# Patient Record
Sex: MALE | Race: WHITE | Employment: OTHER | URBAN - METROPOLITAN AREA
[De-identification: names, ages, dates, MRNs, and addresses within clinical notes are randomized per-mention and may not be internally consistent; named-entity substitution may affect disease eponyms.]

---

## 2017-04-13 ENCOUNTER — TRANSFERRED RECORDS (OUTPATIENT)
Dept: HEALTH INFORMATION MANAGEMENT | Facility: CLINIC | Age: 22
End: 2017-04-13

## 2017-05-19 ENCOUNTER — CARE COORDINATION (OUTPATIENT)
Dept: CASE MANAGEMENT | Facility: CLINIC | Age: 22
End: 2017-05-19

## 2017-05-22 ENCOUNTER — CARE COORDINATION (OUTPATIENT)
Dept: CASE MANAGEMENT | Facility: CLINIC | Age: 22
End: 2017-05-22

## 2017-06-07 ENCOUNTER — CARE COORDINATION (OUTPATIENT)
Dept: CASE MANAGEMENT | Facility: CLINIC | Age: 22
End: 2017-06-07

## 2017-07-05 ENCOUNTER — TRANSFERRED RECORDS (OUTPATIENT)
Dept: HEALTH INFORMATION MANAGEMENT | Facility: CLINIC | Age: 22
End: 2017-07-05

## 2017-07-06 ENCOUNTER — TRANSFERRED RECORDS (OUTPATIENT)
Dept: HEALTH INFORMATION MANAGEMENT | Facility: CLINIC | Age: 22
End: 2017-07-06

## 2017-07-07 ENCOUNTER — OFFICE VISIT (OUTPATIENT)
Dept: PEDIATRICS | Facility: CLINIC | Age: 22
End: 2017-07-07
Payer: COMMERCIAL

## 2017-07-07 ENCOUNTER — CARE COORDINATION (OUTPATIENT)
Dept: CASE MANAGEMENT | Facility: CLINIC | Age: 22
End: 2017-07-07

## 2017-07-07 VITALS
TEMPERATURE: 98.6 F | BODY MASS INDEX: 17.52 KG/M2 | DIASTOLIC BLOOD PRESSURE: 58 MMHG | WEIGHT: 102.6 LBS | SYSTOLIC BLOOD PRESSURE: 114 MMHG | HEIGHT: 64 IN | RESPIRATION RATE: 20 BRPM | HEART RATE: 112 BPM

## 2017-07-07 DIAGNOSIS — G80.1 CP (CEREBRAL PALSY), SPASTIC, DIPLEGIC (H): ICD-10-CM

## 2017-07-07 DIAGNOSIS — Z93.1 FEEDING BY G-TUBE (H): ICD-10-CM

## 2017-07-07 DIAGNOSIS — M41.20 OTHER IDIOPATHIC SCOLIOSIS, UNSPECIFIED SPINAL REGION: ICD-10-CM

## 2017-07-07 DIAGNOSIS — E63.9 NUTRITIONAL DISORDER: ICD-10-CM

## 2017-07-07 DIAGNOSIS — Z86.59 HISTORY OF BEHAVIORAL AND MENTAL HEALTH PROBLEMS: ICD-10-CM

## 2017-07-07 DIAGNOSIS — H54.3 BILATERAL BLINDNESS: ICD-10-CM

## 2017-07-07 DIAGNOSIS — G40.909 SEIZURE DISORDER (H): ICD-10-CM

## 2017-07-07 DIAGNOSIS — G47.9 SLEEP DISORDER: ICD-10-CM

## 2017-07-07 DIAGNOSIS — K59.00 CONSTIPATION, UNSPECIFIED CONSTIPATION TYPE: ICD-10-CM

## 2017-07-07 DIAGNOSIS — F41.9 ANXIETY: ICD-10-CM

## 2017-07-07 DIAGNOSIS — R26.9 ABNORMAL GAIT: ICD-10-CM

## 2017-07-07 DIAGNOSIS — F84.0 AUTISM SPECTRUM DISORDER ASSOCIATED WITH KNOWN MEDICAL OR GENETIC CONDITION OR ENVIRONMENTAL FACTOR, REQUIRING SUBSTANTIAL SUPPORT (LEVEL 2): Primary | ICD-10-CM

## 2017-07-07 DIAGNOSIS — J45.30 MILD PERSISTENT ASTHMA IN ADULT WITHOUT COMPLICATION: ICD-10-CM

## 2017-07-07 DIAGNOSIS — Z87.898 HISTORY OF PREMATURITY: ICD-10-CM

## 2017-07-07 DIAGNOSIS — R63.39 ORAL AVERSION: ICD-10-CM

## 2017-07-07 PROCEDURE — 99205 OFFICE O/P NEW HI 60 MIN: CPT | Performed by: INTERNAL MEDICINE

## 2017-07-07 RX ORDER — HYDROXYZINE HCL 10 MG/5 ML
50 SOLUTION, ORAL ORAL AT BEDTIME
COMMUNITY

## 2017-07-07 RX ORDER — CLOBAZAM 2.5 MG/ML
7.5 SUSPENSION ORAL EVERY MORNING
COMMUNITY

## 2017-07-07 NOTE — NURSING NOTE
"Chief Complaint   Patient presents with     Establish Care       Initial /58  Pulse 112  Temp 98.6  F (37  C) (Axillary)  Resp 20  Ht 5' 3.5\" (1.613 m)  Wt 102 lb 9.6 oz (46.5 kg)  BMI 17.89 kg/m2 Estimated body mass index is 17.89 kg/(m^2) as calculated from the following:    Height as of this encounter: 5' 3.5\" (1.613 m).    Weight as of this encounter: 102 lb 9.6 oz (46.5 kg).  Medication Reconciliation: complete   Ava WILSON, CMA,AAMA        "

## 2017-07-07 NOTE — MR AVS SNAPSHOT
"              After Visit Summary   7/7/2017    Rj Licea    MRN: [id number]           Patient Information     Date Of Birth          1995        Visit Information        Provider Department      7/7/2017 1:20 PM Micky Leyva MD [facility]         Follow-ups after your visit        Your next 10 appointments already scheduled     Sep 19, 2017  2:00 PM CDT   Office Visit with Micky Leyva MD   [facility]    [address] [place] [address]  [address]  [facility] [id number] [phone]           Bring a current list of meds and any records pertaining to this visit.  For Physicals, please bring immunization records and any forms needing to be filled out.  Please arrive 10 minutes early to complete paperwork.              Who to contact     If you have questions or need follow up information about today's clinic visit or your schedule please contact [facility] directly at [phone].  Normal or non-critical lab and imaging results will be communicated to you by MyChart, letter or phone within 4 business days after the clinic has received the results. If you do not hear from us within 7 days, please contact the clinic through [employer] or phone. If you have a critical or abnormal lab result, we will notify you by phone as soon as possible.  Submit refill requests through [employer] or call your pharmacy and they will forward the refill request to us. Please allow 3 business days for your refill to be completed.          Additional Information About Your Visit        Shared [employer] Information     [employer] lets you send messages to your doctor, view your test results, renew your prescriptions, schedule appointments and more. To sign up, go to [email or web address] . Click on \"Log in\" on the left side of the screen, which will take you to the Welcome page. Then click on \"Sign up Now\" on the right side of the page.     You will be asked to enter the " "access code listed below, as well as some personal information. Please follow the directions to create your username and password.     Your access code is: [email or web address]  Expires: 10/5/2017  2:00 PM     Your access code will  in 90 days. If you need help or a new code, please call your [facility] or [phone].        Care EveryWhere ID     This is your Care EveryWhere ID. This could be used by other organizations to access your [facility] medical records  [id number]        Your Vitals Were     Pulse Temperature Respirations Height BMI (Body Mass Index)       112 98.6  F (37  C) (Axillary) 20 5' 3.5\" (1.613 m) 17.89 kg/m2        Blood Pressure from Last 3 Encounters:   17 114/58   07/03/15 (!) 108/100   12 96/64    Weight from Last 3 Encounters:   17 102 lb 9.6 oz (46.5 kg)   07/03/15 108 lb (49 kg) (<1 %)*   12 56 lb (25.4 kg) (<1 %)*     * Growth percentiles are based on CDC 2-20 Years data.              Today, you had the following     No orders found for display       Primary Care Provider Office Phone # Fax #    Micky Leyva [phone]       [facility] [address] [place] [address] [address] [place] [address]        Equal Access to Services     [facility] AH: Hadii aad ku hadasho Soomaali, waaxda luqadaha, qaybta kaalmada adeegyada, tiffanie anguiano . So [facility] [phone].    ATENCIÓN: Si habla español, tiene a norwood disposición servicios gratuitos de asistencia lingüística. Llame al [phone].    We comply with applicable federal civil rights laws and [place] laws. We do not discriminate on the basis of race, color, national origin, age, disability sex, sexual orientation or gender identity.            Thank you!     Thank you for choosing [facility]  for your care. Our goal is always to provide you with excellent care. Hearing back from our patients is one way we can continue to improve our services. Please take a " few minutes to complete the written survey that you may receive in the mail after your visit with us. Thank you!             Your Updated Medication List - Protect others around you: Learn how to safely use, store and throw away your medicines at www.disposemymeds.org.          This list is accurate as of: 7/7/17  2:00 PM.  Always use your most recent med list.                   Brand Name Dispense Instructions for use Diagnosis    ADVAIR HFA IN      Inhale 230 Breaths into the lungs 2 times daily Two puffs bid        clonazePAM 0.5 MG tablet    klonoPIN     Take 2 tablets by mouth daily.    Short stature       cloNIDine 0.1 mg/mL 0.1 mg/mL Soln    CATAPRES     Take 0.1 mg by mouth as needed        FLUOXETINE HCL      10 mLs daily.    Short stature       HYDROcodone-acetaminophen 7.5-325 MG/15ML solution    LORTAB    50 mL    Take 10 mLs by mouth 4 times daily as needed for moderate to severe pain        hydrOXYzine 10 MG/5ML syrup    ATARAX     Take 10 mg by mouth 3 times daily Can take up to qid        LATUDA PO      Take 120 mg by mouth daily        melatonin 1 MG Tabs tablet      Take 1 mg by mouth daily.    Short stature       omeprazole 2 mg/mL Susp    priLOSEC     Take by mouth once At hs        ONFI 2.5 MG/ML suspension   Generic drug:  clobazam      Take 5 mg by mouth 2 times daily 3 ml in the am and 2 ml at hs        penicillin V 250 mg/5 mL suspension    VEETID    100 mL    Take 12 mLs (600 mg) by mouth 2 times daily        RISPERIDONE PO      Take 0.25 mLs by mouth 2 times daily.    Short stature       TEMAZEPAM PO      Take 30 mg by mouth At Bedtime        VALPROIC ACID      8 mLs 3 times daily    Short stature

## 2017-07-07 NOTE — PROGRESS NOTES
"SUBJECTIVE:                                                    Rj Licea is a 21 year old male who presents to clinic today for the following health issues:    Pt presents in clinic with mother to establish care with provider. Mom brought alone medication list and records for provider review.  Hx of seizure  patient here with mom to establish care, excellent transition summary provided in advance of appointment by   previous PCP and patient and mm would like to review that today have list of medications and specialist.s Overall Rj has been doing well, some behavior issues but managed with care plan.   Problem list and histories reviewed & adjusted, as indicated.  Additional history: as documented    Patient Active Problem List    Diagnosis Date Noted     Short stature 03/23/2012     Social History   Substance Use Topics     Smoking status: Never Smoker     Smokeless tobacco: Not on file     Alcohol use No     Family History   Problem Relation Age of Onset     DIABETES Father        ROS:  A complete 10 point review of systems was taken and negative except for those noted in the subjective/HPI section(s) above     BP Readings from Last 3 Encounters:   07/07/17 114/58   07/03/15 (!) 108/100   03/23/12 96/64    Wt Readings from Last 3 Encounters:   07/07/17 102 lb 9.6 oz (46.5 kg)   07/03/15 108 lb (49 kg) (<1 %)*   03/23/12 56 lb (25.4 kg) (<1 %)*     * Growth percentiles are based on CDC 2-20 Years data.                      OBJECTIVE:                                                    /58  Pulse 112  Temp 98.6  F (37  C) (Axillary)  Resp 20  Ht 5' 3.5\" (1.613 m)  Wt 102 lb 9.6 oz (46.5 kg)  BMI 17.89 kg/m2   Constitutional: healthy, alert and no distress; exam somewhat limited due to cooperation  HEENT: normocephalic and atrumatic, mucous membranes moist, op clear,   Cardiovascular: regular rate and rhythm no rubs, gallops or murmurs  Respiratory: clear to auscultation bilaterally in all lung " fields, normal insp/exp effort. Good air movement  Gastrointestinal: Abdomen soft, non-tender. BS normal.   Musculoskeletal: extremities normal- no gross deformities noted, gait normal and normal muscle tone  Skin: no suspicious lesions or rashes  Neurologic: walks with assit, sensation intact and symmetric  Psychiatric: mentation appears normal and affect normal/bright; normal phonation. gets excited/agitated at times but easily redirected              ASSESSMENT/PLAN:                                                        ICD-10-CM    1. Autism spectrum disorder associated with known medical or genetic condition or environmental factor, requiring substantial support (level 2) F84.0    2. Feeding by G-tube (H) Z93.1    3. Seizure disorder (H) G40.909    4. CP (cerebral palsy), spastic, diplegic (H) G80.1    5. Anxiety F41.9    6. Bilateral blindness H54.0    7. Chronic lung disease of prematurity P27.1    8. Constipation, unspecified constipation type K59.00    9. Abnormal gait R26.9    10. History of behavioral and mental health problems Z86.59    11. History of prematurity Z87.898    12. Mild persistent asthma in adult without complication J45.30    13. Nutritional disorder E63.9    14. Oral aversion R63.3    15. Periventricular leukomalacia, associated with prematurity, chronic P91.2     P07.30    16. Other idiopathic scoliosis, unspecified spinal region M41.20    17. Sleep disorder G47.9    discussed with patient (or patient's parents/caregiver) pathophysiology of condition and treatment options.   reviewed history in detail with patient and his mother, reviewed medications, specialists, upcoming appts, etc. Rj is doing well and goal is to have transition to adult cares go as smooth as possible, reviwed clinic workflows, MyChart activated and reviewed with Mom too. Questions answered.  plan to follow-up with me in 2 months for RHM exam, sooner as needed. Patient's parent(s) verbalized understanding and are  "agreeable to this plan.      Estimated body mass index is 17.89 kg/(m^2) as calculated from the following:    Height as of this encounter: 5' 3.5\" (1.613 m).    Weight as of this encounter: 102 lb 9.6 oz (46.5 kg).        Return to clinic as needed or if symptoms persist, change, worsen or if any new symptoms develop.    Total time spent with patient was 60 min, of which greater than 50 minutes of face to face time and/or coordination of care discussing the above issue(s).     Micky Leyva M.D.  Internal Medicine-Pediatrics            "

## 2017-07-20 PROBLEM — R26.9 ABNORMAL GAIT: Status: ACTIVE | Noted: 2017-07-20

## 2017-07-20 PROBLEM — E63.9 NUTRITIONAL DISORDER: Status: ACTIVE | Noted: 2017-07-20

## 2017-07-20 PROBLEM — F84.0: Status: ACTIVE | Noted: 2017-07-20

## 2017-07-20 PROBLEM — G80.1 CP (CEREBRAL PALSY), SPASTIC, DIPLEGIC (H): Status: ACTIVE | Noted: 2017-07-20

## 2017-07-20 PROBLEM — H54.3 BILATERAL BLINDNESS: Status: ACTIVE | Noted: 2017-07-20

## 2017-07-20 PROBLEM — M41.20 OTHER IDIOPATHIC SCOLIOSIS, UNSPECIFIED SPINAL REGION: Status: ACTIVE | Noted: 2017-07-20

## 2017-07-20 PROBLEM — R63.39 ORAL AVERSION: Status: ACTIVE | Noted: 2017-07-20

## 2017-07-20 PROBLEM — K59.00 CONSTIPATION, UNSPECIFIED CONSTIPATION TYPE: Status: ACTIVE | Noted: 2017-07-20

## 2017-07-20 PROBLEM — Z93.1 FEEDING BY G-TUBE (H): Status: ACTIVE | Noted: 2017-07-20

## 2017-07-20 PROBLEM — G40.909 SEIZURE DISORDER (H): Status: ACTIVE | Noted: 2017-07-20

## 2017-07-20 PROBLEM — Z86.59 HISTORY OF BEHAVIORAL AND MENTAL HEALTH PROBLEMS: Status: ACTIVE | Noted: 2017-07-20

## 2017-07-20 PROBLEM — F41.9 ANXIETY: Status: ACTIVE | Noted: 2017-07-20

## 2017-07-20 PROBLEM — Z87.898 HISTORY OF PREMATURITY: Status: ACTIVE | Noted: 2017-07-20

## 2017-07-20 PROBLEM — G47.9 SLEEP DISORDER: Status: ACTIVE | Noted: 2017-07-20

## 2017-07-20 PROBLEM — J45.30 MILD PERSISTENT ASTHMA IN ADULT WITHOUT COMPLICATION: Status: ACTIVE | Noted: 2017-07-20

## 2017-07-20 RX ORDER — LURASIDONE HYDROCHLORIDE 120 MG/1
120 TABLET, FILM COATED ORAL DAILY
Qty: 90 TABLET | Refills: 3 | COMMUNITY
Start: 2017-07-20 | End: 2017-10-13

## 2017-07-20 RX ORDER — FLUTICASONE PROPIONATE AND SALMETEROL XINAFOATE 230; 21 UG/1; UG/1
2 AEROSOL, METERED RESPIRATORY (INHALATION) 2 TIMES DAILY
Qty: 12 G | Refills: 3 | COMMUNITY
Start: 2017-07-20

## 2017-09-29 ENCOUNTER — TRANSFERRED RECORDS (OUTPATIENT)
Dept: HEALTH INFORMATION MANAGEMENT | Facility: CLINIC | Age: 22
End: 2017-09-29

## 2017-10-04 ENCOUNTER — TRANSFERRED RECORDS (OUTPATIENT)
Dept: HEALTH INFORMATION MANAGEMENT | Facility: CLINIC | Age: 22
End: 2017-10-04

## 2017-10-04 LAB
ASTHMA CONTROL TEST SCORE: 25
ER ASTHMA: 0
HOSPITALIZATION ASTHMA: 0

## 2017-10-13 ENCOUNTER — OFFICE VISIT (OUTPATIENT)
Dept: PEDIATRICS | Facility: CLINIC | Age: 22
End: 2017-10-13
Payer: COMMERCIAL

## 2017-10-13 VITALS
RESPIRATION RATE: 24 BRPM | TEMPERATURE: 98.6 F | HEART RATE: 96 BPM | DIASTOLIC BLOOD PRESSURE: 70 MMHG | WEIGHT: 101.8 LBS | SYSTOLIC BLOOD PRESSURE: 106 MMHG | HEIGHT: 63 IN | BODY MASS INDEX: 18.04 KG/M2

## 2017-10-13 DIAGNOSIS — G40.909 SEIZURE DISORDER (H): ICD-10-CM

## 2017-10-13 DIAGNOSIS — G80.1 CP (CEREBRAL PALSY), SPASTIC, DIPLEGIC (H): ICD-10-CM

## 2017-10-13 DIAGNOSIS — Z93.1 FEEDING BY G-TUBE (H): ICD-10-CM

## 2017-10-13 DIAGNOSIS — R23.8 SKIN IRRITATION: ICD-10-CM

## 2017-10-13 DIAGNOSIS — K59.09 CHRONIC CONSTIPATION: ICD-10-CM

## 2017-10-13 DIAGNOSIS — Z23 NEED FOR PROPHYLACTIC VACCINATION AND INOCULATION AGAINST INFLUENZA: ICD-10-CM

## 2017-10-13 DIAGNOSIS — Z00.00 ENCOUNTER FOR ROUTINE ADULT HEALTH EXAMINATION WITHOUT ABNORMAL FINDINGS: Primary | ICD-10-CM

## 2017-10-13 DIAGNOSIS — L70.8 OTHER ACNE: ICD-10-CM

## 2017-10-13 LAB
ASTHMA CONTROL TEST SCORE: 20
ER ASTHMA: 0
HOSPITALIZATION ASTHMA: 0

## 2017-10-13 PROCEDURE — 90471 IMMUNIZATION ADMIN: CPT | Performed by: INTERNAL MEDICINE

## 2017-10-13 PROCEDURE — 99214 OFFICE O/P EST MOD 30 MIN: CPT | Mod: 25 | Performed by: INTERNAL MEDICINE

## 2017-10-13 PROCEDURE — 99395 PREV VISIT EST AGE 18-39: CPT | Mod: 25 | Performed by: INTERNAL MEDICINE

## 2017-10-13 PROCEDURE — 90686 IIV4 VACC NO PRSV 0.5 ML IM: CPT | Performed by: INTERNAL MEDICINE

## 2017-10-13 RX ORDER — POLYETHYLENE GLYCOL 3350 17 G/17G
1 POWDER, FOR SOLUTION ORAL DAILY
Qty: 510 G | Refills: 11 | Status: SHIPPED | OUTPATIENT
Start: 2017-10-13 | End: 2019-02-17

## 2017-10-13 RX ORDER — CLOTRIMAZOLE 1 %
CREAM (GRAM) TOPICAL
Qty: 60 G | Refills: 3 | Status: SHIPPED | OUTPATIENT
Start: 2017-10-13 | End: 2019-01-15

## 2017-10-13 RX ORDER — LURASIDONE HYDROCHLORIDE 80 MG/1
160 TABLET, FILM COATED ORAL DAILY
Qty: 90 TABLET | Refills: 3
Start: 2017-10-13 | End: 2019-01-15

## 2017-10-13 NOTE — PROGRESS NOTES
SUBJECTIVE:   CC: Rj Licea is an 22 year old male who presents for preventative health visit.     Physical   Annual:     Getting at least 3 servings of Calcium per day::  Yes    Bi-annual eye exam::  Yes    Dental care twice a year::  NO    Sleep apnea or symptoms of sleep apnea::  None    Diet::  Other    Frequency of exercise::  None    Taking medications regularly::  Not Applicable    Additional concerns today::  No    Other Concerns:  G-Tube has an odor. Would like to rule out infection or concerns, some skin irritation too. will need adult derm at some point, adult psych too. things right now are pretty good, mood and temper seem well controlled most of the time. taking all medications, no side effects noted. Would liek to review specialists recent notes and reccs. feeds going well.         Today's PHQ-2 Score: PHQ-2 ( 1999 Pf[employer]) 10/13/2017   Q1: Little interest or pleasure in doing things 0   Q2: Feeling down, depressed or hopeless 0   PHQ-2 Score 0   Q1: Little interest or pleasure in doing things Not at all   Q2: Feeling down, depressed or hopeless Not at all   PHQ-2 Score 0       Abuse: Current or Past(Physical, Sexual or Emotional)- NA  Do you feel safe in your environment - NA    Social History   Substance Use Topics     Smoking status: Never Smoker     Smokeless tobacco: Not on file     Alcohol use No     The patient does not drink >3 drinks per day nor >7 drinks per week.    Last PSA: No results found for: PSA    Reviewed orders with patient. Reviewed health maintenance and updated orders accordingly - Yes  BP Readings from Last 3 Encounters:   10/13/17 106/70   07/07/17 114/58   07/03/15 (!) 108/100    Wt Readings from Last 3 Encounters:   10/13/17 101 lb 12.8 oz (46.2 kg)   07/07/17 102 lb 9.6 oz (46.5 kg)   07/03/15 108 lb (49 kg) (<1 %)*     * Growth percentiles are based on CDC 2-20 Years data.                      Reviewed and updated as needed this [facility]          Reviewed and updated as needed this visit by Provider        Past Medical History:   Diagnosis Date     Chronic lung disease     off nebs in 2012     Depression     fluoxetine, risperidone     Epilepsy (H) 5/2011    on keppra (Janaczek)     Feeding by G-tube (H)      GERD (gastroesophageal reflux disease)      Insomnia     melatonin, clonazepam     Retinopathy of prematurity     led to blindness      Patient Active Problem List   Diagnosis     Short stature     CP (cerebral palsy), spastic, diplegic (H)     Sleep disorder     Seizure disorder (H)     Other idiopathic scoliosis, unspecified spinal region     Periventricular leukomalacia, associated with prematurity, chronic     Oral aversion     Nutritional disorder     Mild persistent asthma in adult without complication     History of prematurity     History of behavioral and mental health problems     Abnormal gait     Feeding by G-tube (H)     Constipation, unspecified constipation type     Chronic lung disease of prematurity     Bilateral blindness     Autism spectrum disorder associated with known medical or genetic condition or environmental factor, requiring substantial support (level 2)     Anxiety        ROS:  C: NEGATIVE for fever, chills, change in weight  I: NEGATIVE for worrisome rashes, moles or lesions  EYES: no change, blind  ENT: NEGATIVE for ear, mouth and throat problems  R: NEGATIVE for significant cough or SOB  CV: NEGATIVE for chest pain, palpitations or peripheral edema  GI: NEGATIVE for nausea, abdominal pain, heartburn, or change in bowel habits; + mild irritaiton/erythema superiro to gtube without skin breakdown. no satellite lesions. no vesicles or desquamation.    male: negative for dysuria, hematuria, decreased urinary stream, erectile dysfunction, urethral discharge  M: NEGATIVE for significant arthralgias or myalgia  N: NEGATIVE for weakness, dizziness or paresthesias  P: NEGATIVE for changes in mood or affect    OBJECTIVE:  "  /70  Pulse 96  Temp 98.6  F (37  C) (Oral)  Resp 24  Ht 5' 3.25\" (1.607 m)  Wt 101 lb 12.8 oz (46.2 kg)  BMI 17.89 kg/m2    EXAM:  GENERAL: healthy, alert and no distress  EYES: lids closed, no swelling or erythema  HENT: ear canals and TM's normal, nose and mouth without ulcers or lesions  NECK: no adenopathy, no asymmetry, masses apprecaited  RESP: lungs clear to auscultation - no rales, rhonchi or wheezes  CV: regular rate and rhythm, normal S1 S2, no S3 or S4, no murmur, click or rub, no peripheral edema and peripheral pulses strong  ABDOMEN: soft, nontender, no hepatosplenomegaly, no masses and bowel sounds normal; Gtube in place and mild erythema superior to side, no evidence of cellulitis or infection. ? irritant vs yeast.   MS: no new gross musculoskeletal defects noted  SKIN: as above, also a few facial closed comedones with inflammation and papules  NEURO: Normal strength and tone, mentation intact and speech normal, pressured at times  PSYCH: mentation appears normal, affect normal/bright    ASSESSMENT/PLAN:   1. Encounter for routine adult health examination without abnormal findings  d/w patient and his mohter preventative care measures including seat belt use, bike helmet, moderation of EtOH, avoiding tobacco, avoiding excessive sun exposure/sunscreen, wt management or wt loss if BMI > 30, need to screen for lipid disorders, mood disorders, CAD risk factors, etc. Also discussed accident prevention and future RHM schedule.     2. CP (cerebral palsy), spastic, diplegic (H)  discussed with patient (or patient's parents/caregiver) pathophysiology of condition and treatment options.  doing well, reviwed plans to transition to adult specialists when needed.   - lurasidone (LATUDA) 80 MG TABS tablet; Take 2 tablets (160 mg) by mouth daily  Dispense: 90 tablet; Refill: 3    3. Seizure disorder (H)  Well controlled on current medication(s). discussed with patient (or patient's parents/caregiver) " pathophysiology of condition and treatment options.  reviewed labs, medications, diet ,etc.    - lurasidone (LATUDA) 80 MG TABS tablet; Take 2 tablets (160 mg) by mouth daily  Dispense: 90 tablet; Refill: 3    4. Chronic lung disease of prematurity  Well controlled on current medication(s). discussed with patient (or patient's parents/caregiver) pathophysiology of condition and treatment options.  reviewed labs, medications, diet ,etc.      5. Feeding by G-tube (H)  discussed with patient (or patient's parents/caregiver) pathophysiology of condition and treatment options.  physical exam consistant with ? irritant vs yeast infeciton of the skin, no skin brekadown and symptms mild, recommned prescription a sprescribed, if not better in 1 week coult try over-the-counter hydrocotisone cream to the area. appointment made with Dr. nieves to establish care in a few months. Patient's parent(s) verbalized understanding and are agreeable to this plan.    - clotrimazole (LOTRIMIN) 1 % cream; apply near G-tube site BID as needed  Dispense: 60 g; Refill: 3    6. Need for prophylactic vaccination and inoculation against influenza  - FLU VAC, SPLIT VIRUS IM > 3 YO (QUADRIVALENT) [80541]  - Vaccine Administration, Initial [94935]    7. Skin irritation  as above  - clotrimazole (LOTRIMIN) 1 % cream; apply near G-tube site BID as needed  Dispense: 60 g; Refill: 3  - DERMATOLOGY REFERRAL    8. Other acne  as above  - DERMATOLOGY REFERRAL    9. Chronic constipation  Well controlled on current medication(s). discussed with patient (or patient's parents/caregiver) pathophysiology of condition and treatment options.  reviewed labs, medications, diet ,etc.    - polyethylene glycol (MIRALAX) powder; Take 17 g (1 capful) by mouth daily  Dispense: 510 g; Refill: 11    COUNSELING:   Reviewed preventive health counseling, as reflected in patient instructions  Special attention given to:        Regular exercise       Healthy  "diet/nutrition           reports that he has never smoked. He does not have any smokeless tobacco history on file.      Estimated body mass index is 17.89 kg/(m^2) as calculated from the following:    Height as of 7/7/17: 5' 3.5\" (1.613 m).    Weight as of 7/7/17: 102 lb 9.6 oz (46.5 kg).         Counseling Resources:  ATP IV Guidelines  Pooled Cohorts Equation Calculator  FRAX Risk Assessment  ICSI Preventive Guidelines  Dietary Guidelines for Americans, 2010  USDA's MyPlate  ASA Prophylaxis  Lung CA Screening    I have discussed with patient the risks, benefits, medications, treatment options and modalities.   I have instructed the patient to call or schedule a follow-up appointment if any problems or failure to improve.     Total additonal time spent with patient discussing medical issues as documented above including chronic medical issues, skin issues, MH issues, and transition plans to adult providers, in addition to the physical/wellness exam was 30 min    Micky Leyva MD  [facility]      Answers for HPI/ROS submitted by the patient on 10/13/2017   PHQ-2 Score: 0    Injectable Influenza Immunization Documentation    1.  Is the person to be vaccinated sick today?   No    2. Does the person to be vaccinated have an allergy to a component   of the vaccine?   No    3. Has the person to be vaccinated ever had a serious reaction   to influenza vaccine in the past?   No    4. Has the person to be vaccinated ever had Guillain-Barré syndrome?   No    Form completed by mother         "

## 2017-10-13 NOTE — MR AVS SNAPSHOT
After Visit Summary   10/13/2017    Rj Licea    MRN: [id number]           Patient Information     Date Of Birth          1995        Visit Information        Provider Department      10/13/2017 2:00 PM Micky Leyva MD [facility]        Today's Diagnoses     Encounter for routine adult health examination without abnormal findings    -  1    CP (cerebral palsy), spastic, diplegic (H)        Seizure disorder (H)        Chronic lung disease of prematurity        Feeding by G-tube (H)        Need for prophylactic vaccination and inoculation against influenza        Skin irritation        Other acne        Chronic constipation          Care Instructions      Preventive Health Recommendations  Male Ages 18 - 25     Yearly exam:             See your health care provider every year in order to  o   Review health changes.   o   Discuss preventive care.    o   Review your medicines if your doctor has prescribed any.    You should be tested each year for STDs (sexually transmitted diseases).     Talk to your provider about cholesterol testing.      If you are at risk for diabetes, you should have a diabetes test (fasting glucose).    Shots: Get a flu shot each year. Get a tetanus shot every 10 years.     Nutrition:    Eat at least 5 servings of fruits and vegetables daily.     Eat whole-grain bread, whole-wheat pasta and brown rice instead of white grains and rice.     Talk to your provider about calcium and Vitamin D.     Lifestyle    Exercise for at least 150 minutes a week (30 minutes a day, 5 days a week). This will help you control your weight and prevent disease.     Limit alcohol to one drink per day.     No smoking.     Wear sunscreen to prevent skin cancer.     See your dentist every six months for an exam and cleaning.             Follow-ups after your visit        Additional Services     DERMATOLOGY REFERRAL       Your provider has referred you to: ILEANA: [facility]  [facility]  [phone]   [email or web address]     Please be aware that coverage of these services is subject to the terms and limitations of your health insurance plan.  Call member services at your health plan with any benefit or coverage questions.      Please bring the following with you to your appointment:    (1) Any X-Rays, CTs or MRIs which have been performed.  Contact the facility where they were done to arrange for  prior to your scheduled appointment.    (2) List of current medications  (3) This referral request   (4) Any documents/labs given to you for this referral                  Your next 10 appointments already scheduled     Jan 30, 2018  9:00 AM CST   New Visit with Sandra [facility]seguera MD   [facility]    [address]  [address]  [place] [address] [phone]              Who to contact     If you have questions or need follow up information about today's clinic visit or your schedule please contact [facility] directly at [phone].  Normal or non-critical lab and imaging results will be communicated to you by MyChart, letter or phone within 4 business days after the clinic has received the results. If you do not hear from us within 7 days, please contact the clinic through [facility] or phone. If you have a critical or abnormal lab result, we will notify you by phone as soon as possible.  Submit refill requests through KitchIn or call your pharmacy and they will forward the refill request to us. Please allow 3 business days for your refill to be completed.          Additional Information About Your Visit        [facility] Information     [employer] gives you secure access to your electronic health record. If you see a primary care provider, you can also send messages to your care team and make appointments. If you have questions, please call your primary care clinic.  If you do not have a primary  "care provider, please call [phone] and they will assist you.        Care EveryWhere ID     This is your Care EveryWhere ID. This could be used by other organizations to access your [facility] medical records  [id number]        Your Vitals Were     Pulse Temperature Respirations Height BMI (Body Mass Index)       96 98.6  F (37  C) (Oral) 24 5' 3.25\" (1.607 m) 17.89 kg/m2        Blood Pressure from Last 3 Encounters:   10/13/17 106/70   07/07/17 114/58   07/03/15 (!) 108/100    Weight from Last 3 Encounters:   10/13/17 101 lb 12.8 oz (46.2 kg)   07/07/17 102 lb 9.6 oz (46.5 kg)   07/03/15 108 lb (49 kg) (<1 %)*     * Growth percentiles are based on [facility] 2-20 Years data.              We Performed the Following     DERMATOLOGY REFERRAL     FLU VAC, SPLIT VIRUS IM > 3 YO (QUADRIVALENT) [55641]     Vaccine Administration, Initial [18656]          Today's Medication Changes          These changes are accurate as of: 10/13/17  2:51 PM.  If you have any questions, ask your nurse or doctor.               Start taking these medicines.        Dose/Directions    clotrimazole 1 % cream   Commonly known as:  LOTRIMIN   Used for:  Feeding by G-tube (H), Skin irritation   Started by:  Micky Leyva MD        apply near G-tube site BID as needed   Quantity:  60 g   Refills:  3       polyethylene glycol powder   Commonly known as:  MIRALAX   Used for:  Chronic constipation   Started by:  Micky Leyva MD        Dose:  1 capful   Take 17 g (1 capful) by mouth daily   Quantity:  510 g   Refills:  11         These medicines have changed or have updated prescriptions.        Dose/Directions    lurasidone 80 MG Tabs tablet   Commonly known as:  LATUDA   This may have changed:    - medication strength  - how much to take  - additional instructions   Used for:  CP (cerebral palsy), spastic, diplegic (H), Seizure disorder (H)   Changed by:  Micky Leyva MD        Dose:  160 mg   Take 2 tablets (160 mg) by mouth daily "   Quantity:  90 tablet   Refills:  3         Stop taking these medicines if you haven't already. Please contact your care team if you have questions.     cloNIDine 0.1 mg/mL 0.1 mg/mL Soln   Commonly known as:  CATAPRES   Stopped by:  Micky Leyva MD                Where to get your medicines      These medications were sent to [id number] - [place] - [address] [address] AT [address]  [address] [address] [place] [facility] [place] [place] [address]     Phone:  [phone]     clotrimazole 1 % cream    polyethylene glycol powder         Some of these will need a paper prescription and others can be bought over the counter.  Ask your nurse if you have questions.     You don't need a prescription for these medications     lurasidone 80 MG Tabs tablet                Primary Care Provider Office Phone # Fax #    Micky Leyva [phone] [address] [place] [address]        Equal Access to Services     [facility] AH: Hadii aad ku hadasho Soomaali, waaxda luqadaha, qaybta kaalmada adeegyada, waxay idiin hayaan adeeg floresitaaradanielle la'eleazar . So [facility] [phone].    ATENCIÓN: Si habla español, tiene a [place]wood disposición servicios gratuitos de asistencia lingüística. [facility] [phone].    We comply with applicable federal civil rights laws and [place] laws. We do not discriminate on the basis of race, color, national origin, age, disability, sex, sexual orientation, or gender identity.            Thank you!     Thank you for choosing [facility]  for your care. Our goal is always to provide you with excellent care. Hearing back from our patients is one way we can continue to improve our services. Please take a few minutes to complete the written survey that you may receive in the mail after your visit with us. Thank you!             Your Updated Medication List - Protect others around you: Learn how to safely use, store and throw away your  medicines at www.disposemymeds.org.          This list is accurate as of: 10/13/17  2:51 PM.  Always use your most recent med list.                   Brand Name Dispense Instructions for use Diagnosis    ADVAIR -21 MCG/ACT inhaler   Generic drug:  fluticasone-salmeterol     12 g    Inhale 2 puffs into the lungs 2 times daily Two puffs bid        clotrimazole 1 % cream    LOTRIMIN    60 g    apply near G-tube site BID as needed    Feeding by G-tube (H), Skin irritation       HYDROcodone-acetaminophen 7.5-325 MG/15ML solution    LORTAB    50 mL    Take 10 mLs by mouth 4 times daily as needed for moderate to severe pain        hydrOXYzine 10 MG/5ML syrup    ATARAX     Take 10 mg by mouth as needed Can take up to qid        lurasidone 80 MG Tabs tablet    LATUDA    90 tablet    Take 2 tablets (160 mg) by mouth daily    CP (cerebral palsy), spastic, diplegic (H), Seizure disorder (H)       melatonin 1 MG Tabs tablet      Take 1 mg by mouth daily.    Short stature       omeprazole 2 mg/mL Susp    priLOSEC    800 mL    Take 20 mLs (40 mg) by mouth At Bedtime At hs        ONFI 2.5 MG/ML suspension   Generic drug:  clobazam      Take 5 mg by mouth 2 times daily 3 ml in the am and 2 ml at hs        penicillin V 250 mg/5 mL suspension    VEETID    100 mL    Take 12 mLs (600 mg) by mouth 2 times daily        polyethylene glycol powder    MIRALAX    510 g    Take 17 g (1 capful) by mouth daily    Chronic constipation       RISPERIDONE PO      Take 0.25 mLs by mouth 2 times daily.    Short stature       TEMAZEPAM PO      Take 30 mg by mouth At Bedtime        VALPROIC ACID      8 mLs 3 times daily    Short stature

## 2017-10-20 ENCOUNTER — TELEPHONE (OUTPATIENT)
Dept: PEDIATRICS | Facility: CLINIC | Age: 22
End: 2017-10-20

## 2017-10-21 ASSESSMENT — ASTHMA QUESTIONNAIRES: ACT_TOTALSCORE: 25

## 2018-01-26 ENCOUNTER — TRANSFERRED RECORDS (OUTPATIENT)
Dept: HEALTH INFORMATION MANAGEMENT | Facility: CLINIC | Age: 23
End: 2018-01-26

## 2018-01-31 DIAGNOSIS — Z79.899 ENCOUNTER FOR LONG-TERM (CURRENT) USE OF MEDICATIONS: Primary | ICD-10-CM

## 2018-01-31 DIAGNOSIS — G40.019 LOCALZ-RELATED IDIOP EPILEP W SZ OF LOCALZ ONSET, INTRACT, WO STATUS (H): ICD-10-CM

## 2018-02-05 ENCOUNTER — HOSPITAL ENCOUNTER (OUTPATIENT)
Dept: LAB | Facility: CLINIC | Age: 23
Discharge: HOME | End: 2018-02-05
Attending: PSYCHIATRY & NEUROLOGY | Admitting: PSYCHIATRY & NEUROLOGY
Payer: COMMERCIAL

## 2018-02-05 DIAGNOSIS — Z79.899 ENCOUNTER FOR LONG-TERM (CURRENT) USE OF MEDICATIONS: ICD-10-CM

## 2018-02-05 DIAGNOSIS — G40.019 LOCALZ-RELATED IDIOP EPILEP W SZ OF LOCALZ ONSET, INTRACT, WO STATUS (H): ICD-10-CM

## 2018-02-05 LAB
AMMONIA PLAS-SCNC: 18 UMOL/L (ref 10–50)
VALPROATE SERPL-MCNC: 140 MG/L (ref 50–100)

## 2018-02-05 PROCEDURE — 36415 COLL VENOUS BLD VENIPUNCTURE: CPT | Performed by: PSYCHIATRY & NEUROLOGY

## 2018-02-05 PROCEDURE — 82140 ASSAY OF AMMONIA: CPT | Performed by: PSYCHIATRY & NEUROLOGY

## 2018-02-05 PROCEDURE — 80164 ASSAY DIPROPYLACETIC ACD TOT: CPT | Performed by: PSYCHIATRY & NEUROLOGY

## 2018-04-09 ENCOUNTER — MYC MEDICAL ADVICE (OUTPATIENT)
Dept: PEDIATRICS | Facility: CLINIC | Age: 23
End: 2018-04-09

## 2018-04-10 NOTE — TELEPHONE ENCOUNTER
Mom looking for new behavioral health & sleep provider. Current is Dr. Srinivasan. Please advise.

## 2018-04-13 ENCOUNTER — TRANSFERRED RECORDS (OUTPATIENT)
Dept: HEALTH INFORMATION MANAGEMENT | Facility: CLINIC | Age: 23
End: 2018-04-13

## 2018-04-23 ENCOUNTER — TELEPHONE (OUTPATIENT)
Dept: PEDIATRICS | Facility: CLINIC | Age: 23
End: 2018-04-23

## 2018-04-24 ASSESSMENT — ASTHMA QUESTIONNAIRES: ACT_TOTALSCORE: 20

## 2018-05-03 ENCOUNTER — TELEPHONE (OUTPATIENT)
Dept: PEDIATRICS | Facility: CLINIC | Age: 23
End: 2018-05-03

## 2018-05-03 DIAGNOSIS — G80.1 CP (CEREBRAL PALSY), SPASTIC, DIPLEGIC (H): ICD-10-CM

## 2018-05-03 DIAGNOSIS — F41.9 ANXIETY: Primary | ICD-10-CM

## 2018-05-03 RX ORDER — LORAZEPAM 1 MG/1
TABLET ORAL
Qty: 1 TABLET | Refills: 0 | Status: CANCELLED | OUTPATIENT
Start: 2018-05-03

## 2018-05-03 RX ORDER — LORAZEPAM 0.5 MG/1
TABLET ORAL
Qty: 2 TABLET | Refills: 0 | Status: ON HOLD | OUTPATIENT
Start: 2018-05-03 | End: 2020-05-28

## 2018-05-03 NOTE — TELEPHONE ENCOUNTER
Routing to Dr. Leyva to advise on premedication for dental visit. Teed up Lorazepam.     Fax rx to [facility] in [place]. Call back mom at number below.

## 2018-05-03 NOTE — TELEPHONE ENCOUNTER
Mother calling to say that patient has a dental appointment on 5/5 and she is requesting a medication to help the patient with anxiety/to relax during the appointment.  She reports that they have used Versed and Lorazepam in the past.      Please call mother to discuss.     [phone] (home)

## 2019-01-05 ENCOUNTER — MYC MEDICAL ADVICE (OUTPATIENT)
Dept: PEDIATRICS | Facility: CLINIC | Age: 24
End: 2019-01-05

## 2019-01-07 NOTE — TELEPHONE ENCOUNTER
Mom requesting expedited appt. with you.  Please advise when you can see pt.    See Litigain message below for details.    Wt Readings from Last 5 Encounters:   10/13/17 46.2 kg (101 lb 12.8 oz)   07/07/17 46.5 kg (102 lb 9.6 oz)   07/03/15 49 kg (108 lb) (<1 %)*   03/23/12 25.4 kg (55 lb 16 oz) (<1 %)*     * Growth percentiles are based on CDC (Boys, 2-20 Years) data.         Brittany Ybarra RN Flex

## 2019-01-08 ENCOUNTER — MYC MEDICAL ADVICE (OUTPATIENT)
Dept: PEDIATRICS | Facility: CLINIC | Age: 24
End: 2019-01-08

## 2019-01-08 NOTE — TELEPHONE ENCOUNTER
I spoke with mom on the phone.  Appointment scheduled:  Next 5 appointments (look out 90 days)    Carter 15, 2019 12:20 PM CST  Office Visit with Micky Leyva MD  [facility]) [address] [place] [id number] [phone]        Kalani Adan RN  Message handled by Nurse Triage.

## 2019-01-08 NOTE — TELEPHONE ENCOUNTER
Okay to overbook at noon or 1220 next week on Tuesday or Friday, please book for 40 min appointment.

## 2019-01-08 NOTE — TELEPHONE ENCOUNTER
Call to patient.  Appointment rescheduled:    Next 5 appointments (look out 90 days)    Carter 15, 2019 12:20 PM CST  Office Visit with Micky Leyva MD  [facility] [address] [place] [address] [phone]        Kalani Adan RN  Message handled by Nurse Triage.

## 2019-01-15 ENCOUNTER — OFFICE VISIT (OUTPATIENT)
Dept: PEDIATRICS | Facility: CLINIC | Age: 24
End: 2019-01-15
Payer: COMMERCIAL

## 2019-01-15 ENCOUNTER — MYC MEDICAL ADVICE (OUTPATIENT)
Dept: PEDIATRICS | Facility: CLINIC | Age: 24
End: 2019-01-15

## 2019-01-15 ENCOUNTER — ANCILLARY PROCEDURE (OUTPATIENT)
Dept: GENERAL RADIOLOGY | Facility: CLINIC | Age: 24
End: 2019-01-15
Payer: COMMERCIAL

## 2019-01-15 VITALS
WEIGHT: 80.3 LBS | BODY MASS INDEX: 14.78 KG/M2 | DIASTOLIC BLOOD PRESSURE: 59 MMHG | HEART RATE: 76 BPM | HEIGHT: 62 IN | TEMPERATURE: 97.8 F | SYSTOLIC BLOOD PRESSURE: 88 MMHG

## 2019-01-15 DIAGNOSIS — R63.39 ORAL AVERSION: ICD-10-CM

## 2019-01-15 DIAGNOSIS — Z87.898 HISTORY OF PREMATURITY: ICD-10-CM

## 2019-01-15 DIAGNOSIS — Z93.1 GASTROSTOMY TUBE DEPENDENT (H): ICD-10-CM

## 2019-01-15 DIAGNOSIS — E63.9 NUTRITIONAL DISORDER: ICD-10-CM

## 2019-01-15 DIAGNOSIS — R63.4 LOSS OF WEIGHT: Primary | ICD-10-CM

## 2019-01-15 DIAGNOSIS — G47.9 SLEEP DISORDER: ICD-10-CM

## 2019-01-15 DIAGNOSIS — G40.909 SEIZURE DISORDER (H): ICD-10-CM

## 2019-01-15 DIAGNOSIS — G80.1 CP (CEREBRAL PALSY), SPASTIC, DIPLEGIC (H): ICD-10-CM

## 2019-01-15 DIAGNOSIS — Z86.59 HISTORY OF BEHAVIORAL AND MENTAL HEALTH PROBLEMS: ICD-10-CM

## 2019-01-15 LAB
BASOPHILS # BLD AUTO: 0.1 10E9/L (ref 0–0.2)
BASOPHILS NFR BLD AUTO: 0.5 %
CRP SERPL-MCNC: <2.9 MG/L (ref 0–8)
DIFFERENTIAL METHOD BLD: ABNORMAL
EOSINOPHIL # BLD AUTO: 0.2 10E9/L (ref 0–0.7)
EOSINOPHIL NFR BLD AUTO: 1.8 %
ERYTHROCYTE [DISTWIDTH] IN BLOOD BY AUTOMATED COUNT: 11.9 % (ref 10–15)
ERYTHROCYTE [SEDIMENTATION RATE] IN BLOOD BY WESTERGREN METHOD: 1 MM/H (ref 0–15)
HBA1C MFR BLD: 4.8 % (ref 0–5.6)
HCT VFR BLD AUTO: 42.5 % (ref 40–53)
HGB BLD-MCNC: 14.2 G/DL (ref 13.3–17.7)
LIPASE SERPL-CCNC: 170 U/L (ref 73–393)
LITHIUM SERPL-SCNC: 0.88 MMOL/L (ref 0.6–1.2)
LYMPHOCYTES # BLD AUTO: 4 10E9/L (ref 0.8–5.3)
LYMPHOCYTES NFR BLD AUTO: 42.8 %
MCH RBC QN AUTO: 33.4 PG (ref 26.5–33)
MCHC RBC AUTO-ENTMCNC: 33.4 G/DL (ref 31.5–36.5)
MCV RBC AUTO: 100 FL (ref 78–100)
MONOCYTES # BLD AUTO: 0.6 10E9/L (ref 0–1.3)
MONOCYTES NFR BLD AUTO: 6.9 %
NEUTROPHILS # BLD AUTO: 4.4 10E9/L (ref 1.6–8.3)
NEUTROPHILS NFR BLD AUTO: 48 %
PLATELET # BLD AUTO: 197 10E9/L (ref 150–450)
RBC # BLD AUTO: 4.25 10E12/L (ref 4.4–5.9)
VALPROATE SERPL-MCNC: 121 MG/L (ref 50–100)
WBC # BLD AUTO: 9.3 10E9/L (ref 4–11)

## 2019-01-15 PROCEDURE — 74019 RADEX ABDOMEN 2 VIEWS: CPT

## 2019-01-15 PROCEDURE — 85652 RBC SED RATE AUTOMATED: CPT | Performed by: INTERNAL MEDICINE

## 2019-01-15 PROCEDURE — 36415 COLL VENOUS BLD VENIPUNCTURE: CPT | Performed by: INTERNAL MEDICINE

## 2019-01-15 PROCEDURE — 83516 IMMUNOASSAY NONANTIBODY: CPT | Mod: 59 | Performed by: INTERNAL MEDICINE

## 2019-01-15 PROCEDURE — 71046 X-RAY EXAM CHEST 2 VIEWS: CPT

## 2019-01-15 PROCEDURE — 84439 ASSAY OF FREE THYROXINE: CPT | Performed by: INTERNAL MEDICINE

## 2019-01-15 PROCEDURE — 83690 ASSAY OF LIPASE: CPT | Performed by: INTERNAL MEDICINE

## 2019-01-15 PROCEDURE — 86140 C-REACTIVE PROTEIN: CPT | Performed by: INTERNAL MEDICINE

## 2019-01-15 PROCEDURE — 84134 ASSAY OF PREALBUMIN: CPT | Performed by: INTERNAL MEDICINE

## 2019-01-15 PROCEDURE — 83036 HEMOGLOBIN GLYCOSYLATED A1C: CPT | Performed by: INTERNAL MEDICINE

## 2019-01-15 PROCEDURE — 82784 ASSAY IGA/IGD/IGG/IGM EACH: CPT | Performed by: INTERNAL MEDICINE

## 2019-01-15 PROCEDURE — 80164 ASSAY DIPROPYLACETIC ACD TOT: CPT | Performed by: INTERNAL MEDICINE

## 2019-01-15 PROCEDURE — 80178 ASSAY OF LITHIUM: CPT | Performed by: INTERNAL MEDICINE

## 2019-01-15 PROCEDURE — 99215 OFFICE O/P EST HI 40 MIN: CPT | Performed by: INTERNAL MEDICINE

## 2019-01-15 PROCEDURE — 84443 ASSAY THYROID STIM HORMONE: CPT | Performed by: INTERNAL MEDICINE

## 2019-01-15 PROCEDURE — 80053 COMPREHEN METABOLIC PANEL: CPT | Performed by: INTERNAL MEDICINE

## 2019-01-15 PROCEDURE — 85025 COMPLETE CBC W/AUTO DIFF WBC: CPT | Performed by: INTERNAL MEDICINE

## 2019-01-15 PROCEDURE — 83516 IMMUNOASSAY NONANTIBODY: CPT | Performed by: INTERNAL MEDICINE

## 2019-01-15 ASSESSMENT — MIFFLIN-ST. JEOR: SCORE: 1238.49

## 2019-01-15 NOTE — PROGRESS NOTES
SUBJECTIVE:   Rj Licea is a 23 year old male who presents to clinic today for the following health issues    Weight loss.    Rj presents today with his mother for evaluation of weight loss.  Patient's mother and his sister who are his primary caregivers had really noticed much weight he lost however And recently had an appointment with 1 of the specialists and they were shocked to see he was down to 80 pounds.  He has been getting his normal feeds the same amounts and concentrations over the last several years without complication.  He has not been more active than usual in the last year.  He does have 24-hour supervision.  He has not any fevers chills abdominal pain nausea vomiting diarrhea constipation or any other symptoms.  No rash.  His mood overall has seemed to be okay.  However of note approximately 6 or so months ago Rj's father left the family.  He gave up his parental rights basically walked out the door without notice.  This is been very stressful obviously for Rj and his family.  His mother feels like she and her daughter are coping well but this is been quite a shock.  She does think that this may have affected In some way but she is not sure how this would have resulted in weight loss.  Given gets all of his calories from the tube feeds and these have not changed his metabolic rate by her estimation has not improved and there is been no hypermetabolic processes including no infectious process fevers etc.  He is taking all his medications.  There is been no significant adjustments recently.  He said no seizures.  Patient's mother is very concerned with weight loss and would like to discuss further workup and evaluation today.      Problem list and histories reviewed & adjusted, as indicated.  Additional history: as documented        Patient Active Problem List    Diagnosis Date Noted     CP (cerebral palsy), spastic, diplegic (H) 07/20/2017     Priority: Medium     Sleep disorder  07/20/2017     Priority: Medium     Seizure disorder (H) 07/20/2017     Priority: Medium     Other idiopathic scoliosis, unspecified spinal region 07/20/2017     Priority: Medium     Periventricular leukomalacia, associated with prematurity, chronic 07/20/2017     Priority: Medium     Oral aversion 07/20/2017     Priority: Medium     Nutritional disorder 07/20/2017     Priority: Medium     Mild persistent asthma in adult without complication 07/20/2017     Priority: Medium     History of prematurity, 23 wk 400 grams 07/20/2017     Priority: Medium     History of behavioral and mental health problems 07/20/2017     Priority: Medium     Abnormal gait 07/20/2017     Priority: Medium     Feeding by G-tube (H) 07/20/2017     Priority: Medium     Constipation, unspecified constipation type 07/20/2017     Priority: Medium     Chronic lung disease of prematurity 07/20/2017     Priority: Medium     Bilateral blindness 07/20/2017     Priority: Medium     Autism spectrum disorder associated with known medical or genetic condition or environmental factor, requiring substantial support (level 2) 07/20/2017     Priority: Medium     Anxiety 07/20/2017     Priority: Medium     Short stature 03/23/2012     Priority: Medium     Social History     Tobacco Use     Smoking status: Never Smoker   Substance Use Topics     Alcohol use: No     Drug use: No     Family History   Problem Relation Age of Onset     Diabetes Father        ROS:  A complete 10 point review of systems was taken and negative except for those noted in the subjective/HPI section(s) above     BP Readings from Last 3 Encounters:   01/15/19 (!) 88/59   10/13/17 106/70   07/07/17 114/58    Wt Readings from Last 3 Encounters:   01/15/19 36.4 kg (80 lb 4.8 oz)   10/13/17 46.2 kg (101 lb 12.8 oz)   07/07/17 46.5 kg (102 lb 9.6 oz)                    OBJECTIVE:                                                    BP (!) 88/59   Pulse 76   Temp 97.8  F (36.6  C) (Tympanic)    "Ht 1.575 m (5' 2\")   Wt 36.4 kg (80 lb 4.8 oz)   BMI 14.69 kg/m     Wt Readings from Last 5 Encounters:   01/15/19 36.4 kg (80 lb 4.8 oz)   10/13/17 46.2 kg (101 lb 12.8 oz)   07/07/17 46.5 kg (102 lb 9.6 oz)   07/03/15 49 kg (108 lb) (<1 %)*   03/23/12 25.4 kg (55 lb 16 oz) (<1 %)*     * Growth percentiles are based on CDC (Boys, 2-20 Years) data.       Constitutional: healthy, alert and no distress; listing to walkman and interactive  Head: normocephalic and atrumatic   Neck: Neck supple. No adenopathy.   ENT: no neck nodes or sinus tenderness tympanic membranes within normal limits bilaterally, no tonsillar hypertrophy or exudates apprecaited  Cardiovascular: regular rate and rhythm no rubs, gallops or murmurs normal S1/S2; no S3 or S4  Respiratory: clear to auscultation bilaterally in all lung fields, normal insp/exp effort. Good air movement  Gastrointestinal: Abdomen soft, non-tender. BS normal. GT in place, No masses, organomegaly  Musculoskeletal: extremities normal- no gross deformities noted, gait normal and normal muscle tone  Skin: no suspicious lesions or rashes  Neurologic: Sensation grossly WNL.  Psychiatric: mentation appears at baseline, affect flat    Results for orders placed or performed in visit on 01/15/19   XR Chest 2 Views    Narrative    CHEST TWO VIEWS January 15, 2019 12:35 PM     HISTORY: Evaluate for acute illness. Loss of weight. History of  prematurity. Nutritional disorder. Oral aversion. Gastrostomy tube  dependent (H).    COMPARISON: None.     FINDINGS: There are no acute infiltrates. The cardiac silhouette is  not enlarged. Pulmonary vasculature is unremarkable.       Impression    IMPRESSION: No acute disease.    ANDRA HURLEY MD   XR Abdomen 2 Views    Narrative    ABDOMEN TWO-THREE VIEW January 15, 2019 12:35 PM     HISTORY: Significant weight loss, multiple medical issues. Nonfocal  exam and poor historian, evaluate for acute illness. History of  prematurity. Nutritional " disorder. Oral aversion. Gastrostomy tube  dependent (H).    COMPARISON: None.    FINDINGS: Moderate to large amount of stool. No free air. There are no  air filled distended loops of small bowel. The colon is not distended.  The lung bases are unremarkable.      Impression    IMPRESSION: Nonobstructed bowel gas pattern.    ANDRA HURLEY MD   Valproic acid   Result Value Ref [facility]    Valproic Acid Level 121 (H) 50 - 100 mg/L   Lithium level   Result Value Ref [facility]    Lithium Level 0.88 0.60 - 1.20 mmol/L   CBC with platelets differential   Result Value Ref [facility]    WBC 9.3 4.0 - 11.0 10e9/L    RBC Count 4.25 (L) 4.4 - 5.9 10e12/L    Hemoglobin 14.2 13.3 - 17.7 g/dL    Hematocrit 42.5 40.0 - 53.0 %     78 - 100 fl    MCH 33.4 (H) 26.5 - 33.0 pg    MCHC 33.4 31.5 - 36.5 g/dL    RDW 11.9 10.0 - 15.0 %    Platelet Count 197 150 - 450 10e9/L    % Neutrophils 48.0 %    % Lymphocytes 42.8 %    % Monocytes 6.9 %    % Eosinophils 1.8 %    % Basophils 0.5 %    Absolute Neutrophil 4.4 1.6 - 8.3 10e9/L    Absolute Lymphocytes 4.0 0.8 - 5.3 10e9/L    Absolute Monocytes 0.6 0.0 - 1.3 10e9/L    Absolute Eosinophils 0.2 0.0 - 0.7 10e9/L    Absolute Basophils 0.1 0.0 - 0.2 10e9/L    Diff Method Automated Method    Lipase   Result Value Ref [facility]    Lipase 170 73 - 393 U/L   TSH with free T4 reflex   Result Value Ref [facility]    TSH 6.10 (H) 0.40 - 4.00 mU/L   Hemoglobin A1c   Result Value Ref [facility]    Hemoglobin A1C 4.8 0 - 5.6 %   Erythrocyte sedimentation rate auto   Result Value Ref [facility]    Sed Rate 1 0 - 15 mm/h   CRP inflammation   Result Value Ref [facility]    CRP Inflammation <2.9 0.0 - 8.0 mg/L   Comprehensive metabolic panel   Result Value Ref [facility]    Sodium 139 133 - 144 mmol/L    Potassium 5.0 3.4 - 5.3 mmol/L    Chloride 103 94 - 109 mmol/L    Carbon Dioxide 29 20 - 32 mmol/L    Anion Gap 7 3 - 14 mmol/L    Glucose 100 (H) 70 - 99 mg/dL    Urea Nitrogen 16 7 - 30 mg/dL    Creatinine 0.81 0.66 - 1.25 mg/dL    GFR  Estimate >90 >60 mL/min/[1.73_m2]    GFR Estimate If Black >90 >60 mL/min/[1.73_m2]    Calcium 10.2 (H) 8.5 - 10.1 mg/dL    Bilirubin Total 0.4 0.2 - 1.3 mg/dL    Albumin 4.0 3.4 - 5.0 g/dL    Protein Total 7.2 6.8 - 8.8 g/dL    Alkaline Phosphatase 68 40 - 150 U/L    ALT 34 0 - 70 U/L    AST 21 0 - 45 U/L   Tissue transglutaminase radha IgA and IgG   Result Value Ref [facility]    Tissue Transglutaminase Antibody IgA <1 <7 U/mL    Tissue Transglutaminase Radha IgG 1 <7 U/mL   IgA   Result Value Ref [facility]    IGA 54 (L) 70 - 380 mg/dL   Prealbumin   Result Value Ref [facility]    Prealbumin 27 15 - 45 mg/dL   T4 free   Result Value Ref [facility]    T4 Free 0.87 0.76 - 1.46 ng/dL              ASSESSMENT/PLAN:                                                        ICD-10-CM    1. Loss of weight R63.4 Valproic acid     Lithium level     CBC with platelets differential     Lipase     TSH with free T4 reflex     Hemoglobin A1c     Erythrocyte sedimentation rate auto     CRP inflammation     Comprehensive metabolic panel     XR Chest 2 Views     XR Abdomen 2 Views     Tissue transglutaminase radha IgA and IgG     IgA     Prealbumin     *UA reflex to Microscopic and Culture ([facility] (except [place] and [place])     T4 free     T4 free     CANCELED: *UA reflex to Microscopic and Culture ([facility] (except [place] and [place])   2. CP (cerebral palsy), spastic, diplegic (H) G80.1 Valproic acid     Lithium level   3. Seizure disorder (H) G40.909 Valproic acid     Lithium level   4. History of behavioral and mental health problems Z86.59 CBC with platelets differential   5. History of prematurity Z87.898 Valproic acid     Lithium level     CBC with platelets differential     Lipase     TSH with free T4 reflex     Hemoglobin A1c     Erythrocyte sedimentation rate auto     CRP inflammation     Comprehensive metabolic panel     XR Chest 2 Views     XR Abdomen 2 Views     Tissue transglutaminase radha IgA and  IgG     IgA     Prealbumin     CANCELED: *UA reflex to Microscopic and Culture ([facility] (except [place] and [place])   6. Nutritional disorder E63.9 Valproic acid     Lithium level     CBC with platelets differential     Lipase     TSH with free T4 reflex     Hemoglobin A1c     Erythrocyte sedimentation rate auto     CRP inflammation     Comprehensive metabolic panel     XR Chest 2 Views     XR Abdomen 2 Views     Tissue transglutaminase radha IgA and IgG     IgA     Prealbumin     CANCELED: *UA reflex to Microscopic and Culture ([facility] (except [place] and [place])   7. Oral aversion R63.3 Valproic acid     Lithium level     CBC with platelets differential     Lipase     TSH with free T4 reflex     Hemoglobin A1c     Erythrocyte sedimentation rate auto     CRP inflammation     Comprehensive metabolic panel     XR Chest 2 Views     XR Abdomen 2 Views     Tissue transglutaminase radha IgA and IgG     IgA     Prealbumin     CANCELED: *UA reflex to Microscopic and Culture ([facility] (except [place] and [place])   8. Sleep disorder G47.9 Valproic acid     Lithium level     TSH with free T4 reflex   9. Gastrostomy tube dependent (H) Z93.1 CBC with platelets differential     Lipase     Hemoglobin A1c     Comprehensive metabolic panel     XR Chest 2 Views     XR Abdomen 2 Views   discussed with patient (or patient's parents/caregiver) pathophysiology of condition and treatment options.  Reviewed the history in detail today with Rj and his mother.  Physical exam today is fairly unremarkable but is somewhat limited due to Cavins tolerance of an exam as well as participation.  It is extremely concerning the patient has lost 20 pounds especially in the setting of consistency in his feeds and activity level.  There is been no history of any hypermetabolic process that were aware of.  No fevers.  I do agree that a comprehensive evaluation is warranted and we will  "go ahead and get laboratory imaging as ordered today.  This includes plain films of the chest and abdomen.  Laboratory evaluation including antiepileptic levels complete blood count markers of inflammation electrolytes screen for celiac disease nutrition labs etc.  Results of this evaluation will help us with the next steps.  I also think nutrition evaluation would be warranted as it is possible Cavins caloric needs have changed.  Go ahead and order this and consider a formal gastroenterology evaluation pending the results.  Patient's mother is very supportive of this plan.  I also reviewed that the stress at home likely is having an effect on Rj.  While he is unable to say if he is sad or depressed the significant changes at home have likely affected the whole family and I strongly encouraged all members to seek out support.  Including mental health evaluation to help with coping etc.  The patient's mother reports they have consider this and will look into it.  Plan for close follow-up until we can get ahead of this weight loss.  The patient's mother verbalized understanding and is very agreeable to this plan.  Follow-up pending results.      Estimated body mass index is 14.69 kg/m  as calculated from the following:    Height as of this encounter: 1.575 m (5' 2\").    Weight as of this encounter: 36.4 kg (80 lb 4.8 oz).      Return to clinic as needed or if symptoms persist, change, worsen or if any new symptoms develop.    Total time spent with patient was 60 min, of which greater than 50 minutes of face to face time and/or coordination of care discussing the above issue(s).     Micky Leyva M.D.  Internal Medicine-Pediatrics              "

## 2019-01-16 ENCOUNTER — MYC MEDICAL ADVICE (OUTPATIENT)
Dept: PEDIATRICS | Facility: CLINIC | Age: 24
End: 2019-01-16

## 2019-01-16 DIAGNOSIS — R63.4 WEIGHT LOSS: Primary | ICD-10-CM

## 2019-01-16 LAB
ALBUMIN SERPL-MCNC: 4 G/DL (ref 3.4–5)
ALP SERPL-CCNC: 68 U/L (ref 40–150)
ALT SERPL W P-5'-P-CCNC: 34 U/L (ref 0–70)
ANION GAP SERPL CALCULATED.3IONS-SCNC: 7 MMOL/L (ref 3–14)
AST SERPL W P-5'-P-CCNC: 21 U/L (ref 0–45)
BILIRUB SERPL-MCNC: 0.4 MG/DL (ref 0.2–1.3)
BUN SERPL-MCNC: 16 MG/DL (ref 7–30)
CALCIUM SERPL-MCNC: 10.2 MG/DL (ref 8.5–10.1)
CHLORIDE SERPL-SCNC: 103 MMOL/L (ref 94–109)
CO2 SERPL-SCNC: 29 MMOL/L (ref 20–32)
CREAT SERPL-MCNC: 0.81 MG/DL (ref 0.66–1.25)
GFR SERPL CREATININE-BSD FRML MDRD: >90 ML/MIN/{1.73_M2}
GLUCOSE SERPL-MCNC: 100 MG/DL (ref 70–99)
IGA SERPL-MCNC: 54 MG/DL (ref 70–380)
POTASSIUM SERPL-SCNC: 5 MMOL/L (ref 3.4–5.3)
PREALB SERPL IA-MCNC: 27 MG/DL (ref 15–45)
PROT SERPL-MCNC: 7.2 G/DL (ref 6.8–8.8)
SODIUM SERPL-SCNC: 139 MMOL/L (ref 133–144)
T4 FREE SERPL-MCNC: 0.87 NG/DL (ref 0.76–1.46)
TSH SERPL DL<=0.005 MIU/L-ACNC: 6.1 MU/L (ref 0.4–4)
TTG IGA SER-ACNC: <1 U/ML
TTG IGG SER-ACNC: 1 U/ML

## 2019-01-16 NOTE — TELEPHONE ENCOUNTER
Pt's mother sent back a [employer] reply to 's note.    Referrals placed in chart and mother given information regarding these via [employer] message.  Awaiting to here back from mom regarding starting a low dose of thyroid medication.    Qian Toribio RN

## 2019-01-16 NOTE — TELEPHONE ENCOUNTER
Please see My Chart message below and advise as appropriate.  Liane OWENS RN - Triage  [facility]

## 2019-01-18 ENCOUNTER — PATIENT OUTREACH (OUTPATIENT)
Dept: CARE COORDINATION | Facility: CLINIC | Age: 24
End: 2019-01-18

## 2019-01-18 DIAGNOSIS — E63.9 NUTRITIONAL DISORDER: Primary | ICD-10-CM

## 2019-01-18 ASSESSMENT — ACTIVITIES OF DAILY LIVING (ADL): DEPENDENT_IADLS:: COOKING;LAUNDRY;SHOPPING;MEAL PREPARATION;MEDICATION MANAGEMENT;TRANSPORTATION;INCONTINENCE

## 2019-01-18 NOTE — PROGRESS NOTES
"Clinic Care Coordination Contact    Clinic Care Coordination Contact  OUTREACH    Referral Information:  Referral Source: PCP  RN CC received referral from PCP:   \"Reason for Referral: to see if we can get an out-patient nutrition consult to better assess Rj's caloric needs.     Clinical Staff have discussed the Care Coordination Referral with the patient and/or caregiver: yes-with citlaly Taveras's mother.    Primary Diagnosis: Neurological Disorders\"    Chief Complaint   Patient presents with     Clinic Care Coordination - Initial     Coordination of GI and Nutrition follow up         Clinical Concerns:  Current Medical Concerns:  Patient's mom and guardian, Darcy Licea, has concerns that despite altering G-tube feedings at home, patient has been unable to get in adequate calorie nutrition, and therefor is losing weight, most recently down to about 80 lbs.   Mom reports that historically, patient has had a G-tube for about 20 years; he used to consume 30% of his caloric intake via oral feedings, and supplement 70% via the G-tube, most recently this has not been the case and he is now reliant on \"95% or so\" of his calories through his G-tube.    Mom reports because of poor nutrition, the patient does not have enough stamina to ambulate, transfer, or complete tasks as he normally would.     Current Behavioral Concerns: No behavioral concerns per mom   Education Provided to patient:       Medication Management:  Caregivers (mom) set up and manage medication regimen; newly starting synthroid, no outstanding questions presently regarding medication regimen.    Functional Status:  Dependent ADLs: Bathing, Dressing, Eating, Grooming, Incontinence, Positioning  Dependent IADLs: Cooking, Laundry, Shopping, Meal Preparation, Medication Management, Transportation, Incontinence  Mobility Status: Dependent/Assisted by Another    Living Situation:  Current living arrangement:: I live in a private home with " "family    Diet/Exercise/Sleep:  Inadequate nutrition (GOAL): Yes  Tube Feeding: Yes  Tube Feeding: G-tube  Exercise: Unable to exercise  Inadequate activity/exercise (GOAL):: No  Significant changes in sleep pattern (GOAL): No    Transportation:  Transportation concerns (GOAL): No  Transportation means: Family, Regular car     Psychosocial:  Mental health DX: No  Mental health management concern (GOAL):: No  Informal Support system: Family  Mom reports she recently went back to court and present guardianship document is not accurate; awaiting court papers, but patient's father Saturnino Licea will no longer be guardian; the guardianship duties will be shared between mom and patient's sister, who is also his PCA.     RN CC reported to mom we will need an updated copy of guardianship as soon as that is available as present legal documents in chart outline both parents as dual guardians.     Financial/Insurance:   Financial/Insurance concerns (GOAL):: No  No concerns      Resources and Interventions:  Current Resources:    ;   Community Resources: [facility] Programs (waiver), PCA  Supplies used at home:: Nutritional Supplements       Advance Care Plan/Directive  Advanced Care Plans/Directives on file:: No  Advanced Care Plan/Directive Status: Not Applicable    Referrals Placed: Outpatient Services (Nutrition Consult)    RN CC assisted in conference call with [employer] , able to secure appointment for \"next available\" which is presently April 26th @ 0840 AM with Dr. Madrigal.  Mom requested call if any sooner cancellations open up the schedule for patient.    RN CC also assisted in scheduling outpatient nutrition consult with Cira Madrigal on 1/23/19 @ 4930 at the [facility] location. Instructed mom to keep a detailed food log in advance of appointment, including any oral or tube feedings.     Patient/Caregiver understanding: Caregiver verbalized understanding, engaged in AIDET communication behavior during " encounter.    Outreach Frequency: monthly  Future Appointments              In 5 days Cira Melton, RD, LD [facility], [place] [facility]    In 3 months Joseline Madrigal MD [facility], [facility]          Plan:   Outside of assisting in establishing care with specialty follow ups, patient's mom/guardian denies any outstanding needs for care coordination or new referrals.     RN CC will outreach once more in 2-3 weeks proactively in follow up of nutrition services appointment.     RN CC will alert PCP of appointments, including timeline.     Asia Barriga RN   Clinic Care Coordinator-[facility]  PH: [phone]

## 2019-01-30 ENCOUNTER — TELEPHONE (OUTPATIENT)
Dept: GASTROENTEROLOGY | Facility: CLINIC | Age: 24
End: 2019-01-30

## 2019-01-30 NOTE — TELEPHONE ENCOUNTER
Mother returned my phone call this am.  Due to patients significant lung disease he is not able to attend an appointment today with Dr. King due to the extreme cold. Dr. King had a cancellation  on her schedule next week and the patient is scheduled for Feb 6th at 9 am. Direction were given tot he mother to the clinic. Will send a my chart message with all the information of the appointment.

## 2019-01-31 ASSESSMENT — ENCOUNTER SYMPTOMS
HEMOPTYSIS: 0
SNORES LOUDLY: 0
CHILLS: 0
ALTERED TEMPERATURE REGULATION: 0
JOINT SWELLING: 0
WEIGHT GAIN: 0
NECK PAIN: 0
MEMORY LOSS: 0
FEVER: 0
HALLUCINATIONS: 0
WEAKNESS: 1
DIZZINESS: 1
NIGHT SWEATS: 0
DECREASED CONCENTRATION: 0
SKIN CHANGES: 0
STIFFNESS: 0
MYALGIAS: 0
FATIGUE: 1
COUGH: 0
TREMORS: 1
SPEECH CHANGE: 1
HEADACHES: 1
PARALYSIS: 0
WHEEZING: 1
NAIL CHANGES: 0
INSOMNIA: 1
POOR WOUND HEALING: 0
DISTURBANCES IN COORDINATION: 1
ARTHRALGIAS: 0
SHORTNESS OF BREATH: 1
NUMBNESS: 0
POLYDIPSIA: 0
SPUTUM PRODUCTION: 0
NERVOUS/ANXIOUS: 1
TINGLING: 0
MUSCLE CRAMPS: 0
BACK PAIN: 0
DECREASED APPETITE: 1
DYSPNEA ON EXERTION: 0
POSTURAL DYSPNEA: 0
WEIGHT LOSS: 1
COUGH DISTURBING SLEEP: 0
LOSS OF CONSCIOUSNESS: 0
MUSCLE WEAKNESS: 1
INCREASED ENERGY: 1
PANIC: 0
DEPRESSION: 1
POLYPHAGIA: 0

## 2019-02-04 NOTE — TELEPHONE ENCOUNTER
FUTURE VISIT INFORMATION      FUTURE VISIT INFORMATION:    Date: 2/6/19    Time: 9AM    [place] [facility] [place]  REFERRAL INFORMATION:    Referring provider:  Dr. Micky Leyva    Referring providers clinic:  [facility]    Reason for visit/diagnosis: Weight loss     NOTES STATUS DETAILS   OFFICE NOTE from referring provider Internal [facility] Medical Advice, 1/15/19   OFFICE NOTE from other specialist N/A    DISCHARGE SUMMARY from hospital N/A    OPERATIVE REPORT N/A    MEDICATION LIST Internal         ENDOSCOPY  N/A    COLONOSCOPY N/A    ERCP N/A    EUS N/A    STOOL TESTING N/A    PERTINENT LABS Internal    PATHOLOGY REPORTS (RELATED) N/A    IMAGING (CT, MRI, EGD) Internal PACS

## 2019-02-05 ENCOUNTER — TELEPHONE (OUTPATIENT)
Dept: GASTROENTEROLOGY | Facility: CLINIC | Age: 24
End: 2019-02-05

## 2019-02-05 NOTE — TELEPHONE ENCOUNTER
Called and spoke with patient's Mother-Darcy reminding her of appointment for 2/6/19 at 9AM with directions to facility.

## 2019-02-06 ENCOUNTER — PRE VISIT (OUTPATIENT)
Dept: GASTROENTEROLOGY | Facility: CLINIC | Age: 24
End: 2019-02-06

## 2019-02-06 ENCOUNTER — OFFICE VISIT (OUTPATIENT)
Dept: GASTROENTEROLOGY | Facility: CLINIC | Age: 24
End: 2019-02-06
Payer: COMMERCIAL

## 2019-02-06 VITALS
WEIGHT: 81.4 LBS | HEIGHT: 62 IN | BODY MASS INDEX: 14.98 KG/M2 | SYSTOLIC BLOOD PRESSURE: 100 MMHG | HEART RATE: 86 BPM | DIASTOLIC BLOOD PRESSURE: 64 MMHG

## 2019-02-06 DIAGNOSIS — R63.4 WEIGHT LOSS: Primary | ICD-10-CM

## 2019-02-06 DIAGNOSIS — K59.09 OTHER CONSTIPATION: ICD-10-CM

## 2019-02-06 ASSESSMENT — MIFFLIN-ST. JEOR: SCORE: 1243.48

## 2019-02-06 ASSESSMENT — PATIENT HEALTH QUESTIONNAIRE - PHQ9
10. IF YOU CHECKED OFF ANY PROBLEMS, HOW DIFFICULT HAVE THESE PROBLEMS MADE IT FOR YOU TO DO YOUR WORK, TAKE CARE OF THINGS AT HOME, OR GET ALONG WITH OTHER PEOPLE: SOMEWHAT DIFFICULT
SUM OF ALL RESPONSES TO PHQ QUESTIONS 1-9: 20
SUM OF ALL RESPONSES TO PHQ QUESTIONS 1-9: 20

## 2019-02-06 NOTE — PROGRESS NOTES
"GI CLINIC VISIT    CC/REFERRING MD:  Micky Leyva  REASON FOR CONSULTATION: weight loss     ASSESSMENT/PLAN:  Rj Licea is a 23 year old male with a past medical history of cerebral palsy, autism spectrum disorder, anxiety, insomnia, G-tube for tube feeding, chronic lung disease, seizure disorder and depression presenting for weight loss.    1. Weight loss  Patient has had marked weight loss from 101 lbs on 10/13/17, to 81 lbs today. Patient's mother notes changes in eating by mouth by Mr. Licea including slower eating, and periods where he is expericing \"reflux\" and will not want to eat by mouth. They compensate by increasing his tube feeding, however he continues to lose weight. Has had labs through his primary care provider notable for elevated TSH for which the patient is currently supplementing with levothyroxine.    Patient is experiencing pronounced constipation with a recent abdominal x-ray notable for significant stool burden, and a bowel movement every 4 days with associated straining. Severe constipation could certainly lead to pain/discomfort, decreased appetite, nausea, and difficulty tolerating po intake. We did discuss pursuit of en EGD to evaluate for stricture, ulceration,  outlet obstruction, mass, etc which could impact po intake, appetite, and weight. We also would consider non-invasive evaluation with CT scan of the patient's abdomen and pelvis. Notably the patient will require general anesthesia for sedation for both studies.    Ms. Licea would prefer to hold off on these tests at this time and pursue treatment for constipation, but would be open to these studies if Rj doesn't respond to the interventions below.       Will plan to treat constipation with increased Miralax at 3 capfuls a day for 4 days as a prolonged \"clean-out\", with plan for maintenance dose of miralax to be increased to 2 capfuls a day after four days. Will have patient's mother track bowel movements using " [place] Stool Scale on blank calender to evaluate for improvement in symptoms.     Patient's current TF formula and diet does not have significant fiber which may be impacting consistency of stool. Appreciate recommendations from dietitian next week- may benefit from increasing soluble fiber with either change in supplementation vs. adding dissolved benefiber to current tube feeds. Referral to our GI dietitian was declined as pt is already planned to meet with dietitian in the near future.   -- take Miralax three times a day for 4 days  -- after this, decrease Miralax to 2 times a day   -- track bowel movements on blank calender (amount and consistency on blank calender)    RTC 4-6 weeks     Thank you for this consultation.  It was a pleasure to participate in the care of this patient; please contact us with any further questions.       Macey Ruiz PA-C  Division of Gastroenterology, Hepatology & Nutrition  [facility]    ---------------------------------------  Scribe Disclosure:   I, Macey Ruiz, am serving as a scribe; to document services personally performed by Genesis King MD- -based on data collection and the provider's statements to me.      Provider Disclosure:  I agree with above History, Review of Systems, Physical exam and Plan.  I have reviewed the content of the documentation and have edited it as needed. I have personally performed the services documented here and the documentation accurately represents those services and the decisions I have made.      It was a pleasure to participate in the care of this patient; please contact us with any further questions.  A total of 45 face-to-face minutes was spent with this patient, >50% of which was counseling regarding the above delineated issues     Electronically signed by:  Genesis King MD     --------------------------------------        HPI  Rj Licea is a 23 year old male with a past medical history of cerebral  "palsy, autism spectrum disorder, anxiety, insomnia, chronic G-tube for tube feeding, chronic lung disease, seizure disorder and depression presenting for weight loss. History obtained primarily through patient's mother. The patient's mother reports that starting in the fall of this year, she noticed declining weight at medical appointments. At baseline, the patient weighs around 100 lbs and more recently has been around 80. She denies any significant changes in medications, medical history, or surgical history around the time of the patient's weight loss. Of note, the patient's father has  from the family, but she shares that it is unclear to what degree the patient comprehends this. Patient's primary source of nutrition is tube feeds through the G-tube including Pediasure, and Nutren 2 mixed with whole milk. He will sometimes drink milk or have yogurt or pudding by mouth.     More recently, the patient's mother notes that Rj has been eating more slowly by mouth. Previously he would drink a half of a glass of milk in 5-10 minutes, and now it can take the patient up to an hour. He is also less interested in eating despite her attempts to remove distractions. Patient also has times which she describes as \"reflux\" in which the patient will stop drinking PO and will regurgitate foods and liquids. This appears to be just the recently swallowed food or drink as opposed to emesis of full stomach contents. She has been increasing the amount of tube feeding if the patient does not take PO intake. Overall, she feels as though he receives the same amount of nutrition despite eating less PO. His activity level has not increased recently and if anything, the patient has less energy.     Mr. Licea is currently having bowel movements every 4 days which his mother describes as a large amount of firm stool requiring straining. If he goes more than 5 days without bowel movement, she will add prune juice into his G-tube. " It is unclear if the patient is experiencing any source of abdominal pain.     Patient has recently been sleeping poorly which has been controlled with melatonin and lithium. Within the past week, he has slept 2-3 hours a night which his patient attributes to his mental health conditions. He also seems to occasionally have issues with talking, fatigue, and increased irritation which his mother attributes to possible seizures.     Patient has seen Dr. Sanders from [facility] in the past, unclear what patient had done for workup. He was also recently seen by his primary care provider Dr. Leyva. He ordered an abdominal x-ray notable for moderate to large stool burden, unremarkable chest x-ray, and labs including CBC, TTG, ESR, CMP and lithium level and valproic acid level which were within normal limit. Patient had an elevated TSH of 6.10 with increase in levothyroxine dose.     ROS:    ROS obtained from patient's mother   No fevers or chills  + weight loss  + patient is completely blind   No sore throat  No lymphadenopathy  + headache  paraesthesias, or weakness in a limb  No shortness of breath or wheezing  No chest pain or pressure  - hard to say arthralgias or myalgias   + dry skin   No odynophagia or dysphagia + drinks very slowly   No BRBPR, hematochezia, melena  No dysuria, frequency or urgency  No hot/cold intolerance or polyria  No anxiety or depression    PROBLEM LIST  Patient Active Problem List    Diagnosis Date Noted     CP (cerebral palsy), spastic, diplegic (H) 07/20/2017     Priority: Medium     Sleep disorder 07/20/2017     Priority: Medium     Seizure disorder (H) 07/20/2017     Priority: Medium     Other idiopathic scoliosis, unspecified spinal region 07/20/2017     Priority: Medium     Periventricular leukomalacia, associated with prematurity, chronic 07/20/2017     Priority: Medium     Oral aversion 07/20/2017     Priority: Medium     Nutritional disorder 07/20/2017     Priority: Medium     Mild  persistent asthma in adult without complication 07/20/2017     Priority: Medium     History of prematurity, 23 wk 400 grams 07/20/2017     Priority: Medium     History of behavioral and mental health problems 07/20/2017     Priority: Medium     Abnormal gait 07/20/2017     Priority: Medium     Feeding by G-tube (H) 07/20/2017     Priority: Medium     Constipation, unspecified constipation type 07/20/2017     Priority: Medium     Chronic lung disease of prematurity 07/20/2017     Priority: Medium     Bilateral blindness 07/20/2017     Priority: Medium     Autism spectrum disorder associated with known medical or genetic condition or environmental factor, requiring substantial support (level 2) 07/20/2017     Priority: Medium     Anxiety 07/20/2017     Priority: Medium     Short stature 03/23/2012     Priority: Medium       PERTINENT PAST MEDICAL HISTORY:  Past Medical History:   Diagnosis Date     Chronic lung disease     off nebs in 2012     Depression     fluoxetine, risperidone     Epilepsy (H) 5/2011    on keppra (Janaczek)     Feeding by G-tube (H)      GERD (gastroesophageal reflux disease)      Insomnia     melatonin, clonazepam     Retinopathy of prematurity     led to blindness       PREVIOUS SURGERIES:  ear-tube surgery  eye surgeries  inguinal hernia repair    Past Surgical History:   Procedure Laterality Date     GASTROSTOMY TUBE         PREVIOUS ENDOSCOPY:  Patient's mother does not believe he has had endoscopic evaluation     ALLERGIES:   No Known Allergies    PERTINENT MEDICATIONS:    Current Outpatient Medications:      clobazam (ONFI) 2.5 MG/ML suspension, Take 5 mg by mouth 2 times daily 3 ml in the am and 2 ml at hs, Disp: , Rfl:      fluticasone-salmeterol (ADVAIR HFA) 230-21 MCG/ACT inhaler, Inhale 2 puffs into the lungs 2 times daily Two puffs bid, Disp: 12 g, Rfl: 3     hydrOXYzine (ATARAX) 10 MG/5ML syrup, Take 10 mg by mouth as needed Can take up to qid , Disp: , Rfl:      levothyroxine  "(SYNTHROID/LEVOTHROID) 25 MCG tablet, Take 1 tablet (25 mcg) by mouth daily, Disp: 90 tablet, Rfl: 3     LORazepam (ATIVAN) 0.5 MG tablet, Take 1-2 tablet(s) 30 minutes prior to dental visit.  Do not operate a vehicle after taking this medication, Disp: 2 tablet, Rfl: 0     melatonin 1 MG TABS, Take 1 mg by mouth daily., Disp: , Rfl:      omeprazole (PRILOSEC) 2 mg/mL SUSP, Take 20 mLs (40 mg) by mouth At Bedtime At , Disp: 800 mL, Rfl: 11     polyethylene glycol (MIRALAX) powder, Take 17 g (1 capful) by mouth daily, Disp: 510 g, Rfl: 11     VALPROIC ACID, 7 mLs 3 times daily , Disp: , Rfl:     SOCIAL HISTORY:  Social History     Socioeconomic History     Marital status: Single     Spouse name: Not on file     Number of children: Not on file     Years of education: Not on file     Highest education level: Not on file   Social Needs     Financial resource strain: Not on file     Food insecurity - worry: Not on file     Food insecurity - inability: Not on file     Transportation needs - medical: Not on file     Transportation needs - non-medical: Not on file   Occupational History     Not on file   Tobacco Use     Smoking status: Never Smoker     Smokeless tobacco: Never Used   Substance and Sexual Activity     Alcohol use: No     Drug use: No     Sexual activity: No   Other Topics Concern     Parent/sibling w/ CABG, MI or angioplasty before 65F 55M? Not Asked   Social History Narrative     Not on file       FAMILY HISTORY:  FH of CRC: none   FH of IBD: none   Family History   Problem Relation Age of Onset     Diabetes Father        Past/family/social history reviewed and no changes    PHYSICAL EXAMINATION:  Constitutional: aaox3, thin appearing man, cooperative, pleasant, not dyspneic/diaphoretic, no acute distress  Vitals reviewed: /64   Pulse 86   Ht 1.575 m (5' 2\")   Wt 36.9 kg (81 lb 6.4 oz)   BMI 14.89 kg/m    Wt:   Wt Readings from Last 2 Encounters:   02/06/19 36.9 kg (81 lb 6.4 oz)   01/15/19 36.4 " kg (80 lb 4.8 oz)      Eyes: Sclera anicteric/injected  Ears/nose/mouth/throat: Normal oropharynx without ulcers or exudate, mucus membranes moist, hearing intact  Neck: supple, thyroid normal size  CV: No edema  Respiratory: Unlabored breathing  Abd:  Nondistended, +bs, no hepatosplenomegaly, nontender, no peritoneal signs  Skin: warm, perfused, no jaundice  Psych: patient anxious, repetitive questions   MSK: Normal gait      PERTINENT STUDIES:    Office Visit on 01/15/2019   Component Date Value Ref Range Status     Valproic Acid Level 01/15/2019 121* 50 - 100 mg/L Final     Lithium Level 01/15/2019 0.88  0.60 - 1.20 mmol/L Final     WBC 01/15/2019 9.3  4.0 - 11.0 10e9/L Final     RBC Count 01/15/2019 4.25* 4.4 - 5.9 10e12/L Final     Hemoglobin 01/15/2019 14.2  13.3 - 17.7 g/dL Final     Hematocrit 01/15/2019 42.5  40.0 - 53.0 % Final     MCV 01/15/2019 100  78 - 100 fl Final     MCH 01/15/2019 33.4* 26.5 - 33.0 pg Final     MCHC 01/15/2019 33.4  31.5 - 36.5 g/dL Final     RDW 01/15/2019 11.9  10.0 - 15.0 % Final     Platelet Count 01/15/2019 197  150 - 450 10e9/L Final     % Neutrophils 01/15/2019 48.0  % Final     % Lymphocytes 01/15/2019 42.8  % Final     % Monocytes 01/15/2019 6.9  % Final     % Eosinophils 01/15/2019 1.8  % Final     % Basophils 01/15/2019 0.5  % Final     Absolute Neutrophil 01/15/2019 4.4  1.6 - 8.3 10e9/L Final     Absolute Lymphocytes 01/15/2019 4.0  0.8 - 5.3 10e9/L Final     Absolute Monocytes 01/15/2019 0.6  0.0 - 1.3 10e9/L Final     Absolute Eosinophils 01/15/2019 0.2  0.0 - 0.7 10e9/L Final     Absolute Basophils 01/15/2019 0.1  0.0 - 0.2 10e9/L Final     Diff Method 01/15/2019 Automated Method   Final     Lipase 01/15/2019 170  73 - 393 U/L Final     TSH 01/15/2019 6.10* 0.40 - 4.00 mU/L Final     Hemoglobin A1C 01/15/2019 4.8  0 - 5.6 % Final     Sed Rate 01/15/2019 1  0 - 15 mm/h Final     CRP Inflammation 01/15/2019 <2.9  0.0 - 8.0 mg/L Final     Sodium 01/15/2019 139  133 -  144 mmol/L Final     Potassium 01/15/2019 5.0  3.4 - 5.3 mmol/L Final     Chloride 01/15/2019 103  94 - 109 mmol/L Final     Carbon Dioxide 01/15/2019 29  20 - 32 mmol/L Final     Anion Gap 01/15/2019 7  3 - 14 mmol/L Final     Glucose 01/15/2019 100* 70 - 99 mg/dL Final     Urea Nitrogen 01/15/2019 16  7 - 30 mg/dL Final     Creatinine 01/15/2019 0.81  0.66 - 1.25 mg/dL Final     GFR Estimate 01/15/2019 >90  >60 mL/min/[1.73_m2] Final     GFR Estimate If Black 01/15/2019 >90  >60 mL/min/[1.73_m2] Final     Calcium 01/15/2019 10.2* 8.5 - 10.1 mg/dL Final     Bilirubin Total 01/15/2019 0.4  0.2 - 1.3 mg/dL Final     Albumin 01/15/2019 4.0  3.4 - 5.0 g/dL Final     Protein Total 01/15/2019 7.2  6.8 - 8.8 g/dL Final     Alkaline Phosphatase 01/15/2019 68  40 - 150 U/L Final     ALT 01/15/2019 34  0 - 70 U/L Final     AST 01/15/2019 21  0 - 45 U/L Final     Tissue Transglutaminase Antibody I* 01/15/2019 <1  <7 U/mL Final     Tissue Transglutaminase Marti IgG 01/15/2019 1  <7 U/mL Final     IGA 01/15/2019 54* 70 - 380 mg/dL Final     Prealbumin 01/15/2019 27  15 - 45 mg/dL Final     T4 Free 01/15/2019 0.87  0.76 - 1.46 ng/dL Final         Answers for HPI/ROS submitted by the patient on 1/31/2019   General Symptoms: Yes  Skin Symptoms: Yes  HENT Symptoms: No  EYE SYMPTOMS: No  HEART SYMPTOMS: No  LUNG SYMPTOMS: Yes  INTESTINAL SYMPTOMS: No  URINARY SYMPTOMS: No  REPRODUCTIVE SYMPTOMS: No  SKELETAL SYMPTOMS: Yes  BLOOD SYMPTOMS: No  NERVOUS SYSTEM SYMPTOMS: Yes  MENTAL HEALTH SYMPTOMS: Yes  Fever: No  Loss of appetite: Yes  Weight loss: Yes  Weight gain: No  Fatigue: Yes  Night sweats: No  Chills: No  Increased stress: Yes  Excessive hunger: No  Excessive thirst: No  Feeling hot or cold when others believe the temperature is normal: No  Loss of height: No  Post-operative complications: No  Surgical site pain: No  Hallucinations: No  Change in or Loss of Energy: Yes  Hyperactivity: No  Confusion: No  Changes in hair:  No  Changes in moles/birth marks: No  Itching: No  Rashes: Yes  Changes in nails: No  Acne: No  Change in facial hair: No  Warts: No  Non-healing sores: No  Scarring: No  Flaking of skin: Yes  Color changes of hands/feet in cold : No  Sun sensitivity: No  Cough: No  Sputum or phlegm: No  Coughing up blood: No  Difficulty breating or shortness of breath: Yes  Snoring: No  Wheezing: Yes  Difficulty breathing on exertion: No  Nighttime Cough: No  Difficulty breathing when lying flat: No  Back pain: No  Muscle aches: No  Neck pain: No  Swollen joints: No  Joint pain: No  Bone pain: No  Muscle cramps: No  Muscle weakness: Yes  Joint stiffness: No  Bone fracture: No  Trouble with coordination: Yes  Dizziness or trouble with balance: Yes  Fainting or black-out spells: No  Memory loss: No  Headache: Yes  Speech problems: Yes  Tingling: No  Tremor: Yes  Weakness: Yes  Difficulty walking: Yes  Paralysis: No  Numbness: No  Nervous or Anxious: Yes  Depression: Yes  Trouble sleeping: Yes  Trouble thinking or concentrating: No  Mood changes: Yes  Panic attacks: No  If you checked off any problems, how difficult have these problems made it for you to do your work, take care of things at home, or get along with other people?: Somewhat difficult  PHQ9 TOTAL SCORE: 20

## 2019-02-06 NOTE — PATIENT INSTRUCTIONS
It was a pleasure taking care of you today.  I've included a brief summary of our discussion and care plan from today's visit below.  Please review this information with your primary care provider.  ______________________________________________________________________    My recommendations are summarized as follows:    -- plan to meet with dietitian- either today or next week to discuss caloric intake     -- take Miralax three times a day for 4 days  -- after this, decrease Miralax to 2 times a day   -- track bowel movements on blank calender (amount and consistency on blank calender)     Return to GI Clinic in 4-6 weeks with Macey Ruiz PA-C to review your progress.    ______________________________________________________________________    Who do I call with any questions after my visit?  Please be in touch if there are any further questions that arise following today's visit.  There are multiple ways to contact your gastroenterology care team.        During business hours, you may reach a Gastroenterology nurse at [phone]      To schedule or reschedule an appointment, please call [phone].       You can always send a secure message through [employer].  [employer] messages are answered by your nurse or doctor typically within 24 hours.  Please allow extra time on weekends and holidays.        For urgent/emergent questions after business hours, you may reach the on-call GI Fellow by contacting the [facility] at [phone].     How will I get the results of any tests ordered?    You will receive all of your results.  If you have signed up for [employer], any tests ordered at your visit will be available to you after your physician reviews them.  Typically this takes 1-2 weeks.  If there are urgent results that require a change in your care plan, your physician or nurse will call you to discuss the next steps.      What is MyChart?  [employer] is a secure way for you to access all of your  healthcare records from the [facility].  It is a web based computer program, so you can sign on to it from any location.  It also allows you to send secure messages to your care team.  I recommend signing up for [employer] access if you have not already done so and are comfortable with using a computer.      How to I schedule a follow-up visit?  If you did not schedule a follow-up visit today, please call [phone] to schedule a follow-up office visit.        Sincerely,    Macey Ruiz PA-C  Division of Gastroenterology, Hepatology & Nutrition  [facility]

## 2019-02-06 NOTE — NURSING NOTE
"Chief Complaint   Patient presents with     New Patient     New patient consult       Vitals:    02/06/19 0916   BP: 100/64   Pulse: 86   Weight: 36.9 kg (81 lb 6.4 oz)   Height: 1.575 m (5' 2\")       Body mass index is 14.89 kg/m .  RENETTA Torres                          "

## 2019-02-06 NOTE — LETTER
"2/6/2019       RE: Rj Licea  [address] [facility] [address]     Dear Colleague,    Thank you for referring your patient, Rj Licea, to the [facility] GASTROENTEROLOG[facility] IBD [facility]INIC at [facility]. Please see a copy of my visit note below.    GI CLINIC VISIT    CC/REFERRING MD:  Micky Leyva  REASON FOR CONSULTATION: weight loss     ASSESSMENT/PLAN:  Rj Licea is a 23 year old male with a past medical history of cerebral palsy, autism spectrum disorder, anxiety, insomnia, G-tube for tube feeding, chronic lung disease, seizure disorder and depression presenting for weight loss.    1. Weight loss  Patient has had marked weight loss from 101 lbs on 10/13/17, to 81 lbs today. Patient's mother notes changes in eating by mouth by Mr. Licea including slower eating, and periods where he is expericing \"reflux\" and will not want to eat by mouth. They compensate by increasing his tube feeding, however he continues to lose weight. Has had labs through his primary care provider notable for elevated TSH for which the patient is currently supplementing with levothyroxine.    Patient is experiencing pronounced constipation with a recent abdominal x-ray notable for significant stool burden, and a bowel movement every 4 days with associated straining. Severe constipation could certainly lead to pain/discomfort, decreased appetite, nausea, and difficulty tolerating po intake. We did discuss pursuit of en EGD to evaluate for stricture, ulceration,  outlet obstruction, mass, etc which could impact po intake, appetite, and weight. We also would consider non-invasive evaluation with CT scan of the patient's abdomen and pelvis. Notably the patient will require general anesthesia for sedation for both studies.    Ms. Licea would prefer to hold off on these tests at this time and pursue treatment for constipation, but would be open to these studies if Rj doesn't respond " "to the interventions below.       Will plan to treat constipation with increased Miralax at 3 capfuls a day for 4 days as a prolonged \"clean-out\", with plan for maintenance dose of miralax to be increased to 2 capfuls a day after four days. Will have patient's mother track bowel movements using [place] Stool Scale on blank calender to evaluate for improvement in symptoms.     Patient's current TF formula and diet does not have significant fiber which may be impacting consistency of stool. Appreciate recommendations from dietitian next week- may benefit from increasing soluble fiber with either change in supplementation vs. adding dissolved benefiber to current tube feeds. Referral to our GI dietitian was declined as pt is already planned to meet with dietitian in the near future.   -- take Miralax three times a day for 4 days  -- after this, decrease Miralax to 2 times a day   -- track bowel movements on blank calender (amount and consistency on blank calender)    RTC 4-6 weeks     Thank you for this consultation.  It was a pleasure to participate in the care of this patient; please contact us with any further questions.     Macey Ruiz PA-C  Division of Gastroenterology, Hepatology & Nutrition  [facility]    ---------------------------------------  Scribe Disclosure:   I, Macey Ruiz, am serving as a scribe; to document services personally performed by Genesis King MD- -based on data collection and the provider's statements to me.      Provider Disclosure:  I agree with above History, Review of Systems, Physical exam and Plan.  I have reviewed the content of the documentation and have edited it as needed. I have personally performed the services documented here and the documentation accurately represents those services and the decisions I have made.      It was a pleasure to participate in the care of this patient; please contact us with any further questions.  A total of 45 face-to-face " "minutes was spent with this patient, >50% of which was counseling regarding the above delineated issues     Electronically signed by:  Genesis King MD     --------------------------------------    HPI  Rj Licea is a 23 year old male with a past medical history of cerebral palsy, autism spectrum disorder, anxiety, insomnia, chronic G-tube for tube feeding, chronic lung disease, seizure disorder and depression presenting for weight loss. History obtained primarily through patient's mother. The patient's mother reports that starting in the fall of this year, she noticed declining weight at medical appointments. At baseline, the patient weighs around 100 lbs and more recently has been around 80. She denies any significant changes in medications, medical history, or surgical history around the time of the patient's weight loss. Of note, the patient's father has  from the family, but she shares that it is unclear to what degree the patient comprehends this. Patient's primary source of nutrition is tube feeds through the G-tube including Pediasure, and Nutren 2 mixed with whole milk. He will sometimes drink milk or have yogurt or pudding by mouth.     More recently, the patient's mother notes that Rj has been eating more slowly by mouth. Previously he would drink a half of a glass of milk in 5-10 minutes, and now it can take the patient up to an hour. He is also less interested in eating despite her attempts to remove distractions. Patient also has times which she describes as \"reflux\" in which the patient will stop drinking PO and will regurgitate foods and liquids. This appears to be just the recently swallowed food or drink as opposed to emesis of full stomach contents. She has been increasing the amount of tube feeding if the patient does not take PO intake. Overall, she feels as though he receives the same amount of nutrition despite eating less PO. His activity level has not increased recently " and if anything, the patient has less energy.     Mr. Licea is currently having bowel movements every 4 days which his mother describes as a large amount of firm stool requiring straining. If he goes more than 5 days without bowel movement, she will add prune juice into his G-tube. It is unclear if the patient is experiencing any source of abdominal pain.     Patient has recently been sleeping poorly which has been controlled with melatonin and lithium. Within the past week, he has slept 2-3 hours a night which his patient attributes to his mental health conditions. He also seems to occasionally have issues with talking, fatigue, and increased irritation which his mother attributes to possible seizures.     Patient has seen Dr. Sanders from [facility] in the past, unclear what patient had done for workup. He was also recently seen by his primary care provider Dr. Leyva. He ordered an abdominal x-ray notable for moderate to large stool burden, unremarkable chest x-ray, and labs including CBC, TTG, ESR, CMP and lithium level and valproic acid level which were within normal limit. Patient had an elevated TSH of 6.10 with increase in levothyroxine dose.     ROS:    ROS obtained from patient's mother   No fevers or chills  + weight loss  + patient is completely blind   No sore throat  No lymphadenopathy  + headache  paraesthesias, or weakness in a limb  No shortness of breath or wheezing  No chest pain or pressure  - hard to say arthralgias or myalgias   + dry skin   No odynophagia or dysphagia + drinks very slowly   No BRBPR, hematochezia, melena  No dysuria, frequency or urgency  No hot/cold intolerance or polyria  No anxiety or depression    PROBLEM LIST  Patient Active Problem List    Diagnosis Date Noted     CP (cerebral palsy), spastic, diplegic (H) 07/20/2017     Priority: Medium     Sleep disorder 07/20/2017     Priority: Medium     Seizure disorder (H) 07/20/2017     Priority: Medium     Other idiopathic scoliosis,  unspecified spinal region 07/20/2017     Priority: Medium     Periventricular leukomalacia, associated with prematurity, chronic 07/20/2017     Priority: Medium     Oral aversion 07/20/2017     Priority: Medium     Nutritional disorder 07/20/2017     Priority: Medium     Mild persistent asthma in adult without complication 07/20/2017     Priority: Medium     History of prematurity, 23 wk 400 grams 07/20/2017     Priority: Medium     History of behavioral and mental health problems 07/20/2017     Priority: Medium     Abnormal gait 07/20/2017     Priority: Medium     Feeding by G-tube (H) 07/20/2017     Priority: Medium     Constipation, unspecified constipation type 07/20/2017     Priority: Medium     Chronic lung disease of prematurity 07/20/2017     Priority: Medium     Bilateral blindness 07/20/2017     Priority: Medium     Autism spectrum disorder associated with known medical or genetic condition or environmental factor, requiring substantial support (level 2) 07/20/2017     Priority: Medium     Anxiety 07/20/2017     Priority: Medium     Short stature 03/23/2012     Priority: Medium       PERTINENT PAST MEDICAL HISTORY:  Past Medical History:   Diagnosis Date     Chronic lung disease     off nebs in 2012     Depression     fluoxetine, risperidone     Epilepsy (H) 5/2011    on keppra (Janaczek)     Feeding by G-tube (H)      GERD (gastroesophageal reflux disease)      Insomnia     melatonin, clonazepam     Retinopathy of prematurity     led to blindness       PREVIOUS SURGERIES:  ear-tube surgery  eye surgeries  inguinal hernia repair    Past Surgical History:   Procedure Laterality Date     GASTROSTOMY TUBE         PREVIOUS ENDOSCOPY:  Patient's mother does not believe he has had endoscopic evaluation     ALLERGIES:   No Known Allergies    PERTINENT MEDICATIONS:    Current Outpatient Medications:      clobazam (ONFI) 2.5 MG/ML suspension, Take 5 mg by mouth 2 times daily 3 ml in the am and 2 ml at hs, Disp: ,  Rfl:      fluticasone-salmeterol (ADVAIR HFA) 230-21 MCG/ACT inhaler, Inhale 2 puffs into the lungs 2 times daily Two puffs bid, Disp: 12 g, Rfl: 3     hydrOXYzine (ATARAX) 10 MG/5ML syrup, Take 10 mg by mouth as needed Can take up to qid , Disp: , Rfl:      levothyroxine (SYNTHROID/LEVOTHROID) 25 MCG tablet, Take 1 tablet (25 mcg) by mouth daily, Disp: 90 tablet, Rfl: 3     LORazepam (ATIVAN) 0.5 MG tablet, Take 1-2 tablet(s) 30 minutes prior to dental visit.  Do not operate a vehicle after taking this medication, Disp: 2 tablet, Rfl: 0     melatonin 1 MG TABS, Take 1 mg by mouth daily., Disp: , Rfl:      omeprazole (PRILOSEC) 2 mg/mL SUSP, Take 20 mLs (40 mg) by mouth At Bedtime At hs, Disp: 800 mL, Rfl: 11     polyethylene glycol (MIRALAX) powder, Take 17 g (1 capful) by mouth daily, Disp: 510 g, Rfl: 11     VALPROIC ACID, 7 mLs 3 times daily , Disp: , Rfl:     SOCIAL HISTORY:  Social History     Socioeconomic History     Marital status: Single     Spouse name: Not on file     Number of children: Not on file     Years of education: Not on file     Highest education level: Not on file   Social Needs     Financial resource strain: Not on file     Food insecurity - worry: Not on file     Food insecurity - inability: Not on file     Transportation needs - medical: Not on file     Transportation needs - non-medical: Not on file   Occupational History     Not on file   Tobacco Use     Smoking status: Never Smoker     Smokeless tobacco: Never Used   Substance and Sexual Activity     Alcohol use: No     Drug use: No     Sexual activity: No   Other Topics Concern     Parent/sibling w/ CABG, MI or angioplasty before 65F 55M? Not Asked   Social History Narrative     Not on file       FAMILY HISTORY:  FH of CRC: none   FH of IBD: none   Family History   Problem Relation Age of Onset     Diabetes Father        Past/family/social history reviewed and no changes    PHYSICAL EXAMINATION:  Constitutional: aaox3, thin appearing  "man, cooperative, pleasant, not dyspneic/diaphoretic, no acute distress  Vitals reviewed: /64   Pulse 86   Ht 1.575 m (5' 2\")   Wt 36.9 kg (81 lb 6.4 oz)   BMI 14.89 kg/m     Wt:   Wt Readings from Last 2 Encounters:   02/06/19 36.9 kg (81 lb 6.4 oz)   01/15/19 36.4 kg (80 lb 4.8 oz)      Eyes: Sclera anicteric/injected  Ears/nose/mouth/throat: Normal oropharynx without ulcers or exudate, mucus membranes moist, hearing intact  Neck: supple, thyroid normal size  CV: No edema  Respiratory: Unlabored breathing  Abd:  Nondistended, +bs, no hepatosplenomegaly, nontender, no peritoneal signs  Skin: warm, perfused, no jaundice  Psych: patient anxious, repetitive questions   MSK: Normal gait      PERTINENT STUDIES:    Office Visit on 01/15/2019   Component Date Value Ref Range Status     Valproic Acid Level 01/15/2019 121* 50 - 100 mg/L Final     Lithium Level 01/15/2019 0.88  0.60 - 1.20 mmol/L Final     WBC 01/15/2019 9.3  4.0 - 11.0 10e9/L Final     RBC Count 01/15/2019 4.25* 4.4 - 5.9 10e12/L Final     Hemoglobin 01/15/2019 14.2  13.3 - 17.7 g/dL Final     Hematocrit 01/15/2019 42.5  40.0 - 53.0 % Final     MCV 01/15/2019 100  78 - 100 fl Final     MCH 01/15/2019 33.4* 26.5 - 33.0 pg Final     MCHC 01/15/2019 33.4  31.5 - 36.5 g/dL Final     RDW 01/15/2019 11.9  10.0 - 15.0 % Final     Platelet Count 01/15/2019 197  150 - 450 10e9/L Final     % Neutrophils 01/15/2019 48.0  % Final     % Lymphocytes 01/15/2019 42.8  % Final     % Monocytes 01/15/2019 6.9  % Final     % Eosinophils 01/15/2019 1.8  % Final     % Basophils 01/15/2019 0.5  % Final     Absolute Neutrophil 01/15/2019 4.4  1.6 - 8.3 10e9/L Final     Absolute Lymphocytes 01/15/2019 4.0  0.8 - 5.3 10e9/L Final     Absolute Monocytes 01/15/2019 0.6  0.0 - 1.3 10e9/L Final     Absolute Eosinophils 01/15/2019 0.2  0.0 - 0.7 10e9/L Final     Absolute Basophils 01/15/2019 0.1  0.0 - 0.2 10e9/L Final     Diff Method 01/15/2019 Automated Method   Final     " Lipase 01/15/2019 170  73 - 393 U/L Final     TSH 01/15/2019 6.10* 0.40 - 4.00 mU/L Final     Hemoglobin A1C 01/15/2019 4.8  0 - 5.6 % Final     Sed Rate 01/15/2019 1  0 - 15 mm/h Final     CRP Inflammation 01/15/2019 <2.9  0.0 - 8.0 mg/L Final     Sodium 01/15/2019 139  133 - 144 mmol/L Final     Potassium 01/15/2019 5.0  3.4 - 5.3 mmol/L Final     Chloride 01/15/2019 103  94 - 109 mmol/L Final     Carbon Dioxide 01/15/2019 29  20 - 32 mmol/L Final     Anion Gap 01/15/2019 7  3 - 14 mmol/L Final     Glucose 01/15/2019 100* 70 - 99 mg/dL Final     Urea Nitrogen 01/15/2019 16  7 - 30 mg/dL Final     Creatinine 01/15/2019 0.81  0.66 - 1.25 mg/dL Final     GFR Estimate 01/15/2019 >90  >60 mL/min/[1.73_m2] Final     GFR Estimate If Black 01/15/2019 >90  >60 mL/min/[1.73_m2] Final     Calcium 01/15/2019 10.2* 8.5 - 10.1 mg/dL Final     Bilirubin Total 01/15/2019 0.4  0.2 - 1.3 mg/dL Final     Albumin 01/15/2019 4.0  3.4 - 5.0 g/dL Final     Protein Total 01/15/2019 7.2  6.8 - 8.8 g/dL Final     Alkaline Phosphatase 01/15/2019 68  40 - 150 U/L Final     ALT 01/15/2019 34  0 - 70 U/L Final     AST 01/15/2019 21  0 - 45 U/L Final     Tissue Transglutaminase Antibody I* 01/15/2019 <1  <7 U/mL Final     Tissue Transglutaminase Marti IgG 01/15/2019 1  <7 U/mL Final     IGA 01/15/2019 54* 70 - 380 mg/dL Final     Prealbumin 01/15/2019 27  15 - 45 mg/dL Final     T4 Free 01/15/2019 0.87  0.76 - 1.46 ng/dL Final       Again, thank you for allowing me to participate in the care of your patient.      Sincerely,    Genesis King MD

## 2019-02-08 ENCOUNTER — MYC MEDICAL ADVICE (OUTPATIENT)
Dept: CARE COORDINATION | Facility: CLINIC | Age: 24
End: 2019-02-08

## 2019-02-08 ENCOUNTER — PATIENT OUTREACH (OUTPATIENT)
Dept: CARE COORDINATION | Facility: CLINIC | Age: 24
End: 2019-02-08

## 2019-02-08 NOTE — PROGRESS NOTES
Clinic Care Coordination Contact  [facility]/Voicemail    RN CC outreach follow up: initial referral to assist patient/family in establishing specialty follow up with GI and dietician.   Chart Reviewed: patient has completed GI follow up with Dr. King and has a 2/13/19 appointment with [facility] Di[facility]ian, Ciar Madrigal.    Clinical Data: Care Coordinator Outreach  Outreach attempted x 1.  Left message on [employer] with call back information and requested return call.  Plan: Care Coordinator also followed up with [facility] portal message. Care Coordinator will try to reach patient again in 7-10 business days.    Asia Barriga RN   Clinic Care Coordinator-[employer]  PH: [phone]

## 2019-02-13 ENCOUNTER — HOSPITAL ENCOUNTER (OUTPATIENT)
Dept: NUTRITION | Facility: CLINIC | Age: 24
Discharge: HOME | End: 2019-02-13
Attending: INTERNAL MEDICINE | Admitting: INTERNAL MEDICINE
Payer: COMMERCIAL

## 2019-02-13 ENCOUNTER — TELEPHONE (OUTPATIENT)
Dept: PEDIATRICS | Facility: CLINIC | Age: 24
End: 2019-02-13

## 2019-02-13 VITALS — BODY MASS INDEX: 14.69 KG/M2 | WEIGHT: 80.3 LBS

## 2019-02-13 DIAGNOSIS — R63.4 WEIGHT LOSS: ICD-10-CM

## 2019-02-13 DIAGNOSIS — Z93.1 FEEDING BY G-TUBE (H): Primary | ICD-10-CM

## 2019-02-13 PROCEDURE — 97802 MEDICAL NUTRITION INDIV IN: CPT | Performed by: DIETITIAN, REGISTERED

## 2019-02-13 RX ORDER — LACTOSE-REDUCED FOOD/FIBER 0.07 G-1.5
1 LIQUID (ML) ORAL 4 TIMES DAILY
Qty: 120 CAN | Refills: 11 | Status: ON HOLD | OUTPATIENT
Start: 2019-02-13 | End: 2021-04-19

## 2019-02-13 RX ORDER — LACTOSE-REDUCED FOOD 0.05 G-1.5
1 LIQUID (ML) ORAL 3 TIMES DAILY
Qty: 90 BOTTLE | Refills: 11 | Status: SHIPPED | OUTPATIENT
Start: 2019-02-13 | End: 2019-02-18

## 2019-02-13 NOTE — PROGRESS NOTES
"OUTPATIENT NUTRITION ASSESSMENT  REASON FOR ASSESSMENT  Rj Licea referred by Dr. Leyva for MNT related to weight loss.   Patient accompanied by mother, Darcy.      ASSESSMENT   Nutrition History:  - Information obtained from mother.  Mother states patient with unexplained weight loss.  Patient stopped Risperdal in recent months but otherwise has not has any changes in diet and tube feeding regimen.  Patient's mother wants patient to drink liquids as that is one activity that patient is able to complete for himself.  Mother states that sometimes when she gives feeding there is product that comes through his tube or that patient may reflux tube feeding or beverage/food that patient has consumed.   - Tube feeding history- Nutren 2.0 (1-2 cartons per day mixed with whole milk) + 3 cartons Pediasure     Typical intake: Pediaure (1-3 bottles per day), Nutren 2.0 + whole milk mixture (1:1 ratio), apple juice, chocolate milk, pudding, yogurt    PHYSICAL FINDINGS  Observed:  Muscle Wasting-arms, face, temporal region, legs  Subcutaneous fat loss-orbital region  Obtained from Chart/Interdisciplinary Team: emaciated appearing     LABS  Labs reviewed-Note TSH     MEDICATIONS  Medications reviewed-Levothyroxine     ANTHROPOMETRICS   Height: 5'2\"  Weight: 80.3 lbs   BMI (kg/m2): 14.6 kg/m2  Weight Status:  Underweight BMI <18.5  %IBW: 68%   Weight History: Patient's mother states the highest weight has been 105 lbs.  Patient mother weighed patient on home scale today and weighed 78 lbs.  Patient with 21% weight loss in the past 16 months.  Wt Readings from Last 5 Encounters:   02/13/19 36.4 kg (80 lb 4.8 oz)   02/06/19 36.9 kg (81 lb 6.4 oz)   01/15/19 36.4 kg (80 lb 4.8 oz)   10/13/17 46.2 kg (101 lb 12.8 oz)   07/07/17 46.5 kg (102 lb 9.6 oz)     ASSESSED NUTRITION NEEDS  Estimated Energy Needs: 3962-6963 kcals/day (35-40 Kcal/Kg)  Justification:  (repletion)  Estimated Protein Needs: 43-64 grams protein/day (1.2-1.8 g " pro/Kg)  Justification:  (Repletion)  Estimated Fluid Needs: 0695-9351 mL/day (30-35 mL/kg)    Average 3 day intake recorded by mother = 1460 kcals (40 kcals/kg) 54 gram protein (1.5 gm/kg) 1370 mL (38 mL/kg)    NUTRITION SUPPORT ENTERAL:   Type of Feeding Tube: G-tube   Enteral Frequency:  Bolus through gravity  Enteral Regimen: Nutren 2.0   Total Enteral Provisions: 1-2 cartons provides 500-1000 calories (14-28 gm/kg) 21-42 gm protein (.6 -1.9 grams/kg) 173-346 mL free water  0 grams fiber   Free Water Flush:  mL flush after each feeding (per mother) + water flushes with meds     Suggest:  4 cartons Isosource 1.5 to provide 1500 calories (41 kcals/kg) 68 grams protein (1.8 gm/kg) 15 grams fiber 760 mL free fluid (1000 mL provides 100% UD RDIs)  + oral intake of 2-3 bottles Pediasure with fiber = 480-720 kcals 14-21 grams protein 9-13.5 grams fiber    Total nutrition with EN + oral= 2399-7501 kcals (55-61 kcals/kg) 82-89 grams protein (2.2-2.4 gm/kg) 24-28.5 grams fiber     ASSESSED MALNUTRITION STATUS  % Weight Loss:  > 20% in 1 year (severe malnutrition)  % Intake:  No decreased intake noted  Subcutaneous Fat Loss:  Orbital region severe depletion  Loss of Muscle Mass:  Temporal region severe depletion, Acromion bone region severe depletion, Patellar region severe depletion and Anterior thigh region severe depletion  Fluid Retention:  None noted    Malnutrition Diagnosis:  Severe malnutrition  In the Context of:  Chronic illness or disease    DIAGNOSIS   Nutrition Diagnosis:  Unintended weight loss related to unknown reason as evidenced by 21% weight loss in 16 months      INTERVENTIONS   Nutrition Prescription   Recommend trial of Isosource 1.5 4 cartons per day + 2-3 Pediasure with fiber daily     IMPLEMENTATION   Assessed learning needs and learning preference  Teaching Method(s) used: Explanation  Vitamin and Mineral Supplements: Patient to take calcium sources 4 hours away from thyroid  medication.  Diet Education:  Provided education on tube feeding options   Nutrition Education (Content):   a)  Discussed current tube feeding regimen.  It appears that some days patient is able to drink liquid calories (Pediasure and Nutren 2.0 formula + whole milk dilution) and other days patient requires tube feeding administration.  Explained patient's constipation may have been increased due to recent diagnosis of hypothyroidism.  Patient's mother administered 3 capfuls Miralax per GI MD order which then caused explosive diarrhea.  Patient's mother states patient is having loose stools.  Mother providing 1.5 capfuls Miralax to reduce loose stools.  Patient was previously on 1 capful Miralax per day.  Suggest change in tube feeding regimen to increase blend of soluble and insoluble fiber to improve constipation.  Patient's mother states normal stooling occurs every 4 days which has been normal for patient since birth.  Patient's mother open to trying new formula if it will help patient gain weight.        Nutrition Education (Application):   a)  Discussed change in tube feeding regimen.     b)  Patient's verbalizes understanding of diet by stating will separate calcium containing foods by 4 hours of thyroid medication.  Anticipate good compliance   Other: Note patient has GI follow up in 4 weeks.  Collaboration and Referral of Nutrition Care:  RD spoke with other peers to determine best treatment plan for patient.     GOALS  4 cartons Isosource 1.5 per day (mother to determine timing)  2-3 bottles Pediasure with fiber  Take thyroid medication 4 hours from calcium containing foods/products     FOLLOW UP/MONITORING   Progress towards goals will be monitored and evaluated per protocol and Practice Guidelines  Mother to call RD in 2 weeks with weight update  RD name and number provided     Time Spent with Patient  30 minutes    Darius Patel, RD, LD  [facility] Outpatient  Dietitian  [phone] (office phone)    ABN completed by mother but form completed incorrectly

## 2019-02-13 NOTE — ADDENDUM NOTE
Encounter addended by: Cira Melton, RD, LD on: 2/13/2019 1:44 PM   Actions taken: Sign clinical note

## 2019-02-13 NOTE — TELEPHONE ENCOUNTER
Contacted Pediatric home services.  Orders need to be faxed to [phone] [facility] Patient services.    DME order for nutrition placed as noted below and faxed.  Liane OWENS RN - [facility]          Please see below and place orders for adjusted tube feeds to pediatric home services. Thanks!     Jackson    Previous Messages      ----- Message -----   From: Cira Melton RD, LD   Sent: 2/13/2019   1:32 PM   To: Micky Leyva MD   Subject: RE: Tube feeding                                 You need to send it on your end.     Cira   ----- Message -----   From: Micky Leyva MD   Sent: 2/13/2019   1:28 PM   To: Cira Madrigal RD, LD, *   Subject: RE: Tube feeding                                 That sounds great! Thank you! Will they send orders for me to sign or do we need to do something on this end?     Thanks!!     Jackson Leyva M.D.   Internal Medicine-Pediatrics     ----- Message -----   From: Cira Melton RD, LD   Sent: 2/13/2019   1:20 PM   To: Micky Leyva MD   Subject: Tube feeding                                     Dr. Leyva,     I met with Rj this morning to assess TF needs.  Patient receiving (40 kcals/kg) with current regimen/oral intake.  Suggest Isosource 1.5 (4 cartons per day) + 2-3 Pediasure with fiber (orally) daily.  Hopefully new product will provide better blend of fiber for patient along with weight gain with EN + oral feedings (55-60 kcal/kg).  If agree with change in TF product, patient will need new tube feeding for Pediatric Home Services.  I am having mother call me in 2 weeks with updated weight.  Patient will be seeing GI again in 4 weeks.       Thank you for the referral.       Darius Limon, RD, LD

## 2019-02-15 ENCOUNTER — MYC MEDICAL ADVICE (OUTPATIENT)
Dept: PEDIATRICS | Facility: CLINIC | Age: 24
End: 2019-02-15

## 2019-02-17 DIAGNOSIS — K59.09 CHRONIC CONSTIPATION: ICD-10-CM

## 2019-02-17 NOTE — TELEPHONE ENCOUNTER
"Requested Prescriptions   Pending Prescriptions Disp Refills     polyethylene glycol (MIRALAX/GLYCOLAX) powder [Pharmacy Med Name: POLYETH GLYCOL [id number] NF POWDER [id number]]  Last Written Prescription Date:  10/13/2017  Last Fill Quantity: 510g,  # refills: 11   Last office visit: 1/15/2019 with prescribing provider:  Micky Leyva MD   Future Office Visit:     510 g 0     Sig: TAKE 17 GIVE( ONE VAPFUL) BY MOUTH EVERY DAY    Laxatives Protocol Passed - 2/17/2019  9:27 AM       Passed - Patient is age 6 or older       Passed - Recent (12 mo) or future (30 days) visit within the authorizing provider's specialty    Patient had office visit in the last 12 months or has a visit in the next 30 days with authorizing provider or within the authorizing provider's specialty.  See \"Patient Info\" tab in inbasket, or \"Choose Columns\" in Meds & Orders section of the refill encounter.             Passed - Medication is active on med list          "

## 2019-02-18 ENCOUNTER — MYC MEDICAL ADVICE (OUTPATIENT)
Dept: PEDIATRICS | Facility: CLINIC | Age: 24
End: 2019-02-18

## 2019-02-18 RX ORDER — LACTOSE-REDUCED FOOD 0.05 G-1.5
1 LIQUID (ML) ORAL 3 TIMES DAILY
Qty: 90 BOTTLE | Refills: 11 | Status: SHIPPED | OUTPATIENT
Start: 2019-02-18 | End: 2019-12-31

## 2019-02-18 NOTE — TELEPHONE ENCOUNTER
Per Up to Date-Administer consistently in the morning on an empty stomach, at least 30 to 60 minutes before food. Alternatively, may consistently administer at night 3 to 4 hours after the last meal    Do not administer within 4 hours of calcium- or iron-containing products  My Chart message sent to patient.  GRACIELA Arnold RN

## 2019-02-18 NOTE — TELEPHONE ENCOUNTER
Parent requesting the Pediasure DME to be changed from 'by mouth' to 'per g-tube'.     DME reordered, changed and refaxed to [facility].     Parent updated in the 2/15/19 [employer] message.

## 2019-02-18 NOTE — TELEPHONE ENCOUNTER
Parent asking for the 2/13/19 Pedi[employer] DME to be changed from 'by mouth' to 'per g-tube'.     This was done and refaxed.

## 2019-02-19 NOTE — TELEPHONE ENCOUNTER
See cely, hopefully that helps  Minnie Brody, PharmD BCPS  Medication Therapy Management Practitioner   #[phone]

## 2019-02-19 NOTE — TELEPHONE ENCOUNTER
Minnie do you have any recommendation on administering via NG tube?   It looks like feedings need to be held before and after administration of medication.    Per Up to Date-Nasogastric tube: Bioavailability of levothyroxine (crushed tablets suspended in water) is reduced if administered with enteral tube feeds. Since holding feedings for at least 1 hour before and after levothyroxine administration may not completely resolve the interaction, an increase in dose (eg, additional 25 mcg) may be necessary (Isabell Arnold RN

## 2019-02-20 RX ORDER — POLYETHYLENE GLYCOL 3350 17 G/17G
POWDER, FOR SOLUTION ORAL
Qty: 510 G | Refills: 0 | Status: SHIPPED | OUTPATIENT
Start: 2019-02-20 | End: 2019-06-29

## 2019-02-20 NOTE — TELEPHONE ENCOUNTER
Prescription approved per [facility] Refill Protocol.    Brittany Ybarra RN Flex

## 2019-02-22 ENCOUNTER — PATIENT OUTREACH (OUTPATIENT)
Dept: CARE COORDINATION | Facility: CLINIC | Age: 24
End: 2019-02-22

## 2019-02-22 NOTE — PROGRESS NOTES
Clinic Care Coordination Contact  [facility]/Voicemail       Clinical Data: Care Coordinator Outreach  Outreach attempted x 1.  Sent [employer] patient portal message to check in on status of Miralax regimen/bowel movements as well as tolerance/effects of new tube feeding regimen.     Plan: Care Coordinator will try to reach patient again in 3-5 business days.    Asia Barriga RN   Clinic Care Coordinator-[facility]  PH: [phone]

## 2019-02-28 ENCOUNTER — PATIENT OUTREACH (OUTPATIENT)
Dept: CARE COORDINATION | Facility: CLINIC | Age: 24
End: 2019-02-28

## 2019-02-28 NOTE — PROGRESS NOTES
Clinic Care Coordination Contact  Care Team Conversations    RN CC received voicemail from Cira Madrigal, requesting a call back to collaborate regarding patient.   Attempted to reach her in return, voicemail left. (See Contact log for details).    RN CC also received response to [employer] portal message that was sent to patient's guardian/mom, Darcy, it outlines concerns with no improvement in weight and the difficulties in coordinating tube feedings with Levothyroxine.  Mom also expresses challenges in sleep pattern as a side effect of Levothyroxine.     PLAN:  RN CC will await call back from Cira Madrigal regarding patient needs/collaboration for improved nutrition outcomes.     RN CC will forward patient's (mom) [employer] message outlining concerns to be addressed by PCP.     Asia Barriga RN   Clinic Care Coordinator-[facility]  PH: [phone]

## 2019-03-01 NOTE — PROGRESS NOTES
"Clinic Care Coordination Contact  Care Team Conversations    RN CC collaborated with Cira Madrigal; based on Dr. Leyva's advice that it is \"ok\" to have the Synthroid given when it works best, with the goal of making sure Rj gets in the nutrition that is recommended.     RN CC also shared note from patient's mom regarding the Lithium taper and the plan to have repeat TSH and FT4 done soon.     Cira will call mom to reinforce plan of moving forward with current nutrition regimen recommendations and will reassess weight in 2 weeks.     RN CC will also plan to outreach with patient/family/care team in 2 weeks on next steps.     Asia Barriga RN   Clinic Care Coordinator-[facility]  PH: [phone]    "

## 2019-03-13 ENCOUNTER — TELEPHONE (OUTPATIENT)
Dept: PEDIATRICS | Facility: CLINIC | Age: 24
End: 2019-03-13

## 2019-03-13 NOTE — TELEPHONE ENCOUNTER
Reason for Call:  Guru romeo    Detailed comments: mom is calling and needs to talk to someone about the pt. Pt is losing weight and possible tied to the thyroid. Mom wants the pt to be seen this week. Or talk to a nurse to start the process.    Phone Number Patient can be reached at: Home number on file [phone] (home)    Best Time: any    Can we leave a detailed message on this number? YES    Call taken on 3/13/2019 at 5:09 PM by Therese Loaiza

## 2019-03-13 NOTE — TELEPHONE ENCOUNTER
Mom calling with concerns and also sent my chart message noting yasmeen bui is not moving in the right direction and seem to be getting worse.      States patient was taken off Lithium which does not benefit his behaviors.  The also tried another medication that did not work.  The concern was the interaction with the thyroid.    Now has behavioral issues after stopping Lithium.  States that using lorazepam now to sleep tonight to see if that helps keep him calm as for over a week his behaviors have been very high.      Mom receiving a lot of conflicting information from providers on whether he should/should not be on levothyroxine.    Minor weight changes with nutritionist changes and mom does not feel this is a benefit.  States only gained 1.5 lbs and expects to see larger gain than this in 1 month.    Mom is against hospitalization as was recommended by Dr. Srinivasan.  States she does not feel this is in his best interest for behavioral health or weight managment.  Mom notes that she will fight that tooth and nail.  Does not feel Dr. Srinivasan and PCP are on the same page or possibly even communicating.      Wants to get the proper tools to manage him at home.  Wants to schedule with PCP or have a phone visit.  Did note that PCP is out of the office and only receiving messages.      She would like a plan for patient and is feeling very frustrated and would like provider recommendations,  Please review.

## 2019-03-14 NOTE — TELEPHONE ENCOUNTER
Mom in agreement with plan for appt on 3/29/19 at 1220, with .  Mom will get records form Dr.Elizabeth Srinivasan, psychiatrist at [facility].  She also wanted  to know, that  started the pt on Ativan 2 mg at bedtime and Ativan 0.5 mg every 6 hours as needed.    Offered mom support in assisting her in options of respite care or PCA.  Mom declines these services at this time.  May be interested in this, in the future.    Qian Toribio RN

## 2019-03-14 NOTE — TELEPHONE ENCOUNTER
Agree with and recommend office visit with PCP. Can see patient Friday 3/29 at 1220 for 40 min appointment. Also can we get psych/BH records to see what thoughts are on lithium.     to CC RN as FYI and if any other thoughts on how we can support this family, given the psychosocial turbulence they have had do we have options for respite care or PCA or other way to offer additonal support?

## 2019-03-15 ENCOUNTER — TELEPHONE (OUTPATIENT)
Dept: PEDIATRICS | Facility: CLINIC | Age: 24
End: 2019-03-15

## 2019-03-15 ENCOUNTER — MYC MEDICAL ADVICE (OUTPATIENT)
Dept: PEDIATRICS | Facility: CLINIC | Age: 24
End: 2019-03-15

## 2019-03-15 NOTE — PROGRESS NOTES
Clinic Care Coordination Contact    Situation: Patient chart reviewed by care coordinator.    Background: Patient with poor nutrition, weight loss. Has had a GI consult and nutrition consult with tube feeding changes    Assessment: See Telephone encounter notes from 3/13/19; patient not improving    Plan/Recommendations:   RN CC and Triage RN offered Respite support resources; patient has appointment with PCP on 3/29/19 and Dr. Srinivasan has requested to discuss patient case.     PLAN:  RN CC will follow along, await care team & patient's mom response regarding outstanding questions/concerns/help needed.     Asia Barriga RN   Clinic Care Coordinator-[facility]  PH: [phone]

## 2019-03-15 NOTE — TELEPHONE ENCOUNTER
Appreciate Triage's discussion with mom regarding Respite or PCA.  I will outreach to mom as well to offer support.     Asia Barriga RN   Clinic Care Coordinator-[facility]  PH: [phone]

## 2019-03-15 NOTE — TELEPHONE ENCOUNTER
Dr.Elizabeth Srinivasan is requesting to speak with .  Informed her that  is out of the clinic, until Monday.  Please call her back Monday at [phone].  Inform reception that she is expecting call and to find her and pull her from a pt room, for call.    She states that pt is down to 73 lbs, not eating, and not sleeping.    Qian Toribio RN

## 2019-03-19 RX ORDER — LITHIUM CARBONATE 300 MG
600 TABLET ORAL AT BEDTIME
COMMUNITY
Start: 2019-03-19 | End: 2019-06-07

## 2019-03-19 NOTE — TELEPHONE ENCOUNTER
Sending as a FYI:    Please review the MC message from parent. Pt has an appointment with  on 3/29 at 2:20 pm.     Katie JANG, RN  Triage Nurse

## 2019-03-22 ENCOUNTER — TELEPHONE (OUTPATIENT)
Dept: NUTRITION | Facility: CLINIC | Age: 24
End: 2019-03-22

## 2019-03-22 NOTE — PROGRESS NOTES
Called and spoke with mom, Darcy, regarding patient's weight.  Mom states patient's weight has decreased to 75 lbs.  Mom restarted Nutren 2.0 with whole milk + 4 cartons Isosource 1.5.  Mom states patient seems to be getting reflux with feedings.  Mom also states when opens tube it has sour smell.  Mom states patient's constipation has resolved with using miralax.  Patient meeting with MD on Friday and then will be following up with GI.  Patient's mom to call for follow up appointment once has MD appointments for next plan.  Mother verbalized understanding of plan.    Darius Patel, RD, LD  [facility] Outpatient Dietitian  [phone] (office phone)

## 2019-04-03 ENCOUNTER — MYC MEDICAL ADVICE (OUTPATIENT)
Dept: CARE COORDINATION | Facility: CLINIC | Age: 24
End: 2019-04-03

## 2019-04-03 ENCOUNTER — PATIENT OUTREACH (OUTPATIENT)
Dept: CARE COORDINATION | Facility: CLINIC | Age: 24
End: 2019-04-03

## 2019-04-03 NOTE — PROGRESS NOTES
Clinic Care Coordination Contact  Care Team Conversations    Sent patient's mom a MyChart message inquiring about patient status update: nutrition and weight gain.     PLAN:  Will await mom's response regarding further need for Care Coordination and ongoing follow up.     Asia Barriga RN   Clinic Care Coordinator-[facility]  PH: [phone]

## 2019-04-04 ENCOUNTER — MYC MEDICAL ADVICE (OUTPATIENT)
Dept: PEDIATRICS | Facility: CLINIC | Age: 24
End: 2019-04-04

## 2019-04-05 ENCOUNTER — OFFICE VISIT (OUTPATIENT)
Dept: PEDIATRICS | Facility: CLINIC | Age: 24
End: 2019-04-05
Payer: COMMERCIAL

## 2019-04-05 VITALS
OXYGEN SATURATION: 98 % | BODY MASS INDEX: 14.35 KG/M2 | DIASTOLIC BLOOD PRESSURE: 60 MMHG | HEART RATE: 110 BPM | RESPIRATION RATE: 24 BRPM | HEIGHT: 63 IN | TEMPERATURE: 99.2 F | WEIGHT: 81 LBS | SYSTOLIC BLOOD PRESSURE: 94 MMHG

## 2019-04-05 DIAGNOSIS — F39 MOOD DISORDER (H): ICD-10-CM

## 2019-04-05 DIAGNOSIS — G40.909 SEIZURE DISORDER (H): ICD-10-CM

## 2019-04-05 DIAGNOSIS — R62.7 FAILURE TO THRIVE IN ADULT: Primary | ICD-10-CM

## 2019-04-05 DIAGNOSIS — F84.0 AUTISM SPECTRUM DISORDER ASSOCIATED WITH KNOWN MEDICAL OR GENETIC CONDITION OR ENVIRONMENTAL FACTOR, REQUIRING SUBSTANTIAL SUPPORT (LEVEL 2): ICD-10-CM

## 2019-04-05 DIAGNOSIS — R63.39 ORAL AVERSION: ICD-10-CM

## 2019-04-05 DIAGNOSIS — G80.1 CP (CEREBRAL PALSY), SPASTIC, DIPLEGIC (H): ICD-10-CM

## 2019-04-05 DIAGNOSIS — R21 RASH AND NONSPECIFIC SKIN ERUPTION: ICD-10-CM

## 2019-04-05 DIAGNOSIS — Z86.59 HISTORY OF BEHAVIORAL AND MENTAL HEALTH PROBLEMS: ICD-10-CM

## 2019-04-05 DIAGNOSIS — F41.9 ANXIETY: ICD-10-CM

## 2019-04-05 LAB
ALBUMIN SERPL-MCNC: 3.4 G/DL (ref 3.4–5)
ALP SERPL-CCNC: 72 U/L (ref 40–150)
ALT SERPL W P-5'-P-CCNC: 54 U/L (ref 0–70)
ANION GAP SERPL CALCULATED.3IONS-SCNC: 2 MMOL/L (ref 3–14)
AST SERPL W P-5'-P-CCNC: 29 U/L (ref 0–45)
BILIRUB SERPL-MCNC: 0.2 MG/DL (ref 0.2–1.3)
BUN SERPL-MCNC: 21 MG/DL (ref 7–30)
CALCIUM SERPL-MCNC: 8.8 MG/DL (ref 8.5–10.1)
CHLORIDE SERPL-SCNC: 105 MMOL/L (ref 94–109)
CO2 SERPL-SCNC: 34 MMOL/L (ref 20–32)
CREAT SERPL-MCNC: 0.85 MG/DL (ref 0.66–1.25)
CRP SERPL-MCNC: 13 MG/L (ref 0–8)
DIFFERENTIAL METHOD BLD: ABNORMAL
EOSINOPHIL NFR BLD AUTO: 1 %
ERYTHROCYTE [DISTWIDTH] IN BLOOD BY AUTOMATED COUNT: 12.4 % (ref 10–15)
ERYTHROCYTE [SEDIMENTATION RATE] IN BLOOD BY WESTERGREN METHOD: 7 MM/H (ref 0–15)
GFR SERPL CREATININE-BSD FRML MDRD: >90 ML/MIN/{1.73_M2}
GLUCOSE SERPL-MCNC: 247 MG/DL (ref 70–99)
HCT VFR BLD AUTO: 48.7 % (ref 40–53)
HGB BLD-MCNC: 15.5 G/DL (ref 13.3–17.7)
LITHIUM SERPL-SCNC: 0.9 MMOL/L (ref 0.6–1.2)
LYMPHOCYTES NFR BLD AUTO: 30 %
MCH RBC QN AUTO: 32.8 PG (ref 26.5–33)
MCHC RBC AUTO-ENTMCNC: 31.8 G/DL (ref 31.5–36.5)
MCV RBC AUTO: 103 FL (ref 78–100)
MONOCYTES NFR BLD AUTO: 23 %
NEUTROPHILS NFR BLD AUTO: 46 %
PLATELET # BLD AUTO: 77 10E9/L (ref 150–450)
PLATELET # BLD EST: ABNORMAL 10*3/UL
POTASSIUM SERPL-SCNC: 4.2 MMOL/L (ref 3.4–5.3)
PROT SERPL-MCNC: 7.5 G/DL (ref 6.8–8.8)
RBC # BLD AUTO: 4.73 10E12/L (ref 4.4–5.9)
RBC MORPH BLD: NORMAL
SODIUM SERPL-SCNC: 141 MMOL/L (ref 133–144)
T4 FREE SERPL-MCNC: 1.12 NG/DL (ref 0.76–1.46)
TSH SERPL DL<=0.005 MIU/L-ACNC: 11.13 MU/L (ref 0.4–4)
VALPROATE SERPL-MCNC: 133 MG/L (ref 50–100)
WBC # BLD AUTO: 6.1 10E9/L (ref 4–11)

## 2019-04-05 PROCEDURE — 36415 COLL VENOUS BLD VENIPUNCTURE: CPT | Performed by: INTERNAL MEDICINE

## 2019-04-05 PROCEDURE — 84439 ASSAY OF FREE THYROXINE: CPT | Performed by: INTERNAL MEDICINE

## 2019-04-05 PROCEDURE — 80164 ASSAY DIPROPYLACETIC ACD TOT: CPT | Performed by: INTERNAL MEDICINE

## 2019-04-05 PROCEDURE — 85652 RBC SED RATE AUTOMATED: CPT | Performed by: INTERNAL MEDICINE

## 2019-04-05 PROCEDURE — 85025 COMPLETE CBC W/AUTO DIFF WBC: CPT | Performed by: INTERNAL MEDICINE

## 2019-04-05 PROCEDURE — 84134 ASSAY OF PREALBUMIN: CPT | Performed by: INTERNAL MEDICINE

## 2019-04-05 PROCEDURE — 99215 OFFICE O/P EST HI 40 MIN: CPT | Performed by: INTERNAL MEDICINE

## 2019-04-05 PROCEDURE — 84443 ASSAY THYROID STIM HORMONE: CPT | Performed by: INTERNAL MEDICINE

## 2019-04-05 PROCEDURE — 80053 COMPREHEN METABOLIC PANEL: CPT | Performed by: INTERNAL MEDICINE

## 2019-04-05 PROCEDURE — 86140 C-REACTIVE PROTEIN: CPT | Performed by: INTERNAL MEDICINE

## 2019-04-05 PROCEDURE — 80178 ASSAY OF LITHIUM: CPT | Performed by: INTERNAL MEDICINE

## 2019-04-05 ASSESSMENT — MIFFLIN-ST. JEOR: SCORE: 1257.54

## 2019-04-05 NOTE — PROGRESS NOTES
SUBJECTIVE:   Rj Licea is a 23 year old male who presents to clinic today for the following health issues:      RESPIRATORY SYMPTOMS      Duration: one week    Description  emesis    Severity: Moderate    Accompanying signs and symptoms: cough    History (predisposing factors):  pneumonaie    Precipitating or alleviating factors: None    Therapies tried and outcome:   Tylenol has been effective    Patient presents today accompanied by his mother for follow-up of recent clinic visits as well as evaluation for a new cough.  Patient been seen multiple times in clinic over the last several weeks due to weight loss.  He has had follow-up appointments in the gastroneurology clinic as well as with nutrition and the patient's mother would like to review these today.  Furthermore the patient mother would like to talk about referral to a different psychiatry clinic as she needs a second opinion around his medication regimen would be helpful.  Of note Rj continues to struggle with maintaining his weight.  His mother has gone back to his previous feed regimen as she felt that the new regimen as outlined by the nutritionist at the [place] gastrology clinic was too much volume for his stomach to handle.  She does think things have improved since she did this.  Furthermore per previous discussion she has been holding his Synthroid she thought this also was challenging regarding weight gain.  She does not I think it is any better.  He seems to still get very poor sleep and does seem to be agitated at times.  He has not any fevers.  He has not had any significant nausea or vomiting.  No diarrhea.  He does continue to get a few spots on his neck but these do not seem to be bothersome to him that do not seem to itch.  There is been no new lumps or bumps noted anywhere.  Patient's mother and care team overall are quite frustrated I do not have a clear diagnosis for his failure to thrive.  He has been getting all his  medications however his mother is not sure if they really are helping.  The cough Rj has developed is occasional.  It does not lead to emesis.  It seems to be dry.  It occurs throughout the whole day it is not worse at night or when he lays down.  It is not productive.        Problem list and histories reviewed & adjusted, as indicated.  Additional history: as documented    Patient Active Problem List    Diagnosis Date Noted     CP (cerebral palsy), spastic, diplegic (H) 07/20/2017     Priority: Medium     Sleep disorder 07/20/2017     Priority: Medium     Seizure disorder (H) 07/20/2017     Priority: Medium     Other idiopathic scoliosis, unspecified spinal region 07/20/2017     Priority: Medium     Periventricular leukomalacia, associated with prematurity, chronic 07/20/2017     Priority: Medium     Oral aversion 07/20/2017     Priority: Medium     Nutritional disorder 07/20/2017     Priority: Medium     Mild persistent asthma in adult without complication 07/20/2017     Priority: Medium     History of prematurity, 23 wk 400 grams 07/20/2017     Priority: Medium     History of behavioral and mental health problems 07/20/2017     Priority: Medium     Abnormal gait 07/20/2017     Priority: Medium     Feeding by G-tube (H) 07/20/2017     Priority: Medium     Constipation, unspecified constipation type 07/20/2017     Priority: Medium     Chronic lung disease of prematurity 07/20/2017     Priority: Medium     Bilateral blindness 07/20/2017     Priority: Medium     Autism spectrum disorder associated with known medical or genetic condition or environmental factor, requiring substantial support (level 2) 07/20/2017     Priority: Medium     Anxiety 07/20/2017     Priority: Medium     Short stature 03/23/2012     Priority: Medium     Social History     Tobacco Use     Smoking status: Never Smoker     Smokeless tobacco: Never Used   Substance Use Topics     Alcohol use: No     Drug use: No     Family History   Problem  "Relation Age of Onset     Diabetes Father        ROS:  A complete 10 point review of systems was taken and negative except for those noted in the subjective/HPI section(s) above     BP Readings from Last 3 Encounters:   04/05/19 94/60   02/06/19 100/64   01/15/19 (!) 88/59    Wt Readings from Last 3 Encounters:   04/05/19 36.7 kg (81 lb)   02/13/19 36.4 kg (80 lb 4.8 oz)   02/06/19 36.9 kg (81 lb 6.4 oz)                    OBJECTIVE:                                                    BP 94/60   Pulse 110   Temp 99.2  F (37.3  C) (Axillary)   Resp 24   Ht 1.6 m (5' 3\")   Wt 36.7 kg (81 lb)   SpO2 98%   BMI 14.35 kg/m       Wt Readings from Last 5 Encounters:   04/05/19 36.7 kg (81 lb)   02/13/19 36.4 kg (80 lb 4.8 oz)   02/06/19 36.9 kg (81 lb 6.4 oz)   01/15/19 36.4 kg (80 lb 4.8 oz)   10/13/17 46.2 kg (101 lb 12.8 oz)       Constitutional:  awake and alert. listening to music but answers questions appropriate/per baseline.  no distress  Head: Normocephalic. No masses, lesions, tenderness or abnormalities  Neck: Neck supple. No adenopathy. Thyroid symmetric, normal size,, Carotids without bruits.  ENT: ENT exam normal, no neck nodes or sinus tenderness tympanic membranes within normal limits bilaterally, no tonsillar hypertrophy or exudates  Cardiovascular: regular rate and rhythm no rubs, gallops or murmurs normal S1/S2; no S3 or S4  Respiratory: clear to auscultation bilaterally in all lung fields, normal insp/exp effort. Good air movement (of note, no cough during exam or appointment)  Gastrointestinal: Abdomen soft, non-tender. BS normal. No masses, organomegaly; GTube in place  Musculoskeletal: extremities normal- no gross deformities noted, gait normal and normal muscle tone  Skin: + a couple < 2 cm round annular lesions on neck, slightly hyperpigmented. no skin breakdown  Neurologic: ambulates with assist, Reflexes normal and symmetric. Sensation grossly WNL.  Psychiatric: mentation appears at " basleineand affect normal/bright  Hematologic/Lymphatic/Immunologic: Normal cervical lymph nodes      Results for orders placed or performed in visit on 04/05/19   Lithium level   Result Value Ref Range    Lithium Level 0.90 0.60 - 1.20 mmol/L   Comprehensive metabolic panel   Result Value Ref Range    Sodium 141 133 - 144 mmol/L    Potassium 4.2 3.4 - 5.3 mmol/L    Chloride 105 94 - 109 mmol/L    Carbon Dioxide 34 (H) 20 - 32 mmol/L    Anion Gap 2 (L) 3 - 14 mmol/L    Glucose 247 (H) 70 - 99 mg/dL    Urea Nitrogen 21 7 - 30 mg/dL    Creatinine 0.85 0.66 - 1.25 mg/dL    GFR Estimate >90 >60 mL/min/[1.73_m2]    GFR Estimate If Black >90 >60 mL/min/[1.73_m2]    Calcium 8.8 8.5 - 10.1 mg/dL    Bilirubin Total 0.2 0.2 - 1.3 mg/dL    Albumin 3.4 3.4 - 5.0 g/dL    Protein Total 7.5 6.8 - 8.8 g/dL    Alkaline Phosphatase 72 40 - 150 U/L    ALT 54 0 - 70 U/L    AST 29 0 - 45 U/L   CBC with platelets differential   Result Value Ref Range    WBC 6.1 4.0 - 11.0 10e9/L    RBC Count 4.73 4.4 - 5.9 10e12/L    Hemoglobin 15.5 13.3 - 17.7 g/dL    Hematocrit 48.7 40.0 - 53.0 %     (H) 78 - 100 fl    MCH 32.8 26.5 - 33.0 pg    MCHC 31.8 31.5 - 36.5 g/dL    RDW 12.4 10.0 - 15.0 %    Platelet Count 77 (L) 150 - 450 10e9/L    % Neutrophils 46.0 %    % Lymphocytes 30.0 %    % Monocytes 23.0 %    % Eosinophils 1.0 %    RBC Morphology Normal     Platelet Estimate       Automated count confirmed.  Platelet morphology is normal.    Diff Method Manual Differential    Erythrocyte sedimentation rate auto   Result Value Ref Range    Sed Rate 7 0 - 15 mm/h   CRP, inflammation   Result Value Ref Range    CRP Inflammation 13.0 (H) 0.0 - 8.0 mg/L   TSH with free T4 reflex   Result Value Ref Range    TSH 11.13 (H) 0.40 - 4.00 mU/L   Prealbumin   Result Value Ref Range    Prealbumin 17 15 - 45 mg/dL   Valproic acid   Result Value Ref Range    Valproic Acid Level 133 (H) 50 - 100 mg/L   T4 free   Result Value Ref Range    T4 Free 1.12 0.76 -  1.46 ng/dL   Hemoglobin A1c   Result Value Ref Range    Hemoglobin A1C 5.4 0 - 5.6 %          ASSESSMENT/PLAN:                                                        ICD-10-CM    1. Failure to thrive in adult R62.7 MENTAL HEALTH REFERRAL  - Adult; Psychiatry and Medication Management; Psychiatry; [facility]: Psychiatry [facility] [phone]; We will contact you to schedule the appointment or please call with any questions     Lithium level     Comprehensive metabolic panel     CBC with platelets differential     Erythrocyte sedimentation rate auto     CRP, inflammation     TSH with free T4 reflex     Prealbumin     Valproic acid     T4 free     T4 free     Hemoglobin A1c   2. Autism spectrum disorder associated with known medical or genetic condition or environmental factor, requiring substantial support (level 2) F84.0 MENTAL HEALTH REFERRAL  - Adult; [facility] [phone]; We will contact you to schedule the appointment or please call with any questions     Comprehensive metabolic panel     CBC with platelets differential     Erythrocyte sedimentation rate auto     CRP, inflammation     TSH with free T4 reflex     Prealbumin   3. CP (cerebral palsy), spastic, diplegic (H) G80.1 MENTAL HEALTH REFERRAL  - Adult; Psychiatry and Medication Management; Psychiatry; [facility]: Psychiatry [facility] [phone]; We will contact you to schedule the appointment or please call with any questions     CRP, inflammation     TSH with free T4 reflex   4. Oral aversion R63.3 MENTAL HEALTH REFERRAL  - Adult; [facility] [phone]; We will contact you to schedule the appointment or please call with any questions     Lithium level     TSH with free T4 reflex     Prealbumin   5. Anxiety F41.9 MENTAL HEALTH REFERRAL  - Adult; Psychiatry and Medication Management; Psychiatry; [facility]: Psychiatry [facility] [phone]; We  will contact you to schedule the appointment or please call with any questions   6. Seizure disorder (H) G40.909 MENTAL HEALTH REFERRAL  - Adult; Psychiatry and Medication Management; Psychiatry; [facility]: Psychiatry [facility] [phone]; We will contact you to schedule the appointment or please call with any questions     Lithium level     Comprehensive metabolic panel     CBC with platelets differential     Erythrocyte sedimentation rate auto     Valproic acid   7. History of behavioral and mental health problems Z86.59 MENTAL HEALTH REFERRAL  - Adult; Psychiatry and Medication Management; Psychiatry; [facility]: Psychiatry [facility] [phone]; We will contact you to schedule the appointment or please call with any questions     Lithium level     Comprehensive metabolic panel     CBC with platelets differential     Erythrocyte sedimentation rate auto     CRP, inflammation     TSH with free T4 reflex     Prealbumin   8. Mood disorder (H) F39 MENTAL HEALTH REFERRAL  - Adult; Psychiatry and Medication Management; Psychiatry; [facility]: Psychiatry [facility] [phone]; We will contact you to schedule the appointment or please call with any questions     Lithium level     Comprehensive metabolic panel     CBC with platelets differential     Erythrocyte sedimentation rate auto     CRP, inflammation     TSH with free T4 reflex     Prealbumin   9. Rash and nonspecific skin eruption R21 clotrimazole-betamethasone (LOTRISONE) 1-0.05 % external cream   discussed with patient (or patient's parents/caregiver) pathophysiology of condition and treatment options.  I had a long discussion today with Rj and his mother about workup to date recommendations from our specialty colleagues and possibilities for future evaluation.  I share Rj and his mother's frustrations regarding her inability to get him to gain weight back in a timeline that would be comfortable with.  I am thankful that his weight is not continue to decrease I do appreciate  the frustrations and sensitivities around his feet volume and I trust that his mother was known Rj the best throughout his life is the best appreciation for these technicalities.  I think it is best to continue the feeds as Rj is currently giving them.  Rechecking metabolic labs infectious labs etc. as noted today.  Labs today do show low platelets elevated glucose but he the patient was not fasting slightly increased inflammatory markers with a normal sed rate and slightly increased CRP TSH at 11 which is elevated a normal prealbumin slightly high valproic acid level of normal T4 and normal hemoglobin A1c.  I do that the patient's constellation of symptoms certainly could be due to viral illness and reviewed this with the patient's mother today.  I do think the patient's polypharmacy needs to be looked at and will discuss with our [facility] pharmacist. I also did call over to the [facility] and they will try to expedite an appointment for Rj to establish with our psychiatry clinic there.  We do not seem to have a focus of infection at this time and his weight has stabilized however it is not significantly improved.  I think close follow-up with nutrition in the GI clinic at the  is also paramount. I will order cream for Rj to use on his rash and see if this helps if it does not get any better would have him see Dr. Adames here at our clinic she is a pediatric dermatologist.  I did stress with Rj's mother that we do not know the underlying cause of his issue if any red flag symptoms occur including inability to keep food fluids or his tube feeds down fevers abdominal pain lethargy or any other symptoms like that I would recommend he present to the [facility] or [place] for evaluation urgently.  The patient's mother verbalized understanding and is 100% in support and agreeable to this plan.  Things continue to be manageable and Rj does not develop any new symptoms in his cough  "resolved and his weight start heading in the right direction I would want to recheck his labs again in approximately 2 weeks to be sure these have normalized.  I did also discuss going down further evaluation including hematology evaluation endocrinology evaluation etc. and these would be options for us depending on Rj does.  Rj's mother verbalized understanding and is also agreeable to this plan.  She will keep me posted and is well connected with her care coordination team as well and knows thy are a resource.       Estimated body mass index is 14.35 kg/m  as calculated from the following:    Height as of this encounter: 1.6 m (5' 3\").    Weight as of this encounter: 36.7 kg (81 lb).      Return to clinic as needed or if symptoms persist, change, worsen or if any new symptoms develop.    Total time spent with patient was 60 min, of which greater than 50 minutes of face to face time and/or coordination of care discussing the above issue(s).     Micky Leyva M.D.  Internal Medicine-Pediatrics              "

## 2019-04-05 NOTE — PATIENT INSTRUCTIONS
check at home to see if Rj has ever been on olanzapine (Zyprexa)    I will call the [facility] and try to expedite a psychiatry appointment     labs today, will message once back

## 2019-04-06 LAB — HBA1C MFR BLD: 5.4 % (ref 0–5.6)

## 2019-04-06 PROCEDURE — 36415 COLL VENOUS BLD VENIPUNCTURE: CPT | Performed by: INTERNAL MEDICINE

## 2019-04-06 PROCEDURE — 83036 HEMOGLOBIN GLYCOSYLATED A1C: CPT | Performed by: INTERNAL MEDICINE

## 2019-04-06 RX ORDER — CLOTRIMAZOLE AND BETAMETHASONE DIPROPIONATE 10; .64 MG/G; MG/G
CREAM TOPICAL
Qty: 45 G | Refills: 3 | Status: ON HOLD | OUTPATIENT
Start: 2019-04-06 | End: 2021-04-19

## 2019-04-06 ASSESSMENT — ASTHMA QUESTIONNAIRES: ACT_TOTALSCORE: 24

## 2019-04-08 LAB — PREALB SERPL IA-MCNC: 17 MG/DL (ref 15–45)

## 2019-04-08 NOTE — PROGRESS NOTES
"Clinic Care Coordination Contact  Care Team Conversations    RN CC received [employer] message response from patient's mom (dated 04/04/19):  \"Good Morning Rj Betancourt was doing better with the change back to the milk mix.  I have been keeping a record of all fluids that Rj is taking in.  The bowel movements were better, however this week Rj has been sick and he has not been as regular.  I did see a small weight gain last week,  unfortunately with Rj being so sick this Rj has lost weight again.      I have a request in to see Dr. Leyva hopefully yet this week.   Darcy \"    Chart Reviewed: patient was able to be seen by PCP on 4/5/19; has follow up with [facility] on 4/26/19 and referral was placed for Mental Health.     PLAN:  RN CC will follow up in 1-2 weeks in follow up of referral for Mental Health and check in on any outstanding Care Coordination needs.     Asia Barriga RN   [facility]  PH: [phone]  "

## 2019-04-09 ENCOUNTER — TRANSFERRED RECORDS (OUTPATIENT)
Dept: HEALTH INFORMATION MANAGEMENT | Facility: CLINIC | Age: 24
End: 2019-04-09

## 2019-04-09 NOTE — TELEPHONE ENCOUNTER
FUTURE VISIT INFORMATION      FUTURE VISIT INFORMATION:    Date: 4/26/19    Time: 8:40AM    Location: [place]  REFERRAL INFORMATION:    Referring provider:  Dr. Micky Leyva    Referring providers clinic:  [facility]    Reason for visit/diagnosis:  Weight loss     NOTES STATUS DETAILS   OFFICE NOTE from referring provider  Internal 1/15/19   OFFICE NOTE from other specialist   Internal 2/6/19   DISCHARGE SUMMARY FROM HOSPITAL N/A    DISCHARGE REPORT FROM ED N/A    OPERATIVE REPORT  N/A    PFC REPORT N/A    MEDICATION LIST Internal    LABS     FIT/STOOL TESTING N/A    PERTINENT LABS Internal    PATHOLOGY REPORTS RELATED TO DIAGNOSIS N/A    DIAGNOSTIC PROCEDURES     COLONOSCOPY N/A    ENDOSCOPY (EGD) N/A    ERCP N/A    EUS N/A    FLEX SIGMOIDOSCOPY N/A    IMAGING & REPORT      CT, MRI, US, XR Internal

## 2019-04-10 ENCOUNTER — TRANSFERRED RECORDS (OUTPATIENT)
Dept: HEALTH INFORMATION MANAGEMENT | Facility: CLINIC | Age: 24
End: 2019-04-10

## 2019-04-16 ENCOUNTER — MYC MEDICAL ADVICE (OUTPATIENT)
Dept: PEDIATRICS | Facility: CLINIC | Age: 24
End: 2019-04-16

## 2019-04-17 NOTE — TELEPHONE ENCOUNTER
Next 5 appointments (look out 90 days)    Apr 23, 2019 12:20 PM CDT  Office Visit with Micky Leyva MD  [facility] [address] [place] [id number] [phone]

## 2019-04-18 ENCOUNTER — MYC MEDICAL ADVICE (OUTPATIENT)
Dept: PEDIATRICS | Facility: CLINIC | Age: 24
End: 2019-04-18

## 2019-04-18 NOTE — TELEPHONE ENCOUNTER
Pt's mom sent a [employer] message with an update on pt's weight.  Pt has an upcoming appt on 4/23/19.  See [employer] message.    Qian Toribio RN - [facility]

## 2019-04-19 ENCOUNTER — TRANSFERRED RECORDS (OUTPATIENT)
Dept: HEALTH INFORMATION MANAGEMENT | Facility: CLINIC | Age: 24
End: 2019-04-19

## 2019-04-23 ENCOUNTER — OFFICE VISIT (OUTPATIENT)
Dept: PEDIATRICS | Facility: CLINIC | Age: 24
End: 2019-04-23
Payer: COMMERCIAL

## 2019-04-23 ENCOUNTER — PATIENT OUTREACH (OUTPATIENT)
Dept: CARE COORDINATION | Facility: CLINIC | Age: 24
End: 2019-04-23

## 2019-04-23 ENCOUNTER — MYC MEDICAL ADVICE (OUTPATIENT)
Dept: CARE COORDINATION | Facility: CLINIC | Age: 24
End: 2019-04-23

## 2019-04-23 VITALS
TEMPERATURE: 98.6 F | BODY MASS INDEX: 14.53 KG/M2 | RESPIRATION RATE: 20 BRPM | SYSTOLIC BLOOD PRESSURE: 98 MMHG | DIASTOLIC BLOOD PRESSURE: 66 MMHG | HEART RATE: 88 BPM | WEIGHT: 82 LBS

## 2019-04-23 DIAGNOSIS — G80.1 CP (CEREBRAL PALSY), SPASTIC, DIPLEGIC (H): ICD-10-CM

## 2019-04-23 DIAGNOSIS — R62.7 FAILURE TO THRIVE IN ADULT: Primary | ICD-10-CM

## 2019-04-23 DIAGNOSIS — G40.909 SEIZURE DISORDER (H): ICD-10-CM

## 2019-04-23 DIAGNOSIS — F84.0 AUTISM SPECTRUM DISORDER ASSOCIATED WITH KNOWN MEDICAL OR GENETIC CONDITION OR ENVIRONMENTAL FACTOR, REQUIRING SUBSTANTIAL SUPPORT (LEVEL 2): ICD-10-CM

## 2019-04-23 DIAGNOSIS — F39 MOOD DISORDER (H): ICD-10-CM

## 2019-04-23 DIAGNOSIS — Z93.1 GASTROSTOMY TUBE DEPENDENT (H): ICD-10-CM

## 2019-04-23 LAB
ALBUMIN SERPL-MCNC: 3.7 G/DL (ref 3.4–5)
ALP SERPL-CCNC: 82 U/L (ref 40–150)
ALT SERPL W P-5'-P-CCNC: 40 U/L (ref 0–70)
AMYLASE SERPL-CCNC: 63 U/L (ref 30–110)
ANION GAP SERPL CALCULATED.3IONS-SCNC: 4 MMOL/L (ref 3–14)
AST SERPL W P-5'-P-CCNC: 37 U/L (ref 0–45)
BASOPHILS # BLD AUTO: 0 10E9/L (ref 0–0.2)
BASOPHILS NFR BLD AUTO: 0.5 %
BILIRUB SERPL-MCNC: 0.4 MG/DL (ref 0.2–1.3)
BUN SERPL-MCNC: 19 MG/DL (ref 7–30)
CALCIUM SERPL-MCNC: 9.4 MG/DL (ref 8.5–10.1)
CHLORIDE SERPL-SCNC: 104 MMOL/L (ref 94–109)
CO2 SERPL-SCNC: 29 MMOL/L (ref 20–32)
CORTIS SERPL-MCNC: 6.5 UG/DL (ref 4–22)
CREAT SERPL-MCNC: 0.82 MG/DL (ref 0.66–1.25)
DIFFERENTIAL METHOD BLD: ABNORMAL
EOSINOPHIL # BLD AUTO: 0.1 10E9/L (ref 0–0.7)
EOSINOPHIL NFR BLD AUTO: 1.3 %
ERYTHROCYTE [DISTWIDTH] IN BLOOD BY AUTOMATED COUNT: 12.9 % (ref 10–15)
GFR SERPL CREATININE-BSD FRML MDRD: >90 ML/MIN/{1.73_M2}
GLUCOSE SERPL-MCNC: 88 MG/DL (ref 70–99)
HCT VFR BLD AUTO: 40.8 % (ref 40–53)
HGB BLD-MCNC: 13.7 G/DL (ref 13.3–17.7)
LIPASE SERPL-CCNC: 192 U/L (ref 73–393)
LYMPHOCYTES # BLD AUTO: 2.5 10E9/L (ref 0.8–5.3)
LYMPHOCYTES NFR BLD AUTO: 30.9 %
MCH RBC QN AUTO: 33.4 PG (ref 26.5–33)
MCHC RBC AUTO-ENTMCNC: 33.6 G/DL (ref 31.5–36.5)
MCV RBC AUTO: 100 FL (ref 78–100)
MONOCYTES # BLD AUTO: 0.7 10E9/L (ref 0–1.3)
MONOCYTES NFR BLD AUTO: 8.7 %
NEUTROPHILS # BLD AUTO: 4.8 10E9/L (ref 1.6–8.3)
NEUTROPHILS NFR BLD AUTO: 58.6 %
PLATELET # BLD AUTO: 203 10E9/L (ref 150–450)
POTASSIUM SERPL-SCNC: 4.3 MMOL/L (ref 3.4–5.3)
PROT SERPL-MCNC: 7.2 G/DL (ref 6.8–8.8)
RBC # BLD AUTO: 4.1 10E12/L (ref 4.4–5.9)
SODIUM SERPL-SCNC: 137 MMOL/L (ref 133–144)
T4 FREE SERPL-MCNC: 0.97 NG/DL (ref 0.76–1.46)
TSH SERPL DL<=0.005 MIU/L-ACNC: 5.58 MU/L (ref 0.4–4)
VALPROATE SERPL-MCNC: 108 MG/L (ref 50–100)
WBC # BLD AUTO: 8.2 10E9/L (ref 4–11)

## 2019-04-23 PROCEDURE — 84439 ASSAY OF FREE THYROXINE: CPT | Performed by: INTERNAL MEDICINE

## 2019-04-23 PROCEDURE — 84443 ASSAY THYROID STIM HORMONE: CPT | Performed by: INTERNAL MEDICINE

## 2019-04-23 PROCEDURE — 99215 OFFICE O/P EST HI 40 MIN: CPT | Performed by: INTERNAL MEDICINE

## 2019-04-23 PROCEDURE — 82533 TOTAL CORTISOL: CPT | Performed by: INTERNAL MEDICINE

## 2019-04-23 PROCEDURE — 80053 COMPREHEN METABOLIC PANEL: CPT | Performed by: INTERNAL MEDICINE

## 2019-04-23 PROCEDURE — 36415 COLL VENOUS BLD VENIPUNCTURE: CPT | Performed by: INTERNAL MEDICINE

## 2019-04-23 PROCEDURE — 83690 ASSAY OF LIPASE: CPT | Performed by: INTERNAL MEDICINE

## 2019-04-23 PROCEDURE — 82150 ASSAY OF AMYLASE: CPT | Performed by: INTERNAL MEDICINE

## 2019-04-23 PROCEDURE — 80164 ASSAY DIPROPYLACETIC ACD TOT: CPT | Performed by: INTERNAL MEDICINE

## 2019-04-23 PROCEDURE — 99000 SPECIMEN HANDLING OFFICE-LAB: CPT | Performed by: INTERNAL MEDICINE

## 2019-04-23 PROCEDURE — 80339 ANTIEPILEPTICS NOS 1-3: CPT | Mod: 90 | Performed by: INTERNAL MEDICINE

## 2019-04-23 PROCEDURE — 85025 COMPLETE CBC W/AUTO DIFF WBC: CPT | Performed by: INTERNAL MEDICINE

## 2019-04-23 NOTE — PROGRESS NOTES
Clinic Care Coordination Contact    Clinic Care Coordination Contact  OUTREACH       Primary Diagnosis: Frailty/Failure to thrive    Clinical Concerns:  Patient was admitted recently to [facility], inpatient, for thorough observation and testing to determine source of ongoing weight loss and persistent GI symptoms.     RN CC was unable to meet with patient/family during scheduled post hospital follow up today; sent [employer] message follow up.         Goals:   Goals        General    Other (pt-stated)     Notes - Note created  2/28/2019  9:47 AM by Asia Barriga RN    Goal Statement: I want to gain weight back 100 lbs and sustain weight gain  Measure of Success: Evidenced by patient's gradual increase in weight, with a   Supportive Steps to Achieve: RN CC helped facilitate appointment with GI and Dietician to review current caloric intake                                                 Patient will follow up with dietician to review recommendations for alterations in oral and               G-tube feedings      Mom (caregiver) to monitor weight and report any ongoing concerns to care team  Barriers: Unknown etiology for steady and progressive weight decline; complex medical co-morbidities  Strengths: Very engaged and supportive guardian/caregiver (mother)  Date to Achieve By: 8 months: October 28th, 2019              As of today's date 4/23/2019 goal is met at 0 - 25%.   Goal Status:  Ongoing      Outreach Frequency: monthly  Future Appointments              In 1 month Micky Leyva MD [facility]          Plan:   RN CC will await follow up response from patient/familiy regarding outstanding Care Coordination needs.     Asia Barriga RN   Clinic Care Coordinator-[place] [facility]  PH: [phone]

## 2019-04-23 NOTE — PROGRESS NOTES
SUBJECTIVE:                                                    Rj Licea is a 23 year old male who presents to clinic today for the following health issues:      Hospital Follow-up Visit:    Hospital/Nursing Home/IP Rehab Facility: [facility]  Date of Admission: 04/09/2019  Date of Discharge: 04/11/2019  Reason(s) for Admission: Imaging for wt loss            Problems taking medications regularly:  None       Medication changes since discharge: change in Valproic acid       Problems adhering to non-medication therapy:  None    Summary of hospitalization:  [facility] discharge summary reviewed  Diagnostic Tests/Treatments reviewed.  Follow up needed: none  Other Healthcare Providers Involved in Patient s Care:         multiple  Update since discharge: improved.     Post Discharge Medication Reconciliation: discharge medications reconciled and changed, per note/orders (see AVS).  Plan of care communicated with patient and family     Patient here with this mother for hosptial f/u, weight has stabilized but not up at all. patient's mother very frustrated with lack of celar diagnosis for his weight loss, would like to review results form [facility] today and potential next steps. no new symptoms. does have follow-up appointment with GI and also was contacted by intake from the  psych clinic and plans ot call to get appointment. Patient has had continuous feeds in the past, but would need additional PCA or RN support to do that as patient's current PCA services hours wouldn't cover. Patient had issues with his IV when getting the sedated CT scan, very traumatic for patient and his family and they would like to avoid any sedation if at al possible. Also wondering about cortisol stim test, per records noted that was done correctly. reviwed other records from [facility] today as well.       Problem list and histories reviewed & adjusted, as indicated.  Additional history: as documented    Patient Active Problem  List    Diagnosis Date Noted     CP (cerebral palsy), spastic, diplegic (H) 07/20/2017     Priority: Medium     Sleep disorder 07/20/2017     Priority: Medium     Seizure disorder (H) 07/20/2017     Priority: Medium     Other idiopathic scoliosis, unspecified spinal region 07/20/2017     Priority: Medium     Periventricular leukomalacia, associated with prematurity, chronic 07/20/2017     Priority: Medium     Oral aversion 07/20/2017     Priority: Medium     Nutritional disorder 07/20/2017     Priority: Medium     Mild persistent asthma in adult without complication 07/20/2017     Priority: Medium     History of prematurity, 23 wk 400 grams 07/20/2017     Priority: Medium     History of behavioral and mental health problems 07/20/2017     Priority: Medium     Abnormal gait 07/20/2017     Priority: Medium     Feeding by G-tube (H) 07/20/2017     Priority: Medium     Constipation, unspecified constipation type 07/20/2017     Priority: Medium     Chronic lung disease of prematurity 07/20/2017     Priority: Medium     Bilateral blindness 07/20/2017     Priority: Medium     Autism spectrum disorder associated with known medical or genetic condition or environmental factor, requiring substantial support (level 2) 07/20/2017     Priority: Medium     Anxiety 07/20/2017     Priority: Medium     Short stature 03/23/2012     Priority: Medium     Social History     Tobacco Use     Smoking status: Never Smoker     Smokeless tobacco: Never Used   Substance Use Topics     Alcohol use: No     Drug use: No     Family History   Problem Relation Age of Onset     Diabetes Father        ROS:  A complete 10 point review of systems was taken and negative except for those noted in the subjective/HPI section(s) above     BP Readings from Last 3 Encounters:   04/23/19 98/66   04/05/19 94/60   02/06/19 100/64    Wt Readings from Last 3 Encounters:   04/23/19 37.2 kg (82 lb)   04/05/19 36.7 kg (81 lb)   02/13/19 36.4 kg (80 lb 4.8 oz)                     OBJECTIVE:                                                    BP 98/66   Pulse 88   Temp 98.6  F (37  C) (Axillary)   Resp 20   Wt 37.2 kg (82 lb)   BMI 14.53 kg/m     Constitutional: alert, no distress and very think appearing   HEENT: normocephalic and atrumatic, mucous membranes moist, op clear, mucous membranes moist, neck supple   Cardiovascular: regular rate and rhythm no rubs, gallops or murmurs normal S1/S2; no S3 or S4  Respiratory: clear to auscultation bilaterally in all lung fields, normal insp/exp effort. Good air movement  Gastrointestinal:GT in place  Musculoskeletal: extremities normal- no gross deformities noted, gait normal and normal muscle tone  Skin: no suspicious lesions or rashes  Neurologic: Gait normal. Reflexes normal and symmetric. Sensation grossly WNL.  Psychiatric: mentation appears normal and affect normal/bright  Hematologic/Lymphatic/Immunologic: normal ant/post cervical, axillary, supraclavicular and inguinal nodes    Results for orders placed or performed in visit on 04/23/19   Clobazam Level Quantitative   Result Value Ref Range    Clobazam Quantitative 160 ng/mL   Comprehensive metabolic panel (BMP + Alb, Alk Phos, ALT, AST, Total. Bili, TP)   Result Value Ref Range    Sodium 137 133 - 144 mmol/L    Potassium 4.3 3.4 - 5.3 mmol/L    Chloride 104 94 - 109 mmol/L    Carbon Dioxide 29 20 - 32 mmol/L    Anion Gap 4 3 - 14 mmol/L    Glucose 88 70 - 99 mg/dL    Urea Nitrogen 19 7 - 30 mg/dL    Creatinine 0.82 0.66 - 1.25 mg/dL    GFR Estimate >90 >60 mL/min/[1.73_m2]    GFR Estimate If Black >90 >60 mL/min/[1.73_m2]    Calcium 9.4 8.5 - 10.1 mg/dL    Bilirubin Total 0.4 0.2 - 1.3 mg/dL    Albumin 3.7 3.4 - 5.0 g/dL    Protein Total 7.2 6.8 - 8.8 g/dL    Alkaline Phosphatase 82 40 - 150 U/L    ALT 40 0 - 70 U/L    AST 37 0 - 45 U/L   Amylase   Result Value Ref Range    Amylase 63 30 - 110 U/L   Lipase   Result Value Ref Range    Lipase 192 73 - 393 U/L   Valproic acid    Result Value Ref Range    Valproic Acid Level 108 (H) 50 - 100 mg/L   CBC with platelets and differential   Result Value Ref Range    WBC 8.2 4.0 - 11.0 10e9/L    RBC Count 4.10 (L) 4.4 - 5.9 10e12/L    Hemoglobin 13.7 13.3 - 17.7 g/dL    Hematocrit 40.8 40.0 - 53.0 %     78 - 100 fl    MCH 33.4 (H) 26.5 - 33.0 pg    MCHC 33.6 31.5 - 36.5 g/dL    RDW 12.9 10.0 - 15.0 %    Platelet Count 203 150 - 450 10e9/L    % Neutrophils 58.6 %    % Lymphocytes 30.9 %    % Monocytes 8.7 %    % Eosinophils 1.3 %    % Basophils 0.5 %    Absolute Neutrophil 4.8 1.6 - 8.3 10e9/L    Absolute Lymphocytes 2.5 0.8 - 5.3 10e9/L    Absolute Monocytes 0.7 0.0 - 1.3 10e9/L    Absolute Eosinophils 0.1 0.0 - 0.7 10e9/L    Absolute Basophils 0.0 0.0 - 0.2 10e9/L    Diff Method Automated Method    TSH with free T4 reflex   Result Value Ref Range    TSH 5.58 (H) 0.40 - 4.00 mU/L   Cortisol   Result Value Ref Range    Cortisol Serum 6.5 4 - 22 ug/dL   T4 free   Result Value Ref Range    T4 Free 0.97 0.76 - 1.46 ng/dL            ASSESSMENT/PLAN:                                                        ICD-10-CM    1. Failure to thrive in adult R62.7 Clobazam Level Quantitative     Comprehensive metabolic panel (BMP + Alb, Alk Phos, ALT, AST, Total. Bili, TP)     Amylase     Lipase     Valproic acid     CBC with platelets and differential     TSH with free T4 reflex     Cortisol     T4 free     T4 free   2. CP (cerebral palsy), spastic, diplegic (H) G80.1 Comprehensive metabolic panel (BMP + Alb, Alk Phos, ALT, AST, Total. Bili, TP)     Lipase     Valproic acid     Cortisol   3. Autism spectrum disorder associated with known medical or genetic condition or environmental factor, requiring substantial support (level 2) F84.0 Cortisol   4. Mood disorder (H) F39 Clobazam Level Quantitative     Comprehensive metabolic panel (BMP + Alb, Alk Phos, ALT, AST, Total. Bili, TP)     Cortisol   5. Gastrostomy tube dependent (H) Z93.1 Amylase     Lipase     " CBC with platelets and differential     TSH with free T4 reflex   6. Seizure disorder (H) G40.909 Clobazam Level Quantitative     Comprehensive metabolic panel (BMP + Alb, Alk Phos, ALT, AST, Total. Bili, TP)     Amylase     Lipase     Valproic acid     CBC with platelets and differential     TSH with free T4 reflex     Cortisol   discussed with patient (or patient's parents/caregiver) pathophysiology of condition and treatment options.  reviwed hospitall course and results from [facility] in detail. unclear etiology for weight loss, patient met with nutrition again and has concerns about the feed volumes as recommended. ? if we could try night feeds or at least feeds over a  few hours, will contact CC team to see If we can get home RN support to try this. patient's mother is agreeable. plan to recheck labs again, follow-up with GI as scheduled and patient's mother will coordinate [facility]. follow-up appointment made, plan for ongoing watchful waiting and working with feeds to see if we can optimize calories, etc. Also d/w pt signs/symptoms of worsening of condition and need for urgent ED evaluation. Patient's parent(s) verbalized understanding and are agreeable to this plan.      Estimated body mass index is 14.35 kg/m  as calculated from the following:    Height as of 4/5/19: 1.6 m (5' 3\").    Weight as of 4/5/19: 36.7 kg (81 lb).      Return to clinic as needed or if symptoms persist, change, worsen or if any new symptoms develop.    Total time spent with patient was 60 min, of which greater than 50 minutes of face to face time and/or coordination of care discussing the above issue(s).     Micky Leyva M.D.  Internal Medicine-Pediatrics              "

## 2019-04-26 ENCOUNTER — PRE VISIT (OUTPATIENT)
Dept: GASTROENTEROLOGY | Facility: CLINIC | Age: 24
End: 2019-04-26

## 2019-04-29 LAB — CLOBAZAM SERPL-MCNC: 160 NG/ML

## 2019-05-03 ENCOUNTER — MYC MEDICAL ADVICE (OUTPATIENT)
Dept: GASTROENTEROLOGY | Facility: CLINIC | Age: 24
End: 2019-05-03

## 2019-05-08 ENCOUNTER — DOCUMENTATION ONLY (OUTPATIENT)
Dept: OTHER | Facility: CLINIC | Age: 24
End: 2019-05-08

## 2019-05-14 NOTE — TELEPHONE ENCOUNTER
Spoke with patient's mother, scheduled an appointment time with Macey Ruiz 5/24 at 10 am to discuss next steps. Mother verbalized date and time of appointment.

## 2019-05-15 ENCOUNTER — TELEPHONE (OUTPATIENT)
Dept: GASTROENTEROLOGY | Facility: CLINIC | Age: 24
End: 2019-05-15

## 2019-05-15 NOTE — TELEPHONE ENCOUNTER
Called and spoke with patient's Mother-Darcy to remind of appointment for 5/22/19 at 10AM. Mother advised will be attending appointment WITHOUT patient, and will be meeting with 2 GI providers.

## 2019-05-16 ENCOUNTER — PATIENT OUTREACH (OUTPATIENT)
Dept: CARE COORDINATION | Facility: CLINIC | Age: 24
End: 2019-05-16

## 2019-05-16 ASSESSMENT — ACTIVITIES OF DAILY LIVING (ADL): DEPENDENT_IADLS:: COOKING;LAUNDRY;SHOPPING;MEAL PREPARATION;MEDICATION MANAGEMENT;TRANSPORTATION;INCONTINENCE

## 2019-05-16 NOTE — TELEPHONE ENCOUNTER
Alcides Taveras,   I received a message from Cira Madrigal (the outpatient dietician you met with at [facility]); she is in agreement with Yogesh's latest tube feeding regimen: Nutren 2.0 at night plus Rj's daytime regimen.     Can you give me a snapshot or update as to now that he is mostly NPO (except a glass of milk or so), what is his current 24 hour routine: what rate are you running during the day (and any at night?)    I am happy to see you're seeing GI in follow-up next week.     Asia Barriga, RN   Clinic Care Coordinator-[facility]  PH: [phone]

## 2019-05-16 NOTE — PROGRESS NOTES
Clinic Care Coordination Contact  Care Team Conversations    RN CC has exchanged Reppler messages with patient's mom (primary caregiver) in recent weeks to discuss ongoing nutritional deficiencies and weight loss.     Most recently, earlier this week mom sent a Reppler portal message to RN CC indicating patient had dropped his weight to 74 lbs.     RN CC huddled with PCP, and alerted Cira Madrigal (outpatient dietician) to review as well.     Dr. Leyva responded directly to patient's mom-reinforced encouragement to implement more continual feeding regimen, including night time.     Cira Madrigal has also reviewed and recommends Nutren 2.0 at night plus daytime regimen.     RN CC has sent messages to mom in response to care team review and input. It is noted patient has follow up with GI providers next week.     PLAN:  Will await GI appointment recommendations/mom response to night time tube feeding recommendations and collaborate further as needed.     Asia Barriga RN   Clinic Care Coordinator-[facility]  PH: [phone]

## 2019-05-22 ENCOUNTER — OFFICE VISIT (OUTPATIENT)
Dept: GASTROENTEROLOGY | Facility: CLINIC | Age: 24
End: 2019-05-22
Payer: COMMERCIAL

## 2019-05-22 ENCOUNTER — DOCUMENTATION ONLY (OUTPATIENT)
Dept: OTHER | Facility: CLINIC | Age: 24
End: 2019-05-22

## 2019-05-22 DIAGNOSIS — K59.00 CONSTIPATION, UNSPECIFIED CONSTIPATION TYPE: ICD-10-CM

## 2019-05-22 DIAGNOSIS — R63.4 WEIGHT LOSS: Primary | ICD-10-CM

## 2019-05-22 ASSESSMENT — ENCOUNTER SYMPTOMS
NERVOUS/ANXIOUS: 1
MEMORY LOSS: 0
ALTERED TEMPERATURE REGULATION: 0
WEIGHT GAIN: 0
SEIZURES: 0
NECK PAIN: 0
CHILLS: 0
FEVER: 0
DIZZINESS: 1
DECREASED APPETITE: 1
SKIN CHANGES: 0
MYALGIAS: 0
DISTURBANCES IN COORDINATION: 0
NUMBNESS: 0
JOINT SWELLING: 0
INSOMNIA: 1
HEADACHES: 1
PANIC: 0
ARTHRALGIAS: 0
TINGLING: 0
WEAKNESS: 1
POLYPHAGIA: 0
LOSS OF CONSCIOUSNESS: 0
INCREASED ENERGY: 0
FATIGUE: 1
HALLUCINATIONS: 0
TREMORS: 0
PARALYSIS: 0
POLYDIPSIA: 1
POOR WOUND HEALING: 0
MUSCLE CRAMPS: 0
BACK PAIN: 0
DECREASED CONCENTRATION: 0
SPEECH CHANGE: 0
NAIL CHANGES: 0
WEIGHT LOSS: 1
NIGHT SWEATS: 0
STIFFNESS: 0
MUSCLE WEAKNESS: 1
DEPRESSION: 1

## 2019-05-22 ASSESSMENT — PATIENT HEALTH QUESTIONNAIRE - PHQ9
SUM OF ALL RESPONSES TO PHQ QUESTIONS 1-9: 13
SUM OF ALL RESPONSES TO PHQ QUESTIONS 1-9: 13

## 2019-05-22 NOTE — LETTER
"5/22/2019       RE: Rj Licea  [address] [place] [address]     Dear Colleague,    Thank you for referring your patient, Rj Licea, to the [facility] GASTROENTEROLOG[facility] IBD CLINIC at [facility]. Please see a copy of my visit note below.    GI CLINIC VISIT    CC/REFERRING MD:  Micky Leyva  REASON FOR CONSULTATION: weight loss   COLLABORATING MD: Maryan King     ASSESSMENT/PLAN:  Rj Licea is a 23 year old male with a past medical history of cerebral palsy, autism spectrum disorder, anxiety, insomnia, G-tube for tube feeding, chronic lung disease, seizure disorder and depression presenting for weight loss.    1. Weight loss  Patient has had marked weight loss from 101 lbs on 10/13/17, to 81 lbs at last visit and 76 lbs today. Patient's mother notes changes in eating by mouth by Mr. Licea including slower eating, and periods where he is expericing \"reflux\" and will not want to eat by mouth. Also describes that tube feeds seem to be increasingly slow when he becomes \"full\". They compensate by increasing his tube feeding, however he continues to lose weight.  Patient's mother has tried dietary recommendations from inpatient visit through the [facility] and [facility] but states she has not had much luck, and always goes back to what he tolerates the best.  Would recommend meeting with a dietitian regularly to ensure he is getting adequate amount of calories and that nutritional feeds have been optimized.  We also discussed that he should sit upright after tube feeds to prevent reflux which may be contributing to symptoms. Could also consider J tube to help with tube feeds.     Abdominal x-ray in December with significant stool burden and patient is more regularly moving his bowels with MiraLAX 1 capful a day.  Despite improvement in pattern and straining, it is possible that patient continues to experience constipation which could explain " description that he is full quickly with tube feeds, with decreased appetite and possible increased abdominal pain.  Can plan for abdominal x-ray at their convenience to evaluate stool burden.  If significant stool burden despite MiraLAX 1 capful a day, will plan to optimize this up to 3 capfuls a day. Would also recommend continuing to follow with Dr. Leyva regarding hypothyroidism which they are supplementing with levothyroxine.     Recent CT scan while inpatient 4/9/19 reassuring and without evidence of neoplasm in chest, abdomen or pelvis contributing to weight loss. Scan notable for findings of esophagus suggestive of GERD. Could evaluate for esophagitis, ulcerations, or structural abnormality with upper endoscopy in the OR however Ms. Wong wishes to defer at this time. Can consider pending clinical picture.     Plan:   -- would recommend meeting with our dietitian Ladan Ralph   -- continue to follow thyroid level with Dr. Leyva  -- continue to sit upright after feeds    -- continue Miralax 1 capful a day. Will assess stool burden with abdominal x-ray  -- will consider upper endoscopy in the OR to evaluate for H pylori, celiac disease, or ulceration     RTC 4-6 weeks     Thank you for this consultation. Case discussed with Dr. King, patient's mother notified of recommendations together. It was a pleasure to participate in the care of this patient; please contact us with any further questions.       Macey Ruiz PA-C  Division of Gastroenterology, Hepatology & Nutrition  [facility]      HPI  Rj Licea is a 23 year old male with a past medical history of cerebral palsy, autism spectrum disorder, anxiety, insomnia, chronic G-tube for tube feeding, chronic lung disease, seizure disorder and depression presenting for weight loss. History obtained primarily through patient's mother. The patient's mother reports that starting in the fall of this year, she noticed declining weight at  "medical appointments. At baseline, the patient weighs around 100 lbs and more recently has been around 80. She denies any significant changes in medications, medical history, or surgical history around the time of the patient's weight loss. Of note, the patient's father has  from the family, but she shares that it is unclear to what degree the patient comprehends this. His nutrition is tube feeds through the G-tube including Pediasure, and Nutren 2 mixed with whole milk. He will sometimes drink milk or have yogurt or pudding by mouth.     More recently, the patient's mother notes that Rj has been eating more slowly by mouth. Previously he would drink a half of a glass of milk in 5-10 minutes, and now it can take the patient up to an hour. He is also less interested in eating despite her attempts to remove distractions. Patient also has times which she describes as \"reflux\" in which the patient will stop drinking PO and will regurgitate foods and liquids. This appears to be just the recently swallowed food or drink as opposed to emesis of full stomach contents. She has been increasing the amount of tube feeding if the patient does not take PO intake. Overall, she feels as though he receives the same amount of nutrition despite eating less PO. His activity level has not increased recently and if anything, the patient has less energy.     At the last visit, Mr. Licea was currently having bowel movements every 4 days which his mother describes as a large amount of firm stool requiring straining. If he goes more than 5 days without bowel movement, she will add prune juice into his G-tube. It is unclear if the patient is experiencing any source of abdominal pain.     Patient has recently been sleeping poorly which has been controlled with melatonin and lithium. Within the past week, he has slept 2-3 hours a night which his patient attributes to his mental health conditions. He also seems to occasionally have " "issues with talking, fatigue, and increased irritation which his mother attributes to possible seizures.     Patient has seen Dr. Sanders from [facility] in the past, unclear what patient had done for workup. He was also recently seen by his primary care provider Dr. Leyva. He ordered an abdominal x-ray notable for moderate to large stool burden, unremarkable chest x-ray, and labs including CBC, TTG, ESR, CMP and lithium level and valproic acid level which were within normal limit. Patient had an elevated TSH of 6.10 with increase in levothyroxine dose.     Interval history obtained from Rj Licea's mother. Rj was unable to attend appointment today:   Since the patient's last visit, he continued to lose weight.  He was admitted to due to findings of elevated glucose, elevated TSH and concern for continued weight loss and failure to thrive.  While inpatient, he underwent ACTH stim test, modified glucose tolerance test, screening for underlying malignancies with normal ferritin, uric acid, LDH, alkaline phosphatase.  He had CT abdomen and pelvis notable for air esophagus concerning for GERD and enlarged prostate, otherwise no evidence of malignancy.  He was seen by nutrition was recommended to increase home feedings with Nutren 2.04 ounces with 4 ounces of whole milk 4 times daily in addition to PediaSure and fiber.     Since Mr. Licea was discharged from the hospital, his weight has \"been all over the map\". Initially, it had dropped to 72 lbs, and is currently at 76. His mother reports she is very diligent about feeding and will do pediasure in the tube in addition to Nutren 2 (8 oz, 6-7 oz milk, 8 oz can in the morning, 8 oz at lunch, 8 oz at dinner, 8 oz at 7).    She oftentimes feels as through she is limited in the speed in which she can feed him due to his stomach being full. He occasional will eat pudding or yogurt by mouth. She also believes he is drinking an increased amount of water and is concerned this " is a side effect due to lithium.    He is currently having MiraLAX 1 capful daily with a nonbloody bowel movement every other day.  Describes bowel movement as soft.  No longer skipping multiple days without a bowel movement, no significant straining with bowel movement.  Denies increased belching, flatus, distention, nausea or vomiting.    She notes that will tense up his body about 3 times a week which she attributes to reflux.  Also notes increased weakness in which he can no longer walk independently, and he does not want to go outside.       PROBLEM LIST  Patient Active Problem List    Diagnosis Date Noted     CP (cerebral palsy), spastic, diplegic (H) 07/20/2017     Priority: Medium     Sleep disorder 07/20/2017     Priority: Medium     Seizure disorder (H) 07/20/2017     Priority: Medium     Other idiopathic scoliosis, unspecified spinal region 07/20/2017     Priority: Medium     Periventricular leukomalacia, associated with prematurity, chronic 07/20/2017     Priority: Medium     Oral aversion 07/20/2017     Priority: Medium     Nutritional disorder 07/20/2017     Priority: Medium     Mild persistent asthma in adult without complication 07/20/2017     Priority: Medium     History of prematurity, 23 wk 400 grams 07/20/2017     Priority: Medium     History of behavioral and mental health problems 07/20/2017     Priority: Medium     Abnormal gait 07/20/2017     Priority: Medium     Feeding by G-tube (H) 07/20/2017     Priority: Medium     Constipation, unspecified constipation type 07/20/2017     Priority: Medium     Chronic lung disease of prematurity 07/20/2017     Priority: Medium     Bilateral blindness 07/20/2017     Priority: Medium     Autism spectrum disorder associated with known medical or genetic condition or environmental factor, requiring substantial support (level 2) 07/20/2017     Priority: Medium     Anxiety 07/20/2017     Priority: Medium     Short stature 03/23/2012     Priority: Medium        PERTINENT PAST MEDICAL HISTORY:  Past Medical History:   Diagnosis Date     Chronic lung disease     off nebs in 2012     Depression     fluoxetine, risperidone     Epilepsy (H) 5/2011    on keppra (Janaczek)     Feeding by G-tube (H)      GERD (gastroesophageal reflux disease)      Insomnia     melatonin, clonazepam     Retinopathy of prematurity     led to blindness       PREVIOUS SURGERIES:  ear-tube surgery  eye surgeries  inguinal hernia repair    Past Surgical History:   Procedure Laterality Date     GASTROSTOMY TUBE         PREVIOUS ENDOSCOPY:  Patient's mother does not believe he has had endoscopic evaluation     ALLERGIES:   No Known Allergies    PERTINENT MEDICATIONS:    Current Outpatient Medications:      clobazam (ONFI) 2.5 MG/ML suspension, Take 5 mg by mouth 2 times daily 3 ml in the am and 2 ml at hs, Disp: , Rfl:      clotrimazole-betamethasone (LOTRISONE) 1-0.05 % external cream, apply a very small amount to rash on neck BID as needed. do not apply to face, armpits, or groin, Disp: 45 g, Rfl: 3     fluticasone-salmeterol (ADVAIR HFA) 230-21 MCG/ACT inhaler, Inhale 2 puffs into the lungs 2 times daily Two puffs bid, Disp: 12 g, Rfl: 3     hydrOXYzine (ATARAX) 10 MG/5ML syrup, Take 10 mg by mouth as needed Can take up to qid , Disp: , Rfl:      levothyroxine (SYNTHROID/LEVOTHROID) 25 MCG tablet, Take 1 tablet (25 mcg) by mouth daily, Disp: 90 tablet, Rfl: 3     lithium (ESKALITH) 300 MG tablet, Take 2 tablets (600 mg) by mouth At Bedtime, Disp: , Rfl:      LORazepam (ATIVAN) 0.5 MG tablet, Take 1-2 tablet(s) 30 minutes prior to dental visit.  Do not operate a vehicle after taking this medication, Disp: 2 tablet, Rfl: 0     melatonin 1 MG TABS, Take 1 mg by mouth daily., Disp: , Rfl:      Nutritional Supplements (ISOSOURCE 1.5 ALICE) LIQD, 1 Can by Per G Tube route 4 times daily Take, Disp: 120 Can, Rfl: 11     Nutritional Supplements (PEDIASURE 1.5 ALICE/FIBER) LIQD, 1 Bottle by Per G Tube route 3  times daily, Disp: 90 Bottle, Rfl: 11     omeprazole (PRILOSEC) 2 mg/mL SUSP, Take 20 mLs (40 mg) by mouth At Bedtime At hs, Disp: 800 mL, Rfl: 11     polyethylene glycol (MIRALAX/GLYCOLAX) powder, TAKE 17 GIVE( ONE VAPFUL) BY MOUTH EVERY DAY, Disp: 510 g, Rfl: 0     VALPROIC ACID, 7 mLs 3 times daily 6 ml in the am, 7 ml in afternoon and 7 ml at hs, Disp: , Rfl:    paliperidone- 1.5 mL one at night (with tube)     SOCIAL HISTORY:  Social History     Socioeconomic History     Marital status: Single     Spouse name: Not on file     Number of children: Not on file     Years of education: Not on file     Highest education level: Not on file   Occupational History     Not on file   Social Needs     Financial resource strain: Not on file     Food insecurity:     Worry: Not on file     Inability: Not on file     Transportation needs:     Medical: Not on file     Non-medical: Not on file   Tobacco Use     Smoking status: Never Smoker     Smokeless tobacco: Never Used   Substance and Sexual Activity     Alcohol use: No     Drug use: No     Sexual activity: Never   Lifestyle     Physical activity:     Days per week: Not on file     Minutes per session: Not on file     Stress: Not on file   Relationships     Social connections:     Talks on phone: Not on file     Gets together: Not on file     Attends Gnosticism service: Not on file     Active member of club or organization: Not on file     Attends meetings of clubs or organizations: Not on file     Relationship status: Not on file     Intimate partner violence:     Fear of current or ex partner: Not on file     Emotionally abused: Not on file     Physically abused: Not on file     Forced sexual activity: Not on file   Other Topics Concern     Parent/sibling w/ CABG, MI or angioplasty before 65F 55M? Not Asked   Social History Narrative     Not on file       FAMILY HISTORY:  FH of CRC: none   FH of IBD: none   Family History   Problem Relation Age of Onset     Diabetes Father       Past/family/social history reviewed and no changes      PERTINENT STUDIES:  Office Visit on 01/15/2019   Component Date Value Ref Range Status     Valproic Acid Level 01/15/2019 121* 50 - 100 mg/L Final     Lithium Level 01/15/2019 0.88  0.60 - 1.20 mmol/L Final     WBC 01/15/2019 9.3  4.0 - 11.0 10e9/L Final     RBC Count 01/15/2019 4.25* 4.4 - 5.9 10e12/L Final     Hemoglobin 01/15/2019 14.2  13.3 - 17.7 g/dL Final     Hematocrit 01/15/2019 42.5  40.0 - 53.0 % Final     MCV 01/15/2019 100  78 - 100 fl Final     MCH 01/15/2019 33.4* 26.5 - 33.0 pg Final     MCHC 01/15/2019 33.4  31.5 - 36.5 g/dL Final     RDW 01/15/2019 11.9  10.0 - 15.0 % Final     Platelet Count 01/15/2019 197  150 - 450 10e9/L Final     % Neutrophils 01/15/2019 48.0  % Final     % Lymphocytes 01/15/2019 42.8  % Final     % Monocytes 01/15/2019 6.9  % Final     % Eosinophils 01/15/2019 1.8  % Final     % Basophils 01/15/2019 0.5  % Final     Absolute Neutrophil 01/15/2019 4.4  1.6 - 8.3 10e9/L Final     Absolute Lymphocytes 01/15/2019 4.0  0.8 - 5.3 10e9/L Final     Absolute Monocytes 01/15/2019 0.6  0.0 - 1.3 10e9/L Final     Absolute Eosinophils 01/15/2019 0.2  0.0 - 0.7 10e9/L Final     Absolute Basophils 01/15/2019 0.1  0.0 - 0.2 10e9/L Final     Diff Method 01/15/2019 Automated Method   Final     Lipase 01/15/2019 170  73 - 393 U/L Final     TSH 01/15/2019 6.10* 0.40 - 4.00 mU/L Final     Hemoglobin A1C 01/15/2019 4.8  0 - 5.6 % Final     Sed Rate 01/15/2019 1  0 - 15 mm/h Final     CRP Inflammation 01/15/2019 <2.9  0.0 - 8.0 mg/L Final     Sodium 01/15/2019 139  133 - 144 mmol/L Final     Potassium 01/15/2019 5.0  3.4 - 5.3 mmol/L Final     Chloride 01/15/2019 103  94 - 109 mmol/L Final     Carbon Dioxide 01/15/2019 29  20 - 32 mmol/L Final     Anion Gap 01/15/2019 7  3 - 14 mmol/L Final     Glucose 01/15/2019 100* 70 - 99 mg/dL Final     Urea Nitrogen 01/15/2019 16  7 - 30 mg/dL Final     Creatinine 01/15/2019 0.81  0.66 - 1.25 mg/dL  Final     GFR Estimate 01/15/2019 >90  >60 mL/min/[1.73_m2] Final     GFR Estimate If Black 01/15/2019 >90  >60 mL/min/[1.73_m2] Final     Calcium 01/15/2019 10.2* 8.5 - 10.1 mg/dL Final     Bilirubin Total 01/15/2019 0.4  0.2 - 1.3 mg/dL Final     Albumin 01/15/2019 4.0  3.4 - 5.0 g/dL Final     Protein Total 01/15/2019 7.2  6.8 - 8.8 g/dL Final     Alkaline Phosphatase 01/15/2019 68  40 - 150 U/L Final     ALT 01/15/2019 34  0 - 70 U/L Final     AST 01/15/2019 21  0 - 45 U/L Final     Tissue Transglutaminase Antibody I* 01/15/2019 <1  <7 U/mL Final     Tissue Transglutaminase Marti IgG 01/15/2019 1  <7 U/mL Final     IGA 01/15/2019 54* 70 - 380 mg/dL Final     Prealbumin 01/15/2019 27  15 - 45 mg/dL Final     T4 Free 01/15/2019 0.87  0.76 - 1.46 ng/dL Final     Again, thank you for allowing me to participate in the care of your patient.      Sincerely,    Macey Ruiz PA-C

## 2019-05-22 NOTE — NURSING NOTE
Patient medication list, drug allergies and history was NOT reviewed today. This visit is with the mother and the provider.  Donita Menjivar CMA

## 2019-05-22 NOTE — PROGRESS NOTES
"GI CLINIC VISIT    CC/REFERRING MD:  Micky Leyva  REASON FOR CONSULTATION: weight loss   COLLABORATING MD: Maryan King     ASSESSMENT/PLAN:  Rj Licea is a 23 year old male with a past medical history of cerebral palsy, autism spectrum disorder, anxiety, insomnia, G-tube for tube feeding, chronic lung disease, seizure disorder and depression presenting for weight loss.    1. Weight loss  Patient has had marked weight loss from 101 lbs on 10/13/17, to 81 lbs at last visit and 76 lbs today. Patient's mother notes changes in eating by mouth by Mr. Licea including slower eating, and periods where he is expericing \"reflux\" and will not want to eat by mouth. Also describes that tube feeds seem to be increasingly slow when he becomes \"full\". They compensate by increasing his tube feeding, however he continues to lose weight.  Patient's mother has tried dietary recommendations from inpatient visit through the [facility] and [facility] but states she has not had much luck, and always goes back to what he tolerates the best.  Would recommend meeting with a dietitian regularly to ensure he is getting adequate amount of calories and that nutritional feeds have been optimized.  We also discussed that he should sit upright after tube feeds to prevent reflux which may be contributing to symptoms. Could also consider J tube to help with tube feeds.     Abdominal x-ray in December with significant stool burden and patient is more regularly moving his bowels with MiraLAX 1 capful a day.  Despite improvement in pattern and straining, it is possible that patient continues to experience constipation which could explain description that he is full quickly with tube feeds, with decreased appetite and possible increased abdominal pain.  Can plan for abdominal x-ray at their convenience to evaluate stool burden.  If significant stool burden despite MiraLAX 1 capful a day, will plan to optimize this up to 3 capfuls a day. " Would also recommend continuing to follow with Dr. Leyva regarding hypothyroidism which they are supplementing with levothyroxine.     Recent CT scan while inpatient 4/9/19 reassuring and without evidence of neoplasm in chest, abdomen or pelvis contributing to weight loss. Scan notable for findings of esophagus suggestive of GERD. Could evaluate for esophagitis, ulcerations, or structural abnormality with upper endoscopy in the OR however Ms. Wong wishes to defer at this time. Can consider pending clinical picture.     Plan:   -- would recommend meeting with our dietitian Ladan Ralph   -- continue to follow thyroid level with Dr. Leyva  -- continue to sit upright after feeds    -- continue Miralax 1 capful a day. Will assess stool burden with abdominal x-ray  -- will consider upper endoscopy in the OR to evaluate for H pylori, celiac disease, or ulceration     RTC 4-6 weeks     Thank you for this consultation. Case discussed with Dr. King, patient's mother notified of recommendations together. It was a pleasure to participate in the care of this patient; please contact us with any further questions.       Macey Ruiz PA-C  Division of Gastroenterology, Hepatology & Nutrition  [facility]      HPI  Rj Licea is a 23 year old male with a past medical history of cerebral palsy, autism spectrum disorder, anxiety, insomnia, chronic G-tube for tube feeding, chronic lung disease, seizure disorder and depression presenting for weight loss. History obtained primarily through patient's mother. The patient's mother reports that starting in the fall of this year, she noticed declining weight at medical appointments. At baseline, the patient weighs around 100 lbs and more recently has been around 80. She denies any significant changes in medications, medical history, or surgical history around the time of the patient's weight loss. Of note, the patient's father has  from the family, but  "she shares that it is unclear to what degree the patient comprehends this. His nutrition is tube feeds through the G-tube including Pediasure, and Nutren 2 mixed with whole milk. He will sometimes drink milk or have yogurt or pudding by mouth.     More recently, the patient's mother notes that Rj has been eating more slowly by mouth. Previously he would drink a half of a glass of milk in 5-10 minutes, and now it can take the patient up to an hour. He is also less interested in eating despite her attempts to remove distractions. Patient also has times which she describes as \"reflux\" in which the patient will stop drinking PO and will regurgitate foods and liquids. This appears to be just the recently swallowed food or drink as opposed to emesis of full stomach contents. She has been increasing the amount of tube feeding if the patient does not take PO intake. Overall, she feels as though he receives the same amount of nutrition despite eating less PO. His activity level has not increased recently and if anything, the patient has less energy.     At the last visit, Mr. Licea was currently having bowel movements every 4 days which his mother describes as a large amount of firm stool requiring straining. If he goes more than 5 days without bowel movement, she will add prune juice into his G-tube. It is unclear if the patient is experiencing any source of abdominal pain.     Patient has recently been sleeping poorly which has been controlled with melatonin and lithium. Within the past week, he has slept 2-3 hours a night which his patient attributes to his mental health conditions. He also seems to occasionally have issues with talking, fatigue, and increased irritation which his mother attributes to possible seizures.     Patient has seen Dr. Sanders from [facility] in the past, unclear what patient had done for workup. He was also recently seen by his primary care provider Dr. Leyva. He ordered an abdominal x-ray " "notable for moderate to large stool burden, unremarkable chest x-ray, and labs including CBC, TTG, ESR, CMP and lithium level and valproic acid level which were within normal limit. Patient had an elevated TSH of 6.10 with increase in levothyroxine dose.     Interval history obtained from Rj Licea's mother. Rj was unable to attend appointment today:   Since the patient's last visit, he continued to lose weight.  He was admitted to due to findings of elevated glucose, elevated TSH and concern for continued weight loss and failure to thrive.  While inpatient, he underwent ACTH stim test, modified glucose tolerance test, screening for underlying malignancies with normal ferritin, uric acid, LDH, alkaline phosphatase.  He had CT abdomen and pelvis notable for air esophagus concerning for GERD and enlarged prostate, otherwise no evidence of malignancy.  He was seen by nutrition was recommended to increase home feedings with Nutren 2.04 ounces with 4 ounces of whole milk 4 times daily in addition to PediaSure and fiber.     Since Mr. Licea was discharged from the hospital, his weight has \"been all over the map\". Initially, it had dropped to 72 lbs, and is currently at 76. His mother reports she is very diligent about feeding and will do pediasure in the tube in addition to Nutren 2 (8 oz, 6-7 oz milk, 8 oz can in the morning, 8 oz at lunch, 8 oz at dinner, 8 oz at 7).    She oftentimes feels as through she is limited in the speed in which she can feed him due to his stomach being full. He occasional will eat pudding or yogurt by mouth. She also believes he is drinking an increased amount of water and is concerned this is a side effect due to lithium.    He is currently having MiraLAX 1 capful daily with a nonbloody bowel movement every other day.  Describes bowel movement as soft.  No longer skipping multiple days without a bowel movement, no significant straining with bowel movement.  Denies increased belching, " flatus, distention, nausea or vomiting.    She notes that will tense up his body about 3 times a week which she attributes to reflux.  Also notes increased weakness in which he can no longer walk independently, and he does not want to go outside.       PROBLEM LIST  Patient Active Problem List    Diagnosis Date Noted     CP (cerebral palsy), spastic, diplegic (H) 07/20/2017     Priority: Medium     Sleep disorder 07/20/2017     Priority: Medium     Seizure disorder (H) 07/20/2017     Priority: Medium     Other idiopathic scoliosis, unspecified spinal region 07/20/2017     Priority: Medium     Periventricular leukomalacia, associated with prematurity, chronic 07/20/2017     Priority: Medium     Oral aversion 07/20/2017     Priority: Medium     Nutritional disorder 07/20/2017     Priority: Medium     Mild persistent asthma in adult without complication 07/20/2017     Priority: Medium     History of prematurity, 23 wk 400 grams 07/20/2017     Priority: Medium     History of behavioral and mental health problems 07/20/2017     Priority: Medium     Abnormal gait 07/20/2017     Priority: Medium     Feeding by G-tube (H) 07/20/2017     Priority: Medium     Constipation, unspecified constipation type 07/20/2017     Priority: Medium     Chronic lung disease of prematurity 07/20/2017     Priority: Medium     Bilateral blindness 07/20/2017     Priority: Medium     Autism spectrum disorder associated with known medical or genetic condition or environmental factor, requiring substantial support (level 2) 07/20/2017     Priority: Medium     Anxiety 07/20/2017     Priority: Medium     Short stature 03/23/2012     Priority: Medium       PERTINENT PAST MEDICAL HISTORY:  Past Medical History:   Diagnosis Date     Chronic lung disease     off nebs in 2012     Depression     fluoxetine, risperidone     Epilepsy (H) 5/2011    on keppra (Janaczek)     Feeding by G-tube (H)      GERD (gastroesophageal reflux disease)      Insomnia      melatonin, clonazepam     Retinopathy of prematurity     led to blindness       PREVIOUS SURGERIES:  ear-tube surgery  eye surgeries  inguinal hernia repair    Past Surgical History:   Procedure Laterality Date     GASTROSTOMY TUBE         PREVIOUS ENDOSCOPY:  Patient's mother does not believe he has had endoscopic evaluation     ALLERGIES:   No Known Allergies    PERTINENT MEDICATIONS:    Current Outpatient Medications:      clobazam (ONFI) 2.5 MG/ML suspension, Take 5 mg by mouth 2 times daily 3 ml in the am and 2 ml at hs, Disp: , Rfl:      clotrimazole-betamethasone (LOTRISONE) 1-0.05 % external cream, apply a very small amount to rash on neck BID as needed. do not apply to face, armpits, or groin, Disp: 45 g, Rfl: 3     fluticasone-salmeterol (ADVAIR HFA) 230-21 MCG/ACT inhaler, Inhale 2 puffs into the lungs 2 times daily Two puffs bid, Disp: 12 g, Rfl: 3     hydrOXYzine (ATARAX) 10 MG/5ML syrup, Take 10 mg by mouth as needed Can take up to qid , Disp: , Rfl:      levothyroxine (SYNTHROID/LEVOTHROID) 25 MCG tablet, Take 1 tablet (25 mcg) by mouth daily, Disp: 90 tablet, Rfl: 3     lithium (ESKALITH) 300 MG tablet, Take 2 tablets (600 mg) by mouth At Bedtime, Disp: , Rfl:      LORazepam (ATIVAN) 0.5 MG tablet, Take 1-2 tablet(s) 30 minutes prior to dental visit.  Do not operate a vehicle after taking this medication, Disp: 2 tablet, Rfl: 0     melatonin 1 MG TABS, Take 1 mg by mouth daily., Disp: , Rfl:      Nutritional Supplements (ISOSOURCE 1.5 ALICE) LIQD, 1 Can by Per G Tube route 4 times daily Take, Disp: 120 Can, Rfl: 11     Nutritional Supplements (PEDIASURE 1.5 ALICE/FIBER) LIQD, 1 Bottle by Per G Tube route 3 times daily, Disp: 90 Bottle, Rfl: 11     omeprazole (PRILOSEC) 2 mg/mL SUSP, Take 20 mLs (40 mg) by mouth At Bedtime At hs, Disp: 800 mL, Rfl: 11     polyethylene glycol (MIRALAX/GLYCOLAX) powder, TAKE 17 GIVE( ONE VAPFUL) BY MOUTH EVERY DAY, Disp: 510 g, Rfl: 0     VALPROIC ACID, 7 mLs 3 times daily  6 ml in the am, 7 ml in afternoon and 7 ml at hs, Disp: , Rfl:    paliperidone- 1.5 mL one at night (with tube)     SOCIAL HISTORY:  Social History     Socioeconomic History     Marital status: Single     Spouse name: Not on file     Number of children: Not on file     Years of education: Not on file     Highest education level: Not on file   Occupational History     Not on file   Social Needs     Financial resource strain: Not on file     Food insecurity:     Worry: Not on file     Inability: Not on file     Transportation needs:     Medical: Not on file     Non-medical: Not on file   Tobacco Use     Smoking status: Never Smoker     Smokeless tobacco: Never Used   Substance and Sexual Activity     Alcohol use: No     Drug use: No     Sexual activity: Never   Lifestyle     Physical activity:     Days per week: Not on file     Minutes per session: Not on file     Stress: Not on file   Relationships     Social connections:     Talks on phone: Not on file     Gets together: Not on file     Attends Oriental orthodox service: Not on file     Active member of club or organization: Not on file     Attends meetings of clubs or organizations: Not on file     Relationship status: Not on file     Intimate partner violence:     Fear of current or ex partner: Not on file     Emotionally abused: Not on file     Physically abused: Not on file     Forced sexual activity: Not on file   Other Topics Concern     Parent/sibling w/ CABG, MI or angioplasty before 65F 55M? Not Asked   Social History Narrative     Not on file       FAMILY HISTORY:  FH of CRC: none   FH of IBD: none   Family History   Problem Relation Age of Onset     Diabetes Father      Past/family/social history reviewed and no changes      PERTINENT STUDIES:  Office Visit on 01/15/2019   Component Date Value Ref Range Status     Valproic Acid Level 01/15/2019 121* 50 - 100 mg/L Final     Lithium Level 01/15/2019 0.88  0.60 - 1.20 mmol/L Final     WBC 01/15/2019 9.3  4.0 - 11.0  10e9/L Final     RBC Count 01/15/2019 4.25* 4.4 - 5.9 10e12/L Final     Hemoglobin 01/15/2019 14.2  13.3 - 17.7 g/dL Final     Hematocrit 01/15/2019 42.5  40.0 - 53.0 % Final     MCV 01/15/2019 100  78 - 100 fl Final     MCH 01/15/2019 33.4* 26.5 - 33.0 pg Final     MCHC 01/15/2019 33.4  31.5 - 36.5 g/dL Final     RDW 01/15/2019 11.9  10.0 - 15.0 % Final     Platelet Count 01/15/2019 197  150 - 450 10e9/L Final     % Neutrophils 01/15/2019 48.0  % Final     % Lymphocytes 01/15/2019 42.8  % Final     % Monocytes 01/15/2019 6.9  % Final     % Eosinophils 01/15/2019 1.8  % Final     % Basophils 01/15/2019 0.5  % Final     Absolute Neutrophil 01/15/2019 4.4  1.6 - 8.3 10e9/L Final     Absolute Lymphocytes 01/15/2019 4.0  0.8 - 5.3 10e9/L Final     Absolute Monocytes 01/15/2019 0.6  0.0 - 1.3 10e9/L Final     Absolute Eosinophils 01/15/2019 0.2  0.0 - 0.7 10e9/L Final     Absolute Basophils 01/15/2019 0.1  0.0 - 0.2 10e9/L Final     Diff Method 01/15/2019 Automated Method   Final     Lipase 01/15/2019 170  73 - 393 U/L Final     TSH 01/15/2019 6.10* 0.40 - 4.00 mU/L Final     Hemoglobin A1C 01/15/2019 4.8  0 - 5.6 % Final     Sed Rate 01/15/2019 1  0 - 15 mm/h Final     CRP Inflammation 01/15/2019 <2.9  0.0 - 8.0 mg/L Final     Sodium 01/15/2019 139  133 - 144 mmol/L Final     Potassium 01/15/2019 5.0  3.4 - 5.3 mmol/L Final     Chloride 01/15/2019 103  94 - 109 mmol/L Final     Carbon Dioxide 01/15/2019 29  20 - 32 mmol/L Final     Anion Gap 01/15/2019 7  3 - 14 mmol/L Final     Glucose 01/15/2019 100* 70 - 99 mg/dL Final     Urea Nitrogen 01/15/2019 16  7 - 30 mg/dL Final     Creatinine 01/15/2019 0.81  0.66 - 1.25 mg/dL Final     GFR Estimate 01/15/2019 >90  >60 mL/min/[1.73_m2] Final     GFR Estimate If Black 01/15/2019 >90  >60 mL/min/[1.73_m2] Final     Calcium 01/15/2019 10.2* 8.5 - 10.1 mg/dL Final     Bilirubin Total 01/15/2019 0.4  0.2 - 1.3 mg/dL Final     Albumin 01/15/2019 4.0  3.4 - 5.0 g/dL Final      Protein Total 01/15/2019 7.2  6.8 - 8.8 g/dL Final     Alkaline Phosphatase 01/15/2019 68  40 - 150 U/L Final     ALT 01/15/2019 34  0 - 70 U/L Final     AST 01/15/2019 21  0 - 45 U/L Final     Tissue Transglutaminase Antibody I* 01/15/2019 <1  <7 U/mL Final     Tissue Transglutaminase Marti IgG 01/15/2019 1  <7 U/mL Final     IGA 01/15/2019 54* 70 - 380 mg/dL Final     Prealbumin 01/15/2019 27  15 - 45 mg/dL Final     T4 Free 01/15/2019 0.87  0.76 - 1.46 ng/dL Final     Answers for HPI/ROS submitted by the patient on 5/22/2019   PHQ9 TOTAL SCORE: 13  General Symptoms: Yes  Skin Symptoms: Yes  HENT Symptoms: No  EYE SYMPTOMS: No  HEART SYMPTOMS: No  LUNG SYMPTOMS: No  INTESTINAL SYMPTOMS: No  URINARY SYMPTOMS: No  REPRODUCTIVE SYMPTOMS: No  SKELETAL SYMPTOMS: Yes  BLOOD SYMPTOMS: No  NERVOUS SYSTEM SYMPTOMS: Yes  MENTAL HEALTH SYMPTOMS: Yes  Fever: No  Loss of appetite: Yes  Weight loss: Yes  Weight gain: No  Fatigue: Yes  Night sweats: No  Chills: No  Increased stress: Yes  Excessive hunger: No  Excessive thirst: Yes  Feeling hot or cold when others believe the temperature is normal: No  Loss of height: No  Post-operative complications: No  Surgical site pain: No  Hallucinations: No  Change in or Loss of Energy: No  Hyperactivity: No  Confusion: No  Changes in hair: No  Changes in moles/birth marks: No  Itching: No  Rashes: No  Changes in nails: No  Acne: No  Change in facial hair: No  Warts: No  Non-healing sores: No  Scarring: No  Flaking of skin: Yes  Color changes of hands/feet in cold : No  Sun sensitivity: No  Skin thickening: No  Back pain: No  Muscle aches: No  Neck pain: No  Swollen joints: No  Joint pain: No  Bone pain: No  Muscle cramps: No  Muscle weakness: Yes  Joint stiffness: No  Bone fracture: No  Trouble with coordination: No  Dizziness or trouble with balance: Yes  Fainting or black-out spells: No  Memory loss: No  Headache: Yes  Seizures: No  Speech problems: No  Tingling: No  Tremor: No  Weakness:  Yes  Difficulty walking: Yes  Paralysis: No  Numbness: No  Nervous or Anxious: Yes  Depression: Yes  Trouble sleeping: Yes  Trouble thinking or concentrating: No  Mood changes: Yes  Panic attacks: No

## 2019-05-22 NOTE — PATIENT INSTRUCTIONS
It was a pleasure taking care of you today.  I've included a brief summary of our discussion and care plan from today's visit below.  Please review this information with your primary care provider.  ______________________________________________________________________    My recommendations are summarized as follows:    -- would recommend meeting with our dietitian Ladan Ralph     -- continue to follow thyroid level with Dr. Leyva    -- continue to sit upright after feeds     -- continue Miralax 1 capful a day. Will assess stool burden with abdominal x-ray    -- will consider upper endoscopy in the OR to evaluate for H pylori, celiac disease, or ulceration     Return to GI Clinic in 4-6 weeks to review your progress.    ______________________________________________________________________    Who do I call with any questions after my visit?  Please be in touch if there are any further questions that arise following today's visit.  There are multiple ways to contact your gastroenterology care team.        During business hours, you may reach a Gastroenterology nurse at [phone]      To schedule or reschedule an appointment, please call [phone].       You can always send a secure message through [employer].  [employer] messages are answered by your nurse or doctor typically within 24 hours.  Please allow extra time on weekends and holidays.        For urgent/emergent questions after business hours, you may reach the on-call GI Fellow by contacting the [facility] at [phone].     How will I get the results of any tests ordered?    You will receive all of your results.  If you have signed up for [employer], any tests ordered at your visit will be available to you after your physician reviews them.  Typically this takes 1-2 weeks.  If there are urgent results that require a change in your care plan, your physician or nurse will call you to discuss the next steps.      What is MyChart?  MyChart  is a secure way for you to access all of your healthcare records from the [facility].  It is a web based computer program, so you can sign on to it from any location.  It also allows you to send secure messages to your care team.  I recommend signing up for [employer] access if you have not already done so and are comfortable with using a computer.      How to I schedule a follow-up visit?  If you did not schedule a follow-up visit today, please call [phone] to schedule a follow-up office visit.        Sincerely,    Macey Ruiz PA-C  Division of Gastroenterology, Hepatology & Nutrition  [facility]

## 2019-05-23 ASSESSMENT — PATIENT HEALTH QUESTIONNAIRE - PHQ9: SUM OF ALL RESPONSES TO PHQ QUESTIONS 1-9: 13

## 2019-05-24 ENCOUNTER — PATIENT OUTREACH (OUTPATIENT)
Dept: CARE COORDINATION | Facility: CLINIC | Age: 24
End: 2019-05-24

## 2019-05-24 NOTE — PROGRESS NOTES
Clinic Care Coordination Contact    Clinic Care Coordination Contact  OUTREACH         Primary Diagnosis: Frailty/Failure to thrive    Chief Complaint   Patient presents with     Clinic Care Coordination - Follow-up     weight management         Clinical Concerns:  Patient continue to have erratic weight, down still significantly at this time as compared to year prior.   Despite hospitalization/work up at [facility] and recommendations for altering tube feeding regimen, per notes with recent outpatient GI clinic patient is still hovering around 80 lbs. GI following with recommendations for evaluation with their dietician, continued Miralax to reduce stool burden; unclear at this time if mom has tried initiating night time feedings to increase caloric intake.       RN CC sent patient's mom (caregiver) Frequency Portal message checking in/status update.       Goals:   Goals        General    Other (pt-stated)     Notes - Note created  2/28/2019  9:47 AM by Asia Barriga RN    Goal Statement: I want to gain weight back 100 lbs and sustain weight gain  Measure of Success: Evidenced by patient's gradual increase in weight, with a   Supportive Steps to Achieve: RN CC helped facilitate appointment with GI and Dietician to review current caloric intake                                                 Patient will follow up with dietician to review recommendations for alterations in oral and               G-tube feedings      Mom (caregiver) to monitor weight and report any ongoing concerns to care team  Barriers: Unknown etiology for steady and progressive weight decline; complex medical co-morbidities  Strengths: Very engaged and supportive guardian/caregiver (mother)  Date to Achieve By: 8 months: October 28th, 2019                  Patient/Caregiver understanding: Patient verbalized understanding, engaged in AIDET communication behavior during encounter.    Outreach Frequency: monthly  Future Appointments              In 2  Micky Rosas MD [facility]HA [facility]          Plan:   RN CC will await patient/mom response regarding ongoing care coordination needs.     Asia Barriga RN   Clinic Care Coordinator-[place] [facility]  PH: [phone]

## 2019-06-07 ENCOUNTER — OFFICE VISIT (OUTPATIENT)
Dept: PEDIATRICS | Facility: CLINIC | Age: 24
End: 2019-06-07
Payer: COMMERCIAL

## 2019-06-07 ENCOUNTER — ANCILLARY PROCEDURE (OUTPATIENT)
Dept: GENERAL RADIOLOGY | Facility: CLINIC | Age: 24
End: 2019-06-07
Attending: INTERNAL MEDICINE
Payer: COMMERCIAL

## 2019-06-07 VITALS
SYSTOLIC BLOOD PRESSURE: 109 MMHG | BODY MASS INDEX: 14.05 KG/M2 | HEART RATE: 94 BPM | OXYGEN SATURATION: 100 % | HEIGHT: 63 IN | DIASTOLIC BLOOD PRESSURE: 68 MMHG | TEMPERATURE: 98.6 F | WEIGHT: 79.3 LBS

## 2019-06-07 DIAGNOSIS — K59.00 CONSTIPATION, UNSPECIFIED CONSTIPATION TYPE: ICD-10-CM

## 2019-06-07 DIAGNOSIS — R62.7 FAILURE TO THRIVE IN ADULT: ICD-10-CM

## 2019-06-07 DIAGNOSIS — R63.4 WEIGHT LOSS, UNINTENTIONAL: Primary | ICD-10-CM

## 2019-06-07 DIAGNOSIS — R63.4 WEIGHT LOSS, UNINTENTIONAL: ICD-10-CM

## 2019-06-07 LAB
ALBUMIN SERPL-MCNC: 4 G/DL (ref 3.4–5)
ALP SERPL-CCNC: 73 U/L (ref 40–150)
ALT SERPL W P-5'-P-CCNC: 35 U/L (ref 0–70)
ANION GAP SERPL CALCULATED.3IONS-SCNC: 12 MMOL/L (ref 3–14)
AST SERPL W P-5'-P-CCNC: 24 U/L (ref 0–45)
BASOPHILS # BLD AUTO: 0.1 10E9/L (ref 0–0.2)
BASOPHILS NFR BLD AUTO: 1 %
BILIRUB SERPL-MCNC: 0.3 MG/DL (ref 0.2–1.3)
BUN SERPL-MCNC: 13 MG/DL (ref 7–30)
CALCIUM SERPL-MCNC: 9 MG/DL (ref 8.5–10.1)
CHLORIDE SERPL-SCNC: 104 MMOL/L (ref 94–109)
CO2 SERPL-SCNC: 25 MMOL/L (ref 20–32)
CREAT SERPL-MCNC: 0.79 MG/DL (ref 0.66–1.25)
DIFFERENTIAL METHOD BLD: ABNORMAL
EOSINOPHIL # BLD AUTO: 0.2 10E9/L (ref 0–0.7)
EOSINOPHIL NFR BLD AUTO: 2 %
ERYTHROCYTE [DISTWIDTH] IN BLOOD BY AUTOMATED COUNT: 12 % (ref 10–15)
ERYTHROCYTE [SEDIMENTATION RATE] IN BLOOD BY WESTERGREN METHOD: 2 MM/H (ref 0–15)
GFR SERPL CREATININE-BSD FRML MDRD: >90 ML/MIN/{1.73_M2}
GLUCOSE SERPL-MCNC: 62 MG/DL (ref 70–99)
HBA1C MFR BLD: 4.5 % (ref 0–5.6)
HCT VFR BLD AUTO: 42.3 % (ref 40–53)
HGB BLD-MCNC: 14.2 G/DL (ref 13.3–17.7)
LYMPHOCYTES # BLD AUTO: 4.2 10E9/L (ref 0.8–5.3)
LYMPHOCYTES NFR BLD AUTO: 57 %
MCH RBC QN AUTO: 34.5 PG (ref 26.5–33)
MCHC RBC AUTO-ENTMCNC: 33.6 G/DL (ref 31.5–36.5)
MCV RBC AUTO: 103 FL (ref 78–100)
MONOCYTES # BLD AUTO: 0.5 10E9/L (ref 0–1.3)
MONOCYTES NFR BLD AUTO: 6 %
NEUTROPHILS # BLD AUTO: 2.6 10E9/L (ref 1.6–8.3)
NEUTROPHILS NFR BLD AUTO: 34 %
OSMOLALITY SERPL: 289 MMOL/KG (ref 275–295)
PLATELET # BLD AUTO: 180 10E9/L (ref 150–450)
POTASSIUM SERPL-SCNC: 3.7 MMOL/L (ref 3.4–5.3)
PROT SERPL-MCNC: 7.5 G/DL (ref 6.8–8.8)
RBC # BLD AUTO: 4.11 10E12/L (ref 4.4–5.9)
SODIUM SERPL-SCNC: 141 MMOL/L (ref 133–144)
T4 FREE SERPL-MCNC: 0.88 NG/DL (ref 0.76–1.46)
TSH SERPL DL<=0.005 MIU/L-ACNC: 4.45 MU/L (ref 0.4–4)
VIT B12 SERPL-MCNC: 1641 PG/ML (ref 193–986)
WBC # BLD AUTO: 7.6 10E9/L (ref 4–11)

## 2019-06-07 PROCEDURE — 84443 ASSAY THYROID STIM HORMONE: CPT | Performed by: INTERNAL MEDICINE

## 2019-06-07 PROCEDURE — 83930 ASSAY OF BLOOD OSMOLALITY: CPT | Performed by: INTERNAL MEDICINE

## 2019-06-07 PROCEDURE — 85652 RBC SED RATE AUTOMATED: CPT | Performed by: INTERNAL MEDICINE

## 2019-06-07 PROCEDURE — 36415 COLL VENOUS BLD VENIPUNCTURE: CPT | Performed by: INTERNAL MEDICINE

## 2019-06-07 PROCEDURE — 84134 ASSAY OF PREALBUMIN: CPT | Performed by: INTERNAL MEDICINE

## 2019-06-07 PROCEDURE — 84439 ASSAY OF FREE THYROXINE: CPT | Performed by: INTERNAL MEDICINE

## 2019-06-07 PROCEDURE — 82306 VITAMIN D 25 HYDROXY: CPT | Performed by: INTERNAL MEDICINE

## 2019-06-07 PROCEDURE — 80053 COMPREHEN METABOLIC PANEL: CPT | Performed by: INTERNAL MEDICINE

## 2019-06-07 PROCEDURE — 99215 OFFICE O/P EST HI 40 MIN: CPT | Performed by: INTERNAL MEDICINE

## 2019-06-07 PROCEDURE — 82746 ASSAY OF FOLIC ACID SERUM: CPT | Performed by: INTERNAL MEDICINE

## 2019-06-07 PROCEDURE — 82607 VITAMIN B-12: CPT | Performed by: INTERNAL MEDICINE

## 2019-06-07 PROCEDURE — 83036 HEMOGLOBIN GLYCOSYLATED A1C: CPT | Performed by: INTERNAL MEDICINE

## 2019-06-07 PROCEDURE — 74019 RADEX ABDOMEN 2 VIEWS: CPT

## 2019-06-07 PROCEDURE — 85025 COMPLETE CBC W/AUTO DIFF WBC: CPT | Performed by: INTERNAL MEDICINE

## 2019-06-07 RX ORDER — GABAPENTIN 100 MG/1
CAPSULE ORAL
COMMUNITY
Start: 2019-06-06 | End: 2020-02-06

## 2019-06-07 ASSESSMENT — MIFFLIN-ST. JEOR: SCORE: 1249.83

## 2019-06-07 NOTE — PROGRESS NOTES
"Subjective     Rj Licea is a 23 year old male who presents to clinic today for the following health issues:    HPI   Pt is here for weight and nutrition check.     Overall things are about the same.  He continues to be under his regular weight and we have made very little progress in getting him to gaining weight back.  The patient's mother really is confounded by this and does not understand what could be causing this.  He is getting his regular tube feeds that he was getting prior to this weight loss episode.  He does not seem to be getting the way back however.  His mother does not believe she can get any more calories in him due to volume restrictions related to his gastric capacity however she is willing to consider evening continuous tube feeds and does think she may have a pump and equipment at home for this.  She has not noticed any new symptoms however.  Rj otherwise seems to be at baseline however he continues to have significant issues with his sleeping and with his mood.  They did follow-up with the pediatric psychiatrist again and patient's mother really wants to switch over to a different clinic for a new set of recommendations and perspective.  He is taking all his medications.  He has had no fevers.  He has had no chills.  He has not vomited.  He does not have any significant diarrhea or any complaints of abdominal pain.  He does still have a little bit of constipation for which she does get MiraLAX and does seem to help.      Reviewed and updated as needed this visit by Provider         Review of Systems   A complete 10 point review of systems was taken and negative except for those noted in the subjective/HPI section(s) above        Objective    /68 (BP Location: Right arm, Patient Position: Sitting, Cuff Size: Child)   Pulse 94   Temp 98.6  F (37  C) (Axillary)   Ht 1.6 m (5' 3\")   Wt 36 kg (79 lb 4.8 oz)   SpO2 100%   BMI 14.05 kg/m    Body mass index is 14.05 kg/m .     Wt " Readings from Last 4 Encounters:   06/07/19 36 kg (79 lb 4.8 oz)   04/23/19 37.2 kg (82 lb)   04/05/19 36.7 kg (81 lb)   02/13/19 36.4 kg (80 lb 4.8 oz)       Physical Exam   GENERAL: healthy, alert and no distress  EYES: Eyes grossly normal to inspection, PERRL and conjunctivae and sclerae normal  HENT: ear canals and TM's normal, nose and mouth without ulcers or lesions  NECK: no adenopathy, no asymmetry, masses, or scars and thyroid normal to palpation  RESP: lungs clear to auscultation - no rales, rhonchi or wheezes  CV: regular rate and rhythm, normal S1 S2, no S3 or S4, no murmur, click or rub, no peripheral edema and peripheral pulses strong  ABDOMEN: soft, nontender, no hepatosplenomegaly, no masses and bowel sounds normal  MS: no gross musculoskeletal defects noted, no edema  SKIN: no suspicious lesions or rashes  NEURO: Normal strength and tone, mentation intact and speech normal  PSYCH: mentation appears normal, affect normal/bright    Results for orders placed or performed in visit on 06/07/19   CBC with platelets and differential   Result Value Ref Range    WBC 7.6 4.0 - 11.0 10e9/L    RBC Count 4.11 (L) 4.4 - 5.9 10e12/L    Hemoglobin 14.2 13.3 - 17.7 g/dL    Hematocrit 42.3 40.0 - 53.0 %     (H) 78 - 100 fl    MCH 34.5 (H) 26.5 - 33.0 pg    MCHC 33.6 31.5 - 36.5 g/dL    RDW 12.0 10.0 - 15.0 %    Platelet Count 180 150 - 450 10e9/L    % Neutrophils 34.0 %    % Lymphocytes 57.0 %    % Monocytes 6.0 %    % Eosinophils 2.0 %    % Basophils 1.0 %    Absolute Neutrophil 2.6 1.6 - 8.3 10e9/L    Absolute Lymphocytes 4.2 0.8 - 5.3 10e9/L    Absolute Monocytes 0.5 0.0 - 1.3 10e9/L    Absolute Eosinophils 0.2 0.0 - 0.7 10e9/L    Absolute Basophils 0.1 0.0 - 0.2 10e9/L    Diff Method Automated Method    Comprehensive metabolic panel   Result Value Ref Range    Sodium 141 133 - 144 mmol/L    Potassium 3.7 3.4 - 5.3 mmol/L    Chloride 104 94 - 109 mmol/L    Carbon Dioxide 25 20 - 32 mmol/L    Anion Gap 12 3  - 14 mmol/L    Glucose 62 (L) 70 - 99 mg/dL    Urea Nitrogen 13 7 - 30 mg/dL    Creatinine 0.79 0.66 - 1.25 mg/dL    GFR Estimate >90 >60 mL/min/[1.73_m2]    GFR Estimate If Black >90 >60 mL/min/[1.73_m2]    Calcium 9.0 8.5 - 10.1 mg/dL    Bilirubin Total 0.3 0.2 - 1.3 mg/dL    Albumin 4.0 3.4 - 5.0 g/dL    Protein Total 7.5 6.8 - 8.8 g/dL    Alkaline Phosphatase 73 40 - 150 U/L    ALT 35 0 - 70 U/L    AST 24 0 - 45 U/L   TSH with free T4 reflex   Result Value Ref Range    TSH 4.45 (H) 0.40 - 4.00 mU/L   Erythrocyte sedimentation rate auto   Result Value Ref Range    Sed Rate 2 0 - 15 mm/h   Hemoglobin A1c   Result Value Ref Range    Hemoglobin A1C 4.5 0 - 5.6 %   Prealbumin   Result Value Ref Range    Prealbumin 26 15 - 45 mg/dL   Vitamin D Deficiency   Result Value Ref Range    Vitamin D Deficiency screening 46 20 - 75 ug/L   Vitamin B12   Result Value Ref Range    Vitamin B12 1,641 (H) 193 - 986 pg/mL   Folate   Result Value Ref Range    Folate 35.7 >5.4 ng/mL   Osmolality   Result Value Ref Range    Osmolality 289 275 - 295 mmol/kg   T4 free   Result Value Ref Range    T4 Free 0.88 0.76 - 1.46 ng/dL               ASSESSMENT/PLAN:                                                        ICD-10-CM    1. Weight loss, unintentional R63.4 CBC with platelets and differential     Comprehensive metabolic panel     TSH with free T4 reflex     Erythrocyte sedimentation rate auto     Hemoglobin A1c     Prealbumin     Vitamin D Deficiency     Vitamin B12     Folate     Osmolality     XR Abdomen 2 Views     Osmolality urine     Sodium random urine     T4 free     T4 free     CANCELED: Osmolality urine     CANCELED: Sodium random urine   2. Failure to thrive in adult R62.7 CBC with platelets and differential     Comprehensive metabolic panel     TSH with free T4 reflex     Erythrocyte sedimentation rate auto     Hemoglobin A1c     Prealbumin     Vitamin D Deficiency     Vitamin B12     Folate     Osmolality     XR Abdomen 2  Views     Osmolality urine     Sodium random urine     CANCELED: Osmolality urine     CANCELED: Sodium random urine   3. Constipation, unspecified constipation type K59.00 XR Abdomen 2 Views   discussed with patient (or patient's parents/caregiver) pathophysiology of condition and treatment options.  Reviewed the chart and history in detail again with the patient and his mother today.  I share her frustration and being unable to ascertain or discover the underlying root cause of Rj's weight loss.  I did review again that it is possible that the patient's significant change in his home life related to his father leaving the picture approximately 1 year ago could be contributing however with Rj's baseline mental status is very difficult to ascertain this 1 where the other.  The patient's mother agrees.  She is willing to consider evening tube feeds and I did remind her that our care coordination team is capable of getting them additional PCA or nurse help if needed.  She will consider this.  She would like to do some more labs as ordered today and I will also order some osmolality labs as this patient's mom has noted that he does seem to be more thirsty.  He does have free access to water however he does not wake at night to drink.  Also repeat other labs as noted.  Strongly encouraged the patient to follow-up with the gastroenterology clinic as scheduled and I am very happy to provide him again the number to our pediatric young adult psychiatry clinic at the [facility] for a second opinion.  At this time we will get nutrition labs as well and continue close follow-up with the patient his mother and with our care coordination team to support them anyway we can. Recommend follow-up with nutrition clinic as well. Patient's mother feels very well supported she is happy with this plan she will let me know anytime if things have any other resources or support that she could use to help manage things as best we  "can.      Estimated body mass index is 14.05 kg/m  as calculated from the following:    Height as of this encounter: 1.6 m (5' 3\").    Weight as of this encounter: 36 kg (79 lb 4.8 oz).      Return to clinic as needed or if symptoms persist, change, worsen or if any new symptoms develop.    Total time spent with patient was 50 min, of which greater than 40 minutes of face to face time and/or coordination of care discussing the above issue(s).     Micky Leyva M.D.  Internal Medicine-Pediatrics      "

## 2019-06-08 LAB — FOLATE SERPL-MCNC: 35.7 NG/ML

## 2019-06-10 LAB
DEPRECATED CALCIDIOL+CALCIFEROL SERPL-MC: 46 UG/L (ref 20–75)
PREALB SERPL IA-MCNC: 26 MG/DL (ref 15–45)

## 2019-06-17 ENCOUNTER — MYC MEDICAL ADVICE (OUTPATIENT)
Dept: CARE COORDINATION | Facility: CLINIC | Age: 24
End: 2019-06-17

## 2019-06-17 ENCOUNTER — PATIENT OUTREACH (OUTPATIENT)
Dept: CARE COORDINATION | Facility: CLINIC | Age: 24
End: 2019-06-17

## 2019-06-17 NOTE — PROGRESS NOTES
Clinic Care Coordination Contact     Clinic Care Coordination Contact  OUTREACH           Primary Diagnosis: Frailty/Failure to thrive          Chief Complaint   Patient presents with     Clinic Care Coordination - Follow-up       weight management            Clinical Concerns:  Patient continue to have erratic weight, down still significantly at this time as compared to year prior.   Despite hospitalization/work up at [facility] and recommendations for altering tube feeding regimen, per notes with recent outpatient GI clinic patient is still hovering around 80 lbs. GI following with recommendations for evaluation with their dietician, continued Miralax to reduce stool burden; unclear at this time if mom has tried initiating night time feedings to increase caloric intake.        RN CC sent patient's mom (caregiver) [employer] Portal message checking in/status update.        Goals:           Goals                        General       Other (pt-stated)         Notes - Note created  2/28/2019  9:47 AM by Asia Barriga RN       Goal Statement: I want to gain weight back 100 lbs and sustain weight gain  Measure of Success: Evidenced by patient's gradual increase in weight, with a   Supportive Steps to Achieve: RN CC helped facilitate appointment with GI and Dietician to review current caloric intake                                                 Patient will follow up with dietician to review recommendations for alterations in oral and                                     G-tube feedings                                                  Mom (caregiver) to monitor weight and report any ongoing concerns to care team  Barriers: Unknown etiology for steady and progressive weight decline; complex medical co-morbidities  Strengths: Very engaged and supportive guardian/caregiver (mother)  Date to Achieve By: 8 months: October 28th, 2019                         Patient/Caregiver understanding: Patient verbalized understanding, engaged  in [employer] communication behavior during encounter.     Outreach Frequency: monthly        Future Appointments                In 2 weeks Micky Leyva MD [facility]HA [facility]             Plan:   RN CC will await patient/mom response regarding ongoing care coordination needs.      Asia Barriga RN   Clinic Care Coordinator-[facility]  PH: [phone]

## 2019-06-27 NOTE — PROGRESS NOTES
Clinic Care Coordination Contact  Care Team Conversations    RN CC-primary care received [employer] Portal message from patient's mom indicating everything is going ok.      Caregivers have not indicated they would like any additional assistance in navigating resources, referrals, or care.     PLAN:  At this time, the patient (caregiver) denies any outstanding need for resources or assistance navigating follow up care. No further care coordination outreaches will be scheduled unless patient initiates or new referral for care coordination is placed.     Asia Barriga RN   Clinic Care Coordinator-[facility]  PH: [phone]

## 2019-06-29 DIAGNOSIS — K59.09 CHRONIC CONSTIPATION: ICD-10-CM

## 2019-06-29 RX ORDER — POLYETHYLENE GLYCOL 3350 17 G/17G
POWDER, FOR SOLUTION ORAL
Qty: 510 G | Refills: 1 | Status: SHIPPED | OUTPATIENT
Start: 2019-06-29 | End: 2020-04-20

## 2019-10-16 ENCOUNTER — TRANSFERRED RECORDS (OUTPATIENT)
Dept: HEALTH INFORMATION MANAGEMENT | Facility: CLINIC | Age: 24
End: 2019-10-16

## 2019-10-17 ENCOUNTER — MYC MEDICAL ADVICE (OUTPATIENT)
Dept: PEDIATRICS | Facility: CLINIC | Age: 24
End: 2019-10-17

## 2019-10-17 NOTE — TELEPHONE ENCOUNTER
RONAKI to Dr. Leyva (mychart message)    Team C: Please reschedule patient's missed appointment today 10/17 (refer to 9/5/19 telephone encounter).         Velma Kincaid RN BSN   [facility]

## 2019-11-04 ENCOUNTER — TELEPHONE (OUTPATIENT)
Dept: PEDIATRICS | Facility: CLINIC | Age: 24
End: 2019-11-04

## 2019-11-04 NOTE — LETTER
My Asthma Action Plan    Name: Rj Licea   YOB: 1995  Date: 11/4/2019   My doctor: Micky Leyva MD   [facility]        My Rescue Medicine:   Albuterol inhaler (Proair/Ventolin/Proventil HFA)  2-4 puffs EVERY 4 HOURS as needed. Use a spacer if recommended by your provider.   My Asthma Severity:   Mild Persistent  Know your asthma triggers: dust mites, pollens and emotions             GREEN ZONE   Good Control    I feel good    No cough or wheeze    Can work, sleep and play without asthma symptoms       Take your asthma control medicine every day.     1. If exercise triggers your asthma, take your rescue medication    15 minutes before exercise or sports, and    During exercise if you have asthma symptoms  2. Spacer to use with inhaler: If you have a spacer, make sure to use it with your inhaler             YELLOW ZONE Getting Worse  I have ANY of these:    I do not feel good    Cough or wheeze    Chest feels tight    Wake up at night   1. Keep taking your Green Zone medications  2. Start taking your rescue medicine:    every 20 minutes for up to 1 hour. Then every 4 hours for 24-48 hours.  3. If you stay in the Yellow Zone for more than 12-24 hours, contact your doctor.  4. If you do not return to the Green Zone in 12-24 hours or you get worse, start taking your oral steroid medicine if prescribed by your provider.           RED ZONE Medical Alert - Get Help  I have ANY of these:    I feel awful    Medicine is not helping    Breathing getting harder    Trouble walking or talking    Nose opens wide to breathe       1. Take your rescue medicine NOW  2. If your provider has prescribed an oral steroid medicine, start taking it NOW  3. Call your doctor NOW  4. If you are still in the Red Zone after 20 minutes and you have not reached your doctor:    Take your rescue medicine again and    Call 911 or go to the emergency room right away    See your regular doctor within 2  weeks of an Emergency Room or Urgent Care visit for follow-up treatment.          Annual Reminders:  Meet with Asthma Educator,  Flu Shot in the Fall, consider Pneumonia Vaccination for patients with asthma (aged 19 and older).    Pharmacy: [id number] - [place] - [phone] AT [facility] [address] [facility]                        Asthma Triggers  How To Control Things That Make Your Asthma Worse    Triggers are things that make your asthma worse.  Look at the list below to help you find your triggers and   what you can do about them. You can help prevent asthma flare-ups by staying away from your triggers.      Trigger                                                          What you can do   Cigarette Smoke  Tobacco smoke can make asthma worse. Do not allow smoking in your home, car or around you.  Be sure no one smokes at a child s day care or school.  If you smoke, ask your health care provider for ways to help you quit.  Ask family members to quit too.  Ask your health care provider for a referral to Quit Plan to help you quit smoking, or call 2-771-619-PLAN.     Colds, Flu, Bronchitis  These are common triggers of asthma. Wash your hands often.  Don t touch your eyes, nose or mouth.  Get a flu shot every year.     Dust Mites  These are tiny bugs that live in cloth or carpet. They are too small to see. Wash sheets and blankets in hot water every week.   Encase pillows and mattress in dust mite proof covers.  Avoid having carpet if you can. If you have carpet, vacuum weekly.   Use a dust mask and HEPA vacuum.   Pollen and Outdoor Mold  Some people are allergic to trees, grass, or weed pollen, or molds. Try to keep your windows closed.  Limit time out doors when pollen count is high.   Ask you health care provider about taking medicine during allergy season.     Animal Dander  Some people are allergic to skin flakes, urine or saliva from pets with fur or feathers. Keep pets with  fur or feathers out of your home.    If you can t keep the pet outdoors, then keep the pet out of your bedroom.  Keep the bedroom door closed.  Keep pets off cloth furniture and away from stuffed toys.     Mice, Rats, and Cockroaches  Some people are allergic to the waste from these pests.   Cover food and garbage.  Clean up spills and food crumbs.  Store grease in the refrigerator.   Keep food out of the bedroom.   Indoor Mold  This can be a trigger if your home has high moisture. Fix leaking faucets, pipes, or other sources of water.   Clean moldy surfaces.  Dehumidify basement if it is damp and smelly.   Smoke, Strong Odors, and Sprays  These can reduce air quality. Stay away from strong odors and sprays, such as perfume, powder, hair spray, paints, smoke incense, paint, cleaning products, candles and new carpet.   Exercise or Sports  Some people with asthma have this trigger. Be active!  Ask your doctor about taking medicine before sports or exercise to prevent symptoms.    Warm up for 5-10 minutes before and after sports or exercise.     Other Triggers of Asthma  Cold air:  Cover your nose and mouth with a scarf.  Sometimes laughing or crying can be a trigger.  Some medicines and food can trigger asthma.

## 2019-11-04 NOTE — TELEPHONE ENCOUNTER
Panel Management Review      Patient has the following on his problem list:     Asthma review     ACT Total Scores 4/5/2019   ACT TOTAL SCORE (Goal Greater than or Equal to 20) 24   In the past 12 months, how many times did you visit the emergency room for your asthma without being admitted to the hospital? 0   In the past 12 months, how many times were you hospitalized overnight because of your asthma? 0      1. Is Asthma diagnosis on the Problem List? Yes    2. Is Asthma listed on Health Maintenance? Yes    3. Patient is due for:  ACT and AAP      Composite cancer screening  Chart review shows that this patient is due/due soon for the following Physical  Summary:    Patient is due/failing the following:   AAP and ACT    Action needed:   Patient needs to do ACT.    Type of outreach:    spoke with guardian. Recently ACT sent for review.    Questions for provider review:    None                                                                                                                                    Ava WILSON, JASON,AAMA       Chart routed to Not indicated .

## 2019-11-05 ASSESSMENT — ASTHMA QUESTIONNAIRES: ACT_TOTALSCORE: 22

## 2019-12-29 ENCOUNTER — MYC MEDICAL ADVICE (OUTPATIENT)
Dept: PEDIATRICS | Facility: CLINIC | Age: 24
End: 2019-12-29

## 2019-12-31 ENCOUNTER — MYC MEDICAL ADVICE (OUTPATIENT)
Dept: PEDIATRICS | Facility: CLINIC | Age: 24
End: 2019-12-31

## 2019-12-31 ENCOUNTER — TELEPHONE (OUTPATIENT)
Dept: PEDIATRICS | Facility: CLINIC | Age: 24
End: 2019-12-31

## 2019-12-31 DIAGNOSIS — R63.4 WEIGHT LOSS: ICD-10-CM

## 2019-12-31 DIAGNOSIS — R63.4 WEIGHT LOSS, UNINTENTIONAL: ICD-10-CM

## 2019-12-31 DIAGNOSIS — R06.2 WHEEZING: Primary | ICD-10-CM

## 2019-12-31 DIAGNOSIS — R62.7 FAILURE TO THRIVE IN ADULT: ICD-10-CM

## 2019-12-31 RX ORDER — ALBUTEROL SULFATE 0.63 MG/3ML
1 SOLUTION RESPIRATORY (INHALATION) EVERY 6 HOURS PRN
Status: CANCELLED | OUTPATIENT
Start: 2019-12-31

## 2019-12-31 RX ORDER — ALBUTEROL SULFATE 90 UG/1
2 AEROSOL, METERED RESPIRATORY (INHALATION) EVERY 6 HOURS PRN
Qty: 2 INHALER | Refills: 3 | Status: SHIPPED | OUTPATIENT
Start: 2019-12-31

## 2019-12-31 RX ORDER — LACTOSE-REDUCED FOOD 0.05 G-1.5
1 LIQUID (ML) ORAL 3 TIMES DAILY
Qty: 7 EACH | Refills: 11 | Status: SHIPPED | OUTPATIENT
Start: 2019-12-31 | End: 2019-12-31

## 2019-12-31 RX ORDER — ALBUTEROL SULFATE 0.83 MG/ML
2.5 SOLUTION RESPIRATORY (INHALATION) EVERY 6 HOURS PRN
Qty: 9 ML | Refills: 3 | Status: SHIPPED | OUTPATIENT
Start: 2019-12-31

## 2019-12-31 RX ORDER — LACTOSE-REDUCED FOOD 0.05 G-1.5
1 LIQUID (ML) ORAL 3 TIMES DAILY
Qty: 7 EACH | Refills: 11 | Status: ON HOLD | OUTPATIENT
Start: 2019-12-31 | End: 2021-04-19

## 2019-12-31 NOTE — TELEPHONE ENCOUNTER
Reason for Call:  Other call back    Detailed comments: Silviano, pediatric home care nurse is calling requesting clarification on orders placed by Dr. Leyva.    Phone Number Patient can be reached at: Other phone number:  [phone]    Best Time: anytime before 7:00 pm    Can we leave a detailed message on this number? YES - phone number provided is main line.  Please ask for Silviano.      Call taken on 12/31/2019 at 3:31 PM by Radha Wu

## 2019-12-31 NOTE — TELEPHONE ENCOUNTER
Spoke to Evelyne at [facility] to clarify Pediasure order. Gave phone number in case any additional questions or concerns.  Anjelica BONNER RN, BSN

## 2019-12-31 NOTE — TELEPHONE ENCOUNTER
Spoke to patient's mom. She needs Albuterol neb, not inhaler. Pended possible prescription. Please review/sign if appropriate. Thanks!  Anjelica BONNER RN, BSN

## 2019-12-31 NOTE — TELEPHONE ENCOUNTER
Please review patient mychart message and advise.    1) Requesting labs ordered prior to 1/29/20 visit since patient would need to be fasting. Pended. Please review/sign if appropriate.    2) Requesting an increase of pediasure to 7 cases per month. Pended prescription. Please review and sign if appropriate.    3) Requesting refill of albuterol inhaler. Not in medication list so unable to pend prescription.    Thanks!  Anjelica BONNER RN, BSN

## 2020-01-19 ENCOUNTER — MYC MEDICAL ADVICE (OUTPATIENT)
Dept: PEDIATRICS | Facility: CLINIC | Age: 25
End: 2020-01-19

## 2020-01-20 NOTE — TELEPHONE ENCOUNTER
I called and spoke to the mother and moved the appointment and rescheduled the lab appointment too.   Shelby Madrigal on 1/20/2020 at 1:09 PM

## 2020-01-20 NOTE — TELEPHONE ENCOUNTER
"MA/TC: Are we able to find an alternate appt for pt?    - Mark Anthony \"Souleymane\" CASSY Bhatia - Patient Advocate Liason (PAL)  [facility]    "

## 2020-02-06 ENCOUNTER — OFFICE VISIT (OUTPATIENT)
Dept: PEDIATRICS | Facility: CLINIC | Age: 25
End: 2020-02-06
Payer: COMMERCIAL

## 2020-02-06 VITALS
SYSTOLIC BLOOD PRESSURE: 98 MMHG | TEMPERATURE: 97.7 F | HEIGHT: 61 IN | DIASTOLIC BLOOD PRESSURE: 76 MMHG | WEIGHT: 86.9 LBS | RESPIRATION RATE: 20 BRPM | BODY MASS INDEX: 16.41 KG/M2 | HEART RATE: 96 BPM

## 2020-02-06 DIAGNOSIS — F41.9 ANXIETY: ICD-10-CM

## 2020-02-06 DIAGNOSIS — F39 MOOD DISORDER (H): ICD-10-CM

## 2020-02-06 DIAGNOSIS — L70.0 SUPERFICIAL MIXED COMEDONAL AND INFLAMMATORY ACNE VULGARIS: ICD-10-CM

## 2020-02-06 DIAGNOSIS — R62.7 FAILURE TO THRIVE IN ADULT: ICD-10-CM

## 2020-02-06 DIAGNOSIS — F84.0 AUTISM SPECTRUM DISORDER ASSOCIATED WITH KNOWN MEDICAL OR GENETIC CONDITION OR ENVIRONMENTAL FACTOR, REQUIRING SUBSTANTIAL SUPPORT (LEVEL 2): ICD-10-CM

## 2020-02-06 DIAGNOSIS — Z79.899 ENCOUNTER FOR LONG-TERM (CURRENT) USE OF OTHER MEDICATIONS: Primary | ICD-10-CM

## 2020-02-06 DIAGNOSIS — G40.909 SEIZURE DISORDER (H): ICD-10-CM

## 2020-02-06 DIAGNOSIS — E44.1 MILD PROTEIN-CALORIE MALNUTRITION (H): ICD-10-CM

## 2020-02-06 DIAGNOSIS — G47.9 SLEEP DISORDER: ICD-10-CM

## 2020-02-06 DIAGNOSIS — Z86.59 HISTORY OF BEHAVIORAL AND MENTAL HEALTH PROBLEMS: ICD-10-CM

## 2020-02-06 DIAGNOSIS — R62.7 FAILURE TO THRIVE IN ADULT: Primary | ICD-10-CM

## 2020-02-06 DIAGNOSIS — R63.4 WEIGHT LOSS, UNINTENTIONAL: ICD-10-CM

## 2020-02-06 DIAGNOSIS — R53.81 PHYSICAL DECONDITIONING: ICD-10-CM

## 2020-02-06 DIAGNOSIS — G40.019 LOCALIZATION-RELATED IDIOPATHIC EPILEPSY AND EPILEPTIC SYNDROMES WITH SEIZURES OF LOCALIZED ONSET, INTRACTABLE, WITHOUT STATUS EPILEPTICUS (H): ICD-10-CM

## 2020-02-06 DIAGNOSIS — G80.1 CP (CEREBRAL PALSY), SPASTIC, DIPLEGIC (H): ICD-10-CM

## 2020-02-06 DIAGNOSIS — Z93.1 GASTROSTOMY TUBE DEPENDENT (H): ICD-10-CM

## 2020-02-06 LAB
BASOPHILS # BLD AUTO: 0 10E9/L (ref 0–0.2)
BASOPHILS NFR BLD AUTO: 0.3 %
DIFFERENTIAL METHOD BLD: ABNORMAL
EOSINOPHIL # BLD AUTO: 0 10E9/L (ref 0–0.7)
EOSINOPHIL NFR BLD AUTO: 0.3 %
ERYTHROCYTE [DISTWIDTH] IN BLOOD BY AUTOMATED COUNT: 14 % (ref 10–15)
ERYTHROCYTE [SEDIMENTATION RATE] IN BLOOD BY WESTERGREN METHOD: 5 MM/H (ref 0–15)
FOLATE SERPL-MCNC: 54 NG/ML
HBA1C MFR BLD: 4.9 % (ref 0–5.6)
HCT VFR BLD AUTO: 42.4 % (ref 40–53)
HGB BLD-MCNC: 14.3 G/DL (ref 13.3–17.7)
LYMPHOCYTES # BLD AUTO: 2.3 10E9/L (ref 0.8–5.3)
LYMPHOCYTES NFR BLD AUTO: 39.7 %
MCH RBC QN AUTO: 33.3 PG (ref 26.5–33)
MCHC RBC AUTO-ENTMCNC: 33.7 G/DL (ref 31.5–36.5)
MCV RBC AUTO: 99 FL (ref 78–100)
MONOCYTES # BLD AUTO: 0.6 10E9/L (ref 0–1.3)
MONOCYTES NFR BLD AUTO: 10 %
NEUTROPHILS # BLD AUTO: 2.9 10E9/L (ref 1.6–8.3)
NEUTROPHILS NFR BLD AUTO: 49.7 %
OSMOLALITY SERPL: 294 MMOL/KG (ref 275–295)
PLATELET # BLD AUTO: 115 10E9/L (ref 150–450)
RBC # BLD AUTO: 4.29 10E12/L (ref 4.4–5.9)
VALPROATE SERPL-MCNC: 101 MG/L (ref 50–100)
VIT B12 SERPL-MCNC: 1899 PG/ML (ref 193–986)
WBC # BLD AUTO: 5.8 10E9/L (ref 4–11)

## 2020-02-06 PROCEDURE — 82306 VITAMIN D 25 HYDROXY: CPT | Performed by: INTERNAL MEDICINE

## 2020-02-06 PROCEDURE — 90471 IMMUNIZATION ADMIN: CPT | Performed by: INTERNAL MEDICINE

## 2020-02-06 PROCEDURE — 83930 ASSAY OF BLOOD OSMOLALITY: CPT | Performed by: INTERNAL MEDICINE

## 2020-02-06 PROCEDURE — 99215 OFFICE O/P EST HI 40 MIN: CPT | Mod: 25 | Performed by: INTERNAL MEDICINE

## 2020-02-06 PROCEDURE — 80050 GENERAL HEALTH PANEL: CPT | Performed by: INTERNAL MEDICINE

## 2020-02-06 PROCEDURE — 90472 IMMUNIZATION ADMIN EACH ADD: CPT | Performed by: INTERNAL MEDICINE

## 2020-02-06 PROCEDURE — 82746 ASSAY OF FOLIC ACID SERUM: CPT | Performed by: INTERNAL MEDICINE

## 2020-02-06 PROCEDURE — 83036 HEMOGLOBIN GLYCOSYLATED A1C: CPT | Performed by: INTERNAL MEDICINE

## 2020-02-06 PROCEDURE — 80164 ASSAY DIPROPYLACETIC ACD TOT: CPT | Performed by: PSYCHIATRY & NEUROLOGY

## 2020-02-06 PROCEDURE — 84134 ASSAY OF PREALBUMIN: CPT | Performed by: INTERNAL MEDICINE

## 2020-02-06 PROCEDURE — 36415 COLL VENOUS BLD VENIPUNCTURE: CPT | Performed by: INTERNAL MEDICINE

## 2020-02-06 PROCEDURE — 90686 IIV4 VACC NO PRSV 0.5 ML IM: CPT | Performed by: INTERNAL MEDICINE

## 2020-02-06 PROCEDURE — 82728 ASSAY OF FERRITIN: CPT | Performed by: INTERNAL MEDICINE

## 2020-02-06 PROCEDURE — 90715 TDAP VACCINE 7 YRS/> IM: CPT | Performed by: INTERNAL MEDICINE

## 2020-02-06 PROCEDURE — 80339 ANTIEPILEPTICS NOS 1-3: CPT | Mod: 90 | Performed by: PSYCHIATRY & NEUROLOGY

## 2020-02-06 PROCEDURE — 82607 VITAMIN B-12: CPT | Performed by: INTERNAL MEDICINE

## 2020-02-06 PROCEDURE — 80061 LIPID PANEL: CPT | Performed by: PSYCHIATRY & NEUROLOGY

## 2020-02-06 PROCEDURE — 99000 SPECIMEN HANDLING OFFICE-LAB: CPT | Performed by: PSYCHIATRY & NEUROLOGY

## 2020-02-06 PROCEDURE — 85652 RBC SED RATE AUTOMATED: CPT | Performed by: INTERNAL MEDICINE

## 2020-02-06 RX ORDER — CLINDAMYCIN AND BENZOYL PEROXIDE 10; 50 MG/G; MG/G
GEL TOPICAL 2 TIMES DAILY PRN
Qty: 50 G | Refills: 11 | Status: ON HOLD | OUTPATIENT
Start: 2020-02-06 | End: 2021-04-19

## 2020-02-06 RX ORDER — GABAPENTIN 100 MG/1
CAPSULE ORAL
Qty: 180 CAPSULE | Refills: 3 | Status: ON HOLD | COMMUNITY
Start: 2020-02-06 | End: 2020-05-28

## 2020-02-06 RX ORDER — QUETIAPINE FUMARATE 300 MG/1
600 TABLET, FILM COATED ORAL AT BEDTIME
COMMUNITY
Start: 2019-09-19

## 2020-02-06 ASSESSMENT — MIFFLIN-ST. JEOR: SCORE: 1251.52

## 2020-02-06 NOTE — PROGRESS NOTES
SUBJECTIVE:                                                    Rj Licea is a 24 year old male who presents to clinic today for the following health issues:    Follow up      Duration: NA    Description (location/character/radiation): Follow-up calories and diet    Intensity:  severe    Accompanying signs and symptoms:  Refuses food by mouth    History (similar episodes/previous evaluation): Feeding by G-tube (H    Precipitating or alleviating factors: None    Therapies tried and outcome: would discuss with pcp today     patient here with mom for follow-up, weight is up and is tolerating feeds well, does get free water with feeds too and UO is good. normal stools. still issues with sleep, and behavior, and does have follow-up appointment wthi psych scheduled. taking all medications. due for labs today, is overdue for tdap and flu vacccine, mom is okay have Skip get these today. acne seems to be acting up, wonderign what they can do for this.       Problem list and histories reviewed & adjusted, as indicated.  Additional history: as documented    Patient Active Problem List    Diagnosis Date Noted     CP (cerebral palsy), spastic, diplegic (H) 07/20/2017     Priority: Medium     Sleep disorder 07/20/2017     Priority: Medium     Seizure disorder (H) 07/20/2017     Priority: Medium     Other idiopathic scoliosis, unspecified spinal region 07/20/2017     Priority: Medium     Periventricular leukomalacia, associated with prematurity, chronic 07/20/2017     Priority: Medium     Oral aversion 07/20/2017     Priority: Medium     Nutritional disorder 07/20/2017     Priority: Medium     Mild persistent asthma in adult without complication 07/20/2017     Priority: Medium     History of prematurity, 23 wk 400 grams 07/20/2017     Priority: Medium     History of behavioral and mental health problems 07/20/2017     Priority: Medium     Abnormal gait 07/20/2017     Priority: Medium     Feeding by G-tube (H) 07/20/2017      "Priority: Medium     Constipation, unspecified constipation type 07/20/2017     Priority: Medium     Chronic lung disease of prematurity 07/20/2017     Priority: Medium     Bilateral blindness 07/20/2017     Priority: Medium     Autism spectrum disorder associated with known medical or genetic condition or environmental factor, requiring substantial support (level 2) 07/20/2017     Priority: Medium     Anxiety 07/20/2017     Priority: Medium     Short stature 03/23/2012     Priority: Medium     Social History     Tobacco Use     Smoking status: Never Smoker     Smokeless tobacco: Never Used   Substance Use Topics     Alcohol use: No     Drug use: No     Family History   Problem Relation Age of Onset     Diabetes Father        ROS:  A complete 10 point review of systems was taken and negative except for those noted in the subjective/HPI section(s) above     BP Readings from Last 3 Encounters:   02/06/20 98/76   06/07/19 109/68   04/23/19 98/66    Wt Readings from Last 3 Encounters:   02/06/20 39.4 kg (86 lb 14.4 oz)   06/07/19 36 kg (79 lb 4.8 oz)   04/23/19 37.2 kg (82 lb)                    OBJECTIVE:                                                    BP 98/76   Pulse 96   Temp 97.7  F (36.5  C) (Axillary)   Resp 20   Ht 1.556 m (5' 1.25\")   Wt 39.4 kg (86 lb 14.4 oz)   BMI 16.29 kg/m       Wt Readings from Last 5 Encounters:   02/06/20 39.4 kg (86 lb 14.4 oz)   06/07/19 36 kg (79 lb 4.8 oz)   04/23/19 37.2 kg (82 lb)   04/05/19 36.7 kg (81 lb)   02/13/19 36.4 kg (80 lb 4.8 oz)       Constitutional: alert and no distress; listening to music on his CD player   HEENT: normocephalic and atrumatic, mucous membranes moist, op clear, mucous membranes moist, neck supple   Cardiovascular: regular rate and rhythm no rubs, gallops or murmurs normal S1/S2; no S3 or S4  Respiratory: clear to auscultation bilaterally in all lung fields, normal insp/exp effort. Good air movement  Gastrointestinal: Abdomen soft, non-tender. " BS normal. Gtube in place, No masses, organomegaly  Musculoskeletal: extremities normal- no gross deformities noted, gait normal and normal muscle tone  Skin: patient has opena nd closed comedeons with some inflammed papules on forwahd and cheecks.   Neurologic: Gait normal. Reflexes normal and symmetric. Sensation grossly WNL.  Psychiatric: mentation appears normal and affect normal/bright      Results for orders placed or performed in visit on 02/06/20   **Ferritin FUTURE 2mo     Status: None   Result Value Ref Range    Ferritin 51 26 - 388 ng/mL   ESR: Erythrocyte sedimentation rate     Status: None   Result Value Ref Range    Sed Rate 5 0 - 15 mm/h   Hemoglobin A1c     Status: None   Result Value Ref Range    Hemoglobin A1C 4.9 0 - 5.6 %   Prealbumin     Status: None   Result Value Ref Range    Prealbumin 24 15 - 45 mg/dL   **Vitamin D Deficiency FUTURE anytime     Status: None   Result Value Ref Range    Vitamin D Deficiency screening 66 20 - 75 ug/L   Vitamin B12     Status: Abnormal   Result Value Ref Range    Vitamin B12 1,899 (H) 193 - 986 pg/mL   **Folate FUTURE 2mo     Status: None   Result Value Ref Range    Folate 54.0 >5.4 ng/mL   Osmolality     Status: None   Result Value Ref Range    Osmolality 294 275 - 295 mmol/kg   **TSH with free T4 reflex FUTURE 1yr     Status: None   Result Value Ref Range    TSH 1.10 0.40 - 4.00 mU/L   Lipid panel reflex to direct LDL Fasting     Status: None   Result Value Ref Range    Cholesterol 168 <200 mg/dL    Triglycerides 73 <150 mg/dL    HDL Cholesterol 75 >39 mg/dL    LDL Cholesterol Calculated 78 <100 mg/dL    Non HDL Cholesterol 93 <130 mg/dL   Comprehensive metabolic panel     Status: None   Result Value Ref Range    Sodium 139 133 - 144 mmol/L    Potassium 3.7 3.4 - 5.3 mmol/L    Chloride 103 94 - 109 mmol/L    Carbon Dioxide 29 20 - 32 mmol/L    Anion Gap 7 3 - 14 mmol/L    Glucose 77 70 - 99 mg/dL    Urea Nitrogen 18 7 - 30 mg/dL    Creatinine 0.74 0.66 - 1.25  mg/dL    GFR Estimate >90 >60 mL/min/[1.73_m2]    GFR Estimate If Black >90 >60 mL/min/[1.73_m2]    Calcium 9.7 8.5 - 10.1 mg/dL    Bilirubin Total 0.8 0.2 - 1.3 mg/dL    Albumin 3.6 3.4 - 5.0 g/dL    Protein Total 7.8 6.8 - 8.8 g/dL    Alkaline Phosphatase 78 40 - 150 U/L    ALT 26 0 - 70 U/L    AST 35 0 - 45 U/L   Valproic acid     Status: Abnormal   Result Value Ref Range    Valproic Acid Level 101 (H) 50 - 100 mg/L   CBC with platelets differential     Status: Abnormal   Result Value Ref Range    WBC 5.8 4.0 - 11.0 10e9/L    RBC Count 4.29 (L) 4.4 - 5.9 10e12/L    Hemoglobin 14.3 13.3 - 17.7 g/dL    Hematocrit 42.4 40.0 - 53.0 %    MCV 99 78 - 100 fl    MCH 33.3 (H) 26.5 - 33.0 pg    MCHC 33.7 31.5 - 36.5 g/dL    RDW 14.0 10.0 - 15.0 %    Platelet Count 115 (L) 150 - 450 10e9/L    % Neutrophils 49.7 %    % Lymphocytes 39.7 %    % Monocytes 10.0 %    % Eosinophils 0.3 %    % Basophils 0.3 %    Absolute Neutrophil 2.9 1.6 - 8.3 10e9/L    Absolute Lymphocytes 2.3 0.8 - 5.3 10e9/L    Absolute Monocytes 0.6 0.0 - 1.3 10e9/L    Absolute Eosinophils 0.0 0.0 - 0.7 10e9/L    Absolute Basophils 0.0 0.0 - 0.2 10e9/L    Diff Method Automated Method             ASSESSMENT/PLAN:                                                          ICD-10-CM    1. Failure to thrive in adult R62.7 CARE COORDINATION REFERRAL   2. Mood disorder (H) F39 gabapentin (NEURONTIN) 100 MG capsule     CARE COORDINATION REFERRAL   3. Physical deconditioning R53.81 CARE COORDINATION REFERRAL   4. Anxiety F41.9 gabapentin (NEURONTIN) 100 MG capsule     CARE COORDINATION REFERRAL   5. Sleep disorder G47.9 gabapentin (NEURONTIN) 100 MG capsule     CARE COORDINATION REFERRAL   6. Superficial mixed comedonal and inflammatory acne vulgaris L70.0 clindamycin-benzoyl peroxide (BENZACLIN) 1-5 % external gel     CARE COORDINATION REFERRAL   7. CP (cerebral palsy), spastic, diplegic (H) G80.1 CARE COORDINATION REFERRAL   8. Autism spectrum disorder associated  "with known medical or genetic condition or environmental factor, requiring substantial support (level 2) F84.0 CARE COORDINATION REFERRAL   9. Seizure disorder (H) G40.909 CARE COORDINATION REFERRAL   10. Gastrostomy tube dependent (H) Z93.1 CARE COORDINATION REFERRAL   11. History of behavioral and mental health problems Z86.59 CARE COORDINATION REFERRAL   12. Mild protein-calorie malnutrition (H) E44.1 CARE COORDINATION REFERRAL   discussed with patient (or patient's parents/caregiver) pathophysiology of condition and treatment options.  reviwed history in detail today. patient's weight is up, goal to work on mm mass and will see if we can get a home visit to help, patient had labs today and nutrition labs do look improved, will continue with feeds and see what psych thinks about the sleep and behavior issues. try topicals for the acne. New medication(s) indication(s), adverse effects, risks and benefits discussed. will get labs results to specailits, plan to follow-up again in 3 months, sooner as needed.  reviewed labs, medications, diet ,etc.  Patient's parent(s) verbalized understanding and are agreeable to this plan.            Estimated body mass index is 14.05 kg/m  as calculated from the following:    Height as of 6/7/19: 1.6 m (5' 3\").    Weight as of 6/7/19: 36 kg (79 lb 4.8 oz).      Return to clinic as needed or if symptoms persist, change, worsen or if any new symptoms develop.    Total time spent with patient was 60 min, of which greater than 50 minutes of face to face time and/or coordination of care discussing the above issue(s).     Micky Leyva M.D.  Internal Medicine-Pediatrics              "

## 2020-02-07 ENCOUNTER — TELEPHONE (OUTPATIENT)
Dept: PEDIATRICS | Facility: CLINIC | Age: 25
End: 2020-02-07

## 2020-02-07 DIAGNOSIS — L70.0 SUPERFICIAL MIXED COMEDONAL AND INFLAMMATORY ACNE VULGARIS: Primary | ICD-10-CM

## 2020-02-07 LAB
ALBUMIN SERPL-MCNC: 3.6 G/DL (ref 3.4–5)
ALP SERPL-CCNC: 78 U/L (ref 40–150)
ALT SERPL W P-5'-P-CCNC: 26 U/L (ref 0–70)
ANION GAP SERPL CALCULATED.3IONS-SCNC: 7 MMOL/L (ref 3–14)
AST SERPL W P-5'-P-CCNC: 35 U/L (ref 0–45)
BILIRUB SERPL-MCNC: 0.8 MG/DL (ref 0.2–1.3)
BUN SERPL-MCNC: 18 MG/DL (ref 7–30)
CALCIUM SERPL-MCNC: 9.7 MG/DL (ref 8.5–10.1)
CHLORIDE SERPL-SCNC: 103 MMOL/L (ref 94–109)
CHOLEST SERPL-MCNC: 168 MG/DL
CO2 SERPL-SCNC: 29 MMOL/L (ref 20–32)
CREAT SERPL-MCNC: 0.74 MG/DL (ref 0.66–1.25)
DEPRECATED CALCIDIOL+CALCIFEROL SERPL-MC: 66 UG/L (ref 20–75)
FERRITIN SERPL-MCNC: 51 NG/ML (ref 26–388)
GFR SERPL CREATININE-BSD FRML MDRD: >90 ML/MIN/{1.73_M2}
GLUCOSE SERPL-MCNC: 77 MG/DL (ref 70–99)
HDLC SERPL-MCNC: 75 MG/DL
LDLC SERPL CALC-MCNC: 78 MG/DL
NONHDLC SERPL-MCNC: 93 MG/DL
POTASSIUM SERPL-SCNC: 3.7 MMOL/L (ref 3.4–5.3)
PREALB SERPL IA-MCNC: 24 MG/DL (ref 15–45)
PROT SERPL-MCNC: 7.8 G/DL (ref 6.8–8.8)
SODIUM SERPL-SCNC: 139 MMOL/L (ref 133–144)
TRIGL SERPL-MCNC: 73 MG/DL
TSH SERPL DL<=0.005 MIU/L-ACNC: 1.1 MU/L (ref 0.4–4)

## 2020-02-07 NOTE — TELEPHONE ENCOUNTER
Prior Authorization Retail Medication Request    Medication/Dose: clindamycin-benzoyl peroxide (BENZACLIN) 1-5 % external gel  ICD code (if different than what is on RX): Superficial mixed comedonal and inflammatory acne vulgaris [L70.0]   Previously Tried and Failed: NA  Rationale:  NA    Insurance Name: [facility]  Insurance ID: [id number]    Pharmacy Information (if different than what is on RX)  Name: [facility]   Phone: [phone]

## 2020-02-11 NOTE — TELEPHONE ENCOUNTER
Central Prior Authorization Team   Phone: [phone]      PA Initiation    Medication: clindamycin-benzoyl peroxide (BENZACLIN) 1-5 % external gel  Insurance Company: [employer] - Phone [phone] Fax [phone]  Pharmacy Filling the Rx: "[id number] - [place], [place] - [address] AT [address] [address] & [facility]  Filling Pharmacy Phone: [phone]  Filling Pharmacy Fax:    Start Date: 2/11/2020    Started PA on CMM and a response of Drug is covered by current benefit plan. No further PA activity needed.  Called and spoke with pharmacy they state the patient has MA for secondary insurance and that is what is requiring the PA.  Advised pharmacy to fax over primary payment screen so PA can be completed for MA.

## 2020-02-11 NOTE — TELEPHONE ENCOUNTER
PA Initiation    Medication: clindamycin-benzoyl peroxide (BENZACLIN) 1-5 % external gel-Initiated  Insurance Company: [place] Medicaid ([facility]) - Phone [phone] Fax [phone]  Pharmacy Filling the Rx: [id number] [place], [place] - [address] AT [address] [place] [address] & [facility] [address] [facility]  Filling Pharmacy Phone: [phone]  Filling Pharmacy Fax:    Start Date: 2/11/2020

## 2020-02-11 NOTE — TELEPHONE ENCOUNTER
PRIOR AUTHORIZATION DENIED    Medication: clindamycin-benzoyl peroxide (BENZACLIN) 1-5 % external gel-DENIED    Denial Date: 2/11/2020    Denial Rational: Not preferred drug, alternatives available.  ROHITH HURLEY will pay for the drugs individually, not as a combination.        Appeal Information:

## 2020-02-12 ENCOUNTER — PATIENT OUTREACH (OUTPATIENT)
Dept: CARE COORDINATION | Facility: CLINIC | Age: 25
End: 2020-02-12

## 2020-02-12 DIAGNOSIS — H54.3 BILATERAL BLINDNESS: ICD-10-CM

## 2020-02-12 DIAGNOSIS — R53.81 PHYSICAL DECONDITIONING: Primary | ICD-10-CM

## 2020-02-12 DIAGNOSIS — G80.1 CP (CEREBRAL PALSY), SPASTIC, DIPLEGIC (H): ICD-10-CM

## 2020-02-12 DIAGNOSIS — Z87.898 HISTORY OF PREMATURITY: Chronic | ICD-10-CM

## 2020-02-12 DIAGNOSIS — R26.9 ABNORMAL GAIT: ICD-10-CM

## 2020-02-12 LAB — CLOBAZAM SERPL-MCNC: 200 NG/ML

## 2020-02-12 ASSESSMENT — ACTIVITIES OF DAILY LIVING (ADL): DEPENDENT_IADLS:: COOKING;LAUNDRY;SHOPPING;MEAL PREPARATION;MEDICATION MANAGEMENT;TRANSPORTATION;INCONTINENCE

## 2020-02-12 NOTE — PROGRESS NOTES
Clinic Care Coordination Contact  Care Team Conversations    PCP referral received. Patient has lost some muscle mass.  Wondering if patient julian get home physical therapy eval to have mom and care team with exercise to do to help build back muscle.  Patient is not able to tolerate out patient physical therapy visits due to multiple ongoing medical issues.    RN CC reviewed chart.  Patient is on DD waiver which could pay for services.  Outreach made to  home care to see if this is a service they can provide and bill DD Waiver.  Response back via email from Juan Shin MA, [employer]   Supervisor Insurance Team [facility] - Yes they can provide physical therapy only with DD waiver as payment.    RN CC spoke with patient's mom/guardian, Darcy.  Darcy explained patient has lost a lot of muscle and they are hopeful for  physical therapy and learn exercise they can do with patient at home as well.  It would be much easier for all of them if home care was an options.  RN CC explained [facility] could provide those services according to received information above.  Darcy verified patient does have DD Waiver in place.  Darcy agreed to [facility] referral.  RN CC will ask PCP to complete referral.    Deanne Caicedo RN  Care Coordination  Phone:  [phone]  Email: [email or web address]  [facility], [place] and [facility]

## 2020-02-19 RX ORDER — CLINDAMYCIN PHOSPHATE 10 MG/G
GEL TOPICAL 2 TIMES DAILY PRN
Qty: 50 G | Refills: 11 | Status: SHIPPED | OUTPATIENT
Start: 2020-02-19

## 2020-02-19 RX ORDER — BENZOYL PEROXIDE 5 G/100G
GEL TOPICAL 2 TIMES DAILY PRN
Qty: 50 G | Refills: 11 | Status: SHIPPED | OUTPATIENT
Start: 2020-02-19

## 2020-02-19 NOTE — TELEPHONE ENCOUNTER
Spoke to patient's mom and let her know that two prescriptions were sent to pharmacy.  Anjelica BONNER RN, BSN

## 2020-02-19 NOTE — TELEPHONE ENCOUNTER
looks great! I sent the prescriptions, can you let his mom know they will be 2 creams for the acne instead of 1 due to the insurance?

## 2020-02-27 ENCOUNTER — PATIENT OUTREACH (OUTPATIENT)
Dept: CARE COORDINATION | Facility: CLINIC | Age: 25
End: 2020-02-27

## 2020-02-27 DIAGNOSIS — R26.9 ABNORMAL GAIT: ICD-10-CM

## 2020-02-27 DIAGNOSIS — Z87.898 HISTORY OF PREMATURITY: ICD-10-CM

## 2020-02-27 DIAGNOSIS — G80.1 CP (CEREBRAL PALSY), SPASTIC, DIPLEGIC (H): ICD-10-CM

## 2020-02-27 DIAGNOSIS — E63.9 NUTRITIONAL DISORDER: ICD-10-CM

## 2020-02-27 DIAGNOSIS — H54.3 BILATERAL BLINDNESS: ICD-10-CM

## 2020-02-27 DIAGNOSIS — R53.81 PHYSICAL DECONDITIONING: Primary | ICD-10-CM

## 2020-02-27 NOTE — PROGRESS NOTES
Clinic Care Coordination Contact  [facility]/Voicemail       Clinical Data: Care Coordinator Follow up Outreach  Chart reviewed.  PCP did write order for physical therapy, but for out patient physical therapy.  Patient/guardian prefers home physical therapy.  Confirmed DD waiver will cover visits.    Writer requested home care orders for physical therapy from PCP.    Outreach attempted x 1.  Left message on guardian's  voicemail with call back information and requested return call.  Plan:  Care Coordinator will try to reach patient again in 3-5 business days.    Deanne Caicedo RN  Care Coordination  Phone:  [phone]  Email: [email or web address]  [facility], [place], [place] and [facility] and [place]

## 2020-03-02 ENCOUNTER — HEALTH MAINTENANCE LETTER (OUTPATIENT)
Age: 25
End: 2020-03-02

## 2020-03-02 NOTE — PROGRESS NOTES
I think I did what is needed, there isn't an exact referral that I can find to meet this situation but I was able to freetext. please let me know if this doesn't work. thanks!

## 2020-03-09 NOTE — PROGRESS NOTES
Clinic Care Coordination Contact    Patient was opened to [facility] Care physical therapy today, 3/9/20.    Deanne MADERA CC

## 2020-03-10 ENCOUNTER — TELEPHONE (OUTPATIENT)
Dept: PEDIATRICS | Facility: CLINIC | Age: 25
End: 2020-03-10

## 2020-03-10 NOTE — TELEPHONE ENCOUNTER
[facility] now requests orders and shares plan of care/discharge summaries for some patients through [employer].  Please REPLY TO THIS MESSAGE OR ROUTE BACK TO THE AUTHOR in order to give authorization for orders when needed.  This is considered a verbal order, you will still receive a faxed copy of orders for signature.  Thank you for your assistance in improving collaboration for our patients.    ORDER  PT to tx 1w1, 2w2, 1w1 to improve ble rom, strength, balance, transfers, gait, estabilsh HEP.      Mason Mann PT, [email or web address]  [phone]    MD SUMMARY/PLAN OF CARE    DISCHARGE SUMMARY

## 2020-05-21 ENCOUNTER — MYC MEDICAL ADVICE (OUTPATIENT)
Dept: PEDIATRICS | Facility: CLINIC | Age: 25
End: 2020-05-21

## 2020-05-27 ENCOUNTER — MYC MEDICAL ADVICE (OUTPATIENT)
Dept: PEDIATRICS | Facility: CLINIC | Age: 25
End: 2020-05-27

## 2020-05-28 ENCOUNTER — APPOINTMENT (OUTPATIENT)
Dept: GENERAL RADIOLOGY | Facility: CLINIC | Age: 25
End: 2020-05-28
Attending: EMERGENCY MEDICINE
Payer: COMMERCIAL

## 2020-05-28 ENCOUNTER — APPOINTMENT (OUTPATIENT)
Dept: CT IMAGING | Facility: CLINIC | Age: 25
End: 2020-05-28
Attending: EMERGENCY MEDICINE
Payer: COMMERCIAL

## 2020-05-28 ENCOUNTER — HOSPITAL ENCOUNTER (INPATIENT)
Facility: CLINIC | Age: 25
LOS: 2 days | Discharge: HOME | End: 2020-05-30
Attending: EMERGENCY MEDICINE | Admitting: INTERNAL MEDICINE
Payer: COMMERCIAL

## 2020-05-28 DIAGNOSIS — K56.609 SMALL BOWEL OBSTRUCTION (H): ICD-10-CM

## 2020-05-28 PROBLEM — R11.10 VOMITING: Status: ACTIVE | Noted: 2020-05-28

## 2020-05-28 LAB
ALBUMIN SERPL-MCNC: 2.8 G/DL (ref 3.4–5)
ALP SERPL-CCNC: 70 U/L (ref 40–150)
ALT SERPL W P-5'-P-CCNC: 19 U/L (ref 0–70)
ANION GAP SERPL CALCULATED.3IONS-SCNC: 1 MMOL/L (ref 3–14)
AST SERPL W P-5'-P-CCNC: 25 U/L (ref 0–45)
BASOPHILS # BLD AUTO: 0 10E9/L (ref 0–0.2)
BASOPHILS NFR BLD AUTO: 0 %
BILIRUB SERPL-MCNC: 1.1 MG/DL (ref 0.2–1.3)
BUN SERPL-MCNC: 25 MG/DL (ref 7–30)
CALCIUM SERPL-MCNC: 8.6 MG/DL (ref 8.5–10.1)
CHLORIDE SERPL-SCNC: 99 MMOL/L (ref 94–109)
CO2 SERPL-SCNC: 35 MMOL/L (ref 20–32)
CREAT SERPL-MCNC: 0.76 MG/DL (ref 0.66–1.25)
DIFFERENTIAL METHOD BLD: ABNORMAL
EOSINOPHIL # BLD AUTO: 0 10E9/L (ref 0–0.7)
EOSINOPHIL NFR BLD AUTO: 0 %
ERYTHROCYTE [DISTWIDTH] IN BLOOD BY AUTOMATED COUNT: 12.9 % (ref 10–15)
GFR SERPL CREATININE-BSD FRML MDRD: >90 ML/MIN/{1.73_M2}
GLUCOSE SERPL-MCNC: 91 MG/DL (ref 70–99)
HCT VFR BLD AUTO: 45.3 % (ref 40–53)
HGB BLD-MCNC: 15.2 G/DL (ref 13.3–17.7)
LYMPHOCYTES # BLD AUTO: 1.3 10E9/L (ref 0.8–5.3)
LYMPHOCYTES NFR BLD AUTO: 19 %
MACROCYTES BLD QL SMEAR: PRESENT
MCH RBC QN AUTO: 34.8 PG (ref 26.5–33)
MCHC RBC AUTO-ENTMCNC: 33.6 G/DL (ref 31.5–36.5)
MCV RBC AUTO: 104 FL (ref 78–100)
METAMYELOCYTES # BLD: 0.1 10E9/L
METAMYELOCYTES NFR BLD MANUAL: 2 %
MONOCYTES # BLD AUTO: 1.9 10E9/L (ref 0–1.3)
MONOCYTES NFR BLD AUTO: 27 %
NEUTROPHILS # BLD AUTO: 3.6 10E9/L (ref 1.6–8.3)
NEUTROPHILS NFR BLD AUTO: 52 %
PLATELET # BLD AUTO: 129 10E9/L (ref 150–450)
PLATELET # BLD EST: ABNORMAL 10*3/UL
POTASSIUM SERPL-SCNC: 4.1 MMOL/L (ref 3.4–5.3)
PROT SERPL-MCNC: 6.9 G/DL (ref 6.8–8.8)
RBC # BLD AUTO: 4.37 10E12/L (ref 4.4–5.9)
SODIUM SERPL-SCNC: 135 MMOL/L (ref 133–144)
VALPROATE SERPL-MCNC: 95 MG/L (ref 50–100)
WBC # BLD AUTO: 7 10E9/L (ref 4–11)

## 2020-05-28 PROCEDURE — 25000128 H RX IP 250 OP 636: Performed by: EMERGENCY MEDICINE

## 2020-05-28 PROCEDURE — 85025 COMPLETE CBC W/AUTO DIFF WBC: CPT | Performed by: EMERGENCY MEDICINE

## 2020-05-28 PROCEDURE — 25800030 ZZH RX IP 258 OP 636: Performed by: EMERGENCY MEDICINE

## 2020-05-28 PROCEDURE — 80053 COMPREHEN METABOLIC PANEL: CPT | Performed by: EMERGENCY MEDICINE

## 2020-05-28 PROCEDURE — 74019 RADEX ABDOMEN 2 VIEWS: CPT

## 2020-05-28 PROCEDURE — 71045 X-RAY EXAM CHEST 1 VIEW: CPT

## 2020-05-28 PROCEDURE — 25800030 ZZH RX IP 258 OP 636: Performed by: INTERNAL MEDICINE

## 2020-05-28 PROCEDURE — 25000125 ZZHC RX 250: Performed by: INTERNAL MEDICINE

## 2020-05-28 PROCEDURE — 25000132 ZZH RX MED GY IP 250 OP 250 PS 637: Performed by: INTERNAL MEDICINE

## 2020-05-28 PROCEDURE — 12000000 ZZH R&B MED SURG/OB

## 2020-05-28 PROCEDURE — 99285 EMERGENCY DEPT VISIT HI MDM: CPT | Mod: 25

## 2020-05-28 PROCEDURE — 99223 1ST HOSP IP/OBS HIGH 75: CPT | Mod: AI | Performed by: INTERNAL MEDICINE

## 2020-05-28 PROCEDURE — 74177 CT ABD & PELVIS W/CONTRAST: CPT

## 2020-05-28 PROCEDURE — 96361 HYDRATE IV INFUSION ADD-ON: CPT

## 2020-05-28 PROCEDURE — 80164 ASSAY DIPROPYLACETIC ACD TOT: CPT | Performed by: EMERGENCY MEDICINE

## 2020-05-28 PROCEDURE — 25000132 ZZH RX MED GY IP 250 OP 250 PS 637: Performed by: EMERGENCY MEDICINE

## 2020-05-28 PROCEDURE — 25000128 H RX IP 250 OP 636: Performed by: INTERNAL MEDICINE

## 2020-05-28 PROCEDURE — 96374 THER/PROPH/DIAG INJ IV PUSH: CPT | Mod: 59

## 2020-05-28 RX ORDER — LANOLIN ALCOHOL/MO/W.PET/CERES
6 CREAM (GRAM) TOPICAL AT BEDTIME
COMMUNITY

## 2020-05-28 RX ORDER — NALOXONE HYDROCHLORIDE 0.4 MG/ML
.1-.4 INJECTION, SOLUTION INTRAMUSCULAR; INTRAVENOUS; SUBCUTANEOUS
Status: DISCONTINUED | OUTPATIENT
Start: 2020-05-28 | End: 2020-05-30 | Stop reason: HOSPADM

## 2020-05-28 RX ORDER — POTASSIUM CHLORIDE 1.5 G/1.58G
20-40 POWDER, FOR SOLUTION ORAL
Status: DISCONTINUED | OUTPATIENT
Start: 2020-05-28 | End: 2020-05-30 | Stop reason: HOSPADM

## 2020-05-28 RX ORDER — SODIUM CHLORIDE 9 MG/ML
INJECTION, SOLUTION INTRAVENOUS ONCE
Status: COMPLETED | OUTPATIENT
Start: 2020-05-28 | End: 2020-05-28

## 2020-05-28 RX ORDER — POLYETHYLENE GLYCOL 3350 17 G/17G
17 POWDER, FOR SOLUTION ORAL DAILY PRN
Status: DISCONTINUED | OUTPATIENT
Start: 2020-05-28 | End: 2020-05-30

## 2020-05-28 RX ORDER — ONDANSETRON 4 MG/1
4 TABLET, ORALLY DISINTEGRATING ORAL EVERY 6 HOURS PRN
Status: DISCONTINUED | OUTPATIENT
Start: 2020-05-28 | End: 2020-05-30 | Stop reason: HOSPADM

## 2020-05-28 RX ORDER — POTASSIUM CHLORIDE 1500 MG/1
20-40 TABLET, EXTENDED RELEASE ORAL
Status: DISCONTINUED | OUTPATIENT
Start: 2020-05-28 | End: 2020-05-30 | Stop reason: HOSPADM

## 2020-05-28 RX ORDER — POTASSIUM CHLORIDE 7.45 MG/ML
10 INJECTION INTRAVENOUS
Status: DISCONTINUED | OUTPATIENT
Start: 2020-05-28 | End: 2020-05-30 | Stop reason: HOSPADM

## 2020-05-28 RX ORDER — DEXTROSE, SODIUM CHLORIDE, SODIUM LACTATE, POTASSIUM CHLORIDE, AND CALCIUM CHLORIDE 5; .6; .31; .03; .02 G/100ML; G/100ML; G/100ML; G/100ML; G/100ML
INJECTION, SOLUTION INTRAVENOUS CONTINUOUS
Status: DISCONTINUED | OUTPATIENT
Start: 2020-05-28 | End: 2020-05-30

## 2020-05-28 RX ORDER — CLOBAZAM 2.5 MG/ML
7.5 SUSPENSION ORAL DAILY
Status: DISCONTINUED | OUTPATIENT
Start: 2020-05-29 | End: 2020-05-28 | Stop reason: RX

## 2020-05-28 RX ORDER — IOPAMIDOL 755 MG/ML
42 INJECTION, SOLUTION INTRAVASCULAR ONCE
Status: COMPLETED | OUTPATIENT
Start: 2020-05-28 | End: 2020-05-28

## 2020-05-28 RX ORDER — GABAPENTIN 250 MG/5ML
100 SOLUTION ORAL 2 TIMES DAILY
Status: ON HOLD | COMMUNITY
End: 2021-04-19

## 2020-05-28 RX ORDER — GABAPENTIN 250 MG/5ML
750 SOLUTION ORAL AT BEDTIME
COMMUNITY

## 2020-05-28 RX ORDER — VALPROIC ACID 250 MG/5ML
350 SOLUTION ORAL 2 TIMES DAILY
Status: ON HOLD | COMMUNITY
End: 2021-09-24

## 2020-05-28 RX ORDER — QUETIAPINE FUMARATE 200 MG/1
600 TABLET, FILM COATED ORAL AT BEDTIME
Status: DISCONTINUED | OUTPATIENT
Start: 2020-05-28 | End: 2020-05-30 | Stop reason: HOSPADM

## 2020-05-28 RX ORDER — LIDOCAINE 40 MG/G
CREAM TOPICAL
Status: DISCONTINUED | OUTPATIENT
Start: 2020-05-28 | End: 2020-05-30 | Stop reason: HOSPADM

## 2020-05-28 RX ORDER — HYDROXYZINE HCL 10 MG/5 ML
50 SOLUTION, ORAL ORAL AT BEDTIME
Status: DISCONTINUED | OUTPATIENT
Start: 2020-05-28 | End: 2020-05-30 | Stop reason: HOSPADM

## 2020-05-28 RX ORDER — CLOBAZAM 2.5 MG/ML
4 SUSPENSION ORAL 2 TIMES DAILY
COMMUNITY

## 2020-05-28 RX ORDER — LORAZEPAM 1 MG/1
1 TABLET ORAL DAILY PRN
Status: DISCONTINUED | OUTPATIENT
Start: 2020-05-28 | End: 2020-05-30 | Stop reason: HOSPADM

## 2020-05-28 RX ORDER — LORAZEPAM 2 MG/ML
1 INJECTION INTRAMUSCULAR ONCE
Status: COMPLETED | OUTPATIENT
Start: 2020-05-28 | End: 2020-05-28

## 2020-05-28 RX ORDER — POTASSIUM CL/LIDO/0.9 % NACL 10MEQ/0.1L
10 INTRAVENOUS SOLUTION, PIGGYBACK (ML) INTRAVENOUS
Status: DISCONTINUED | OUTPATIENT
Start: 2020-05-28 | End: 2020-05-30 | Stop reason: HOSPADM

## 2020-05-28 RX ORDER — VALPROATE SODIUM 100 MG/ML
350 INJECTION, SOLUTION INTRAVENOUS ONCE
Status: DISCONTINUED | OUTPATIENT
Start: 2020-05-28 | End: 2020-05-28

## 2020-05-28 RX ORDER — AMOXICILLIN 250 MG
2 CAPSULE ORAL 2 TIMES DAILY PRN
Status: DISCONTINUED | OUTPATIENT
Start: 2020-05-28 | End: 2020-05-30 | Stop reason: HOSPADM

## 2020-05-28 RX ORDER — LORAZEPAM 1 MG/1
1 TABLET ORAL EVERY 6 HOURS PRN
COMMUNITY

## 2020-05-28 RX ORDER — POTASSIUM CHLORIDE 29.8 MG/ML
20 INJECTION INTRAVENOUS
Status: DISCONTINUED | OUTPATIENT
Start: 2020-05-28 | End: 2020-05-28

## 2020-05-28 RX ORDER — VALPROIC ACID 250 MG/5ML
300 SOLUTION ORAL EVERY MORNING
Status: ON HOLD | COMMUNITY
End: 2021-09-24

## 2020-05-28 RX ORDER — ONDANSETRON 2 MG/ML
4 INJECTION INTRAMUSCULAR; INTRAVENOUS EVERY 6 HOURS PRN
Status: DISCONTINUED | OUTPATIENT
Start: 2020-05-28 | End: 2020-05-30 | Stop reason: HOSPADM

## 2020-05-28 RX ORDER — AMOXICILLIN 250 MG
1 CAPSULE ORAL 2 TIMES DAILY PRN
Status: DISCONTINUED | OUTPATIENT
Start: 2020-05-28 | End: 2020-05-30 | Stop reason: HOSPADM

## 2020-05-28 RX ADMIN — SODIUM CHLORIDE 1000 ML: 9 INJECTION, SOLUTION INTRAVENOUS at 14:33

## 2020-05-28 RX ADMIN — LORAZEPAM 1 MG: 2 INJECTION INTRAMUSCULAR; INTRAVENOUS at 17:25

## 2020-05-28 RX ADMIN — SODIUM CHLORIDE: 9 INJECTION, SOLUTION INTRAVENOUS at 19:05

## 2020-05-28 RX ADMIN — IOPAMIDOL 42 ML: 755 INJECTION, SOLUTION INTRAVENOUS at 17:39

## 2020-05-28 RX ADMIN — VALPROATE SODIUM 350 MG: 100 INJECTION, SOLUTION INTRAVENOUS at 21:29

## 2020-05-28 RX ADMIN — QUETIAPINE FUMARATE 600 MG: 200 TABLET ORAL at 21:55

## 2020-05-28 RX ADMIN — Medication 5 MG: at 20:03

## 2020-05-28 RX ADMIN — HYDROXYZINE HYDROCHLORIDE 50 MG: 10 SYRUP ORAL at 21:55

## 2020-05-28 RX ADMIN — SODIUM CHLORIDE, SODIUM LACTATE, POTASSIUM CHLORIDE, CALCIUM CHLORIDE AND DEXTROSE MONOHYDRATE: 5; 600; 310; 30; 20 INJECTION, SOLUTION INTRAVENOUS at 21:29

## 2020-05-28 ASSESSMENT — ENCOUNTER SYMPTOMS
WHEEZING: 0
ACTIVITY CHANGE: 1
NAUSEA: 1
FATIGUE: 1
DIARRHEA: 1
SHORTNESS OF BREATH: 1
VOMITING: 1

## 2020-05-28 ASSESSMENT — ACTIVITIES OF DAILY LIVING (ADL): ADLS_ACUITY_SCORE: 25

## 2020-05-28 NOTE — ED PROVIDER NOTES
History     Chief Complaint:  Cough; Shortness of Breath; and Diarrhea    The history is provided by a parent (mother). The history is limited by a developmental delay.     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 4 days ago the patient had several episodes of vomiting and over the last 2 days has been experiencing diarrhea. He is fed through a G-tube and the vomiting has occurred after each feeding. His mother notes the patient appears dehydrated and when urinating this morning for the first time in 24 hours this was vary dark brown. He has been sleeping significantly more over the past 72 hours. She notes that at his baseline he is up ambulating around the home. His mother also notes the patient has been talking less over that time period and appears to be taking shallow breaths. She has not heard him wheezing.       Allergies:  No known drug allergies    Medications:    Albuterol   Clobazam   Advair   Gabapentin   Hydroxyzine   Levothyroxine   Ativan  Melatonin   Omeprazole   Miralax  Seroquel  Valproic acid    Past Medical History:    Short stature  Cerebral palsy spastic, diplegic  Sleep disorder  Seizure disorder  idiopathic scoliosis  Periventricular leukomalacia, associated with prematurity, chronic  Oral aversion  Nutritional disorder  Mild persistent asthma in adult without complication  History of prematurity, 23 wk 400 grams  Feeding by G-tube  Chronic lung disease of prematurity  Bilateral blindness  Autism spectrum disorder   Anxiety Depression  GERD  Insomnia  Retinopathy of prematurity     Past Surgical History:    Gastrostomy tube    Family History:    The patient's family history includes Diabetes in his father    Social History:  The patient arrives in care of mother  Smoking: never  Alcohol use: no  PCP: Micky Leyva  Status: Single [1]      Review of Systems   Unable to perform ROS: Other (Cognitive delay - ROS supplemented by mother)   Constitutional: Positive for activity change and fatigue.   Respiratory: Positive for shortness of breath. Negative for wheezing.    Gastrointestinal: Positive for diarrhea, nausea and vomiting.   Genitourinary: Positive for decreased urine volume.       Physical Exam     Patient Vitals for the past 24 hrs:   BP Temp Temp src Pulse Resp SpO2 Weight   05/28/20 1108 124/83 98.4  F (36.9  C) Oral 115 20 94 % 37.6 kg (83 lb)     Physical Exam   Constitutional: Vital signs reviewed as above.   HENT:    Head: No external signs of trauma noted.   Eyes: left corneal opacification (chronic)    Oropharynx: Dry mm  Cardiovascular:    Normal rate, regular rhythm and normal heart sounds.     Exam reveals no friction rub.     No murmur heard.  Pulmonary/Chest:    Effort normal and breath sounds normal.    No respiratory distress.    There are no wheezes.    There are no rales.   Gastrointestinal:    Soft.    Bowel sounds normal.    There is notable distension.    There is no apparent tenderness on my exam   There is no rebound or guarding.   Musculoskeletal:    Normal range of motion.    Normal Tone  Neurological: Patient is alert. He follows commands, but has cognitive delay  Skin: Skin is warm and dry. Patient is not diaphoretic  Psychiatric: The patient appears calm    Emergency Department Course     Imaging:  Radiology findings were communicated with the patient who voiced understanding of the findings.    XR Chest Port 1 view   IMPRESSION: Portable chest. Lungs are clear. Heart is normal in size.   No pneumothorax. No definite pleural effusions  Reading per radiology    XR Abdomen, 1 view  IMPRESSION: Two views of the abdomen are performed. Dilated loops of   small bowel and colon have increased since prior exam. Left lateral   decubitus view demonstrates scattered air-fluid levels within both   loops of  small bowel and colon concerning for adynamic ileus. Early   changes of bowel obstruction remain in the differential. Moderate   amount of stool is scattered throughout the colon. No significant   rectal fecal distention is evident. Bony structures demonstrate slight   rightward curvature lumbar spine  Reading per radiology    CT Abdomen Pelvis w contrast     IMPRESSION:   1.  Small obstruction with suspected transition point in the right lower quadrant.  Reading per radiology      Laboratory:  Laboratory findings were communicated with the patient who voiced understanding of the findings.    CBC:  (L) o/w WNL. (WBC 7.0, HGB 15.2)   CMP: Glucose 91, carbon dioxide 35 (L), anion gap 1 (L), o/w WNL (Creatinine: 0.76)    Valproic acid level: 95    Interventions:  1433 NS 1L IV Bolus  1725 Lorazepam, 1 mg, IV injection    Emergency Department Course:  Past medical records, nursing notes, and vitals reviewed.  1402: I performed an exam of the patient and obtained history, as documented above.     IV was inserted and blood was drawn for laboratory testing, results above.  The patient was sent for an XR while in the emergency department, findings above    1543: I rechecked the patient. Explained findings to mother.    1611 I spoke with Dr. Toribio of general surgery regarding this patient.    I personally reviewed the laboratory and imaging results with the mother and answered all related questions prior to admission.     Findings and plan explained to the mother who consents to admission.     1626 Discussed the patient with Dr. Wilson, who will admit the patient to a med/surg bed for further monitoring, evaluation, and treatment.  Impression & Plan      Medical Decision Making:  Rj Licea is a 24 year old male who presents to the emergency department today for evaluation of vomiting and abdominal distention.  Please see the HPI and exam for specifics.  The patient's mother also wondered if he was short of breath  though on further discussion it seems as though he was not taking as deep of breath and I believe this is likely due to the fact that his abdomen is distended due to his small bowel obstruction which was found on the CT as above.  The case was discussed with general surgery and hospitalist.  We will place the patient's G-tube to gravity drainage and admit him to the hospital for further monitoring and care.    Diagnosis:    ICD-10-CM   1. Small bowel obstruction (H)  K56.609       Disposition:   Admitted to med/surg      Scribe Disclosure:  Tiara MILLER, am serving as a scribe at 2:02 PM on 5/28/2020 to document services personally performed by Leif Vigil DO based on my observations and the provider's statements to me.    [facility] EMERGENCY DEPARTMENT     Leif Vigil DO  05/28/20 1943       Leif Vigil DO  05/28/20 1944

## 2020-05-28 NOTE — ED NOTES
RECEIVING UNIT ED HANDOFF REVIEW    Above ED Nurse Handoff Report was reviewed: Yes  Reviewed by: Thao Cristina RN on May 28, 2020 at 7:19 PM   [facility]  ED Nurse Handoff Report    Rj Licea is a 24 year old male   ED Chief complaint: Cough; Shortness of Breath; and Diarrhea  . ED Diagnosis:   Final diagnoses:   Small bowel obstruction (H)     Allergies: No Known Allergies    Code Status: Full Code  Activity level - Baseline/Home:  Assist X 1. Activity Level - Current:   Total Care. Lift room needed: Yes. Bariatric: No   Needed: No   Isolation: No. Infection: Not Applicable.     Vital Signs:   Vitals:    05/28/20 1600 05/28/20 1630 05/28/20 1700 05/28/20 1730   BP: (!) 127/92 123/87 128/86 125/87   Pulse: 124 118 108 113   Resp:       Temp:       TempSrc:       SpO2:       Weight:           Cardiac Rhythm:  ,      Pain level:    Patient confused: Yes. Patient Falls Risk: Yes.   Elimination Status: Has voided   Patient Report - Initial Complaint: generalized weakness. Focused Assessment:   Gastrointestinal TD        Details:   Gastrointestinal - GI WDL: all  Stool Consistency:  (incont X2 of stool-normally cont. ) GI Signs/Symptoms: vomiting (abd distention)       14:45 Musculoskeletal TD      Details:   Musculoskeletal - General Mobility: generalized weakness; significantly impaired (unable to walk or sit up)       14:45 Neurological TD      Details:   Cognitive - Cognitive/Neuro/Behavioral WDL:  (more lethargic than baseline Mother reports)          Tests Performed: labs/imaging. Abnormal Results:   Labs Ordered and Resulted from Time of ED Arrival Up to the Time of Departure from the ED   COMPREHENSIVE METABOLIC PANEL - Abnormal; Notable for the following components:       Result Value    Carbon Dioxide 35 (*)     Anion Gap 1 (*)     Albumin 2.8 (*)     All other components within normal limits   CBC WITH PLATELETS DIFFERENTIAL - Abnormal; Notable for the following components:     RBC Count 4.37 (*)      (*)     MCH 34.8 (*)     Platelet Count 129 (*)     Absolute Monocytes 1.9 (*)     Absolute Metamyelocytes 0.1 (*)     All other components within normal limits   VALPROIC ACID   ROUTINE UA WITH MICROSCOPIC   FREE WATER   URINE CULTURE AEROBIC BACTERIAL     .   Treatments provided: fluids  Family Comments: Parth (care-giver)  OBS brochure/video discussed/provided to patient:  N/A  ED Medications:   Medications   sodium chloride 0.9% infusion (has no administration in time range)   0.9% sodium chloride BOLUS (0 mLs Intravenous Stopped 5/28/20 1743)   LORazepam (ATIVAN) injection 1 mg (1 mg Intravenous Given 5/28/20 1725)   iopamidol (ISOVUE-370) solution 42 mL (42 mLs Intravenous Given 5/28/20 1739)   sodium chloride (PF) 0.9% PF flush 51 mL (51 mLs Intravenous Given 5/28/20 1739)     Drips infusing:  No  For the majority of the shift, the patient's behavior Green. Interventions performed were .    Sepsis treatment initiated: No       ED Nurse Name/Phone Number: Zora Walden RN,   6:42 PM

## 2020-05-28 NOTE — H&P
[facility]    History and Physical  Hospitalist       Date of Admission:  5/28/2020    Assessment & Plan   Rj Licea is a 24 year old male who presents with VOMITING, DIARRHEA, ABDOMINAL DISTENSION.    Unable to obtain any history from the patient due to developmental delay.    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 4 days ago the patient had several episodes of vomiting and over the last 2 days has been experiencing diarrhea- one episode of diarrhea today. He is fed through a G-tube and the vomiting has occurred after each feeding. His mother notes the patient appears dehydrated and when urinating this morning for the first time in 24 hours this was vary dark brown. He has been sleeping significantly more over the past 72 hours. She has also noticed an abdominal distension. He appears to be less interactive.    Patient's labs were unremarkable in the ER. Abdominal xray shows dilatation of the bowel loops- adynamic ileus vs SBO. CT abdomen showed small bowel obstruction. Patient appears dehydrated. Stable vitals.    I spoke to patient's mom who is at the bedside.    Problem List:    Small bowel obstruction.  -Hold tube feedings.   -IV fluid.  -General surgery consult.  - If more diarrhea, send stool studies.    Dehydration due to the above  -IVF. monitor labs, urine output and vitals    Seizure disorder  -continue home meds, one med rec is complete  -based on last documentation he is on valproate 350mg TID- change that to IV  -continue clobazam via the G tube    According to mom, patient also takes hydroxyzine and Ativan for his anxiety/agitation.  He was also resumed.      DVT Prophylaxis: lovenox  Code Status: Full Code    Clarence Wilson MD    Primary Care Physician   Micky Leyva    Chief Complaint   VOMITING, DIARRHEA,  ABDOMINAL DISTENSION.      History of Present Illness   Rj Licea is a 24 year old male presented with the above symptoms.      Past Medical History    I have reviewed this patient's medical history and updated it with pertinent information if needed.   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  Past Surgical History:   Procedure Laterality Date     GASTROSTOMY TUBE         Prior to Admission Medications   Prior to Admission Medications   Prescriptions Last Dose Informant Patient Reported? Taking?   LORazepam (ATIVAN) 0.5 MG tablet   No No   Sig: Take 1-2 tablet(s) 30 minutes prior to dental visit.  Do not operate a vehicle after taking this medication   Nutritional Supplements (ISOSOURCE 1.5 ALICE) LIQD   No No   Si Can by Per G Tube route 4 times daily Take   Nutritional Supplements (PEDIASURE 1.5 ALICE/FIBER) LIQD   No No   Si Bottle by Per G Tube route 3 times daily   QUEtiapine (SEROQUEL) 300 MG tablet   Yes No   Sig: Take 600 mg by mouth   VALPROIC ACID   Yes No   Si mLs 3 times daily 6 ml in the am, 7 ml in afternoon and 7 ml at hs   albuterol (PROAIR HFA/PROVENTIL HFA/VENTOLIN HFA) 108 (90 Base) MCG/ACT inhaler   No No   Sig: Inhale 2 puffs into the lungs every 6 hours as needed for shortness of breath / dyspnea or wheezing   albuterol (PROVENTIL) (2.5 MG/3ML) 0.083% neb solution   No No   Sig: Take 1 vial (2.5 mg) by nebulization every 6 hours as needed for shortness of breath / dyspnea or wheezing   benzoyl peroxide 5 % EX topical gel   No No   Sig: Apply topically 2 times  daily as needed (acne)   clindamycin 1 % EX external gel   No No   Sig: Apply topically 2 times daily as needed (acne)   clindamycin-benzoyl peroxide (BENZACLIN) 1-5 % external gel   No No   Sig: Apply topically 2 times daily as needed (acne)   clobazam (ONFI) 2.5 MG/ML suspension   Yes No   Sig: Take 5 mg by mouth 2 times daily 3 ml in the am and 2 ml at hs   clotrimazole-betamethasone (LOTRISONE) 1-0.05 % external cream   No No   Sig: apply a very small amount to rash on neck BID as needed. do not apply to face, armpits, or groin   fluticasone-salmeterol (ADVAIR HFA) 230-21 MCG/ACT inhaler   Yes No   Sig: Inhale 2 puffs into the lungs 2 times daily Two puffs bid   gabapentin (NEURONTIN) 100 MG capsule   Yes No   Si in the am, 1 at lunch, and 4 at night   hydrOXYzine (ATARAX) 10 MG/5ML syrup   Yes No   Sig: Take 10 mg by mouth as needed Can take up to qid    levothyroxine (SYNTHROID/LEVOTHROID) 25 MCG tablet   No No   Sig: Take 1 tablet (25 mcg) by mouth daily   melatonin 1 MG TABS   Yes No   Sig: Take 1 mg by mouth daily.   omeprazole (PRILOSEC) 2 mg/mL SUSP   Yes No   Sig: Take 20 mLs (40 mg) by mouth At Bedtime At hs   polyethylene glycol (MIRALAX) 17 GM/SCOOP powder   No No   Sig: TAKE 17 GIVE( ONE VAPFUL) BY MOUTH EVERY DAY      Facility-Administered Medications: None     Allergies   No Known Allergies    Social History   I have reviewed this patient's social history and updated it with pertinent information if needed. Rj Cejademarky  reports that he has never smoked. He has never used smokeless tobacco. He reports that he does not drink alcohol or use drugs.    Family History   I have reviewed this patient's family history and updated it with pertinent information if needed.   Family History   Problem Relation Age of Onset     Diabetes Father        Review of Systems   The 10 point Review of Systems is negative other than noted in the HPI or here.     Physical Exam   Temp: 98.4  F (36.9  C) Temp src: Oral  BP: 124/83 Pulse: 115   Resp: 20 SpO2: 94 % O2 Device: None (Room air)    Vital Signs with Ranges  Temp:  [98.4  F (36.9  C)] 98.4  F (36.9  C)  Pulse:  [115] 115  Resp:  [20] 20  BP: (124)/(83) 124/83  SpO2:  [94 %] 94 %  83 lbs 0 oz    Constitutional: Sleeping, arousable.  Mom is in the room.  Eyes: Conjunctiva and pupils examined and normal.  HEENT: Dry mucous membranes, normal dentition.  Respiratory: Clear to auscultation bilaterally, no crackles or wheezing.  Cardiovascular: Regular rate and rhythm, normal S1 and S2, and no murmur noted.  GI: Soft, distention present, G-tube in place without any surrounding infection.  Mildly tender.  Skin: No rashes, no cyanosis, no edema.  Musculoskeletal: No joint swelling, erythema or tenderness.  Neurologic: Moving all extremities.  He gets agitated every now and then but his mom is able to calm him down.    Data     Recent Labs   Lab 05/28/20  1257   WBC 7.0   HGB 15.2   *   *      POTASSIUM 4.1   CHLORIDE 99   CO2 35*   BUN 25   CR 0.76   ANIONGAP 1*   ALICE 8.6   GLC 91   ALBUMIN 2.8*   PROTTOTAL 6.9   BILITOTAL 1.1   ALKPHOS 70   ALT 19   AST 25       Recent Results (from the past 24 hour(s))   XR Chest Port 1 View    Narrative    CHEST PORTABLE ONE VIEW   5/28/2020 3:51 PM     HISTORY: Dyspnea.    COMPARISON: Chest x-ray 1/15/2019.      Impression    IMPRESSION: Portable chest. Lungs are clear. Heart is normal in size.  No pneumothorax. No definite pleural effusions.    LONNIE JAUREGUI MD   XR Abdomen Port 2 Views    Narrative    XR PORTABLE ABDOMEN TWO VIEWS  5/28/2020 3:53 PM     HISTORY: Abdominal distension, vomiting, evaluate for obstruction.    COMPARISON: Abdominal x-ray 6/7/2019.      Impression    IMPRESSION: Two views of the abdomen are performed. Dilated loops of  small bowel and colon have increased since prior exam. Left lateral  decubitus view demonstrates scattered air-fluid levels within both  loops of small bowel and colon concerning  for adynamic ileus. Early  changes of bowel obstruction remain in the differential. Moderate  amount of stool is scattered throughout the colon. No significant  rectal fecal distention is evident. Bony structures demonstrate slight  rightward curvature lumbar spine.    LONNIE JAUREGUI MD

## 2020-05-28 NOTE — TELEPHONE ENCOUNTER
Called patient's mother Darcy. Rj's foot is not an issue and no changtes or worse.    New issue is for last 48 hours he has had vomiting and family working on giving Pedialyte for a few days, got some milk today as well but may have made things worse, is getting some of his tube feeds, but mostly not tolerating, + abdominal distention Somnolent, is getting his medications. no fever. no exposures, started nebs last night and not sure if helping the cough yet or now. does have a pulse ox at home and it is in low high 80s-low 90s, HR is higher as well, 112-118. has been mostly sleeping, not listening to music or any of his normal activities. got up and went to the bathroom once  today, otherwise has been in bed for last 2 days. did urinate this am x 1 a little, was dark yellow. diarrhea x 2. worreisa bout dehydration and/or an infection that hasn't declared itself.    Given significant change and worsening of symptoms, recommend patient be seen urgently for evaluation. Patients mother thinks ER best choice as may not be able to get him in the clinic due ot somnolence and these are significant changes.She is very worried but also of COVID exposure in our facility and also if she may be split up from her son. PTs mom is POA and reassured her she will be able to stay with patient at all times. Pts mother will bring patient to [facility] for evaluation.    Called [facility] ER and sign out given to Dr. Jeong.    Micky Leyva M.D.  Internal Medicine-Pediatrics

## 2020-05-28 NOTE — ED NOTES
"Asked pt to wear mask, mother stated, \"He will not wear a mask this is okay\" ensured transport of this   "

## 2020-05-28 NOTE — ED TRIAGE NOTES
Vomiting since Sunday.  He receives all medications and feeds via G-Tube.  He has been weak and fatigued for 3 days, not walking and not talking much at home.  He has been incontinent of stool x 2.  He voided this morning for the first time in 24 hours, which as dark yellow in color per his mother.  ABCs intact.  Patient is alert and oriented x3.

## 2020-05-29 ENCOUNTER — APPOINTMENT (OUTPATIENT)
Dept: GENERAL RADIOLOGY | Facility: CLINIC | Age: 25
End: 2020-05-29
Attending: INTERNAL MEDICINE
Payer: COMMERCIAL

## 2020-05-29 LAB
ALBUMIN UR-MCNC: NEGATIVE MG/DL
ANION GAP SERPL CALCULATED.3IONS-SCNC: 2 MMOL/L (ref 3–14)
APPEARANCE UR: CLEAR
BILIRUB UR QL STRIP: NEGATIVE
BUN SERPL-MCNC: 15 MG/DL (ref 7–30)
CALCIUM SERPL-MCNC: 8 MG/DL (ref 8.5–10.1)
CHLORIDE SERPL-SCNC: 104 MMOL/L (ref 94–109)
CO2 SERPL-SCNC: 30 MMOL/L (ref 20–32)
COLOR UR AUTO: YELLOW
CREAT SERPL-MCNC: 0.6 MG/DL (ref 0.66–1.25)
ERYTHROCYTE [DISTWIDTH] IN BLOOD BY AUTOMATED COUNT: 12.3 % (ref 10–15)
ERYTHROCYTE [DISTWIDTH] IN BLOOD BY AUTOMATED COUNT: 12.3 % (ref 10–15)
GFR SERPL CREATININE-BSD FRML MDRD: >90 ML/MIN/{1.73_M2}
GLUCOSE SERPL-MCNC: 94 MG/DL (ref 70–99)
GLUCOSE UR STRIP-MCNC: NEGATIVE MG/DL
HCT VFR BLD AUTO: 36.6 % (ref 40–53)
HCT VFR BLD AUTO: 40.1 % (ref 40–53)
HGB BLD-MCNC: 12.3 G/DL (ref 13.3–17.7)
HGB BLD-MCNC: 14.1 G/DL (ref 13.3–17.7)
HGB UR QL STRIP: NEGATIVE
KETONES UR STRIP-MCNC: NEGATIVE MG/DL
LACTATE BLD-SCNC: 0.6 MMOL/L (ref 0.7–2)
LEUKOCYTE ESTERASE UR QL STRIP: NEGATIVE
MAGNESIUM SERPL-MCNC: 2.3 MG/DL (ref 1.6–2.3)
MCH RBC QN AUTO: 34.4 PG (ref 26.5–33)
MCH RBC QN AUTO: 35.3 PG (ref 26.5–33)
MCHC RBC AUTO-ENTMCNC: 33.6 G/DL (ref 31.5–36.5)
MCHC RBC AUTO-ENTMCNC: 35.2 G/DL (ref 31.5–36.5)
MCV RBC AUTO: 101 FL (ref 78–100)
MCV RBC AUTO: 102 FL (ref 78–100)
MUCOUS THREADS #/AREA URNS LPF: PRESENT /LPF
NITRATE UR QL: NEGATIVE
PH UR STRIP: 7.5 PH (ref 5–7)
PHOSPHATE SERPL-MCNC: 2.1 MG/DL (ref 2.5–4.5)
PLATELET # BLD AUTO: 139 10E9/L (ref 150–450)
PLATELET # BLD AUTO: 75 10E9/L (ref 150–450)
POTASSIUM SERPL-SCNC: 4.6 MMOL/L (ref 3.4–5.3)
PROCALCITONIN SERPL-MCNC: 0.22 NG/ML
RBC # BLD AUTO: 3.58 10E12/L (ref 4.4–5.9)
RBC # BLD AUTO: 3.99 10E12/L (ref 4.4–5.9)
RBC #/AREA URNS AUTO: 1 /HPF (ref 0–2)
SARS-COV-2 RNA SPEC QL NAA+PROBE: NORMAL
SODIUM SERPL-SCNC: 136 MMOL/L (ref 133–144)
SOURCE: ABNORMAL
SP GR UR STRIP: 1.01 (ref 1–1.03)
SPECIMEN SOURCE: NORMAL
UROBILINOGEN UR STRIP-MCNC: 4 MG/DL (ref 0–2)
WBC # BLD AUTO: 10.6 10E9/L (ref 4–11)
WBC # BLD AUTO: 9.2 10E9/L (ref 4–11)
WBC #/AREA URNS AUTO: <1 /HPF (ref 0–5)

## 2020-05-29 PROCEDURE — 25000132 ZZH RX MED GY IP 250 OP 250 PS 637: Performed by: SURGERY

## 2020-05-29 PROCEDURE — 84145 PROCALCITONIN (PCT): CPT | Performed by: INTERNAL MEDICINE

## 2020-05-29 PROCEDURE — 25000132 ZZH RX MED GY IP 250 OP 250 PS 637: Performed by: INTERNAL MEDICINE

## 2020-05-29 PROCEDURE — 83605 ASSAY OF LACTIC ACID: CPT | Performed by: SURGERY

## 2020-05-29 PROCEDURE — 25000128 H RX IP 250 OP 636: Performed by: INTERNAL MEDICINE

## 2020-05-29 PROCEDURE — 85027 COMPLETE CBC AUTOMATED: CPT | Performed by: INTERNAL MEDICINE

## 2020-05-29 PROCEDURE — 81001 URINALYSIS AUTO W/SCOPE: CPT | Performed by: INTERNAL MEDICINE

## 2020-05-29 PROCEDURE — 99221 1ST HOSP IP/OBS SF/LOW 40: CPT | Performed by: SURGERY

## 2020-05-29 PROCEDURE — 84100 ASSAY OF PHOSPHORUS: CPT | Performed by: INTERNAL MEDICINE

## 2020-05-29 PROCEDURE — 25000125 ZZHC RX 250: Performed by: INTERNAL MEDICINE

## 2020-05-29 PROCEDURE — 12000000 ZZH R&B MED SURG/OB

## 2020-05-29 PROCEDURE — 83735 ASSAY OF MAGNESIUM: CPT | Performed by: INTERNAL MEDICINE

## 2020-05-29 PROCEDURE — 80048 BASIC METABOLIC PNL TOTAL CA: CPT | Performed by: INTERNAL MEDICINE

## 2020-05-29 PROCEDURE — 25800030 ZZH RX IP 258 OP 636: Performed by: INTERNAL MEDICINE

## 2020-05-29 PROCEDURE — 87086 URINE CULTURE/COLONY COUNT: CPT | Performed by: EMERGENCY MEDICINE

## 2020-05-29 PROCEDURE — 87040 BLOOD CULTURE FOR BACTERIA: CPT | Performed by: INTERNAL MEDICINE

## 2020-05-29 PROCEDURE — 71045 X-RAY EXAM CHEST 1 VIEW: CPT

## 2020-05-29 PROCEDURE — 99233 SBSQ HOSP IP/OBS HIGH 50: CPT | Performed by: INTERNAL MEDICINE

## 2020-05-29 PROCEDURE — 36415 COLL VENOUS BLD VENIPUNCTURE: CPT | Performed by: SURGERY

## 2020-05-29 PROCEDURE — 74019 RADEX ABDOMEN 2 VIEWS: CPT

## 2020-05-29 PROCEDURE — 25000132 ZZH RX MED GY IP 250 OP 250 PS 637

## 2020-05-29 PROCEDURE — 36415 COLL VENOUS BLD VENIPUNCTURE: CPT | Performed by: INTERNAL MEDICINE

## 2020-05-29 RX ORDER — LANOLIN ALCOHOL/MO/W.PET/CERES
6 CREAM (GRAM) TOPICAL AT BEDTIME
Status: DISCONTINUED | OUTPATIENT
Start: 2020-05-29 | End: 2020-05-30

## 2020-05-29 RX ORDER — CLOBAZAM 2.5 MG/ML
5 SUSPENSION ORAL AT BEDTIME
Status: DISCONTINUED | OUTPATIENT
Start: 2020-05-29 | End: 2020-05-29 | Stop reason: CLARIF

## 2020-05-29 RX ORDER — ALBUTEROL SULFATE 0.83 MG/ML
2.5 SOLUTION RESPIRATORY (INHALATION) EVERY 6 HOURS PRN
Status: DISCONTINUED | OUTPATIENT
Start: 2020-05-29 | End: 2020-05-30 | Stop reason: HOSPADM

## 2020-05-29 RX ORDER — VALPROIC ACID 250 MG/5ML
300 SOLUTION ORAL EVERY MORNING
Status: DISCONTINUED | OUTPATIENT
Start: 2020-05-29 | End: 2020-05-29

## 2020-05-29 RX ORDER — FLUTICASONE PROPIONATE AND SALMETEROL XINAFOATE 230; 21 UG/1; UG/1
2 AEROSOL, METERED RESPIRATORY (INHALATION) 2 TIMES DAILY
Status: DISCONTINUED | OUTPATIENT
Start: 2020-05-29 | End: 2020-05-29 | Stop reason: CLARIF

## 2020-05-29 RX ORDER — GABAPENTIN 250 MG/5ML
1000 SOLUTION ORAL AT BEDTIME
Status: DISCONTINUED | OUTPATIENT
Start: 2020-05-29 | End: 2020-05-30 | Stop reason: HOSPADM

## 2020-05-29 RX ORDER — BISACODYL 10 MG
10 SUPPOSITORY, RECTAL RECTAL ONCE
Status: COMPLETED | OUTPATIENT
Start: 2020-05-29 | End: 2020-05-29

## 2020-05-29 RX ORDER — CLOBAZAM 2.5 MG/ML
7.5 SUSPENSION ORAL EVERY MORNING
Status: DISCONTINUED | OUTPATIENT
Start: 2020-05-29 | End: 2020-05-29 | Stop reason: CLARIF

## 2020-05-29 RX ORDER — VALPROIC ACID 250 MG/5ML
350 SOLUTION ORAL 2 TIMES DAILY
Status: DISCONTINUED | OUTPATIENT
Start: 2020-05-29 | End: 2020-05-29

## 2020-05-29 RX ORDER — ACETAMINOPHEN 325 MG/1
650 TABLET ORAL EVERY 4 HOURS PRN
Status: DISCONTINUED | OUTPATIENT
Start: 2020-05-29 | End: 2020-05-30

## 2020-05-29 RX ORDER — GABAPENTIN 250 MG/5ML
100 SOLUTION ORAL 2 TIMES DAILY
Status: DISCONTINUED | OUTPATIENT
Start: 2020-05-29 | End: 2020-05-30 | Stop reason: HOSPADM

## 2020-05-29 RX ADMIN — VALPROATE SODIUM 300 MG: 100 INJECTION, SOLUTION INTRAVENOUS at 08:23

## 2020-05-29 RX ADMIN — ACETAMINOPHEN 650 MG: 325 TABLET, FILM COATED ORAL at 22:14

## 2020-05-29 RX ADMIN — MELATONIN TAB 3 MG 6 MG: 3 TAB at 20:18

## 2020-05-29 RX ADMIN — SODIUM CHLORIDE, SODIUM LACTATE, POTASSIUM CHLORIDE, CALCIUM CHLORIDE AND DEXTROSE MONOHYDRATE: 5; 600; 310; 30; 20 INJECTION, SOLUTION INTRAVENOUS at 22:17

## 2020-05-29 RX ADMIN — PANTOPRAZOLE SODIUM 40 MG: 40 TABLET, DELAYED RELEASE ORAL at 17:42

## 2020-05-29 RX ADMIN — VALPROATE SODIUM 350 MG: 100 INJECTION, SOLUTION INTRAVENOUS at 14:23

## 2020-05-29 RX ADMIN — VALPROATE SODIUM 350 MG: 100 INJECTION, SOLUTION INTRAVENOUS at 22:14

## 2020-05-29 RX ADMIN — SODIUM CHLORIDE, SODIUM LACTATE, POTASSIUM CHLORIDE, CALCIUM CHLORIDE AND DEXTROSE MONOHYDRATE: 5; 600; 310; 30; 20 INJECTION, SOLUTION INTRAVENOUS at 12:06

## 2020-05-29 RX ADMIN — Medication 5 MG: at 20:17

## 2020-05-29 RX ADMIN — HYDROXYZINE HYDROCHLORIDE 50 MG: 10 SYRUP ORAL at 20:18

## 2020-05-29 RX ADMIN — Medication 1 MG: at 17:41

## 2020-05-29 RX ADMIN — GABAPENTIN 100 MG: 250 SUSPENSION ORAL at 14:22

## 2020-05-29 RX ADMIN — SODIUM CHLORIDE 500 ML: 9 INJECTION, SOLUTION INTRAVENOUS at 10:21

## 2020-05-29 RX ADMIN — GABAPENTIN 1000 MG: 250 SUSPENSION ORAL at 20:18

## 2020-05-29 RX ADMIN — Medication 7.5 MG: at 08:24

## 2020-05-29 RX ADMIN — QUETIAPINE FUMARATE 600 MG: 200 TABLET ORAL at 20:18

## 2020-05-29 RX ADMIN — BISACODYL 10 MG: 10 SUPPOSITORY RECTAL at 13:21

## 2020-05-29 RX ADMIN — SODIUM CHLORIDE, SODIUM LACTATE, POTASSIUM CHLORIDE, CALCIUM CHLORIDE AND DEXTROSE MONOHYDRATE: 5; 600; 310; 30; 20 INJECTION, SOLUTION INTRAVENOUS at 08:41

## 2020-05-29 ASSESSMENT — ACTIVITIES OF DAILY LIVING (ADL)
ADLS_ACUITY_SCORE: 30
ADLS_ACUITY_SCORE: 32
ADLS_ACUITY_SCORE: 30
ADLS_ACUITY_SCORE: 32
ADLS_ACUITY_SCORE: 30
ADLS_ACUITY_SCORE: 30

## 2020-05-29 NOTE — PHARMACY-ADMISSION MEDICATION HISTORY
Admission medication history interview status for this patient is complete. See [facility] admission navigator for allergy information, prior to admission medications and immunization status.     Medication history interview done via telephone during Covid-19 pandemic, indicate source(s): pt's mother.  Medication history resources (including written lists, pill bottles, clinic record):None    Changes made to PTA medication list:  Added: none  Deleted: none  Changed: clobazam, gabapentin, lorazepam, melatonin, seroquel, valproid acid.    Actions taken by pharmacist (provider contacted, etc):None     Additional medication history information: takes meds via feeding tube.    Medication reconciliation/reorder completed by provider prior to medication history?  Yes  (Y/N)     For patients on insulin therapy: No  (Y/N)    Prior to Admission medications    Medication Sig Last Dose Taking? Auth Provider   albuterol (PROAIR HFA/PROVENTIL HFA/VENTOLIN HFA) 108 (90 Base) MCG/ACT inhaler Inhale 2 puffs into the lungs every 6 hours as needed for shortness of breath / dyspnea or wheezing prn Yes Micky Leyva MD   albuterol (PROVENTIL) (2.5 MG/3ML) 0.083% neb solution Take 1 vial (2.5 mg) by nebulization every 6 hours as needed for shortness of breath / dyspnea or wheezing prn Yes Micky Leyva MD   benzoyl peroxide 5 % EX topical gel Apply topically 2 times daily as needed (acne) prn Yes Micky Leyva MD   clindamycin 1 % EX external gel Apply topically 2 times daily as needed (acne) prn Yes Micky Leyva MD   clindamycin-benzoyl peroxide (BENZACLIN) 1-5 % external gel Apply topically 2 times daily as needed (acne) prn Yes Micky Leyva MD   clobazam (,ONFI,) 2.5 MG/ML suspension Take 5 mg by mouth At Bedtime 5/27/2020 at hs Yes Unknown, Entered By History   clobazam (ONFI) 2.5 MG/ML suspension Take 7.5 mg by mouth every morning (3 ml in the am and 2 ml at hs) 5/28/2020 at am Yes Reported, Patient    clotrimazole-betamethasone (LOTRISONE) 1-0.05 % external cream apply a very small amount to rash on neck BID as needed. do not apply to face, armpits, or groin prn Yes Micky Leyva MD   fluticasone-salmeterol (ADVAIR HFA) 230-21 MCG/ACT inhaler Inhale 2 puffs into the lungs 2 times daily Two puffs bid 5/28/2020 at am Yes Micky Leyva MD   gabapentin (NEURONTIN) 250 MG/5ML solution 100 mg by Per G Tube route 2 times daily (2mls in am, 2 mls at 2pm, 20mls @hs) 5/28/2020 at am Yes Unknown, Entered By History   gabapentin (NEURONTIN) 250 MG/5ML solution 1,000 mg by Per G Tube route At Bedtime 5/27/2020 at hs Yes Unknown, Entered By History   hydrOXYzine (ATARAX) 10 MG/5ML syrup Take 50 mg by mouth At Bedtime 25mls @hs 5/27/2020 at hs Yes Reported, Patient   LORazepam (ATIVAN) 1 MG tablet Take 1 mg by mouth as needed for anxiety Per mom: prn prn Yes Unknown, Entered By History   melatonin 1 MG TABS Take 1 mg by mouth daily Daily between 4-5pm* 5/27/2020 at Unknown time Yes Reported, Patient   melatonin 3 MG tablet Take 6 mg by mouth At Bedtime  at hs Yes Unknown, Entered By History   omeprazole (PRILOSEC) 2 mg/mL SUSP Take 40 mg by mouth At Bedtime  5/27/2020 at hs Yes Micky Leyva MD   polyethylene glycol (MIRALAX) 17 GM/SCOOP powder TAKE 17 GIVE( ONE VAPFUL) BY MOUTH EVERY DAY 5/28/2020 at am Yes Micky Leyva MD   QUEtiapine (SEROQUEL) 300 MG tablet Take 600 mg by mouth At Bedtime  5/27/2020 at hs Yes Reported, Patient   Valproate Sodium (VALPROIC ACID) 250 MG/5ML 300 mg by Per G Tube route every morning 5/27/2020 at am Yes Unknown, Entered By History   VALPROIC ACID 7 mLs 2 times daily (6 ml in the am, 7 ml in afternoon and 7 ml at hs)    Conc: 250mg/5ml* 5/27/2020 at Unknown time Yes Reported, Patient

## 2020-05-29 NOTE — PLAN OF CARE
Day RN  VS monitored, tachycardia, Dr Romo notified and bolus ordered and given, low grade temp, denies pain, up w/A2 to bsc, hypo BS, flatus+, abd distended, voiding, large soft bm after supp, G tube connected to gravity suction, Ph 2.1, replacement re-ordered, mom present and very involved, updated at b/s, home w/mom upon discharge, will cont to monitor.

## 2020-05-29 NOTE — PLAN OF CARE
Alert to person, blind VSS denies pain npo Surgery consult G tube in place.  Up with Assist of 2 to bed side commode.  Abdomen distend passing flatus

## 2020-05-29 NOTE — PROVIDER NOTIFICATION
Paged admitting provider at [phone]:  Pt running a 101.2 axillary temp and has a HR of 122.  Sepsis protocol triggered and we are following it - anything else we should be doing at this time?  Thank you

## 2020-05-29 NOTE — CONSULTS
"CLINICAL NUTRITION SERVICES  -  ASSESSMENT NOTE      Recommendations Ordered by Registered Dietitian (RD):   Add-on phosphorus and magnesium for baseline check this admit.      Future/Additional Recommendations:   When deemed appropriate per MD/Surgery team and based on clinical picture, please consult with TF assess and order to initiate EN, or consider alternative nutrition support interventions w/in the next 72 hrs (given lack of consistent EN over the past week PTA).  Given SBO admit, water flushes for tube patency/medication admin via GT per MD.  Given baseline pediatric formula, discussed with Clinical Nutrition Manager and will have stock transferred from [facility].       MALNUTRITION:  % Weight Loss:  None noted  % Intake:  <75% for > 7 days (non-severe malnutrition)  Subcutaneous Fat Loss:  Unable to complete physical exam  Muscle Loss: Unable to complete physical exam  Fluid Retention: None documented    Malnutrition Diagnosis:   Unable to determine due to lack of physical exam (per direction of department guidelines in the setting of COVID19), certainly at risk          REASON FOR ASSESSMENT  Rj Licea is a 24 year old male seen by Registered Dietitian for Admission Nutrition Risk Screen for tube feeding or parenteral nutrition.    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    Admit 2/2: SOB, vomiting, diarrhea, found to have SBO.    NUTRITION HISTORY  - Information obtained from patient's Mom, Darcy, via phone (per direction of department guidelines in the setting of COVID19).  Darcy is extremely knowledgeable regarding nutrition/Rj's baseline enteral needs.  - RD notes from 2/2019 and 3/2019 indicate some degree of feeding intolerance (reflux of formula) or enteral access malfunction (formula \"coming through tube\") as well as unintentional weight loss.   - Mom describes that Rj has always been \"sensitive\" in terms of GI tolerance (since he was a child even).  He had a " "degree of wt loss ~1 year ago where she became concerned about overall muscle loss and wanted to focus on improving nutritional status.  - Since this time patient has been receiving Pediasure 1.5 with fiber via GT, 5 cartons (on average daily, 237 ml each) 5x/day, bolused via syringe.  Water flushes of \"2-3 oz\" (60-90 mL) before and after each feeding, may also mix water into infusions (may dilute feedings).  - 1185 mL provides 1778 kcal, 70 g protein, 195 g CHO, 15 g fiber, and 901 mL free water daily.  - Enteral schedule:    1 carton at 6-7 am  1 carton at 10 am  1 carton at 2 pm  1 carton at 5 pm  1 carton at 7 pm  Mom may also give additional partial or whole carton overnight if patient wakes up    - Overall enteral intolerance over the past week.  Last received total prescribed volume of 5 cartons ~1 week ago d/t vomiting associated with feedings.    - Stooling patterns: \"Always tricky\".  Mom monitors closely and self adjusts Miralax based on trends.  May goes days without having a BM.  Last BM PTA 5/27/2020.    - Baseline NPO.  May take sips of juice or water at times.  - Home infusion: [facility].  Not following with RD over the past ~1 year (most recent notes from [facility] outpatient RD).  Had been recommended to try feeding clinic, though Mom felt was not realistic d/t patient's level of care.  Had wanted to try outpatient PT though cancelled d/t COVID.  - Allergies: NKFA.      CURRENT NUTRITION ORDERS  Diet Order:     NPO    Current Intake/Tolerance:  No EN restart since admit in the setting of altered GI function with Surgery consult pending.        NUTRITION FOCUSED PHYSICAL ASSESSMENT FOR DIAGNOSING MALNUTRITION)  No:  per direction of department guidelines in the setting of COVID19            Observed:    deferred d/t above    Obtained from Chart/Interdisciplinary Team:  - GT (baseline)  - Abdominal distention  - Blind  - No documentation of skin issues  - Stooling patterns reviewed, no documented BM since " "admit    ANTHROPOMETRICS  Height: 5' 1\"  Weight: 91 lbs 9.6 oz  Body mass index is 17.17 kg/m .  Weight Status:  Underweight BMI <18.5  Weight History:  Wt Readings from Last 10 Encounters:   05/29/20 41.5 kg (91 lb 9.6 oz)   02/06/20 39.4 kg (86 lb 14.4 oz)   06/07/19 36 kg (79 lb 4.8 oz)   04/23/19 37.2 kg (82 lb)   04/05/19 36.7 kg (81 lb)   02/13/19 36.4 kg (80 lb 4.8 oz)   02/06/19 36.9 kg (81 lb 6.4 oz)   01/15/19 36.4 kg (80 lb 4.8 oz)   10/13/17 46.2 kg (101 lb 12.8 oz)   07/07/17 46.5 kg (102 lb 9.6 oz)   - Chronically \"underweight\" over the past at least 1-2 years.  Appears wt has fluctuated from ~80-90# during this time.  No recent wt loss noted based on above.    - Mom reports wt over past year of 83-86#.  Highest wt prior to reported loss of 100#, lowest wt of 73# (since gained).      LABS  Labs reviewed:  Electrolytes  Potassium (mmol/L)   Date Value   05/29/2020 4.6   05/28/2020 4.1   02/06/2020 3.7    Blood Glucose  Glucose (mg/dL)   Date Value   05/29/2020 94   05/28/2020 91   02/06/2020 77   06/07/2019 62 (L)   04/23/2019 88     Hemoglobin A1C (%)   Date Value   02/06/2020 4.9   06/07/2019 4.5   04/06/2019 5.4   01/15/2019 4.8    Inflammatory Markers  CRP Inflammation (mg/L)   Date Value   04/05/2019 13.0 (H)   01/15/2019 <2.9     WBC (10e9/L)   Date Value   05/29/2020 9.2   05/28/2020 7.0   02/06/2020 5.8     Albumin (g/dL)   Date Value   05/28/2020 2.8 (L)   02/06/2020 3.6   06/07/2019 4.0      Sodium (mmol/L)   Date Value   05/29/2020 136   05/28/2020 135   02/06/2020 139    Renal  Urea Nitrogen (mg/dL)   Date Value   05/29/2020 15   05/28/2020 25   02/06/2020 18     Creatinine (mg/dL)   Date Value   05/29/2020 0.60 (L)   05/28/2020 0.76   02/06/2020 0.74     Additional  Triglycerides (mg/dL)   Date Value   02/06/2020 73     Ketones Urine (mg/dL)   Date Value   06/11/2016 Negative        B/P: 122/79, T: 98.5, P: 115, R: 15      MEDICATIONS  Medications reviewed:    clobazam  5 mg Per G Tube At " Bedtime     clobazam  7.5 mg Oral Daily     enoxaparin ANTICOAGULANT  30 mg Subcutaneous Q24H     hydrOXYzine  50 mg Per G Tube At Bedtime     QUEtiapine  600 mg Per G Tube At Bedtime     sodium chloride (PF)  3 mL Intracatheter Q8H     valproate (DEPACON) intermittent infusion  300 mg Intravenous Daily     valproate (DEPACON) intermittent infusion  350 mg Intravenous BID        dextrose 5% lactated ringers 100 mL/hr at 05/28/20 2129          ASSESSED NUTRITION NEEDS PER APPROVED PRACTICE GUIDELINES:    Dosing Weight 42 kg   Estimated Energy Needs: 7883-6395+  kcals (35-40+ Kcal/Kg)  Justification: minimum maintenance  Estimated Protein Needs: 63-84 grams protein (1.5-2 g pro/Kg)  Justification: preservation of lean body mass  Estimated Fluid Needs: per MD      NUTRITION DIAGNOSIS:  Inadequate protein-energy intake related to altered GI function leading to inadequate enteral infusion as evidenced by meeting <75% usual intakes over the past week.        NUTRITION INTERVENTIONS  Recommendations / Nutrition Prescription  When deemed appropriate per MD/Surgery team, please consult with TF assess and order to initiate EN.  Given SBO admit, water flushes for tube patency/medication admin via GT per MD.  Given baseline pediatric formula, discussed with Clinical Nutrition Manager and will have stock transferred from [facility].      Add-on phosphorus and magnesium for baseline check this admit.       Implementation  Nutrition education: Provided education on above with mom via phone.    Collaboration and Referral of Nutrition care: Briefly discussed POC with RN via phone.    Nutrition Goals  EN restart vs need for need for alternative nutrition support options w/in 48-72 hrs (given lack of consistent EN over the past week PTA).      MONITORING AND EVALUATION:  Progress towards goals will be monitored and evaluated per protocol and Practice Guidelines          Afshan Scott RDN, LD  Clinical Dietitian  3rd floor/ICU:  [phone]  All other floors: [phone]  Weekend/holiday: [phone]

## 2020-05-29 NOTE — CONSULTS
Consult Date:  05/29/2020      REASON FOR CONSULTATION:  Possible bowel obstruction versus adynamic ileus.      HISTORY OF PRESENT ILLNESS:  Mr. Licea is a 24-year-old gentleman with a complex medical history which primarily involves prematurity and multiple sequelae thereof.  He has a history of developmental delay as well as cerebral palsy, epilepsy and lung disease and has been feeding tube dependent his entire life.  He has a percutaneous gastrostomy tube, which has been present for an extended period of time.  He is under the complete care of his mother, who states that she regularly bolus feeds him and is not uncommon evidently that he has some intolerance or emesis thereafter.  She states also that he has had a history of constipation since birth and then as recently as last year she was prescribed an entire bowel prep to clear his colon on one occasion.  He was admitted yesterday evening with several day history of feeding intolerance and diminished energy as well as several bouts of large stools and diarrhea.  He has had no sick contacts and indeed because of his medical status has been on protective quarantine since the onset of the current pandemic.  Workup in the ER showed findings on x-ray consistent with an ileus, he subsequently underwent a CT scan which showed a possible distal transition.  His mother relates that he has not complained of any pain, although apart from headaches he never really does have much in the way of discomfort or conceptually seems to understand it entirely.  She relates that he has been passing flatus and this has been quite loud and somewhat foul-smelling.  His G-tube has not been vented since admission.      PAST MEDICAL AND SURGICAL HISTORY:  includes prematurity, short stature, CP, seizure disorder, malnutrition.  He has had a percutaneous gastrostomy placed, he has not had previous abdominal surgeries.  He did have an inguinal hernias repaired in infancy.      CURRENT  MEDICATIONS:  Include albuterol, clobazam, Advair, gabapentin, hydroxyzine, levothyroxine, Ativan, melatonin, omeprazole, MiraLax, Seroquel, and valproic acid.      ALLERGIES:  THE PATIENT HAS NO RECOGNIZED DRUG ALLERGIES.      SOCIAL HISTORY:  As above, the patient is care dependent and lives with his mother.      REVIEW OF SYSTEMS:  Unable to be performed due to the patient's developmental delay.      PHYSICAL EXAMINATION:   VITAL SIGNS:  The patient is afebrile since admission, current temperature is 99 degrees, pulses range from 102 to 124, blood pressure is 120/82, respiratory rate is 16 with 97% saturations on room air.   GENERAL:  He is nontoxic and seems to be at his baseline mentation.  This is per his mother's impression.    HEART:  Sounds are regular.   RESPIRATORY:  Breath sounds are clear.   ABDOMEN:  Mildly distended and somewhat full, but not firm.  He does not guard or exhibit rebound, grimacing in any other quadrants.  He has well-healed groin incisions from his inguinal hernia repairs and a OH gastrostomy button in the left upper quadrant, the skin surrounding this is immaculate.      LABORATORY EXAMS:  Notable for white blood cell count of 9.2 thousand, hemoglobin of 14.1.  His current platelet count of 75,000.  Electrolytes were unremarkable on admission.  Albumin was 2.8.      IMAGING:  I personally reviewed the patient's initial abdominal x-ray as well as subsequent CT scan done yesterday afternoon.  There is dilation of the small bowel throughout much of the abdomen on CT,  though there is some slow tapering to decompressed ileum, right before the ileocecal valve.  Notably, there is significant stool burden in the colon which almost appears consistent with contrast, although the patient has not had oral contrast in the recent past.  There is no edema or signs of ischemia noted.      IMPRESSION AND RECOMMENDATIONS:  This is a 24-year-old gentleman with a history of developmental delay and what  sounds like a colonic inertia who likely has some global bowel hypokinesia, who presents with signs and symptoms consistent with a partial obstruction.  Throughout this episode he has not complaining of pain and has had flatus and stool.  I suspect that he may be either obstipated or constipated and that this is a secondarily affecting his small bowel at this point.  I was hesitant to offer surgery to the mother and she is encouraged by this recommendation as she wishes to avoid it as well.  I am hopeful that initiation of a bowel regimen from below will get his colon moving and hopefully coordinate his small bowel as well.  If he does have more consistent signs of bowel function, i.e., less distention, consistent flatus and solid results from his suppository, we could consider MiraLax regimen from above.  In the interim, however, I would vent his G-tube and discussed this with his nurse and mother.  Context under which surgery might be considered were reviewed as well, but at present, I see no indication for this.      Thank you very much for this consultation.         BARON VAUGHN MD             D: 2020   T: 2020   MT: ZULEMA      Name:     AYLIN MCCANN   MRN:      [id number]        Account:       [id number]   :      1995           Consult Date:  2020      Document: [id number]       cc: Micky Leyva MD

## 2020-05-29 NOTE — PROGRESS NOTES
[facility]    Medicine Progress Note - Hospitalist Service       Date of Admission:  5/28/2020  Assessment & Plan   Rj Licea is a 24 year old male who presents with VOMITING, DIARRHEA, ABDOMINAL DISTENSION.    Unable to obtain any history from the patient due to developmental delay.    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 4 days ago the patient had several episodes of vomiting and over the last 2 days has been experiencing diarrhea- one episode of diarrhea today. He is fed through a G-tube and the vomiting has occurred after each feeding. His mother notes the patient appears dehydrated and when urinating this morning for the first time in 24 hours this was vary dark brown. He has been sleeping significantly more over the past 72 hours. She has also noticed an abdominal distension. He appears to be less interactive.    Patient's labs were unremarkable in the ER. Abdominal xray shows dilatation of the bowel loops- adynamic ileus vs SBO. CT abdomen showed small bowel obstruction. Patient appears dehydrated. Stable vitals.    I spoke to patient's mom who is at the bedside.    Problem List:    Small bowel obstruction.  -Hold tube feedings.   -IV fluid.  -General surgery consult.  - If more diarrhea, send stool studies.    Dehydration due to the above  -IVF. monitor labs, urine output and vitals    Seizure disorder  -continue home meds, one med rec is complete  -based on last documentation he is on valproate 350mg TID- change that to IV  -continue clobazam via the G tube    According to mom, patient also takes hydroxyzine and Ativan for his anxiety/agitation.  He was also resumed.     Thrombocytopenia.  - Has had it in past.  - Monitor.    Diet: NPO for Medical/Clinical Reasons Except for: No Exceptions    DVT Prophylaxis: Pneumatic  Compression Devices  [place] Catheter: not present  Code Status: Full Code           Disposition Plan   Expected discharge: 2 - 3 days, recommended to prior living arrangement once adequate pain management/ tolerating PO medications.  Entered: Yeny Romo MD 05/29/2020, 11:42 AM       The patient's care was discussed with the Patient's Family.    Yeny Romo MD  Hospitalist Service  [facility]    ______________________________________________________________________    Interval History     No fevers/chills/emesis. + flatus.    Data reviewed today: I reviewed all medications, new labs and imaging results over the last 24 hours. I personally reviewed no images or EKG's today.    Physical Exam   Vital Signs: Temp: 99  F (37.2  C) Temp src: Temporal BP: 121/82 Pulse: 120   Resp: 16 SpO2: 97 % O2 Device: None (Room air)    Weight: 91 lbs 9.6 oz    Gen - Awake, blind. Mother present. PE deferred as was done by surgery.    Data   Recent Labs   Lab 05/29/20  0629 05/28/20  1257   WBC 9.2 7.0   HGB 14.1 15.2   * 104*   PLT 75* 129*    135   POTASSIUM 4.6 4.1   CHLORIDE 104 99   CO2 30 35*   BUN 15 25   CR 0.60* 0.76   ANIONGAP 2* 1*   ALICE 8.0* 8.6   GLC 94 91   ALBUMIN  --  2.8*   PROTTOTAL  --  6.9   BILITOTAL  --  1.1   ALKPHOS  --  70   ALT  --  19   AST  --  25     Recent Results (from the past 24 hour(s))   XR Chest Port 1 View    Narrative    CHEST PORTABLE ONE VIEW   5/28/2020 3:51 PM     HISTORY: Dyspnea.    COMPARISON: Chest x-ray 1/15/2019.      Impression    IMPRESSION: Portable chest. Lungs are clear. Heart is normal in size.  No pneumothorax. No definite pleural effusions.    LONNIE JAUREGUI MD   XR Abdomen Port 2 Views    Narrative    XR PORTABLE ABDOMEN TWO VIEWS  5/28/2020 3:53 PM     HISTORY: Abdominal distension, vomiting, evaluate for obstruction.    COMPARISON: Abdominal x-ray 6/7/2019.      Impression    IMPRESSION: Two views of the abdomen are performed. Dilated loops  of  small bowel and colon have increased since prior exam. Left lateral  decubitus view demonstrates scattered air-fluid levels within both  loops of small bowel and colon concerning for adynamic ileus. Early  changes of bowel obstruction remain in the differential. Moderate  amount of stool is scattered throughout the colon. No significant  rectal fecal distention is evident. Bony structures demonstrate slight  rightward curvature lumbar spine.    LONNIE JAUREGUI MD   CT Abdomen Pelvis w Contrast    Narrative    EXAM: CT ABDOMEN PELVIS W CONTRAST  LOCATION: [facility]  DATE/TIME: 5/28/2020 5:22 PM    INDICATION: Abdominal distention  COMPARISON: None.  TECHNIQUE: CT scan of the abdomen and pelvis was performed following injection of IV contrast. Multiplanar reformats were obtained. Dose reduction techniques were used.  CONTRAST: 42mL Isovue-370    FINDINGS:   LOWER CHEST: Mild segmental atelectasis at the lung bases.    HEPATOBILIARY: Normal.    PANCREAS: Normal.    SPLEEN: Normal.    ADRENAL GLANDS: Normal.    KIDNEYS/BLADDER: Normal.    BOWEL: Diffuse marked dilation of small bowel loops throughout the abdomen transition point in the right lower quadrant just before the ileocecal valve. No free air, trace free fluid. Percutaneous gastrostomy tube in expected position. Colon   unremarkable, although contains high density material, correlate for previously administered oral contrast.    LYMPH NODES: Normal.    VASCULATURE: Unremarkable.    PELVIC ORGANS: Normal.    MUSCULOSKELETAL: No suspicious bone lesion. Mild lumbar scoliosis      Impression    IMPRESSION:   1.  Small obstruction with suspected transition point in the right lower quadrant.       Medications     dextrose 5% lactated ringers 100 mL/hr at 05/29/20 0841       bisacodyl  10 mg Rectal Once     clobazam  5 mg Oral At Bedtime     clobazam  7.5 mg Oral QAM     enoxaparin ANTICOAGULANT  30 mg Subcutaneous Q24H     fluticasone-vilanterol  1 puff  Inhalation Daily     gabapentin  1,000 mg Per G Tube At Bedtime     gabapentin  100 mg Per G Tube BID     hydrOXYzine  50 mg Per G Tube At Bedtime     omeprazole  40 mg Per G Tube At Bedtime     QUEtiapine  600 mg Per G Tube At Bedtime     sodium chloride (PF)  3 mL Intracatheter Q8H     valproate (DEPACON) intermittent infusion  300 mg Intravenous Daily     valproate (DEPACON) intermittent infusion  350 mg Intravenous BID

## 2020-05-29 NOTE — PROVIDER NOTIFICATION
Paged Dr Romo:  Pt here w/SBO. HR in 120's at rest. BP ok at 121/82. RRR, asymptomatic. Cont to monitor or please advise?  4403    500 ml NS bolus ordered

## 2020-05-30 VITALS
SYSTOLIC BLOOD PRESSURE: 118 MMHG | RESPIRATION RATE: 20 BRPM | OXYGEN SATURATION: 100 % | DIASTOLIC BLOOD PRESSURE: 73 MMHG | WEIGHT: 91.6 LBS | HEART RATE: 112 BPM | TEMPERATURE: 98.3 F | BODY MASS INDEX: 17.17 KG/M2

## 2020-05-30 LAB
ANION GAP SERPL CALCULATED.3IONS-SCNC: 2 MMOL/L (ref 3–14)
BASOPHILS # BLD AUTO: 0.1 10E9/L (ref 0–0.2)
BASOPHILS NFR BLD AUTO: 1 %
BUN SERPL-MCNC: 5 MG/DL (ref 7–30)
CALCIUM SERPL-MCNC: 8.2 MG/DL (ref 8.5–10.1)
CHLORIDE SERPL-SCNC: 110 MMOL/L (ref 94–109)
CO2 SERPL-SCNC: 30 MMOL/L (ref 20–32)
CREAT SERPL-MCNC: 0.56 MG/DL (ref 0.66–1.25)
DIFFERENTIAL METHOD BLD: ABNORMAL
EOSINOPHIL # BLD AUTO: 0 10E9/L (ref 0–0.7)
EOSINOPHIL NFR BLD AUTO: 0 %
ERYTHROCYTE [DISTWIDTH] IN BLOOD BY AUTOMATED COUNT: 13 % (ref 10–15)
GFR SERPL CREATININE-BSD FRML MDRD: >90 ML/MIN/{1.73_M2}
GLUCOSE SERPL-MCNC: 80 MG/DL (ref 70–99)
HCT VFR BLD AUTO: 38.8 % (ref 40–53)
HGB BLD-MCNC: 12.8 G/DL (ref 13.3–17.7)
LYMPHOCYTES # BLD AUTO: 4.1 10E9/L (ref 0.8–5.3)
LYMPHOCYTES NFR BLD AUTO: 45 %
MCH RBC QN AUTO: 34.9 PG (ref 26.5–33)
MCHC RBC AUTO-ENTMCNC: 33 G/DL (ref 31.5–36.5)
MCV RBC AUTO: 106 FL (ref 78–100)
MONOCYTES # BLD AUTO: 1.1 10E9/L (ref 0–1.3)
MONOCYTES NFR BLD AUTO: 12 %
NEUTROPHILS # BLD AUTO: 3.9 10E9/L (ref 1.6–8.3)
NEUTROPHILS NFR BLD AUTO: 42 %
PHOSPHATE SERPL-MCNC: 2.9 MG/DL (ref 2.5–4.5)
PLATELET # BLD AUTO: 143 10E9/L (ref 150–450)
PLATELET # BLD EST: ABNORMAL 10*3/UL
POTASSIUM SERPL-SCNC: 3.5 MMOL/L (ref 3.4–5.3)
PROCALCITONIN SERPL-MCNC: 0.22 NG/ML
RBC # BLD AUTO: 3.67 10E12/L (ref 4.4–5.9)
RBC MORPH BLD: ABNORMAL
SARS-COV-2 PCR COMMENT: NORMAL
SARS-COV-2 RNA SPEC QL NAA+PROBE: NEGATIVE
SODIUM SERPL-SCNC: 142 MMOL/L (ref 133–144)
SPECIMEN SOURCE: NORMAL
WBC # BLD AUTO: 9.2 10E9/L (ref 4–11)

## 2020-05-30 PROCEDURE — 25000132 ZZH RX MED GY IP 250 OP 250 PS 637: Performed by: INTERNAL MEDICINE

## 2020-05-30 PROCEDURE — 80048 BASIC METABOLIC PNL TOTAL CA: CPT | Performed by: INTERNAL MEDICINE

## 2020-05-30 PROCEDURE — 85025 COMPLETE CBC W/AUTO DIFF WBC: CPT | Performed by: INTERNAL MEDICINE

## 2020-05-30 PROCEDURE — 25800030 ZZH RX IP 258 OP 636: Performed by: INTERNAL MEDICINE

## 2020-05-30 PROCEDURE — 25000125 ZZHC RX 250: Performed by: INTERNAL MEDICINE

## 2020-05-30 PROCEDURE — 99239 HOSP IP/OBS DSCHRG MGMT >30: CPT | Performed by: INTERNAL MEDICINE

## 2020-05-30 PROCEDURE — 84145 PROCALCITONIN (PCT): CPT | Performed by: INTERNAL MEDICINE

## 2020-05-30 PROCEDURE — 84100 ASSAY OF PHOSPHORUS: CPT | Performed by: INTERNAL MEDICINE

## 2020-05-30 PROCEDURE — 36415 COLL VENOUS BLD VENIPUNCTURE: CPT | Performed by: INTERNAL MEDICINE

## 2020-05-30 PROCEDURE — 99231 SBSQ HOSP IP/OBS SF/LOW 25: CPT | Performed by: SURGERY

## 2020-05-30 RX ORDER — BISACODYL 10 MG
10 SUPPOSITORY, RECTAL RECTAL DAILY PRN
Status: DISCONTINUED | OUTPATIENT
Start: 2020-05-30 | End: 2020-05-30 | Stop reason: HOSPADM

## 2020-05-30 RX ORDER — BISACODYL 10 MG
10 SUPPOSITORY, RECTAL RECTAL ONCE
Status: COMPLETED | OUTPATIENT
Start: 2020-05-30 | End: 2020-05-30

## 2020-05-30 RX ORDER — DEXTROSE MONOHYDRATE 100 MG/ML
INJECTION, SOLUTION INTRAVENOUS CONTINUOUS PRN
Status: DISCONTINUED | OUTPATIENT
Start: 2020-05-30 | End: 2020-05-30 | Stop reason: HOSPADM

## 2020-05-30 RX ORDER — ACETAMINOPHEN 325 MG/1
650 TABLET ORAL EVERY 4 HOURS PRN
Status: DISCONTINUED | OUTPATIENT
Start: 2020-05-30 | End: 2020-05-30 | Stop reason: HOSPADM

## 2020-05-30 RX ORDER — LANOLIN ALCOHOL/MO/W.PET/CERES
6 CREAM (GRAM) TOPICAL AT BEDTIME
Status: DISCONTINUED | OUTPATIENT
Start: 2020-05-30 | End: 2020-05-30 | Stop reason: HOSPADM

## 2020-05-30 RX ORDER — POLYETHYLENE GLYCOL 3350 17 G/17G
17 POWDER, FOR SOLUTION ORAL 2 TIMES DAILY
Status: DISCONTINUED | OUTPATIENT
Start: 2020-05-30 | End: 2020-05-30

## 2020-05-30 RX ORDER — POLYETHYLENE GLYCOL 3350 17 G/17G
17 POWDER, FOR SOLUTION ORAL 2 TIMES DAILY
Status: DISCONTINUED | OUTPATIENT
Start: 2020-05-30 | End: 2020-05-30 | Stop reason: HOSPADM

## 2020-05-30 RX ADMIN — POLYETHYLENE GLYCOL 3350 17 G: 17 POWDER, FOR SOLUTION ORAL at 11:50

## 2020-05-30 RX ADMIN — GABAPENTIN 100 MG: 250 SUSPENSION ORAL at 14:13

## 2020-05-30 RX ADMIN — GABAPENTIN 100 MG: 250 SUSPENSION ORAL at 08:26

## 2020-05-30 RX ADMIN — POTASSIUM PHOSPHATE, MONOBASIC AND POTASSIUM PHOSPHATE, DIBASIC 15 MMOL: 224; 236 INJECTION, SOLUTION INTRAVENOUS at 01:32

## 2020-05-30 RX ADMIN — VALPROATE SODIUM 350 MG: 100 INJECTION, SOLUTION INTRAVENOUS at 14:14

## 2020-05-30 RX ADMIN — VALPROATE SODIUM 300 MG: 100 INJECTION, SOLUTION INTRAVENOUS at 08:24

## 2020-05-30 RX ADMIN — BISACODYL 10 MG: 10 SUPPOSITORY RECTAL at 11:50

## 2020-05-30 RX ADMIN — Medication 7.5 MG: at 08:25

## 2020-05-30 RX ADMIN — PANTOPRAZOLE SODIUM 40 MG: 40 TABLET, DELAYED RELEASE ORAL at 08:25

## 2020-05-30 ASSESSMENT — ACTIVITIES OF DAILY LIVING (ADL)
ADLS_ACUITY_SCORE: 36
ADLS_ACUITY_SCORE: 32
ADLS_ACUITY_SCORE: 36
ADLS_ACUITY_SCORE: 32

## 2020-05-30 NOTE — PHARMACY-CONSULT NOTE
Pharmacy Tube Feeding Consult    Medication reviewed for administration by feeding tube and for potential food/drug interactions.    Routes of administration changed for polyethylene glycol, melatonin, acetaminophen    Pharmacy will continue to follow as new medications are ordered.

## 2020-05-30 NOTE — PLAN OF CARE
Day RN  VS monitored, up w/A2 and gb to bsc, hypo BS, large soft bm after supp, maia tube feedings, plan is to discharge home with mom tonight, will cont to monitor.

## 2020-05-30 NOTE — PLAN OF CARE
This nurse cared for the patient for the last hour of his stay.      Reviewed discharge instructions and medications with patient and mother. Questions answered. Patient discharged to home with mother, discharge instructions, and belongings at this time. Adequate for discharge, mother pleased with his progress and to be able to take him home.

## 2020-05-30 NOTE — DISCHARGE INSTRUCTIONS
Nutrition:  - Continue 5 cartons Pediasure 1.5 per day infused via syringe with water flushes of 60-90 mL water flushes before and after feedings

## 2020-05-30 NOTE — PROGRESS NOTES
Surgery Progress Note    S: pt sleeping, hx discussed with his mother. She feels he is no longer having pain. gtube was to gravity with minimal output. He had a large BM after suppository last night. Has been passing a lot of gas. She would like to get him home as soon as reasonably possible.    O: VSS AF (last fever yesterday)  Appears comfortably, sleeping  Abdomen is flat, nondistended. gtube site c/d/i. No reaction to palpation in all 4 quadrants    Assessment:  ileus secondary to chronic constipation, resolving.  Plan:  Repeat suppository  Restart miralax BID and would continue BID at home for now with close follow up with his PCP.  Ok to keep gtube capped  Start slowly with gtube feeds - discussed with mom starting with 1/2can of his usual tube feeds and slowly increase from there at her direction.    If he continues to do well throughout the day, able to tolerate his regular tube feed boluses, from my standpoint could consider discharge later today at the earliest given mother's excellent understanding of the condition (as well as covid swab returning negative).     Tressa Fox MD

## 2020-05-30 NOTE — PLAN OF CARE
Evening RN  Pt A/O x self - mother at bedside and very calm and cooperative with her involvement.  VSS - Temp (101 at highest) and HR in the 120's for a period.  Sepsis protocol was triggered and lactic returned 0.6.  Other workup completed as ordered by Dr. Hamlin.  COVID-19 swab pending.  Pt denies pain - utilized PRN tylenol x1 for fever.  Pt flinched against mother cleaning around G-tube at one point, but abd soft.  CMS intact - SAHIL numbness/tingling.  Skin wdl ex for G-tube site.  G-tube wdl.  Up A2 with gait belt.  Voiding adequately.  Tolerating NPO.  No bm this shift and bowel sounds still hypoactive.  Plan is home on discharge.  Will continue to monitor.

## 2020-05-30 NOTE — CONSULTS
CLINICAL NUTRITION SERVICES - BRIEF NOTE    - Refer to RD assessment completed yesterday.  - Received TF assess and order consult: per Surgery team (VO, discussed with RN), ok to trial 1/2 carton home formula via syringe and increase to home regimen as tolerated with plans for possible discharge today if tolerated.  - Labs reviewed:   Na, K, and phosphorus WNL  - Medications reviewed:   Remains on D5-containing IVFs at 100 ml/hr   - Stooling patterns reviewed.  - Ordered the following:    - EAD: Baseline GT  - Frequency: Boluses via syringe 5x/day  - Regimen: 5 cartons (237 mL each) Pediasure 1.5 with fiber daily  - 1185 mL provides 1778 kcal, 70 g protein, 195 g CHO, 15 g fiber, and 901 mL free water daily  - Free water flush: 60 ml before and after feedings for tube patency  - 5/30: Start with 1/2 carton bolused via syringe.  If tolerated, ok to bolus additional 2 cartons during the day, 1 carton/feeding, spaced 4 hrs apart.   - 5/31: If remains admitted, ok to bolus 1 carton/feeding beginning at 8 am, 3-4 hrs between feedings with goal for 5 cartons/day as tolerated.    - Patient is a refeeding risk, if remains admitted, plan for K and phosphorus recheck at 9 pm this evening.  Electrolyte check tomorrow AM as well if remains admitted.  - Paged hospitalist to change to non-dextrose IVFs with EN restart and refeeding risk.    - Discussed POC with RN via phone.  - Will continue following, off-site, please page as needed.      Afshan Scott RDN, LD  Clinical Dietitian  3rd floor/ICU: [phone]  All other floors: [phone]  Weekend/holiday: [phone]

## 2020-05-30 NOTE — DISCHARGE SUMMARY
[facility]  Hospitalist Discharge Summary      Date of Admission:  5/28/2020  Date of Discharge:  5/30/2020  Discharging Provider: Yeny Romo MD      Discharge Diagnoses     1. sbo.    Follow-ups Needed After Discharge   Follow-up Appointments     Follow-up and recommended labs and tests       Follow up with primary care provider, Micky Leyva, within 7 days   for hospital follow- up.  No follow up labs or test are needed.             Unresulted Labs Ordered in the Past 30 Days of this Admission     Date and Time Order Name Status Description    5/30/2020 1250 Procalcitonin In process     5/29/2020 2040 SARS-CoV-2 COVID-19 Virus (Coronavirus) RT-PCR In process     5/29/2020 1857 Blood culture Preliminary     5/29/2020 1857 Blood culture Preliminary     5/28/2020 1418 Urine Culture Preliminary       These results will be followed up by PCP.    Discharge Disposition   Discharged to home  Condition at discharge: Stable      Hospital Course   Rj Licea is a 24 year old male who presents with VOMITING, DIARRHEA, ABDOMINAL DISTENSION.    Unable to obtain any history from the patient due to developmental delay.    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 4 days ago the patient had several episodes of vomiting and over the last 2 days has been experiencing diarrhea- one episode of diarrhea today. He is fed through a G-tube and the vomiting has occurred after each feeding. His mother notes the patient appears dehydrated and when urinating this morning for the first time in 24 hours this was vary dark brown. He has been sleeping significantly more over the past 72 hours. She has also noticed an abdominal distension. He appears to be less interactive.    Patient's labs were unremarkable in the ER. Abdominal xray shows dilatation of the  bowel loops- adynamic ileus vs SBO. CT abdomen showed small bowel obstruction. Patient appears dehydrated. Stable vitals.    I spoke to patient's mom who is at the bedside.    Problem List:    Small bowel obstruction.  -Hold tube feedings.   -IV fluid.  -General surgery consult.  - If more diarrhea, send stool studies.    Dehydration due to the above  -IVF. monitor labs, urine output and vitals    Seizure disorder  -continue home meds, one med rec is complete  -based on last documentation he is on valproate 350mg TID- change that to IV  -continue clobazam via the G tube    According to mom, patient also takes hydroxyzine and Ativan for his anxiety/agitation.  He was also resumed.      Admitted as inpatient. Rx conservatively and has had BM's and tolerating G tube feedings. Will plan on discharge today per his mother's desires.    Consultations This Hospital Stay   SURGERY GENERAL IP CONSULT  NUTRITION SERVICES ADULT IP CONSULT  PHARMACY IP CONSULT    Code Status   Full Code    Time Spent on this Encounter   I, Yeny Romo MD, personally saw the patient today and spent greater than 30 minutes discharging this patient.       Yeny Romo MD  [facility]  ______________________________________________________________________    Physical Exam   Vital Signs: Temp: 97.1  F (36.2  C) Temp src: Temporal BP: 111/78 Pulse: 98   Resp: 16 SpO2: 96 % O2 Device: None (Room air)    Weight: 91 lbs 9.6 oz  Gen - awake in NAD.  Lungs - CTA B.  Heart - RR,S1=S2 nml, no m/g/r.  Abd - soft, NT, ND, + BS. + g tube site C/D/I.  Ext - no edema.       Primary Care Physician   Micky Leyva    Discharge Orders      Follow-up and recommended labs and tests     Follow up with primary care provider, Micky Leyva, within 7 days for hospital follow- up.  No follow up labs or test are needed.     Activity    Your activity upon discharge: activity as tolerated     Full Code     Diet    Follow this diet upon discharge:  Orders Placed This Encounter      Adult Formula Bolus Feedinx Daily Other; Pediasure 1.5 with fiber; Route: Gastrostomy; 5; Can(s); Medication - Feeding Tube Flush Frequency: At least 15-30 mL water before and after medication administration and with tube clogging; : Start...      Adult Formula Bolus Feedinx Daily Other; Pediasure 1.5 with fiber; Route: Gastrostomy; 5; Can(s); Medication - Feeding Tube       Significant Results and Procedures   Results for orders placed or performed during the hospital encounter of 20   XR Chest Port 1 View    Narrative    CHEST PORTABLE ONE VIEW   2020 3:51 PM     HISTORY: Dyspnea.    COMPARISON: Chest x-ray 1/15/2019.      Impression    IMPRESSION: Portable chest. Lungs are clear. Heart is normal in size.  No pneumothorax. No definite pleural effusions.    LONNIE JAUREGUI MD   XR Abdomen Port 2 Views    Narrative    XR PORTABLE ABDOMEN TWO VIEWS  2020 3:53 PM     HISTORY: Abdominal distension, vomiting, evaluate for obstruction.    COMPARISON: Abdominal x-ray 2019.      Impression    IMPRESSION: Two views of the abdomen are performed. Dilated loops of  small bowel and colon have increased since prior exam. Left lateral  decubitus view demonstrates scattered air-fluid levels within both  loops of small bowel and colon concerning for adynamic ileus. Early  changes of bowel obstruction remain in the differential. Moderate  amount of stool is scattered throughout the colon. No significant  rectal fecal distention is evident. Bony structures demonstrate slight  rightward curvature lumbar spine.    LONNIE JAUREGUI MD   CT Abdomen Pelvis w Contrast    Narrative    EXAM: CT ABDOMEN PELVIS W CONTRAST  LOCATION: [facility]  DATE/TIME: 2020 5:22 PM    INDICATION: Abdominal distention  COMPARISON: None.  TECHNIQUE: CT scan of the abdomen and pelvis was performed following injection of IV contrast. Multiplanar reformats were obtained. Dose reduction  techniques were used.  CONTRAST: 42mL Isovue-370    FINDINGS:   LOWER CHEST: Mild segmental atelectasis at the lung bases.    HEPATOBILIARY: Normal.    PANCREAS: Normal.    SPLEEN: Normal.    ADRENAL GLANDS: Normal.    KIDNEYS/BLADDER: Normal.    BOWEL: Diffuse marked dilation of small bowel loops throughout the abdomen transition point in the right lower quadrant just before the ileocecal valve. No free air, trace free fluid. Percutaneous gastrostomy tube in expected position. Colon   unremarkable, although contains high density material, correlate for previously administered oral contrast.    LYMPH NODES: Normal.    VASCULATURE: Unremarkable.    PELVIC ORGANS: Normal.    MUSCULOSKELETAL: No suspicious bone lesion. Mild lumbar scoliosis      Impression    IMPRESSION:   1.  Small obstruction with suspected transition point in the right lower quadrant.     XR Chest Port 1 View    Narrative    EXAM: XR CHEST PORT 1 VW  LOCATION: Health system  DATE/TIME: 5/29/2020 7:51 PM    INDICATION: Tachycardia  COMPARISON: 05/28/2020      Impression    IMPRESSION: Normal heart size and mediastinal contour allowing for patient rotation. New streaky density in the left midlung likely subsegmental atelectasis. No evidence for CHF or pneumonia. No pleural effusion or pneumothorax.   XR Abdomen Port 2 Views    Narrative    EXAM: XR ABDOMEN PORT 2 VW  LOCATION: Health system  DATE/TIME: 5/29/2020 7:50 PM    INDICATION: Fever, tachycardia.   COMPARISON: 05/28/2020      Impression    IMPRESSION: Moderate gaseous distention of loops of bowel throughout the upper abdomen, decreased compared to yesterday's exam. Percutaneous G-tube again noted. No definite free air or abnormal calcification. Moderate lumbar scoliosis. Left hip   dysplasia.        Discharge Medications   Current Discharge Medication List      CONTINUE these medications which have NOT CHANGED    Details   albuterol (PROAIR HFA/PROVENTIL HFA/VENTOLIN HFA)  108 (90 Base) MCG/ACT inhaler Inhale 2 puffs into the lungs every 6 hours as needed for shortness of breath / dyspnea or wheezing  Qty: 2 Inhaler, Refills: 3    Comments: Pharmacy may dispense brand covered by insurance (Proair, or proventil or ventolin or generic albuterol inhaler)  Associated Diagnoses: Chronic lung disease of prematurity      albuterol (PROVENTIL) (2.5 MG/3ML) 0.083% neb solution Take 1 vial (2.5 mg) by nebulization every 6 hours as needed for shortness of breath / dyspnea or wheezing  Qty: 9 mL, Refills: 3    Associated Diagnoses: Wheezing      benzoyl peroxide 5 % EX topical gel Apply topically 2 times daily as needed (acne)  Qty: 50 g, Refills: 11    Associated Diagnoses: 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 clobazam (,ONFI,) 2.5 MG/ML suspension Take 5 mg by mouth At Bedtime (3 mL in AM / 2 mL in PM)      !! clobazam (ONFI) 2.5 MG/ML suspension Take 7.5 mg by mouth every morning (3 mL in AM / 2 mL in PM)      clotrimazole-betamethasone (LOTRISONE) 1-0.05 % external cream apply a very small amount to rash on neck BID as needed. do not apply to face, armpits, or groin  Qty: 45 g, Refills: 3    Associated Diagnoses: Rash and nonspecific skin eruption      fluticasone-salmeterol (ADVAIR HFA) 230-21 MCG/ACT inhaler Inhale 2 puffs into the lungs 2 times daily   Qty: 12 g, Refills: 3      !! gabapentin (NEURONTIN) 250 MG/5ML solution 100 mg by Per G Tube route 2 times daily Morning and 2pm      !! gabapentin (NEURONTIN) 250 MG/5ML solution 1,000 mg by Per G Tube  route At Bedtime      hydrOXYzine (ATARAX) 10 MG/5ML syrup Take 50 mg by mouth At Bedtime (25 mL)      LORazepam (ATIVAN) 1 MG tablet Take 1 mg by mouth as needed for anxiety Per mom: prn      !! melatonin 1 MG TABS Take 1 mg by mouth daily Daily between 4-5pm*    Associated Diagnoses: Short stature      !! melatonin 3 MG tablet Take 6 mg by mouth At Bedtime      omeprazole (PRILOSEC) 2 mg/mL SUSP 40 mg by Per G Tube route At Bedtime   Qty: 800 mL, Refills: 11      polyethylene glycol (MIRALAX) 17 GM/SCOOP powder TAKE 17 GIVE( ONE VAPFUL) BY MOUTH EVERY DAY  Qty: 510 g, Refills: 1    Associated Diagnoses: Chronic constipation      QUEtiapine (SEROQUEL) 300 MG tablet Take 600 mg by mouth At Bedtime       !! Valproate Sodium (VALPROIC ACID) 250 MG/5ML 300 mg by Per G Tube route every morning      !! Valproate Sodium (VALPROIC ACID) 250 MG/5ML 350 mg by Per G Tube route 2 times daily Afternoon and bedtime      !! Nutritional Supplements (ISOSOURCE 1.5 ALICE) LIQD 1 Can by Per G Tube route 4 times daily Take  Qty: 120 Can, Refills: 11    Associated Diagnoses: Feeding by G-tube (H); Weight loss      !! Nutritional Supplements (PEDIASURE 1.5 ALICE/FIBER) LIQD 1 Bottle by Per G Tube route 3 times daily  Qty: 7 each, Refills: 11    Comments: 7 cases of pediasure per month  Associated Diagnoses: Weight loss       !! - Potential duplicate medications found. Please discuss with provider.        Allergies   No Known Allergies

## 2020-05-30 NOTE — PROGRESS NOTES
X-cover    Called for fever 101.2 and tachycardia to 122. He triggered the sepsis protocol and LA 0.6.  Has G tube in place and mom noticed it seemed more tender    /76 (BP Location: Right arm)   Pulse 118   Temp 100.9  F (38.3  C) (Axillary)   Resp 20   Wt 41.5 kg (91 lb 9.6 oz)   SpO2 94%   BMI 17.17 kg/m    Sitting in bed, mom at bedside, CV appears stable, belly mod distension, G-tube site is without any erythema/warmth, ? Tender but difficult to tell.     Stat CBC:  WBC 10.6 (9.2), hgb 12.3 (14.1), platelet 139 (75)  UA negative    CXR no discrete infiltrate to my read  AXR:  With AF levels and diffusely distended small bowel no free air to my read    Await formal rads read    Blood cultures and asx COVID 19 testing completed    apap for fever, monitor closely

## 2020-05-30 NOTE — PLAN OF CARE
Night RN/P Mireya  /64 (BP Location: Right arm)   Pulse 98   Temp 98  F (36.7  C) (Temporal)   Resp 16   Wt 41.5 kg (91 lb 9.6 oz)   SpO2 95%   BMI 17.17 kg/m    Temp 99 at midnight, 0400 vitals as above  Lungs are clear, diminished   BS active, Gtube to gravity, scant amount clear returns  Phosphorus replacement running.  Incontinent large amount urine this am.   Slept well. Appears comfortable.

## 2020-05-31 LAB
BACTERIA SPEC CULT: NO GROWTH
Lab: NORMAL
SPECIMEN SOURCE: NORMAL

## 2020-06-01 ENCOUNTER — TELEPHONE (OUTPATIENT)
Dept: PEDIATRICS | Facility: CLINIC | Age: 25
End: 2020-06-01

## 2020-06-01 NOTE — TELEPHONE ENCOUNTER
See also MyChart message.       ED / Discharge Outreach Protocol    Patient Contact    Attempt # 1    Was call answered?  No.  Left message on voicemail with information to call me back.

## 2020-06-01 NOTE — TELEPHONE ENCOUNTER
Please contact patient for In-patient follow up.  [phone] (home)     Visit date: 053020  Diagnosis listed: Small Bowel Obstruction (H)  Number of visits in past 12 months: 0 ED / 1 IP

## 2020-06-01 NOTE — TELEPHONE ENCOUNTER
"Addressing pt concerns and follow-up options in separate MyChart encounter. Communication with pt's mother.    Increased miralax and started a liquid multivitamin.    PCP informed pt and parent that follow-up not needed.      - Mark Anthony \"Souleymane\" CASSY Bhatia - Patient Advocate Liason (PAL)  [facility]    "

## 2020-06-04 LAB
BACTERIA SPEC CULT: NO GROWTH
BACTERIA SPEC CULT: NO GROWTH
SPECIMEN SOURCE: NORMAL
SPECIMEN SOURCE: NORMAL

## 2020-12-14 ENCOUNTER — HEALTH MAINTENANCE LETTER (OUTPATIENT)
Age: 25
End: 2020-12-14

## 2020-12-28 ENCOUNTER — TELEPHONE (OUTPATIENT)
Dept: PEDIATRICS | Facility: CLINIC | Age: 25
End: 2020-12-28

## 2020-12-28 NOTE — LETTER
My Asthma Action Plan    Name: Rj Licea   YOB: 1995  Date: 12/28/2020   My doctor: Micky Leyva MD   My clinic: [facility]        My Rescue Medicine:   Albuterol inhaler (Proair/Ventolin/Proventil HFA)  2-4 puffs EVERY 4 HOURS as needed. Use a spacer if recommended by your provider.   My Asthma Severity:   Intermittent / Exercise Induced  Know your asthma triggers: wheezing, dyspnea and SOB             GREEN ZONE   Good Control    I feel good    No cough or wheeze    Can work, sleep and play without asthma symptoms       Take your asthma control medicine every day.     1. If exercise triggers your asthma, take your rescue medication    15 minutes before exercise or sports, and    During exercise if you have asthma symptoms  2. Spacer to use with inhaler: If you have a spacer, make sure to use it with your inhaler             YELLOW ZONE Getting Worse  I have ANY of these:    I do not feel good    Cough or wheeze    Chest feels tight    Wake up at night   1. Keep taking your Green Zone medications  2. Start taking your rescue medicine:    every 20 minutes for up to 1 hour. Then every 4 hours for 24-48 hours.  3. If you stay in the Yellow Zone for more than 12-24 hours, contact your doctor.  4. If you do not return to the Green Zone in 12-24 hours or you get worse, start taking your oral steroid medicine if prescribed by your provider.           RED ZONE Medical Alert - Get Help  I have ANY of these:    I feel awful    Medicine is not helping    Breathing getting harder    Trouble walking or talking    Nose opens wide to breathe       1. Take your rescue medicine NOW  2. If your provider has prescribed an oral steroid medicine, start taking it NOW  3. Call your doctor NOW  4. If you are still in the Red Zone after 20 minutes and you have not reached your doctor:    Take your rescue medicine again and    Call 911 or go to the emergency room right away    See your regular  doctor within 2 weeks of an Emergency Room or Urgent Care visit for follow-up treatment.          Annual Reminders:  Meet with Asthma Educator,  Flu Shot in the Fall, consider Pneumonia Vaccination for patients with asthma (aged 19 and older).    Pharmacy:    [id number] - [place] - [address] [facility] AT [place] [address] & [facility]  PEDIATRIC HOME SERVICE    Electronically signed by Micky Leyva MD   Date: 12/28/20                    Asthma Triggers  How To Control Things That Make Your Asthma Worse    Triggers are things that make your asthma worse.  Look at the list below to help you find your triggers and   what you can do about them. You can help prevent asthma flare-ups by staying away from your triggers.      Trigger                                                          What you can do   Cigarette Smoke  Tobacco smoke can make asthma worse. Do not allow smoking in your home, car or around you.  Be sure no one smokes at a child s day care or school.  If you smoke, ask your health care provider for ways to help you quit.  Ask family members to quit too.  Ask your health care provider for a referral to Quit Plan to help you quit smoking, or call 3-631-144-PLAN.     Colds, Flu, Bronchitis  These are common triggers of asthma. Wash your hands often.  Don t touch your eyes, nose or mouth.  Get a flu shot every year.     Dust Mites  These are tiny bugs that live in cloth or carpet. They are too small to see. Wash sheets and blankets in hot water every week.   Encase pillows and mattress in dust mite proof covers.  Avoid having carpet if you can. If you have carpet, vacuum weekly.   Use a dust mask and HEPA vacuum.   Pollen and Outdoor Mold  Some people are allergic to trees, grass, or weed pollen, or molds. Try to keep your windows closed.  Limit time out doors when pollen count is high.   Ask you health care provider about taking medicine during allergy season.     Animal  Dander  Some people are allergic to skin flakes, urine or saliva from pets with fur or feathers. Keep pets with fur or feathers out of your home.    If you can t keep the pet outdoors, then keep the pet out of your bedroom.  Keep the bedroom door closed.  Keep pets off cloth furniture and away from stuffed toys.     Mice, Rats, and Cockroaches  Some people are allergic to the waste from these pests.   Cover food and garbage.  Clean up spills and food crumbs.  Store grease in the refrigerator.   Keep food out of the bedroom.   Indoor Mold  This can be a trigger if your home has high moisture. Fix leaking faucets, pipes, or other sources of water.   Clean moldy surfaces.  Dehumidify basement if it is damp and smelly.   Smoke, Strong Odors, and Sprays  These can reduce air quality. Stay away from strong odors and sprays, such as perfume, powder, hair spray, paints, smoke incense, paint, cleaning products, candles and new carpet.   Exercise or Sports  Some people with asthma have this trigger. Be active!  Ask your doctor about taking medicine before sports or exercise to prevent symptoms.    Warm up for 5-10 minutes before and after sports or exercise.     Other Triggers of Asthma  Cold air:  Cover your nose and mouth with a scarf.  Sometimes laughing or crying can be a trigger.  Some medicines and food can trigger asthma.

## 2020-12-28 NOTE — TELEPHONE ENCOUNTER
Panel Management Review      Patient has the following on his problem list:     Asthma review     ACT Total Scores 11/4/2019   ACT TOTAL SCORE (Goal Greater than or Equal to 20) 22   In the past 12 months, how many times did you visit the emergency room for your asthma without being admitted to the hospital? 0   In the past 12 months, how many times were you hospitalized overnight because of your asthma? 0      1. Is Asthma diagnosis on the Problem List? Yes    2. Is Asthma listed on Health Maintenance? Yes    3. Patient is due for:  ACT and AAP      Composite cancer screening  Chart review shows that this patient is due/due soon for the following None  Summary:    Patient is due/failing the following:   PHYSICAL    Action needed:   Patient needs to do ACT.    Type of outreach:    Sent [employer] message.    Questions for provider review:    None                                                                                                                                    Ava WILSON, JASON,AAMA       Chart routed to Care Team .

## 2021-01-18 ENCOUNTER — TRANSFERRED RECORDS (OUTPATIENT)
Dept: HEALTH INFORMATION MANAGEMENT | Facility: CLINIC | Age: 26
End: 2021-01-18

## 2021-01-21 ENCOUNTER — TELEPHONE (OUTPATIENT)
Dept: PEDIATRICS | Facility: CLINIC | Age: 26
End: 2021-01-21

## 2021-01-21 NOTE — TELEPHONE ENCOUNTER
This writer called pharmacy to inquire PA.  Medication has been on patient's med list since 2017, Per Pharmacy staff Thao will try running it through primary insurance ( Medicaid).    Pharm staff request I call back by EOD for update.         Jamia Souza MA// January 21, 2021 3:54 PM

## 2021-01-21 NOTE — TELEPHONE ENCOUNTER
Pharmacy called, couldn't get through to EA team. Needed to talk about getting a prior auth for omeprazole (PRILOSEC) Pharmacist who called stated that the Dr who prescribed the medication stated that the prior auth needs to come from Dr. Leyva. Please call [facility] pharmacy at [phone] with questions.    Any Hanna Patient Representative

## 2021-01-25 ENCOUNTER — TRANSFERRED RECORDS (OUTPATIENT)
Dept: HEALTH INFORMATION MANAGEMENT | Facility: CLINIC | Age: 26
End: 2021-01-25

## 2021-02-28 ENCOUNTER — MYC MEDICAL ADVICE (OUTPATIENT)
Dept: PEDIATRICS | Facility: CLINIC | Age: 26
End: 2021-02-28

## 2021-02-28 DIAGNOSIS — Z93.1 GASTROSTOMY TUBE DEPENDENT (H): ICD-10-CM

## 2021-02-28 DIAGNOSIS — F84.0 AUTISM SPECTRUM DISORDER ASSOCIATED WITH KNOWN MEDICAL OR GENETIC CONDITION OR ENVIRONMENTAL FACTOR, REQUIRING SUBSTANTIAL SUPPORT (LEVEL 2): Primary | ICD-10-CM

## 2021-02-28 DIAGNOSIS — G40.909 SEIZURE DISORDER (H): ICD-10-CM

## 2021-02-28 DIAGNOSIS — G80.1 CP (CEREBRAL PALSY), SPASTIC, DIPLEGIC (H): ICD-10-CM

## 2021-02-28 DIAGNOSIS — E44.1 MILD PROTEIN-CALORIE MALNUTRITION (H): ICD-10-CM

## 2021-02-28 NOTE — LETTER
[facility]  [address] [place] [address]  [address]  [facility] [address]  Phone: [phone]  Fax: [phone]    03/01/21    Rj Licea  [address] [facility] [address]      Please excuse Rj Licea from wearing a face mask due to a medical condition.     Sincerely,      Micky Leyva MD

## 2021-03-01 NOTE — TELEPHONE ENCOUNTER
Routing to TC to help set up the visit as advised per below.     Updated Mom via [employer] message response on what labs Dr. Leyva ordered.     Letter signed.   Chart updated in specific areas to inform staff that patient does NOT need to wear a mask.

## 2021-03-01 NOTE — TELEPHONE ENCOUNTER
patient needs an hour long appointment and if that is the day that works for the family it will need to be in the afternoon from 1-2pm/ We could do labs a week before the visit (they could do them at the [facility]) and then we would have them to discuss. Okay to put patient on my schedule from 1-2 pm if mom agrees with plan. Future orders in for cmp, lipids, cbc, tsh, valproic acid, a1c, vit d and prealbumin. please check with mom if there are any other labs we need. thanks!

## 2021-03-01 NOTE — TELEPHONE ENCOUNTER
Dr. Leyva,     Spoke with Jayne (manager)    If you are ok writing a letter stating the Pt is exempt from wearing a mask, this will help when he comes into the clinic. Malissa pended a letter for you to review.     Ok for this?    Jocelyne Pozo, RN   [facility] -- Triage Nurse

## 2021-03-01 NOTE — TELEPHONE ENCOUNTER
yes, I am okay with letter. Does patient need/want appointment with me in clinic?     Patient is SB3, updated in chart.

## 2021-03-03 NOTE — TELEPHONE ENCOUNTER
please see my message from earlier in this thread. patient needs long appt and I can see that day from 1-2 pm. I cannot see him at 10am as I have another patient booked. they can do labs at  the week before.

## 2021-03-04 NOTE — TELEPHONE ENCOUNTER
Called and ensured they knew the updated appt time.    Mom does not feel comfortable bringing pt into another clinic for labs so she will just ensure pt is fasting for labs.     Mom is also bringing in outside provider lab orders to be drawn.

## 2021-03-17 ENCOUNTER — OFFICE VISIT (OUTPATIENT)
Dept: PEDIATRICS | Facility: CLINIC | Age: 26
End: 2021-03-17
Payer: COMMERCIAL

## 2021-03-17 VITALS
DIASTOLIC BLOOD PRESSURE: 64 MMHG | HEART RATE: 92 BPM | TEMPERATURE: 98.2 F | WEIGHT: 83.6 LBS | SYSTOLIC BLOOD PRESSURE: 106 MMHG | BODY MASS INDEX: 15.78 KG/M2 | HEIGHT: 61 IN | RESPIRATION RATE: 16 BRPM

## 2021-03-17 DIAGNOSIS — F39 MOOD DISORDER (H): ICD-10-CM

## 2021-03-17 DIAGNOSIS — G80.1 CP (CEREBRAL PALSY), SPASTIC, DIPLEGIC (H): ICD-10-CM

## 2021-03-17 DIAGNOSIS — Z00.00 ROUTINE GENERAL MEDICAL EXAMINATION AT A HEALTH CARE FACILITY: Primary | ICD-10-CM

## 2021-03-17 DIAGNOSIS — F84.0 AUTISM SPECTRUM DISORDER ASSOCIATED WITH KNOWN MEDICAL OR GENETIC CONDITION OR ENVIRONMENTAL FACTOR, REQUIRING SUBSTANTIAL SUPPORT (LEVEL 2): ICD-10-CM

## 2021-03-17 DIAGNOSIS — E44.1 MILD PROTEIN-CALORIE MALNUTRITION (H): ICD-10-CM

## 2021-03-17 DIAGNOSIS — G40.909 SEIZURE DISORDER (H): ICD-10-CM

## 2021-03-17 DIAGNOSIS — Z93.1 GASTROSTOMY TUBE DEPENDENT (H): ICD-10-CM

## 2021-03-17 LAB
AMMONIA PLAS-SCNC: 16 UMOL/L (ref 10–50)
ERYTHROCYTE [DISTWIDTH] IN BLOOD BY AUTOMATED COUNT: 12.4 % (ref 10–15)
HBA1C MFR BLD: 5 % (ref 0–5.6)
HCT VFR BLD AUTO: 43.8 % (ref 40–53)
HGB BLD-MCNC: 14.8 G/DL (ref 13.3–17.7)
MCH RBC QN AUTO: 35 PG (ref 26.5–33)
MCHC RBC AUTO-ENTMCNC: 33.8 G/DL (ref 31.5–36.5)
MCV RBC AUTO: 104 FL (ref 78–100)
PLATELET # BLD AUTO: 113 10E9/L (ref 150–450)
RBC # BLD AUTO: 4.23 10E12/L (ref 4.4–5.9)
VALPROATE SERPL-MCNC: 92 MG/L (ref 50–100)
WBC # BLD AUTO: 6.7 10E9/L (ref 4–11)

## 2021-03-17 PROCEDURE — 80164 ASSAY DIPROPYLACETIC ACD TOT: CPT | Performed by: INTERNAL MEDICINE

## 2021-03-17 PROCEDURE — 84443 ASSAY THYROID STIM HORMONE: CPT | Performed by: INTERNAL MEDICINE

## 2021-03-17 PROCEDURE — 99395 PREV VISIT EST AGE 18-39: CPT | Performed by: INTERNAL MEDICINE

## 2021-03-17 PROCEDURE — 82306 VITAMIN D 25 HYDROXY: CPT | Performed by: INTERNAL MEDICINE

## 2021-03-17 PROCEDURE — 82140 ASSAY OF AMMONIA: CPT | Performed by: INTERNAL MEDICINE

## 2021-03-17 PROCEDURE — 80061 LIPID PANEL: CPT | Performed by: INTERNAL MEDICINE

## 2021-03-17 PROCEDURE — 99213 OFFICE O/P EST LOW 20 MIN: CPT | Mod: 25 | Performed by: INTERNAL MEDICINE

## 2021-03-17 PROCEDURE — 85027 COMPLETE CBC AUTOMATED: CPT | Performed by: INTERNAL MEDICINE

## 2021-03-17 PROCEDURE — 99000 SPECIMEN HANDLING OFFICE-LAB: CPT | Performed by: INTERNAL MEDICINE

## 2021-03-17 PROCEDURE — 83036 HEMOGLOBIN GLYCOSYLATED A1C: CPT | Performed by: INTERNAL MEDICINE

## 2021-03-17 PROCEDURE — 36415 COLL VENOUS BLD VENIPUNCTURE: CPT | Performed by: INTERNAL MEDICINE

## 2021-03-17 PROCEDURE — 80053 COMPREHEN METABOLIC PANEL: CPT | Performed by: INTERNAL MEDICINE

## 2021-03-17 PROCEDURE — 84134 ASSAY OF PREALBUMIN: CPT | Performed by: INTERNAL MEDICINE

## 2021-03-17 PROCEDURE — 80171 DRUG SCREEN QUANT GABAPENTIN: CPT | Mod: 90 | Performed by: INTERNAL MEDICINE

## 2021-03-17 PROCEDURE — 80339 ANTIEPILEPTICS NOS 1-3: CPT | Performed by: INTERNAL MEDICINE

## 2021-03-17 ASSESSMENT — MIFFLIN-ST. JEOR: SCORE: 1231.55

## 2021-03-18 LAB
ALBUMIN SERPL-MCNC: 3.6 G/DL (ref 3.4–5)
ALP SERPL-CCNC: 81 U/L (ref 40–150)
ALT SERPL W P-5'-P-CCNC: 26 U/L (ref 0–70)
ANION GAP SERPL CALCULATED.3IONS-SCNC: 2 MMOL/L (ref 3–14)
AST SERPL W P-5'-P-CCNC: 37 U/L (ref 0–45)
BILIRUB SERPL-MCNC: 0.9 MG/DL (ref 0.2–1.3)
BUN SERPL-MCNC: 15 MG/DL (ref 7–30)
CALCIUM SERPL-MCNC: 9 MG/DL (ref 8.5–10.1)
CHLORIDE SERPL-SCNC: 104 MMOL/L (ref 94–109)
CHOLEST SERPL-MCNC: 179 MG/DL
CO2 SERPL-SCNC: 30 MMOL/L (ref 20–32)
CREAT SERPL-MCNC: 0.79 MG/DL (ref 0.66–1.25)
DEPRECATED CALCIDIOL+CALCIFEROL SERPL-MC: 51 UG/L (ref 20–75)
GFR SERPL CREATININE-BSD FRML MDRD: >90 ML/MIN/{1.73_M2}
GLUCOSE SERPL-MCNC: 73 MG/DL (ref 70–99)
HDLC SERPL-MCNC: 95 MG/DL
LDLC SERPL CALC-MCNC: 73 MG/DL
NONHDLC SERPL-MCNC: 84 MG/DL
POTASSIUM SERPL-SCNC: 4.2 MMOL/L (ref 3.4–5.3)
PREALB SERPL IA-MCNC: 25 MG/DL (ref 15–45)
PROT SERPL-MCNC: 7.5 G/DL (ref 6.8–8.8)
SODIUM SERPL-SCNC: 136 MMOL/L (ref 133–144)
TRIGL SERPL-MCNC: 57 MG/DL
TSH SERPL DL<=0.005 MIU/L-ACNC: 0.94 MU/L (ref 0.4–4)

## 2021-03-18 ASSESSMENT — ASTHMA QUESTIONNAIRES: ACT_TOTALSCORE: 21

## 2021-03-19 LAB — GABAPENTIN SERPLBLD-MCNC: 7 UG/ML (ref 4–16)

## 2021-03-21 LAB
CLOBAZAM SERPL-MCNC: 218 NG/ML (ref 30–300)
NORCLOBAZAM SERPL-MCNC: 1955 NG/ML (ref 300–3000)

## 2021-04-17 DIAGNOSIS — K59.09 CHRONIC CONSTIPATION: ICD-10-CM

## 2021-04-19 ENCOUNTER — ANESTHESIA (OUTPATIENT)
Dept: SURGERY | Facility: CLINIC | Age: 26
End: 2021-04-19
Payer: COMMERCIAL

## 2021-04-19 ENCOUNTER — APPOINTMENT (OUTPATIENT)
Dept: CT IMAGING | Facility: CLINIC | Age: 26
End: 2021-04-19
Attending: EMERGENCY MEDICINE
Payer: COMMERCIAL

## 2021-04-19 ENCOUNTER — ANESTHESIA EVENT (OUTPATIENT)
Dept: SURGERY | Facility: CLINIC | Age: 26
End: 2021-04-19
Payer: COMMERCIAL

## 2021-04-19 ENCOUNTER — HOSPITAL ENCOUNTER (INPATIENT)
Facility: CLINIC | Age: 26
LOS: 8 days | Discharge: HOME | End: 2021-04-27
Attending: EMERGENCY MEDICINE | Admitting: INTERNAL MEDICINE
Payer: COMMERCIAL

## 2021-04-19 ENCOUNTER — APPOINTMENT (OUTPATIENT)
Dept: GENERAL RADIOLOGY | Facility: CLINIC | Age: 26
End: 2021-04-19
Attending: EMERGENCY MEDICINE
Payer: COMMERCIAL

## 2021-04-19 ENCOUNTER — NURSE TRIAGE (OUTPATIENT)
Dept: NURSING | Facility: CLINIC | Age: 26
End: 2021-04-19

## 2021-04-19 ENCOUNTER — APPOINTMENT (OUTPATIENT)
Dept: GENERAL RADIOLOGY | Facility: CLINIC | Age: 26
End: 2021-04-19
Attending: SURGERY
Payer: COMMERCIAL

## 2021-04-19 DIAGNOSIS — R73.9 HYPERGLYCEMIA: Primary | ICD-10-CM

## 2021-04-19 DIAGNOSIS — K56.609 SBO (SMALL BOWEL OBSTRUCTION) (H): ICD-10-CM

## 2021-04-19 DIAGNOSIS — G40.909 NONINTRACTABLE EPILEPSY WITHOUT STATUS EPILEPTICUS, UNSPECIFIED EPILEPSY TYPE (H): ICD-10-CM

## 2021-04-19 LAB
ALBUMIN SERPL-MCNC: 3 G/DL (ref 3.4–5)
ALBUMIN SERPL-MCNC: NORMAL G/DL (ref 3.4–5)
ALBUMIN UR-MCNC: 10 MG/DL
ALP SERPL-CCNC: 104 U/L (ref 40–150)
ALP SERPL-CCNC: 80 U/L (ref 40–150)
ALT SERPL W P-5'-P-CCNC: 23 U/L (ref 0–70)
ALT SERPL W P-5'-P-CCNC: 30 U/L (ref 0–70)
ANION GAP SERPL CALCULATED.3IONS-SCNC: 8 MMOL/L (ref 3–14)
ANION GAP SERPL CALCULATED.3IONS-SCNC: NORMAL MMOL/L (ref 3–14)
APPEARANCE UR: CLEAR
AST SERPL W P-5'-P-CCNC: 33 U/L (ref 0–45)
AST SERPL W P-5'-P-CCNC: 41 U/L (ref 0–45)
BASOPHILS # BLD AUTO: 0 10E9/L (ref 0–0.2)
BASOPHILS NFR BLD AUTO: 0 %
BILIRUB SERPL-MCNC: 1 MG/DL (ref 0.2–1.3)
BILIRUB SERPL-MCNC: 1.1 MG/DL (ref 0.2–1.3)
BILIRUB UR QL STRIP: NEGATIVE
BUN SERPL-MCNC: 25 MG/DL (ref 7–30)
BUN SERPL-MCNC: NORMAL MG/DL (ref 7–30)
CALCIUM SERPL-MCNC: 8.9 MG/DL (ref 8.5–10.1)
CALCIUM SERPL-MCNC: NORMAL MG/DL (ref 8.5–10.1)
CHLORIDE SERPL-SCNC: 103 MMOL/L (ref 94–109)
CHLORIDE SERPL-SCNC: 108 MMOL/L (ref 94–109)
CO2 SERPL-SCNC: 26 MMOL/L (ref 20–32)
CO2 SERPL-SCNC: NORMAL MMOL/L (ref 20–32)
COLOR UR AUTO: YELLOW
CREAT SERPL-MCNC: 0.98 MG/DL (ref 0.66–1.25)
CREAT SERPL-MCNC: NORMAL MG/DL (ref 0.66–1.25)
DIFFERENTIAL METHOD BLD: ABNORMAL
EOSINOPHIL # BLD AUTO: 0 10E9/L (ref 0–0.7)
EOSINOPHIL NFR BLD AUTO: 0 %
ERYTHROCYTE [DISTWIDTH] IN BLOOD BY AUTOMATED COUNT: 13.3 % (ref 10–15)
GFR SERPL CREATININE-BSD FRML MDRD: >90 ML/MIN/{1.73_M2}
GFR SERPL CREATININE-BSD FRML MDRD: >90 ML/MIN/{1.73_M2}
GLUCOSE SERPL-MCNC: 83 MG/DL (ref 70–99)
GLUCOSE SERPL-MCNC: NORMAL MG/DL (ref 70–99)
GLUCOSE UR STRIP-MCNC: NEGATIVE MG/DL
HCT VFR BLD AUTO: 60.9 % (ref 40–53)
HGB BLD-MCNC: 21.1 G/DL (ref 13.3–17.7)
HGB UR QL STRIP: NEGATIVE
INR PPP: 1.1 (ref 0.86–1.14)
KETONES UR STRIP-MCNC: ABNORMAL MG/DL
LABORATORY COMMENT REPORT: NORMAL
LACTATE BLD-SCNC: 2.8 MMOL/L (ref 0.7–2)
LACTATE BLD-SCNC: 3.4 MMOL/L (ref 0.7–2)
LACTATE BLD-SCNC: 4.4 MMOL/L (ref 0.7–2)
LACTATE BLD-SCNC: 4.6 MMOL/L (ref 0.7–2)
LEUKOCYTE ESTERASE UR QL STRIP: NEGATIVE
LYMPHOCYTES # BLD AUTO: 0.7 10E9/L (ref 0.8–5.3)
LYMPHOCYTES NFR BLD AUTO: 23 %
MACROCYTES BLD QL SMEAR: PRESENT
MAGNESIUM SERPL-MCNC: 2 MG/DL (ref 1.6–2.3)
MCH RBC QN AUTO: 35.6 PG (ref 26.5–33)
MCHC RBC AUTO-ENTMCNC: 34.6 G/DL (ref 31.5–36.5)
MCV RBC AUTO: 103 FL (ref 78–100)
METAMYELOCYTES # BLD: 0.2 10E9/L
METAMYELOCYTES NFR BLD MANUAL: 6 %
MONOCYTES # BLD AUTO: 0.6 10E9/L (ref 0–1.3)
MONOCYTES NFR BLD AUTO: 21 %
NEUTROPHILS # BLD AUTO: 1.5 10E9/L (ref 1.6–8.3)
NEUTROPHILS NFR BLD AUTO: 50 %
NITRATE UR QL: NEGATIVE
PH UR STRIP: 7 PH (ref 5–7)
PLATELET # BLD AUTO: 103 10E9/L (ref 150–450)
PLATELET # BLD EST: ABNORMAL 10*3/UL
POTASSIUM SERPL-SCNC: 4.4 MMOL/L (ref 3.4–5.3)
POTASSIUM SERPL-SCNC: 4.8 MMOL/L (ref 3.4–5.3)
PROCALCITONIN SERPL-MCNC: 21.12 NG/ML
PROT SERPL-MCNC: 6.6 G/DL (ref 6.8–8.8)
PROT SERPL-MCNC: 8.7 G/DL (ref 6.8–8.8)
RBC # BLD AUTO: 5.92 10E12/L (ref 4.4–5.9)
RBC #/AREA URNS AUTO: <1 /HPF (ref 0–2)
SARS-COV-2 RNA RESP QL NAA+PROBE: NEGATIVE
SODIUM SERPL-SCNC: 136 MMOL/L (ref 133–144)
SODIUM SERPL-SCNC: 142 MMOL/L (ref 133–144)
SOURCE: ABNORMAL
SP GR UR STRIP: 1.01 (ref 1–1.03)
SPECIMEN SOURCE: NORMAL
UROBILINOGEN UR STRIP-MCNC: 6 MG/DL (ref 0–2)
VARIANT LYMPHS BLD QL SMEAR: PRESENT
WBC # BLD AUTO: 3 10E9/L (ref 4–11)
WBC #/AREA URNS AUTO: 1 /HPF (ref 0–5)

## 2021-04-19 PROCEDURE — 258N000003 HC RX IP 258 OP 636: Performed by: PHYSICIAN ASSISTANT

## 2021-04-19 PROCEDURE — 83605 ASSAY OF LACTIC ACID: CPT | Performed by: HOSPITALIST

## 2021-04-19 PROCEDURE — 250N000013 HC RX MED GY IP 250 OP 250 PS 637: Performed by: EMERGENCY MEDICINE

## 2021-04-19 PROCEDURE — 250N000011 HC RX IP 250 OP 636: Performed by: INTERNAL MEDICINE

## 2021-04-19 PROCEDURE — 96361 HYDRATE IV INFUSION ADD-ON: CPT

## 2021-04-19 PROCEDURE — 83735 ASSAY OF MAGNESIUM: CPT | Performed by: EMERGENCY MEDICINE

## 2021-04-19 PROCEDURE — 99221 1ST HOSP IP/OBS SF/LOW 40: CPT | Mod: 57 | Performed by: SURGERY

## 2021-04-19 PROCEDURE — 250N000009 HC RX 250: Performed by: SURGERY

## 2021-04-19 PROCEDURE — 258N000003 HC RX IP 258 OP 636: Performed by: NURSE ANESTHETIST, CERTIFIED REGISTERED

## 2021-04-19 PROCEDURE — 250N000009 HC RX 250: Performed by: EMERGENCY MEDICINE

## 2021-04-19 PROCEDURE — 80053 COMPREHEN METABOLIC PANEL: CPT | Performed by: EMERGENCY MEDICINE

## 2021-04-19 PROCEDURE — 999N000141 HC STATISTIC PRE-PROCEDURE NURSING ASSESSMENT: Performed by: SURGERY

## 2021-04-19 PROCEDURE — 250N000011 HC RX IP 250 OP 636: Performed by: EMERGENCY MEDICINE

## 2021-04-19 PROCEDURE — 250N000011 HC RX IP 250 OP 636: Performed by: NURSE ANESTHETIST, CERTIFIED REGISTERED

## 2021-04-19 PROCEDURE — 87040 BLOOD CULTURE FOR BACTERIA: CPT | Performed by: INTERNAL MEDICINE

## 2021-04-19 PROCEDURE — 258N000003 HC RX IP 258 OP 636: Performed by: INTERNAL MEDICINE

## 2021-04-19 PROCEDURE — 258N000003 HC RX IP 258 OP 636: Performed by: EMERGENCY MEDICINE

## 2021-04-19 PROCEDURE — 36415 COLL VENOUS BLD VENIPUNCTURE: CPT | Performed by: HOSPITALIST

## 2021-04-19 PROCEDURE — 36415 COLL VENOUS BLD VENIPUNCTURE: CPT | Performed by: INTERNAL MEDICINE

## 2021-04-19 PROCEDURE — 99223 1ST HOSP IP/OBS HIGH 75: CPT | Mod: AI | Performed by: PHYSICIAN ASSISTANT

## 2021-04-19 PROCEDURE — 81001 URINALYSIS AUTO W/SCOPE: CPT | Performed by: INTERNAL MEDICINE

## 2021-04-19 PROCEDURE — 250N000009 HC RX 250: Performed by: NURSE ANESTHETIST, CERTIFIED REGISTERED

## 2021-04-19 PROCEDURE — 250N000009 HC RX 250: Performed by: PHYSICIAN ASSISTANT

## 2021-04-19 PROCEDURE — 360N000076 HC SURGERY LEVEL 3, PER MIN: Performed by: SURGERY

## 2021-04-19 PROCEDURE — 0W9G0ZZ DRAINAGE OF PERITONEAL CAVITY, OPEN APPROACH: ICD-10-PCS | Performed by: SURGERY

## 2021-04-19 PROCEDURE — 36415 COLL VENOUS BLD VENIPUNCTURE: CPT | Performed by: EMERGENCY MEDICINE

## 2021-04-19 PROCEDURE — 44005 FREEING OF BOWEL ADHESION: CPT | Mod: AS | Performed by: PHYSICIAN ASSISTANT

## 2021-04-19 PROCEDURE — 99285 EMERGENCY DEPT VISIT HI MDM: CPT | Mod: 25

## 2021-04-19 PROCEDURE — C9803 HOPD COVID-19 SPEC COLLECT: HCPCS

## 2021-04-19 PROCEDURE — 85025 COMPLETE CBC W/AUTO DIFF WBC: CPT | Performed by: EMERGENCY MEDICINE

## 2021-04-19 PROCEDURE — 272N000001 HC OR GENERAL SUPPLY STERILE: Performed by: SURGERY

## 2021-04-19 PROCEDURE — 88304 TISSUE EXAM BY PATHOLOGIST: CPT | Mod: TC | Performed by: SURGERY

## 2021-04-19 PROCEDURE — 84145 PROCALCITONIN (PCT): CPT | Performed by: INTERNAL MEDICINE

## 2021-04-19 PROCEDURE — 96365 THER/PROPH/DIAG IV INF INIT: CPT | Mod: 59

## 2021-04-19 PROCEDURE — 710N000009 HC RECOVERY PHASE 1, LEVEL 1, PER MIN: Performed by: SURGERY

## 2021-04-19 PROCEDURE — 0DN80ZZ RELEASE SMALL INTESTINE, OPEN APPROACH: ICD-10-PCS | Performed by: SURGERY

## 2021-04-19 PROCEDURE — 258N000003 HC RX IP 258 OP 636: Performed by: ANESTHESIOLOGY

## 2021-04-19 PROCEDURE — 120N000001 HC R&B MED SURG/OB

## 2021-04-19 PROCEDURE — 87635 SARS-COV-2 COVID-19 AMP PRB: CPT | Performed by: EMERGENCY MEDICINE

## 2021-04-19 PROCEDURE — 88304 TISSUE EXAM BY PATHOLOGIST: CPT | Mod: 26

## 2021-04-19 PROCEDURE — 71045 X-RAY EXAM CHEST 1 VIEW: CPT

## 2021-04-19 PROCEDURE — 83605 ASSAY OF LACTIC ACID: CPT | Performed by: INTERNAL MEDICINE

## 2021-04-19 PROCEDURE — C1765 ADHESION BARRIER: HCPCS | Performed by: SURGERY

## 2021-04-19 PROCEDURE — 250N000009 HC RX 250: Performed by: INTERNAL MEDICINE

## 2021-04-19 PROCEDURE — 99233 SBSQ HOSP IP/OBS HIGH 50: CPT | Performed by: INTERNAL MEDICINE

## 2021-04-19 PROCEDURE — 74019 RADEX ABDOMEN 2 VIEWS: CPT

## 2021-04-19 PROCEDURE — 74177 CT ABD & PELVIS W/CONTRAST: CPT

## 2021-04-19 PROCEDURE — 85610 PROTHROMBIN TIME: CPT | Performed by: EMERGENCY MEDICINE

## 2021-04-19 PROCEDURE — 370N000017 HC ANESTHESIA TECHNICAL FEE, PER MIN: Performed by: SURGERY

## 2021-04-19 PROCEDURE — 44005 FREEING OF BOWEL ADHESION: CPT | Performed by: SURGERY

## 2021-04-19 RX ORDER — LIDOCAINE 40 MG/G
CREAM TOPICAL
Status: DISCONTINUED | OUTPATIENT
Start: 2021-04-19 | End: 2021-04-20

## 2021-04-19 RX ORDER — ALBUTEROL SULFATE 0.83 MG/ML
2.5 SOLUTION RESPIRATORY (INHALATION) EVERY 6 HOURS PRN
Status: DISCONTINUED | OUTPATIENT
Start: 2021-04-19 | End: 2021-04-27 | Stop reason: HOSPADM

## 2021-04-19 RX ORDER — DEXAMETHASONE SODIUM PHOSPHATE 4 MG/ML
INJECTION, SOLUTION INTRA-ARTICULAR; INTRALESIONAL; INTRAMUSCULAR; INTRAVENOUS; SOFT TISSUE PRN
Status: DISCONTINUED | OUTPATIENT
Start: 2021-04-19 | End: 2021-04-19

## 2021-04-19 RX ORDER — ONDANSETRON 2 MG/ML
4 INJECTION INTRAMUSCULAR; INTRAVENOUS EVERY 30 MIN PRN
Status: DISCONTINUED | OUTPATIENT
Start: 2021-04-19 | End: 2021-04-20 | Stop reason: HOSPADM

## 2021-04-19 RX ORDER — FENTANYL CITRATE 50 UG/ML
INJECTION, SOLUTION INTRAMUSCULAR; INTRAVENOUS PRN
Status: DISCONTINUED | OUTPATIENT
Start: 2021-04-19 | End: 2021-04-19

## 2021-04-19 RX ORDER — ONDANSETRON 2 MG/ML
4 INJECTION INTRAMUSCULAR; INTRAVENOUS ONCE
Status: DISCONTINUED | OUTPATIENT
Start: 2021-04-19 | End: 2021-04-19

## 2021-04-19 RX ORDER — ONDANSETRON 4 MG/1
4 TABLET, ORALLY DISINTEGRATING ORAL EVERY 6 HOURS PRN
Status: DISCONTINUED | OUTPATIENT
Start: 2021-04-19 | End: 2021-04-20

## 2021-04-19 RX ORDER — LORAZEPAM 2 MG/ML
2 INJECTION INTRAMUSCULAR
Status: DISCONTINUED | OUTPATIENT
Start: 2021-04-19 | End: 2021-04-20

## 2021-04-19 RX ORDER — FENTANYL CITRATE 50 UG/ML
25-50 INJECTION, SOLUTION INTRAMUSCULAR; INTRAVENOUS
Status: DISCONTINUED | OUTPATIENT
Start: 2021-04-19 | End: 2021-04-20 | Stop reason: HOSPADM

## 2021-04-19 RX ORDER — AMPICILLIN AND SULBACTAM 2; 1 G/1; G/1
3 INJECTION, POWDER, FOR SOLUTION INTRAMUSCULAR; INTRAVENOUS ONCE
Status: COMPLETED | OUTPATIENT
Start: 2021-04-19 | End: 2021-04-19

## 2021-04-19 RX ORDER — ONDANSETRON 4 MG/1
4 TABLET, ORALLY DISINTEGRATING ORAL EVERY 30 MIN PRN
Status: DISCONTINUED | OUTPATIENT
Start: 2021-04-19 | End: 2021-04-20 | Stop reason: HOSPADM

## 2021-04-19 RX ORDER — PROPOFOL 10 MG/ML
INJECTION, EMULSION INTRAVENOUS PRN
Status: DISCONTINUED | OUTPATIENT
Start: 2021-04-19 | End: 2021-04-19

## 2021-04-19 RX ORDER — HYDROMORPHONE HYDROCHLORIDE 1 MG/ML
.3-.5 INJECTION, SOLUTION INTRAMUSCULAR; INTRAVENOUS; SUBCUTANEOUS EVERY 5 MIN PRN
Status: DISCONTINUED | OUTPATIENT
Start: 2021-04-19 | End: 2021-04-20 | Stop reason: HOSPADM

## 2021-04-19 RX ORDER — VANCOMYCIN HYDROCHLORIDE 1 G/200ML
1000 INJECTION, SOLUTION INTRAVENOUS EVERY 12 HOURS
Status: DISCONTINUED | OUTPATIENT
Start: 2021-04-19 | End: 2021-04-20

## 2021-04-19 RX ORDER — POLYETHYLENE GLYCOL 3350 17 G/17G
17 POWDER, FOR SOLUTION ORAL DAILY
COMMUNITY

## 2021-04-19 RX ORDER — ACETAMINOPHEN 650 MG/1
650 SUPPOSITORY RECTAL EVERY 4 HOURS PRN
Status: DISCONTINUED | OUTPATIENT
Start: 2021-04-19 | End: 2021-04-20

## 2021-04-19 RX ORDER — LORAZEPAM 2 MG/ML
0.5 INJECTION INTRAMUSCULAR ONCE
Status: COMPLETED | OUTPATIENT
Start: 2021-04-19 | End: 2021-04-19

## 2021-04-19 RX ORDER — ASPIRIN 325 MG
325 TABLET ORAL ONCE
Status: DISCONTINUED | OUTPATIENT
Start: 2021-04-19 | End: 2021-04-19

## 2021-04-19 RX ORDER — GLYCOPYRROLATE 0.2 MG/ML
INJECTION, SOLUTION INTRAMUSCULAR; INTRAVENOUS PRN
Status: DISCONTINUED | OUTPATIENT
Start: 2021-04-19 | End: 2021-04-19

## 2021-04-19 RX ORDER — ONDANSETRON 2 MG/ML
4 INJECTION INTRAMUSCULAR; INTRAVENOUS EVERY 6 HOURS PRN
Status: DISCONTINUED | OUTPATIENT
Start: 2021-04-19 | End: 2021-04-20

## 2021-04-19 RX ORDER — CEFEPIME HYDROCHLORIDE 1 G/1
1 INJECTION, POWDER, FOR SOLUTION INTRAMUSCULAR; INTRAVENOUS EVERY 12 HOURS
Status: DISCONTINUED | OUTPATIENT
Start: 2021-04-19 | End: 2021-04-26

## 2021-04-19 RX ORDER — LORAZEPAM 2 MG/ML
1 INJECTION INTRAMUSCULAR EVERY 8 HOURS
Status: DISCONTINUED | OUTPATIENT
Start: 2021-04-19 | End: 2021-04-19

## 2021-04-19 RX ORDER — SODIUM CHLORIDE, SODIUM LACTATE, POTASSIUM CHLORIDE, CALCIUM CHLORIDE 600; 310; 30; 20 MG/100ML; MG/100ML; MG/100ML; MG/100ML
INJECTION, SOLUTION INTRAVENOUS CONTINUOUS
Status: DISCONTINUED | OUTPATIENT
Start: 2021-04-19 | End: 2021-04-19 | Stop reason: HOSPADM

## 2021-04-19 RX ORDER — MAGNESIUM HYDROXIDE 1200 MG/15ML
LIQUID ORAL PRN
Status: DISCONTINUED | OUTPATIENT
Start: 2021-04-19 | End: 2021-04-20 | Stop reason: HOSPADM

## 2021-04-19 RX ORDER — NALOXONE HYDROCHLORIDE 0.4 MG/ML
0.2 INJECTION, SOLUTION INTRAMUSCULAR; INTRAVENOUS; SUBCUTANEOUS
Status: DISCONTINUED | OUTPATIENT
Start: 2021-04-19 | End: 2021-04-27 | Stop reason: HOSPADM

## 2021-04-19 RX ORDER — NALOXONE HYDROCHLORIDE 0.4 MG/ML
0.4 INJECTION, SOLUTION INTRAMUSCULAR; INTRAVENOUS; SUBCUTANEOUS
Status: DISCONTINUED | OUTPATIENT
Start: 2021-04-19 | End: 2021-04-27 | Stop reason: HOSPADM

## 2021-04-19 RX ORDER — LORAZEPAM 2 MG/ML
1 INJECTION INTRAMUSCULAR EVERY 6 HOURS
Status: DISCONTINUED | OUTPATIENT
Start: 2021-04-19 | End: 2021-04-20

## 2021-04-19 RX ORDER — LABETALOL HYDROCHLORIDE 5 MG/ML
10 INJECTION, SOLUTION INTRAVENOUS EVERY 5 MIN PRN
Status: DISCONTINUED | OUTPATIENT
Start: 2021-04-19 | End: 2021-04-20 | Stop reason: HOSPADM

## 2021-04-19 RX ORDER — LIDOCAINE 40 MG/G
CREAM TOPICAL
Status: DISCONTINUED | OUTPATIENT
Start: 2021-04-19 | End: 2021-04-19 | Stop reason: HOSPADM

## 2021-04-19 RX ORDER — SODIUM CHLORIDE, SODIUM LACTATE, POTASSIUM CHLORIDE, CALCIUM CHLORIDE 600; 310; 30; 20 MG/100ML; MG/100ML; MG/100ML; MG/100ML
INJECTION, SOLUTION INTRAVENOUS CONTINUOUS
Status: DISCONTINUED | OUTPATIENT
Start: 2021-04-19 | End: 2021-04-20 | Stop reason: HOSPADM

## 2021-04-19 RX ORDER — NEOSTIGMINE METHYLSULFATE 1 MG/ML
VIAL (ML) INJECTION PRN
Status: DISCONTINUED | OUTPATIENT
Start: 2021-04-19 | End: 2021-04-19

## 2021-04-19 RX ORDER — HYDROMORPHONE HYDROCHLORIDE 1 MG/ML
.3-.5 INJECTION, SOLUTION INTRAMUSCULAR; INTRAVENOUS; SUBCUTANEOUS
Status: DISCONTINUED | OUTPATIENT
Start: 2021-04-19 | End: 2021-04-20

## 2021-04-19 RX ORDER — KETOROLAC TROMETHAMINE 15 MG/ML
15 INJECTION, SOLUTION INTRAMUSCULAR; INTRAVENOUS EVERY 6 HOURS PRN
Status: DISCONTINUED | OUTPATIENT
Start: 2021-04-19 | End: 2021-04-20

## 2021-04-19 RX ORDER — FENTANYL CITRATE-0.9 % NACL/PF 10 MCG/ML
PLASTIC BAG, INJECTION (ML) INTRAVENOUS PRN
Status: DISCONTINUED | OUTPATIENT
Start: 2021-04-19 | End: 2021-04-19

## 2021-04-19 RX ORDER — PROCHLORPERAZINE 25 MG
25 SUPPOSITORY, RECTAL RECTAL EVERY 12 HOURS PRN
Status: DISCONTINUED | OUTPATIENT
Start: 2021-04-19 | End: 2021-04-20

## 2021-04-19 RX ORDER — CEFAZOLIN SODIUM 2 G/100ML
2 INJECTION, SOLUTION INTRAVENOUS
Status: DISCONTINUED | OUTPATIENT
Start: 2021-04-19 | End: 2021-04-19 | Stop reason: HOSPADM

## 2021-04-19 RX ORDER — VALPROATE SODIUM 100 MG/ML
250 INJECTION, SOLUTION INTRAVENOUS EVERY 6 HOURS
Status: DISCONTINUED | OUTPATIENT
Start: 2021-04-19 | End: 2021-04-19

## 2021-04-19 RX ORDER — SODIUM CHLORIDE, SODIUM LACTATE, POTASSIUM CHLORIDE, CALCIUM CHLORIDE 600; 310; 30; 20 MG/100ML; MG/100ML; MG/100ML; MG/100ML
INJECTION, SOLUTION INTRAVENOUS CONTINUOUS
Status: DISCONTINUED | OUTPATIENT
Start: 2021-04-19 | End: 2021-04-20

## 2021-04-19 RX ORDER — PROCHLORPERAZINE 25 MG
25 SUPPOSITORY, RECTAL RECTAL EVERY 12 HOURS PRN
Status: DISCONTINUED | OUTPATIENT
Start: 2021-04-19 | End: 2021-04-19

## 2021-04-19 RX ORDER — ONDANSETRON 2 MG/ML
INJECTION INTRAMUSCULAR; INTRAVENOUS PRN
Status: DISCONTINUED | OUTPATIENT
Start: 2021-04-19 | End: 2021-04-19

## 2021-04-19 RX ORDER — LORAZEPAM 2 MG/ML
1 INJECTION INTRAMUSCULAR ONCE
Status: DISCONTINUED | OUTPATIENT
Start: 2021-04-19 | End: 2021-04-19

## 2021-04-19 RX ORDER — AMPICILLIN AND SULBACTAM 2; 1 G/1; G/1
3 INJECTION, POWDER, FOR SOLUTION INTRAMUSCULAR; INTRAVENOUS EVERY 6 HOURS
Status: DISCONTINUED | OUTPATIENT
Start: 2021-04-19 | End: 2021-04-19

## 2021-04-19 RX ORDER — IOPAMIDOL 755 MG/ML
500 INJECTION, SOLUTION INTRAVASCULAR ONCE
Status: DISCONTINUED | OUTPATIENT
Start: 2021-04-19 | End: 2021-04-19

## 2021-04-19 RX ORDER — CEFAZOLIN SODIUM 2 G/100ML
2 INJECTION, SOLUTION INTRAVENOUS SEE ADMIN INSTRUCTIONS
Status: DISCONTINUED | OUTPATIENT
Start: 2021-04-19 | End: 2021-04-19 | Stop reason: HOSPADM

## 2021-04-19 RX ORDER — LIDOCAINE HYDROCHLORIDE 10 MG/ML
INJECTION, SOLUTION INFILTRATION; PERINEURAL PRN
Status: DISCONTINUED | OUTPATIENT
Start: 2021-04-19 | End: 2021-04-19

## 2021-04-19 RX ORDER — IOPAMIDOL 755 MG/ML
500 INJECTION, SOLUTION INTRAVASCULAR ONCE
Status: COMPLETED | OUTPATIENT
Start: 2021-04-19 | End: 2021-04-19

## 2021-04-19 RX ORDER — FAMOTIDINE 40 MG/5ML
20 POWDER, FOR SUSPENSION ORAL 2 TIMES DAILY
COMMUNITY

## 2021-04-19 RX ORDER — PROCHLORPERAZINE MALEATE 10 MG
10 TABLET ORAL EVERY 6 HOURS PRN
Status: DISCONTINUED | OUTPATIENT
Start: 2021-04-19 | End: 2021-04-19

## 2021-04-19 RX ORDER — ALBUTEROL SULFATE 90 UG/1
2 AEROSOL, METERED RESPIRATORY (INHALATION) EVERY 6 HOURS PRN
Status: DISCONTINUED | OUTPATIENT
Start: 2021-04-19 | End: 2021-04-27 | Stop reason: HOSPADM

## 2021-04-19 RX ADMIN — LORAZEPAM 0.5 MG: 2 INJECTION INTRAMUSCULAR; INTRAVENOUS at 10:49

## 2021-04-19 RX ADMIN — VANCOMYCIN HYDROCHLORIDE 1000 MG: 1 INJECTION, SOLUTION INTRAVENOUS at 21:01

## 2021-04-19 RX ADMIN — NEOSTIGMINE METHYLSULFATE 2 MG: 1 INJECTION, SOLUTION INTRAVENOUS at 23:13

## 2021-04-19 RX ADMIN — SODIUM CHLORIDE 250 MG: 9 INJECTION, SOLUTION INTRAVENOUS at 07:54

## 2021-04-19 RX ADMIN — LORAZEPAM 1 MG: 2 INJECTION INTRAMUSCULAR; INTRAVENOUS at 16:21

## 2021-04-19 RX ADMIN — GLYCOPYRROLATE 0.4 MG: 0.2 INJECTION, SOLUTION INTRAMUSCULAR; INTRAVENOUS at 23:12

## 2021-04-19 RX ADMIN — SODIUM CHLORIDE, POTASSIUM CHLORIDE, SODIUM LACTATE AND CALCIUM CHLORIDE 1000 ML: 600; 310; 30; 20 INJECTION, SOLUTION INTRAVENOUS at 09:02

## 2021-04-19 RX ADMIN — ONDANSETRON HYDROCHLORIDE 4 MG: 2 INJECTION, SOLUTION INTRAVENOUS at 22:19

## 2021-04-19 RX ADMIN — SODIUM CHLORIDE 250 MG: 9 INJECTION, SOLUTION INTRAVENOUS at 14:37

## 2021-04-19 RX ADMIN — SODIUM CHLORIDE, POTASSIUM CHLORIDE, SODIUM LACTATE AND CALCIUM CHLORIDE: 600; 310; 30; 20 INJECTION, SOLUTION INTRAVENOUS at 13:16

## 2021-04-19 RX ADMIN — PHENYLEPHRINE HYDROCHLORIDE 100 MCG: 10 INJECTION INTRAVENOUS at 22:03

## 2021-04-19 RX ADMIN — FAMOTIDINE 20 MG: 10 INJECTION, SOLUTION INTRAVENOUS at 16:21

## 2021-04-19 RX ADMIN — IOPAMIDOL 42 ML: 755 INJECTION, SOLUTION INTRAVENOUS at 06:43

## 2021-04-19 RX ADMIN — ACETAMINOPHEN 650 MG: 650 SUPPOSITORY RECTAL at 12:00

## 2021-04-19 RX ADMIN — DEXAMETHASONE SODIUM PHOSPHATE 4 MG: 4 INJECTION, SOLUTION INTRA-ARTICULAR; INTRALESIONAL; INTRAMUSCULAR; INTRAVENOUS; SOFT TISSUE at 21:50

## 2021-04-19 RX ADMIN — LIDOCAINE HYDROCHLORIDE 50 MG: 10 INJECTION, SOLUTION INFILTRATION; PERINEURAL at 21:47

## 2021-04-19 RX ADMIN — Medication 50 MCG: at 21:51

## 2021-04-19 RX ADMIN — AMPICILLIN SODIUM AND SULBACTAM SODIUM 3 G: 2; 1 INJECTION, POWDER, FOR SOLUTION INTRAMUSCULAR; INTRAVENOUS at 10:37

## 2021-04-19 RX ADMIN — SODIUM CHLORIDE 500 ML: 9 INJECTION, SOLUTION INTRAVENOUS at 20:00

## 2021-04-19 RX ADMIN — ROCURONIUM BROMIDE 30 MG: 10 INJECTION INTRAVENOUS at 21:48

## 2021-04-19 RX ADMIN — SODIUM CHLORIDE, POTASSIUM CHLORIDE, SODIUM LACTATE AND CALCIUM CHLORIDE: 600; 310; 30; 20 INJECTION, SOLUTION INTRAVENOUS at 23:16

## 2021-04-19 RX ADMIN — FENTANYL CITRATE 25 MCG: 50 INJECTION, SOLUTION INTRAMUSCULAR; INTRAVENOUS at 22:19

## 2021-04-19 RX ADMIN — PROPOFOL 110 MG: 10 INJECTION, EMULSION INTRAVENOUS at 21:47

## 2021-04-19 RX ADMIN — FAMOTIDINE 20 MG: 10 INJECTION, SOLUTION INTRAVENOUS at 20:10

## 2021-04-19 RX ADMIN — SODIUM CHLORIDE 250 MG: 9 INJECTION, SOLUTION INTRAVENOUS at 20:14

## 2021-04-19 RX ADMIN — SODIUM CHLORIDE, POTASSIUM CHLORIDE, SODIUM LACTATE AND CALCIUM CHLORIDE: 600; 310; 30; 20 INJECTION, SOLUTION INTRAVENOUS at 21:44

## 2021-04-19 RX ADMIN — FENTANYL CITRATE 25 MCG: 50 INJECTION, SOLUTION INTRAMUSCULAR; INTRAVENOUS at 22:09

## 2021-04-19 RX ADMIN — METRONIDAZOLE 500 MG: 500 INJECTION, SOLUTION INTRAVENOUS at 17:03

## 2021-04-19 RX ADMIN — FENTANYL CITRATE 50 MCG: 50 INJECTION, SOLUTION INTRAMUSCULAR; INTRAVENOUS at 21:47

## 2021-04-19 RX ADMIN — SODIUM CHLORIDE 51 ML: 9 INJECTION, SOLUTION INTRAVENOUS at 06:47

## 2021-04-19 RX ADMIN — CEFEPIME HYDROCHLORIDE 1 G: 1 INJECTION, POWDER, FOR SOLUTION INTRAMUSCULAR; INTRAVENOUS at 16:23

## 2021-04-19 RX ADMIN — SODIUM CHLORIDE, POTASSIUM CHLORIDE, SODIUM LACTATE AND CALCIUM CHLORIDE: 600; 310; 30; 20 INJECTION, SOLUTION INTRAVENOUS at 22:30

## 2021-04-19 RX ADMIN — SODIUM CHLORIDE 500 ML: 9 INJECTION, SOLUTION INTRAVENOUS at 05:30

## 2021-04-19 ASSESSMENT — ENCOUNTER SYMPTOMS
ABDOMINAL DISTENTION: 1
ANAL BLEEDING: 1
SEIZURES: 1
VOMITING: 1
DIARRHEA: 1

## 2021-04-19 ASSESSMENT — ACTIVITIES OF DAILY LIVING (ADL): ADLS_ACUITY_SCORE: 35

## 2021-04-19 NOTE — CONSULTS
[facility]  [facility]     Rj Licea MRN# [id number]   Age: 25 year old YOB: 1995     HPI:  Rj is a 25 year old male with prior history of small bowel obstruction in May 2020 with similar imaging.  It resolved non-operatively.  He has limited prior abdominal surgeries.  G tube and bilateral inguinal hernia repairs as an infant.  He is non-verbal.  Resting comfortably.  One stool has been bloody, bright red, modest amount.  He always has loose stools.   Has spiked fever since admission and this is likely due to pneumonia, infiltrate on CXR.    History is obtained from the patient's parent(s)    Review Of Systems:  Respiratory: No cough  Cardiovascular: negative  Gastrointestinal: as above  Genitourinary: negative  All remaining review of systems negative except as stated in HPI.    PMH:  Past Medical History:   Diagnosis Date     Chronic lung disease     off nebs in 2012     Depression     fluoxetine, risperidone     Epilepsy (H) 5/2011    on keppra (Janaczek)     Feeding by G-tube (H)      GERD (gastroesophageal reflux disease)      Insomnia     melatonin, clonazepam     Retinopathy of prematurity     led to blindness       PSH:  Past Surgical History:   Procedure Laterality Date     GASTROSTOMY TUBE         Allergies:  No Known Allergies    Home Medications:  No current outpatient medications on file.       Social History:  Social History     Tobacco Use     Smoking status: Never Smoker     Smokeless tobacco: Never Used   Substance Use Topics     Alcohol use: No     Drug use: No       Family History:  Family History   Problem Relation Age of Onset     Diabetes Father       Family history, diabetes.    Physical Exam:  /74   Pulse 141   Temp 99  F (37.2  C) (Axillary)   Resp (!) 32   Wt 40.6 kg (89 lb 6.4 oz)   SpO2 95%   BMI 16.75 kg/m      General appearance: thin, small male.  Hydration: mildly dehydrated  HEENT: traumatic  Neck: no adenopathy  Lungs: no  increased respiratory effort.  Heart: regular rate and rhythm  Abdomen: rounded and distended, hypoactive bowel sounds. Tenderness: none, tympanitic.  Masses: none.  Organomegaly: none  Skin: clear without rashes/lesions  Extremities: no gross deformities  Neuro: oriented to time & place, moves all extremities with normal strength, speech clear    Labs Reviewed:  Lab Results   Component Value Date    WBC 3.0 04/19/2021     Lab Results   Component Value Date    HGB 21.1 04/19/2021     Lab Results   Component Value Date     04/19/2021       Last Basic Metabolic Panel:  Lab Results   Component Value Date     04/19/2021      Lab Results   Component Value Date    POTASSIUM 4.8 04/19/2021     Lab Results   Component Value Date    CHLORIDE 108 04/19/2021     Lab Results   Component Value Date    ALICE 8.9 04/19/2021     Lab Results   Component Value Date    CO2 26 04/19/2021     Lab Results   Component Value Date    BUN 25 04/19/2021     Lab Results   Component Value Date    CR 0.98 04/19/2021     Lab Results   Component Value Date    GLC 83 04/19/2021         Radiology:  CT abdomen:  1.  Findings compatible with mechanical small bowel obstruction with an abrupt transition in the distal small bowel just proximal to the terminal ileum in the right lower quadrant. Definite obstructing etiology is unclear. Findings could be seen with   adhesions or internal hernia among other etiologies. No free air.     2.  There is some borderline mild low-density wall thickening in the rectosigmoid colon. Findings could be seen with a mild or low-grade distal colitis. Clinical correlation.     3.  Small amount of free fluid in the pelvis.     4.  Mild hazy acute-appearing infiltrate in the right middle lobe. Clinical correlation for any signs of a mild or low-grade pneumonitis.           ASSESSMENT/PLAN:  Rj has a recurrent small bowel obstruction with high grade obstruction now about 10 months after similar event in May  2020.  I have suggested that he will likely need exploration.  His mother is very reluctant to consider surgery now and wants to try other means of getting a resolution.  His fever is likely due to his pneumonia, as his belly is non-tender to palpation.  He also has stooled twice since at the hospital  He is NOT a candidate for gastrografin challenge due to his high risk for aspiration.  Nor will he tolerate an NG.  Our options are limited.  Will keep G tube at venting, NPO, hydrate and check abdominal films in the am.      Fabiola Pop MD

## 2021-04-19 NOTE — H&P
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     Patient was discussed with Dr. Morton, who was provider in ED. Chart review of ED work up was reviewed as well as chart review of Care Everywhere, previous visits and admissions.     #Mechanical small bowel obstruction  #Chronic constipation  Patient has a history of 2 large hernia repairs as an infant and has been feeding tube dependent his entire life.  He has always struggled with constipation and was admitted in May 2020 with ileus.  Yesterday his mother noted that his abdomen was more distended with vomiting and no bowel movement for 3 days.  She also noted tube feeding leaking around the PEG tube.  She gave him a suppository which resulted in bright red blood per rectum.  His exam and lab work suggest significant dehydration.  CT scan is compatible with mechanical small bowel obstruction with an abrupt transition in the distal small bowel just proximal to the terminal ileum in the right lower quadrant.  Mother would prefer that NG tube is not placed. Received 2 litres of fluid in the ER.  -General surgery consult  -Nothing by G tube, patient does not take anything orally  -LR at 100 mL/h  -We will have nausea and pain medicines available by IV    #Possible aspiration pneumonia  Mother states that patient aspirates when he vomits but she did not witness any aspiration events.  On admission he is more lethargic than normal, chilled with leukopenia, tachycardic and there is evidence of infiltrate in the right middle lobe.  -Unasyn started  -Add  Procalcitonin  -If febrile add blood cultures    #Bright red blood per rectum  #Rectal dose sigmoidcolon wall thickening  Mother notes that he had some bright red blood per rectum this morning and CT findings show borderline mild low-density wall thickening in the rectosigmoid colon findings could be consistent with low-grade distal colitis. No active bleeding at this point with Hgb 21.1 suggesting hemoconcentration.   -Continue to follow consider GI or colorectal consult if bleeding continues  -Repeat Hgb tomorrow unless further bleeding    #Cerebral palsy  #Epilepsy  I spoke with his neurologist, Dr. Durán at [facility] who recommends continuing valproic acid IV and in place of clobazam using Ativan 1 mg IV every 8 hours and adjusting for sedation.  -Valproic acid  mg every 6 hours  -Ativan 1 mg IV every 8 hrs in place of Clobazam  -Seizure precautions      #Chronic lung disease  Patient appears to be breathing comfortably on room air and lungs are clear.  -Continue inhalers    #Depression/anxiety  Unfortunately there are no IV formulations of gabapentin or Seroquel for replacement at this time      Social: Lives with his mother who is his legal guardian  Code: Discussed with patient  and they have chosen full code  VTE prophylaxis: PCDs  Disposition: Inpatient                    Chief Complaint:   Vomiting, lethargy         History of Present Illness:   History is limited as patient is very lethargic and history is provided by his mother who is his guardian.    Rj Licea is a 25 year old male who presents with acute onset of stomach distention with vomiting and diarrhea.  His mother states that for his entire life they have struggled with constipation and have been on a good bowel regimen recently however he has not had a bowel movement for 3 days which is not unusual and she noted some increased abdominal distention last night so gave him a suppository.  Then at approximately 3 AM he got  up to have a bowel movement with little stool but a fair amount of bright red blood.  He also was vomiting and his mother noted that there was tube feeding coming out around the edges of the PEG tube.  She also felt he looked pale and felt chilled.  He has aspirated with vomiting before but she did not witness any aspiration.  He has not urinated since yesterday and noted that his urine was dark.  He has not had any coughing and does not appear short of breath.  She has noted that he is more tachycardic than usual when he normally runs about 110.  He has not had any changes to his medicines recently.             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               Past Surgical History:     Past Surgical History:   Procedure Laterality Date     GASTROSTOMY TUBE                 Social History:     Social History     Socioeconomic History     Marital status: Single     Spouse name: Not on file     Number of children: Not on file     Years of education: Not on file     Highest education level: Not on file   Occupational History     Not on file   Social Needs     Financial resource strain: Not on file     Food insecurity     Worry: Not on file     Inability: Not on file     Transportation needs     Medical: Not on file     Non-medical: Not on file   Tobacco Use     Smoking status: Never Smoker     Smokeless tobacco: Never Used   Substance and Sexual Activity     Alcohol use: No     Drug use: No     Sexual activity: Never   Lifestyle     Physical activity     Days per week: Not on file     Minutes per session: Not on file     Stress: Not on file   Relationships     Social connections     Talks on  phone: Not on file     Gets together: Not on file     Attends Congregation service: Not on file     Active member of club or organization: Not on file     Attends meetings of clubs or organizations: Not on file     Relationship status: Not on file     Intimate partner violence     Fear of current or ex partner: Not on file     Emotionally abused: Not on file     Physically abused: Not on file     Forced sexual activity: Not on file   Other Topics Concern     Parent/sibling w/ CABG, MI or angioplasty before 65F 55M? Not Asked   Social History Narrative     Not on file               Family History:     Family History   Problem Relation Age of Onset     Diabetes Father               Allergies:    No Known Allergies            Medications:     Prior to Admission medications    Medication Sig Last Dose Taking? Auth Provider   albuterol (PROAIR HFA/PROVENTIL HFA/VENTOLIN HFA) 108 (90 Base) MCG/ACT inhaler Inhale 2 puffs into the lungs every 6 hours as needed for shortness of breath / dyspnea or wheezing   Micky Leyva MD   albuterol (PROVENTIL) (2.5 MG/3ML) 0.083% neb solution Take 1 vial (2.5 mg) by nebulization every 6 hours as needed for shortness of breath / dyspnea or wheezing   Micky Leyva MD   amoxicillin-clavulanate (AUGMENTIN ES-600) 600-42.9 MG/5ML suspension 5 mLs (600 mg) by Per Feeding Tube route 3 times daily   Micky Leyva MD   benzoyl peroxide 5 % EX topical gel Apply topically 2 times daily as needed (acne)   Micky Leyva MD   clindamycin 1 % EX external gel Apply topically 2 times daily as needed (acne)   Micky Leyva MD   clindamycin-benzoyl peroxide (BENZACLIN) 1-5 % external gel Apply topically 2 times daily as needed (acne)   Micky Leyva MD   clobazam (,ONFI,) 2.5 MG/ML suspension Take 5 mg by mouth At Bedtime (3 mL in AM / 2 mL in PM)   Unknown, Entered By History   clobazam (ONFI) 2.5 MG/ML suspension Take 7.5 mg by mouth every morning (3 mL in AM / 2 mL in PM)    Reported, Patient   clotrimazole-betamethasone (LOTRISONE) 1-0.05 % external cream apply a very small amount to rash on neck BID as needed. do not apply to face, armpits, or groin   Micky Leyva MD   fluticasone-salmeterol (ADVAIR HFA) 230-21 MCG/ACT inhaler Inhale 2 puffs into the lungs 2 times daily    Micky Leyva MD   gabapentin (NEURONTIN) 250 MG/5ML solution 100 mg by Per G Tube route 2 times daily Morning and 2pm   Unknown, Entered By History   gabapentin (NEURONTIN) 250 MG/5ML solution 1,000 mg by Per G Tube route At Bedtime   Unknown, Entered By History   hydrOXYzine (ATARAX) 10 MG/5ML syrup Take 50 mg by mouth At Bedtime (25 mL)   Reported, Patient   LORazepam (ATIVAN) 1 MG tablet Take 1 mg by mouth as needed for anxiety Per mom: prn   Unknown, Entered By History   melatonin 1 MG TABS Take 1 mg by mouth daily Daily between 4-5pm*   Reported, Patient   melatonin 3 MG tablet Take 6 mg by mouth At Bedtime   Unknown, Entered By History   multivitamins w/minerals (CERTAVITE) liquid Take 15 mLs by mouth daily   Micky Leyva MD   Nutritional Supplements (ISOSOURCE 1.5 ALICE) LIQD 1 Can by Per G Tube route 4 times daily Take   Micky Leyva MD   Nutritional Supplements (PEDIASURE 1.5 ALICE/FIBER) LIQD 1 Bottle by Per G Tube route 3 times daily   Micky Leyva MD   omeprazole (PRILOSEC) 2 mg/mL SUSP 40 mg by Per G Tube route At Bedtime    Micky Leyva MD   polyethylene glycol (MIRALAX) 17 GM/SCOOP powder TAKE 17 GRAMS(ONE CAPFUL) BY MOUTH EVERY DAY   Micky Leyva MD   QUEtiapine (SEROQUEL) 300 MG tablet Take 600 mg by mouth At Bedtime    Reported, Patient   Valproate Sodium (VALPROIC ACID) 250 MG/5ML 300 mg by Per G Tube route every morning   Unknown, Entered By History   Valproate Sodium (VALPROIC ACID) 250 MG/5ML 350 mg by Per G Tube route 2 times daily Afternoon and bedtime   Unknown, Entered By History              Review of Systems:   A Comprehensive greater than 10 system  review of systems was carried out.  Pertinent positives and negatives are noted above.  Otherwise negative for contributory information.            Physical Exam:   Blood pressure 107/76, pulse 136, temperature 99.3  F (37.4  C), temperature source Rectal, resp. rate 28, SpO2 98 %.  Exam:  GENERAL:  Comfortable but tired with cold extremities  PSYCH: Appears very tired  HEENT:  PERRLA. Normal conjunctiva, normal hearing, nasal mucosa and Oropharynx are dry  NECK:  Supple, no neck vein distention, adenopathy or bruits, normal thyroid.  HEART:  Tachycardic with no murmur, no pericardial rub, gallops or S3 or S4.  LUNGS:  Clear to auscultation, normal Respiratory effort. No wheezing, rales or ronchi.  ABDOMEN: Abdomen is mildly distended and slightly tight.  Absent bowel sounds on auscultation  EXTREMITIES:  No pedal edema, +2 pulses bilateral and equal.  SKIN:  Dry to touch, No rash, wound or ulcerations.  NEUROLOGIC:  CN 2-12 grossly intact, patient unable to follow commands to do full neurologic exam              Data:     Recent Labs   Lab 04/19/21  0537   WBC 3.0*   HGB 21.1*   HCT 60.9*   *   *     Recent Labs   Lab 04/19/21  0850 04/19/21  0537    136   POTASSIUM 4.8 4.4   CHLORIDE 108 103   CO2 26 Canceled, Test credited   ANIONGAP 8 Not Calculated   GLC 83 Canceled, Test credited   BUN 25 Canceled, Test credited   CR 0.98 Canceled, Test credited, specimen discarded   GFRESTIMATED >90 >90   GFRESTBLACK >90 >90   ALICE 8.9 Canceled, Test credited   PROTTOTAL 6.6* 8.7   ALBUMIN 3.0* Canceled, Test credited   BILITOTAL 1.0 1.1   ALKPHOS 80 104   AST 41 33   ALT 23 30         Recent Results (from the past 24 hour(s))   CT Abdomen Pelvis w Contrast    Narrative    EXAM: CT ABDOMEN PELVIS W CONTRAST  LOCATION: [facility]  DATE/TIME: 4/19/2021 6:30 AM    INDICATION: Vomiting, abdominal distention, rectal bleeding.  COMPARISON: 5/28/2020.  TECHNIQUE: CT scan of the abdomen and pelvis was  performed following injection of IV contrast. Multiplanar reformats were obtained. Dose reduction techniques were used.  CONTRAST: 42 mL Isovue-370.    FINDINGS:   LOWER CHEST: Mild hazy acute-appearing infiltrate in the right middle lobe. Clinical correlation for any signs of pneumonitis.    HEPATOBILIARY: Normal.    PANCREAS: Normal.    SPLEEN: Normal.    ADRENAL GLANDS: Normal.    KIDNEYS/BLADDER: Normal.    BOWEL: Percutaneous gastrostomy tube in place. There is significant dilatation of numerous loops of small bowel with multiple air-fluid levels present. The very distal small bowel is decompressed and findings are compatible with mechanical small bowel   obstruction. There is an abrupt transition point in the distal small bowel just proximal to the terminal ileum in the right lower quadrant. Definite obstructing etiology is unclear. Findings could be seen with adhesions or internal hernia. No free air.   Normal appendix. There is some questionable mild low-density wall thickening in the rectosigmoid colon. Clinical correlation for any signs of a mild or low-grade distal colitis. Small amount of free fluid in the pelvis.    LYMPH NODES: Normal.    VASCULATURE: Unremarkable.    PELVIC ORGANS: Normal.    MUSCULOSKELETAL: Thoracolumbar curve convex to the right. No acute bony findings.      Impression    IMPRESSION:   1.  Findings compatible with mechanical small bowel obstruction with an abrupt transition in the distal small bowel just proximal to the terminal ileum in the right lower quadrant. Definite obstructing etiology is unclear. Findings could be seen with   adhesions or internal hernia among other etiologies. No free air.    2.  There is some borderline mild low-density wall thickening in the rectosigmoid colon. Findings could be seen with a mild or low-grade distal colitis. Clinical correlation.    3.  Small amount of free fluid in the pelvis.    4.  Mild hazy acute-appearing infiltrate in the right  middle lobe. Clinical correlation for any signs of a mild or low-grade pneumonitis.               XR Chest Port 1 View    Narrative    CHEST ONE VIEW PORTABLE  4/19/2021 9:44 AM     HISTORY: Dyspnea.    COMPARISON: Chest x-ray on 5/29/2020.      Impression    IMPRESSION: Single portable AP view of the chest was obtained.  Cardiomediastinal silhouette is within normal limits. Mild medial  right basilar pulmonary opacities, indeterminate, could be infectious.  No significant pleural effusion or pneumothorax. Multiple dilated  small bowel loops in the visualized portion of the abdomen, consistent  with patient's history of small bowel obstruction.         Minnie Sosa PA-C    This patient was seen and discussed with Dr. Roger who agrees with the current plans as outlined above.

## 2021-04-19 NOTE — ED PROVIDER NOTES
History   Chief Complaint:  Rectal Bleeding and Vomiting     History limited due to disability. History supplemented by mother.      HPI   Rj Licea is a 25 year old male who presents with vomiting and rectal bleeding. The patient has received a laxative suppository 8 hours ago and became throwing up, his abdomen became distended and some content started leaking from his g-tube, after which he went to the bathroom and had a bowel movement with a significant amount of bright red blood in it. The patient was in hospital recently for 2 days for a bowel obstruction. Mother denies patient being around sick people lately.  Complaining of discomfort, mother suspects abdominal in nature.      Review of Systems   Gastrointestinal: Positive for abdominal distention, anal bleeding, diarrhea and vomiting.   All other systems reviewed and are negative.      Allergies:  The patient has no known allergies.     Medications:  Albuterol  Augmentin  Lotrisone  Gabapentin  Lorazepam  Omeprazole  Seroquel     Past Medical History:    Chronic lung disease  Depression  Epilepsy  Feeding by G-tube  GERD  Insomnia  Retinopathy   Anxiety     Past Surgical History:    G-tube     Family History:    Diabetes (father)    Social History:  The patient was brought to the emergency department by EMS.  The patient presents to the emergency department with mother.    Physical Exam     Patient Vitals for the past 24 hrs:   BP Temp Temp src Pulse Resp SpO2   04/19/21 0800 107/76 -- -- 136 -- 98 %   04/19/21 0750 -- -- -- -- -- 97 %   04/19/21 0740 -- -- -- -- -- 97 %   04/19/21 0710 -- -- -- -- -- 94 %   04/19/21 0700 -- -- -- -- -- 98 %   04/19/21 0650 105/70 -- -- 134 -- 97 %   04/19/21 0630 -- -- -- -- -- 90 %   04/19/21 0600 -- -- -- -- -- 99 %   04/19/21 0550 112/79 -- -- 126 28 99 %   04/19/21 0545 112/79 -- -- 129 30 99 %   04/19/21 0530 108/78 -- -- 131 (!) 31 99 %   04/19/21 0515 105/79 -- -- 130 (!) 33 98 %   04/19/21 0500 116/86 -- --  128 22 99 %   04/19/21 0445 105/78 -- -- 126 27 99 %   04/19/21 0430 114/81 -- -- 126 29 99 %   04/19/21 0420 -- -- -- 125 22 95 %   04/19/21 0415 -- -- -- -- -- 91 %   04/19/21 0410 -- -- -- -- -- 98 %   04/19/21 0401 114/81 99.3  F (37.4  C) Rectal 126 16 98 %   04/19/21 0400 114/81 -- -- 136 -- --       Physical Exam    Eyes: No discharge, symmetrical lids  ENT: Dry mucous membranes, no ear discharge  Neck: Full range of motion  Respiratory: CTAB, no wheezes  Cardiovascular: Tachycardic, no lower extremity edema  Chest: Equal rise  Gastrointestinal: Distended. Diffuse TTP. No rebound or guarding  Musculoskeletal: Atrophic extremities. No bony deformities.  Skin: Warm and well perfused. No visible rash.  Neurologic: Moves all extremities, no facial droop  Psychiatric: Somnolent    Emergency Department Course         Imaging:  CT Abdomen Pelvis w Contrast   Final Result   IMPRESSION:    1.  Findings compatible with mechanical small bowel obstruction with an abrupt transition in the distal small bowel just proximal to the terminal ileum in the right lower quadrant. Definite obstructing etiology is unclear. Findings could be seen with    adhesions or internal hernia among other etiologies. No free air.      2.  There is some borderline mild low-density wall thickening in the rectosigmoid colon. Findings could be seen with a mild or low-grade distal colitis. Clinical correlation.      3.  Small amount of free fluid in the pelvis.      4.  Mild hazy acute-appearing infiltrate in the right middle lobe. Clinical correlation for any signs of a mild or low-grade pneumonitis.                          Laboratory:    Labs Ordered and Resulted from Time of ED Arrival Up to the Time of Departure from the ED   CBC WITH PLATELETS DIFFERENTIAL - Abnormal; Notable for the following components:       Result Value    WBC 3.0 (*)     RBC Count 5.92 (*)     Hemoglobin 21.1 (*)     Hematocrit 60.9 (*)      (*)     MCH 35.6 (*)      Platelet Count 103 (*)     Absolute Neutrophil 1.5 (*)     Absolute Lymphocytes 0.7 (*)     Absolute Metamyelocytes 0.2 (*)     All other components within normal limits   COMPREHENSIVE METABOLIC PANEL   INR   SARS-COV-2 (COVID-19) VIRUS RT-PCR        Procedures  none    Emergency Department Course:    Reviewed:  I reviewed nursing notes, vitals, past medical history and care everywhere    Assessments/Consults  ED Course as of 801   Mon 2021   0419 I obtained history and examined the patient as noted above.          Interventions:    0440 Zofran 4 mg IV  0440  mL IV    Disposition:  The patient was admitted to the hospital       Impression & Plan       Medical Decision MakinM history of cerebral palsy, epilepsy, prior SBO, presenting with vomiting, decreased stools, abdominal pain and distension  Differential diagnosis includes but is not limited to SBO, LBO, partial SBO, gastritis, viral syndrome, hypovolemia.  CT obtained, showing SBO with transition point in the small bowel.  Labs notable for leukopenia to 3.0, polycythemia with hgb of 21.  Suspect related to hypovolemia; pt appears quite dehydrated.  He received a total of 1L NS in the ED.    CT also shows a questionable pneumonitis; he has no fever, no respiratory symptoms, so antibiotics were deferred at this time.  He was given valproate IV; mother inquires about clobazam, however per pharmacy there is no equivalent IV conversion.  Plan to admit for further treatment and monitoring of SBO.      Covid-19  Rj Licea was evaluated during a global COVID-19 pandemic, which necessitated consideration that the patient might be at risk for infection with the SARS-CoV-2 virus that causes COVID-19.   Applicable protocols for evaluation were followed during the patient's care.   COVID-19 was considered as part of the patient's evaluation. The plan for testing is:  a test was obtained during this visit.  Diagnosis:    ICD-10-CM    1.  SBO (small bowel obstruction) (H)  K56.609 Comprehensive metabolic panel     INR     CANCELED: Comprehensive metabolic panel   2. Nonintractable epilepsy without status epilepticus, unspecified epilepsy type (H)  G40.909        Discharge Medications:  New Prescriptions    No medications on file       Scribe Disclosure:  Ousmane MILLER, am serving as a scribe at 4:10 AM on 4/19/2021 to document services personally performed by Ulises Morton MD based on my observations and the provider's statements to me.              Ulises Morton MD  04/19/21 0756       Ulises Morton MD  04/19/21 0801

## 2021-04-19 NOTE — PROVIDER NOTIFICATION
DATE:  4/19/2021   TIME OF RECEIPT FROM LAB:  1471  LAB TEST:  porcalcitonin  LAB VALUE:  21.12  RESULTS GIVEN WITH READ-BACK TO (PROVIDER):  Paul Gross MD via web based page  TIME LAB VALUE REPORTED TO PROVIDER:   9571

## 2021-04-19 NOTE — ED TRIAGE NOTES
Pt BIBA from home for evaluation of bright red blood in his stool after receiving a laxative suppository at 1930.  Pt has also been vomiting.  Pt is pale and lethargic, tachycardic for EMS.

## 2021-04-19 NOTE — PHARMACY-ADMISSION MEDICATION HISTORY
Admission medication history interview status for this patient is complete. See [facility] admission navigator for allergy information, prior to admission medications and immunization status.     Medication history interview done, indicate source(s): Family, mother Darcy  Medication history resources (including written lists, pill bottles, clinic record):written list    Changes made to PTA medication list:  Added: famotidine  Deleted: augmentin, benzaclin, lotrisone  Changed: gabapentin dose    Actions taken by pharmacist (provider contacted, etc):None     Additional medication history information:None    Medication reconciliation/reorder completed by provider prior to medication history?  N   (Y/N)     Prior to Admission medications    Medication Sig Last Dose Taking? Auth Provider   albuterol (PROAIR HFA/PROVENTIL HFA/VENTOLIN HFA) 108 (90 Base) MCG/ACT inhaler Inhale 2 puffs into the lungs every 6 hours as needed for shortness of breath / dyspnea or wheezing  Yes Micky Leyva MD   albuterol (PROVENTIL) (2.5 MG/3ML) 0.083% neb solution Take 1 vial (2.5 mg) by nebulization every 6 hours as needed for shortness of breath / dyspnea or wheezing  Yes Micky Leyva MD   benzoyl peroxide 5 % EX topical gel Apply topically 2 times daily as needed (acne)  Yes Micky Leyva MD   clindamycin 1 % EX external gel Apply topically 2 times daily as needed (acne)  Yes Micky Leyva MD   clobazam (,ONFI,) 2.5 MG/ML suspension 5 mg by Per G Tube route At Bedtime (3 mL in AM / 2 mL in PM) 4/17/2021 at Unknown time Yes Unknown, Entered By History   clobazam (ONFI) 2.5 MG/ML suspension 7.5 mg by Per G Tube route every morning (3 mL in AM / 2 mL in PM) 4/18/2021 at Unknown time Yes Reported, Patient   famotidine (PEPCID) 40 MG/5ML suspension 20 mg by Per G Tube route 2 times daily 4/18/2021 at Unknown time Yes Unknown, Entered By History   fluticasone-salmeterol (ADVAIR HFA) 230-21 MCG/ACT inhaler Inhale 2 puffs into the  lungs 2 times daily  4/18/2021 at Unknown time Yes Micky Leyva MD   gabapentin (NEURONTIN) 250 MG/5ML solution 750 mg by Per G Tube route At Bedtime  4/17/2021 at Unknown time Yes Unknown, Entered By History   hydrOXYzine (ATARAX) 10 MG/5ML syrup 50 mg by Per G Tube route At Bedtime (25 mL) 4/17/2021 at Unknown time Yes Reported, Patient   LORazepam (ATIVAN) 1 MG tablet 1 mg by Per G Tube route every 6 hours as needed for anxiety Per mom: prn   Yes Unknown, Entered By History   melatonin 1 MG TABS 1 mg by Per G Tube route daily Daily between 4-5pm* 4/18/2021 at Unknown time Yes Reported, Patient   melatonin 3 MG tablet 6 mg by Per G Tube route At Bedtime  4/17/2021 at Unknown time Yes Unknown, Entered By History   multivitamins w/minerals (CERTAVITE) liquid 15 mLs by Per G Tube route daily 4/18/2021 at Unknown time Yes Unknown, Entered By History   omeprazole (PRILOSEC) 2 mg/mL SUSP 40 mg by Per G Tube route At Bedtime  4/17/2021 at Unknown time Yes Micky Leyva MD   polyethylene glycol (MIRALAX) 17 g packet 17 g by Per G Tube route daily 4/18/2021 at Unknown time Yes Unknown, Entered By History   QUEtiapine (SEROQUEL) 300 MG tablet 600 mg by Per G Tube route At Bedtime  4/17/2021 at Unknown time Yes Reported, Patient   Valproate Sodium (VALPROIC ACID) 250 MG/5ML 300 mg by Per G Tube route every morning   Unknown, Entered By History   Valproate Sodium (VALPROIC ACID) 250 MG/5ML 350 mg by Per G Tube route 2 times daily Afternoon and bedtime   Unknown, Entered By History

## 2021-04-19 NOTE — ED NOTES
Bed: ED15  Expected date:   Expected time:   Means of arrival:   Comments:  A597 - 25 M constipation

## 2021-04-19 NOTE — ED NOTES
[facility]  ED Nurse Handoff Report    Rj Licea is a 25 year old male   ED Chief complaint: Rectal Bleeding and Vomiting  . ED Diagnosis:   Final diagnoses:   SBO (small bowel obstruction) (H)   Nonintractable epilepsy without status epilepticus, unspecified epilepsy type (H)     Allergies: No Known Allergies    Code Status: Full Code  Activity level - Baseline/Home:  Total Care. Activity Level - Current:   Total Care. Lift room needed: No. Bariatric: No   Needed: No   Isolation: No. Infection: Not Applicable.     Vital Signs:   Vitals:    04/19/21 0710 04/19/21 0740 04/19/21 0750 04/19/21 0800   BP:    107/76   Pulse:    136   Resp:       Temp:       TempSrc:       SpO2: 94% 97% 97% 98%       Cardiac Rhythm:  ,      Pain level:    Patient confused: No. Patient Falls Risk: Yes.   Elimination Status: Has voided   Patient Report - Initial Complaint: Rectal bleeding, vomiting . Focused Assessment:  Rj Licea is a 25 year old male who presents with vomiting and rectal bleeding. The patient has received a laxative suppository 8 hours ago and became throwing up, his abdomen became distended and some content started leaking from his g-tube, after which he went to the bathroom and had a bowel movement with a significant amount of bright red blood in it. The patient was in hospital recently for 2 days for a bowel obstruction. Mother denies patient being around sick people lately.  Complaining of discomfort, mother suspects abdominal in nature.        Review of Systems   Gastrointestinal: Positive for abdominal distention, anal bleeding, diarrhea and vomiting.   All other systems reviewed and are negative.  Tests Performed: EKG, Labs, Imaging . Abnormal Results:   Labs Ordered and Resulted from Time of ED Arrival Up to the Time of Departure from the ED   CBC WITH PLATELETS DIFFERENTIAL - Abnormal; Notable for the following components:       Result Value    WBC 3.0 (*)     RBC Count 5.92  (*)     Hemoglobin 21.1 (*)     Hematocrit 60.9 (*)      (*)     MCH 35.6 (*)     Platelet Count 103 (*)     Absolute Neutrophil 1.5 (*)     Absolute Lymphocytes 0.7 (*)     Absolute Metamyelocytes 0.2 (*)     All other components within normal limits   COMPREHENSIVE METABOLIC PANEL   INR   SARS-COV-2 (COVID-19) VIRUS RT-PCR     CT Abdomen Pelvis w Contrast   Final Result   IMPRESSION:    1.  Findings compatible with mechanical small bowel obstruction with an abrupt transition in the distal small bowel just proximal to the terminal ileum in the right lower quadrant. Definite obstructing etiology is unclear. Findings could be seen with    adhesions or internal hernia among other etiologies. No free air.      2.  There is some borderline mild low-density wall thickening in the rectosigmoid colon. Findings could be seen with a mild or low-grade distal colitis. Clinical correlation.      3.  Small amount of free fluid in the pelvis.      4.  Mild hazy acute-appearing infiltrate in the right middle lobe. Clinical correlation for any signs of a mild or low-grade pneumonitis.                        .   Treatments provided: See MAR  Family Comments: Mother at bedside   OBS brochure/video discussed/provided to patient:  Yes  ED Medications:   Medications   ondansetron (ZOFRAN) injection 4 mg (4 mg Intravenous Not Given 4/19/21 0623)   0.9% sodium chloride BOLUS (0 mLs Intravenous Stopped 4/19/21 0634)   iopamidol (ISOVUE-370) solution 500 mL (42 mLs Intravenous Given 4/19/21 0643)   for CT scan flush use (51 mLs Intravenous Given 4/19/21 0647)   valproate (DEPACON) 250 mg in sodium chloride 0.9 % 50 mL intermittent infusion (250 mg Intravenous New Bag 4/19/21 0754)     Drips infusing:  Yes  For the majority of the shift, the patient's behavior Green. Interventions performed were N/A.    Sepsis treatment initiated: No     Patient tested for COVID 19 prior to admission: YES    ED Nurse Name/Phone Number: Radha  CASSY Ramos,   8:01 AM  RECEIVING UNIT ED HANDOFF REVIEW    Above ED Nurse Handoff Report was reviewed: Yes  Reviewed by: Julia Valentin RN on April 19, 2021 at 11:20 AM

## 2021-04-19 NOTE — TELEPHONE ENCOUNTER
"Triage Call    Pt's mother/guardian calling with report of rectal bleeding, vomiting, and abdominal distention.  Mom gave pt a (R) suppository last evening about 8pm in response to distension and Hx constipation.  Result was rectal blood only and pt vomiting numerous times until mom opened pt's G Tube to air.  Pt is disabled and unable to feel pain per mom.  Denies current fever.    Mother would like to have pt evaluated before she continues medication or feedings.  RN agrees that pt could have acute abd problem or complication, so ED visit tonight recommended.    Mom calling 911 because she is unable to transport son.        Jasmin Montes RN      Reason for Disposition    Patient sounds very sick or weak to the triager    Additional Information    Negative: Shock suspected (e.g., cold/pale/clammy skin, too weak to stand, low BP, rapid pulse)    Negative: Difficult to awaken or acting confused (e.g., disoriented, slurred speech)    Negative: Passed out (i.e., lost consciousness, collapsed and was not responding)    Negative: [1] Vomiting AND [2] contains red blood or black (\"coffee ground\") material  (Exception: few red streaks in vomit that only happened once)    Negative: Sounds like a life-threatening emergency to the triager    Negative: Diarrhea is main symptom    Negative: Stool color other than brown or tan is main concern  (no bleeding and no melena)    Negative: SEVERE rectal bleeding (large blood clots; on and off, or constant bleeding)    Negative: SEVERE dizziness (e.g., unable to stand, requires support to walk, feels like passing out now)    Negative: [1] MODERATE rectal bleeding (small blood clots, passing blood without stool, or toilet water turns red) AND [2] more than once a day    Negative: Pale skin (pallor) of new onset or worsening    Negative: Tarry or jet black-colored stool (not dark green)    Negative: [1] Constant abdominal pain AND [2] present > 2 hours    Negative: Rectal foreign " body (i.e., now or within past week;  inserted or swallowed)    Negative: [1] Abdomen pain is main symptom AND [2] male    Negative: [1] Abdomen pain is main symptom AND [2] adult female    Negative: Rectal bleeding or blood in stool is main symptom    Negative: Rectal pain or itching is main symptom    Negative: Constipation in a cancer patient who is currently (or recently) receiving chemotherapy or radiation therapy, or cancer patient who has metastatic or end-stage cancer and is receiving palliative care    Protocols used: CONSTIPATION-A-AH, RECTAL BLEEDING-A-AH

## 2021-04-19 NOTE — PROVIDER NOTIFICATION
Provider notified of tachycardia and tachypnea.  NS bolus initially ordered but per bedside verbal, 500cc LR bolus given as those were the current fluids running.  Pt pale, with flushed cheeks.  Dry mucous membranes, sunken cheeks.

## 2021-04-19 NOTE — PROVIDER NOTIFICATION
REASON FOR CONTACT: Lactic now 3.4 (up from 2.8).   Pt resting comfortably. Mom requesting Ativan to keep sz at bay; home order is q 6 hr via Gtube--so IV please. planning tx to [facility] for tele.  PROVIDER CONTACTED:  Dr Gross, hospitalist  TIME CONTACTED: 3262  MODE OF CONTACT: web based page  RESPONSE: repeat lactic acid at 1930

## 2021-04-20 ENCOUNTER — APPOINTMENT (OUTPATIENT)
Dept: GENERAL RADIOLOGY | Facility: CLINIC | Age: 26
End: 2021-04-20
Attending: INTERNAL MEDICINE
Payer: COMMERCIAL

## 2021-04-20 LAB
ALBUMIN SERPL-MCNC: 2 G/DL (ref 3.4–5)
ALP SERPL-CCNC: 45 U/L (ref 40–150)
ALT SERPL W P-5'-P-CCNC: 17 U/L (ref 0–70)
ANION GAP SERPL CALCULATED.3IONS-SCNC: <1 MMOL/L (ref 3–14)
AST SERPL W P-5'-P-CCNC: 27 U/L (ref 0–45)
BILIRUB SERPL-MCNC: 1 MG/DL (ref 0.2–1.3)
BUN SERPL-MCNC: 19 MG/DL (ref 7–30)
CALCIUM SERPL-MCNC: 7.6 MG/DL (ref 8.5–10.1)
CHLORIDE SERPL-SCNC: 112 MMOL/L (ref 94–109)
CO2 SERPL-SCNC: 30 MMOL/L (ref 20–32)
CREAT SERPL-MCNC: 0.82 MG/DL (ref 0.66–1.25)
ERYTHROCYTE [DISTWIDTH] IN BLOOD BY AUTOMATED COUNT: 12.7 % (ref 10–15)
GFR SERPL CREATININE-BSD FRML MDRD: >90 ML/MIN/{1.73_M2}
GLUCOSE SERPL-MCNC: 91 MG/DL (ref 70–99)
HCT VFR BLD AUTO: 42.5 % (ref 40–53)
HGB BLD-MCNC: 14.4 G/DL (ref 13.3–17.7)
LACTATE BLD-SCNC: 0.9 MMOL/L (ref 0.7–2)
MAGNESIUM SERPL-MCNC: 1.5 MG/DL (ref 1.6–2.3)
MCH RBC QN AUTO: 35 PG (ref 26.5–33)
MCHC RBC AUTO-ENTMCNC: 33.9 G/DL (ref 31.5–36.5)
MCV RBC AUTO: 103 FL (ref 78–100)
PLATELET # BLD AUTO: 71 10E9/L (ref 150–450)
POTASSIUM SERPL-SCNC: 4.9 MMOL/L (ref 3.4–5.3)
PROT SERPL-MCNC: 4.6 G/DL (ref 6.8–8.8)
RBC # BLD AUTO: 4.12 10E12/L (ref 4.4–5.9)
SODIUM SERPL-SCNC: 142 MMOL/L (ref 133–144)
WBC # BLD AUTO: 6.6 10E9/L (ref 4–11)

## 2021-04-20 PROCEDURE — 71045 X-RAY EXAM CHEST 1 VIEW: CPT

## 2021-04-20 PROCEDURE — 80053 COMPREHEN METABOLIC PANEL: CPT | Performed by: PHYSICIAN ASSISTANT

## 2021-04-20 PROCEDURE — 250N000011 HC RX IP 250 OP 636: Performed by: INTERNAL MEDICINE

## 2021-04-20 PROCEDURE — 250N000009 HC RX 250: Performed by: SURGERY

## 2021-04-20 PROCEDURE — 36415 COLL VENOUS BLD VENIPUNCTURE: CPT | Performed by: INTERNAL MEDICINE

## 2021-04-20 PROCEDURE — 99233 SBSQ HOSP IP/OBS HIGH 50: CPT | Performed by: INTERNAL MEDICINE

## 2021-04-20 PROCEDURE — 83605 ASSAY OF LACTIC ACID: CPT | Performed by: INTERNAL MEDICINE

## 2021-04-20 PROCEDURE — 83735 ASSAY OF MAGNESIUM: CPT | Performed by: PHYSICIAN ASSISTANT

## 2021-04-20 PROCEDURE — 258N000003 HC RX IP 258 OP 636: Performed by: SURGERY

## 2021-04-20 PROCEDURE — 250N000011 HC RX IP 250 OP 636: Performed by: ANESTHESIOLOGY

## 2021-04-20 PROCEDURE — 85027 COMPLETE CBC AUTOMATED: CPT | Performed by: PHYSICIAN ASSISTANT

## 2021-04-20 PROCEDURE — 36415 COLL VENOUS BLD VENIPUNCTURE: CPT | Performed by: PHYSICIAN ASSISTANT

## 2021-04-20 PROCEDURE — 94640 AIRWAY INHALATION TREATMENT: CPT

## 2021-04-20 PROCEDURE — 250N000013 HC RX MED GY IP 250 OP 250 PS 637: Performed by: INTERNAL MEDICINE

## 2021-04-20 PROCEDURE — 258N000003 HC RX IP 258 OP 636: Performed by: INTERNAL MEDICINE

## 2021-04-20 PROCEDURE — 250N000011 HC RX IP 250 OP 636: Performed by: SURGERY

## 2021-04-20 PROCEDURE — 120N000001 HC R&B MED SURG/OB

## 2021-04-20 RX ORDER — ACETAMINOPHEN 325 MG/1
650 TABLET ORAL EVERY 8 HOURS
Status: DISCONTINUED | OUTPATIENT
Start: 2021-04-20 | End: 2021-04-22

## 2021-04-20 RX ORDER — OXYCODONE HYDROCHLORIDE 5 MG/1
5 TABLET ORAL EVERY 4 HOURS PRN
Status: DISCONTINUED | OUTPATIENT
Start: 2021-04-20 | End: 2021-04-27 | Stop reason: HOSPADM

## 2021-04-20 RX ORDER — DEXTROSE MONOHYDRATE, SODIUM CHLORIDE, AND POTASSIUM CHLORIDE 50; 1.49; 4.5 G/1000ML; G/1000ML; G/1000ML
INJECTION, SOLUTION INTRAVENOUS CONTINUOUS
Status: DISCONTINUED | OUTPATIENT
Start: 2021-04-20 | End: 2021-04-21

## 2021-04-20 RX ORDER — POLYETHYLENE GLYCOL 3350 17 G/17G
17 POWDER, FOR SOLUTION ORAL DAILY
Status: DISCONTINUED | OUTPATIENT
Start: 2021-04-20 | End: 2021-04-25

## 2021-04-20 RX ORDER — LORAZEPAM 2 MG/ML
0.5 INJECTION INTRAMUSCULAR EVERY 6 HOURS
Status: DISCONTINUED | OUTPATIENT
Start: 2021-04-20 | End: 2021-04-21

## 2021-04-20 RX ORDER — HYDROMORPHONE HYDROCHLORIDE 1 MG/ML
0.4 INJECTION, SOLUTION INTRAMUSCULAR; INTRAVENOUS; SUBCUTANEOUS
Status: DISCONTINUED | OUTPATIENT
Start: 2021-04-20 | End: 2021-04-20

## 2021-04-20 RX ORDER — PROCHLORPERAZINE MALEATE 5 MG
10 TABLET ORAL EVERY 6 HOURS PRN
Status: DISCONTINUED | OUTPATIENT
Start: 2021-04-20 | End: 2021-04-27 | Stop reason: HOSPADM

## 2021-04-20 RX ORDER — BISACODYL 10 MG
10 SUPPOSITORY, RECTAL RECTAL DAILY PRN
Status: DISCONTINUED | OUTPATIENT
Start: 2021-04-20 | End: 2021-04-27 | Stop reason: HOSPADM

## 2021-04-20 RX ORDER — LIDOCAINE 40 MG/G
CREAM TOPICAL
Status: DISCONTINUED | OUTPATIENT
Start: 2021-04-20 | End: 2021-04-23

## 2021-04-20 RX ORDER — MAGNESIUM SULFATE HEPTAHYDRATE 40 MG/ML
2 INJECTION, SOLUTION INTRAVENOUS ONCE
Status: COMPLETED | OUTPATIENT
Start: 2021-04-20 | End: 2021-04-20

## 2021-04-20 RX ORDER — AMOXICILLIN 250 MG
1 CAPSULE ORAL 2 TIMES DAILY
Status: DISCONTINUED | OUTPATIENT
Start: 2021-04-20 | End: 2021-04-25

## 2021-04-20 RX ORDER — ACETAMINOPHEN 325 MG/1
650 TABLET ORAL EVERY 4 HOURS PRN
Status: DISCONTINUED | OUTPATIENT
Start: 2021-04-22 | End: 2021-04-27 | Stop reason: HOSPADM

## 2021-04-20 RX ORDER — OXYCODONE HYDROCHLORIDE 5 MG/1
10 TABLET ORAL EVERY 4 HOURS PRN
Status: DISCONTINUED | OUTPATIENT
Start: 2021-04-20 | End: 2021-04-27 | Stop reason: HOSPADM

## 2021-04-20 RX ORDER — DOCUSATE SODIUM 100 MG/1
100 CAPSULE, LIQUID FILLED ORAL 2 TIMES DAILY
Status: DISCONTINUED | OUTPATIENT
Start: 2021-04-20 | End: 2021-04-24

## 2021-04-20 RX ORDER — ONDANSETRON 4 MG/1
4 TABLET, ORALLY DISINTEGRATING ORAL EVERY 6 HOURS PRN
Status: DISCONTINUED | OUTPATIENT
Start: 2021-04-20 | End: 2021-04-27 | Stop reason: HOSPADM

## 2021-04-20 RX ORDER — LORAZEPAM 2 MG/ML
1 INJECTION INTRAMUSCULAR EVERY 4 HOURS PRN
Status: DISCONTINUED | OUTPATIENT
Start: 2021-04-20 | End: 2021-04-27 | Stop reason: HOSPADM

## 2021-04-20 RX ORDER — ONDANSETRON 2 MG/ML
4 INJECTION INTRAMUSCULAR; INTRAVENOUS EVERY 6 HOURS PRN
Status: DISCONTINUED | OUTPATIENT
Start: 2021-04-20 | End: 2021-04-27 | Stop reason: HOSPADM

## 2021-04-20 RX ORDER — HYDROMORPHONE HCL IN WATER/PF 6 MG/30 ML
0.2 PATIENT CONTROLLED ANALGESIA SYRINGE INTRAVENOUS
Status: DISCONTINUED | OUTPATIENT
Start: 2021-04-20 | End: 2021-04-27 | Stop reason: HOSPADM

## 2021-04-20 RX ADMIN — LORAZEPAM 0.5 MG: 2 INJECTION INTRAMUSCULAR; INTRAVENOUS at 23:00

## 2021-04-20 RX ADMIN — POTASSIUM CHLORIDE, DEXTROSE MONOHYDRATE AND SODIUM CHLORIDE: 150; 5; 450 INJECTION, SOLUTION INTRAVENOUS at 03:54

## 2021-04-20 RX ADMIN — METRONIDAZOLE 500 MG: 500 INJECTION, SOLUTION INTRAVENOUS at 14:26

## 2021-04-20 RX ADMIN — HYDROMORPHONE HYDROCHLORIDE 0.2 MG: 0.2 INJECTION, SOLUTION INTRAMUSCULAR; INTRAVENOUS; SUBCUTANEOUS at 22:04

## 2021-04-20 RX ADMIN — FAMOTIDINE 20 MG: 10 INJECTION, SOLUTION INTRAVENOUS at 07:55

## 2021-04-20 RX ADMIN — LORAZEPAM 0.5 MG: 2 INJECTION INTRAMUSCULAR; INTRAVENOUS at 17:04

## 2021-04-20 RX ADMIN — LORAZEPAM 0.5 MG: 2 INJECTION INTRAMUSCULAR; INTRAVENOUS at 11:37

## 2021-04-20 RX ADMIN — VANCOMYCIN HYDROCHLORIDE 750 MG: 1 INJECTION, POWDER, LYOPHILIZED, FOR SOLUTION INTRAVENOUS at 09:43

## 2021-04-20 RX ADMIN — HYDROMORPHONE HYDROCHLORIDE 0.4 MG: 1 INJECTION, SOLUTION INTRAMUSCULAR; INTRAVENOUS; SUBCUTANEOUS at 05:32

## 2021-04-20 RX ADMIN — FAMOTIDINE 20 MG: 10 INJECTION, SOLUTION INTRAVENOUS at 19:36

## 2021-04-20 RX ADMIN — MAGNESIUM SULFATE HEPTAHYDRATE 2 G: 40 INJECTION, SOLUTION INTRAVENOUS at 11:38

## 2021-04-20 RX ADMIN — HYDROMORPHONE HYDROCHLORIDE 0.4 MG: 1 INJECTION, SOLUTION INTRAMUSCULAR; INTRAVENOUS; SUBCUTANEOUS at 08:58

## 2021-04-20 RX ADMIN — CEFEPIME HYDROCHLORIDE 1 G: 1 INJECTION, POWDER, FOR SOLUTION INTRAMUSCULAR; INTRAVENOUS at 17:04

## 2021-04-20 RX ADMIN — HYDROMORPHONE HYDROCHLORIDE 0.2 MG: 0.2 INJECTION, SOLUTION INTRAMUSCULAR; INTRAVENOUS; SUBCUTANEOUS at 14:26

## 2021-04-20 RX ADMIN — HYDROMORPHONE HYDROCHLORIDE 0.4 MG: 1 INJECTION, SOLUTION INTRAMUSCULAR; INTRAVENOUS; SUBCUTANEOUS at 01:55

## 2021-04-20 RX ADMIN — METRONIDAZOLE 500 MG: 500 INJECTION, SOLUTION INTRAVENOUS at 23:01

## 2021-04-20 RX ADMIN — SODIUM CHLORIDE 250 MG: 9 INJECTION, SOLUTION INTRAVENOUS at 15:43

## 2021-04-20 RX ADMIN — CEFEPIME HYDROCHLORIDE 1 G: 1 INJECTION, POWDER, FOR SOLUTION INTRAMUSCULAR; INTRAVENOUS at 03:49

## 2021-04-20 RX ADMIN — VANCOMYCIN HYDROCHLORIDE 750 MG: 1 INJECTION, POWDER, LYOPHILIZED, FOR SOLUTION INTRAVENOUS at 21:14

## 2021-04-20 RX ADMIN — HYDROMORPHONE HYDROCHLORIDE 0.5 MG: 1 INJECTION, SOLUTION INTRAMUSCULAR; INTRAVENOUS; SUBCUTANEOUS at 00:57

## 2021-04-20 RX ADMIN — HYDROMORPHONE HYDROCHLORIDE 0.2 MG: 0.2 INJECTION, SOLUTION INTRAMUSCULAR; INTRAVENOUS; SUBCUTANEOUS at 19:56

## 2021-04-20 RX ADMIN — SODIUM CHLORIDE 250 MG: 9 INJECTION, SOLUTION INTRAVENOUS at 07:55

## 2021-04-20 RX ADMIN — METRONIDAZOLE 500 MG: 500 INJECTION, SOLUTION INTRAVENOUS at 06:17

## 2021-04-20 RX ADMIN — SODIUM CHLORIDE 250 MG: 9 INJECTION, SOLUTION INTRAVENOUS at 04:21

## 2021-04-20 RX ADMIN — SODIUM CHLORIDE 250 MG: 9 INJECTION, SOLUTION INTRAVENOUS at 19:44

## 2021-04-20 ASSESSMENT — ACTIVITIES OF DAILY LIVING (ADL)
ADLS_ACUITY_SCORE: 31

## 2021-04-20 NOTE — PROVIDER NOTIFICATION
md notified: HR sustaining 130s, fluids infusing, ABX to be given. No repeat lactic acid labs scheduled, would you like a recheck? Please advise. Thank you.     Addendum: orders placed for repeat lactic. Will continue with POC.

## 2021-04-20 NOTE — PLAN OF CARE
LA up up 4.6. Bolus given per Dr. Singer order, bolus given and Vanco started, see Dr. Bagley note. Patient left for surgery, mother with a patient.

## 2021-04-20 NOTE — PROGRESS NOTES
[facility]   General Surgery Progress Note           Assessment and Plan:   Assessment:   POD#1 s/p Exploratory laparotomy, enterolysis, incidental appendectomy  Postop ileus as expected  Aspiration pneumonia      Plan:   -NPO/NGT, await return of bowel function  -continue abx for pneumonia  -pain control: Tylenol, IV Dilaudid  -GI prophylaxis:  Pepcid  -VTE prophylaxis: defer to hospitalist.  Safe to start anticoagulation from surgical perspective.         Interval History:   tmax 100.7 this am.  Appears comfortable in bed, sleepy.  Mother at bedside, states patient has been resting well and pain well-controlled.  Not out of bed yet yet, cardozo in place.          Physical Exam:   Blood pressure 100/54, pulse 136, temperature 100.7  F (38.2  C), temperature source Temporal, resp. rate 16, weight 40.9 kg (90 lb 3.2 oz), SpO2 96 %.    I/O last 3 completed shifts:  In: 2625 [I.V.:2125; IV Piggyback:500]  Out: 110 [Urine:110]    Abdomen:   soft, distended,  and absent bowel sounds   Inc(s) - dressing intact      Venting G-tube intact          Data:     Recent Labs   Lab 04/20/21  0425 04/19/21  0537   WBC 6.6 3.0*   HGB 14.4 21.1*   HCT 42.5 60.9*   * 103*   PLT 71* 103*       IAIN SamuelsC     Seen and agree.    Little NG output.  Please irrigate NG.  If NG were to be pulled, no plan for replacing it.  Fabiola Pop MD

## 2021-04-20 NOTE — CONSULTS
Care Management Initial Consult    General Information  Assessment completed with: Parents, Caregiver, Darcy  Type of CM/SW Visit: CM Role Introduction    Primary Care Provider verified and updated as needed: Yes   Readmission within the last 30 days: no previous admission in last 30 days      Reason for Consult: care coordination/care conference, discharge planning  Advance Care Planning:  Has co Guardians with his sister and mother          Communication Assessment  Patient's communication style: spoken language (English or Bilingual)    Hearing Difficulty or Deaf: no   Wear Glasses or Blind: no    Cognitive  Cognitive/Neuro/Behavioral: .WDL except  Level of Consciousness: lethargic  Arousal Level: arouses to voice  Orientation: other (see comments)(SAHIL)  Mood/Behavior: calm     Speech: (cognitively imapaired- 3-5 year old)    Living Environment:   People in home: parent(s), sibling(s)     Current living Arrangements: house      Able to return to prior arrangements:  yes       Family/Social Support:  Care provided by: parent(s)                 Description of Support System:    Very supportive and involved       Current Resources:   Patient receiving home care services:  Pt has PCA services through [facility] for up to 40hrs /week     Community Resources:    Equipment currently used at home:  Pt has all home equipment he needs, TF formula and supplies are provided from Pediatric home services.  Supplies currently used at home:  TF    Employment/Financial:  Employment Status: NA         Financial Concerns:  None identified           Lifestyle & Psychosocial Needs:        Socioeconomic History     Marital status: Single     Spouse name: Not on file     Number of children: Not on file     Years of education: Not on file     Highest education level: Not on file     Tobacco Use     Smoking status: Never Smoker     Smokeless tobacco: Never Used   Substance and Sexual Activity     Alcohol use: No     Drug use: No     Sexual  activity: Never       Functional Status:  Prior to admission patient needed assistance: Total care services provided by family             Additional Information:  RN CC following for possible discharge needs. Met with mother, Darcy, at bedside to introduce self and care coordination role. Mother verifies that pt lives at home with her. She and pt's sister Shani are Co Guardians for pt and provide total cares for him. Pt's mother works during that day and his sister stays with him as his caregiver during that time. Pt has PCA services provided by his sister for up to 40 hrs/week through [facility]. They get all TF formula and supplies through [facility] home [facility]. He gets all nutrition and medications through his [facility]. Mother confirms that they have all equipment and supplies that they need at home and do not have any difficulties getting what they needs. Pt's mother has a close relationship with pt's PCP Dr Leyva and he is very helpful to the family. She does not anticipate any needs on discharge. Pt's sister will pick them up and provide transport for pt on day of discharge.    No needs identified. Will cont to follow for discharge plan of care.         Teri Wright RN BSN CM  Inpatient Care Coordination  [facility]  [phone]

## 2021-04-20 NOTE — PROGRESS NOTES
Update:    Lactic acid slowly trending up.  Patient w/ sepsis from aspiration pneumonia in addition to SBO.  Surgery following, family reluctant to operate at this time.  Unclear to me if the rising lactic is from his bowel issue or sepsis.      A/P: rising lactate likely related to sepsis or bowel obstruction, could suggest some gut ischemia.  --already on cefepime and flagl, will add vancomycin.    --epeat NS bolus, continue maintenance fluids and trend lactic acid.    -0-Note that repeat abdominal XR is also in process.    --Request that Surgery be notified of rising lactic acid as well.  --discussed w/ in-house MD to help with management moving forward.    Paul Gross MD

## 2021-04-20 NOTE — PLAN OF CARE
Pt confused and restless all day, slight temps, T-max-100.7, pt on 10 LPM oxymask on blow-by setup, lethargic all shift, IV ativan scheduled, IV dilaudid given for pain and restlessness, minimal output on NG tube today, surgery gave me orders to irrigate NG, NG irrigated without issues, ST on tele, LS dim, pt pulls at tubes, pt's mother at bedside helping to prevent tubes from being pulled, discharge possible in at least 3 more days.

## 2021-04-20 NOTE — PROVIDER NOTIFICATION
DATE:  4/19/2021   TIME OF RECEIPT FROM LAB: 1940  LAB TEST:  LA  LAB VALUE:  4.6  RESULTS GIVEN WITH READ-BACK TO (PROVIDER):  {Choose the provider you called     Paged Dr. Bagley  TIME LAB VALUE REPORTED TO PROVIDER:   1945. See order from Dr Singer and Dr. Bagley note. Patient left for surgery.

## 2021-04-20 NOTE — PLAN OF CARE
Arrived from PACU around 0120.  Abd binder with dressing in place. Mother, Darcy, at bedtime. G-tube WDL. Carmona in place, minimal output. Ng tube in place with intermittent suction- minimal to no output. HR in 130s, MD aware, otherwise VSS. Oxymask placed loosely around pts face/chest to provide oxygen at 8 L. Lactic recheck 0.9. D5 .45 NS with K infusion. IV abx given.- see MAR. Pain managed with IV dilaudid. Pt resting comfortably. Cognitively impaired- currently near baseline per mother. Will continue POC.

## 2021-04-20 NOTE — OP NOTE
Procedure Date: 04/19/2021      PREOPERATIVE DIAGNOSIS:  Small-bowel obstruction.      POSTOPERATIVE DIAGNOSIS:  Small-bowel obstruction.      PROCEDURES:  Exploratory laparotomy, enterolysis.      ANESTHESIA:  General.      PREOPERATIVE MEDICATIONS:  Vancomycin, metronidazole and cefepime.      SURGEON:  Fabiola Pop MD      ASSISTANT:  Darcy Curtis PA-C.  Physician assistant is medically necessary in providing adequate exposure in the operating field, maintaining hemostasis, cutting suture, clamping and ligating blood vessels, and visualization of the anatomic structures throughout the surgical procedure.      INDICATIONS:  The patient is a 24-year-old male admitted early this morning with a small-bowel obstruction.  He has an impressive amount of distention, a tense, tympanitic abdomen.  He is developmentally delayed due to cerebral palsy, epilepsy and prematurity.  He has been leaking around the G-tube as well as vomiting and is in some distress.  His abdominal films are impressive with a significant small-bowel obstruction and a distal decompressed short segment of terminal ileum.  He had similar picture back in May 2020, which did resolve nonoperatively.  He has a rising lactate level, and despite his mother's initial reluctance for surgical intervention, she has agreed to proceed with exploration.      DESCRIPTION OF PROCEDURE:  The patient was placed supine, abdomen prepped and draped in the usual sterile fashion.  We draped off the G-tube and then entered the abdomen through a midline incision.  His fascia was tight as a drum due to the massively dilated small-bowel loops beneath.  We entered the abdomen and aspirated about 300 mL of clear ascites fluid.  The bowel loops were significantly dilated, so much so that there seemed to be at least a partial chronic component to this distention.  In order to even be able to perform an exploration due to the massive distention, I had to extend the incision so  that I could actually eviscerate the bowel loops and identify the obstructing mechanism.  The bowel was fortunately pink and viable.  When we reached the terminal ileum, there was an accordion-like adhesion amongst the distal, terminal ileum mesentery, creating an obstructing mechanism, which once , opened and freely allowed the distended proximal small bowel content to fill into that decompressed distal bowel.  There was, however, a significant amount of small-bowel content and a very tight, small abdominal cavity.  In order to ever be able to return the bowel to the abdominal domain, we had to milk the small-bowel content backwards up into the stomach.  An NG had been placed, and the small bowel succus was evacuated with NG suction.  Ultimately, the bowel was a normal caliber, and we could return it to the abdominal cavity and then closed the abdomen without tension using running double looped 0 PDS for fascia.  We closed over Seprafilm, and this was between the abdominal wall and the underlying omentum.  Prior to closure, we did perform an incidental appendectomy in the usual fashion by ligating the mesoappendix and dividing the appendiceal base with the DORINA stapler.  The abdominal wall closure was completed, the skin incision closed with running subcuticular 3-0 Vicryl, and the patient was then transferred to recovery in good condition.      ESTIMATED BLOOD LOSS:  15 mL.      INTRAOPERATIVE FINDINGS:   1.  300 mL of clear ascites.   2.  Massive small-bowel dilatation with an obstructing mechanism involving the distal terminal ileum mesentery, resolved without resection required and excellent bowel viability.   It appears that he had an abdominal compartment syndrome due to the extreme distension.  3.  Incidental appendectomy performed.         TAMMIE DIAZ MD             D: 04/20/2021   T: 04/20/2021   MT: NADIA      Name:     AYLIN MCCANN   MRN:      [id number]        Account:         [id number]   :      1995           Procedure Date: 2021      Document: [id number]

## 2021-04-20 NOTE — PROGRESS NOTES
[facility]  Hospitalist Progress Note  Paul Gross MD 04/20/21    Reason for Stay (Diagnosis): Small bowel obstruction with intra-abdominal compartment syndrome, associated aspiration pneumonia from vomiting prior to admission         Assessment and Plan:      Summary of Stay:     Rj is a 25-year-old young man with history of cerebral palsy, blindness, lifelong G-tube, epilepsy who presented 4/19/2021 with abdominal distention, frequent vomiting and leakage of contents around his G-tube in the setting of prior bowel obstructions and 2 large hernia repairs as an infant.  Here in the emergency room lab work-up was most notable for creatinine of 0.98 with a baseline of 0.6, BUN of 25, white blood count of 3.0, hemoglobin of 21.1, platelet count of 103 and negative COVID-19 swab.     CT scan of his abdomen showed significant dilatation of numerous loops of small bowel with multiple air-fluid levels compatible with a small bowel obstruction and a transition point in the distal small bowel just proximal to the terminal ileum in the right lower quadrant.  He was also noted to have a right middle lobe infiltrate concerning for pneumonia.     He was given IV Unasyn, multiple fluid boluses and admitted for further care.  Notably, just prior to leaving the emergency room he did become febrile.  He has baseline tachycardia to about 110 per his mother but became tachycardic to the 140s to 150 range as well as tachypneic though not yet hypoxic.    Following arrival to the medical floor lactic acid was elevated and he continued to have fevers.  He was reevaluated multiple times by surgery and in the setting of a rising lactic acid and concern for evolving got ischemia he was taken to the operating room for exploratory laparotomy with resolution without resection found likely to have an intra-abdominal compartment syndrome due to massive dilation from his obstruction.    He remains admitted for  ongoing IV antibiotics for pneumonia and postop management following his laparotomy.    Problem List/Assessment and Plan:   1. Small bowel obstruction with intra-abdominal compartment syndrome: Massive dilatation of his bowels.  Taken to the operating room the evening of admission.  No resection was required and he appeared to have excellent gut viability.  --Currently n.p.o. with NG tube in place.  Defer management of NG tube and timing of initiation of enteral nutrition via his G tube to general surgery.  --Dilaudid for pain control.  0.2 mg dose seems more appropriate than 0.4 mg based on observed sedation  --Continue gentle hydration    2.   Sepsis due to aspiration pneumonia due to vomiting of gastric contents in the setting of bowel obstruction:   --Continue IV cefepime, vancomycin and Flagyl.  If cultures are negative we will likely stop vancomycin in the next day.   -- He is getting blow-by oxygen at a rate of about 10 L/minute but it seems this is not history requirement, he simply cannot tolerate nasal cannula or tight fitting facemask.  --Wean O2 as able  --Await sputum and blood cultures    3.   Thrombocytopenia: Cause unclear.  May be related to sepsis.  Consider medication side effects as well.  --Recheck  --Treat sepsis as above  --We will hold off on anticoagulants for now  --He did have some bright red blood per rectum upon presentation which may have been in part related to constipation and/or suppository administration.  Rule out gut ischemia.    4.   History of cerebral palsy with associated developmental delay, blindness, hernia repairs as an infant and lifelong G-tube: His mother is extremely supportive and has been present throughout the hospital stay.  She is his primary caregiver.  --Tube feeds on hold pending resolution of postop ileus/obstructive symptoms  --Treating seizure disorder as below  --Patient is blind  --He does have developmental delay but does like music and movies and does  interact baseline    5.   Seizure disorder: Complicated by inability to take meds enterally.  My colleague spoke with his neurologist, Dr. Durán at [facility] who recommends continuing valproic acid IV and in place of home clobazam using Ativan 1 mg IV every 8 hours and adjusting for sedation.  Given sedation seen here we did reduce to 0.5 mg every 6 hours.  6.  Oliguria: Likely related to a combination of sepsis with prerenal state and intra-abdominal compartment syndrome.  --Carmona catheter remains in place, monitor ins and outs and daily weights  --Urine still appears somewhat concentrated, continuing IV fluids.  Given small size/weight will need to be careful to avoid fluid overload    DVT Prophylaxis: Pneumatic Compression Devices  Code Status: Full Code  Discharge Dispo/Date: Likely at least 3 more days        Interval History (Subjective):      Please see progress notes from last night regarding rising lactic acid in spite of fluid resuscitation, greatly appreciate coordination with in-house hospitalist MD Dr. Bagley and surgeon Dr. Pop regarding urgent surgery last night.    Lactic acid normalized, he remains somewhat tachycardic though that is overall improved    Continuing antibiotics and fluids today, now seeing some improvement in urine output                  Physical Exam:      Last Vital Signs:  /54 (BP Location: Left arm)   Pulse 136   Temp 100.7  F (38.2  C) (Temporal)   Resp 16   Wt 40.9 kg (90 lb 3.2 oz)   SpO2 92%   BMI 16.90 kg/m        Intake/Output Summary (Last 24 hours) at 4/20/2021 1502  Last data filed at 4/20/2021 1356  Gross per 24 hour   Intake 2125 ml   Output 110 ml   Net 2015 ml       General: Somnolent somewhat syndromic appearing male lying in bed  HEENT: NC/AT, some temporal wasting noted.  Mucous membranes seem less dry.  Cardiac: Tachycardic and regular.  Pulmonary: Normal chest rise, normal work of breathing.  Some right-sided rales  noted.  Abdomen: Binder in place.  No guarding.  Extremities: In extremities, no deformities.  Warm, well perfused.  Skin: no rashes or lesions noted.  Warm and Dry.  Neuro: Not interacting with me today, somnolent with eyes closed.  Does reposition himself in bed periodically.  Psych: Calm.         Medications:      All current medications were reviewed with changes reflected in problem list.         Data:      All new lab and imaging data was reviewed.   Labs:  Recent Labs   Lab 04/19/21  1326 04/19/21  1321   CULT No growth after 23 hours No growth after 23 hours     Recent Labs   Lab 04/20/21  0425      POTASSIUM 4.9   CHLORIDE 112*   CO2 30   ANIONGAP <1*   GLC 91   BUN 19   CR 0.82   GFRESTIMATED >90   GFRESTBLACK >90   ALICE 7.6*     Recent Labs   Lab 04/20/21  0425   WBC 6.6   HGB 14.4   HCT 42.5   *   PLT 71*      Imaging:   Recent Results (from the past 48 hour(s))   CT Abdomen Pelvis w Contrast    Narrative    EXAM: CT ABDOMEN PELVIS W CONTRAST  LOCATION: [facility]  DATE/TIME: 4/19/2021 6:30 AM    INDICATION: Vomiting, abdominal distention, rectal bleeding.  COMPARISON: 5/28/2020.  TECHNIQUE: CT scan of the abdomen and pelvis was performed following injection of IV contrast. Multiplanar reformats were obtained. Dose reduction techniques were used.  CONTRAST: 42 mL Isovue-370.    FINDINGS:   LOWER CHEST: Mild hazy acute-appearing infiltrate in the right middle lobe. Clinical correlation for any signs of pneumonitis.    HEPATOBILIARY: Normal.    PANCREAS: Normal.    SPLEEN: Normal.    ADRENAL GLANDS: Normal.    KIDNEYS/BLADDER: Normal.    BOWEL: Percutaneous gastrostomy tube in place. There is significant dilatation of numerous loops of small bowel with multiple air-fluid levels present. The very distal small bowel is decompressed and findings are compatible with mechanical small bowel   obstruction. There is an abrupt transition point in the distal small bowel just proximal to the  terminal ileum in the right lower quadrant. Definite obstructing etiology is unclear. Findings could be seen with adhesions or internal hernia. No free air.   Normal appendix. There is some questionable mild low-density wall thickening in the rectosigmoid colon. Clinical correlation for any signs of a mild or low-grade distal colitis. Small amount of free fluid in the pelvis.    LYMPH NODES: Normal.    VASCULATURE: Unremarkable.    PELVIC ORGANS: Normal.    MUSCULOSKELETAL: Thoracolumbar curve convex to the right. No acute bony findings.      Impression    IMPRESSION:   1.  Findings compatible with mechanical small bowel obstruction with an abrupt transition in the distal small bowel just proximal to the terminal ileum in the right lower quadrant. Definite obstructing etiology is unclear. Findings could be seen with   adhesions or internal hernia among other etiologies. No free air.    2.  There is some borderline mild low-density wall thickening in the rectosigmoid colon. Findings could be seen with a mild or low-grade distal colitis. Clinical correlation.    3.  Small amount of free fluid in the pelvis.    4.  Mild hazy acute-appearing infiltrate in the right middle lobe. Clinical correlation for any signs of a mild or low-grade pneumonitis.               XR Chest Port 1 View    Narrative    CHEST ONE VIEW PORTABLE  4/19/2021 9:44 AM     HISTORY: Dyspnea.    COMPARISON: Chest x-ray on 5/29/2020.      Impression    IMPRESSION: Single portable AP view of the chest was obtained.  Cardiomediastinal silhouette is within normal limits. Mild medial  right basilar pulmonary opacities, indeterminate, could be infectious.  No significant pleural effusion or pneumothorax. Multiple dilated  small bowel loops in the visualized portion of the abdomen, consistent  with patient's history of small bowel obstruction.    GEOVANNY ANGUIANO MD   XR Abdomen 2 Views    Narrative    EXAM: XR ABDOMEN 2 VW  LOCATION: [facility]  SERVICES  DATE/TIME: 4/19/2021 6:01 PM    INDICATION: Abdominal distention.  COMPARISON: CT 04/19/2021.      Impression    IMPRESSION: Gastrostomy. Gas-filled dilated loops of small bowel throughout the abdomen compatible with small bowel obstruction. Degree of small bowel distention slightly worsened when compared to the  film from CT 04/19/2021.    XR Chest Port 1 View    Narrative    CHEST ONE VIEW PORTABLE  4/20/2021 9:10 AM     HISTORY:  Admitted with sepsis, aspiration pneumonia, small bowel  obstruction.  Some concern for pulmonary edema after fluid  resuscitation.    COMPARISON: Chest x-ray 4/19/2021.      Impression    IMPRESSION: Portable chest. Worsening atelectasis or infiltrate noted  at the lung bases bilaterally. Upper lungs remain clear. No  pneumothorax or significant pleural fluid. Nasogastric tube has been  placed and extends below the left hemidiaphragm, presumably in the  stomach. Heart is normal in size. Dilated air-filled loops of bowel  upper abdomen again noted.    MD Paul COOPER MD , MD.    ]

## 2021-04-20 NOTE — ANESTHESIA PREPROCEDURE EVALUATION
Anesthesia Pre-Procedure Evaluation    Patient: Rj Licea   MRN: [id number] : 1995        Preoperative Diagnosis: * No pre-op diagnosis entered *   Procedure : Procedure(s):  LAPAROTOMY, POSSIBLE BOWEL RESSECTION     Past Medical History:   Diagnosis Date     Chronic lung disease     off nebs in      Depression     fluoxetine, risperidone     Epilepsy (H) 2011    on keppra (Janaczek)     Feeding by G-tube (H)      GERD (gastroesophageal reflux disease)      Insomnia     melatonin, clonazepam     Retinopathy of prematurity     led to blindness      Past Surgical History:   Procedure Laterality Date     GASTROSTOMY TUBE        No Known Allergies   Social History     Tobacco Use     Smoking status: Never Smoker     Smokeless tobacco: Never Used   Substance Use Topics     Alcohol use: No      Wt Readings from Last 1 Encounters:   21 40.6 kg (89 lb 6.4 oz)        Anesthesia Evaluation   Pt has had prior anesthetic. Type: General.    No history of anesthetic complications       ROS/MED HX  ENT/Pulmonary:     (+) asthma recent URI, possible aspiration:     Neurologic: Comment: blind    (+) seizures, Developmental delay,     Cardiovascular: Comment: tachycardic      METS/Exercise Tolerance:     Hematologic:  - neg hematologic  ROS     Musculoskeletal:  - neg musculoskeletal ROS     GI/Hepatic:     (+) GERD, Asymptomatic on medication,     Renal/Genitourinary:  - neg Renal ROS     Endo:  - neg endo ROS     Psychiatric/Substance Use:     (+) psychiatric history depression     Infectious Disease: Comment: Aspiration pneumonitis, bowel obstruction      Malignancy:  - neg malignancy ROS     Other:  - neg other ROS          Physical Exam    Airway      Comment: Prominent upper incisors     TM distance: > 3 FB      Respiratory Devices and Support         Dental  no notable dental history         Cardiovascular   cardiovascular exam normal          Pulmonary   pulmonary exam normal                OUTSIDE  LABS:  CBC:   Lab Results   Component Value Date    WBC 3.0 (L) 04/19/2021    WBC 6.7 03/17/2021    HGB 21.1 (H) 04/19/2021    HGB 14.8 03/17/2021    HCT 60.9 (H) 04/19/2021    HCT 43.8 03/17/2021     (L) 04/19/2021     (L) 03/17/2021     BMP:   Lab Results   Component Value Date     04/19/2021     04/19/2021    POTASSIUM 4.8 04/19/2021    POTASSIUM 4.4 04/19/2021    CHLORIDE 108 04/19/2021    CHLORIDE 103 04/19/2021    CO2 26 04/19/2021    CO2 Canceled, Test credited 04/19/2021    BUN 25 04/19/2021    BUN Canceled, Test credited 04/19/2021    CR 0.98 04/19/2021    CR Canceled, Test credited, specimen discarded 04/19/2021    GLC 83 04/19/2021    GLC Canceled, Test credited 04/19/2021     COAGS:   Lab Results   Component Value Date    INR 1.10 04/19/2021     POC: No results found for: BGM, HCG, HCGS  HEPATIC:   Lab Results   Component Value Date    ALBUMIN 3.0 (L) 04/19/2021    PROTTOTAL 6.6 (L) 04/19/2021    ALT 23 04/19/2021    AST 41 04/19/2021    ALKPHOS 80 04/19/2021    BILITOTAL 1.0 04/19/2021    MELISSA 16 03/17/2021     OTHER:   Lab Results   Component Value Date    LACT 4.6 (HH) 04/19/2021    A1C 5.0 03/17/2021    ALICE 8.9 04/19/2021    PHOS 2.9 05/30/2020    MAG 2.0 04/19/2021    LIPASE 192 04/23/2019    AMYLASE 63 04/23/2019    TSH 0.94 03/17/2021    T4 0.88 06/07/2019    CRP 13.0 (H) 04/05/2019    SED 5 02/06/2020       Anesthesia Plan    ASA Status:  3   NPO Status:  NPO Appropriate    Anesthesia Type: General.     - Airway: ETT   Induction: Intravenous, Propofol.   Maintenance: Balanced.        Consents    Anesthesia Plan(s) and associated risks, benefits, and realistic alternatives discussed. Questions answered and patient/representative(s) expressed understanding.     - Discussed with:  Patient         Postoperative Care    Pain management: IV analgesics, Oral pain medications, Multi-modal analgesia.   PONV prophylaxis: Ondansetron (or other 5HT-3), Dexamethasone or Solumedrol      Comments:    No history of difficulty managing the airway or with intubation, per patients mother.            Dinesh Padgett MD

## 2021-04-20 NOTE — PROGRESS NOTES
Cross Cx:     Patient admitted 4/19 for SBO and aspiration pneumonia. Worsening lactic acidosis overnight. Surgery consulted and patient take to the OR due to concern for ischemic bowel. Patient was found to have a massive small bowel dilation with obstructing mechanism involving the distal terminal ileum mesentery, resolved without resection. Lactic acid peaked at 4.6 1932. Recheck 4.4 2347. Paged for persistent sinus tachycardia. Patient is current on IV abx - vancomycin/Cefepime/metornidazole. IVF infusion. Recheck lactic acid now to determine if improved from surgical intervention and if benefit for IVF bolus. Patient is hemodynamically stable but requiring 8 L Oxymask post-operatively. Will need to monitor oxygen needs with ongoing IVF but suspect a component of sedation from surgery contributing. Nursing reports that he is comfortable with adequate pain control. Discussed care plan with nursing.     Addendum: Lactic acid normalized.

## 2021-04-20 NOTE — OR NURSING
Dr Padgett notified of heart rate of 130's and lactic of 4.4.  Continue with current plan of care, fluids running.  Patient not able to tolerate capnography, Mom requested not to use.

## 2021-04-20 NOTE — ANESTHESIA POSTPROCEDURE EVALUATION
Patient: Rj Licea    Procedure(s):  LAPAROTOMY, enterolysis  Appendectomy open    Diagnosis:* No pre-op diagnosis entered *  Diagnosis Additional Information: No value filed.    Anesthesia Type:  General    Note:  Disposition: Inpatient   Postop Pain Control: Uneventful            Sign Out: Well controlled pain   PONV: No   Neuro/Psych: Uneventful            Sign Out: Acceptable/Baseline neuro status   Airway/Respiratory: Uneventful            Sign Out: Acceptable/Baseline resp. status   CV/Hemodynamics: Uneventful            Sign Out: Acceptable CV status   Other NRE: NONE   DID A NON-ROUTINE EVENT OCCUR?          Last vitals:  Vitals:    04/19/21 2034 04/19/21 2116 04/19/21 2330   BP: 132/62 104/76 101/57   Pulse: 150 140 133   Resp:  24 13   Temp:  98.9  F (37.2  C) 98.1  F (36.7  C)   SpO2:  91% 99%       Last vitals prior to Anesthesia Care Transfer:  CRNA VITALS  4/19/2021 2256 - 4/19/2021 2355      4/19/2021             Pulse:  126    SpO2:  99 %    Resp Rate (observed):  14          Electronically Signed By: Dinesh Padgett MD  April 19, 2021  11:55 PM

## 2021-04-20 NOTE — PROGRESS NOTES
[facility]    Hospitalist cross cover/critical Note  Name: Rj Licea    MRN: [id number]  Provider: Matilde Bagley MD  Date of Service: 04/19/2021    Assessment & Plan   Summary of Stay: Rj Licea is a 25 year old male who was admitted on 4/19/2021 for aspiration pneumonia evidence of sepsis and bowel obstruction.      Severe lactic acidosis  -Likely secondary to bowel obstruction cannot exclude bowel ischemia and sepsis  Cross cover patient with worsening lactic acid levels.  CT abdomen and pelvis shows high-grade obstruction   Patient had an episode of bloody stool  General surgery was consulted, at that time family was reluctant for surgery  -Aggressive hydration  -Antibiotics as ordered  -We will follow lactic acid levels  -Had detailed conversation with mom she is agreeable for surgical intervention at this time  -Contacted Dr. Pop, notified of abnormal lactic acid levels and concerns about ischemic bowel.  Also notified and mom is now agreeable for surgical intervention.  Surgery to likely consider surgical intervention.  -Care plan discussed with nursing staff  -Updated Dr. Gross    DVT Prophylaxis: PCD  Code Status: Full Code        Interval History   Cross cover paged with elevated lactic acid levels.  Patient is nonverbal, discussed with mom at bedside regarding concern for possible ischemic bowel along with sepsis.  Also contacted general surgery.  Given worsening lactic acid levels surgery may be inevitable.    -Data reviewed today: I reviewed all new labs and imaging reports over the last 24 hours. I personally reviewed the abdominal CT image(s) showing Free fluid in abdomen.    Physical Exam   Temp: 98.6  F (37  C) Temp src: Axillary BP: 106/63 Pulse: 136   Resp: 22 SpO2: 94 % O2 Device: None (Room air)    Vitals:    04/19/21 1251   Weight: 40.6 kg (89 lb 6.4 oz)     Vital Signs with Ranges  Temp:  [98.1  F (36.7  C)-103.1  F (39.5  C)] 98.6  F (37  C)  Pulse:   [125-150] 136  Resp:  [16-44] 22  BP: ()/(63-86) 106/63  SpO2:  [90 %-99 %] 94 %  I/O last 3 completed shifts:  In: 500 [IV Piggyback:500]  Out: -       GEN: Somnolent nonverbal  HEENT:  Normocephalic/atraumatic, no scleral icterus, no nasal discharge, mouth moist.  CV: Tachycardic, no murmur or JVD.  S1 + S2 noted, no S3 or S4.  LUNGS:  Clear to auscultation bilaterally without rales/rhonchi/wheezing/retractions.  Symmetric chest rise on inhalation noted.  ABD: No bowel sounds distended cannot check for tenderness   EXT:  No edema.  SKIN:  Dry to touch    Medications     lactated ringers 100 mL/hr at 04/19/21 1316       sodium chloride 0.9%  500 mL Intravenous Once     sodium chloride 0.9%  500 mL Intravenous Once     ceFEPIme (MAXIPIME) IV  1 g Intravenous Q12H     famotidine  20 mg Intravenous BID     fluticasone-vilanterol  1 puff Inhalation Daily     LORazepam  1 mg Intravenous Q6H     metroNIDAZOLE  500 mg Intravenous Q8H     sodium chloride (PF)  3 mL Intracatheter Q8H     valproate (DEPACON) in NS intermittent infusion  250 mg Intravenous Q6H     Data     Recent Labs   Lab 04/19/21  0537   WBC 3.0*   HGB 21.1*   HCT 60.9*   *   *     Recent Labs   Lab 04/19/21  0850 04/19/21  0537    136   POTASSIUM 4.8 4.4   CHLORIDE 108 103   CO2 26 Canceled, Test credited   ANIONGAP 8 Not Calculated   GLC 83 Canceled, Test credited   BUN 25 Canceled, Test credited   CR 0.98 Canceled, Test credited, specimen discarded   GFRESTIMATED >90 >90   GFRESTBLACK >90 >90   ALICE 8.9 Canceled, Test credited     Recent Labs   Lab 04/19/21  1326 04/19/21  1321   CULT No growth after 2 hours No growth after 2 hours     Recent Labs   Lab 04/19/21  0537   HGB 21.1*     Recent Labs   Lab 04/19/21  0850 04/19/21  0537   AST 41 33   ALT 23 30   ALKPHOS 80 104   BILITOTAL 1.0 1.1     Recent Labs   Lab 04/19/21  0754   INR 1.10     Recent Labs   Lab 04/19/21  1932 04/19/21  1451   LACT 4.6* 3.4*     No results for  input(s): TSH in the last 168 hours.  No results for input(s): TROPONIN, TROPI, TROPR in the last 168 hours.    Invalid input(s): TROP, TROPONINIES  Recent Labs   Lab 04/19/21  1442   COLOR Yellow   APPEARANCE Clear   URINEGLC Negative   URINEBILI Negative   URINEKETONE Trace*   SG 1.010   UBLD Negative   URINEPH 7.0   PROTEIN 10*   NITRITE Negative   LEUKEST Negative   RBCU <1   WBCU 1       Recent Results (from the past 24 hour(s))   CT Abdomen Pelvis w Contrast    Narrative    EXAM: CT ABDOMEN PELVIS W CONTRAST  LOCATION: [facility]  DATE/TIME: 4/19/2021 6:30 AM    INDICATION: Vomiting, abdominal distention, rectal bleeding.  COMPARISON: 5/28/2020.  TECHNIQUE: CT scan of the abdomen and pelvis was performed following injection of IV contrast. Multiplanar reformats were obtained. Dose reduction techniques were used.  CONTRAST: 42 mL Isovue-370.    FINDINGS:   LOWER CHEST: Mild hazy acute-appearing infiltrate in the right middle lobe. Clinical correlation for any signs of pneumonitis.    HEPATOBILIARY: Normal.    PANCREAS: Normal.    SPLEEN: Normal.    ADRENAL GLANDS: Normal.    KIDNEYS/BLADDER: Normal.    BOWEL: Percutaneous gastrostomy tube in place. There is significant dilatation of numerous loops of small bowel with multiple air-fluid levels present. The very distal small bowel is decompressed and findings are compatible with mechanical small bowel   obstruction. There is an abrupt transition point in the distal small bowel just proximal to the terminal ileum in the right lower quadrant. Definite obstructing etiology is unclear. Findings could be seen with adhesions or internal hernia. No free air.   Normal appendix. There is some questionable mild low-density wall thickening in the rectosigmoid colon. Clinical correlation for any signs of a mild or low-grade distal colitis. Small amount of free fluid in the pelvis.    LYMPH NODES: Normal.    VASCULATURE: Unremarkable.    PELVIC ORGANS:  Normal.    MUSCULOSKELETAL: Thoracolumbar curve convex to the right. No acute bony findings.      Impression    IMPRESSION:   1.  Findings compatible with mechanical small bowel obstruction with an abrupt transition in the distal small bowel just proximal to the terminal ileum in the right lower quadrant. Definite obstructing etiology is unclear. Findings could be seen with   adhesions or internal hernia among other etiologies. No free air.    2.  There is some borderline mild low-density wall thickening in the rectosigmoid colon. Findings could be seen with a mild or low-grade distal colitis. Clinical correlation.    3.  Small amount of free fluid in the pelvis.    4.  Mild hazy acute-appearing infiltrate in the right middle lobe. Clinical correlation for any signs of a mild or low-grade pneumonitis.               XR Chest Port 1 View    Narrative    CHEST ONE VIEW PORTABLE  4/19/2021 9:44 AM     HISTORY: Dyspnea.    COMPARISON: Chest x-ray on 5/29/2020.      Impression    IMPRESSION: Single portable AP view of the chest was obtained.  Cardiomediastinal silhouette is within normal limits. Mild medial  right basilar pulmonary opacities, indeterminate, could be infectious.  No significant pleural effusion or pneumothorax. Multiple dilated  small bowel loops in the visualized portion of the abdomen, consistent  with patient's history of small bowel obstruction.    GEOVANNY ANGUIANO MD

## 2021-04-20 NOTE — ANESTHESIA CARE TRANSFER NOTE
Patient: Rj Licea    Procedure(s):  LAPAROTOMY, enterolysis  Appendectomy open    Diagnosis: * No pre-op diagnosis entered *  Diagnosis Additional Information: No value filed.    Anesthesia Type:   General     Note:      Level of Consciousness: awake  Oxygen Supplementation: face mask    Independent Airway: airway patency satisfactory and stable  Dentition: dentition unchanged  Vital Signs Stable: post-procedure vital signs reviewed and stable  Report to RN Given: handoff report given  Patient transferred to: PACU  Comments: To PACU, report to RN, oxygen per face mask.        Vitals: (Last set prior to Anesthesia Care Transfer)  CRNA VITALS  4/19/2021 2256 - 4/19/2021 2334      4/19/2021             Pulse:  126    SpO2:  99 %    Resp Rate (observed):  14        Electronically Signed By: ADRIAN Perez CRNA  April 19, 2021  11:34 PM

## 2021-04-20 NOTE — PHARMACY-VANCOMYCIN DOSING SERVICE
Pharmacy Vancomycin Initial Note  Date of Service 2021  Patient's  1995  25 year old, male    Indication: Sepsis    Current estimated CrCl = Estimated Creatinine Clearance: 66.2 mL/min (based on SCr of 0.98 mg/dL).    Creatinine for last 3 days  2021:  5:37 AM Creatinine Canceled, Test credited, specimen discarded mg/dL;  8:50 AM Creatinine 0.98 mg/dL    Recent Vancomycin Level(s) for last 3 days  No results found for requested labs within last 72 hours.      Vancomycin IV Administrations (past 72 hours)      No vancomycin orders with administrations in past 72 hours.                Nephrotoxins and other renal medications (From now, onward)    Start     Dose/Rate Route Frequency Ordered Stop    21  vancomycin (VANCOCIN) 1000 mg in dextrose 5% 200 mL PREMIX      1,000 mg  200 mL/hr over 1 Hours Intravenous EVERY 12 HOURS 21 1427  ketorolac (TORADOL) injection 15 mg      15 mg Intravenous EVERY 6 HOURS PRN 21 1428            Contrast Orders - past 72 hours (72h ago, onward)    Start     Dose/Rate Route Frequency Ordered Stop    21 0640  iopamidol (ISOVUE-370) solution 500 mL  Status:  Discontinued      500 mL Intravenous ONCE 21 0635 21 0649    21 0555  iopamidol (ISOVUE-370) solution 500 mL      500 mL Intravenous ONCE 21 0552 21 0643           Plan:  1. Start vancomycin  1000 mg IV q12h.   2. Vancomycin therapeutic monitoring goal: 15-20 mg/L  3. Pharmacy will check vancomycin levels as appropriate in 1-3 Days.    4. Serum creatinine levels will be ordered daily for the first week of therapy and at least twice weekly for subsequent weeks.      Juliano Caruso RPH

## 2021-04-21 ENCOUNTER — APPOINTMENT (OUTPATIENT)
Dept: GENERAL RADIOLOGY | Facility: CLINIC | Age: 26
End: 2021-04-21
Attending: INTERNAL MEDICINE
Payer: COMMERCIAL

## 2021-04-21 LAB
ALBUMIN SERPL-MCNC: 2.1 G/DL (ref 3.4–5)
ALP SERPL-CCNC: 51 U/L (ref 40–150)
ALT SERPL W P-5'-P-CCNC: 19 U/L (ref 0–70)
ANION GAP SERPL CALCULATED.3IONS-SCNC: 1 MMOL/L (ref 3–14)
AST SERPL W P-5'-P-CCNC: 38 U/L (ref 0–45)
BASE EXCESS BLDV CALC-SCNC: 6.3 MMOL/L
BILIRUB SERPL-MCNC: 0.8 MG/DL (ref 0.2–1.3)
BUN SERPL-MCNC: 13 MG/DL (ref 7–30)
CALCIUM SERPL-MCNC: 8.2 MG/DL (ref 8.5–10.1)
CHLORIDE SERPL-SCNC: 109 MMOL/L (ref 94–109)
CO2 SERPL-SCNC: 32 MMOL/L (ref 20–32)
COPATH REPORT: NORMAL
CREAT SERPL-MCNC: 0.71 MG/DL (ref 0.66–1.25)
ERYTHROCYTE [DISTWIDTH] IN BLOOD BY AUTOMATED COUNT: 12.4 % (ref 10–15)
GFR SERPL CREATININE-BSD FRML MDRD: >90 ML/MIN/{1.73_M2}
GLUCOSE SERPL-MCNC: 81 MG/DL (ref 70–99)
HCO3 BLDV-SCNC: 34 MMOL/L (ref 21–28)
HCT VFR BLD AUTO: 41.5 % (ref 40–53)
HGB BLD-MCNC: 14.4 G/DL (ref 13.3–17.7)
MAGNESIUM SERPL-MCNC: 2 MG/DL (ref 1.6–2.3)
MCH RBC QN AUTO: 35.1 PG (ref 26.5–33)
MCHC RBC AUTO-ENTMCNC: 34.7 G/DL (ref 31.5–36.5)
MCV RBC AUTO: 101 FL (ref 78–100)
O2/TOTAL GAS SETTING VFR VENT: ABNORMAL %
OXYHGB MFR BLDV: 88 %
PCO2 BLDV: 62 MM HG (ref 40–50)
PH BLDV: 7.35 PH (ref 7.32–7.43)
PLATELET # BLD AUTO: 60 10E9/L (ref 150–450)
PO2 BLDV: 56 MM HG (ref 25–47)
POTASSIUM SERPL-SCNC: 3.6 MMOL/L (ref 3.4–5.3)
PROT SERPL-MCNC: 5.5 G/DL (ref 6.8–8.8)
RBC # BLD AUTO: 4.1 10E12/L (ref 4.4–5.9)
SODIUM SERPL-SCNC: 142 MMOL/L (ref 133–144)
VANCOMYCIN SERPL-MCNC: 8 MG/L
WBC # BLD AUTO: 8.1 10E9/L (ref 4–11)

## 2021-04-21 PROCEDURE — 83735 ASSAY OF MAGNESIUM: CPT | Performed by: SURGERY

## 2021-04-21 PROCEDURE — 99233 SBSQ HOSP IP/OBS HIGH 50: CPT | Performed by: INTERNAL MEDICINE

## 2021-04-21 PROCEDURE — 36415 COLL VENOUS BLD VENIPUNCTURE: CPT | Performed by: INTERNAL MEDICINE

## 2021-04-21 PROCEDURE — 36415 COLL VENOUS BLD VENIPUNCTURE: CPT | Performed by: SURGERY

## 2021-04-21 PROCEDURE — 80053 COMPREHEN METABOLIC PANEL: CPT | Performed by: SURGERY

## 2021-04-21 PROCEDURE — 250N000013 HC RX MED GY IP 250 OP 250 PS 637: Performed by: INTERNAL MEDICINE

## 2021-04-21 PROCEDURE — 250N000009 HC RX 250: Performed by: SURGERY

## 2021-04-21 PROCEDURE — 99207 PR NO BILLABLE SERVICE THIS VISIT: CPT | Performed by: INTERNAL MEDICINE

## 2021-04-21 PROCEDURE — 250N000011 HC RX IP 250 OP 636: Performed by: SURGERY

## 2021-04-21 PROCEDURE — 250N000009 HC RX 250: Performed by: INTERNAL MEDICINE

## 2021-04-21 PROCEDURE — 82805 BLOOD GASES W/O2 SATURATION: CPT | Performed by: INTERNAL MEDICINE

## 2021-04-21 PROCEDURE — 258N000003 HC RX IP 258 OP 636: Performed by: SURGERY

## 2021-04-21 PROCEDURE — 250N000011 HC RX IP 250 OP 636: Performed by: INTERNAL MEDICINE

## 2021-04-21 PROCEDURE — 94640 AIRWAY INHALATION TREATMENT: CPT

## 2021-04-21 PROCEDURE — 258N000003 HC RX IP 258 OP 636: Performed by: INTERNAL MEDICINE

## 2021-04-21 PROCEDURE — 71045 X-RAY EXAM CHEST 1 VIEW: CPT

## 2021-04-21 PROCEDURE — 80202 ASSAY OF VANCOMYCIN: CPT | Performed by: INTERNAL MEDICINE

## 2021-04-21 PROCEDURE — 120N000001 HC R&B MED SURG/OB

## 2021-04-21 PROCEDURE — 85027 COMPLETE CBC AUTOMATED: CPT | Performed by: SURGERY

## 2021-04-21 RX ORDER — ACETAMINOPHEN 650 MG/1
650 SUPPOSITORY RECTAL EVERY 4 HOURS PRN
Status: DISCONTINUED | OUTPATIENT
Start: 2021-04-21 | End: 2021-04-27 | Stop reason: HOSPADM

## 2021-04-21 RX ORDER — LORAZEPAM 2 MG/ML
0.25 INJECTION INTRAMUSCULAR EVERY 6 HOURS PRN
Status: DISCONTINUED | OUTPATIENT
Start: 2021-04-21 | End: 2021-04-27 | Stop reason: HOSPADM

## 2021-04-21 RX ORDER — FLUTICASONE PROPIONATE AND SALMETEROL XINAFOATE 230; 21 UG/1; UG/1
2 AEROSOL, METERED RESPIRATORY (INHALATION) 2 TIMES DAILY
Status: DISCONTINUED | OUTPATIENT
Start: 2021-04-21 | End: 2021-04-27 | Stop reason: HOSPADM

## 2021-04-21 RX ORDER — LORAZEPAM 2 MG/ML
0.25 INJECTION INTRAMUSCULAR EVERY 6 HOURS
Status: DISCONTINUED | OUTPATIENT
Start: 2021-04-21 | End: 2021-04-21

## 2021-04-21 RX ORDER — FUROSEMIDE 10 MG/ML
20 INJECTION INTRAMUSCULAR; INTRAVENOUS ONCE
Status: COMPLETED | OUTPATIENT
Start: 2021-04-21 | End: 2021-04-21

## 2021-04-21 RX ORDER — CLOBAZAM 2.5 MG/ML
5 SUSPENSION ORAL AT BEDTIME
Status: DISCONTINUED | OUTPATIENT
Start: 2021-04-21 | End: 2021-04-27 | Stop reason: HOSPADM

## 2021-04-21 RX ORDER — CLOBAZAM 2.5 MG/ML
7.5 SUSPENSION ORAL EVERY MORNING
Status: DISCONTINUED | OUTPATIENT
Start: 2021-04-21 | End: 2021-04-27 | Stop reason: HOSPADM

## 2021-04-21 RX ADMIN — FAMOTIDINE 20 MG: 10 INJECTION, SOLUTION INTRAVENOUS at 08:25

## 2021-04-21 RX ADMIN — METRONIDAZOLE 500 MG: 500 INJECTION, SOLUTION INTRAVENOUS at 12:53

## 2021-04-21 RX ADMIN — LORAZEPAM 0.25 MG: 2 INJECTION INTRAMUSCULAR; INTRAVENOUS at 05:22

## 2021-04-21 RX ADMIN — CEFEPIME HYDROCHLORIDE 1 G: 1 INJECTION, POWDER, FOR SOLUTION INTRAMUSCULAR; INTRAVENOUS at 01:16

## 2021-04-21 RX ADMIN — ALBUTEROL SULFATE 2.5 MG: 2.5 SOLUTION RESPIRATORY (INHALATION) at 05:24

## 2021-04-21 RX ADMIN — HYDROMORPHONE HYDROCHLORIDE 0.2 MG: 0.2 INJECTION, SOLUTION INTRAMUSCULAR; INTRAVENOUS; SUBCUTANEOUS at 20:41

## 2021-04-21 RX ADMIN — CEFEPIME HYDROCHLORIDE 1 G: 1 INJECTION, POWDER, FOR SOLUTION INTRAMUSCULAR; INTRAVENOUS at 14:49

## 2021-04-21 RX ADMIN — SODIUM CHLORIDE 250 MG: 9 INJECTION, SOLUTION INTRAVENOUS at 20:42

## 2021-04-21 RX ADMIN — ACETAMINOPHEN 650 MG: 650 SUPPOSITORY RECTAL at 08:52

## 2021-04-21 RX ADMIN — VANCOMYCIN HYDROCHLORIDE 750 MG: 1 INJECTION, POWDER, LYOPHILIZED, FOR SOLUTION INTRAVENOUS at 10:01

## 2021-04-21 RX ADMIN — FLUTICASONE PROPIONATE AND SALMETEROL XINAFOATE 2 PUFF: 230; 21 AEROSOL, METERED RESPIRATORY (INHALATION) at 17:31

## 2021-04-21 RX ADMIN — SODIUM CHLORIDE 250 MG: 9 INJECTION, SOLUTION INTRAVENOUS at 01:54

## 2021-04-21 RX ADMIN — METRONIDAZOLE 500 MG: 500 INJECTION, SOLUTION INTRAVENOUS at 05:31

## 2021-04-21 RX ADMIN — SODIUM CHLORIDE 250 MG: 9 INJECTION, SOLUTION INTRAVENOUS at 14:04

## 2021-04-21 RX ADMIN — FAMOTIDINE 20 MG: 10 INJECTION, SOLUTION INTRAVENOUS at 20:41

## 2021-04-21 RX ADMIN — CLOBAZAM 5 MG: 2.5 SUSPENSION ORAL at 20:42

## 2021-04-21 RX ADMIN — METRONIDAZOLE 500 MG: 500 INJECTION, SOLUTION INTRAVENOUS at 21:53

## 2021-04-21 RX ADMIN — SODIUM CHLORIDE 250 MG: 9 INJECTION, SOLUTION INTRAVENOUS at 08:42

## 2021-04-21 RX ADMIN — FUROSEMIDE 20 MG: 10 INJECTION, SOLUTION INTRAMUSCULAR; INTRAVENOUS at 05:22

## 2021-04-21 ASSESSMENT — ACTIVITIES OF DAILY LIVING (ADL)
ADLS_ACUITY_SCORE: 31

## 2021-04-21 NOTE — PROGRESS NOTES
Cross Cx:     CC: Hypoxia    HPI: Rj is a 26 yo male admitted with SBO with intra-abdominal compartment syndrome and aspiration pneumonia. Patient does not tolerate anything near his face. Support cares being provided with cool aerosol. Moves extremities but does not make any verbal sounds. Mom reports increased sedation since surgery. Attempting to cough and clear throat with minimal success.       Vitals: Afebrile, spO2 84-88% on 10 LPM cool aerosol. Some positional improvement.     CXR: Increased bilateral pulmonary infiltrates.     Brief Exam:   General: Minimal responsive. Agitated with anything near his face. Thin/frail. Ill in appearance.   HEENT: AT. NC. MMM  CV: Tachycardic  Lungs: Poor respiratory effort. No use of accessory respiratory muscles  Mentation: Minimal participation. Mom reports more sedated throughout the day. Does not tolerate anything on his face.     Assessment/Plan  Acute hypoxic respiratory failure secondary to aspiration pneumonia and possible volume overload and atelectasis   -Stat CXR  -RT consulted - albuterol neb, discussed escalation of oxygen deliver to at minimum nasal canula. Mom would like to trial positional changes, neb, and encouraging deep breathing. He did get some positional improvement when laying on left side.   -CXR appears to be increased pulmonary vascular congestion with poor respiratory effort compared to 4/20. Lasix 20 mg IV once  -IVF on standby overnight. Reassess respiratory status in AM  -Ativan 0.25 mg q 6 hours (dose reduced) - mom reports increased sedation post-operatively.   -Attempt nasal canula if patient will tolerate additional oxygen support  -Monitor I/Os    Patient's Mom was at the bedside. Discussed clinical status and indication for additional respiratory support.

## 2021-04-21 NOTE — PROGRESS NOTES
No bowel tones.  Has Gtube, clamped.  Abdomen soft, steristrips in place.    [place] to bsd clear dwight urine.  [place] care done.    Lung sounds diminished.  Earlier at reception some audible rhonchi upper airways.  RT responded. Listened to patient with advisement to suction if it becomes indicated.    At the time of this entry, patient is quietly sleeping.    Tylenol changed from oral to rectal.  Pain is controlled.       Mother Darcy in attendance.       Plan: safety.  Monitor return of bowel function.

## 2021-04-21 NOTE — CONSULTS
"CLINICAL NUTRITION SERVICES  -  ASSESSMENT NOTE      MALNUTRITION:  % Weight Loss:  None noted  % Intake:  Decreased intake does not meet criteria for malnutrition   Subcutaneous Fat Loss:  Orbital region mild to moderate depletion --> not using as indicator with only 1 region present, CP hx  Muscle Loss:  Temporal region moderate depletion --> not using as indicator with only 1 region present, CP hx  Fluid Retention:  None noted or documented     Malnutrition Diagnosis: Patient does not meet two of the above criteria necessary for diagnosing malnutrition, high risk for decline, pending ongoing holding of EN          REASON FOR ASSESSMENT  Rj Licea is a 25 year old male seen by Registered Dietitian for RN Consult - \"    PMH of: CP, blindness, lifelong dysphagia with GT dependence, epilepsy, obstruction in 5/2020 as well.    Admit 2/2: SBO, aspiration PNA, sepsis.     NUTRITION HISTORY  - Information obtained from patient's mom at bedside and chart.  - Briefly known to this service and this writer during 5/2020 admit.  - Patient does not follow with an RD as outpatient (none through [facility], only receives formula per report).  Mom describes Rj receives most care, including nutrition recommendations, through his PCP.  - Home infusion: [facility].    - Described to be sensitive to most formulas and for a period of years has been receiving the following:  - Pediasure 1.5 with fiber via GT, 5-6 cartons daily (237 ml each) bolused via gravity (previously reported as syringe infusion?).  Water flushes of \"4 oz\" (120 mL) with connections.  - 1185 mL provides 1778 kcal, 70 g protein, 195 g CHO, 15 g fiber, and 901 mL free water daily.  - Enteral schedule:     1 carton at 5:30 am  2 cartons at 10:30 am  1 carton at 2 pm  1 carton at 5:30 pm    - Sister and mom both provide care (co-guardians).    - No recent intolerance to regimen (no N/V/D) until the day prior to admission.  Mom reports patient was consistently receiving at " "least 5 cartons/day until this time.    - Stooling patterns: Monitored closely and receives at least Miralax daily.   - 100% enteral reliance.  NPO and uses GT for med access.  Mom notes she wishes Rj were more interested in food and describes he used to take small items such as ice cream or pretzels.    - Allergies: NKFA.      CURRENT NUTRITION ORDERS  Diet Order:     NPO (baseline)  Without use of EN since admission    Current Intake/Tolerance:  Surgery team following and required exploratory lap and appendectomy 4/19.  Has NGT and is now receiving some meds via his GT as well.        NUTRITION FOCUSED PHYSICAL ASSESSMENT FOR DIAGNOSING MALNUTRITION)  Yes     Obtained from Chart/Interdisciplinary Team:  - No documentation of PI  - Stooling patterns reviewed    ANTHROPOMETRICS  Height: 5' 1\"  Weight: 92 lbs 6.4 oz  Body mass index is 17.32 kg/m .  Weight Status:  Underweight BMI <18.5  Weight History:  Wt Readings from Last 10 Encounters:   04/21/21 41.9 kg (92 lb 6.4 oz)   03/17/21 37.9 kg (83 lb 9.6 oz)   05/29/20 41.5 kg (91 lb 9.6 oz)   02/06/20 39.4 kg (86 lb 14.4 oz)   06/07/19 36 kg (79 lb 4.8 oz)   04/23/19 37.2 kg (82 lb)   04/05/19 36.7 kg (81 lb)   02/13/19 36.4 kg (80 lb 4.8 oz)   02/06/19 36.9 kg (81 lb 6.4 oz)   01/15/19 36.4 kg (80 lb 4.8 oz)     - Mom reports UBW is 82-84#.  Suspect fluid shifts post-op.  Mom denies wt loss or wt changes PTA.      LABS  Labs reviewed:  Electrolytes  Potassium (mmol/L)   Date Value   04/21/2021 3.6   04/20/2021 4.9   04/19/2021 4.8     Phosphorus (mg/dL)   Date Value   05/30/2020 2.9   05/29/2020 2.1 (L)    Blood Glucose  Glucose (mg/dL)   Date Value   04/21/2021 81   04/20/2021 91   04/19/2021 83   04/19/2021 Canceled, Test credited   03/17/2021 73     Hemoglobin A1C (%)   Date Value   03/17/2021 5.0   02/06/2020 4.9   06/07/2019 4.5   04/06/2019 5.4   01/15/2019 4.8    Inflammatory Markers  CRP Inflammation (mg/L)   Date Value   04/05/2019 13.0 (H)   01/15/2019 " <2.9     WBC (10e9/L)   Date Value   04/21/2021 8.1   04/20/2021 6.6   04/19/2021 3.0 (L)     Albumin (g/dL)   Date Value   04/21/2021 2.1 (L)   04/20/2021 2.0 (L)   04/19/2021 3.0 (L)      Magnesium (mg/dL)   Date Value   04/21/2021 2.0   04/20/2021 1.5 (L)   04/19/2021 2.0     Sodium (mmol/L)   Date Value   04/21/2021 142   04/20/2021 142   04/19/2021 142    Renal  Urea Nitrogen (mg/dL)   Date Value   04/21/2021 13   04/20/2021 19   04/19/2021 25     Creatinine (mg/dL)   Date Value   04/21/2021 0.71   04/20/2021 0.82   04/19/2021 0.98     Additional  Triglycerides (mg/dL)   Date Value   03/17/2021 57   02/06/2020 73     Ketones Urine (mg/dL)   Date Value   04/19/2021 Trace (A)        B/P: 102/66, T: 98.2, P: 114, R: 18      MEDICATIONS  Medications reviewed:    acetaminophen  650 mg Oral Q8H     ceFEPIme (MAXIPIME) IV  1 g Intravenous Q12H     clobazam  5 mg Per G Tube At Bedtime     clobazam  7.5 mg Per G Tube QAM     [Held by provider] docusate sodium  100 mg Oral BID     famotidine  20 mg Intravenous BID     fluticasone-vilanterol  1 puff Inhalation Daily     metroNIDAZOLE  500 mg Intravenous Q8H     [Held by provider] polyethylene glycol  17 g Oral Daily     [Held by provider] senna-docusate  1 tablet Oral BID     sodium chloride (PF)  3 mL Intracatheter Q8H     valproate (DEPACON) in NS intermittent infusion  250 mg Intravenous Q6H             ASSESSED NUTRITION NEEDS PER APPROVED PRACTICE GUIDELINES:    Dosing Weight 38 kg - dry/UBW  Estimated Energy Needs: >/=1520 kcals (>/=40 Kcal/Kg)  Justification: minimum maintenance  Estimated Protein Needs: >/=57 grams protein (>/=1.5 g pro/Kg)  Justification: preservation of lean body mass  Estimated Fluid Needs: per MD      NUTRITION DIAGNOSIS:  Inadequate protein-energy intake related to holding of EN with SBO, now post-op as evidenced by meeting 0% protein/energy needs since admission.      NUTRITION INTERVENTIONS  Recommendations / Nutrition  Prescription  Resumption of EN per direction of Surgery team.  Discussed with PA today (who was in contact with [employer]), would be ok to utilize home formula which contains fiber - Pediasure 1.5 w/ fiber.  CNM to order from [facility] as it is not carried at this facility.  Please enter TF assess and order consult when appropriate to begin feedings.  Plan for continuous restart, unless otherwise directed.       Implementation  Nutrition education: Provided education on RD following with mom in room.  Discussed general progression of restarting EN which may include use of pump/continuous rate to start and fiber free formula.  Will need to [employer] back to discuss ok for home formula.      Collaboration and Referral of Nutrition care: An RD discussed POC with team during rounds and this writer completed assessment.  Discussed formula needs with Surgery team and clinical manager.    Nutrition Goals  Restart of EN w/in 48-72 hrs.        MONITORING AND EVALUATION:  Progress towards goals will be monitored and evaluated per protocol and Practice Guidelines          Afshan Scott RDN, LD  Clinical Dietitian  3rd floor/ICU: [phone]  All other floors: [phone]  Weekend/holiday: [phone]

## 2021-04-21 NOTE — PROGRESS NOTES
[facility]   General Surgery Progress Note           Assessment and Plan:   Assessment:   SBO with intra-abdominal compartment syndrome due to extreme destension  POD#2 s/p Exploratory laparotomy, enterolysis, incidental appendectomy  Postop ileus as expected  Aspiration pneumonia  H/o cerebral palsy with developmental delay, lifelong venting G-tube in place      Plan:   -Continue NPO/NGT. If NGT falls out, will not replace it.  -OK for seizure meds through NGT or G-tube.  -decrease sedation and start to increase physical activity  -continue abx for pneumonia  -pain control: Tylenol, IV Dilaudid  -GI prophylaxis:  Pepcid  -VTE prophylaxis: defer to hospitalist         Interval History:   Afebrile today. Mother at bedside. Patient appears comfortable.  Not out of bed yet yet, cardozo in place. Very little NGT output.  Discussed plans to decrease sedation and increase physical activity.         Physical Exam:   Blood pressure 102/66, pulse 114, temperature 98.2  F (36.8  C), temperature source Temporal, resp. rate 18, weight 41.9 kg (92 lb 6.4 oz), SpO2 93 %.    I/O last 3 completed shifts:  In: 63 [I.V.:3; NG/GT:60]  Out: 1500 [Urine:1500]    Abdomen:   soft, non-distended,  and absent bowel sounds   Inc(s) - cdi, mild bruising at incision.  Venting G-tube intact          Data:     Recent Labs   Lab 04/21/21  0640 04/20/21  0425 04/19/21  0537   WBC 8.1 6.6 3.0*   HGB 14.4 14.4 21.1*   HCT 41.5 42.5 60.9*   * 103* 103*   PLT PENDING 71* 103*       Darcy Curtis PA-C     Seen and agree.  Fabiola Pop MD

## 2021-04-21 NOTE — PROVIDER NOTIFICATION
Pt unable to maintain side lying position required to keep sats up for long. Current sats 87-89%. RT notified.

## 2021-04-21 NOTE — PROGRESS NOTES
[facility]  Hospitalist Progress Note  Paul Gross MD 04/21/2021      Reason for Stay (Diagnosis): Small bowel obstruction with intra-abdominal compartment syndrome, associated aspiration pneumonia from vomiting prior to admission         Assessment and Plan:      Summary of Stay:     Rj is a 25-year-old young man with history of cerebral palsy, blindness, lifelong G-tube, epilepsy who presented 4/19/2021 with abdominal distention, frequent vomiting and leakage of contents around his G-tube in the setting of prior bowel obstructions and 2 large hernia repairs as an infant.  Here in the emergency room lab work-up was most notable for creatinine of 0.98 with a baseline of 0.6, BUN of 25, white blood count of 3.0, hemoglobin of 21.1, platelet count of 103 and negative COVID-19 swab.     CT scan of his abdomen showed significant dilatation of numerous loops of small bowel with multiple air-fluid levels compatible with a small bowel obstruction and a transition point in the distal small bowel just proximal to the terminal ileum in the right lower quadrant.  He was also noted to have a right middle lobe infiltrate concerning for pneumonia.     He was given IV Unasyn, multiple fluid boluses and admitted for further care.  Notably, just prior to leaving the emergency room he did become febrile.  He has baseline tachycardia to about 110 per his mother but became tachycardic to the 140s to 150 range as well as tachypneic.    Following arrival to the medical floor lactic acid was elevated and he continued to have fevers.  He was reevaluated multiple times by surgery and in the setting of a rising lactic acid and concern for evolving got ischemia he was taken to the operating room for exploratory laparotomy with resolution without resection found likely to have an intra-abdominal compartment syndrome due to massive dilation from his obstruction.    He remains admitted for ongoing IV antibiotics  for pneumonia and postop management following his laparotomy.    He does continue to require supplemental oxygen likely related to combination of atelectasis, sedation and pneumonia it is not quite yet at his baseline mental status.    Have started to give some medications via his G-tube.    Problem List/Assessment and Plan:   1. Small bowel obstruction with intra-abdominal compartment syndrome: Massive dilatation of his bowels.  Taken to the operating room the evening of admission.  No resection was required and he appeared to have excellent gut viability.  --Currently n.p.o. with NG tube in place.   --Okay to give meds via G-tube as needed, would clamp NG tube for an hour or so afterwards.  --Dilaudid for pain control.  0.2 mg dose seems more appropriate than 0.4 mg based on observed sedation  --Holding off on further IV fluids given some concern regarding fluid overload.    2.   Sepsis due to aspiration pneumonia due to vomiting of gastric contents in the setting of bowel obstruction: Started on cefepime, Flagyl and vancomycin upon admission 4/19.  --Continue IV cefepime and Flagyl.   --cultures are negative, stopping vancomycin 4/21   --Wean O2 as able  --Await sputum and blood cultures, no growth to date.    3.   Thrombocytopenia: Cause unclear, probably in part dilutional given aggressive fluid resuscitation.  Trending down to 60 from 103 upon admission.  May be related to sepsis.  Consider medication side effects as well.  --Recheck  --Treat sepsis as above  --We will hold off on anticoagulants for now  --check peripheral smear.  --Discussed with his mother, if not resolving may need broader evaluation    4.   History of cerebral palsy with associated developmental delay, blindness, hernia repairs as an infant and lifelong G-tube: His mother is extremely supportive and has been present throughout the hospital stay.  She is his primary caregiver.  --Tube feeds on hold pending resolution of postop  ileus/obstructive symptoms but okay to use for meds for the time being.  --Treating seizure disorder as below    5.   Seizure disorder: Initially had scheduled some Ativan at the direction of the patient's neurologist when he was n.p.o.  Now able to use NG tube for medications in a limited fashion.   --Restart clobazam at home dosing  --Continuing valproic acid IV, might be able to change to home regimen soon but bowel absorption is still somewhat unclear  --1 mg IV Ativan as needed available for any seizure activity  --0.25 mg IV Ativan as needed available for anxiety or mild agitation    6.  Oliguria, acute kidney injury: Resolved.  Creatinine was 0.98 upon admission with BUN of 25.  Baseline is around 0.6.  SALLY likely related to a combination of sepsis with prerenal state and intra-abdominal compartment syndrome.  Improved with IV hydration.  --Holding off on further IV fluids, recheck basic metabolic panel.      7.  Iatrogenic pulmonary edema due to fluid resuscitation for sepsis: Developed some pulmonary vascular congestion early 4/21 prompting fluids to be stopped and a dose of IV Lasix to be given.  --We will hold off on IV fluids and further diuretics for the time being, recheck BMP and monitor oxygen level.    8.  Acute hypoxemic respiratory failure due to combination of pneumonia, atelectasis and respiratory depression, also pulmonary edema as above  --Wean supplemental O2.  Treating as above    9.  Severe protein calorie malnutrition: G-tube dependent, nothing by mouth.  --We will defer resumption of tube feeds to general surgery.  If a prolonged ileus is noted might need to consider TPN.    DVT Prophylaxis: Pneumatic Compression Devices  Code Status: Full Code  Discharge Dispo/Date: Likely at least 3 more days        Interval History (Subjective):        Developed some worsening hypoxia overnight, fluids stopped and Lasix given with good effect  Tachycardia improved, heart rate now closer to baseline of  110 or so  Stopping vancomycin given negative cultures  Discussed with general surgery, okay to use his feeding tube in a limited basis for his clobazam.  Not starting tube feeds yet and keeping most medications IV if able.  His mother was updated at the bedside                  Physical Exam:      Last Vital Signs:  /66 (BP Location: Left arm)   Pulse 114   Temp 98.2  F (36.8  C) (Temporal)   Resp 18   Wt 41.9 kg (92 lb 6.4 oz)   SpO2 93%   BMI 17.32 kg/m        Intake/Output Summary (Last 24 hours) at 4/21/2021 1325  Last data filed at 4/21/2021 1015  Gross per 24 hour   Intake 93 ml   Output 1500 ml   Net -1407 ml       General: Somnolent somewhat syndromic appearing male lying in bed.  Does reposition periodically  HEENT: NC/AT, some temporal wasting noted.    Cardiac: Tachycardic and regular.  Pulmonary: Normal chest rise, normal work of breathing.  Some right-sided rales noted.  Abdomen: Binder in place.  No guarding.  Extremities: In extremities, no deformities.  Warm, well perfused.  Skin: no rashes or lesions noted.  Warm and Dry.  Neuro: Not interacting with me today, somnolent with eyes closed.  Does reposition himself in bed periodically.  Psych: Calm.         Medications:      All current medications were reviewed with changes reflected in problem list.         Data:      All new lab and imaging data was reviewed.   Labs:  Recent Labs   Lab 04/19/21  1326 04/19/21  1321   CULT No growth after 2 days No growth after 2 days     Recent Labs   Lab 04/21/21  0640      POTASSIUM 3.6   CHLORIDE 109   CO2 32   ANIONGAP 1*   GLC 81   BUN 13   CR 0.71   GFRESTIMATED >90   GFRESTBLACK >90   ALICE 8.2*     Recent Labs   Lab 04/21/21  0640   WBC 8.1   HGB 14.4   HCT 41.5   *   PLT 60*      Imaging:   Recent Results (from the past 48 hour(s))   CT Abdomen Pelvis w Contrast    Narrative    EXAM: CT ABDOMEN PELVIS W CONTRAST  LOCATION: [facility]  DATE/TIME: 4/19/2021 6:30  AM    INDICATION: Vomiting, abdominal distention, rectal bleeding.  COMPARISON: 5/28/2020.  TECHNIQUE: CT scan of the abdomen and pelvis was performed following injection of IV contrast. Multiplanar reformats were obtained. Dose reduction techniques were used.  CONTRAST: 42 mL Isovue-370.    FINDINGS:   LOWER CHEST: Mild hazy acute-appearing infiltrate in the right middle lobe. Clinical correlation for any signs of pneumonitis.    HEPATOBILIARY: Normal.    PANCREAS: Normal.    SPLEEN: Normal.    ADRENAL GLANDS: Normal.    KIDNEYS/BLADDER: Normal.    BOWEL: Percutaneous gastrostomy tube in place. There is significant dilatation of numerous loops of small bowel with multiple air-fluid levels present. The very distal small bowel is decompressed and findings are compatible with mechanical small bowel   obstruction. There is an abrupt transition point in the distal small bowel just proximal to the terminal ileum in the right lower quadrant. Definite obstructing etiology is unclear. Findings could be seen with adhesions or internal hernia. No free air.   Normal appendix. There is some questionable mild low-density wall thickening in the rectosigmoid colon. Clinical correlation for any signs of a mild or low-grade distal colitis. Small amount of free fluid in the pelvis.    LYMPH NODES: Normal.    VASCULATURE: Unremarkable.    PELVIC ORGANS: Normal.    MUSCULOSKELETAL: Thoracolumbar curve convex to the right. No acute bony findings.      Impression    IMPRESSION:   1.  Findings compatible with mechanical small bowel obstruction with an abrupt transition in the distal small bowel just proximal to the terminal ileum in the right lower quadrant. Definite obstructing etiology is unclear. Findings could be seen with   adhesions or internal hernia among other etiologies. No free air.    2.  There is some borderline mild low-density wall thickening in the rectosigmoid colon. Findings could be seen with a mild or low-grade distal  colitis. Clinical correlation.    3.  Small amount of free fluid in the pelvis.    4.  Mild hazy acute-appearing infiltrate in the right middle lobe. Clinical correlation for any signs of a mild or low-grade pneumonitis.               XR Chest Port 1 View    Narrative    CHEST ONE VIEW PORTABLE  4/19/2021 9:44 AM     HISTORY: Dyspnea.    COMPARISON: Chest x-ray on 5/29/2020.      Impression    IMPRESSION: Single portable AP view of the chest was obtained.  Cardiomediastinal silhouette is within normal limits. Mild medial  right basilar pulmonary opacities, indeterminate, could be infectious.  No significant pleural effusion or pneumothorax. Multiple dilated  small bowel loops in the visualized portion of the abdomen, consistent  with patient's history of small bowel obstruction.    GEOVANNY ANGUIANO MD   XR Abdomen 2 Views    Narrative    EXAM: XR ABDOMEN 2 VW  LOCATION: [facility]  DATE/TIME: 4/19/2021 6:01 PM    INDICATION: Abdominal distention.  COMPARISON: CT 04/19/2021.      Impression    IMPRESSION: Gastrostomy. Gas-filled dilated loops of small bowel throughout the abdomen compatible with small bowel obstruction. Degree of small bowel distention slightly worsened when compared to the  film from CT 04/19/2021.    XR Chest Port 1 View    Narrative    CHEST ONE VIEW PORTABLE  4/20/2021 9:10 AM     HISTORY:  Admitted with sepsis, aspiration pneumonia, small bowel  obstruction.  Some concern for pulmonary edema after fluid  resuscitation.    COMPARISON: Chest x-ray 4/19/2021.      Impression    IMPRESSION: Portable chest. Worsening atelectasis or infiltrate noted  at the lung bases bilaterally. Upper lungs remain clear. No  pneumothorax or significant pleural fluid. Nasogastric tube has been  placed and extends below the left hemidiaphragm, presumably in the  stomach. Heart is normal in size. Dilated air-filled loops of bowel  upper abdomen again noted.    MD Paul COOPER  MD Renato , MD.

## 2021-04-21 NOTE — PLAN OF CARE
Pt is lethargic; responds to mother's voice and touch. VSS on 6L NC. Dilaudid x 2 for non-verbal signs of pain and per mother's request. Pt's oxygen saturation's were difficult to maintain at 90% or greater. MD paged and came up to assess pt. Chest x-ray showed increased bilateral infiltrates. One time IV lasix ordered; pt put out 700 ml of pale urine post lasix. Scheduled Ativan dose decreased to 0.25 mg. IV fluids stopped. Pt is tolerating NC; mother is at beside and replacing when pt pulls cannula out. Discharge date TBD. Will continue with POC.

## 2021-04-21 NOTE — PROGRESS NOTES
Called for pt desaturating on oxymask ( as a blowby ) .    Placed pt on Cool aerosol 100% 10lpm via face tent ( as blow by ).  Pt tolerates well.     Pt given prn neb this shift.   MD at beside , pt repositioned for improved aeration .

## 2021-04-22 ENCOUNTER — APPOINTMENT (OUTPATIENT)
Dept: CT IMAGING | Facility: CLINIC | Age: 26
End: 2021-04-22
Attending: INTERNAL MEDICINE
Payer: COMMERCIAL

## 2021-04-22 ENCOUNTER — APPOINTMENT (OUTPATIENT)
Dept: PHYSICAL THERAPY | Facility: CLINIC | Age: 26
End: 2021-04-22
Attending: PHYSICIAN ASSISTANT
Payer: COMMERCIAL

## 2021-04-22 LAB
ALBUMIN SERPL-MCNC: 1.9 G/DL (ref 3.4–5)
ALP SERPL-CCNC: 47 U/L (ref 40–150)
ALT SERPL W P-5'-P-CCNC: 16 U/L (ref 0–70)
ANION GAP SERPL CALCULATED.3IONS-SCNC: 1 MMOL/L (ref 3–14)
AST SERPL W P-5'-P-CCNC: 27 U/L (ref 0–45)
BASOPHILS # BLD AUTO: 0.1 10E9/L (ref 0–0.2)
BASOPHILS NFR BLD AUTO: 0.9 %
BILIRUB SERPL-MCNC: 0.7 MG/DL (ref 0.2–1.3)
BUN SERPL-MCNC: 20 MG/DL (ref 7–30)
CALCIUM SERPL-MCNC: 8.2 MG/DL (ref 8.5–10.1)
CHLORIDE SERPL-SCNC: 113 MMOL/L (ref 94–109)
CO2 SERPL-SCNC: 32 MMOL/L (ref 20–32)
COPATH REPORT: NORMAL
CREAT SERPL-MCNC: 0.68 MG/DL (ref 0.66–1.25)
EOSINOPHIL # BLD AUTO: 0 10E9/L (ref 0–0.7)
EOSINOPHIL NFR BLD AUTO: 0.1 %
ERYTHROCYTE [DISTWIDTH] IN BLOOD BY AUTOMATED COUNT: 12.2 % (ref 10–15)
GFR SERPL CREATININE-BSD FRML MDRD: >90 ML/MIN/{1.73_M2}
GLUCOSE BLDC GLUCOMTR-MCNC: 112 MG/DL (ref 70–99)
GLUCOSE BLDC GLUCOMTR-MCNC: 190 MG/DL (ref 70–99)
GLUCOSE BLDC GLUCOMTR-MCNC: 38 MG/DL (ref 70–99)
GLUCOSE BLDC GLUCOMTR-MCNC: 66 MG/DL (ref 70–99)
GLUCOSE BLDC GLUCOMTR-MCNC: 79 MG/DL (ref 70–99)
GLUCOSE SERPL-MCNC: 66 MG/DL (ref 70–99)
HCT VFR BLD AUTO: 35.2 % (ref 40–53)
HGB BLD-MCNC: 12.2 G/DL (ref 13.3–17.7)
IMM GRANULOCYTES # BLD: 0.1 10E9/L (ref 0–0.4)
IMM GRANULOCYTES NFR BLD: 0.7 %
LYMPHOCYTES # BLD AUTO: 1.1 10E9/L (ref 0.8–5.3)
LYMPHOCYTES NFR BLD AUTO: 13.9 %
MAGNESIUM SERPL-MCNC: 2 MG/DL (ref 1.6–2.3)
MCH RBC QN AUTO: 35.5 PG (ref 26.5–33)
MCHC RBC AUTO-ENTMCNC: 34.7 G/DL (ref 31.5–36.5)
MCV RBC AUTO: 102 FL (ref 78–100)
MONOCYTES # BLD AUTO: 0.9 10E9/L (ref 0–1.3)
MONOCYTES NFR BLD AUTO: 11.5 %
NEUTROPHILS # BLD AUTO: 5.9 10E9/L (ref 1.6–8.3)
NEUTROPHILS NFR BLD AUTO: 72.9 %
NRBC # BLD AUTO: 0 10*3/UL
NRBC BLD AUTO-RTO: 0 /100
PLATELET # BLD AUTO: 54 10E9/L (ref 150–450)
POTASSIUM SERPL-SCNC: 3.2 MMOL/L (ref 3.4–5.3)
PROT SERPL-MCNC: 4.8 G/DL (ref 6.8–8.8)
RBC # BLD AUTO: 3.44 10E12/L (ref 4.4–5.9)
RETICS # AUTO: 81.9 10E9/L (ref 25–95)
RETICS/RBC NFR AUTO: 2.4 % (ref 0.5–2)
SODIUM SERPL-SCNC: 146 MMOL/L (ref 133–144)
WBC # BLD AUTO: 8.1 10E9/L (ref 4–11)

## 2021-04-22 PROCEDURE — 70450 CT HEAD/BRAIN W/O DYE: CPT

## 2021-04-22 PROCEDURE — 250N000013 HC RX MED GY IP 250 OP 250 PS 637: Performed by: INTERNAL MEDICINE

## 2021-04-22 PROCEDURE — 85045 AUTOMATED RETICULOCYTE COUNT: CPT | Performed by: INTERNAL MEDICINE

## 2021-04-22 PROCEDURE — 85027 COMPLETE CBC AUTOMATED: CPT | Performed by: INTERNAL MEDICINE

## 2021-04-22 PROCEDURE — 99233 SBSQ HOSP IP/OBS HIGH 50: CPT | Performed by: INTERNAL MEDICINE

## 2021-04-22 PROCEDURE — 250N000009 HC RX 250: Performed by: SURGERY

## 2021-04-22 PROCEDURE — 999N000157 HC STATISTIC RCP TIME EA 10 MIN

## 2021-04-22 PROCEDURE — 97530 THERAPEUTIC ACTIVITIES: CPT | Mod: GP | Performed by: PHYSICAL THERAPIST

## 2021-04-22 PROCEDURE — 258N000003 HC RX IP 258 OP 636: Performed by: SURGERY

## 2021-04-22 PROCEDURE — 36415 COLL VENOUS BLD VENIPUNCTURE: CPT | Performed by: INTERNAL MEDICINE

## 2021-04-22 PROCEDURE — 999N001109 HC STATISTIC MORPHOLOGY W/INTERP HISTOLOGY TC 85060: Performed by: INTERNAL MEDICINE

## 2021-04-22 PROCEDURE — 83735 ASSAY OF MAGNESIUM: CPT | Performed by: INTERNAL MEDICINE

## 2021-04-22 PROCEDURE — 250N000011 HC RX IP 250 OP 636: Performed by: INTERNAL MEDICINE

## 2021-04-22 PROCEDURE — 80053 COMPREHEN METABOLIC PANEL: CPT | Performed by: INTERNAL MEDICINE

## 2021-04-22 PROCEDURE — 85060 BLOOD SMEAR INTERPRETATION: CPT | Performed by: PATHOLOGY

## 2021-04-22 PROCEDURE — 250N000011 HC RX IP 250 OP 636: Performed by: SURGERY

## 2021-04-22 PROCEDURE — 120N000001 HC R&B MED SURG/OB

## 2021-04-22 PROCEDURE — 272N000433 HC KIT CATH IV 18 OR 20G CM, POWERGLIDE W MAX BARRIER

## 2021-04-22 PROCEDURE — 99291 CRITICAL CARE FIRST HOUR: CPT | Performed by: INTERNAL MEDICINE

## 2021-04-22 PROCEDURE — 36569 INSJ PICC 5 YR+ W/O IMAGING: CPT

## 2021-04-22 PROCEDURE — 258N000001 HC RX 258: Performed by: INTERNAL MEDICINE

## 2021-04-22 PROCEDURE — 999N001017 HC STATISTIC GLUCOSE BY METER IP

## 2021-04-22 PROCEDURE — 97161 PT EVAL LOW COMPLEX 20 MIN: CPT | Mod: GP | Performed by: PHYSICAL THERAPIST

## 2021-04-22 PROCEDURE — 258N000003 HC RX IP 258 OP 636: Performed by: INTERNAL MEDICINE

## 2021-04-22 PROCEDURE — 85025 COMPLETE CBC W/AUTO DIFF WBC: CPT | Performed by: INTERNAL MEDICINE

## 2021-04-22 RX ORDER — DEXTROSE MONOHYDRATE 25 G/50ML
25-50 INJECTION, SOLUTION INTRAVENOUS
Status: DISCONTINUED | OUTPATIENT
Start: 2021-04-22 | End: 2021-04-27 | Stop reason: HOSPADM

## 2021-04-22 RX ORDER — DEXTROSE MONOHYDRATE 50 MG/ML
INJECTION, SOLUTION INTRAVENOUS CONTINUOUS
Status: DISCONTINUED | OUTPATIENT
Start: 2021-04-22 | End: 2021-04-22

## 2021-04-22 RX ORDER — LIDOCAINE 40 MG/G
CREAM TOPICAL
Status: DISCONTINUED | OUTPATIENT
Start: 2021-04-22 | End: 2021-04-24

## 2021-04-22 RX ORDER — NICOTINE POLACRILEX 4 MG
15-30 LOZENGE BUCCAL
Status: DISCONTINUED | OUTPATIENT
Start: 2021-04-22 | End: 2021-04-27 | Stop reason: HOSPADM

## 2021-04-22 RX ORDER — DEXTROSE MONOHYDRATE 25 G/50ML
INJECTION, SOLUTION INTRAVENOUS
Status: DISPENSED
Start: 2021-04-22 | End: 2021-04-23

## 2021-04-22 RX ORDER — POTASSIUM CHLORIDE 7.45 MG/ML
10 INJECTION INTRAVENOUS
Status: COMPLETED | OUTPATIENT
Start: 2021-04-22 | End: 2021-04-22

## 2021-04-22 RX ADMIN — DEXTROSE MONOHYDRATE: 50 INJECTION, SOLUTION INTRAVENOUS at 19:21

## 2021-04-22 RX ADMIN — METRONIDAZOLE 500 MG: 500 INJECTION, SOLUTION INTRAVENOUS at 05:50

## 2021-04-22 RX ADMIN — LORAZEPAM 1 MG: 2 INJECTION INTRAMUSCULAR; INTRAVENOUS at 22:32

## 2021-04-22 RX ADMIN — DEXTROSE MONOHYDRATE 50 ML: 500 INJECTION PARENTERAL at 21:54

## 2021-04-22 RX ADMIN — POTASSIUM CHLORIDE 10 MEQ: 7.46 INJECTION, SOLUTION INTRAVENOUS at 15:53

## 2021-04-22 RX ADMIN — SODIUM CHLORIDE 250 MG: 9 INJECTION, SOLUTION INTRAVENOUS at 18:49

## 2021-04-22 RX ADMIN — POTASSIUM CHLORIDE 10 MEQ: 7.46 INJECTION, SOLUTION INTRAVENOUS at 14:27

## 2021-04-22 RX ADMIN — FLUTICASONE PROPIONATE AND SALMETEROL XINAFOATE 2 PUFF: 230; 21 AEROSOL, METERED RESPIRATORY (INHALATION) at 10:08

## 2021-04-22 RX ADMIN — SODIUM CHLORIDE 250 MG: 9 INJECTION, SOLUTION INTRAVENOUS at 02:22

## 2021-04-22 RX ADMIN — DEXTROSE MONOHYDRATE 50 ML: 500 INJECTION PARENTERAL at 13:48

## 2021-04-22 RX ADMIN — FAMOTIDINE 20 MG: 10 INJECTION, SOLUTION INTRAVENOUS at 21:44

## 2021-04-22 RX ADMIN — METRONIDAZOLE 500 MG: 500 INJECTION, SOLUTION INTRAVENOUS at 20:12

## 2021-04-22 RX ADMIN — ACETAMINOPHEN 650 MG: 650 SUPPOSITORY RECTAL at 10:10

## 2021-04-22 RX ADMIN — CEFEPIME HYDROCHLORIDE 1 G: 1 INJECTION, POWDER, FOR SOLUTION INTRAMUSCULAR; INTRAVENOUS at 03:38

## 2021-04-22 RX ADMIN — CEFEPIME HYDROCHLORIDE 1 G: 1 INJECTION, POWDER, FOR SOLUTION INTRAMUSCULAR; INTRAVENOUS at 22:57

## 2021-04-22 RX ADMIN — CLOBAZAM 5 MG: 2.5 SUSPENSION ORAL at 21:39

## 2021-04-22 RX ADMIN — ACETAMINOPHEN 650 MG: 650 SUPPOSITORY RECTAL at 02:36

## 2021-04-22 RX ADMIN — CLOBAZAM 7.5 MG: 2.5 SUSPENSION ORAL at 08:38

## 2021-04-22 ASSESSMENT — ACTIVITIES OF DAILY LIVING (ADL)
ADLS_ACUITY_SCORE: 31
ADLS_ACUITY_SCORE: 32
ADLS_ACUITY_SCORE: 32
ADLS_ACUITY_SCORE: 31
ADLS_ACUITY_SCORE: 32
ADLS_ACUITY_SCORE: 32

## 2021-04-22 NOTE — PROVIDER NOTIFICATION
01:17 MD paged: Patient BP 83/50. Rechecked for 91/57. Please advise.     MD responded: Continue to trend BP and page if BP drops.

## 2021-04-22 NOTE — PROGRESS NOTES
[facility]  Hospitalist Progress Note    Assessment & Plan   Rj is a 25-year-old young man with history of cerebral palsy, blindness, lifelong G-tube, epilepsy who presented 4/19/2021 with abdominal distention, frequent vomiting and leakage of contents around his G-tube in the setting of prior bowel obstructions and 2 large hernia repairs as an infant.       CT scan of his abdomen showed significant dilatation of numerous loops of small bowel with multiple air-fluid levels compatible with a small bowel obstruction and a transition point in the distal small bowel just proximal to the terminal ileum in the right lower quadrant.  He was also noted to have a right middle lobe infiltrate concerning for pneumonia.     He was given IV Unasyn, multiple fluid boluses and admitted for further care. Notably, just prior to leaving the emergency room he did become febrile.  He has baseline tachycardia to about 110 per his mother but became tachycardic to the 140s to 150 range as well as tachypneic.     Following arrival to the medical floor lactic acid was elevated and he continued to have fevers.  He was reevaluated multiple times by surgery and in the setting of a rising lactic acid and concern for evolving got ischemia he was taken to the operating room for exploratory laparotomy with resolution without resection found likely to have an intra-abdominal compartment syndrome due to massive dilation from his obstruction.     He remains admitted for ongoing IV antibiotics for pneumonia and postop management following his laparotomy.     He does continue to require supplemental oxygen likely related to combination of atelectasis, sedation and pneumonia it is not quite yet at his baseline mental status.     Have started to give some medications via his G-tube.    Small bowel obstruction with intra-abdominal compartment syndrome:Massive dilatation of his bowels.  Taken to the operating room the evening of  admission.  No resection was required and he appeared to have excellent gut viability.  -Surgery consulted-appreciate assistance  -Continue n.p.o. advance per surgery recommendations.  NG tube removed 4/22  -Pain control doing well mainly acetaminophen.  Dilaudid as needed 0.2 mg as needed for severe pain    Mild hypernatremia: Sodium 146. Started D5 25 ml/hr.     Hypokalemia: Potassium 3.2. Replacement protocol ordered    Severe hypoglycemia: BS 66 on AM labs. Recheck BS 38. Hypoglycemia protocol ordered. Monitor BS BID HS while NPO and not on tube feeds. D5 25 ml/hr    Sepsis due to aspiration pneumonia, vomiting gastric contents in setting of bowel obstruction  -Antibiotics: IV cefepime and Flagyl started 4/18.  Vancomycin stopped 4/21  -Follow cultures to completion  -Wean oxygen as able.  Overall oxygen needs are improving.    Thrombocytopenia, etiology unclear.  Thought to be secondary to dilution and acute infection. Peripheral blood smear reviewed 4/22.   -Continue treatment for sepsis.  Hold on anticoagulants  -Peripheral blood smear pending    Macrocytic anemia: No clear bleeding. Hemoglobin 14.4-->12.2. Suspect blood loss associated with surgery and component of hemoconcentration on admission. Baseline hemoglobin 13-15.     History of cerebral palsy with associated developmental delay, blindness, hernia repairs as an infant and lifelong G-tube: His mother is extremely supportive and has been present throughout the hospital stay.  She is his primary caregiver.  -Continue to hold tube feeds pending resolution of postop ileus/obstructive symptoms -defer to surgery when to advance    Seizure disorder: Initially had scheduled some Ativan at the direction of the patient's neurologist when he was n.p.o.  Now able to use NG tube for medications in a limited fashion.   -PTA clobazam.  1 mg IV Ativan as needed for seizure activity.  0.25 mg IV Ativan for anxiety and agitation. Valproate 250 mg q 6 hours.      Oliguria, acute kidney injury: Resolved.  Creatinine was 0.98 upon admission with BUN of 25.  Baseline is around 0.6.  SALLY likely related to a combination of sepsis with prerenal state and intra-abdominal compartment syndrome.  Improved with IV hydration.  -Hold on additional IV fluids.  Monitor with daily BMP.  Attempt Carmona catheter removal 4/22    Surgery pulmonary edema secondary to fluid resuscitation for sepsis: Developed some pulmonary vascular congestion early 4/21 prompting fluids to be stopped and a dose of IV Lasix to be given.  -Hold additional IV fluids.  Monitor oxygenation and BMP.    Acute hypoxemic respiratory failure due to combination of pneumonia, atelectasis and respiratory depression, also pulmonary edema as above  -Wean supplemental O2.  Treating as above     Severe protein calorie malnutrition: G-tube dependent, nothing by mouth.  -We will defer resumption of tube feeds to general surgery. If a prolonged ileus is noted might need to consider TPN.    Of note, IV access was lost and nursing was unable to obtain a IV.  Requesting vascular access assistance.  Patient continuously IV antibiotics and is n.p.o. if unable to obtain peripheral IV ordered midline.  Nursing to remove Carmona catheter 4/22    FEN: D5 at 25 ml/hr; monitor; NPO  Activity: PT - home with assist  DVT Prophylaxis: Pneumatic Compression Devices  Family update: Yes, Mom at bedside.     Code Status: Full Code    Expected discharge: 2 - 3 days, recommended to prior living arrangement once antibiotic plan established and return of abdominal function.    Carla De Leon, DO    Text Page (7am - 6pm, M-F)    Interval History   Patient is more responsive today.  He moves upper and lower extremities.  Does not appear to be in pain.  Slowly opens his eyes.  He is now back to baseline but is making clinical improvement.  Mom is at bedside and is extremely supportive.  Discussed with nursing.     -Data reviewed today: I reviewed all new  labs and imaging results over the last 24 hours. I personally reviewed    Physical Exam   Temp: 96.9  F (36.1  C) Temp src: Axillary BP: 104/65 Pulse: 93   Resp: 16 SpO2: 95 % O2 Device: Nasal cannula with humidification Oxygen Delivery: 3 LPM  Vitals:    04/20/21 0625 04/21/21 0610 04/22/21 0510   Weight: 40.9 kg (90 lb 3.2 oz) 41.9 kg (92 lb 6.4 oz) 41.1 kg (90 lb 8 oz)     Vital Signs with Ranges  Temp:  [95.3  F (35.2  C)-96.9  F (36.1  C)] 96.9  F (36.1  C)  Pulse:  [] 93  Resp:  [16-18] 16  BP: ()/(44-72) 104/65  SpO2:  [93 %-98 %] 95 %  I/O last 3 completed shifts:  In: 601 [I.V.:511; NG/GT:90]  Out: 875 [Urine:800; Emesis/NG output:75]    Constitutional: Somnolent.  Frequent repositioning.  No apparent distress. Non-toxic. Appears stated age.  Thin/frail  HEENT: Atraumatic. Normocephalic. Conjunctiva non-injected. Sclera anicteric.  Dry mucous membrane.  Respiratory: Moves air bilaterally.  Faint rales on right side.  No wheezing.  Cardiovascular: Tachycardic and regular rhythm, normal S1 and S2, and no murmur noted  GI: Abdominal binder.  Hypoactive, soft, non-distended, non-tender  Skin/Integumen: No rashes, no cyanosis, no edema.    Medications       dextrose         ceFEPIme (MAXIPIME) IV  1 g Intravenous Q12H     clobazam  5 mg Per G Tube At Bedtime     clobazam  7.5 mg Per G Tube QAM     [Held by provider] docusate sodium  100 mg Oral BID     famotidine  20 mg Intravenous BID     fluticasone-salmeterol  2 puff Inhalation BID     metroNIDAZOLE  500 mg Intravenous Q8H     [Held by provider] polyethylene glycol  17 g Oral Daily     potassium chloride  10 mEq Intravenous Q1H     [Held by provider] senna-docusate  1 tablet Oral BID     sodium chloride (PF)  3 mL Intracatheter Q8H     valproate (DEPACON) in NS intermittent infusion  250 mg Intravenous Q6H     Data   Recent Labs   Lab 04/22/21  0729 04/21/21  0640 04/20/21  0425 04/19/21  0754 04/19/21  0754   WBC 8.1 8.1 6.6  --   --    HGB 12.2*  14.4 14.4  --   --    * 101* 103*  --   --    PLT 54* 60* 71*  --   --    INR  --   --   --   --  1.10   * 142 142   < >  --    POTASSIUM 3.2* 3.6 4.9   < >  --    CHLORIDE 113* 109 112*   < >  --    CO2 32 32 30   < >  --    BUN 20 13 19   < >  --    CR 0.68 0.71 0.82   < >  --    ANIONGAP 1* 1* <1*   < >  --    ALICE 8.2* 8.2* 7.6*   < >  --    GLC 66* 81 91   < >  --    ALBUMIN 1.9* 2.1* 2.0*   < >  --    PROTTOTAL 4.8* 5.5* 4.6*   < >  --    BILITOTAL 0.7 0.8 1.0   < >  --    ALKPHOS 47 51 45   < >  --    ALT 16 19 17   < >  --    AST 27 38 27   < >  --     < > = values in this interval not displayed.     No results found for this or any previous visit (from the past 24 hour(s)).

## 2021-04-22 NOTE — PROCEDURES
[facility]    Single Lumen Midline Placement    Date/Time: 4/22/2021 6:49 PM  Performed by: Reyes Williamson RN  Authorized by: Carla De Leon DO   Indications: vascular access    UNIVERSAL PROTOCOL   Site Marked: Yes  Prior Images Obtained and Reviewed:  Yes  Required items: Required blood products, implants, devices and special equipment available    Patient identity confirmed:  Verbally with patient, arm band and hospital-assigned identification number  Patient was reevaluated immediately before administering moderate or deep sedation or anesthesia  Confirmation Checklist:  Patient's identity using two indicators, relevant allergies, procedure was appropriate and matched the consent or emergent situation and correct equipment/implants were available  Time out: Immediately prior to the procedure a time out was called    Universal Protocol: the Joint Commission Universal Protocol was followed    Preparation: Patient was prepped and draped in usual sterile fashion           ANESTHESIA    Anesthesia: Local infiltration  Local Anesthetic:  Lidocaine 1% without epinephrine  Anesthetic Total (mL):  1      SEDATION    Patient Sedated: No        Preparation: skin prepped with 2% chlorhexidine  Skin prep agent: skin prep agent completely dried prior to procedure  Sterile barriers: maximum sterile barriers were used: cap, mask, sterile gown, sterile gloves, and large sterile sheet  Hand hygiene: hand hygiene performed prior to central venous catheter insertion  Type of line used: Midline  Catheter type: single lumen  Lumen type: non-valved  Catheter size: 18g.  Brand: PacketFront  Lot number: [id number]  Placement method: venipuncture and ultrasound  Number of attempts: 1  Successful placement: yes  Orientation: left  Location: brachial vein (lateral) (4.1mm)  Arm circumference: adults 10 cm  Extremity circumference: 17.5  Visible catheter length: 0  Internal length: 10 cm  Total catheter length: 10  Dressing  and securement: chlorhexidine disc applied, blood cleaned with CHG, blood removed, statlock, occlusive dressing applied and sterile dressing applied  Post procedure assessment: blood return through all ports  PROCEDURE   Patient Tolerance:  Patient tolerated the procedure well with no immediate complications     Midline ok to use

## 2021-04-22 NOTE — PROGRESS NOTES
Cross cover notified of patient with blood pressure 83/50 then recheck 91/57.  Reviewed chart and spoke to patient's RN.  The patient has cerebral palsy and developmental delay.  He is here with suspected aspiration pneumonia in setting of vomiting along with SBO and has NG in place.  He previously was on IV fluids although became hypoxic and chest x-ray showed pulmonary edema yesterday so these were stopped.  He received 20 IV Lasix yesterday morning although only made 800 mL of urine.  He remains lethargic as he has been.  He is actually less tachycardic at 103 compared to 115-120 yesterday.  Hemoglobin was 14.  He remains on IV antibiotics for his aspiration pneumonia.  -Recheck blood pressure in 30 minutes and 1 hour after that as long as blood pressure remains above 90 systolic.  Given he remains hypoxic hold on IV fluids at this time.  If blood pressure decreasing will plan to bolus and check lactic acid.  Spoke with patient's RN who will follow up on BP.

## 2021-04-22 NOTE — PLAN OF CARE
VSS, soft BP. MD paged, no intervention needed at this time. Continue to trend and page for dropping BP. LS diminished/clear. On 3 L NC. Hypoactive Bowel sounds. Intermittent low suction through NG. G tube clamped. Carmona in place with dark yellow output. Lethargic on shift. Mother at bedside. Continue to monitor patient.

## 2021-04-22 NOTE — PROVIDER NOTIFICATION
MD De Leon paged at [phone] regarding no IV access, wanted to know if we wanted an order for G tube replacement for Valproate or wait for midline placement.     MD replied at [phone] to wait for midline placement to give IV Valproate.

## 2021-04-22 NOTE — PROGRESS NOTES
[facility]   General Surgery Progress Note           Assessment and Plan:   Assessment:   -SBO with intra-abdominal compartment syndrome due to extreme destension  -POD#3 s/p Exploratory laparotomy, enterolysis, incidental appendectomy; Pathology: mild acute appendicitis  -Postop ileus as expected  -Aspiration pneumonia  -H/o cerebral palsy with developmental delay, lifelong venting G-tube in place      Plan:   -OK to remove NG  -OK for seizure meds through NGT or G-tube; will hold off on feedings (plan return to regular formula feeding) until +flatus.  -Increase physical activity as able.  PT eval indicated due to new ABD precautions.  -continue abx for pneumonia  -pain control: Tylenol, IV Dilaudid prn  -GI prophylaxis:  Pepcid  -Carmona in place; per hospitalist; seems to be on strict I&O currently  -VTE prophylaxis: defer to hospitalist         Interval History:   Afebrile today.  Mother at bedside.  More mobile in bed today, crossing legs, etc.  Mother believes he is appearing more comfortable in general.  Scant output to NG, basically just flushes.  No obvious flatus or BM yet.  Discussed baseline meds and nutrition thru Gtube, will hold off on feedings until starting to pass more consistent bowel activity.  Mother interested in PT eval and education due to new abd incision/precautions.         Physical Exam:   Blood pressure 104/65, pulse 93, temperature 96.9  F (36.1  C), temperature source Axillary, resp. rate 16, weight 41.1 kg (90 lb 8 oz), SpO2 95 %.    I/O last 3 completed shifts:  In: 601 [I.V.:511; NG/GT:90]  Out: 875 [Urine:800; Emesis/NG output:75]    NG output: scant watery output in cannister  Abdomen:   soft, non-distended, and few bowel sounds   Midline abd incision - steri-strips in place, no drainage, no erythema  Venting G-tube intact          Data:   Pathology: Appendix  -Mild acute appendicitis    Recent Labs   Lab 04/22/21  0729 04/21/21  0640 04/20/21  0425   WBC 8.1 8.1 6.6    HGB 12.2* 14.4 14.4   HCT 35.2* 41.5 42.5   * 101* 103*   PLT 54* 60* 71*       Lillie Brice PA-C     Seen and agree,    Steven Toribio MD  [facility]

## 2021-04-22 NOTE — RESULT ENCOUNTER NOTE
These results were given to Rj's mother by my partner.  Rj is still in the hospital, this is an unexpected finding.    Fabiola Pop MD

## 2021-04-22 NOTE — PROGRESS NOTES
04/22/21 1031   Quick Adds   Type of Visit Initial PT Evaluation   Living Environment   People in home parent(s)   Current Living Arrangements house   Home Accessibility stairs to enter home;stairs within home   Number of Stairs, Main Entrance 1   Number of Stairs, Within Home, Primary 7   Stair Railings, Within Home, Primary railing on right side (ascending)   Transportation Anticipated family or friend will provide   Living Environment Comments Pt lives with mom in a split entry home with 1 step to enter through garage and 7 steps up or down to living area.     Self-Care   Usual Activity Tolerance moderate   Current Activity Tolerance fair   Activity/Exercise/Self-Care Comment Pt ambulates without a device with mother standing next to him at all times.  Mother assists with all ADLs, toileting and bathing. Mother assists to lower pt in/out of tub.   Disability/Function   Hearing Difficulty or Deaf no   Wear Glasses or Blind yes   Vision Management blind   Walking or Climbing Stairs Difficulty yes   Walking or Climbing Stairs ambulation difficulty, assistance 1 person;stair climbing difficulty, assistance 1 person;transferring difficulty, assistance 1 person   Dressing/Bathing Difficulty yes   Dressing/Bathing bathing difficulty, assistance 1 person;dressing difficulty, assistance 1 person   Toileting issues yes   Toileting Assistance toileting difficulty, assistance 1 person   Doing Errands Independently Difficulty (such as shopping) no   Fall history within last six months no   General Information   Onset of Illness/Injury or Date of Surgery 04/19/21   Referring Physician Lillie Brice   Patient/Family Therapy Goals Statement (PT) Mother would like to be educated on abdominal precautions   Pertinent History of Current Problem (include personal factors and/or comorbidities that impact the POC) Rj is a 25-year-old young man with history of cerebral palsy, blindness, lifelong G-tube, epilepsy who presented  4/19/2021 with abdominal distention, frequent vomiting and leakage of contents around his G-tube in the setting of prior bowel obstructions and 2 large hernia repairs as an infant.  Pt is now POD 3 exploratory Lap  with appy.     Existing Precautions/Restrictions abdominal   General Observations Pt sitting in recliner with B hips and knees fully flexed with pt partially reclined.  Pt on 3L O2 per NC with SpO2 95%.  O2 removed and pt able to maintain sats in mid 90s at rest.   Cognition   Orientation Status (Cognition) unable/difficult to assess   Affect/Mental Status (Cognition) unable/difficult to assess   Follows Commands (Cognition) increased processing time needed;initiation impaired;physical/tactile prompts required;repetition of directions required;verbal cues/prompting required   Safety Deficit (Cognition) severe deficit   Cognitive Status Comments Pt nonverbal with developmental delay   Integumentary/Edema   Integumentary/Edema Comments abdominal incision   Posture    Posture Forward head position;Protracted shoulders   Range of Motion (ROM)   ROM Comment Pt keeping B hips and knees flexed in sup, sitting and standing.  Not formally assessed but most likely lacks terminal hip and knee ext.     Strength   Strength Comments diffuse mms weakness and atrophy   Bed Mobility   Bed Mobility supine-sit   Supine-Sit [place] (Bed Mobility) maximum assist (25% patient effort)   Comment (Bed Mobility) Sit > sup with Max A at trunk and LEs via L sidely with logroll to supine.   Transfers   Transfers sit-stand transfer   Sit-Stand Transfer   Sit-Stand [place] (Transfers) maximum assist (25% patient effort);2 person assist   Sit/Stand Transfer Comments Sit <> stand from recliner with Mod/Max A x 2, no AD   Gait/Stairs (Locomotion)   [place] Level (Gait) maximum assist (25% patient effort);2 person assist   Distance in Feet (Required for LE Total Joints) 10   Comment (Gait/Stairs) Pt amb 10' without AD around  bed with Max A of therapist and mother, on each side, with trunk, hips and knees flexed with narrow SHILO with assist at times to advance LEs.  Occasional scissoring with wt bearing through balls of feet.  Pt desaturated to 77% on RA.  Pt recovered to 90% on 3L in ~ 1 min.   Balance   Balance Comments Poor sitting and standing balance requiring A x 1 while sitting on EOB and A x 2 for stance   Sensory Examination   Sensory Perception Comments unable to assess   Coordination   Coordination Comments Decreased coordination all 4 extremities due to CP.   Muscle Tone   Muscle Tone Comments Increased tone and clonus noted in L foot/LE while sitting on EOB   Clinical Impression   Criteria for Skilled Therapeutic Intervention yes, treatment indicated   PT Diagnosis (PT) Decreased functional mobility   Influenced by the following impairments Impaired gait and balance, decreased activity tolerance, abdominal incision, blind, Cerebral Palsy, global mms weakness   Functional limitations due to impairments Assisted mobility and ADLs,  increased falls risk, A x 2 for gait, unable to safely negotiate stairs to enter his home   Clinical Presentation Stable/Uncomplicated   Clinical Presentation Rationale Pt with multiple comorbidities with Cerebral Palsy   Clinical Decision Making (Complexity) low complexity   Therapy Frequency (PT) Daily   Predicted Duration of Therapy Intervention (days/wks) 5 days   Planned Therapy Interventions (PT) bed mobility training;gait training;patient/family education;stair training;stretching;transfer training   Anticipated Equipment Needs at Discharge (PT) shower chair   Risk & Benefits of therapy have been explained evaluation/treatment results reviewed;care plan/treatment goals reviewed;risks/benefits reviewed;current/potential barriers reviewed;participants voiced agreement with care plan;participants included;patient;mother   PT Discharge Planning    PT Discharge Recommendation (DC Rec) home with  assist, Home care PT   PT Rationale for DC Rec Pt below baseline with all mobility following abdominal surgery.  At baseline pt's mother provides CGA with gait on level surfaces and stairs and assists pt with all ADLs.  I anticipate that with continued PT in the acute setting pt will return to PLOF. If pt requiring more than Min/Mod A with mobility then he may benefit from Home PT to progress independence with all mobility and to decrease burden of care on mother. Home PT as leaving the house would cause significant hardship for the patient and mother. Recommend shower chair to maintain abdominal precautions with bathing    PT Brief overview of current status  A x 2 bed <> chair stand pivot transfer, no device   Total Evaluation Time   Total Evaluation Time (Minutes) 15   Skin WDL   Skin WDL X   Skin Integrity/Characteristics other (see comments)  (midline incision, with steri strips)

## 2021-04-22 NOTE — PLAN OF CARE
/72 (BP Location: Left arm)   Pulse 115   Temp 96.4  F (35.8  C) (Temporal)   Resp 18   Wt 41.9 kg (92 lb 6.4 oz)   SpO2 98%   BMI 17.32 kg/m    ST, somnolent but responds to commands.     Lung sounds clear bilaterally. O2 decreased to 3L NC.     Bowel sounds hypoactive in all quadrants.     Low intermittent suction via NG. Medication okay to be given via G tube, clamp for 1hr after.     Abdominal incision clear and intact, free of redness/swelling.     Per mother report, PT displayed nonverbal signs of pain, IV dilaudid given.     Mother continues to sit bedside with PT.

## 2021-04-22 NOTE — PROGRESS NOTES
Ng tube discontinued.    Hypo bowel tones.  Abdomen is soft.    Iv saline lock dc'd-->swelling of hand.  Flyer RN attempted, unable.  Vascular access called to re attempt.  In the meantime, I have been unable to give the patient valproate.  texted Dr. De Leon to advise.    I spoke with Dr. De Leon-->try to get vascular access to start iv, check with surgery if we can give meds via GT.  Update her accordingly.      Serum glucose this am 66-->rechecked prior to iv access 38-->PIV 24 gauge right arm -->d50 given--->recheck 112.    Plan:  Replace potassium, midline.      I spoke with Lillie, Surgery PA.  It is ok to use the gtube for meds, but in small amounts.  This may not be achievable with giving meds since there needs to be flushes in between and volumes could reach 100-->150.    I also spoke with Dr. De Leon-->regarding missing iv meds today--> we should start with valproate then the antibiotics.  I updated the oncoming RN.

## 2021-04-23 ENCOUNTER — APPOINTMENT (OUTPATIENT)
Dept: GENERAL RADIOLOGY | Facility: CLINIC | Age: 26
End: 2021-04-23
Attending: INTERNAL MEDICINE
Payer: COMMERCIAL

## 2021-04-23 ENCOUNTER — APPOINTMENT (OUTPATIENT)
Dept: PHYSICAL THERAPY | Facility: CLINIC | Age: 26
End: 2021-04-23
Payer: COMMERCIAL

## 2021-04-23 LAB
ALBUMIN SERPL-MCNC: 1.9 G/DL (ref 3.4–5)
ALBUMIN SERPL-MCNC: 2 G/DL (ref 3.4–5)
ALP SERPL-CCNC: 47 U/L (ref 40–150)
ALP SERPL-CCNC: 56 U/L (ref 40–150)
ALT SERPL W P-5'-P-CCNC: 19 U/L (ref 0–70)
ALT SERPL W P-5'-P-CCNC: 19 U/L (ref 0–70)
ANION GAP SERPL CALCULATED.3IONS-SCNC: 2 MMOL/L (ref 3–14)
ANION GAP SERPL CALCULATED.3IONS-SCNC: <1 MMOL/L (ref 3–14)
AST SERPL W P-5'-P-CCNC: 33 U/L (ref 0–45)
AST SERPL W P-5'-P-CCNC: 37 U/L (ref 0–45)
BASE EXCESS BLDV CALC-SCNC: 10.7 MMOL/L
BASOPHILS # BLD AUTO: 0 10E9/L (ref 0–0.2)
BASOPHILS NFR BLD AUTO: 0.5 %
BILIRUB SERPL-MCNC: 0.8 MG/DL (ref 0.2–1.3)
BILIRUB SERPL-MCNC: 1.1 MG/DL (ref 0.2–1.3)
BUN SERPL-MCNC: 12 MG/DL (ref 7–30)
BUN SERPL-MCNC: 15 MG/DL (ref 7–30)
CALCIUM SERPL-MCNC: 8.1 MG/DL (ref 8.5–10.1)
CALCIUM SERPL-MCNC: 8.1 MG/DL (ref 8.5–10.1)
CHLORIDE SERPL-SCNC: 108 MMOL/L (ref 94–109)
CHLORIDE SERPL-SCNC: 111 MMOL/L (ref 94–109)
CO2 SERPL-SCNC: 33 MMOL/L (ref 20–32)
CO2 SERPL-SCNC: 35 MMOL/L (ref 20–32)
CREAT SERPL-MCNC: 0.59 MG/DL (ref 0.66–1.25)
CREAT SERPL-MCNC: 0.63 MG/DL (ref 0.66–1.25)
DIFFERENTIAL METHOD BLD: ABNORMAL
EOSINOPHIL # BLD AUTO: 0 10E9/L (ref 0–0.7)
EOSINOPHIL NFR BLD AUTO: 0.2 %
ERYTHROCYTE [DISTWIDTH] IN BLOOD BY AUTOMATED COUNT: 12.1 % (ref 10–15)
ERYTHROCYTE [DISTWIDTH] IN BLOOD BY AUTOMATED COUNT: 12.3 % (ref 10–15)
FOLATE SERPL-MCNC: 20.2 NG/ML
FOLATE SERPL-MCNC: NORMAL NG/ML
GFR SERPL CREATININE-BSD FRML MDRD: >90 ML/MIN/{1.73_M2}
GFR SERPL CREATININE-BSD FRML MDRD: >90 ML/MIN/{1.73_M2}
GLUCOSE BLDC GLUCOMTR-MCNC: 117 MG/DL (ref 70–99)
GLUCOSE BLDC GLUCOMTR-MCNC: 126 MG/DL (ref 70–99)
GLUCOSE BLDC GLUCOMTR-MCNC: 31 MG/DL (ref 70–99)
GLUCOSE BLDC GLUCOMTR-MCNC: 58 MG/DL (ref 70–99)
GLUCOSE BLDC GLUCOMTR-MCNC: 78 MG/DL (ref 70–99)
GLUCOSE SERPL-MCNC: 103 MG/DL (ref 70–99)
GLUCOSE SERPL-MCNC: 86 MG/DL (ref 70–99)
HCO3 BLDV-SCNC: 37 MMOL/L (ref 21–28)
HCT VFR BLD AUTO: 34.3 % (ref 40–53)
HCT VFR BLD AUTO: 37.5 % (ref 40–53)
HGB BLD-MCNC: 11.8 G/DL (ref 13.3–17.7)
HGB BLD-MCNC: 12.6 G/DL (ref 13.3–17.7)
IMM GRANULOCYTES # BLD: 0.1 10E9/L (ref 0–0.4)
IMM GRANULOCYTES NFR BLD: 0.7 %
LACTATE BLD-SCNC: 1.2 MMOL/L (ref 0.7–2)
LYMPHOCYTES # BLD AUTO: 1.8 10E9/L (ref 0.8–5.3)
LYMPHOCYTES NFR BLD AUTO: 21.2 %
MAGNESIUM SERPL-MCNC: 1.9 MG/DL (ref 1.6–2.3)
MCH RBC QN AUTO: 35 PG (ref 26.5–33)
MCH RBC QN AUTO: 35.1 PG (ref 26.5–33)
MCHC RBC AUTO-ENTMCNC: 33.6 G/DL (ref 31.5–36.5)
MCHC RBC AUTO-ENTMCNC: 34.4 G/DL (ref 31.5–36.5)
MCV RBC AUTO: 102 FL (ref 78–100)
MCV RBC AUTO: 104 FL (ref 78–100)
MONOCYTES # BLD AUTO: 1.1 10E9/L (ref 0–1.3)
MONOCYTES NFR BLD AUTO: 13.3 %
NEUTROPHILS # BLD AUTO: 5.4 10E9/L (ref 1.6–8.3)
NEUTROPHILS NFR BLD AUTO: 64.1 %
NRBC # BLD AUTO: 0 10*3/UL
NRBC BLD AUTO-RTO: 0 /100
O2/TOTAL GAS SETTING VFR VENT: 3 %
OXYHGB MFR BLDV: 70 %
PCO2 BLDV: 54 MM HG (ref 40–50)
PH BLDV: 7.44 PH (ref 7.32–7.43)
PHOSPHATE SERPL-MCNC: 0.7 MG/DL (ref 2.5–4.5)
PLATELET # BLD AUTO: 58 10E9/L (ref 150–450)
PLATELET # BLD AUTO: 66 10E9/L (ref 150–450)
PO2 BLDV: 34 MM HG (ref 25–47)
POTASSIUM SERPL-SCNC: 3.2 MMOL/L (ref 3.4–5.3)
POTASSIUM SERPL-SCNC: 3.5 MMOL/L (ref 3.4–5.3)
PROT SERPL-MCNC: 4.7 G/DL (ref 6.8–8.8)
PROT SERPL-MCNC: 5 G/DL (ref 6.8–8.8)
RBC # BLD AUTO: 3.36 10E12/L (ref 4.4–5.9)
RBC # BLD AUTO: 3.6 10E12/L (ref 4.4–5.9)
SODIUM SERPL-SCNC: 143 MMOL/L (ref 133–144)
SODIUM SERPL-SCNC: 146 MMOL/L (ref 133–144)
VIT B12 SERPL-MCNC: 2539 PG/ML (ref 193–986)
WBC # BLD AUTO: 8.5 10E9/L (ref 4–11)
WBC # BLD AUTO: 8.6 10E9/L (ref 4–11)

## 2021-04-23 PROCEDURE — 258N000003 HC RX IP 258 OP 636: Performed by: INTERNAL MEDICINE

## 2021-04-23 PROCEDURE — 85025 COMPLETE CBC W/AUTO DIFF WBC: CPT | Performed by: INTERNAL MEDICINE

## 2021-04-23 PROCEDURE — 258N000003 HC RX IP 258 OP 636: Performed by: SURGERY

## 2021-04-23 PROCEDURE — 250N000011 HC RX IP 250 OP 636: Performed by: INTERNAL MEDICINE

## 2021-04-23 PROCEDURE — 999N000065 XR CHEST PORT 1 VIEW

## 2021-04-23 PROCEDURE — 250N000009 HC RX 250: Performed by: SURGERY

## 2021-04-23 PROCEDURE — 250N000011 HC RX IP 250 OP 636: Performed by: SURGERY

## 2021-04-23 PROCEDURE — 258N000001 HC RX 258: Performed by: INTERNAL MEDICINE

## 2021-04-23 PROCEDURE — 85027 COMPLETE CBC AUTOMATED: CPT | Performed by: INTERNAL MEDICINE

## 2021-04-23 PROCEDURE — 80053 COMPREHEN METABOLIC PANEL: CPT | Performed by: INTERNAL MEDICINE

## 2021-04-23 PROCEDURE — 80165 DIPROPYLACETIC ACID FREE: CPT | Performed by: INTERNAL MEDICINE

## 2021-04-23 PROCEDURE — 82805 BLOOD GASES W/O2 SATURATION: CPT | Performed by: INTERNAL MEDICINE

## 2021-04-23 PROCEDURE — 84100 ASSAY OF PHOSPHORUS: CPT | Performed by: INTERNAL MEDICINE

## 2021-04-23 PROCEDURE — 83605 ASSAY OF LACTIC ACID: CPT | Performed by: INTERNAL MEDICINE

## 2021-04-23 PROCEDURE — 36569 INSJ PICC 5 YR+ W/O IMAGING: CPT

## 2021-04-23 PROCEDURE — 99233 SBSQ HOSP IP/OBS HIGH 50: CPT | Performed by: INTERNAL MEDICINE

## 2021-04-23 PROCEDURE — 82607 VITAMIN B-12: CPT | Performed by: INTERNAL MEDICINE

## 2021-04-23 PROCEDURE — 250N000009 HC RX 250: Performed by: INTERNAL MEDICINE

## 2021-04-23 PROCEDURE — 120N000001 HC R&B MED SURG/OB

## 2021-04-23 PROCEDURE — 82746 ASSAY OF FOLIC ACID SERUM: CPT | Performed by: INTERNAL MEDICINE

## 2021-04-23 PROCEDURE — 250N000013 HC RX MED GY IP 250 OP 250 PS 637: Performed by: INTERNAL MEDICINE

## 2021-04-23 PROCEDURE — 83735 ASSAY OF MAGNESIUM: CPT | Performed by: INTERNAL MEDICINE

## 2021-04-23 PROCEDURE — 272N000026 HC KIT POWER PICC TRIPLE LUMEN

## 2021-04-23 PROCEDURE — 97110 THERAPEUTIC EXERCISES: CPT | Mod: GP | Performed by: PHYSICAL THERAPIST

## 2021-04-23 PROCEDURE — 999N001017 HC STATISTIC GLUCOSE BY METER IP

## 2021-04-23 RX ORDER — BISACODYL 10 MG
10 SUPPOSITORY, RECTAL RECTAL DAILY PRN
Status: DISCONTINUED | OUTPATIENT
Start: 2021-04-23 | End: 2021-04-26

## 2021-04-23 RX ORDER — POTASSIUM CHLORIDE 7.45 MG/ML
10 INJECTION INTRAVENOUS
Status: COMPLETED | OUTPATIENT
Start: 2021-04-23 | End: 2021-04-23

## 2021-04-23 RX ORDER — LIDOCAINE 40 MG/G
CREAM TOPICAL
Status: ACTIVE | OUTPATIENT
Start: 2021-04-23 | End: 2021-04-26

## 2021-04-23 RX ADMIN — FAMOTIDINE 20 MG: 10 INJECTION, SOLUTION INTRAVENOUS at 21:49

## 2021-04-23 RX ADMIN — SODIUM CHLORIDE 250 MG: 9 INJECTION, SOLUTION INTRAVENOUS at 01:06

## 2021-04-23 RX ADMIN — SODIUM CHLORIDE 250 MG: 9 INJECTION, SOLUTION INTRAVENOUS at 13:47

## 2021-04-23 RX ADMIN — DEXTROSE MONOHYDRATE 25 ML: 500 INJECTION PARENTERAL at 02:20

## 2021-04-23 RX ADMIN — SODIUM CHLORIDE 250 MG: 9 INJECTION, SOLUTION INTRAVENOUS at 20:35

## 2021-04-23 RX ADMIN — SODIUM CHLORIDE 250 MG: 9 INJECTION, SOLUTION INTRAVENOUS at 07:04

## 2021-04-23 RX ADMIN — METRONIDAZOLE 500 MG: 500 INJECTION, SOLUTION INTRAVENOUS at 21:54

## 2021-04-23 RX ADMIN — FLUTICASONE PROPIONATE AND SALMETEROL XINAFOATE 2 PUFF: 230; 21 AEROSOL, METERED RESPIRATORY (INHALATION) at 21:02

## 2021-04-23 RX ADMIN — METRONIDAZOLE 500 MG: 500 INJECTION, SOLUTION INTRAVENOUS at 05:16

## 2021-04-23 RX ADMIN — METRONIDAZOLE 500 MG: 500 INJECTION, SOLUTION INTRAVENOUS at 11:51

## 2021-04-23 RX ADMIN — FAMOTIDINE 20 MG: 10 INJECTION, SOLUTION INTRAVENOUS at 08:13

## 2021-04-23 RX ADMIN — POTASSIUM CHLORIDE 10 MEQ: 7.46 INJECTION, SOLUTION INTRAVENOUS at 02:24

## 2021-04-23 RX ADMIN — DEXTROSE AND SODIUM CHLORIDE: 10; .45 INJECTION, SOLUTION INTRAVENOUS at 00:55

## 2021-04-23 RX ADMIN — DEXTROSE MONOHYDRATE 50 ML: 500 INJECTION PARENTERAL at 15:37

## 2021-04-23 RX ADMIN — CLOBAZAM 7.5 MG: 2.5 SUSPENSION ORAL at 08:11

## 2021-04-23 RX ADMIN — FLUTICASONE PROPIONATE AND SALMETEROL XINAFOATE 2 PUFF: 230; 21 AEROSOL, METERED RESPIRATORY (INHALATION) at 08:24

## 2021-04-23 RX ADMIN — CEFEPIME HYDROCHLORIDE 1 G: 1 INJECTION, POWDER, FOR SOLUTION INTRAMUSCULAR; INTRAVENOUS at 11:04

## 2021-04-23 RX ADMIN — POTASSIUM CHLORIDE 10 MEQ: 7.46 INJECTION, SOLUTION INTRAVENOUS at 03:28

## 2021-04-23 RX ADMIN — DEXTROSE AND SODIUM CHLORIDE: 10; .45 INJECTION, SOLUTION INTRAVENOUS at 08:23

## 2021-04-23 RX ADMIN — CLOBAZAM 5 MG: 2.5 SUSPENSION ORAL at 21:00

## 2021-04-23 RX ADMIN — CEFEPIME HYDROCHLORIDE 1 G: 1 INJECTION, POWDER, FOR SOLUTION INTRAMUSCULAR; INTRAVENOUS at 23:05

## 2021-04-23 ASSESSMENT — ACTIVITIES OF DAILY LIVING (ADL)
ADLS_ACUITY_SCORE: 36
ADLS_ACUITY_SCORE: 32
ADLS_ACUITY_SCORE: 36
ADLS_ACUITY_SCORE: 32

## 2021-04-23 NOTE — PROVIDER NOTIFICATION
DATE:  4/23/2021   TIME OF RECEIPT FROM LAB:  10:56  LAB TEST: Phosphorus  LAB VALUE: 0.7  RESULTS GIVEN WITH READ-BACK TO (PROVIDER):  Web paged Dr. De Leon and informed Primary RN Maia ESTRELLA   TIME LAB VALUE REPORTED TO PROVIDER  10:57    1115: Per MD start Phosphorus replacement protocol.

## 2021-04-23 NOTE — PROCEDURES
[facility]    Triple Lumen PICC Placement    Date/Time: 4/23/2021 5:01 PM  Performed by: Inga Loredo RN  Authorized by: Carla De Leon DO   Indications: vascular access    UNIVERSAL PROTOCOL   Site Marked: NA  Prior Images Obtained and Reviewed:  Yes  Required items: Required blood products, implants, devices and special equipment available    Patient identity confirmed:  Verbally with patient, arm band, provided demographic data and hospital-assigned identification number  Patient was reevaluated immediately before administering moderate or deep sedation or anesthesia  Confirmation Checklist:  Patient's identity using two indicators, relevant allergies, procedure was appropriate and matched the consent or emergent situation and correct equipment/implants were available  Time out: Immediately prior to the procedure a time out was called    Universal Protocol: the Joint Commission Universal Protocol was followed    Preparation: Patient was prepped and draped in usual sterile fashion    ESBL (mL):  1         ANESTHESIA    Anesthesia: Local infiltration  Local Anesthetic:  Lidocaine 1% without epinephrine  Anesthetic Total (mL):  1      SEDATION    Patient Sedated: No        Preparation: skin prepped with ChloraPrep  Skin prep agent: skin prep agent completely dried prior to procedure  Sterile barriers: maximum sterile barriers were used: cap, mask, sterile gown, sterile gloves, and large sterile sheet  Hand hygiene: hand hygiene performed prior to central venous catheter insertion  Type of line used: Power PICC  Catheter type: triple lumen  Lumen type: valved  Catheter size: 5 Fr  Brand: Bard  Placement method: venipuncture, MST and ultrasound  Number of attempts: over wire exchange.  Successful placement: yes  Orientation: left  Location: brachial vein (lateral) (over wire exchange from midline )  Arm circumference: adults 10 cm  Extremity circumference: 19  Dressing and securement:  blood cleaned with CHG, blood removed, sterile dressing applied, chlorhexidine patch applied and site cleaned  Post procedure assessment: free fluid flow, blood return through all ports and placement verified by x-ray  PROCEDURE   Patient Tolerance:  Patient tolerated the procedure well with no immediate complications  Describe Procedure: Ok to use PICC line, First xray showed tip in RA. Line retracted 3.5cm, repeat xray ordered, tip now at Distal SVC.

## 2021-04-23 NOTE — PROGRESS NOTES
RRT:     Writer was in the room w/ pt's mom when pt's lower extremities started shaking and breathing became shallow.  The mom stated that this is what happens when he starts seizing. Increased shaking throughout upper and lower extremities. VS were checked and O2 remained above 90% on 3L O2, -120s.  Pt face appears flushed but no temp.  1mg of IV ativan was given.  Bear hugger was placed d/t pt's cool skin.  Pt now appears somnolent.

## 2021-04-23 NOTE — PLAN OF CARE
Tele: NA  Vitals: /72 (BP Location: Right arm)   Pulse 97   Temp 98.6  F (37  C) (Temporal)   Resp 16   Wt 40.9 kg (90 lb 3.2 oz)   SpO2 95%   BMI 16.90 kg/m    Pain: No nonverbal signs of pain observed.  Neuro: Somnolent overnight. Rouses briefly to repeated stim. Unable to follow commands consistently or respond to questions.  Cardiac: Intermittent tachycardia  Resp: LS dim. On 3L O2 via NC.  GI/: Incont of urine overnight. Faint hypoactive bowel sounds. G tube in place, clamped. NPO.  Skin: Blanchable erythema to coccyx.  Mobility: In bed overnight. Assisted with wt shifting.  Misc: Mom at bedside.   Plan: Continue POC.

## 2021-04-23 NOTE — PROGRESS NOTES
[facility]   General Surgery Progress Note           Assessment and Plan:   Assessment:   -SBO with intra-abdominal compartment syndrome due to extreme destension  -POD#4 s/p Exploratory laparotomy, enterolysis, incidental appendectomy; Pathology: mild acute appendicitis  -Postop ileus as expected  -Aspiration pneumonia  -H/o cerebral palsy with developmental delay, lifelong venting G-tube in place      Plan:   -OK for meds through NGT or G-tube; will hold off on feedings (plan return to regular formula feeding) until +flatus.  Possible consideration of TPN, will discuss with surgeon.    -Will try suppository  -Increase physical activity as able when alert and able.  PT following.  -continue abx for pneumonia; Maxipime/flagyl  -pain control: Tylenol, IV Dilaudid prn; none recently  -GI prophylaxis:  Pepcid  -VTE prophylaxis: defer to hospitalist  -Await return of bowel function, tolerance of nutrition/meds intake.         Interval History:   Afebrile today.  Mother at bedside, very concerned today after seizure yesterday.  Many questions about meds and dosing options; will have to defer to Hospitalist.  Discussed inability to dose much via the G-tube due to ileus, questionable absorption, possible increase in risk for aspiration if too much volume to handle.  Discussed also limited options for stimulation of bowel activity; will add suppository order.  As patient more alert, may attempt activity for bowel activation.  Also discussed nutrition timing/options.           Physical Exam:   Blood pressure 107/70, pulse 89, temperature 98.8  F (37.1  C), temperature source Temporal, resp. rate 18, weight 40.9 kg (90 lb 3.2 oz), SpO2 98 %.    I/O last 3 completed shifts:  In: -   Out: 300 [Urine:300]    Less alert and active today, minimally reactive to abd exam  Abdomen:   soft, mild-distended, non-reactive to palpation, and absent bowel sounds   Midline abd incision - steri-strips in place, no drainage, no  erythema  Venting G-tube intact          Data:   Pathology: Appendix  -Mild acute appendicitis    Recent Labs   Lab 04/23/21  0532 04/23/21  0012 04/22/21  0729   WBC 8.6 8.5 8.1   HGB 12.6* 11.8* 12.2*   HCT 37.5* 34.3* 35.2*   * 102* 102*   PLT 66* 58* 54*     Lillie Brice PA-C     Agree with above, patient more lethargic than usual per mother. Difficult to tell if he has pain/nausea. G tube currently clamped and has not had any vomiting. No further seizure activity today. No bowel function yet. Abdomen soft and mildly distended.  PICC line placed. If no bowel function by tomorrow, would start TPN.      Charlene Fulton MD

## 2021-04-23 NOTE — CONSULTS
HOSPITAL NEUROLOGY    History of the Present Illness:  Mr. Licea is a 25 year old male who is obtunded and cannot provide a history.  He is currently being treated for hypoglycemia, though mother states that this has essentially been his mental status for most of his hospitalization.    The medical record notes that patient presented to the ER 4/19/2021 with vomiting and rectal bleeding.  He was treated for small bowel obstruction and also suspected aspiration pneumonia.  His outpatient epileptologist was contacted and suggested that 1mg IV Ativan q8hrs to be used in place of clobazam given NPO (or G-tube) status.    He underwent an ex-lap on 4/19.  His stay has been complicated by periods of tachycardia and desaturations.  He had a seizure yesterday, which was associated with missing two doses of his valproate when IV access was lost.      For today, he has been very lethargic, but again, mother states this is similar to past days as well.        Previous Records Reviewed    [facility]  1/25/2021 - follow up for focal intractable epilepsy, occasional tonic posturing with vomiting - was on Onfi and Depakote.  While  shaky  every morning, having breakthrough seizures.  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          Review of systems:  Cannot obtain    Past Medical History:   Diagnosis Date     Chronic lung disease     off nebs in 2012     Depression     fluoxetine, risperidone     Epilepsy (H) 5/2011    on keppra (Janaczek)     Feeding by G-tube (H)      GERD (gastroesophageal reflux disease)      Insomnia     melatonin, clonazepam     Retinopathy of prematurity     led to blindness        Past Surgical History:   Procedure Laterality Date     APPENDECTOMY  OPEN N/A 4/19/2021    Procedure: Appendectomy open;  Surgeon: Fabiola Pop MD;  Location: [facility] OR     GASTROSTOMY TUBE       LAPAROTOMY EXPLORATORY N/A 4/19/2021    Procedure: LAPAROTOMY, enterolysis;  Surgeon: Fabiola Pop MD;  Location: [facility] OR        Social History     Tobacco Use     Smoking status: Never Smoker     Smokeless tobacco: Never Used   Substance Use Topics     Alcohol use: No     Drug use: No        Family History   Problem Relation Age of Onset     Diabetes Father           Current Facility-Administered Medications:      acetaminophen (TYLENOL) Suppository 650 mg, 650 mg, Rectal, Q4H PRN, Paul Gross MD, 650 mg at 04/22/21 1010     acetaminophen (TYLENOL) tablet 650 mg, 650 mg, Oral, Q4H PRN, Fabiola Pop MD     albuterol (PROAIR HFA/PROVENTIL HFA/VENTOLIN HFA) 108 (90 Base) MCG/ACT inhaler 2 puff, 2 puff, Inhalation, Q6H PRN, Paul Gross MD     albuterol (PROVENTIL) neb solution 2.5 mg, 2.5 mg, Nebulization, Q6H PRN, Paul Gross MD, 2.5 mg at 04/21/21 0524     benzocaine-menthol (CHLORASEPTIC) 6-10 MG lozenge 1 lozenge, 1 lozenge, Buccal, Q1H PRN, Fabiola Pop MD     bisacodyl (DULCOLAX) Suppository 10 mg, 10 mg, Rectal, Daily PRN, Lillie Brice PA-C     bisacodyl (DULCOLAX) Suppository 10 mg, 10 mg, Rectal, Daily PRN, Fabiola Pop MD     ceFEPIme (MAXIPIME) 1g vial to attach to  ml bag for ADULTS or NS 50 ml bag for PEDS, 1 g, Intravenous, Q12H, Fabiola Pop MD, Last Rate: 200 mL/hr at 04/23/21 1104, 1 g at 04/23/21 1104     clobazam ((ONFI)) suspension 5 mg, 5 mg, Per G Tube, At Bedtime, Paul Gross MD, 5 mg at 04/22/21 2139     clobazam ((ONFI)) suspension 7.5 mg, 7.5 mg, Per G Tube, QAM, Paul Gross MD, 7.5 mg at 04/23/21 0811     dextrose 10% and 0.45% sodium chloride infusion, , Intravenous, Continuous, Paul Gross MD, Last Rate: 40 mL/hr at  04/23/21 0823, Started at 04/23/21 0823     glucose gel 15-30 g, 15-30 g, Oral, Q15 Min PRN **OR** dextrose 50 % injection 25-50 mL, 25-50 mL, Intravenous, Q15 Min PRN, 25 mL at 04/23/21 0220 **OR** glucagon injection 1 mg, 1 mg, Subcutaneous, Q15 Min PRN, Carla De Leon DO     [Held by provider] docusate sodium (COLACE) capsule 100 mg, 100 mg, Oral, BID, Fabiola Pop MD     famotidine (PEPCID) injection 20 mg, 20 mg, Intravenous, BID, Fabiola Pop MD, 20 mg at 04/23/21 0813     fluticasone-salmeterol (ADVAIR-HFA) 230-21 MCG/ACT inhaler 2 puff(patient supplied), 2 puff, Inhalation, BID, Paul Gross MD, 2 puff at 04/23/21 0824     HYDROmorphone (DILAUDID) injection 0.2 mg, 0.2 mg, Intravenous, Q2H PRN, 0.2 mg at 04/21/21 2041 **OR** [DISCONTINUED] HYDROmorphone (PF) (DILAUDID) injection 0.4 mg, 0.4 mg, Intravenous, Q2H PRN, Fabiola Pop MD, 0.4 mg at 04/20/21 0858     lidocaine (LMX4) cream, , Topical, Q1H PRN, Carla De Leon DO     lidocaine 1 % 0.1-1 mL, 0.1-1 mL, Other, Q1H PRN, Fabiola Pop MD     lidocaine 1 % 0.1-5 mL, 0.1-5 mL, Other, Q1H PRN, Carla De Leon DO     LORazepam (ATIVAN) injection 0.25 mg, 0.25 mg, Intravenous, Q6H PRN, Paul Gross MD     LORazepam (ATIVAN) injection 1 mg, 1 mg, Intravenous, Q4H PRN, Paul Gross MD, 1 mg at 04/22/21 2232     magnesium hydroxide (MILK OF MAGNESIA) suspension 30 mL, 30 mL, Oral, Daily PRN, Fabiola Pop MD     metroNIDAZOLE (FLAGYL) infusion 500 mg, 500 mg, Intravenous, Q8H, Fabiola oPp MD, Last Rate: 100 mL/hr at 04/23/21 1151, 500 mg at 04/23/21 1151     naloxone (NARCAN) injection 0.2 mg, 0.2 mg, Intravenous, Q2 Min PRN **OR** naloxone (NARCAN) injection 0.4 mg, 0.4 mg, Intravenous, Q2 Min PRN **OR** naloxone (NARCAN) injection 0.2 mg, 0.2 mg, Intramuscular, Q2 Min PRN **OR** naloxone (NARCAN) injection 0.4 mg, 0.4 mg, Intramuscular, Q2 Min PRN, Fabiola Pop,  MD     ondansetron (ZOFRAN-ODT) ODT tab 4 mg, 4 mg, Oral, Q6H PRN **OR** ondansetron (ZOFRAN) injection 4 mg, 4 mg, Intravenous, Q6H PRN, Faboila Pop MD     oxyCODONE (ROXICODONE) tablet 5 mg, 5 mg, Oral, Q4H PRN **OR** oxyCODONE (ROXICODONE) tablet 10 mg, 10 mg, Oral, Q4H PRN, Fabiola Pop MD     [Held by provider] polyethylene glycol (MIRALAX) Packet 17 g, 17 g, Oral, Daily, Fabiola Pop MD     prochlorperazine (COMPAZINE) injection 10 mg, 10 mg, Intravenous, Q6H PRN **OR** prochlorperazine (COMPAZINE) tablet 10 mg, 10 mg, Oral, Q6H PRN, Fabiola Pop MD     [Held by provider] senna-docusate (SENOKOT-S/PERICOLACE) 8.6-50 MG per tablet 1 tablet, 1 tablet, Oral, BID, Fabiola Pop MD     sodium chloride (PF) 0.9% PF flush 10 mL, 10 mL, Intracatheter, Q8H, Carla De Leon DO, 10 mL at 04/23/21 1346     sodium chloride (PF) 0.9% PF flush 10-20 mL, 10-20 mL, Intracatheter, q1 min prn, Carla De Leon DO     sodium chloride (PF) 0.9% PF flush 3 mL, 3 mL, Intracatheter, Q8H, Fabiola Pop MD, 3 mL at 04/22/21 0342     sodium chloride (PF) 0.9% PF flush 3 mL, 3 mL, Intracatheter, Q1H PRN, Fabiola Pop MD     sodium chloride (PF) 0.9% PF flush 3 mL, 3 mL, Intracatheter, q1 min prn, Fabiola Pop MD, 3 mL at 04/22/21 1427     sodium chloride (PF) 0.9% PF flush 5-50 mL, 5-50 mL, Intracatheter, Once PRN, Carla De Leon DO     sodium phosphate (FLEET ENEMA) 1 enema, 1 enema, Rectal, Once PRN, Fabiola Pop MD     sodium phosphate (NaPHOS) 15 mMol in 250 mL D5W PREMADE infusion, 15 mmol, Intravenous, Once, Carla De Leon DO     valproate (DEPACON) 250 mg in sodium chloride 0.9 % 50 mL intermittent infusion, 250 mg, Intravenous, Q6H, Fabiola Pop MD, Last Rate: 50 mL/hr at 04/23/21 1347, 250 mg at 04/23/21 1347    Allergies:  No Known Allergies    Physical Examination:     /63 (BP Location: Right arm)   Pulse 100   Temp 98  F (36.7  C) (Temporal)   Resp 16    Wt 40.9 kg (90 lb 3.2 oz)   SpO2 91%   BMI 16.90 kg/m      Body mass index is 16.9 kg/m .    GEN: Somnolent, appears comfortable  HEENT: Normocephalic / atraumatic  NECK/BACK: No meningismus. No significant paraspinal neck or shoulder musculature pain.  CV: Mildly tachycardic, regular rhythm  EXT: No cyanosis. No edema. No erythema. Bruising noted right forearm  SKIN: No rashes.     NEUROLOGICAL:   MENTAL STATUS:   Poorly responsive, nonverbal, very lethargic    CN: Patient blind at baseline  Face appears grossly symmetric    Moves hands and toes in response to touch, cannot evaluate strength more formally   Mildly tremulous     Reflexes borderline brisk throughout, toes downgoing bilaterally     Cannot assess finger-to-nose           Review of Diagnostics:  I personally reviewed and interpreted the following:    Results for AYLIN MCCANN (MRN [id number]) as of 4/23/2021 15:54   4/23/2021 05:32   Sodium 143   Potassium 3.5   Chloride 108   Carbon Dioxide 33 (H)   Urea Nitrogen 12   Creatinine 0.59 (L)   GFR Estimate >90   GFR Estimate If Black >90   Calcium 8.1 (L)   Anion Gap 2 (L)   Magnesium 1.9   Albumin 2.0 (L)   Protein Total 5.0 (L)   Bilirubin Total 1.1   Alkaline Phosphatase 56   ALT 19   AST 37         Vitamin B12:   Lab Results   Component Value Date    B12 2,539 04/23/2021         Complete Blood Count:    Recent Labs   Lab Test 04/23/21  0532 04/23/21  0012 04/22/21  0729 04/19/21  0537 04/19/21  0537   WBC 8.6 8.5 8.1   < > 3.0*   RBC 3.60* 3.36* 3.44*   < > 5.92*   HGB 12.6* 11.8* 12.2*   < > 21.1*   HCT 37.5* 34.3* 35.2*   < > 60.9*   PLT 66* 58* 54*   < > 103*   LYMPH  --  21.2 13.9  --  23.0    < > = values in this interval not displayed.        HgA1c:   Lab Results   Component Value Date    A1C 5.0 03/17/2021        Thyroid Stimulating Hormone:   Lab Results   Component Value Date    TSH 0.94 03/17/2021        Head CT (4/22/2021)  IMPRESSION:  1.  No acute intracranial  process.      Impression:  Mr. Licea is a 25 year old male with developmental delay and epilepsy presented to ER 4/19/21 with vomiting and rectal bleeding.  He has had a complicated hospital stay thus far, being treated for SBO and a variety of metabolic derangements as well as sepsis.    All of these factors would lower the seizure threshold.  He follows with an epileptologist as an outpatient for focal intractable epilepsy.  His seizure yesterday was in the setting of two missed valproate doses due to loss of IV access.  I do not believe that any change in medications is warranted given the circumstances.  His EEG today was abnormal but nothing surprising given his known history of developmental delay and epilepsy.  The important point is that there were no active seizures on the recording.  Continue his baseline anti-seizure regimen.      Michi Lundy MD (general neurology)  pgr [phone]    1. SBO (small bowel obstruction) (H)    2. Nonintractable epilepsy without status epilepticus, unspecified epilepsy type (H)

## 2021-04-23 NOTE — PROGRESS NOTES
NUTRITION BRIEF NOTE     Chart check for baseline nutrition support patient. Consideration for PN initiation per surgery note with ongoing ileus. D10 at 40 mL/hr to provide 96 g dextrose per day.    Recommendations: If PN pursued, consider PPN for up to 2-3 days with midline for access currently in place to bridge to TPN vs EN resumption. Phos add on.     ADDENDUM: Discussed with RN. Compatibility and limited access with current IV access.   Mom with questions regarding nutrition plan and asking about initiation of PN --> waiting for additional surgery input.    Please place appropriate consult once plan of care established. Will continue to follow per protocol. Please page/consult as needed.       Ny Phelps MS, RDN-AP, LD, CNSC  Pager - 3rd floor/ICU: [phone]  Pager - All other floors: [phone]  Pager - Weekend/holiday: [phone]  Office: [phone]

## 2021-04-23 NOTE — PLAN OF CARE
Pt lethargic, aroused with stimulation, up with assist of 2 & lift. No signs of pain.  Mom at bedside.  Midline patent with blood return noted.  G Tube patent.  Pt is on IV abx flagyl & cefepime.  Seizure precautions maintained.  PT incontinent of bladder, no BM this shift.  Bowel sounds are hypoactive and faint.  Surgical incision dressing CDI.  PT NPO on D10 w/ 1/2 NS running continuous through midline.  VSS on 1.5 LPM NC.  Plan to discharge TBD. Continue with POC.

## 2021-04-23 NOTE — PROVIDER NOTIFICATION
Phosphorus replacement calls for a 6 hour infusion time, pt has several IV meds and D10 1/2 NS running for low BG.  Please advise    Per MD continue to run meds as ordered through single lumen midline.

## 2021-04-23 NOTE — PROGRESS NOTES
RRT Note:    RRT was called overhead as the patient was noted to have seizure activity.  Rj is well-known to me as I was his rounding provider earlier this week.  He is a 25-year-old man with severe developmental delay and seizure disorder who presented with small bowel obstruction with associated vomiting and aspiration pneumonia taken to the operating room found of intra-abdominal compartment syndrome with reduction of his bowel obstruction without resection.    He has had persistent altered mental status but at least periodically will speak briefly to his mother who has been diligent at the bedside.    Earlier today he reportedly had multiple issues with IV access and missed at least 1 or 2 doses of valproic acid.    During his seizure activity oxygen saturations maintained greater than 90%.  1 mg IV Ativan was given via midline and seizure eventually broke.  He did say hi to his mother when she was talking to him afterwards but remains relatively somnolent below his baseline.  Blood sugar was 190.    Assessment/plan: Seizure activity, certainly threshold is likely lowered by active infection and recent surgery in addition to missed valproic acid dose due to lack of IV access earlier today.  His Onfi has been given enterally as no IV option is available though otherwise his gastric feeding tube has generally been not used following surgery.  Given ongoing encephalopathy and seizure disorder will proceed with the following  --Check lactic acid  --Check venous blood gas to rule out CO2 retention because of ongoing encephalopathy  --Recheck CMP and CBC  --EEG and neurology consult ordered for the morning  --Check valproic acid level for the morning  --Continue to treat with IV antibiotics for sepsis/aspiration pneumonia  --Continue to monitor closely  --IV fluids changed to D10 0.45 NS as he was noted to be hypoglycemic earlier in the day.  --Given persistent altered mental status will ask CT head    Evaluation  and management time exclusive of procedures was 35 minutes critical care time including: urgent examination and evaluation of the patient, discussion of the patient's condition with other physicians and members of the care team, reviewing data and chart related to the patient, discussion of patient's condition with the family and time utilizing the EMR for documentation of this patient's care.

## 2021-04-23 NOTE — PROGRESS NOTES
[facility]  Hospitalist Progress Note    Assessment & Plan   Rj is a 25-year-old young man with history of cerebral palsy, blindness, lifelong G-tube, epilepsy who presented 4/19/2021 with abdominal distention, frequent vomiting and leakage of contents around his G-tube in the setting of prior bowel obstructions and 2 large hernia repairs as an infant.       CT scan of his abdomen showed significant dilatation of numerous loops of small bowel with multiple air-fluid levels compatible with a small bowel obstruction and a transition point in the distal small bowel just proximal to the terminal ileum in the right lower quadrant.  He was also noted to have a right middle lobe infiltrate concerning for pneumonia.     He was given IV Unasyn, multiple fluid boluses and admitted for further care. Notably, just prior to leaving the emergency room he did become febrile.  He has baseline tachycardia to about 110 per his mother but became tachycardic to the 140s to 150 range as well as tachypneic.     Following arrival to the medical floor lactic acid was elevated and he continued to have fevers.  He was reevaluated multiple times by surgery and in the setting of a rising lactic acid and concern for evolving got ischemia he was taken to the operating room for exploratory laparotomy with resolution without resection found likely to have an intra-abdominal compartment syndrome due to massive dilation from his obstruction.     He remains admitted for ongoing IV antibiotics for pneumonia and postop management following his laparotomy.     He does continue to require supplemental oxygen likely related to combination of atelectasis, sedation and pneumonia it is not quite yet at his baseline mental status.     Have started to give some medications via his G-tube.    Overnight, 4/23 patient had a seizure. Patient has known breakthrough seizures. He was treated with IV ativan. Due to family concerns neurology  was consulted, CT head completed, and EEG ordered.     Small bowel obstruction with intra-abdominal compartment syndrome:Massive dilatation of his bowels.  Taken to the operating room the evening of admission.  No resection was required and he appeared to have excellent gut viability.  -Surgery consulted-appreciate assistance  -Continue n.p.o. advance per surgery recommendations.  NG tube removed 4/22  -Pain control doing well mainly acetaminophen.  Dilaudid as needed 0.2 mg as needed for severe pain    Mild hypernatremia: Sodium 146. Started D5 25 ml/hr.     Hypokalemia: Potassium 3.2. Replacement protocol ordered    Severe hypoglycemia: BS 66 on AM labs. Recheck BS 38. Hypoglycemia protocol ordered. Monitor BS BID HS while NPO and not on tube feeds. Improved.    Hypophosphatemia: Phosphorus 0.7. Replacement protocol ordered.     Sepsis due to aspiration pneumonia, vomiting gastric contents in setting of bowel obstruction  -Antibiotics: IV cefepime and Flagyl started 4/18.  Vancomycin stopped 4/21  -Follow cultures to completion  -Wean oxygen as able.  Overall oxygen needs are improving.    Thrombocytopenia, etiology unclear.  Thought to be secondary to dilution and acute infection. Peripheral blood smear reviewed 4/22.   -Continue treatment for sepsis.  Hold on anticoagulants. Improving.    Macrocytic anemia: No clear bleeding. Hemoglobin 14.4-->12.2. Suspect blood loss associated with surgery and component of hemoconcentration on admission. Baseline hemoglobin 13-15.     History of cerebral palsy with associated developmental delay, blindness, hernia repairs as an infant and lifelong G-tube: His mother is extremely supportive and has been present throughout the hospital stay.  She is his primary caregiver.  -Continue to hold tube feeds pending resolution of postop ileus/obstructive symptoms -defer to surgery when to advance    Seizure disorder: Initially had scheduled some Ativan at the direction of the patient's  neurologist when he was n.p.o.  Now able to use NG tube for medications in a limited fashion.   -PTA clobazam.  1 mg IV Ativan as needed for seizure activity.  0.25 mg IV Ativan for anxiety and agitation. Valproate 250 mg q 6 hours. 4/22 seizure overnight, resolved with IV ativan. CT head without acute abnormality. Neurology consulted. EEG pending. Valproate lvl 8.0 (low end for trough)     Oliguria, acute kidney injury: Resolved.  Creatinine was 0.98 upon admission with BUN of 25.  Baseline is around 0.6.  SALLY likely related to a combination of sepsis with prerenal state and intra-abdominal compartment syndrome.  Improved with IV hydration.  -Monitor with daily BMP.  Attempt Carmona catheter removal 4/22. IVF for above issues.     Surgery pulmonary edema secondary to fluid resuscitation for sepsis: Developed some pulmonary vascular congestion early 4/21 prompting fluids to be stopped and a dose of IV Lasix to be given.  -Improved with IV diuresis but given NPO status IVF initiated for above issues. Will need to monitor respiratory status closely.     Acute hypoxemic respiratory failure due to combination of pneumonia, atelectasis and respiratory depression, also pulmonary edema as above  -Wean supplemental O2.  Treating as above. Currently on RA.      Severe protein calorie malnutrition: G-tube dependent, nothing by mouth.  -We will defer resumption of tube feeds to general surgery. If a prolonged ileus is noted might need to consider TPN.    FEN: D10 and 0.45% at 40 ml/hr; monitor; NPO  Activity: PT - home with assist  DVT Prophylaxis: Pneumatic Compression Devices  Family update: Yes, Mom at bedside.     Code Status: Full Code    Expected discharge: 2 - 3 days, recommended to prior living arrangement once antibiotic plan established and return of abdominal function.    >35 minutes in care coordination. See below documentation.     Carla De Leon, DO    Text Page (7am - 6pm, M-F)    Interval History   Yesterday,  patient had an episode of hypoglycemia given poor nutritional reserve and has been n.p.o.  In addition, he lost IV access left and is able to be obtained for quite some time.  Is resulted in multiple doses of valproic acid being missed.  Later in the evening patient had seizure activity.  See rapid response notes.  Today patient is resting comfortably in bed.  He is not as cognitively interactive as normal.  His mother is at the bedside is extremely concerned.  She feels like he should be getting better and he is not.  Discussed that given his decreased alertness he does not cough and clear secretions as easily nor does he take big deep breaths.  This with prolonged healing from a pulmonary standpoint.  However he is currently on room air.  The crackles in his lung are to be expected with pneumonia.  Encouraged her discussed expectations for return of bowel function with surgery.  Reiterated that it is unpredictable when bowel function returned but time is the best treatment given his abdomen is stable.  Discussed patient's decreased cognitive interaction likely secondary to the acuity of his illness, obesity and recent seizure activity.  Mom is reluctant to accept at that is was going on.  She feels like she is doing a terrible job advocating for her son and that more needs to be done.  Attempted to answer questions that are asked and I believe.  Discussed with nursing at bedside    -Data reviewed today: I reviewed all new labs and imaging results over the last 24 hours. I personally reviewed    Physical Exam   Temp: 98.8  F (37.1  C) Temp src: Temporal BP: 107/70 Pulse: 89   Resp: 18 SpO2: 98 % O2 Device: Nasal cannula Oxygen Delivery: 3 LPM  Vitals:    04/21/21 0610 04/22/21 0510 04/23/21 0519   Weight: 41.9 kg (92 lb 6.4 oz) 41.1 kg (90 lb 8 oz) 40.9 kg (90 lb 3.2 oz)     Vital Signs with Ranges  Temp:  [97.2  F (36.2  C)-99.7  F (37.6  C)] 98.8  F (37.1  C)  Pulse:  [] 89  Resp:  [16-20] 18  BP:  (100-113)/(56-72) 107/70  SpO2:  [92 %-98 %] 98 %  I/O last 3 completed shifts:  In: -   Out: 300 [Urine:300]    Constitutional: Somnolent.  Frequent repositioning.  No apparent distress. Non-toxic. Appears stated age.  Thin/frail  HEENT: Atraumatic. Normocephalic. Conjunctiva non-injected. Sclera anicteric.  Dry mucous membrane.  Respiratory: Moves air bilaterally.  Faint rales on right side.  No wheezing.  Cardiovascular: Tachycardic and regular rhythm, normal S1 and S2, and no murmur noted  GI: Round, soft, nontender.  Hypoactive,   Skin/Integumen: No rashes, no cyanosis, no edema.    Medications     dextrose 10% and 0.45% NaCl 40 mL/hr at 04/23/21 0823       ceFEPIme (MAXIPIME) IV  1 g Intravenous Q12H     clobazam  5 mg Per G Tube At Bedtime     clobazam  7.5 mg Per G Tube QAM     [Held by provider] docusate sodium  100 mg Oral BID     famotidine  20 mg Intravenous BID     fluticasone-salmeterol  2 puff Inhalation BID     metroNIDAZOLE  500 mg Intravenous Q8H     [Held by provider] polyethylene glycol  17 g Oral Daily     [Held by provider] senna-docusate  1 tablet Oral BID     sodium chloride (PF)  10 mL Intracatheter Q8H     sodium chloride (PF)  10 mL Intracatheter Q8H     sodium chloride (PF)  3 mL Intracatheter Q8H     valproate (DEPACON) in NS intermittent infusion  250 mg Intravenous Q6H     Data   Recent Labs   Lab 04/23/21  0532 04/23/21  0012 04/22/21  0729 04/19/21  0754 04/19/21  0754   WBC 8.6 8.5 8.1   < >  --    HGB 12.6* 11.8* 12.2*   < >  --    * 102* 102*   < >  --    PLT 66* 58* 54*   < >  --    INR  --   --   --   --  1.10    146* 146*   < >  --    POTASSIUM 3.5 3.2* 3.2*   < >  --    CHLORIDE 108 111* 113*   < >  --    CO2 33* 35* 32   < >  --    BUN 12 15 20   < >  --    CR 0.59* 0.63* 0.68   < >  --    ANIONGAP 2* <1* 1*   < >  --    ALICE 8.1* 8.1* 8.2*   < >  --    GLC 86 103* 66*   < >  --    ALBUMIN 2.0* 1.9* 1.9*   < >  --    PROTTOTAL 5.0* 4.7* 4.8*   < >  --    BILITOTAL  1.1 0.8 0.7   < >  --    ALKPHOS 56 47 47   < >  --    ALT 19 19 16   < >  --    AST 37 33 27   < >  --     < > = values in this interval not displayed.     Recent Results (from the past 24 hour(s))   CT Head w/o Contrast    Narrative    EXAM: CT HEAD W/O CONTRAST  LOCATION: [facility]  DATE/TIME: 4/22/2021 11:32 PM    INDICATION: Seizure, nontraumatic (Age 18-40y)  COMPARISON: None.  TECHNIQUE: Routine CT Head without IV contrast. Multiplanar reformats. Dose reduction techniques were used.    FINDINGS:  INTRACRANIAL CONTENTS: No intracranial hemorrhage, extraaxial collection, or mass effect.  No CT evidence of acute infarct. Normal parenchymal attenuation. There is diffuse white matter volume loss, resulting in diffuse ventriculomegaly. No evidence   obstructive hydrocephalus.    VISUALIZED ORBITS/SINUSES/MASTOIDS: Dystrophic calcifications in the posterior globes which are small in size. No paranasal sinus mucosal disease. No middle ear or mastoid effusion.    BONES/SOFT TISSUES: No acute abnormality.      Impression    IMPRESSION:  1.  No acute intracranial process.

## 2021-04-23 NOTE — PLAN OF CARE
/67 (BP Location: Left arm)   Pulse 106   Temp 97.2  F (36.2  C) (Axillary)   Resp 16   Wt 41.1 kg (90 lb 8 oz)   SpO2 93%   BMI 16.96 kg/m    VS WNL, somnolent but responds to commands. Midline placed, IV medication and D5 resumed. BG were 38, 112, 78. No signs of pain. Hypo bowl sounds, abdomen soft.   ADMISSION NOTE    68year old femalewith h/o chf htn hl presents with chest pain and intermittent new Afib . Available medical records partially reviewed. Dictation to follow.     Nida Burleson M.D.  8/4/2019

## 2021-04-23 NOTE — PROGRESS NOTES
Care Management Follow Up    Length of Stay (days): 4    Expected Discharge Date: 04/25/21     Concerns to be Addressed:       Patient plan of care discussed at interdisciplinary rounds: Yes    Anticipated Discharge Disposition:  home     Anticipated Discharge Services:  no  Anticipated Discharge DME: shower chair     Referrals Placed by CM/SW:  none  Private pay costs discussed: Not applicable    Additional Information:  Met with Rj and his mom at bedside to discuss discharge planning.  PT recommending HC PT.  Mom declined HC stating that with COVID she is not allowing anyone into her home. Rj PCP was planning for him to get J&J covid vaccine, on hold at this time. Provided options for how to obtain a shower chair.     Katharine Newsome RN, BSN, PHN, CTS  Care Coordinator  [facility]  [phone]

## 2021-04-24 ENCOUNTER — APPOINTMENT (OUTPATIENT)
Dept: PHYSICAL THERAPY | Facility: CLINIC | Age: 26
End: 2021-04-24
Payer: COMMERCIAL

## 2021-04-24 LAB
ALBUMIN SERPL-MCNC: 2.1 G/DL (ref 3.4–5)
ALP SERPL-CCNC: 56 U/L (ref 40–150)
ALT SERPL W P-5'-P-CCNC: 17 U/L (ref 0–70)
ANION GAP SERPL CALCULATED.3IONS-SCNC: <1 MMOL/L (ref 3–14)
AST SERPL W P-5'-P-CCNC: 26 U/L (ref 0–45)
BILIRUB SERPL-MCNC: 0.8 MG/DL (ref 0.2–1.3)
BUN SERPL-MCNC: 5 MG/DL (ref 7–30)
CALCIUM SERPL-MCNC: 7.9 MG/DL (ref 8.5–10.1)
CHLORIDE SERPL-SCNC: 106 MMOL/L (ref 94–109)
CO2 SERPL-SCNC: 35 MMOL/L (ref 20–32)
CREAT SERPL-MCNC: 0.55 MG/DL (ref 0.66–1.25)
ERYTHROCYTE [DISTWIDTH] IN BLOOD BY AUTOMATED COUNT: 11.8 % (ref 10–15)
GFR SERPL CREATININE-BSD FRML MDRD: >90 ML/MIN/{1.73_M2}
GLUCOSE BLDC GLUCOMTR-MCNC: 104 MG/DL (ref 70–99)
GLUCOSE BLDC GLUCOMTR-MCNC: 104 MG/DL (ref 70–99)
GLUCOSE BLDC GLUCOMTR-MCNC: 111 MG/DL (ref 70–99)
GLUCOSE BLDC GLUCOMTR-MCNC: 87 MG/DL (ref 70–99)
GLUCOSE SERPL-MCNC: 117 MG/DL (ref 70–99)
HCT VFR BLD AUTO: 37.4 % (ref 40–53)
HGB BLD-MCNC: 12.7 G/DL (ref 13.3–17.7)
MAGNESIUM SERPL-MCNC: 1.6 MG/DL (ref 1.6–2.3)
MCH RBC QN AUTO: 34.3 PG (ref 26.5–33)
MCHC RBC AUTO-ENTMCNC: 34 G/DL (ref 31.5–36.5)
MCV RBC AUTO: 101 FL (ref 78–100)
PHOSPHATE SERPL-MCNC: 2.3 MG/DL (ref 2.5–4.5)
PLATELET # BLD AUTO: 76 10E9/L (ref 150–450)
POTASSIUM SERPL-SCNC: 2.9 MMOL/L (ref 3.4–5.3)
POTASSIUM SERPL-SCNC: 3.5 MMOL/L (ref 3.4–5.3)
PROT SERPL-MCNC: 5.2 G/DL (ref 6.8–8.8)
RBC # BLD AUTO: 3.7 10E12/L (ref 4.4–5.9)
SODIUM SERPL-SCNC: 141 MMOL/L (ref 133–144)
VALPROATE FREE SERPL-MCNC: 28.5 UG/ML (ref 6–20)
WBC # BLD AUTO: 8.6 10E9/L (ref 4–11)

## 2021-04-24 PROCEDURE — 84132 ASSAY OF SERUM POTASSIUM: CPT | Performed by: INTERNAL MEDICINE

## 2021-04-24 PROCEDURE — 250N000009 HC RX 250: Performed by: SURGERY

## 2021-04-24 PROCEDURE — 250N000011 HC RX IP 250 OP 636: Performed by: INTERNAL MEDICINE

## 2021-04-24 PROCEDURE — 80053 COMPREHEN METABOLIC PANEL: CPT | Performed by: INTERNAL MEDICINE

## 2021-04-24 PROCEDURE — 97110 THERAPEUTIC EXERCISES: CPT | Mod: GP | Performed by: PHYSICAL THERAPIST

## 2021-04-24 PROCEDURE — 999N001017 HC STATISTIC GLUCOSE BY METER IP

## 2021-04-24 PROCEDURE — 250N000011 HC RX IP 250 OP 636: Performed by: SURGERY

## 2021-04-24 PROCEDURE — 258N000003 HC RX IP 258 OP 636: Performed by: INTERNAL MEDICINE

## 2021-04-24 PROCEDURE — 120N000001 HC R&B MED SURG/OB

## 2021-04-24 PROCEDURE — 250N000009 HC RX 250: Performed by: INTERNAL MEDICINE

## 2021-04-24 PROCEDURE — 84100 ASSAY OF PHOSPHORUS: CPT | Performed by: INTERNAL MEDICINE

## 2021-04-24 PROCEDURE — 85027 COMPLETE CBC AUTOMATED: CPT | Performed by: INTERNAL MEDICINE

## 2021-04-24 PROCEDURE — 258N000003 HC RX IP 258 OP 636: Performed by: SURGERY

## 2021-04-24 PROCEDURE — 83735 ASSAY OF MAGNESIUM: CPT | Performed by: INTERNAL MEDICINE

## 2021-04-24 PROCEDURE — 3E0436Z INTRODUCTION OF NUTRITIONAL SUBSTANCE INTO CENTRAL VEIN, PERCUTANEOUS APPROACH: ICD-10-PCS | Performed by: INTERNAL MEDICINE

## 2021-04-24 PROCEDURE — 99232 SBSQ HOSP IP/OBS MODERATE 35: CPT | Performed by: INTERNAL MEDICINE

## 2021-04-24 PROCEDURE — 250N000013 HC RX MED GY IP 250 OP 250 PS 637: Performed by: INTERNAL MEDICINE

## 2021-04-24 PROCEDURE — 258N000001 HC RX 258: Performed by: INTERNAL MEDICINE

## 2021-04-24 RX ORDER — POTASSIUM CHLORIDE 29.8 MG/ML
20 INJECTION INTRAVENOUS
Status: COMPLETED | OUTPATIENT
Start: 2021-04-24 | End: 2021-04-24

## 2021-04-24 RX ORDER — DEXTROSE MONOHYDRATE 100 MG/ML
INJECTION, SOLUTION INTRAVENOUS CONTINUOUS PRN
Status: DISCONTINUED | OUTPATIENT
Start: 2021-04-24 | End: 2021-04-27 | Stop reason: HOSPADM

## 2021-04-24 RX ORDER — DEXTROSE MONOHYDRATE 100 MG/ML
INJECTION, SOLUTION INTRAVENOUS CONTINUOUS PRN
Status: DISCONTINUED | OUTPATIENT
Start: 2021-04-24 | End: 2021-04-24

## 2021-04-24 RX ADMIN — POTASSIUM CHLORIDE 20 MEQ: 29.8 INJECTION, SOLUTION INTRAVENOUS at 11:46

## 2021-04-24 RX ADMIN — FAMOTIDINE 20 MG: 10 INJECTION, SOLUTION INTRAVENOUS at 08:54

## 2021-04-24 RX ADMIN — CLOBAZAM 5 MG: 2.5 SUSPENSION ORAL at 21:08

## 2021-04-24 RX ADMIN — DEXTROSE AND SODIUM CHLORIDE: 10; .45 INJECTION, SOLUTION INTRAVENOUS at 17:08

## 2021-04-24 RX ADMIN — SODIUM CHLORIDE 250 MG: 9 INJECTION, SOLUTION INTRAVENOUS at 15:20

## 2021-04-24 RX ADMIN — SODIUM PHOSPHATE, MONOBASIC, MONOHYDRATE AND SODIUM PHOSPHATE, DIBASIC, ANHYDROUS 9 MMOL: 276; 142 INJECTION, SOLUTION INTRAVENOUS at 09:44

## 2021-04-24 RX ADMIN — POTASSIUM CHLORIDE 20 MEQ: 29.8 INJECTION, SOLUTION INTRAVENOUS at 08:52

## 2021-04-24 RX ADMIN — METRONIDAZOLE 500 MG: 500 INJECTION, SOLUTION INTRAVENOUS at 14:15

## 2021-04-24 RX ADMIN — FAMOTIDINE 20 MG: 10 INJECTION, SOLUTION INTRAVENOUS at 21:10

## 2021-04-24 RX ADMIN — METRONIDAZOLE 500 MG: 500 INJECTION, SOLUTION INTRAVENOUS at 21:14

## 2021-04-24 RX ADMIN — CEFEPIME HYDROCHLORIDE 1 G: 1 INJECTION, POWDER, FOR SOLUTION INTRAMUSCULAR; INTRAVENOUS at 22:20

## 2021-04-24 RX ADMIN — METRONIDAZOLE 500 MG: 500 INJECTION, SOLUTION INTRAVENOUS at 05:37

## 2021-04-24 RX ADMIN — I.V. FAT EMULSION 250 ML: 20 EMULSION INTRAVENOUS at 20:02

## 2021-04-24 RX ADMIN — ASCORBIC ACID, VITAMIN A PALMITATE, CHOLECALCIFEROL, THIAMINE HYDROCHLORIDE, RIBOFLAVIN-5 PHOSPHATE SODIUM, PYRIDOXINE HYDROCHLORIDE, NIACINAMIDE, DEXPANTHENOL, ALPHA-TOCOPHEROL ACETATE, VITAMIN K1, FOLIC ACID, BIOTIN, CYANOCOBALAMIN: 200; 3300; 200; 6; 3.6; 6; 40; 15; 10; 150; 600; 60; 5 INJECTION, SOLUTION INTRAVENOUS at 20:02

## 2021-04-24 RX ADMIN — SODIUM CHLORIDE 250 MG: 9 INJECTION, SOLUTION INTRAVENOUS at 01:53

## 2021-04-24 RX ADMIN — CLOBAZAM 7.5 MG: 2.5 SUSPENSION ORAL at 09:07

## 2021-04-24 RX ADMIN — LORAZEPAM 1 MG: 2 INJECTION INTRAMUSCULAR; INTRAVENOUS at 23:03

## 2021-04-24 RX ADMIN — SODIUM CHLORIDE 250 MG: 9 INJECTION, SOLUTION INTRAVENOUS at 08:53

## 2021-04-24 RX ADMIN — SODIUM CHLORIDE 250 MG: 9 INJECTION, SOLUTION INTRAVENOUS at 19:37

## 2021-04-24 RX ADMIN — FLUTICASONE PROPIONATE AND SALMETEROL XINAFOATE 2 PUFF: 230; 21 AEROSOL, METERED RESPIRATORY (INHALATION) at 08:50

## 2021-04-24 RX ADMIN — CEFEPIME HYDROCHLORIDE 1 G: 1 INJECTION, POWDER, FOR SOLUTION INTRAMUSCULAR; INTRAVENOUS at 12:52

## 2021-04-24 ASSESSMENT — ACTIVITIES OF DAILY LIVING (ADL)
ADLS_ACUITY_SCORE: 36
FALL_HISTORY_WITHIN_LAST_SIX_MONTHS: NO
ADLS_ACUITY_SCORE: 36
EATING/SWALLOWING: EATING;SWALLOWING LIQUIDS;SWALLOWING SOLID FOOD
WALKING_OR_CLIMBING_STAIRS_DIFFICULTY: YES
DIFFICULTY_COMMUNICATING: YES
ADLS_ACUITY_SCORE: 36
DIFFICULTY_EATING/SWALLOWING: YES
DRESSING/BATHING_DIFFICULTY: YES
DOING_ERRANDS_INDEPENDENTLY_DIFFICULTY: YES
TOILETING_ISSUES: YES
EQUIPMENT_CURRENTLY_USED_AT_HOME: LIFT DEVICE
ADLS_ACUITY_SCORE: 36
CONCENTRATING,_REMEMBERING_OR_MAKING_DECISIONS_DIFFICULTY: YES

## 2021-04-24 NOTE — CONSULTS
NUTRITION BRIEF NOTE + CONSULT: TF Assess and Order (start trophic feeds), Pharmacy/Nutrition to start and manage TPN  See RD note 4/21 for full assessment details    New findings in last 24 hours:    Remains NPO    Midline --> PICC conversion 4/23    Electrolytes  Potassium (mmol/L)   Date Value   04/24/2021 2.9 (L)   04/23/2021 3.5   04/23/2021 3.2 (L)     Phosphorus (mg/dL)   Date Value   04/24/2021 2.3 (L)   04/23/2021 0.7 (LL)   05/30/2020 2.9   05/29/2020 2.1 (L)        Blood Glucose  Glucose (mg/dL)   Date Value   04/24/2021 117 (H)   04/23/2021 86   04/23/2021 103 (H)   04/22/2021 66 (L)   04/21/2021 81     Hemoglobin A1C (%)   Date Value   03/17/2021 5.0   02/06/2020 4.9        Inflammatory Markers  CRP Inflammation (mg/L)   Date Value   04/05/2019 13.0 (H)   01/15/2019 <2.9     WBC (10e9/L)   Date Value   04/24/2021 8.6   04/23/2021 8.6   04/23/2021 8.5     Albumin (g/dL)   Date Value   04/24/2021 2.1 (L)   04/23/2021 2.0 (L)   04/23/2021 1.9 (L)        Magnesium (mg/dL)   Date Value   04/24/2021 1.6   04/23/2021 1.9   04/22/2021 2.0     Sodium (mmol/L)   Date Value   04/24/2021 141   04/23/2021 143   04/23/2021 146 (H)        Renal  Urea Nitrogen (mg/dL)   Date Value   04/24/2021 5 (L)   04/23/2021 12   04/23/2021 15     Creatinine (mg/dL)   Date Value   04/24/2021 0.55 (L)   04/23/2021 0.59 (L)   04/23/2021 0.63 (L)         Additional  Triglycerides (mg/dL)   Date Value   03/17/2021 57   02/06/2020 73     Ketones Urine (mg/dL)   Date Value   04/19/2021 Trace (A)            ceFEPIme (MAXIPIME) IV  1 g Intravenous Q12H     clobazam  5 mg Per G Tube At Bedtime     clobazam  7.5 mg Per G Tube QAM     famotidine  20 mg Intravenous BID     fluticasone-salmeterol  2 puff Inhalation BID     metroNIDAZOLE  500 mg Intravenous Q8H     [Held by provider] polyethylene glycol  17 g Oral Daily     [Held by provider] senna-docusate  1 tablet Oral BID     sodium chloride (PF)  10 mL Intracatheter Q7 Days     sodium  phosphate  15 mmol Intravenous Once     sodium phosphate  9 mmol Intravenous Once     valproate (DEPACON) in NS intermittent infusion  250 mg Intravenous Q6H          dextrose 10% and 0.45% NaCl 40 mL/hr at 04/23/21 2018        Assessed Nutrition Needs (DW: 38 kg):  Estimated Energy Needs: >/=1520 kcals (>/=40 Kcal/Kg)  Justification: minimum maintenance  Estimated Protein Needs: >/=57 grams protein (>/=1.5 g pro/Kg)  Justification: preservation of lean body mass  Estimated Fluid Needs: per MD    Interventions:    TF at trophic rate: Pediasure 1.5 at 10 mL/hr (home formula, ok'd by surgery team on days prior), free water flush 30 mL q4 hours.     TPN to start tonight at 8 pm: D15 AA5 at 40 mL/hr + 250 mL 20% lipids to provide 144 g dextrose, 48 g protein, 1182 kcal. Total IVF = 40 mL/hr    Electrolyte replacement protocols     Nutrition education: discussed plan above with mom at bedside, in agreement    Collaboration and Referral of care: Discussed patient during interdisciplinary care rounds this morning and with RN, Dr. Wilson    Please page/consult as needed.      Ny Phelps, MS, RDN-AP, LD, CNSC  Pager - 3rd floor/ICU: [phone]  Pager - All other floors: [phone]  Pager - Weekend/holiday: [phone]  Office: [phone]

## 2021-04-24 NOTE — PROGRESS NOTES
[facility]   General Surgery Progress Note           Assessment and Plan:   Assessment:   -SBO with intra-abdominal compartment syndrome due to extreme destension  -POD#5 s/p Exploratory laparotomy, enterolysis, incidental appendectomy; Pathology: mild acute appendicitis  -Postop ileus as expected  -Aspiration pneumonia  -H/o cerebral palsy with developmental delay, lifelong venting G-tube in place  -hypokalemia, hypophosphatemia      Plan:   -abdomen is improved - Start trickle tube feeds today - 10ml/hr. Will not advance until having bowel function.   -Start TPN  -electrolyte replacements - will be important for resolving ileus  -Increase physical activity as able when alert and able.  PT following.  -continue abx for pneumonia per hospitalist; Maxipime/flagyl  -pain control: Tylenol, IV Dilaudid prn; none recently  -GI prophylaxis:  Pepcid  -VTE prophylaxis: defer to hospitalist  -Await return of bowel function, tolerance of nutrition/meds intake.         Interval History:   Seems better per mom. Is saying a few words though still more sleepy than normal.  She hasn't seen any indicators of pain.  His belly is much flatter and softer than when he came in per mom  No flatus heard, no BMs         Physical Exam:   Blood pressure 111/71, pulse 103, temperature 98.2  F (36.8  C), temperature source Axillary, resp. rate 16, weight 44.8 kg (98 lb 12.8 oz), SpO2 95 %.    I/O last 3 completed shifts:  In: 30 [NG/GT:30]  Out: -     Less alert and active today, minimally reactive to abd exam  Abdomen:   soft, non-distended, non-reactive to palpation  Midline abd incision - steri-strips in place, no drainage, no erythema  Venting G-tube intact          Data:   Pathology: Appendix  -Mild acute appendicitis    Recent Labs   Lab 04/24/21  0530 04/23/21  0532 04/23/21  0012   WBC 8.6 8.6 8.5   HGB 12.7* 12.6* 11.8*   HCT 37.4* 37.5* 34.3*   * 104* 102*   PLT 76* 66* 58*     Tressa Fox MD

## 2021-04-24 NOTE — PROGRESS NOTES
[facility]    Hospitalist Progress Note      Assessment & Plan   Rj Licea is a 25 year old male who was admitted on 4/19/2021.    Summary of Stay:     Rj is a 25-year-old young man with history of cerebral palsy, blindness, lifelong G-tube, epilepsy who presented 4/19/2021 with abdominal distention, frequent vomiting and leakage of contents around his G-tube in the setting of prior bowel obstructions and 2 large hernia repairs as an infant.       CT scan of his abdomen showed significant dilatation of numerous loops of small bowel with multiple air-fluid levels compatible with a small bowel obstruction and a transition point in the distal small bowel just proximal to the terminal ileum in the right lower quadrant.  He was also noted to have a right middle lobe infiltrate concerning for pneumonia.     He was given IV Unasyn, multiple fluid boluses and admitted for further care. Notably, just prior to leaving the emergency room he did become febrile.  He has baseline tachycardia to about 110 per his mother but became tachycardic to the 140s to 150 range as well as tachypneic.     Following arrival to the medical floor lactic acid was elevated and he continued to have fevers.  He was reevaluated multiple times by surgery and in the setting of a rising lactic acid and concern for evolving got ischemia he was taken to the operating room for exploratory laparotomy with resolution without resection found likely to have an intra-abdominal compartment syndrome due to massive dilation from his obstruction.     He remains admitted for ongoing IV antibiotics for pneumonia and postop management following his laparotomy.     He does continue to require supplemental oxygen likely related to combination of atelectasis, sedation and pneumonia it is not quite yet at his baseline mental status.     Have started to give some medications via his G-tube.     On 4/23 patient had a seizure. Patient has  known breakthrough seizures. He was treated with IV ativan. Due to family concerns neurology was consulted, CT head completed, and EEG ordered.     Plan:    Small bowel obstruction with intra-abdominal compartment syndrome:  -Massive dilatation of his bowels.  Taken to the operating room the evening of admission.  No resection was required and he appeared to have excellent gut viability.  But suspected to have intra-abdominal compartment syndrome.  -Surgery consulted-appreciate assistance  -NG tube removed 4/22  -Per surgery team, starting TPN today.  Starting trickle feeds today.     Mild hypernatremia: Better     Hypokalemia:   Replace per protocol.       Severe hypoglycemia:   -Improved after IV dextrose.  -Continue monitoring.  Now on TPN and trickle tube feeding.     Hypophosphatemia: Replace per protocol      Sepsis due to aspiration pneumonia, vomiting gastric contents in setting of bowel obstruction  -Antibiotics: IV cefepime and Flagyl started 4/18.  Vancomycin stopped 4/21  -Follow cultures to completion  -Wean oxygen as able.  Overall oxygen needs are improving.     Thrombocytopenia, etiology unclear.    -Thought to be secondary to dilution and acute infection.  Improving.  -Continue treatment for sepsis.  Hold on anticoagulants. Improving.     Macrocytic anemia:   -No clear bleeding.  Mild anemia.     History of cerebral palsy with associated developmental delay, blindness, hernia repairs as an infant and lifelong G-tube:   -His mother is extremely supportive and has been present throughout the hospital stay.  She and patient's sister are his primary caregiver.  -Starting trickle tube feeding.     Seizure disorder:   -Initially had scheduled some Ativan at the direction of the patient's neurologist when he was n.p.o.  Now able to use NG tube for medications in a limited fashion.   -PTA clobazam.  1 mg IV Ativan as needed for seizure activity.  0.25 mg IV Ativan for anxiety and agitation. Valproate 250 mg  q 6 hours. 4/22 seizure on 4/23, resolved with IV ativan. CT head without acute abnormality. Neurology consulted. EEG pending. Valproate lvl 8.0 (low end for trough)      Oliguria, acute kidney injury:   -Resolved.  Creatinine was 0.98 upon admission with BUN of 25.  Baseline is around 0.6.  SALLY likely related to a combination of sepsis with prerenal state and intra-abdominal compartment syndrome.  Improved with IV hydration.  -Monitor with daily BMP.     pulmonary edema secondary to fluid resuscitation for sepsis:   -Developed some pulmonary vascular congestion early 4/21 prompting fluids to be stopped and a dose of IV Lasix to be given.  -Improved with IV diuresis but given NPO status IVF initiated for above issues. Will need to monitor respiratory status closely.      Acute hypoxemic respiratory failure due to combination of pneumonia, atelectasis and respiratory depression, also pulmonary edema as above  -Currently on RA.      Severe protein calorie malnutrition:   -Nutrition plan as above.    DVT Prophylaxis: Pneumatic Compression Devices  Code Status: Full Code  Expected discharge: 2-3 days    Clarence Wilson MD  Text Page (7am - 6pm, M-F)    Interval History   Patient was evaluated with nursing staff. Overnight issues discussed.    Review of systems:   Sleeping comfortably in the bed.  Occasionally speaks to his mother.  Appears comfortable.  Unable to give any history.    -Data reviewed today: Labs and medications.    Physical Exam   Temp: 98.2  F (36.8  C) Temp src: Axillary BP: 111/71 Pulse: 103   Resp: 16 SpO2: 95 % O2 Device: None (Room air) Oxygen Delivery: 1 LPM  Vitals:    04/22/21 0510 04/23/21 0519 04/24/21 0555   Weight: 41.1 kg (90 lb 8 oz) 40.9 kg (90 lb 3.2 oz) 44.8 kg (98 lb 12.8 oz)     Vital Signs with Ranges  Temp:  [98  F (36.7  C)-98.3  F (36.8  C)] 98.2  F (36.8  C)  Pulse:  [100-108] 103  Resp:  [16] 16  BP: (105-111)/(61-71) 111/71  SpO2:  [91 %-96 %] 95 %  I/O last 3 completed  shifts:  In: 30 [NG/GT:30]  Out: -     Constitutional: Awake, alert, cooperative, no apparent distress  HEENT: Trachea midline, sclera is clear   Respiratory: No crackles. No wheezing. Equal breath sounds bilaterally.  Cardiovascular: Regular rate and rhythm, normal S1 and S2, and no murmur noted  GI: Normal bowel sounds, soft, non-distended, surgical wound is healing well.  Feeding tube in place.  Extremities: No pitting edema     Medications     dextrose 10% and 0.45% NaCl 40 mL/hr at 04/23/21 2018       ceFEPIme (MAXIPIME) IV  1 g Intravenous Q12H     clobazam  5 mg Per G Tube At Bedtime     clobazam  7.5 mg Per G Tube QAM     [Held by provider] docusate sodium  100 mg Oral BID     famotidine  20 mg Intravenous BID     fluticasone-salmeterol  2 puff Inhalation BID     metroNIDAZOLE  500 mg Intravenous Q8H     [Held by provider] polyethylene glycol  17 g Oral Daily     potassium chloride  20 mEq Intravenous Q1H     [Held by provider] senna-docusate  1 tablet Oral BID     sodium chloride (PF)  10 mL Intracatheter Q7 Days     sodium phosphate  15 mmol Intravenous Once     sodium phosphate  9 mmol Intravenous Once     valproate (DEPACON) in NS intermittent infusion  250 mg Intravenous Q6H       Data   Recent Labs   Lab 04/24/21  0530 04/23/21  0532 04/23/21  0012 04/19/21  0754 04/19/21  0754   WBC 8.6 8.6 8.5   < >  --    HGB 12.7* 12.6* 11.8*   < >  --    * 104* 102*   < >  --    PLT 76* 66* 58*   < >  --    INR  --   --   --   --  1.10    143 146*   < >  --    POTASSIUM 2.9* 3.5 3.2*   < >  --    CHLORIDE 106 108 111*   < >  --    CO2 35* 33* 35*   < >  --    BUN 5* 12 15   < >  --    CR 0.55* 0.59* 0.63*   < >  --    ANIONGAP <1* 2* <1*   < >  --    ALICE 7.9* 8.1* 8.1*   < >  --    * 86 103*   < >  --    ALBUMIN 2.1* 2.0* 1.9*   < >  --    PROTTOTAL 5.2* 5.0* 4.7*   < >  --    BILITOTAL 0.8 1.1 0.8   < >  --    ALKPHOS 56 56 47   < >  --    ALT 17 19 19   < >  --    AST 26 37 33   < >  --     <  > = values in this interval not displayed.       Recent Results (from the past 24 hour(s))   XR Chest Port 1 View    Narrative    EXAM: XR CHEST PORT 1 VIEW  LOCATION: [facility]  DATE/TIME: 4/23/2021 4:49 PM    INDICATION: PICC line placement.  COMPARISON: 04/21/2021 at 0353 hours      Impression    IMPRESSION: Heart size and pulmonary vascularity normal. Infiltrates and/or atelectasis within both mid and lower lung fields have slightly improved. Left PICC line tip extends into the right atrium. Small left pleural effusion tracking to the left apex.   XR Chest Port 1 View    Narrative    EXAM: XR CHEST PORT 1 VIEW  LOCATION: [facility]  DATE/TIME: 4/23/2021 5:46 PM    INDICATION: Line placement.  COMPARISON: 04/23/2021.      Impression    IMPRESSION: Left PICC has been pulled back with tip in the lower SVC in good location. Stable pulmonary changes.

## 2021-04-24 NOTE — PHARMACY-CONSULT NOTE
Pharmacy Tube Feeding Consult    Medication reviewed for administration by feeding tube and for potential food/drug interactions.    Recommendation: No changes are needed at this time.      Pharmacy will continue to follow as new medications are ordered.

## 2021-04-24 NOTE — PLAN OF CARE
VSS on RA. More alert this afternoon, responsive to conversation and answering question. BM x2 noted this shift, medium, soft brown stool. Incontinent of urine. GT with Tube feed infusing at 10mL/hr. D10 with NS@40mL/hr infusing to PICC line. Potassium and Phosphorus replaced per protocol.  and 87. NPO. Lift for transfers. Mepelex to sacrum for prevention. Plan to begin TPN tonight. Discharge plan home with family 2-3 days. Will continue POC.

## 2021-04-24 NOTE — PLAN OF CARE
VSS, weaned to RA, SpO2 96%. No s/sx pain. Calm, somnolent this shift. Bowel sounds hypoactive. Incision to abdomen WDL, closure with adhesive strips. G tube clamped, WDL. Blood glucose 104. Incontinent of urine. Seizure precautions maintained. PICC line in place, continuous D10 w/ 0.45% NS infusing. On IV cefepime, flagyl.

## 2021-04-24 NOTE — PLAN OF CARE
/63 (BP Location: Right arm)   Pulse 100   Temp 98  F (36.7  C) (Temporal)   Resp 16   Wt 40.9 kg (90 lb 3.2 oz)   SpO2 91%   BMI 16.90 kg/m    PT somnolent this shift, arouses to voice. Afebrile. BG were 31, 126, 78. D50 given for low sugar. PICC placed in L arm. Lung sounds clear bilaterally. Replacing phos. D10/.45NS infusing.

## 2021-04-25 ENCOUNTER — APPOINTMENT (OUTPATIENT)
Dept: PHYSICAL THERAPY | Facility: CLINIC | Age: 26
End: 2021-04-25
Payer: COMMERCIAL

## 2021-04-25 LAB
ALBUMIN SERPL-MCNC: 1.7 G/DL (ref 3.4–5)
ALP SERPL-CCNC: 51 U/L (ref 40–150)
ALT SERPL W P-5'-P-CCNC: 12 U/L (ref 0–70)
ANION GAP SERPL CALCULATED.3IONS-SCNC: <1 MMOL/L (ref 3–14)
AST SERPL W P-5'-P-CCNC: 16 U/L (ref 0–45)
BACTERIA SPEC CULT: NO GROWTH
BACTERIA SPEC CULT: NO GROWTH
BILIRUB DIRECT SERPL-MCNC: 0.1 MG/DL (ref 0–0.2)
BILIRUB SERPL-MCNC: 0.3 MG/DL (ref 0.2–1.3)
BUN SERPL-MCNC: 8 MG/DL (ref 7–30)
CALCIUM SERPL-MCNC: 7.5 MG/DL (ref 8.5–10.1)
CHLORIDE SERPL-SCNC: 111 MMOL/L (ref 94–109)
CO2 SERPL-SCNC: 31 MMOL/L (ref 20–32)
CREAT SERPL-MCNC: 0.58 MG/DL (ref 0.66–1.25)
ERYTHROCYTE [DISTWIDTH] IN BLOOD BY AUTOMATED COUNT: 12.2 % (ref 10–15)
GFR SERPL CREATININE-BSD FRML MDRD: >90 ML/MIN/{1.73_M2}
GLUCOSE BLDC GLUCOMTR-MCNC: 117 MG/DL (ref 70–99)
GLUCOSE BLDC GLUCOMTR-MCNC: 124 MG/DL (ref 70–99)
GLUCOSE BLDC GLUCOMTR-MCNC: 127 MG/DL (ref 70–99)
GLUCOSE BLDC GLUCOMTR-MCNC: 139 MG/DL (ref 70–99)
GLUCOSE BLDC GLUCOMTR-MCNC: 185 MG/DL (ref 70–99)
GLUCOSE SERPL-MCNC: 131 MG/DL (ref 70–99)
HCT VFR BLD AUTO: 31.8 % (ref 40–53)
HGB BLD-MCNC: 11.1 G/DL (ref 13.3–17.7)
INR PPP: 1.53 (ref 0.86–1.14)
MAGNESIUM SERPL-MCNC: 1.6 MG/DL (ref 1.6–2.3)
MCH RBC QN AUTO: 35.6 PG (ref 26.5–33)
MCHC RBC AUTO-ENTMCNC: 34.9 G/DL (ref 31.5–36.5)
MCV RBC AUTO: 102 FL (ref 78–100)
PHOSPHATE SERPL-MCNC: 3.4 MG/DL (ref 2.5–4.5)
PLATELET # BLD AUTO: 100 10E9/L (ref 150–450)
POTASSIUM SERPL-SCNC: 3.1 MMOL/L (ref 3.4–5.3)
POTASSIUM SERPL-SCNC: 3.6 MMOL/L (ref 3.4–5.3)
PREALB SERPL IA-MCNC: 10 MG/DL (ref 15–45)
PROT SERPL-MCNC: 4.6 G/DL (ref 6.8–8.8)
RBC # BLD AUTO: 3.12 10E12/L (ref 4.4–5.9)
SODIUM SERPL-SCNC: 142 MMOL/L (ref 133–144)
SPECIMEN SOURCE: NORMAL
SPECIMEN SOURCE: NORMAL
WBC # BLD AUTO: 8.7 10E9/L (ref 4–11)

## 2021-04-25 PROCEDURE — 258N000003 HC RX IP 258 OP 636: Performed by: SURGERY

## 2021-04-25 PROCEDURE — 250N000013 HC RX MED GY IP 250 OP 250 PS 637: Performed by: INTERNAL MEDICINE

## 2021-04-25 PROCEDURE — 250N000009 HC RX 250: Performed by: SURGERY

## 2021-04-25 PROCEDURE — 99232 SBSQ HOSP IP/OBS MODERATE 35: CPT | Performed by: INTERNAL MEDICINE

## 2021-04-25 PROCEDURE — 80053 COMPREHEN METABOLIC PANEL: CPT | Performed by: INTERNAL MEDICINE

## 2021-04-25 PROCEDURE — 250N000011 HC RX IP 250 OP 636: Performed by: SURGERY

## 2021-04-25 PROCEDURE — 97530 THERAPEUTIC ACTIVITIES: CPT | Mod: GP | Performed by: PHYSICAL THERAPIST

## 2021-04-25 PROCEDURE — 83735 ASSAY OF MAGNESIUM: CPT | Performed by: INTERNAL MEDICINE

## 2021-04-25 PROCEDURE — 250N000011 HC RX IP 250 OP 636: Performed by: INTERNAL MEDICINE

## 2021-04-25 PROCEDURE — 999N001017 HC STATISTIC GLUCOSE BY METER IP

## 2021-04-25 PROCEDURE — 82248 BILIRUBIN DIRECT: CPT | Performed by: INTERNAL MEDICINE

## 2021-04-25 PROCEDURE — 120N000001 HC R&B MED SURG/OB

## 2021-04-25 PROCEDURE — 84134 ASSAY OF PREALBUMIN: CPT | Performed by: INTERNAL MEDICINE

## 2021-04-25 PROCEDURE — 84132 ASSAY OF SERUM POTASSIUM: CPT | Performed by: INTERNAL MEDICINE

## 2021-04-25 PROCEDURE — 85027 COMPLETE CBC AUTOMATED: CPT | Performed by: INTERNAL MEDICINE

## 2021-04-25 PROCEDURE — 250N000013 HC RX MED GY IP 250 OP 250 PS 637: Performed by: SURGERY

## 2021-04-25 PROCEDURE — 84100 ASSAY OF PHOSPHORUS: CPT | Performed by: INTERNAL MEDICINE

## 2021-04-25 PROCEDURE — 85610 PROTHROMBIN TIME: CPT | Performed by: INTERNAL MEDICINE

## 2021-04-25 RX ORDER — POTASSIUM CHLORIDE 29.8 MG/ML
20 INJECTION INTRAVENOUS ONCE
Status: COMPLETED | OUTPATIENT
Start: 2021-04-25 | End: 2021-04-25

## 2021-04-25 RX ORDER — LANOLIN ALCOHOL/MO/W.PET/CERES
6 CREAM (GRAM) TOPICAL AT BEDTIME
Status: DISCONTINUED | OUTPATIENT
Start: 2021-04-25 | End: 2021-04-27 | Stop reason: HOSPADM

## 2021-04-25 RX ORDER — QUETIAPINE FUMARATE 200 MG/1
600 TABLET, FILM COATED ORAL AT BEDTIME
Status: DISCONTINUED | OUTPATIENT
Start: 2021-04-25 | End: 2021-04-27 | Stop reason: HOSPADM

## 2021-04-25 RX ORDER — GABAPENTIN 250 MG/5ML
750 SOLUTION ORAL AT BEDTIME
Status: DISCONTINUED | OUTPATIENT
Start: 2021-04-25 | End: 2021-04-27 | Stop reason: HOSPADM

## 2021-04-25 RX ORDER — HYDROXYZINE HCL 10 MG/5 ML
50 SOLUTION, ORAL ORAL AT BEDTIME
Status: DISCONTINUED | OUTPATIENT
Start: 2021-04-25 | End: 2021-04-27 | Stop reason: HOSPADM

## 2021-04-25 RX ADMIN — METRONIDAZOLE 500 MG: 500 INJECTION, SOLUTION INTRAVENOUS at 21:06

## 2021-04-25 RX ADMIN — GABAPENTIN 750 MG: 250 SUSPENSION ORAL at 20:07

## 2021-04-25 RX ADMIN — LORAZEPAM 1 MG: 2 INJECTION INTRAMUSCULAR; INTRAVENOUS at 00:02

## 2021-04-25 RX ADMIN — FLUTICASONE PROPIONATE AND SALMETEROL XINAFOATE 2 PUFF: 230; 21 AEROSOL, METERED RESPIRATORY (INHALATION) at 20:38

## 2021-04-25 RX ADMIN — Medication 6 MG: at 20:05

## 2021-04-25 RX ADMIN — CEFEPIME HYDROCHLORIDE 1 G: 1 INJECTION, POWDER, FOR SOLUTION INTRAMUSCULAR; INTRAVENOUS at 09:28

## 2021-04-25 RX ADMIN — POTASSIUM CHLORIDE 20 MEQ: 29.8 INJECTION, SOLUTION INTRAVENOUS at 08:14

## 2021-04-25 RX ADMIN — FAMOTIDINE 20 MG: 10 INJECTION, SOLUTION INTRAVENOUS at 08:08

## 2021-04-25 RX ADMIN — HYDROXYZINE HYDROCHLORIDE 50 MG: 10 SYRUP ORAL at 20:06

## 2021-04-25 RX ADMIN — CLOBAZAM 7.5 MG: 2.5 SUSPENSION ORAL at 08:20

## 2021-04-25 RX ADMIN — VALPROIC ACID 350 MG: 250 SOLUTION ORAL at 20:07

## 2021-04-25 RX ADMIN — QUETIAPINE FUMARATE 600 MG: 200 TABLET ORAL at 20:06

## 2021-04-25 RX ADMIN — FAMOTIDINE 20 MG: 10 INJECTION, SOLUTION INTRAVENOUS at 20:05

## 2021-04-25 RX ADMIN — FLUTICASONE PROPIONATE AND SALMETEROL XINAFOATE 2 PUFF: 230; 21 AEROSOL, METERED RESPIRATORY (INHALATION) at 08:23

## 2021-04-25 RX ADMIN — ACETAMINOPHEN 650 MG: 325 TABLET, FILM COATED ORAL at 00:10

## 2021-04-25 RX ADMIN — METRONIDAZOLE 500 MG: 500 INJECTION, SOLUTION INTRAVENOUS at 12:50

## 2021-04-25 RX ADMIN — SODIUM CHLORIDE 250 MG: 9 INJECTION, SOLUTION INTRAVENOUS at 08:06

## 2021-04-25 RX ADMIN — CEFEPIME HYDROCHLORIDE 1 G: 1 INJECTION, POWDER, FOR SOLUTION INTRAMUSCULAR; INTRAVENOUS at 22:20

## 2021-04-25 RX ADMIN — METRONIDAZOLE 500 MG: 500 INJECTION, SOLUTION INTRAVENOUS at 05:37

## 2021-04-25 RX ADMIN — CLOBAZAM 5 MG: 2.5 SUSPENSION ORAL at 20:27

## 2021-04-25 RX ADMIN — VALPROIC ACID 350 MG: 250 SOLUTION ORAL at 13:57

## 2021-04-25 RX ADMIN — SODIUM CHLORIDE 250 MG: 9 INJECTION, SOLUTION INTRAVENOUS at 01:14

## 2021-04-25 RX ADMIN — Medication 1 MG: at 17:17

## 2021-04-25 ASSESSMENT — ACTIVITIES OF DAILY LIVING (ADL)
ADLS_ACUITY_SCORE: 36
ADLS_ACUITY_SCORE: 37
ADLS_ACUITY_SCORE: 36

## 2021-04-25 NOTE — PLAN OF CARE
Assumed care at 1900.  VSS on RA.  No evidence of pain, CP or SOB.  Lethargic but occasionally will answer to questions.   SAHIL orientation.  LS dim.  No tele.  TF running at 10ml/hr in g-tube.  TPN infusing at 40 ml/hr and lipids infusing at 20.8 ml/hr in PICC.  .  On IV flagyl and maxipime. Had BM x2.  Seizure precautions maintained.  Mom at bedside.  Poss discharge 2-3 days.  Continue POC.

## 2021-04-25 NOTE — PROGRESS NOTES
[facility]    Hospitalist Progress Note      Assessment & Plan   Rj Licea is a 25 year old male who was admitted on 4/19/2021.    Summary of Stay:   Rj is a 25-year-old young man with history of cerebral palsy, blindness, lifelong G-tube, epilepsy who presented 4/19/2021 with abdominal distention, frequent vomiting and leakage of contents around his G-tube in the setting of prior bowel obstructions and 2 large hernia repairs as an infant.       CT scan of his abdomen showed significant dilatation of numerous loops of small bowel with multiple air-fluid levels compatible with a small bowel obstruction and a transition point in the distal small bowel just proximal to the terminal ileum in the right lower quadrant.  He was also noted to have a right middle lobe infiltrate concerning for pneumonia.     He was given IV Unasyn, multiple fluid boluses and admitted for further care. Notably, just prior to leaving the emergency room he did become febrile.  He has baseline tachycardia to about 110 per his mother but became tachycardic to the 140s to 150 range as well as tachypneic.     Following arrival to the medical floor lactic acid was elevated and he continued to have fevers.  He was reevaluated multiple times by surgery and in the setting of a rising lactic acid and concern for evolving got ischemia he was taken to the operating room for exploratory laparotomy with resolution without resection found likely to have an intra-abdominal compartment syndrome due to massive dilation from his obstruction.     He remains admitted for ongoing IV antibiotics for pneumonia and postop management following his laparotomy.     He does continue to require supplemental oxygen likely related to combination of atelectasis, sedation and pneumonia it is not quite yet at his baseline mental status.     Have started to give some medications via his G-tube.    Had breakthrough seizures on 4/23/2021.  Then  again last night.    Plan:    Small bowel obstruction with intra-abdominal compartment syndrome:  -Massive dilatation of his bowels.  Taken to the operating room the evening of admission.  No resection was required and he appeared to have excellent gut viability.  But suspected to have intra-abdominal compartment syndrome.  -Surgery consulted-appreciate assistance  -NG tube removed 4/22  -Started on TPN on 4/24/2021.  Also started on trickle tube feeding.  -Per surgery recommendations, wean off TPN today.  Resume bolus tube feeding today, as recommended by general surgery.    Seizure disorder:   -Initially had scheduled some Ativan at the direction of the patient's neurologist when he was n.p.o.  -Breakthrough seizures likely due to not getting all the usual medicines.  Getting valproic acid IV.  But gabapentin and quetiapine are on hold.  -Discussed with surgery: Oral medications can be restarted.  Resume gabapentin, quetiapine, hydroxyzine as usual.  Also resume medications to help him sleep as poor sleep can also trigger seizures.  -PTA clobazam.  -EEG on 4/23/2021: This electroencephalogram is abnormal due to the presence of slowing, consistent with a nonspecific encephalopathy.  There are also rare epileptiform discharges consistent with his history of epilepsy.  No seizures were captured on this recording.       Mild hypernatremia: Better     Hypokalemia:   Replace per protocol.       hypoglycemia:   -Improved after IV dextrose.  -Continue monitoring.  Now on TPN and trickle tube feeding.     Hypophosphatemia: Replace per protocol      Sepsis due to aspiration pneumonia, vomiting gastric contents in setting of bowel obstruction  -Antibiotics: IV cefepime and Flagyl started 4/18.  Vancomycin stopped 4/21  -Blood cultures negative till date.     Thrombocytopenia, likely due to sepsis.    -Thought to be secondary to dilution and acute infection.  Improving.  -Continue treatment for sepsis.  Hold on  anticoagulants. Improving.     History of cerebral palsy with associated developmental delay, blindness, hernia repairs as an infant and lifelong G-tube:   -His mother is extremely supportive and has been present throughout the hospital stay.  She and patient's sister are his primary caregiver.  -Starting trickle tube feeding.     Oliguria, acute kidney injury:   -Resolved.  Creatinine was 0.98 upon admission with BUN of 25.  Baseline is around 0.6.  SALLY likely related to a combination of sepsis with prerenal state and intra-abdominal compartment syndrome.  Improved with IV hydration.  -Monitor with daily BMP.     pulmonary edema secondary to fluid resuscitation for sepsis:   -Developed some pulmonary vascular congestion early 4/21 prompting fluids to be stopped and a dose of IV Lasix to be given.  -Improved with IV diuresis but given NPO status IVF initiated for above issues. Will need to monitor respiratory status closely.      Acute hypoxemic respiratory failure due to combination of pneumonia, atelectasis and respiratory depression, also pulmonary edema as above  -Currently on RA.      Severe protein calorie malnutrition:   -Nutrition plan as above.    Mother is in the room.  Updated.     DVT Prophylaxis: Pneumatic Compression Devices  Code Status: Full Code  Expected discharge: 1-2 days    Clarence Wilson MD  Text Page (7am - 6pm, M-F)    Interval History   Patient was evaluated with nursing staff. Overnight issues discussed.    Review of systems:   Unable to obtain.  Appears comfortable.  Not in any distress.    -Data reviewed today: Labs and medications.    Physical Exam   Temp: 98.2  F (36.8  C) Temp src: Axillary BP: 103/65 Pulse: 107   Resp: 18 SpO2: 97 % O2 Device: None (Room air)    Vitals:    04/23/21 0519 04/24/21 0555 04/25/21 0554   Weight: 40.9 kg (90 lb 3.2 oz) 44.8 kg (98 lb 12.8 oz) 42.6 kg (94 lb)     Vital Signs with Ranges  Temp:  [98.1  F (36.7  C)-100.9  F (38.3  C)] 98.2  F (36.8  C)  Pulse:   [107-119] 107  Resp:  [16-18] 18  BP: ()/(56-71) 103/65  SpO2:  [95 %-97 %] 97 %  I/O last 3 completed shifts:  In: 1180 [I.V.:250; NG/GT:300]  Out: -     Constitutional: Awake, alert, cooperative, no apparent distress  HEENT: Trachea midline, sclera is clear   Respiratory: No crackles. No wheezing. Equal breath sounds bilaterally.  Cardiovascular: Regular rate and rhythm, normal S1 and S2, and no murmur noted  GI: Normal bowel sounds, soft, non-distended, non-tender    Medications     dextrose 10% and 0.45% NaCl Stopped (04/24/21 2013)     dextrose       parenteral nutrition - Clinimix E 40 mL/hr at 04/25/21 0808       ceFEPIme (MAXIPIME) IV  1 g Intravenous Q12H     clobazam  5 mg Per G Tube At Bedtime     clobazam  7.5 mg Per G Tube QAM     famotidine  20 mg Intravenous BID     fluticasone-salmeterol  2 puff Inhalation BID     gabapentin  750 mg Per G Tube At Bedtime     hydrOXYzine  50 mg Per G Tube At Bedtime     melatonin  1 mg Per G Tube Daily     melatonin  6 mg Per G Tube At Bedtime     metroNIDAZOLE  500 mg Intravenous Q8H     [Held by provider] polyethylene glycol  17 g Oral Daily     QUEtiapine  600 mg Per G Tube At Bedtime     [Held by provider] senna-docusate  1 tablet Oral BID     sodium chloride (PF)  10 mL Intracatheter Q7 Days     sodium phosphate  15 mmol Intravenous Once     valproate (DEPACON) in NS intermittent infusion  250 mg Intravenous Q6H     valproic acid  300 mg Per G Tube QAM     valproic acid  350 mg Per G Tube BID       Data   Recent Labs   Lab 04/25/21  1130 04/25/21  0530 04/24/21  1410 04/24/21  0530 04/23/21  0532 04/19/21  0754 04/19/21  0754   WBC  --  8.7  --  8.6 8.6   < >  --    HGB  --  11.1*  --  12.7* 12.6*   < >  --    MCV  --  102*  --  101* 104*   < >  --    PLT  --  100*  --  76* 66*   < >  --    INR  --  1.53*  --   --   --   --  1.10   NA  --  142  --  141 143   < >  --    POTASSIUM 3.6 3.1* 3.5 2.9* 3.5   < >  --    CHLORIDE  --  111*  --  106 108   < >  --     CO2  --  31  --  35* 33*   < >  --    BUN  --  8  --  5* 12   < >  --    CR  --  0.58*  --  0.55* 0.59*   < >  --    ANIONGAP  --  <1*  --  <1* 2*   < >  --    ALICE  --  7.5*  --  7.9* 8.1*   < >  --    GLC  --  131*  --  117* 86   < >  --    ALBUMIN  --  1.7*  --  2.1* 2.0*   < >  --    PROTTOTAL  --  4.6*  --  5.2* 5.0*   < >  --    BILITOTAL  --  0.3  --  0.8 1.1   < >  --    ALKPHOS  --  51  --  56 56   < >  --    ALT  --  12  --  17 19   < >  --    AST  --  16  --  26 37   < >  --     < > = values in this interval not displayed.       No results found for this or any previous visit (from the past 24 hour(s)).

## 2021-04-25 NOTE — PLAN OF CARE
Spiritual Care Partner Volunteer visited patient in Neuro on July 9, 2019.     Documented by:  KAITLIN Urbina, [address],   [place] [facility] Paging Service  [phone]) VSS Pt is baseline tachy up to 110. ex: tmax 100.6 improved w/ Tylenol. G-tube infusing tube feed @ 10ml/hr, tolerating well with 0 residual. Bowel sounds hypoactive, but is having bowel movements. LPICC: infusing TPN @ 40ml/hr & Lipids 20.8ml/hr. , 117, 127. Beginning of NOC shift during assessment, pt appeared to be having a seizure, mom stated that this is what he looks like when about to have a seizure. 1mg IV Ativan given with some relief and MD was paged to come assess pt. MD instructed writer to give Ativan and page if this happens again and to not wait for the 4 hour cali on the PRN Ativan and give right away. Pt was responding to stimuli and was vitally stable. Roughly 1hr later pt was having mild intermittent, but frequent what appeared to be seizure activity, 1mg IV Ativan given with relief, pt appeared to be more comfortable and mom stated that he looked more like his baseline. Pt felt warm, temporal thermometer was reading temps inaccurately, recheck with different device, temp was 100.6, Tylenol given and temp improved (98.8). Pt had very mild twitches but would respond to writer asking him if he was feeling ok, he stated yes, scheduled Depacon was administered and the twitching subsided. Mom is aware to let staff know of any changes. Pt rested comfortably the rest of the noc shift, will cont plan of care.

## 2021-04-25 NOTE — PROGRESS NOTES
[facility]   General Surgery Progress Note           Assessment and Plan:   Assessment:   -SBO with intra-abdominal compartment syndrome due to extreme distension  -POD#6 s/p Exploratory laparotomy, enterolysis, incidental appendectomy; Pathology: mild acute appendicitis  -Postop ileus resolved  -Aspiration pneumonia  -H/o cerebral palsy with developmental delay, lifelong venting G-tube in place  -hypokalemia      Plan:   -stop trickle and start bolus feeds today - discussed with mom to start half a can for next usual feeding time, then go to full can as she would normally do after that if tolerating.  -wean TPN with increasing GT feeds  -ok for oral meds  -continue electrolyte replacements - will be important for resolving ileus  -Increase physical activity as able when alert and able.  PT following.  -continue abx for pneumonia per hospitalist; Maxipime/flagyl  -pain control: Tylenol, IV Dilaudid prn; none recently  -GI prophylaxis:  Pepcid  -VTE prophylaxis: defer to hospitalist  -possible discharge tomorrow if tolerates feeds    Discussed with Dr Wilson         Interval History:   Much better today, more interactive, though he did have a seizure.  Had many BMs. Tolerated trickle feeds         Physical Exam:   Blood pressure 103/65, pulse 107, temperature 98.2  F (36.8  C), temperature source Axillary, resp. rate 18, weight 42.6 kg (94 lb), SpO2 97 %.    I/O last 3 completed shifts:  In: 1952 [I.V.:1752; NG/GT:190]  Out: -     Less alert and active today, minimally reactive to abd exam  Abdomen:   soft, non-distended, non-reactive to palpation  Midline abd incision - steri-strips in place, no drainage, no erythema, bruising inferiorly  Venting G-tube intact          Data:   Pathology: Appendix  -Mild acute appendicitis    Recent Labs   Lab 04/25/21  0530 04/24/21  0530 04/23/21  0532   WBC 8.7 8.6 8.6   HGB 11.1* 12.7* 12.6*   HCT 31.8* 37.4* 37.5*   * 101* 104*   * 76* 66*     Tressa  MD Ruddy

## 2021-04-25 NOTE — PLAN OF CARE
No seizure activity day shift. TPN current rate at 30ml/hr. This is 1/2 of previous rate of 60ml/hr. Per RD, run until bag is empty or hang time . Started bolus T.F. Starting slow per RD recommendation. Pt has received 60 & 120ml with H2O flushes per RD order. Pt appears comfortable, non-distended. BMs today. Large amount of urine output. K+ replaced and recheck scheduled for morning. Abdominal incision clean/dry/intact steri strips. Lungs clear. IV Flagyl/Maxipime continue. All medications scheduled per Mother's time request (matching home routine/schedule). Temp axillary 98.5 @ 1700. Talking to mother, no anxiety. Turns on own. Glucoses q6hr continue as pt has been hypoglycemic during admission. Glucoses 124 & 139. Mom present and supportive. Demonstrated correct technique to provide tube feed bolus/flushes (as she does at home).

## 2021-04-26 ENCOUNTER — APPOINTMENT (OUTPATIENT)
Dept: PHYSICAL THERAPY | Facility: CLINIC | Age: 26
End: 2021-04-26
Payer: COMMERCIAL

## 2021-04-26 LAB
ALBUMIN SERPL-MCNC: 1.9 G/DL (ref 3.4–5)
ALP SERPL-CCNC: 49 U/L (ref 40–150)
ALT SERPL W P-5'-P-CCNC: 11 U/L (ref 0–70)
ANION GAP SERPL CALCULATED.3IONS-SCNC: 2 MMOL/L (ref 3–14)
AST SERPL W P-5'-P-CCNC: 11 U/L (ref 0–45)
BILIRUB SERPL-MCNC: 0.3 MG/DL (ref 0.2–1.3)
BUN SERPL-MCNC: 10 MG/DL (ref 7–30)
CALCIUM SERPL-MCNC: 8.1 MG/DL (ref 8.5–10.1)
CHLORIDE SERPL-SCNC: 112 MMOL/L (ref 94–109)
CO2 SERPL-SCNC: 30 MMOL/L (ref 20–32)
CREAT SERPL-MCNC: 0.56 MG/DL (ref 0.66–1.25)
ERYTHROCYTE [DISTWIDTH] IN BLOOD BY AUTOMATED COUNT: 12.5 % (ref 10–15)
ERYTHROCYTE [DISTWIDTH] IN BLOOD BY AUTOMATED COUNT: 12.7 % (ref 10–15)
GFR SERPL CREATININE-BSD FRML MDRD: >90 ML/MIN/{1.73_M2}
GLUCOSE BLDC GLUCOMTR-MCNC: 180 MG/DL (ref 70–99)
GLUCOSE BLDC GLUCOMTR-MCNC: 185 MG/DL (ref 70–99)
GLUCOSE BLDC GLUCOMTR-MCNC: 198 MG/DL (ref 70–99)
GLUCOSE BLDC GLUCOMTR-MCNC: 273 MG/DL (ref 70–99)
GLUCOSE BLDC GLUCOMTR-MCNC: 346 MG/DL (ref 70–99)
GLUCOSE SERPL-MCNC: 172 MG/DL (ref 70–99)
HCT VFR BLD AUTO: 29.7 % (ref 40–53)
HCT VFR BLD AUTO: 30.2 % (ref 40–53)
HGB BLD-MCNC: 10.2 G/DL (ref 13.3–17.7)
HGB BLD-MCNC: 9.9 G/DL (ref 13.3–17.7)
INR PPP: 1.14 (ref 0.86–1.14)
LABORATORY COMMENT REPORT: NORMAL
MAGNESIUM SERPL-MCNC: 1.8 MG/DL (ref 1.6–2.3)
MCH RBC QN AUTO: 34.5 PG (ref 26.5–33)
MCH RBC QN AUTO: 34.9 PG (ref 26.5–33)
MCHC RBC AUTO-ENTMCNC: 32.8 G/DL (ref 31.5–36.5)
MCHC RBC AUTO-ENTMCNC: 34.3 G/DL (ref 31.5–36.5)
MCV RBC AUTO: 102 FL (ref 78–100)
MCV RBC AUTO: 105 FL (ref 78–100)
PHOSPHATE SERPL-MCNC: 2.5 MG/DL (ref 2.5–4.5)
PLATELET # BLD AUTO: 108 10E9/L (ref 150–450)
PLATELET # BLD AUTO: 111 10E9/L (ref 150–450)
POTASSIUM SERPL-SCNC: 3.6 MMOL/L (ref 3.4–5.3)
PREALB SERPL IA-MCNC: 14 MG/DL (ref 15–45)
PROT SERPL-MCNC: 4.8 G/DL (ref 6.8–8.8)
RBC # BLD AUTO: 2.87 10E12/L (ref 4.4–5.9)
RBC # BLD AUTO: 2.92 10E12/L (ref 4.4–5.9)
SARS-COV-2 RNA RESP QL NAA+PROBE: NEGATIVE
SODIUM SERPL-SCNC: 144 MMOL/L (ref 133–144)
SPECIMEN SOURCE: NORMAL
WBC # BLD AUTO: 6.7 10E9/L (ref 4–11)
WBC # BLD AUTO: 7.2 10E9/L (ref 4–11)

## 2021-04-26 PROCEDURE — 80053 COMPREHEN METABOLIC PANEL: CPT | Performed by: INTERNAL MEDICINE

## 2021-04-26 PROCEDURE — 84100 ASSAY OF PHOSPHORUS: CPT | Performed by: INTERNAL MEDICINE

## 2021-04-26 PROCEDURE — 250N000013 HC RX MED GY IP 250 OP 250 PS 637: Performed by: INTERNAL MEDICINE

## 2021-04-26 PROCEDURE — 85610 PROTHROMBIN TIME: CPT | Performed by: INTERNAL MEDICINE

## 2021-04-26 PROCEDURE — 83735 ASSAY OF MAGNESIUM: CPT | Performed by: INTERNAL MEDICINE

## 2021-04-26 PROCEDURE — 87635 SARS-COV-2 COVID-19 AMP PRB: CPT | Performed by: INTERNAL MEDICINE

## 2021-04-26 PROCEDURE — 250N000011 HC RX IP 250 OP 636: Performed by: SURGERY

## 2021-04-26 PROCEDURE — 85027 COMPLETE CBC AUTOMATED: CPT | Performed by: INTERNAL MEDICINE

## 2021-04-26 PROCEDURE — 84134 ASSAY OF PREALBUMIN: CPT | Performed by: INTERNAL MEDICINE

## 2021-04-26 PROCEDURE — 36415 COLL VENOUS BLD VENIPUNCTURE: CPT | Performed by: INTERNAL MEDICINE

## 2021-04-26 PROCEDURE — 250N000009 HC RX 250: Performed by: SURGERY

## 2021-04-26 PROCEDURE — 85027 COMPLETE CBC AUTOMATED: CPT | Performed by: HOSPITALIST

## 2021-04-26 PROCEDURE — 120N000001 HC R&B MED SURG/OB

## 2021-04-26 PROCEDURE — 99233 SBSQ HOSP IP/OBS HIGH 50: CPT | Performed by: INTERNAL MEDICINE

## 2021-04-26 PROCEDURE — 999N001017 HC STATISTIC GLUCOSE BY METER IP

## 2021-04-26 PROCEDURE — 87040 BLOOD CULTURE FOR BACTERIA: CPT | Performed by: INTERNAL MEDICINE

## 2021-04-26 PROCEDURE — 97530 THERAPEUTIC ACTIVITIES: CPT | Mod: GP | Performed by: PHYSICAL THERAPIST

## 2021-04-26 PROCEDURE — 258N000003 HC RX IP 258 OP 636: Performed by: HOSPITALIST

## 2021-04-26 RX ORDER — AMOXICILLIN AND CLAVULANATE POTASSIUM 400; 57 MG/5ML; MG/5ML
500 POWDER, FOR SUSPENSION ORAL EVERY 8 HOURS
Status: DISCONTINUED | OUTPATIENT
Start: 2021-04-26 | End: 2021-04-27 | Stop reason: HOSPADM

## 2021-04-26 RX ORDER — FAMOTIDINE 40 MG/5ML
20 POWDER, FOR SUSPENSION ORAL 2 TIMES DAILY
Status: DISCONTINUED | OUTPATIENT
Start: 2021-04-26 | End: 2021-04-27 | Stop reason: HOSPADM

## 2021-04-26 RX ORDER — SODIUM CHLORIDE 9 MG/ML
INJECTION, SOLUTION INTRAVENOUS CONTINUOUS
Status: DISCONTINUED | OUTPATIENT
Start: 2021-04-26 | End: 2021-04-26

## 2021-04-26 RX ORDER — SODIUM CHLORIDE 9 MG/ML
INJECTION, SOLUTION INTRAVENOUS CONTINUOUS
Status: ACTIVE | OUTPATIENT
Start: 2021-04-26 | End: 2021-04-26

## 2021-04-26 RX ADMIN — FLUTICASONE PROPIONATE AND SALMETEROL XINAFOATE 2 PUFF: 230; 21 AEROSOL, METERED RESPIRATORY (INHALATION) at 10:55

## 2021-04-26 RX ADMIN — Medication 6 MG: at 21:18

## 2021-04-26 RX ADMIN — AMOXICILLIN AND CLAVULANATE POTASSIUM 500 MG: 400; 57 POWDER, FOR SUSPENSION ORAL at 14:33

## 2021-04-26 RX ADMIN — VALPROIC ACID 300 MG: 250 SOLUTION ORAL at 06:00

## 2021-04-26 RX ADMIN — CLOBAZAM 5 MG: 2.5 SUSPENSION ORAL at 21:15

## 2021-04-26 RX ADMIN — CEFEPIME HYDROCHLORIDE 1 G: 1 INJECTION, POWDER, FOR SOLUTION INTRAMUSCULAR; INTRAVENOUS at 11:17

## 2021-04-26 RX ADMIN — Medication 1 MG: at 17:05

## 2021-04-26 RX ADMIN — GABAPENTIN 750 MG: 250 SUSPENSION ORAL at 21:09

## 2021-04-26 RX ADMIN — SODIUM CHLORIDE: 9 INJECTION, SOLUTION INTRAVENOUS at 01:20

## 2021-04-26 RX ADMIN — VALPROIC ACID 350 MG: 250 SOLUTION ORAL at 13:47

## 2021-04-26 RX ADMIN — METRONIDAZOLE 500 MG: 500 INJECTION, SOLUTION INTRAVENOUS at 06:18

## 2021-04-26 RX ADMIN — Medication 500 MG: at 21:09

## 2021-04-26 RX ADMIN — HYDROXYZINE HYDROCHLORIDE 50 MG: 10 SYRUP ORAL at 21:10

## 2021-04-26 RX ADMIN — FAMOTIDINE 20 MG: 40 POWDER, FOR SUSPENSION ORAL at 21:10

## 2021-04-26 RX ADMIN — QUETIAPINE FUMARATE 600 MG: 200 TABLET ORAL at 21:19

## 2021-04-26 RX ADMIN — VALPROIC ACID 350 MG: 250 SOLUTION ORAL at 21:10

## 2021-04-26 RX ADMIN — CLOBAZAM 7.5 MG: 2.5 SUSPENSION ORAL at 06:01

## 2021-04-26 RX ADMIN — FAMOTIDINE 20 MG: 10 INJECTION, SOLUTION INTRAVENOUS at 10:55

## 2021-04-26 RX ADMIN — FLUTICASONE PROPIONATE AND SALMETEROL XINAFOATE 2 PUFF: 230; 21 AEROSOL, METERED RESPIRATORY (INHALATION) at 21:26

## 2021-04-26 RX ADMIN — AMOXICILLIN AND CLAVULANATE POTASSIUM 500 MG: 400; 57 POWDER, FOR SUSPENSION ORAL at 21:10

## 2021-04-26 ASSESSMENT — ACTIVITIES OF DAILY LIVING (ADL)
ADLS_ACUITY_SCORE: 36

## 2021-04-26 NOTE — PROGRESS NOTES
Paged for HR 130s. ECG obtained shows sinus tachycardia, I will order blood cultures, CBC, and gentle hydration at 75 ml/hour and monitor patient clinically and place on telemetry

## 2021-04-26 NOTE — PLAN OF CARE
VSS ex tachycardia. Afebrile. MD notified for HR sustaining in 130s, see MD note. Blood cultures drawn. Tele ST. LS clear. Abdominal incision C/D/I. Abdomen soft and nontender, bowel sounds hypoactive. TPN completed, plan to continue bolus G tube feedings today. BG q6h, 185 and 196. Triple lumen PICC to left arm. IV cefepime/flagyl. Incontinent, voiding adequately. NS @75 ml/hr.

## 2021-04-26 NOTE — PROGRESS NOTES
04/26/21 1412   Quick Adds   Quick Adds Comments   Vital Signs   Pulse 137   Comments   Comments During PT session with transfer only

## 2021-04-26 NOTE — PLAN OF CARE
Denies pain. Q2 turned. Ambulated in room x2. Pedisure schedule same as home schedule per orders. Possible discharge tomorrow.

## 2021-04-26 NOTE — PROGRESS NOTES
[facility]    Hospitalist Progress Note      Assessment & Plan   Rj Licea is a 25 year old male who was admitted on 4/19/2021.    Summary of Stay:   Rj is a 25-year-old young man with history of cerebral palsy, blindness, lifelong G-tube, epilepsy who presented 4/19/2021 with abdominal distention, frequent vomiting and leakage of contents around his G-tube in the setting of prior bowel obstructions and 2 large hernia repairs as an infant.       CT scan of his abdomen showed significant dilatation of numerous loops of small bowel with multiple air-fluid levels compatible with a small bowel obstruction and a transition point in the distal small bowel just proximal to the terminal ileum in the right lower quadrant.  He was also noted to have a right middle lobe infiltrate concerning for pneumonia.     He was given IV Unasyn, multiple fluid boluses and admitted for further care. Notably, just prior to leaving the emergency room he did become febrile.  He has baseline tachycardia to about 110 per his mother but became tachycardic to the 140s to 150 range as well as tachypneic.     Following arrival to the medical floor lactic acid was elevated and he continued to have fevers.  He was reevaluated multiple times by surgery and in the setting of a rising lactic acid and concern for evolving got ischemia he was taken to the operating room for exploratory laparotomy with resolution without resection found likely to have an intra-abdominal compartment syndrome due to massive dilation from his obstruction.     He remains admitted for ongoing IV antibiotics for pneumonia and postop management following his laparotomy.     He does continue to require supplemental oxygen likely related to combination of atelectasis, sedation and pneumonia it is not quite yet at his baseline mental status.     Have started to give some medications via his G-tube.    Had breakthrough seizures on 4/23/2021.  Then  again last night.    4/26: Overnight patient was tachycardic, EKG with sinus tachycardia, Started on IVF. Return to prior to admit feeding regimen if possible. TPN weaned, increased tube feeds    Plan:    Small bowel obstruction with intra-abdominal compartment syndrome:  -Massive dilatation of his bowels.  Taken to the operating room the evening of admission.  No resection was required and he appeared to have excellent gut viability.  But suspected to have intra-abdominal compartment syndrome.  -Surgery consulted-appreciate assistance  -NG tube removed 4/22  -Started on TPN on 4/24/2021.  Also started on trickle tube feeding.  -Per surgery recommendations, weaned off TPN   Resumed bolus tube feeding       Seizure disorder:   -Initially had scheduled some Ativan at the direction of the patient's neurologist when he was n.p.o.  -Breakthrough seizures likely due to not getting all the usual medicines.  Getting valproic acid IV.  But gabapentin and quetiapine are on hold.  -Discussed with surgery: Oral medications can be restarted.  Resume gabapentin, quetiapine, hydroxyzine as usual.  Also resume medications to help him sleep as poor sleep can also trigger seizures.  -PTA clobazam.  -EEG on 4/23/2021: This electroencephalogram is abnormal due to the presence of slowing, consistent with a nonspecific encephalopathy.  There are also rare epileptiform discharges consistent with his history of epilepsy.  No seizures were captured on this recording.       Mild hypernatremia: Better     Hypokalemia:   Replace per protocol.       hypoglycemia:   -Improved after IV dextrose.  -Continue monitoring.  Now on TPN and trickle tube feeding.     Hypophosphatemia: Replace per protocol      Sepsis due to aspiration pneumonia, vomiting gastric contents in setting of bowel obstruction  -Antibiotics: IV cefepime and Flagyl started 4/18.  Vancomycin stopped 4/21  -Blood cultures negative till date.  --We will switch to oral antibiotics  Augmentin and Flagyl     Thrombocytopenia, likely due to sepsis.    -Thought to be secondary to dilution and acute infection.  Improving.  -Continue treatment for sepsis.  Hold on anticoagulants. Improving.     History of cerebral palsy with associated developmental delay, blindness, hernia repairs as an infant and lifelong G-tube:   -His mother is extremely supportive and has been present throughout the hospital stay.  She and patient's sister are his primary caregiver.  -Starting trickle tube feeding.     Oliguria, acute kidney injury:   -Resolved.  Creatinine was 0.98 upon admission with BUN of 25.  Baseline is around 0.6.  SALLY likely related to a combination of sepsis with prerenal state and intra-abdominal compartment syndrome.  Improved with IV hydration.  -Monitor with daily BMP.     pulmonary edema secondary to fluid resuscitation for sepsis:   -Developed some pulmonary vascular congestion early 4/21 prompting fluids to be stopped and a dose of IV Lasix to be given.  -Improved with IV diuresis but given NPO status IVF initiated for above issues. Will need to monitor respiratory status closely.      Acute hypoxemic respiratory failure due to combination of pneumonia, atelectasis and respiratory depression, also pulmonary edema as above  -Currently on RA.      Severe protein calorie malnutrition:   -Nutrition plan as above.    Anemia  -Drop in hemoglobin likely due to dilution no evidence of bleeding we will continue to monitor  -Recheck hemoglobin in a.m.    Mother is in the room.  Updated.     DVT Prophylaxis: Pneumatic Compression Devices  Code Status: Full Code  Expected discharge: 1-2 days    Matilde Bagley MD   (7am - 6pm, M-F)    Interval History   Patient was evaluated with nursing staff. Overnight issues discussed.    Review of systems:   Unable to obtain.  Appears comfortable.  Not in any distress.    10 point review of Systems is limited due to patient's cerebral palsy.  Care plan discussed in  detail with mom.  Mom looking for discharge planning and resuming home regimen of diet and meds.  -Data reviewed today: Labs and medications.    Physical Exam   Temp: 98.4  F (36.9  C) Temp src: Axillary BP: 107/59 Pulse: 122   Resp: 18 SpO2: 94 % O2 Device: None (Room air)    Vitals:    04/24/21 0555 04/25/21 0554 04/26/21 0642   Weight: 44.8 kg (98 lb 12.8 oz) 42.6 kg (94 lb) 43.5 kg (96 lb)     Vital Signs with Ranges  Temp:  [98.3  F (36.8  C)-98.6  F (37  C)] 98.4  F (36.9  C)  Pulse:  [104-135] 122  Resp:  [16-18] 18  BP: (101-107)/(54-64) 107/59  SpO2:  [94 %-97 %] 94 %  I/O last 3 completed shifts:  In: 1640 [I.V.:250; NG/GT:400]  Out: -     Constitutional: Awake, alert, cooperative, no apparent distress  HEENT: Trachea midline, sclera is clear   Respiratory: No crackles. No wheezing. Equal breath sounds bilaterally.  Cardiovascular: Regular rate and rhythm, normal S1 and S2, and no murmur noted  GI: Normal bowel sounds, soft, non-distended, non-tender    Medications     dextrose 10% and 0.45% NaCl Stopped (04/24/21 2013)     dextrose       parenteral nutrition - Clinimix E 30 mL/hr at 04/25/21 1400       ceFEPIme (MAXIPIME) IV  1 g Intravenous Q12H     clobazam  5 mg Per G Tube At Bedtime     clobazam  7.5 mg Per G Tube QAM     famotidine  20 mg Intravenous BID     fluticasone-salmeterol  2 puff Inhalation BID     gabapentin  750 mg Per G Tube At Bedtime     hydrOXYzine  50 mg Per G Tube At Bedtime     melatonin  1 mg Per G Tube Daily     melatonin  6 mg Per G Tube At Bedtime     metroNIDAZOLE  500 mg Intravenous Q8H     QUEtiapine  600 mg Per G Tube At Bedtime     sodium chloride (PF)  10 mL Intracatheter Q7 Days     sodium phosphate  15 mmol Intravenous Once     valproic acid  300 mg Per G Tube QAM     valproic acid  350 mg Per G Tube BID       Data   Recent Labs   Lab 04/26/21  0600 04/26/21  0130 04/25/21  1130 04/25/21  0530 04/24/21  0530 04/24/21  0530   WBC 7.2 6.7  --  8.7  --  8.6   HGB 9.9* 10.2*   --  11.1*  --  12.7*   * 102*  --  102*  --  101*   * 108*  --  100*  --  76*   INR 1.14  --   --  1.53*  --   --      --   --  142  --  141   POTASSIUM 3.6  --  3.6 3.1*   < > 2.9*   CHLORIDE 112*  --   --  111*  --  106   CO2 30  --   --  31  --  35*   BUN 10  --   --  8  --  5*   CR 0.56*  --   --  0.58*  --  0.55*   ANIONGAP 2*  --   --  <1*  --  <1*   ALICE 8.1*  --   --  7.5*  --  7.9*   *  --   --  131*  --  117*   ALBUMIN 1.9*  --   --  1.7*  --  2.1*   PROTTOTAL 4.8*  --   --  4.6*  --  5.2*   BILITOTAL 0.3  --   --  0.3  --  0.8   ALKPHOS 49  --   --  51  --  56   ALT 11  --   --  12  --  17   AST 11  --   --  16  --  26    < > = values in this interval not displayed.       No results found for this or any previous visit (from the past 24 hour(s)).

## 2021-04-26 NOTE — PROVIDER NOTIFICATION
Pt's HR sustaining 135-140, otherwise VSS and afebrile. Resting comfortably. Do you advise any intervention? Thanks!

## 2021-04-26 NOTE — PROGRESS NOTES
NUTRITION BRIEF NOTE     Chart check for nutrition support patient. Chart reviewed and discussed during IDT rounds.   Discussed with mom Darcy at bedside - tolerated transition to bolus feedings on 4/25    Recommendations:   - Pediasure 1.5 with fiber via GT, 6 cartons daily (237 ml each) bolused via gravity  - Water flushes of 60 mL before connection, 120 mL after connection  Additional 180 mL free water between feedings (may need to increase further)     - Enteral schedule:  1 carton at 5:30 am  2 cartons at 10:30 am  1 carton at 2 pm  2 cartons at 5:30 pm if tolerated (typically only feeds 1 can at this time; does not like to feed at 7:30 pm due to concern for overnight reflux).   At home, may feed 6th can overnight if Rj is awake       Discussed with RN and mom in agreement. Will continue to follow per protocol. Please page/consult as needed.       Ny Phelps MS, RDN-AP, LD, CNSC  Pager - 3rd floor/ICU: [phone]  Pager - All other floors: [phone]  Pager - Weekend/holiday: [phone]  Office: [phone]

## 2021-04-26 NOTE — PROGRESS NOTES
[facility]   General Surgery Progress Note           Assessment and Plan:   Assessment:   -SBO with intra-abdominal compartment syndrome due to extreme distension  -7 Days Post-Op s/p Exploratory laparotomy, enterolysis, incidental appendectomy; Pathology: mild acute appendicitis  -Postop ileus resolved  -Aspiration pneumonia  -H/o cerebral palsy with developmental delay, lifelong venting G-tube in place  -hypokalemia - resolved      Plan:   -gradually working up to his normal tube feeds, tolerating well  -TPN weaned increasing GT feeds  -ok for oral meds  -continue electrolyte replacements - will be important for resolving ileus  -Increase physical activity as able when alert and able.  PT following.  -continue abx for pneumonia per hospitalist; Maxipime/flagyl  -pain control: Tylenol, IV Dilaudid prn; none recently  -GI prophylaxis:  Pepcid  -VTE prophylaxis: defer to hospitalist  -doing well from surgical perspective, home per hospitalist           Interval History:   Mom reports Rj had a good day yesterday.  Tolerating tube feeds.  Having bowel movements. Did have tachycardia 120-130 last night.          Physical Exam:   Blood pressure 107/59, pulse 122, temperature 98.4  F (36.9  C), temperature source Axillary, resp. rate 18, weight 43.5 kg (96 lb), SpO2 94 %.    I/O last 3 completed shifts:  In: 1640 [I.V.:250; NG/GT:400]  Out: -     Sleeping, minimally reactive to abd exam  Abdomen:   soft, non-distended, non-reactive to palpation  Midline abd incision - steri-strips in place, no drainage, no erythema, bruising inferiorly  Venting G-tube intact          Data:   Pathology: Appendix  -Mild acute appendicitis    Recent Labs   Lab 04/26/21  0600 04/26/21  0130 04/25/21  0530   WBC 7.2 6.7 8.7   HGB 9.9* 10.2* 11.1*   HCT 30.2* 29.7* 31.8*   * 102* 102*   * 108* 100*     Dayana Welsh PA-C     Seen and agree.  Doing well from the surgical standpoint.  Discharge per the  hospitalist service.  No healing or incisional issues, and POD #7, so no formal office follow up needed with surgery unless he has an issue.  Fabiola Pop MD

## 2021-04-27 ENCOUNTER — APPOINTMENT (OUTPATIENT)
Dept: PHYSICAL THERAPY | Facility: CLINIC | Age: 26
End: 2021-04-27
Payer: COMMERCIAL

## 2021-04-27 VITALS
DIASTOLIC BLOOD PRESSURE: 60 MMHG | HEART RATE: 111 BPM | TEMPERATURE: 97.9 F | WEIGHT: 98.4 LBS | RESPIRATION RATE: 18 BRPM | OXYGEN SATURATION: 96 % | BODY MASS INDEX: 18.44 KG/M2 | SYSTOLIC BLOOD PRESSURE: 100 MMHG

## 2021-04-27 LAB
ANION GAP SERPL CALCULATED.3IONS-SCNC: 1 MMOL/L (ref 3–14)
BUN SERPL-MCNC: 9 MG/DL (ref 7–30)
CALCIUM SERPL-MCNC: 8.3 MG/DL (ref 8.5–10.1)
CHLORIDE SERPL-SCNC: 104 MMOL/L (ref 94–109)
CO2 SERPL-SCNC: 32 MMOL/L (ref 20–32)
CREAT SERPL-MCNC: 0.58 MG/DL (ref 0.66–1.25)
ERYTHROCYTE [DISTWIDTH] IN BLOOD BY AUTOMATED COUNT: 12.6 % (ref 10–15)
GFR SERPL CREATININE-BSD FRML MDRD: >90 ML/MIN/{1.73_M2}
GLUCOSE BLDC GLUCOMTR-MCNC: 204 MG/DL (ref 70–99)
GLUCOSE BLDC GLUCOMTR-MCNC: 229 MG/DL (ref 70–99)
GLUCOSE BLDC GLUCOMTR-MCNC: 234 MG/DL (ref 70–99)
GLUCOSE BLDC GLUCOMTR-MCNC: 307 MG/DL (ref 70–99)
GLUCOSE BLDC GLUCOMTR-MCNC: 345 MG/DL (ref 70–99)
GLUCOSE SERPL-MCNC: 244 MG/DL (ref 70–99)
HCT VFR BLD AUTO: 28.1 % (ref 40–53)
HGB BLD-MCNC: 9.7 G/DL (ref 13.3–17.7)
HGB BLD-MCNC: 9.8 G/DL (ref 13.3–17.7)
INTERPRETATION ECG - MUSE: NORMAL
MAGNESIUM SERPL-MCNC: 1.8 MG/DL (ref 1.6–2.3)
MCH RBC QN AUTO: 35.3 PG (ref 26.5–33)
MCHC RBC AUTO-ENTMCNC: 34.5 G/DL (ref 31.5–36.5)
MCV RBC AUTO: 102 FL (ref 78–100)
PHOSPHATE SERPL-MCNC: 2.2 MG/DL (ref 2.5–4.5)
PLATELET # BLD AUTO: 201 10E9/L (ref 150–450)
POTASSIUM SERPL-SCNC: 4.2 MMOL/L (ref 3.4–5.3)
RBC # BLD AUTO: 2.75 10E12/L (ref 4.4–5.9)
SODIUM SERPL-SCNC: 137 MMOL/L (ref 133–144)
WBC # BLD AUTO: 8.5 10E9/L (ref 4–11)

## 2021-04-27 PROCEDURE — 258N000002 HC RX IP 258 OP 250: Performed by: INTERNAL MEDICINE

## 2021-04-27 PROCEDURE — 250N000009 HC RX 250: Performed by: INTERNAL MEDICINE

## 2021-04-27 PROCEDURE — 258N000003 HC RX IP 258 OP 636: Performed by: INTERNAL MEDICINE

## 2021-04-27 PROCEDURE — 80048 BASIC METABOLIC PNL TOTAL CA: CPT | Performed by: INTERNAL MEDICINE

## 2021-04-27 PROCEDURE — 250N000013 HC RX MED GY IP 250 OP 250 PS 637: Performed by: INTERNAL MEDICINE

## 2021-04-27 PROCEDURE — 999N001017 HC STATISTIC GLUCOSE BY METER IP

## 2021-04-27 PROCEDURE — 85018 HEMOGLOBIN: CPT | Performed by: INTERNAL MEDICINE

## 2021-04-27 PROCEDURE — 85027 COMPLETE CBC AUTOMATED: CPT | Performed by: INTERNAL MEDICINE

## 2021-04-27 PROCEDURE — 250N000012 HC RX MED GY IP 250 OP 636 PS 637: Performed by: INTERNAL MEDICINE

## 2021-04-27 PROCEDURE — 97530 THERAPEUTIC ACTIVITIES: CPT | Mod: GP | Performed by: PHYSICAL THERAPIST

## 2021-04-27 PROCEDURE — 99207 PR CDG-CODE CATEGORY CHANGED: CPT | Performed by: INTERNAL MEDICINE

## 2021-04-27 PROCEDURE — 84100 ASSAY OF PHOSPHORUS: CPT | Performed by: INTERNAL MEDICINE

## 2021-04-27 PROCEDURE — 99232 SBSQ HOSP IP/OBS MODERATE 35: CPT | Performed by: INTERNAL MEDICINE

## 2021-04-27 PROCEDURE — 83735 ASSAY OF MAGNESIUM: CPT | Performed by: INTERNAL MEDICINE

## 2021-04-27 RX ORDER — AMOXICILLIN AND CLAVULANATE POTASSIUM 400; 57 MG/5ML; MG/5ML
500 POWDER, FOR SUSPENSION ORAL EVERY 8 HOURS
Qty: 56.7 ML | Refills: 0 | Status: SHIPPED | OUTPATIENT
Start: 2021-04-27 | End: 2021-04-30

## 2021-04-27 RX ORDER — NICOTINE POLACRILEX 4 MG
15-30 LOZENGE BUCCAL
Status: DISCONTINUED | OUTPATIENT
Start: 2021-04-27 | End: 2021-04-27

## 2021-04-27 RX ORDER — NICOTINE POLACRILEX 4 MG
15 LOZENGE BUCCAL
Qty: 37.5 ML | Refills: 0 | Status: SHIPPED | OUTPATIENT
Start: 2021-04-27 | End: 2021-05-02

## 2021-04-27 RX ORDER — DEXTROSE MONOHYDRATE 25 G/50ML
25-50 INJECTION, SOLUTION INTRAVENOUS
Status: DISCONTINUED | OUTPATIENT
Start: 2021-04-27 | End: 2021-04-27

## 2021-04-27 RX ORDER — SODIUM CHLORIDE 450 MG/100ML
INJECTION, SOLUTION INTRAVENOUS CONTINUOUS
Status: DISCONTINUED | OUTPATIENT
Start: 2021-04-27 | End: 2021-04-27 | Stop reason: HOSPADM

## 2021-04-27 RX ADMIN — FLUTICASONE PROPIONATE AND SALMETEROL XINAFOATE 2 PUFF: 230; 21 AEROSOL, METERED RESPIRATORY (INHALATION) at 12:21

## 2021-04-27 RX ADMIN — AMOXICILLIN AND CLAVULANATE POTASSIUM 500 MG: 400; 57 POWDER, FOR SUSPENSION ORAL at 06:17

## 2021-04-27 RX ADMIN — FAMOTIDINE 20 MG: 40 POWDER, FOR SUSPENSION ORAL at 08:58

## 2021-04-27 RX ADMIN — SODIUM PHOSPHATE, MONOBASIC, MONOHYDRATE AND SODIUM PHOSPHATE, DIBASIC, ANHYDROUS 9 MMOL: 276; 142 INJECTION, SOLUTION INTRAVENOUS at 08:58

## 2021-04-27 RX ADMIN — AMOXICILLIN AND CLAVULANATE POTASSIUM 500 MG: 400; 57 POWDER, FOR SUSPENSION ORAL at 13:38

## 2021-04-27 RX ADMIN — INSULIN ASPART 2 UNITS: 100 INJECTION, SOLUTION INTRAVENOUS; SUBCUTANEOUS at 09:10

## 2021-04-27 RX ADMIN — SODIUM CHLORIDE: 4.5 INJECTION, SOLUTION INTRAVENOUS at 02:58

## 2021-04-27 RX ADMIN — INSULIN ASPART 2 UNITS: 100 INJECTION, SOLUTION INTRAVENOUS; SUBCUTANEOUS at 12:19

## 2021-04-27 RX ADMIN — VALPROIC ACID 350 MG: 250 SOLUTION ORAL at 13:38

## 2021-04-27 RX ADMIN — Medication 500 MG: at 13:38

## 2021-04-27 RX ADMIN — CLOBAZAM 7.5 MG: 2.5 SUSPENSION ORAL at 06:17

## 2021-04-27 RX ADMIN — Medication 500 MG: at 06:16

## 2021-04-27 RX ADMIN — VALPROIC ACID 300 MG: 250 SOLUTION ORAL at 06:17

## 2021-04-27 RX ADMIN — INSULIN ASPART 5 UNITS: 100 INJECTION, SOLUTION INTRAVENOUS; SUBCUTANEOUS at 02:57

## 2021-04-27 ASSESSMENT — ACTIVITIES OF DAILY LIVING (ADL)
ADLS_ACUITY_SCORE: 36
ADLS_ACUITY_SCORE: 38
ADLS_ACUITY_SCORE: 38
ADLS_ACUITY_SCORE: 36

## 2021-04-27 NOTE — PROGRESS NOTES
Tele NSR. Hgb repeat at 1210 9.8. Dr. Bagley web paged result. Tolerating bolus feedings. Phosphorus being replaced for value 2.2.    RN writer instructed Darcy - Mother on how to check Rj's glucose, timing of when to check, what to do if hypo/hyperglycemic. She demonstrated correct technique in checking his glucose. She stated she is comfortable with process. RN writer also gave her the [facility] Diabetes Management booklet only to review the pages that pertain to checking glucose and signs high/low & treatments.   Darcy stated understanding of all discharge instructions. Discharge to home w/Mother via staff escort.

## 2021-04-27 NOTE — PROGRESS NOTES
NUTRITION BRIEF NOTE    Discussed with mom at bedside and reviewed following information, also added to discharge instructions:     6 cartons Pediasure 1.5 9 (237 mL each) provides 2100 kcal, 84 g protein, 234 g CHO, 1110 mL free water.  To meet minimum 1 mL/kcal, needs an additional 1000 mL free water daily. Providing 8 oz (240 mL) of free water 5x per day meets this minimum (provides an additional 1200 mL free water per day for a total of 2310 mL).   Ok to adjust timings and volumes at each feeding based on preferences and tolerance.     Continue to aim for 6 cartons per day for at least 1 week as make up volume, and may need to titrate between 5-6 cartons after this based on weight trends, tolerance and provider guidance.     Please call with questions or follow up with home infusion company.      Ny Phelps MS, RDN-AP, LD, [facility]  Pager - 3rd floor/ICU: [phone]  Pager - All other floors: [phone]  Pager - Weekend/holiday: [phone]  Office: [phone]

## 2021-04-27 NOTE — DISCHARGE SUMMARY
[facility]  Discharge Summary  Hospitalist      Date of Admission:  4/19/2021  Date of Discharge:  4/27/2021  Provider:  Matilde Bagley MD  Date of Service (when I last saw the patient): 04/27/21      Primary Provider: Micky Leyva          Discharge Diagnosis:   Discharge Diagnoses   Small bowel obstruction with intra-abdominal compartment syndrome  Sepsis due to aspiration pneumonia and small bowel obstruction  History of cerebral palsy with associated developmental delay, blindness, hernia repairs as an infant and lifelong G-tube:  Acute hypoxemic respiratory failure due to combination of pneumonia, atelectasis and respiratory depression, also pulmonary edema as above  Severe protein calorie malnutrition:   Anemia  Seizure disorder  Mild hyponatremia  Hypokalemia  Hypoglycemia and hyperglycemia on tube feeds  Hypophosphatemia    Other medical issues:  Past Medical History:   Diagnosis Date     Chronic lung disease     off nebs in 2012     Depression     fluoxetine, risperidone     Epilepsy (H) 5/2011    on keppra (Janaczek)     Feeding by G-tube (H)      GERD (gastroesophageal reflux disease)      Insomnia     melatonin, clonazepam     Retinopathy of prematurity     led to blindness          History of Present Illness   Rj Licea is an 25 year old male who presented with abdominal distention.  Please see the admission history and physical for full details.    Hospital Course     jR Licea was admitted on 4/19/2021.  He is a 25-year-old young man with history of cerebral palsy, blindness, lifelong G-tube, epilepsy who presented 4/19/2021 with abdominal distention, frequent vomiting and leakage of contents around his G-tube in the setting of prior bowel obstructions and 2 large hernia repairs as an infant.       CT scan of his abdomen showed significant dilatation of numerous loops of small bowel with multiple air-fluid levels compatible with a small bowel obstruction  and a transition point in the distal small bowel just proximal to the terminal ileum in the right lower quadrant.  He was also noted to have a right middle lobe infiltrate concerning for pneumonia.     He was given IV Unasyn, multiple fluid boluses and admitted for further care. Notably, just prior to leaving the emergency room he did become febrile.  He has baseline tachycardia to about 110 per his mother but became tachycardic to the 140s to 150 range as well as tachypneic.     Following arrival to the medical floor lactic acid was elevated and he continued to have fevers.  He was reevaluated multiple times by surgery and in the setting of a rising lactic acid and concern for evolving got ischemia he was taken to the operating room for exploratory laparotomy with resolution without resection found likely to have an intra-abdominal compartment syndrome due to massive dilation from his obstruction.     He was treated with IV antibiotics for pneumonia and postop management following his laparotomy.  Is being discharged on oral antibiotics for additional 3 days.     He continued to improve and was discharged home at his baseline mental status.  Oxygenation did improve.       During hospitalization he also had an episode of breakthrough seizures on 4/23/2021.    Was initially treated with TPN then switched to tube feeds.  Blood glucose was higher on tube feeds.  Patient is being discharged home with glucometer to keep an eye on blood glucose while on tube feeds      On the day of discharge patient appears to be at his baseline state of health vitals were stable.  Mom was comfortable taking him home.  Discussed with mom at length.    The following problems were addressed during his hospitalization:    Small bowel obstruction with intra-abdominal compartment syndrome:  -Massive dilatation of his bowels.  Taken to the operating room the evening of admission.  No resection was required and he appeared to have excellent gut  viability.  But suspected to have intra-abdominal compartment syndrome.  -Surgery consulted-appreciate assistance  -NG tube removed 4/22  -Started on TPN on 4/24/2021.    Switched to tube feeding prior to discharge  -Per surgery recommendations, weaned off TPN   Resumed bolus tube feeding         Seizure disorder:   -Initially had scheduled some Ativan at the direction of the patient's neurologist when he was n.p.o.  -Breakthrough seizures likely due to not getting all the usual medicines.  Getting valproic acid IV.  But gabapentin and quetiapine were initially on hold.  -After discussion with surgery: Oral medications were restarted Resumed gabapentin, quetiapine, hydroxyzine as usual.  Also resumed medications to help him sleep as poor sleep can also trigger seizures.  -PTA clobazam.  -EEG on 4/23/2021: This electroencephalogram is abnormal due to the presence of slowing, consistent with a nonspecific encephalopathy.  There are also rare epileptiform discharges consistent with his history of epilepsy.  No seizures were captured on this recording.       Mild hypernatremia: Resolved     Hypokalemia:   Replaced per protocol.       hypoglycemia:   -Improved after IV dextrose.  -Continue monitoring.    --He was hypoglycemic while on IV dextrose and tube feeds together.  Improved after IV dextrose was discontinued.  -Patient sent home with glucometer for frequent checks at home     Hypophosphatemia: Replaced per protocol      Sepsis due to aspiration pneumonia, vomiting gastric contents in setting of bowel obstruction  -Antibiotics: IV cefepime and Flagyl started 4/18.  Vancomycin stopped 4/21  -Blood cultures negative till date.  --He was switched to oral antibiotics Augmentin and Flagyl and DC'd home for additional 3 days     Thrombocytopenia, likely due to sepsis.    -Thought to be secondary to dilution and acute infection.  Improved       History of cerebral palsy with associated developmental delay, blindness,  hernia repairs as an infant and lifelong G-tube:   -His mother is extremely supportive and has been present throughout the hospital stay.  She and patient's sister are his primary caregiver.  -Prior to discharge tube feeds was resumed     Oliguria, acute kidney injury:   -Resolved.  Creatinine was 0.98 upon admission with BUN of 25.  Baseline is around 0.6.  SALLY likely related to a combination of sepsis with prerenal state and intra-abdominal compartment syndrome.  Improved with IV hydration.  -Monitor with daily BMP.     pulmonary edema secondary to fluid resuscitation for sepsis:   -Developed some pulmonary vascular congestion early 4/21 prompting fluids to be stopped and a dose of IV Lasix to be given.  -Improved with IV diuresis     Acute hypoxemic respiratory failure due to combination of pneumonia, atelectasis and respiratory depression, also pulmonary edema as above  -Resolved maintaining sats on room air prior to discharge     Severe protein calorie malnutrition:   -Nutrition plan as above.     Anemia  -Drop in hemoglobin likely due to dilution no evidence of bleeding we will continue to monitor  -Recheck hemoglobin was stable with no evidence of active bleeding       Significant Results and Procedures   As noted above    Pending Results   Unresulted Labs Ordered in the Past 30 Days of this Admission     Date and Time Order Name Status Description    4/26/2021 0156 Blood culture Preliminary     4/26/2021 0130 Blood culture Preliminary           Code Status   Full Code       Primary Care Physician   Micky Leyva    Physical Exam   Temp: 97.9  F (36.6  C) Temp src: Axillary BP: 100/60 Pulse: 111   Resp: 18 SpO2: 96 % O2 Device: None (Room air)    Vitals:    04/25/21 0554 04/26/21 0642 04/27/21 0601   Weight: 42.6 kg (94 lb) 43.5 kg (96 lb) 44.6 kg (98 lb 6.4 oz)     Vital Signs with Ranges  Temp:  [97.8  F (36.6  C)-98.6  F (37  C)] 97.9  F (36.6  C)  Pulse:  [107-137] 111  Resp:  [18] 18  BP:  (100-109)/(58-61) 100/60  SpO2:  [96 %-99 %] 96 %  I/O last 3 completed shifts:  In: 1215 [NG/GT:915]  Out: 40 [Emesis/NG output:40]    Constitutional: Awake, alert, cooperative, no apparent distress, and appears stated age.  Respiratory: No increased work of breathing, good air exchange, clear to auscultation bilaterally, no crackles or wheezing.  Cardiovascular: Normal apical impulse,normal S1 and S2,   GI: normal bowel sounds, soft, non-distended, non-tender.      Discharge Disposition   Discharged to home    Consultations This Hospital Stay   SURGERY GENERAL IP CONSULT  PHARMACY TO DOSE VANCO  CARE MANAGEMENT / SOCIAL WORK IP CONSULT  NUTRITION SERVICES ADULT IP CONSULT  PHYSICAL THERAPY ADULT IP CONSULT  VASCULAR ACCESS ADULT IP CONSULT  NEUROLOGY IP CONSULT  VASCULAR ACCESS ADULT IP CONSULT  NUTRITION SERVICES ADULT IP CONSULT  PHARMACY/NUTRITION TO START AND MANAGE TPN  PHARMACY IP CONSULT  PHARMACY IP CONSULT  PHARMACY IP CONSULT  PHARMACY IP CONSULT  PHARMACY IP CONSULT    Time Spent on this Encounter   I, Matilde Bagley MD, personally saw the patient today and spent greater than 30 minutes discharging this patient.    Discharge Orders      Reason for your hospital stay    Please refer to discharge summary     Follow-up and recommended labs and tests     Follow up with primary care provider, Micky Leyva, within 7 days for hospital follow- up.  The following labs/tests are recommended: basic metabolic panel and hgb  Follow up with surgery as recommended  Please monitor blood glucose  Please call or come if there are any new or worsening symptoms.     Activity    Your activity upon discharge: activity as tolerated     Monitor and record    blood glucose BID as needed. To watch for high and low glucose on tube feedings     Full Code     Diet    Follow this diet upon discharge: Orders Placed This Encounter      Adult Formula Bolus Feedinx Daily Other; Pediasure 1.5; Route: Gastrostomy; 6;  Can(s); Medication - Feeding Tube Flush Frequency: At least 15-30 mL water before and after medication administration and with tube clogging; 0530: 1 carton. 1030: ...      NPO for Medical/Clinical Reasons Except for: Ice Chips, NPO but receiving Tube Feeding, Meds     Discharge Medications   Current Discharge Medication List      START taking these medications    Details   amoxicillin-clavulanate (AUGMENTIN) 400-57 MG/5ML suspension 6.3 mLs (500 mg) by Per Feeding Tube route every 8 hours for 3 days  Qty: 56.7 mL, Refills: 0    Associated Diagnoses: SBO (small bowel obstruction) (H)      !! blood glucose monitoring (NO BRAND SPECIFIED) meter device kit Use to test blood sugar 4 times daily or as directed.  Qty: 1 kit, Refills: 0    Associated Diagnoses: Hyperglycemia      !! blood glucose monitoring (NO BRAND SPECIFIED) meter device kit Use to test blood sugar 4 times daily or as directed.  Qty: 1 kit, Refills: 0    Associated Diagnoses: Hyperglycemia      glucose 40 % (400 mg/mL) gel Take 15 g by mouth every 15 minutes as needed for low blood sugar  Qty: 37.5 mL, Refills: 0    Associated Diagnoses: Hyperglycemia      Lancets 30G MISC 120 30 gauge lancets (substitute as needed)  Qty: 120 each, Refills: 0    Associated Diagnoses: Hyperglycemia      metronidazole (FIRST-METRONIDAZOLE) 50 MG/ML SUSR 10 mLs (500 mg) by Per Feeding Tube route every 8 hours for 3 days  Qty: 90 mL, Refills: 0    Associated Diagnoses: SBO (small bowel obstruction) (H)       !! - Potential duplicate medications found. Please discuss with provider.      CONTINUE these medications which have NOT CHANGED    Details   albuterol (PROAIR HFA/PROVENTIL HFA/VENTOLIN HFA) 108 (90 Base) MCG/ACT inhaler Inhale 2 puffs into the lungs every 6 hours as needed for shortness of breath / dyspnea or wheezing  Qty: 2 Inhaler, Refills: 3    Comments: Pharmacy may dispense brand covered by insurance (Proair, or proventil or ventolin or generic albuterol  inhaler)  Associated Diagnoses: Chronic lung disease of prematurity      albuterol (PROVENTIL) (2.5 MG/3ML) 0.083% neb solution Take 1 vial (2.5 mg) by nebulization every 6 hours as needed for shortness of breath / dyspnea or wheezing  Qty: 9 mL, Refills: 3    Associated Diagnoses: Wheezing      benzoyl peroxide 5 % EX topical gel Apply topically 2 times daily as needed (acne)  Qty: 50 g, Refills: 11    Associated Diagnoses: 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 clobazam (,ONFI,) 2.5 MG/ML suspension 5 mg by Per G Tube route At Bedtime (3 mL in AM / 2 mL in PM)      !! clobazam (ONFI) 2.5 MG/ML suspension 7.5 mg by Per G Tube route every morning (3 mL in AM / 2 mL in PM)      famotidine (PEPCID) 40 MG/5ML suspension 20 mg by Per G Tube route 2 times daily      fluticasone-salmeterol (ADVAIR HFA) 230-21 MCG/ACT inhaler Inhale 2 puffs into the lungs 2 times daily   Qty: 12 g, Refills: 3      gabapentin (NEURONTIN) 250 MG/5ML solution 750 mg by Per G Tube route At Bedtime       hydrOXYzine (ATARAX) 10 MG/5ML syrup 50 mg by Per G Tube route At Bedtime (25 mL)      LORazepam (ATIVAN) 1 MG tablet 1 mg by Per G Tube route every 6 hours as needed for anxiety Per mom: prn       !! melatonin 1 MG TABS 1 mg by Per G Tube route daily Daily between 4-5pm*    Associated Diagnoses: Short stature      !! melatonin 3 MG tablet 6 mg by Per G Tube route At Bedtime       multivitamins w/minerals (CERTAVITE) liquid 15 mLs by Per G Tube route daily      omeprazole (PRILOSEC) 2 mg/mL SUSP 40 mg by Per G Tube route At Bedtime   Qty: 800 mL, Refills: 11      polyethylene glycol (MIRALAX) 17 g packet 17 g by Per G Tube route daily      QUEtiapine (SEROQUEL) 300 MG tablet 600 mg by Per G Tube route At Bedtime       !!  Valproate Sodium (VALPROIC ACID) 250 MG/5ML 300 mg by Per G Tube route every morning      !! Valproate Sodium (VALPROIC ACID) 250 MG/5ML 350 mg by Per G Tube route 2 times daily Afternoon and bedtime       !! - Potential duplicate medications found. Please discuss with provider.        Allergies   No Known Allergies  Data   Most Recent 3 CBC's:  Recent Labs   Lab Test 04/27/21  1210 04/27/21  0630 04/26/21  0600 04/26/21  0130   WBC  --  8.5 7.2 6.7   HGB 9.8* 9.7* 9.9* 10.2*   MCV  --  102* 105* 102*   PLT  --  201 111* 108*      Most Recent 3 BMP's:  Recent Labs   Lab Test 04/27/21  0630 04/26/21  0600 04/25/21  1130 04/25/21  0530    144  --  142   POTASSIUM 4.2 3.6 3.6 3.1*   CHLORIDE 104 112*  --  111*   CO2 32 30  --  31   BUN 9 10  --  8   CR 0.58* 0.56*  --  0.58*   ANIONGAP 1* 2*  --  <1*   ALICE 8.3* 8.1*  --  7.5*   * 172*  --  131*     Most Recent 2 LFT's:  Recent Labs   Lab Test 04/26/21  0600 04/25/21  0530   AST 11 16   ALT 11 12   ALKPHOS 49 51   BILITOTAL 0.3 0.3     Most Recent INR's and Anticoagulation Dosing History:  Anticoagulation Dose History     Recent Dosing and Labs Latest Ref Rng & Units 4/19/2021 4/25/2021 4/26/2021    INR 0.86 - 1.14 1.10 1.53(H) 1.14        Most Recent 3 Troponin's:No lab results found.  Most Recent Cholesterol Panel:  Recent Labs   Lab Test 03/17/21  1351   CHOL 179   LDL 73   HDL 95   TRIG 57     Most Recent 6 Bacteria Isolates From Any Culture (See EPIC Reports for Culture Details):  Recent Labs   Lab Test 04/26/21  0150 04/26/21  0143 04/19/21  1326 04/19/21  1321 05/29/20  1929 05/29/20  1920   CULT No growth after 1 day No growth after 1 day No growth No growth No growth No growth     Most Recent TSH, T4 and A1c Labs:  Recent Labs   Lab Test 03/17/21  1351 06/07/19  1315 06/07/19  1315   TSH 0.94   < > 4.45*   T4  --   --  0.88   A1C 5.0   < > 4.5    < > = values in this interval not displayed.     Results for orders placed or performed during the  hospital encounter of 04/19/21   CT Abdomen Pelvis w Contrast    Narrative    EXAM: CT ABDOMEN PELVIS W CONTRAST  LOCATION: [facility]  DATE/TIME: 4/19/2021 6:30 AM    INDICATION: Vomiting, abdominal distention, rectal bleeding.  COMPARISON: 5/28/2020.  TECHNIQUE: CT scan of the abdomen and pelvis was performed following injection of IV contrast. Multiplanar reformats were obtained. Dose reduction techniques were used.  CONTRAST: 42 mL Isovue-370.    FINDINGS:   LOWER CHEST: Mild hazy acute-appearing infiltrate in the right middle lobe. Clinical correlation for any signs of pneumonitis.    HEPATOBILIARY: Normal.    PANCREAS: Normal.    SPLEEN: Normal.    ADRENAL GLANDS: Normal.    KIDNEYS/BLADDER: Normal.    BOWEL: Percutaneous gastrostomy tube in place. There is significant dilatation of numerous loops of small bowel with multiple air-fluid levels present. The very distal small bowel is decompressed and findings are compatible with mechanical small bowel   obstruction. There is an abrupt transition point in the distal small bowel just proximal to the terminal ileum in the right lower quadrant. Definite obstructing etiology is unclear. Findings could be seen with adhesions or internal hernia. No free air.   Normal appendix. There is some questionable mild low-density wall thickening in the rectosigmoid colon. Clinical correlation for any signs of a mild or low-grade distal colitis. Small amount of free fluid in the pelvis.    LYMPH NODES: Normal.    VASCULATURE: Unremarkable.    PELVIC ORGANS: Normal.    MUSCULOSKELETAL: Thoracolumbar curve convex to the right. No acute bony findings.      Impression    IMPRESSION:   1.  Findings compatible with mechanical small bowel obstruction with an abrupt transition in the distal small bowel just proximal to the terminal ileum in the right lower quadrant. Definite obstructing etiology is unclear. Findings could be seen with   adhesions or internal hernia among  other etiologies. No free air.    2.  There is some borderline mild low-density wall thickening in the rectosigmoid colon. Findings could be seen with a mild or low-grade distal colitis. Clinical correlation.    3.  Small amount of free fluid in the pelvis.    4.  Mild hazy acute-appearing infiltrate in the right middle lobe. Clinical correlation for any signs of a mild or low-grade pneumonitis.               XR Chest Port 1 View    Narrative    CHEST ONE VIEW PORTABLE  4/19/2021 9:44 AM     HISTORY: Dyspnea.    COMPARISON: Chest x-ray on 5/29/2020.      Impression    IMPRESSION: Single portable AP view of the chest was obtained.  Cardiomediastinal silhouette is within normal limits. Mild medial  right basilar pulmonary opacities, indeterminate, could be infectious.  No significant pleural effusion or pneumothorax. Multiple dilated  small bowel loops in the visualized portion of the abdomen, consistent  with patient's history of small bowel obstruction.    GEOVANNY ANGUIANO MD   XR Abdomen 2 Views    Narrative    EXAM: XR ABDOMEN 2 VW  LOCATION: [facility]  DATE/TIME: 4/19/2021 6:01 PM    INDICATION: Abdominal distention.  COMPARISON: CT 04/19/2021.      Impression    IMPRESSION: Gastrostomy. Gas-filled dilated loops of small bowel throughout the abdomen compatible with small bowel obstruction. Degree of small bowel distention slightly worsened when compared to the  film from CT 04/19/2021.    XR Chest Port 1 View    Narrative    CHEST ONE VIEW PORTABLE  4/20/2021 9:10 AM     HISTORY:  Admitted with sepsis, aspiration pneumonia, small bowel  obstruction.  Some concern for pulmonary edema after fluid  resuscitation.    COMPARISON: Chest x-ray 4/19/2021.      Impression    IMPRESSION: Portable chest. Worsening atelectasis or infiltrate noted  at the lung bases bilaterally. Upper lungs remain clear. No  pneumothorax or significant pleural fluid. Nasogastric tube has been  placed and extends below  the left hemidiaphragm, presumably in the  stomach. Heart is normal in size. Dilated air-filled loops of bowel  upper abdomen again noted.    LONNIE JAUREGUI MD   XR Chest Port 1 View    Narrative    EXAM: XR CHEST PORTABLE 1 VIEW  LOCATION: [facility]  DATE/TIME: 04/21/2021, 4:43 AM    INDICATION: Hypoxia.  COMPARISON: 04/20/2021.    FINDINGS: NG tube passes into the stomach. No pneumothorax. The heart size is normal. There are infiltrates in the mid and lower lungs bilaterally, increased in the interval. There is again air under the right hemidiaphragm postoperatively.      Impression    IMPRESSION: Increasing bilateral pulmonary infiltrates.     CT Head w/o Contrast    Narrative    EXAM: CT HEAD W/O CONTRAST  LOCATION: [facility]  DATE/TIME: 4/22/2021 11:32 PM    INDICATION: Seizure, nontraumatic (Age 18-40y)  COMPARISON: None.  TECHNIQUE: Routine CT Head without IV contrast. Multiplanar reformats. Dose reduction techniques were used.    FINDINGS:  INTRACRANIAL CONTENTS: No intracranial hemorrhage, extraaxial collection, or mass effect.  No CT evidence of acute infarct. Normal parenchymal attenuation. There is diffuse white matter volume loss, resulting in diffuse ventriculomegaly. No evidence   obstructive hydrocephalus.    VISUALIZED ORBITS/SINUSES/MASTOIDS: Dystrophic calcifications in the posterior globes which are small in size. No paranasal sinus mucosal disease. No middle ear or mastoid effusion.    BONES/SOFT TISSUES: No acute abnormality.      Impression    IMPRESSION:  1.  No acute intracranial process.   XR Chest Port 1 View    Narrative    EXAM: XR CHEST PORT 1 VIEW  LOCATION: [facility]  DATE/TIME: 4/23/2021 4:49 PM    INDICATION: PICC line placement.  COMPARISON: 04/21/2021 at 0353 hours      Impression    IMPRESSION: Heart size and pulmonary vascularity normal. Infiltrates and/or atelectasis within both mid and lower lung fields have slightly improved. Left  PICC line tip extends into the right atrium. Small left pleural effusion tracking to the left apex.   XR Chest Port 1 View    Narrative    EXAM: XR CHEST PORT 1 VIEW  LOCATION: [facility]  DATE/TIME: 4/23/2021 5:46 PM    INDICATION: Line placement.  COMPARISON: 04/23/2021.      Impression    IMPRESSION: Left PICC has been pulled back with tip in the lower SVC in good location. Stable pulmonary changes.           Disclaimer: This note consists of symbols derived from keyboarding, dictation and/or voice recognition software. As a result, there may be errors in the script that have gone undetected. Please consider this when interpreting information found in this chart.

## 2021-04-27 NOTE — PROGRESS NOTES
Care Management Discharge Note    Discharge Date: 04/27/21       Discharge Disposition: Home, Outpatient Infusion Services    Discharge Transportation: family or friend will provide    Additional Information:  Pt has discharge orders in place, no discharge needs identified by mother during stay. They declined home care PT and family will resume the PCA services that was already in place. Pt has a G tube with [facility] [phone] called to update that pt is discharging and faxed discharge orders [phone].     Will continue to follow patient for any additional discharge needs.     Joseline Obregon RN BSN   Inpatient Care Coordination  [facility]   Phone [phone]

## 2021-04-27 NOTE — PROGRESS NOTES
Cross cover notified of patient with hyperglycemia up to 345.  Same on repeat check on alternate arm from IV where he is receiving D10 1/2 NS fluids at 40 ml/hr. He is on tube feeds, but his mother says it is very unusual for him to have this high blood sugar.  He was hypoglycemic earlier in the admission requiring IV dextrose.  Perhaps no longer necessary but he is improving clinically and replenishing his glycemic stores on tube feeds.  -Change D10 1/2 NS to just 1/2 NS at 40 ml/hr  -Start medium dose sliding scale aspart every 4 hours with tube feeds

## 2021-04-27 NOTE — PLAN OF CARE
Orientation: disoriented x4, mother/caregiver in room   VS: stable  Pain: no pain observed or reported  Tele: STach  Activity: 2 assist w/ lift  Resp: WDL  GI: G tube intact, abd incision WDL  : incontinence  Notable Labs: , MD paged, ordered insulin sliding scale, given 5 units novolog (see MAR), BG down to 234   Lines: PICC tri lumen, white & gray S/L, Red infusing .45 NS at 40mL/hr  Other:   Plan: potential discharge 1-2 days pending improvement

## 2021-04-27 NOTE — PROGRESS NOTES
[facility]   General Surgery Progress Note           Assessment and Plan:   Assessment:   -SBO with intra-abdominal compartment syndrome due to extreme distension  -8 Days Post-Op s/p Exploratory laparotomy, enterolysis, incidental appendectomy; Pathology: mild acute appendicitis  -Postop ileus resolved  -Aspiration pneumonia  -H/o cerebral palsy with developmental delay, lifelong venting G-tube in place  -hypokalemia - resolved      Plan:   -gradually working up to his normal tube feeds, tolerating well  -ok for oral meds  -continue electrolyte replacements - will be important for resolving ileus  -Increase physical activity as able when alert and able.  PT following.  -continue abx for pneumonia per hospitalist; oral Augmentin/Flagyl  -pain control: Tylenol, IV Dilaudid prn; none recently  -GI prophylaxis:  Pepcid  -VTE prophylaxis: defer to hospitalist  -doing well from surgical perspective, home per Hospitalist     - no surgical follow up needed. Mom will call if any questions or concerns.            Interval History:   Mom reports no issues yesterday.  Rj is more awake this morning and talking with mom.  He is tolerating tube feeds. Mom excited to get him back home and on his regular schedule.  No questions or concerns.         Physical Exam:   Blood pressure 100/60, pulse 111, temperature 97.9  F (36.6  C), temperature source Axillary, resp. rate 18, weight 44.6 kg (98 lb 6.4 oz), SpO2 96 %.    I/O last 3 completed shifts:  In: 1215 [NG/GT:915]  Out: 40 [Emesis/NG output:40]    Awake, conversive, minimally reactive to abd exam  Abdomen:   soft, non-distended, non-reactive to palpation  Midline abd incision - steri-strips in place, no drainage, no erythema,  Venting G-tube intact          Data:   Pathology: Appendix  -Mild acute appendicitis    Recent Labs   Lab 04/27/21  0630 04/26/21  0600 04/26/21  0130   WBC 8.5 7.2 6.7   HGB 9.7* 9.9* 10.2*   HCT 28.1* 30.2* 29.7*   * 105* 102*     111* 108*     Dayana Welsh PA-C     Seen and agree.  Fabiola Pop MD

## 2021-04-27 NOTE — PLAN OF CARE
VSS except tachycardia, Denies any pain, BG  q 6 hr- 185, 273. G-tube feedings, Mother is caregiver, stays with pt in room. Followed by PT and vascular access.  Milky white emesis of 40mL this shift.

## 2021-04-27 NOTE — DISCHARGE INSTRUCTIONS
NUTRITION:  6 cartons Pediasure 1.5 9 (237 mL each) provides 2100 kcal, 84 g protein, 234 g CHO, 1110 mL free water.  To meet minimum 1 mL/kcal, needs an additional 1000 mL free water daily. Providing 8 oz (240 mL) of free water 5x per day meets this minimum (provides an additional 1200 mL free water per day for a total of 2310 mL).   Ok to adjust timings and volumes at each feeding based on preferences and tolerance.     Continue to aim for 6 cartons per day for at least 1 week as make up volume, and may need to titrate between 5-6 cartons after this based on weight trends, tolerance and provider guidance.     Please call with questions or follow up with home infusion company.    Ny Phelps MS, ARMANDO-AP, LD, [facility]  Office: [phone]

## 2021-04-27 NOTE — PLAN OF CARE
Physical Therapy Discharge Summary    Reason for therapy discharge:    Discharged to home.    Progress towards therapy goal(s). See goals on Care Plan in [facility] electronic health record for goal details.  Goals partially met.  Barriers to achieving goals:   discharge from facility.    Therapy recommendation(s):    Continued therapy is recommended.  Rationale/Recommendations:  HHPT.

## 2021-04-29 ENCOUNTER — TELEPHONE (OUTPATIENT)
Dept: PEDIATRICS | Facility: CLINIC | Age: 26
End: 2021-04-29

## 2021-04-29 DIAGNOSIS — K56.609 SBO (SMALL BOWEL OBSTRUCTION) (H): ICD-10-CM

## 2021-04-29 DIAGNOSIS — Z93.1 FEEDING BY G-TUBE (H): Primary | ICD-10-CM

## 2021-04-29 DIAGNOSIS — Z93.1 GASTROSTOMY TUBE DEPENDENT (H): ICD-10-CM

## 2021-04-29 DIAGNOSIS — E44.1 MILD PROTEIN-CALORIE MALNUTRITION (H): ICD-10-CM

## 2021-04-29 RX ORDER — SIMETHICONE 40MG/0.6ML
40 SUSPENSION, DROPS(FINAL DOSAGE FORM)(ML) ORAL 4 TIMES DAILY PRN
Qty: 45 ML | Refills: 1 | Status: SHIPPED | OUTPATIENT
Start: 2021-04-29 | End: 2022-01-22

## 2021-04-29 NOTE — TELEPHONE ENCOUNTER
Spoke to patient's mom. She would like to do the simethicone drops. Sent prescription to patient's pharmacy per MD. She would also like a referral placed for the gastroenterologist. Chela Connolly.   Thanks!  Anjelica BONNER RN, BSN

## 2021-04-29 NOTE — TELEPHONE ENCOUNTER
"ED/Discharge Protocol    \"Hi, my name is Anjelica Bell RN, a registered nurse, and I am calling on behalf of Dr. Leyva's office at [place].  I am calling to follow up and see how things are going for you after your recent visit.\"    \"I see that you were in the (ER/UC/IP) on 4/19/21.    How are you doing now that you are home?\" \"Doing ok\"    Is patient experiencing symptoms that may require a hospital visit?  No    Discharge Instructions    \"Let's review your discharge instructions.  What is/are the follow-up recommendations?  Pt. Response: Follow-up with PCP     \"Were you instructed to make a follow-up appointment?\"  Pt. Response: Yes.  Has appointment been made?   Yes      \"When you see the provider, I would recommend that you bring your discharge instructions with you.    Medications    \"How many new medications are you on since your hospitalization/ED visit?\"    0-1  \"How many of your current medicines changed (dose, timing, name, etc.) while you were in the hospital/ED visit?\"   0-1  \"Do you have questions about your medications?\"   No  \"Were you newly diagnosed with heart failure, COPD, diabetes or did you have a heart attack?\"   No  For patients on insulin: \"Did you start on insulin in the hospital or did you have your insulin dose changed?\"   No  Post Discharge Medication Reconciliation Status: discharge medications reconciled, continue medications without change.    Was MTM referral placed (*Make sure to put transitions as reason for referral)?   No    Call Summary    \"Do you have any questions or concerns about your condition or care plan at the moment?\"    Yes Gas issue with g-tube feeding and concerns about getting enough nutrition into patient.  Triage nurse advice given: Discussed venting g-tube and advised will reach out to PCP    Patient was in ER 2 times in the past year (assess appropriateness of ER visits.)      \"If you have questions or things don't continue to improve, we encourage you " "contact us through the main clinic number,  [phone].  Even if the clinic is not open, triage nurses are available 24/7 to help you.     We would like you to know that our clinic has extended hours (provide information).  We also have urgent care (provide details on closest location and hours/contact info)\"      \"Thank you for your time and take care!\"      Anjelica BONNER RN, BSN    "

## 2021-04-29 NOTE — TELEPHONE ENCOUNTER
Please contact patient for In-patient follow up.  [phone] (home)     Visit date: [id number]  Diagnosis listed: Sbo (Small Bowel Obstruction) (H), Hyperglycemia  Number of visits in past 12 months: 0 ED / 1 IP

## 2021-04-29 NOTE — TELEPHONE ENCOUNTER
we could try simethicone drops to help with gas, another option could be a probiotic. With the antibiotics it is possible his normal GI chandler is diminished, so the probiotic would help with that and that would be my first choice.     Would they be able to quantify how much of the feeds are not getting in? as long as 80% or more are getting in of the feeds we are likely okay for now and odds are it will improve as he continues to heal and settle back into their usual routine    I also did hear back from my colleague at the  and she recommends Rj So MD is a gastroenterologist who sees patients in [place],  general number there is [phone] and they can help schedule. It patient's mom is okay with this I an place a referral.

## 2021-04-29 NOTE — TELEPHONE ENCOUNTER
referral order in, I believe she will be contacted to schedule but they can call to schedule as well. thanks!

## 2021-04-29 NOTE — TELEPHONE ENCOUNTER
Patient's mom aware of referral and has phone number to call if she needs to reach them.  Anjelica BONNER RN, BSN

## 2021-04-29 NOTE — TELEPHONE ENCOUNTER
PCP: patient's mom concerned patient may not be getting enough nutrition through G-tube. Per mom, patient seems to have a lot of gas issues right now and a lot of the feed comes back out or they are not able to get a lot in. She would like to know:  1) what is an acceptable amount for patient where she shouldn't be concerned.  2) any way to help with the increase gas.    She is currently venting the tube but is worried to leave it open as contents could spill out.    Please advise.  Anjelica BONNER RN, BSN

## 2021-04-30 RX ORDER — POLYETHYLENE GLYCOL 3350 17 G/17G
POWDER, FOR SOLUTION ORAL
Qty: 510 G | Refills: 4 | Status: SHIPPED | OUTPATIENT
Start: 2021-04-30 | End: 2021-09-23

## 2021-05-02 LAB
BACTERIA SPEC CULT: NO GROWTH
BACTERIA SPEC CULT: NO GROWTH
Lab: NORMAL
SPECIMEN SOURCE: NORMAL
SPECIMEN SOURCE: NORMAL

## 2021-05-05 ENCOUNTER — TELEPHONE (OUTPATIENT)
Dept: GASTROENTEROLOGY | Facility: CLINIC | Age: 26
End: 2021-05-05

## 2021-05-05 NOTE — TELEPHONE ENCOUNTER
LVM with [facility] number (since referral was forwarded from call center). Pt being referred to Dr. So in GI. Referred from Dr. Leyva. Schedule next available virtual visit.

## 2021-05-06 ENCOUNTER — OFFICE VISIT (OUTPATIENT)
Dept: PEDIATRICS | Facility: CLINIC | Age: 26
End: 2021-05-06
Payer: COMMERCIAL

## 2021-05-06 VITALS
RESPIRATION RATE: 24 BRPM | TEMPERATURE: 98.3 F | SYSTOLIC BLOOD PRESSURE: 106 MMHG | HEIGHT: 61 IN | DIASTOLIC BLOOD PRESSURE: 80 MMHG | HEART RATE: 102 BPM | BODY MASS INDEX: 18.31 KG/M2 | WEIGHT: 97 LBS

## 2021-05-06 DIAGNOSIS — Z93.1 GASTROSTOMY TUBE DEPENDENT (H): ICD-10-CM

## 2021-05-06 DIAGNOSIS — D64.9 ANEMIA, UNSPECIFIED TYPE: ICD-10-CM

## 2021-05-06 DIAGNOSIS — Z09 HOSPITAL DISCHARGE FOLLOW-UP: Primary | ICD-10-CM

## 2021-05-06 LAB
ERYTHROCYTE [DISTWIDTH] IN BLOOD BY AUTOMATED COUNT: 13.6 % (ref 10–15)
HCT VFR BLD AUTO: 37.1 % (ref 40–53)
HGB BLD-MCNC: 12 G/DL (ref 13.3–17.7)
MCH RBC QN AUTO: 34.2 PG (ref 26.5–33)
MCHC RBC AUTO-ENTMCNC: 32.3 G/DL (ref 31.5–36.5)
MCV RBC AUTO: 106 FL (ref 78–100)
PLATELET # BLD AUTO: 389 10E9/L (ref 150–450)
RBC # BLD AUTO: 3.51 10E12/L (ref 4.4–5.9)
WBC # BLD AUTO: 5.7 10E9/L (ref 4–11)

## 2021-05-06 PROCEDURE — 36415 COLL VENOUS BLD VENIPUNCTURE: CPT | Performed by: INTERNAL MEDICINE

## 2021-05-06 PROCEDURE — 99495 TRANSJ CARE MGMT MOD F2F 14D: CPT | Mod: GC | Performed by: STUDENT IN AN ORGANIZED HEALTH CARE EDUCATION/TRAINING PROGRAM

## 2021-05-06 PROCEDURE — 85027 COMPLETE CBC AUTOMATED: CPT | Performed by: INTERNAL MEDICINE

## 2021-05-06 ASSESSMENT — MIFFLIN-ST. JEOR: SCORE: 1288.37

## 2021-05-06 NOTE — PATIENT INSTRUCTIONS
1. Follow up with gastroenterologist Chela So MD in [place], [place]. Call [phone] to schedule next earliest available appointment.  2. Plan to follow up here with Dr. Leyva in 1-2 months.

## 2021-05-06 NOTE — PROGRESS NOTES
"Assessment & Plan     Mr. Rj Licea is a 25-year-old man with a complex past medical history most notable for cerebral palsy and intellectual disability who presents for hospital follow-up s/p hospitalization at [facility] 04/19/2021-04/27/2021 for small bowel obstruction complicated by intra-abdominal compartment syndrome and sepsis 2/2 aspiration pneumonia. Hemodynamically, VS here WNLs. BMI stably low at 18.3 kg/m^2. Clinically, the patient is overall doing relatively well, recovering mostly appropriately from his small bowel obstruction. He completed his course of antibiotics for aspiration pneumonia. His paleness may be explained by his ongoing (though improving) anemia. His vital signs and glucose levels at the time of these pale \"spells\" are reassuringly normal. Moreover, his reported glucose levels at home have been entirely within normal limits, so recurrent hypoglycemia is unlikely to explain the patient's paleness. Most importantly, he ought to follow up with gastroenterology as soon as possible to collaborate with the gastroenterologist and dietitian re: a plan for adequate nutrition and perhaps a trial of metoclopramide to assist with gastric emptying to achieve sufficient nutrition.      ICD-10-CM    1. Hospital discharge follow-up  Z09    2. Gastrostomy tube dependent (H)  Z93.1    3. Anemia, unspecified type  D64.9 CBC with platelets     G-tube dependence  - Plan to follow up with gastroenterologist Chela Connolly and nutrition there at earliest available appointment; provided mother phone number from the chart.    Anemia, macrocytic  Thrombocytopenia, improved  Hgb 9.8, plt 201 on most recent check 04/27/2021.  - CBC: Hgb 12.0, , plt 389 on 05/06/2021      60 minutes spent on the date of the encounter doing chart review, history and exam, documentation and further activities per the note      - Recommended follow-up appointment with the patient's PCP, Dr. Leyva.  Return in about 2 months " "(around 7/6/2021) for Follow up G-tube dependence and feeds.    Mercedez Calzada MD  PGY-3 Internal Medicine/Pediatrics  Pager [phone]  Thursday 05/06/2021  [facility]    Patient staffed with the attending physician, Dr. Gates.    Subjective   Mr. Rj Licea is a 25-year-old man with cerebral palsy and severe intellectual disability who presents for the following health issues:    - Accompanied by his mother, Darcy, who provided the following updates/history.    - Abdominal incision \"looks good.\"    1. Spells of going pale  - Patient will be sitting and then \"all of a sudden goes gray [in color].\"  - Oxygen saturation is 90-95%; heart rate is within normal limits.  - Glucose levels at home have been  mg/dL (s/p hypoglycemic episodes in the hospital to blood glucose 31 mg/dL).    2. G-tube dependence  - Patient is 100% G-tube feed.  - When mother is opening G-tube, \"a lot of formula is coming out.\"  - When she administers a G-tube feed, a lot of formula is also leaking around the tube.  - Mother is most concerned about how his stomach is emptying.  - Patient is only getting 3-4 cans/day (goal 6 cans of 1.5 Pediasure with fiber with 8-oz water flushes).  - Patient has reassuringly not had any vomiting/aspiration.  - With the patient's history of reflux, feeds are limited to occur no later than 5/5:30 PM.  - She has yet to call to schedule follow-up with gastroenterology.    - Patient's last weight at home was reportedly 84 lbs on 04/18/2021.  - Family normally weighs patient every 2-3 weeks.    - Patient is voiding 2x/day (previously at least 3-4x/day).  - Patient's stools are never formed, though more recently they have had \"somewhat of a substance\"/been \"thick\"; patient's last bowel movement was yesterday.    Hospital Follow-up Visit:    Hospital/Nursing Home/IP Rehab Facility: [facility]  Date of Admission: 04/19/2021  Date of Discharge: 04/27/2021  Reason(s) for " "Admission: See admission notes. Mom has concerns of Pt looking pale often and elevated heart rate      Was your hospitalization related to COVID-19? No   Problems taking medications regularly:  Mom has concerns with admission via G-tube  Medication changes since discharge: None  Problems adhering to non-medication therapy:  None    Summary of hospitalization:  [facility] discharge summary reviewed  Diagnostic Tests/Treatments reviewed.  Follow up needed: none  Other Healthcare Providers Involved in Patient s Care: Surgery  Update since discharge: stable. Post Discharge Medication Reconciliation: discharge medications reconciled, continue medications without change.  Plan of care communicated with family          Review of Systems   10-point ROS negative except for as listed above        Objective    /80   Pulse 102   Temp 98.3  F (36.8  C) (Axillary)   Resp 24   Ht 1.549 m (5' 1\")   Wt 44 kg (97 lb)   BMI 18.33 kg/m    Body mass index is 18.33 kg/m .     Physical Exam   GENERAL: cachectic, blind man with intellectual disability sitting in chair in no apparent distress; alert, frequently voicing a desire to leave and go home  EYES: eyes closed  HENT: poor dentition, moist mucous membranes, cervical kyphosis  RESP: lungs clear to auscultation throughout - no rales, rhonchi, or wheezes  CV: regular rate and rhythm, normal S1 S2, no S3 or S4, no murmur, click or rub, no peripheral edema and peripheral pulses strong  ABDOMEN: bowel sounds present, soft, non-tender, mildly distended, G-tube in place and site appears healthy without any drainage, midline incision/scar well-healed  EXT: extremely thin  SKIN: no suspicious lesions or rashes on exposed skin  NEURO: speech clear but unable to meaningfully engage in conversation, redirectable  PSYCH: affect normal/bright            "

## 2021-05-13 ENCOUNTER — TELEPHONE (OUTPATIENT)
Dept: GASTROENTEROLOGY | Facility: CLINIC | Age: 26
End: 2021-05-13

## 2021-05-13 NOTE — TELEPHONE ENCOUNTER
[facility] Call Center    Phone Message    May a detailed message be left on voicemail: yes     Reason for Call: Other: Per GI Guidelines writer is sending this TE for patient assessment. Pt records are in epic.      Action Taken: Message routed to:  [facility] Gastro    Travel Screening: Not Applicable

## 2021-05-20 NOTE — TELEPHONE ENCOUNTER
Shruthi Lockett  You 50 minutes ago (2:05 PM)     Alcides Doyle,   I just spoke to pt's mom Darcy and she said that the referring provider Dr. Leyva specifically referred the patient to Dr. So. She did not agree to cancelling or rescheduling until this is clarified with Dr. Leyva? Would you be able to check on this or confirm with patient's mom?   Let me know if I can help any further.     -Shruthi   Clinic Coordinator

## 2021-05-26 ENCOUNTER — MYC MEDICAL ADVICE (OUTPATIENT)
Dept: PEDIATRICS | Facility: CLINIC | Age: 26
End: 2021-05-26

## 2021-05-27 NOTE — TELEPHONE ENCOUNTER
REFERRAL INFORMATION:    Referring Provider:  Dr. Micky Leyva     Referring Clinic:   [facility]    Reason for Visit/Diagnosis: Weight loss- abnormal      FUTURE VISIT INFORMATION:    Appointment Date: 6/30/2021    Appointment Time: 4:20 PM      NOTES STATUS DETAILS   OFFICE NOTE from Referring Provider Internal 3/17/2021, 6/7/19, 1/15/19 Office visit with Dr. Leyva     OFFICE NOTE from Other Specialist Internal 5/6/2021 Office visit with Dr. Mercedez Calzada ( [facility])    5/22/19 Office visit with Macey Ruiz PA-C ([facility])     2/6/19 Office visit with Dr. Genesis King ([facility])     HOSPITAL DISCHARGE SUMMARY/  ED VISITS Internal 4/19/2021, 5/28/2020 ( [facility])    OPERATIVE REPORT N/A    MEDICATION LIST Internal         ENDOSCOPY  N/A    COLONOSCOPY N/A    ERCP N/A    EUS N/A    STOOL TESTING N/A    PERTINENT LABS Internal/ Care Everywhere    PATHOLOGY REPORTS (RELATED) N/A    IMAGING (CT, MRI, EGD, MRCP, Small Bowel Follow Through/SBT, MR/CT Enterography) Internal CT Abdomen Pelvis: 4/19/2021, 5/28/2020

## 2021-05-27 NOTE — TELEPHONE ENCOUNTER
Please review patient mychart message and advise. Please advise if ok for an E-Visit or should see extender. Thanks!  Anjelica BONNER RN, BSN

## 2021-05-28 ENCOUNTER — OFFICE VISIT (OUTPATIENT)
Dept: PEDIATRICS | Facility: CLINIC | Age: 26
End: 2021-05-28
Payer: COMMERCIAL

## 2021-05-28 VITALS
BODY MASS INDEX: 15.67 KG/M2 | WEIGHT: 83 LBS | SYSTOLIC BLOOD PRESSURE: 110 MMHG | DIASTOLIC BLOOD PRESSURE: 70 MMHG | HEART RATE: 99 BPM | RESPIRATION RATE: 16 BRPM | TEMPERATURE: 97.6 F | HEIGHT: 61 IN | OXYGEN SATURATION: 100 %

## 2021-05-28 DIAGNOSIS — T81.49XA INCISIONAL INFECTION: Primary | ICD-10-CM

## 2021-05-28 PROCEDURE — 87186 SC STD MICRODIL/AGAR DIL: CPT | Performed by: NURSE PRACTITIONER

## 2021-05-28 PROCEDURE — 87077 CULTURE AEROBIC IDENTIFY: CPT | Performed by: NURSE PRACTITIONER

## 2021-05-28 PROCEDURE — 99214 OFFICE O/P EST MOD 30 MIN: CPT | Performed by: NURSE PRACTITIONER

## 2021-05-28 PROCEDURE — 87070 CULTURE OTHR SPECIMN AEROBIC: CPT | Performed by: NURSE PRACTITIONER

## 2021-05-28 RX ORDER — SULFAMETHOXAZOLE AND TRIMETHOPRIM 200; 40 MG/5ML; MG/5ML
20 SUSPENSION ORAL 2 TIMES DAILY
Qty: 280 ML | Refills: 0 | Status: SHIPPED | OUTPATIENT
Start: 2021-05-28 | End: 2021-09-24

## 2021-05-28 RX ORDER — SULFAMETHOXAZOLE AND TRIMETHOPRIM 200; 40 MG/5ML; MG/5ML
20 SUSPENSION ORAL 2 TIMES DAILY
Qty: 280 ML | Refills: 0 | Status: SHIPPED | OUTPATIENT
Start: 2021-05-28 | End: 2021-05-28

## 2021-05-28 ASSESSMENT — MIFFLIN-ST. JEOR: SCORE: 1224.87

## 2021-05-28 NOTE — PROGRESS NOTES
"    Assessment & Plan     Incisional infection  Begin abx-Bactrim for MRSA coverage. Wound culture pending. To see surgery next week. If worsening over the weekend, needs ER. Discussed s/s to watch for with pt/mother.  If not improving likely needs ultrasound/possible drainage.  - Wound Culture Aerobic Bacterial  - GENERAL SURG ADULT REFERRAL; Future  - sulfamethoxazole-trimethoprim (BACTRIM/SEPTRA) 8 mg/mL suspension; 20 mLs (160 mg) by Per Feeding Tube route 2 times daily    There are no Patient Instructions on file for this visit.    No follow-ups on file.    Brenda Flowers NP  [facility] De La Rosa is a 25 year old who presents for the following health issues     HPI     Concern - Derm Issue   Onset: 4 days  Description: belly button area incision, itching, redness warmth and oozing   Intensity: NA  Progression of Symptoms:  worsening  Accompanying Signs & Symptoms: NONE  Previous history of similar problem: NONE  Precipitating factors:        Worsened by: itching  Alleviating factors:        Improved by: none  Therapies tried and outcome: Neosporin and bandaging helped     G Tube dependent  4/19 laparotomy and appy. No follow-up with surgeon.  Noted 4-5 days ago area of erythema, purulence, slight itch.  No fever/chills. No change in personality/behavior.     Review of Systems   Constitutional, HEENT, cardiovascular, pulmonary, gi and gu systems are negative, except as otherwise noted.      Objective    /70 (BP Location: Right arm, Patient Position: Chair, Cuff Size: Adult Regular)   Pulse 99   Temp 97.6  F (36.4  C) (Tympanic)   Resp 16   Ht 1.549 m (5' 1\")   Wt 37.6 kg (83 lb)   SpO2 100%   BMI 15.68 kg/m    Body mass index is 15.68 kg/m .  Physical Exam   GENERAL: healthy, alert and no distress  ABDOMEN: soft, nontender, without hepatosplenomegaly or masses  SKIN: midline abdominal incision with area to top of incision with localized erythema, purulence, " slightly firm but non-tender  Gtube intact    No results found for this or any previous visit (from the past 24 hour(s)).

## 2021-05-31 LAB
BACTERIA SPEC CULT: ABNORMAL
Lab: ABNORMAL
SPECIMEN SOURCE: ABNORMAL

## 2021-06-01 ENCOUNTER — OFFICE VISIT (OUTPATIENT)
Dept: SURGERY | Facility: CLINIC | Age: 26
End: 2021-06-01
Payer: COMMERCIAL

## 2021-06-01 VITALS
OXYGEN SATURATION: 99 % | DIASTOLIC BLOOD PRESSURE: 70 MMHG | RESPIRATION RATE: 16 BRPM | SYSTOLIC BLOOD PRESSURE: 110 MMHG | HEIGHT: 61 IN | WEIGHT: 83 LBS | BODY MASS INDEX: 15.67 KG/M2 | HEART RATE: 80 BPM

## 2021-06-01 DIAGNOSIS — Z09 SURGICAL FOLLOWUP VISIT: Primary | ICD-10-CM

## 2021-06-01 PROCEDURE — 99024 POSTOP FOLLOW-UP VISIT: CPT | Performed by: SURGERY

## 2021-06-01 ASSESSMENT — MIFFLIN-ST. JEOR: SCORE: 1224.87

## 2021-06-01 NOTE — PROGRESS NOTES
"Rj is well known to myself, having had enterolysis on 4/19/2021.  He was healing nicely until just the last week or so when he developed a site of irritation, redness and intermittent drainage near the top of the incision and a smaller one near the bottom.    PE:  /70   Pulse 80   Resp 16   Ht 1.549 m (5' 1\")   Wt 37.6 kg (83 lb)   SpO2 99%   BMI 15.68 kg/m       Abdomen:  Soft and non tender.   Small site near the top with erythema and a small open tract draining minimal fluid.  Fascia appears intact and this is most consistent with a stitch granuloma, alto the suture was dissolvable.  I believe opening this small site in the OR is the only option for resolving it as even an exam is difficult.      Assessment:  Stitch granuloma.  Recommend opening a small portion to remove a suture/knot to allow this to heal.  Will need to do under general anesthesia.  Mom wonders if it can be done at the same time as wisdom teeth removal which is planned.  I think that will be ideal.    Plan:    Coordinate as mentioned above.    Fabiola Pop MD        "

## 2021-06-01 NOTE — LETTER
"2021       Re: Rj Licea - 1995    Rj is well known to myself, having had enterolysis on 2021.  He was healing nicely until just the last week or so when he developed a site of irritation, redness and intermittent drainage near the top of the incision and a smaller one near the bottom.     PE:  /70   Pulse 80   Resp 16   Ht 1.549 m (5' 1\")   Wt 37.6 kg (83 lb)   SpO2 99%   BMI 15.68 kg/m        Abdomen:  Soft and non tender.   Small site near the top with erythema and a small open tract draining minimal fluid.  Fascia appears intact and this is most consistent with a stitch granuloma, alto the suture was dissolvable.  I believe opening this small site in the OR is the only option for resolving it as even an exam is difficult.       Assessment:  Stitch granuloma.  Recommend opening a small portion to remove a suture/knot to allow this to heal. Will need to do under general anesthesia.  Mom wonders if it can be done at the same time as wisdom teeth removal which is planned.  I think that will be ideal.     Plan:    Coordinate as mentioned above.     Fabiola Pop MD  "

## 2021-06-30 ENCOUNTER — PRE VISIT (OUTPATIENT)
Dept: GASTROENTEROLOGY | Facility: CLINIC | Age: 26
End: 2021-06-30

## 2021-06-30 ENCOUNTER — VIRTUAL VISIT (OUTPATIENT)
Dept: GASTROENTEROLOGY | Facility: CLINIC | Age: 26
End: 2021-06-30
Attending: INTERNAL MEDICINE
Payer: COMMERCIAL

## 2021-06-30 VITALS — WEIGHT: 81 LBS | BODY MASS INDEX: 15.3 KG/M2

## 2021-06-30 DIAGNOSIS — Z93.1 GASTROSTOMY TUBE DEPENDENT (H): ICD-10-CM

## 2021-06-30 DIAGNOSIS — E44.1 MILD PROTEIN-CALORIE MALNUTRITION (H): ICD-10-CM

## 2021-06-30 DIAGNOSIS — K56.609 SBO (SMALL BOWEL OBSTRUCTION) (H): Primary | ICD-10-CM

## 2021-06-30 DIAGNOSIS — Z93.1 FEEDING BY G-TUBE (H): ICD-10-CM

## 2021-06-30 PROCEDURE — 99215 OFFICE O/P EST HI 40 MIN: CPT | Mod: 95 | Performed by: INTERNAL MEDICINE

## 2021-06-30 NOTE — PATIENT INSTRUCTIONS
PLAN  ---Referral to Nutrition --Dr. Galileo Morris     ---First, do a CTE -- [phone]  ---After CTE, perform a cscope with MAC  --COVID vaccine at home?

## 2021-06-30 NOTE — NURSING NOTE
Chief Complaint   Patient presents with     New Patient       Vitals:    06/30/21 1503   Weight: 36.7 kg (81 lb)       Body mass index is 15.3 kg/m .    Belinda Jaeger CMA  Spoke to tayo Taveras during check in call, patient unable to due to disability.

## 2021-06-30 NOTE — PROGRESS NOTES
"Rj Licea is a 25 year old male who is being evaluated via a billable video visit.      The patient has been notified of following:     \"This video visit will be conducted via a call between you and your physician/provider. We have found that certain health care needs can be provided without the need for an in-person physical exam.  This service lets us provide the care you need with a video conversation.  If a prescription is necessary we can send it directly to your pharmacy.  If lab work is needed we can place an order for that and you can then stop by our lab to have the test done at a later time.    If during the course of the call the physician/provider feels a video visit is not appropriate, you will not be charged for this service.\"     Patient confirmed that they are in [place] for today's visit yes.    Video-Visit Details  Type of service:  Video Visit    Video Start Time: 4:08 PM    Originating Location (pt. Location): [place]    Distant Location (provider location):  [facility] [place]     Platform used: [facility]      GI CLINIC VISIT    CC/REFERRING MD:  Micky Leyva  REASON FOR CONSULTATION:   Micky Leyva for   Chief Complaint   Patient presents with     New Patient       ASSESSMENT/PLAN:    Rj Licea is a 24 yo M with a complicated PMHx who is presenting for evaluation of recurrent SBOs (in 4/2021 had surgical JANEEN) and FTT. At this time, we should rule out an inflammatory process and a mass as a cause Rj's symptoms.     We will proceed with the following:    ---Referral to Nutrition --Dr. Galileo Morris   ---First, do a CTE -- [phone]  ---After CTE, perform a cscope with MAC  ---GI nurse (Yanira Vanegas) will look into options to get Rj vaccinated against COVID    RTC: Return in about 3 months (around 9/30/2021).     60 minutes spent on the date of the encounter performing chart review, history and exam, documentation and further activities as " "noted above.    Chela So MD   of Medicine  Division of Gastroenterology, Hepatology and Nutrition  [facility]        HPI    Rj Licea is a 24 yo man presenting for evaluation of recurrent SBOs and failure to thrive. Rj has a complicated PMHx. 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 Darcy (Mom and guardian) provided the history during today's virtual appointment.      Admitted to hospital 5/2020 with an SBO. CT a/p showed a transition point at the ICV. Managed conservatively.  In 4/2021, admitted for SBO. CT a/p showed a SBO in the distal SB in RLQ. Had exploratory laparotomy with resolution without resection found likely to have an intra-abdominal compartment syndrome due to massive dilation from his obstruction. Per the op note:  \"When we reached the terminal ileum, there was an accordion-like adhesion amongst the distal, terminal ileum mesentery, creating an obstructing mechanism, which once , opened and freely allowed the distended proximal small bowel content to fill into that decompressed distal bowel...Prior to closure, we did perform an incidental appendectomy in the usual fashion by ligating the mesoappendix and dividing the appendiceal base with the DORINA stapler\"    Rj previously weight >100 lb, then dropped to 73 lb 6/2020, now 82 lb.   Rj has constipation. Takes Miralax daily. Has a BM EOD or every three days, liquidy. Has never had a colonoscopy.   Had blood in the stool prior to last ED visit.    Uses pediasure with fiber. Used to get 6 cans per day, now 5 cans per day (since the hospital), due to reflux.   No hx of inflammatory process or radiation.    ROS:    No fevers or chills  No weight loss  No blurry vision, double vision or change in " vision  No sore throat  No lymphadenopathy  No headache, paraesthesias, or weakness in a limb  No shortness of breath or wheezing  No chest pain or pressure  No arthralgias or myalgias  No rashes or skin changes  No odynophagia or dysphagia  No BRBPR, hematochezia, melena  No dysuria, frequency or urgency  No hot/cold intolerance or polyria  No anxiety or depression    PROBLEM LIST  Patient Active Problem List    Diagnosis Date Noted     SBO (small bowel obstruction) (H) 04/19/2021     Priority: Medium     Nonintractable epilepsy without status epilepticus, unspecified epilepsy type (H) 04/19/2021     Priority: Medium     Vomiting 05/28/2020     Priority: Medium     CP (cerebral palsy), spastic, diplegic (H) 07/20/2017     Priority: Medium     Sleep disorder 07/20/2017     Priority: Medium     Seizure disorder (H) 07/20/2017     Priority: Medium     Other idiopathic scoliosis, unspecified spinal region 07/20/2017     Priority: Medium     Periventricular leukomalacia, associated with prematurity, chronic 07/20/2017     Priority: Medium     Oral aversion 07/20/2017     Priority: Medium     Nutritional disorder 07/20/2017     Priority: Medium     Mild persistent asthma in adult without complication 07/20/2017     Priority: Medium     History of prematurity, 23 wk 400 grams 07/20/2017     Priority: Medium     History of behavioral and mental health problems 07/20/2017     Priority: Medium     Abnormal gait 07/20/2017     Priority: Medium     Feeding by G-tube (H) 07/20/2017     Priority: Medium     Constipation, unspecified constipation type 07/20/2017     Priority: Medium     Chronic lung disease of prematurity 07/20/2017     Priority: Medium     Bilateral blindness 07/20/2017     Priority: Medium     Autism spectrum disorder associated with known medical or genetic condition or environmental factor, requiring substantial support (level 2) 07/20/2017     Priority: Medium     Anxiety 07/20/2017     Priority: Medium      Short stature 03/23/2012     Priority: Medium       PERTINENT PAST MEDICAL HISTORY:  Past Medical History:   Diagnosis Date     Chronic lung disease     off nebs in 2012     Depression     fluoxetine, risperidone     Epilepsy (H) 5/2011    on keppra (Janaczek)     Feeding by G-tube (H)      GERD (gastroesophageal reflux disease)      Insomnia     melatonin, clonazepam     Retinopathy of prematurity     led to blindness       PREVIOUS SURGERIES:  Past Surgical History:   Procedure Laterality Date     APPENDECTOMY OPEN N/A 4/19/2021    Procedure: Appendectomy open;  Surgeon: Fabiola Pop MD;  Location: [facility] OR     GASTROSTOMY TUBE       LAPAROTOMY EXPLORATORY N/A 4/19/2021    Procedure: LAPAROTOMY, enterolysis;  Surgeon: Fabiola Pop MD;  Location: [facility] OR       PREVIOUS ENDOSCOPY:  none    ALLERGIES:   No Known Allergies    PERTINENT MEDICATIONS:    Current Outpatient Medications:      albuterol (PROAIR HFA/PROVENTIL HFA/VENTOLIN HFA) 108 (90 Base) MCG/ACT inhaler, Inhale 2 puffs into the lungs every 6 hours as needed for shortness of breath / dyspnea or wheezing, Disp: 2 Inhaler, Rfl: 3     albuterol (PROVENTIL) (2.5 MG/3ML) 0.083% neb solution, Take 1 vial (2.5 mg) by nebulization every 6 hours as needed for shortness of breath / dyspnea or wheezing, Disp: 9 mL, Rfl: 3     benzoyl peroxide 5 % EX topical gel, Apply topically 2 times daily as needed (acne), Disp: 50 g, Rfl: 11     blood glucose monitoring (NO BRAND SPECIFIED) meter device kit, Use to test blood sugar 4 times daily or as directed., Disp: 1 kit, Rfl: 0     blood glucose monitoring (NO BRAND SPECIFIED) meter device kit, Use to test blood sugar 4 times daily or as directed., Disp: 1 kit, Rfl: 0     clindamycin 1 % EX external gel, Apply topically 2 times daily as needed (acne), Disp: 50 g, Rfl: 11     clobazam (,ONFI,) 2.5 MG/ML suspension, 5 mg by Per G Tube route At Bedtime (3 mL in AM / 2 mL in PM), Disp: , Rfl:      clobazam (ONFI) 2.5 MG/ML  suspension, 7.5 mg by Per G Tube route every morning (3 mL in AM / 2 mL in PM), Disp: , Rfl:      famotidine (PEPCID) 40 MG/5ML suspension, 20 mg by Per G Tube route 2 times daily, Disp: , Rfl:      fluticasone-salmeterol (ADVAIR HFA) 230-21 MCG/ACT inhaler, Inhale 2 puffs into the lungs 2 times daily , Disp: 12 g, Rfl: 3     gabapentin (NEURONTIN) 250 MG/5ML solution, 750 mg by Per G Tube route At Bedtime , Disp: , Rfl:      hydrOXYzine (ATARAX) 10 MG/5ML syrup, 50 mg by Per G Tube route At Bedtime (25 mL), Disp: , Rfl:      Lancets 30G MISC, 120 30 gauge lancets (substitute as needed), Disp: 120 each, Rfl: 0     LORazepam (ATIVAN) 1 MG tablet, 1 mg by Per G Tube route every 6 hours as needed for anxiety Per mom: prn , Disp: , Rfl:      melatonin 1 MG TABS, 1 mg by Per G Tube route daily Daily between 4-5pm*, Disp: , Rfl:      melatonin 3 MG tablet, 6 mg by Per G Tube route At Bedtime , Disp: , Rfl:      multivitamins w/minerals (CERTAVITE) liquid, 15 mLs by Per G Tube route daily, Disp: , Rfl:      omeprazole (PRILOSEC) 2 mg/mL SUSP, 40 mg by Per G Tube route At Bedtime , Disp: 800 mL, Rfl: 11     polyethylene glycol (MIRALAX) 17 g packet, 17 g by Per G Tube route daily, Disp: , Rfl:      polyethylene glycol (MIRALAX) 17 GM/Dose powder, TAKE 17 GRAMS(ONE CAPFUL) BY MOUTH EVERY DAY as needed, Disp: 510 g, Rfl: 4     QUEtiapine (SEROQUEL) 300 MG tablet, 600 mg by Per G Tube route At Bedtime , Disp: , Rfl:      simethicone (MYLICON) 40 MG/0.6ML suspension, Take 0.6 mLs (40 mg) by mouth 4 times daily as needed (gassiness), Disp: 45 mL, Rfl: 1     sulfamethoxazole-trimethoprim (BACTRIM/SEPTRA) 8 mg/mL suspension, 20 mLs (160 mg) by Per Feeding Tube route 2 times daily, Disp: 280 mL, Rfl: 0     Valproate Sodium (VALPROIC ACID) 250 MG/5ML, 300 mg by Per G Tube route every morning, Disp: , Rfl:      Valproate Sodium (VALPROIC ACID) 250 MG/5ML, 350 mg by Per G Tube route 2 times daily Afternoon and bedtime, Disp: , Rfl:      SOCIAL HISTORY:  Social History     Socioeconomic History     Marital status: Single     Spouse name: Not on file     Number of children: Not on file     Years of education: Not on file     Highest education level: Not on file   Occupational History     Not on file   Social Needs     Financial resource strain: Not on file     Food insecurity     Worry: Not on file     Inability: Not on file     Transportation needs     Medical: Not on file     Non-medical: Not on file   Tobacco Use     Smoking status: Never Smoker     Smokeless tobacco: Never Used   Substance and Sexual Activity     Alcohol use: No     Drug use: No     Sexual activity: Never   Lifestyle     Physical activity     Days per week: Not on file     Minutes per session: Not on file     Stress: Not on file   Relationships     Social connections     Talks on phone: Not on file     Gets together: Not on file     Attends [employer] service: Not on file     Active member of club or organization: Not on file     Attends meetings of clubs or organizations: Not on file     Relationship status: Not on file     Intimate partner violence     Fear of current or ex partner: Not on file     Emotionally abused: Not on file     Physically abused: Not on file     Forced sexual activity: Not on file   Other Topics Concern     Parent/sibling w/ CABG, MI or angioplasty before 65F 55M? Not Asked   Social History Narrative     Not on file       FAMILY HISTORY:  Family History   Problem Relation Age of Onset     Diabetes Father        Past/family/social history reviewed and no changes    PHYSICAL EXAMINATION:  Constitutional: aaox3, cooperative, pleasant, not dyspneic/diaphoretic, no acute distress  Vitals reviewed: Wt 36.7 kg (81 lb)   BMI 15.30 kg/m    Wt:   Wt Readings from Last 2 Encounters:   06/30/21 36.7 kg (81 lb)   06/01/21 37.6 kg (83 lb)     General appearance  In no acute distress     Eyes  Sclera anicteric     Ears, nose, mouth and throat  No obvious external  lesions of ears and nose  Hearing intact     Neck  No obvious external lesions     Respiratory  Normal respiration, no use of accessory muscles       PERTINENT STUDIES:    Office Visit on 05/28/2021   Component Date Value Ref Range Status     Specimen Description 05/28/2021 Abdominal Incision   Final     Special Requests 05/28/2021 Specimen collected in eSwab transport (white cap)   Final     Culture Micro 05/28/2021 *  Final                    Value:Light growth  Staphylococcus aureus

## 2021-06-30 NOTE — LETTER
"    6/30/2021         RE: Rj Licea  [id number] [facility] [id number]        Dear Colleague,    Thank you for referring your patient, Rj Licea, to the [facility] [place]. Please see a copy of my visit note below.    Rj Licea is a 25 year old male who is being evaluated via a billable video visit.      The patient has been notified of following:     \"This video visit will be conducted via a call between you and your physician/provider. We have found that certain health care needs can be provided without the need for an in-person physical exam.  This service lets us provide the care you need with a video conversation.  If a prescription is necessary we can send it directly to your pharmacy.  If lab work is needed we can place an order for that and you can then stop by our lab to have the test done at a later time.    If during the course of the call the physician/provider feels a video visit is not appropriate, you will not be charged for this service.\"     Patient confirmed that they are in [place] for today's visit yes.    Video-Visit Details  Type of service:  Video Visit    Video Start Time: 4:08 PM    Originating Location (pt. Location): Home    Distant Location (provider location):  [facility] [place]     Platform used: [employer]      GI CLINIC VISIT    CC/REFERRING MD:  Micky Leyva  REASON FOR CONSULTATION:   Micky Leyva for   Chief Complaint   Patient presents with     New Patient       ASSESSMENT/PLAN:    Rj Licea is a 26 yo M with a complicated PMHx who is presenting for evaluation of recurrent SBOs (in 4/2021 had surgical JANEEN) and FTT. At this time, we should rule out an inflammatory process and a mass as a cause Rj's symptoms.     We will proceed with the following:    ---Referral to Nutrition --Dr. Galileo Morris   ---First, do a CTE -- [phone]  ---After CTE, perform a cscope with " "MAC  ---GI nurse (Yanira Vanegas) will look into options to get Rj vaccinated against COVID    RTC: Return in about 3 months (around 9/30/2021).     60 minutes spent on the date of the encounter performing chart review, history and exam, documentation and further activities as noted above.    Chela So MD   of Medicine  Division of Gastroenterology, Hepatology and Nutrition  [facility]        HPI    Rj Licea is a 26 yo man presenting for evaluation of recurrent SBOs and failure to thrive. Rj has a complicated PMHx. 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 Darcy (Mom and guardian) provided the history during today's virtual appointment.      Admitted to hospital 5/2020 with an SBO. CT a/p showed a transition point at the ICV. Managed conservatively.  In 4/2021, admitted for SBO. CT a/p showed a SBO in the distal SB in RLQ. Had exploratory laparotomy with resolution without resection found likely to have an intra-abdominal compartment syndrome due to massive dilation from his obstruction. Per the op note:  \"When we reached the terminal ileum, there was an accordion-like adhesion amongst the distal, terminal ileum mesentery, creating an obstructing mechanism, which once , opened and freely allowed the distended proximal small bowel content to fill into that decompressed distal bowel...Prior to closure, we did perform an incidental appendectomy in the usual fashion by ligating the mesoappendix and dividing the appendiceal base with the DORINA stapler\"    Rj previously weight >100 lb, then dropped to 73 lb 6/2020, now 82 lb.   Rj has constipation. Takes Miralax daily. Has a BM EOD or every three days, liquidy. Has never had a colonoscopy.   Had blood in the stool " prior to last ED visit.    Uses pediasure with fiber. Used to get 6 cans per day, now 5 cans per day (since the hospital), due to reflux.   No hx of inflammatory process or radiation.    ROS:    No fevers or chills  No weight loss  No blurry vision, double vision or change in vision  No sore throat  No lymphadenopathy  No headache, paraesthesias, or weakness in a limb  No shortness of breath or wheezing  No chest pain or pressure  No arthralgias or myalgias  No rashes or skin changes  No odynophagia or dysphagia  No BRBPR, hematochezia, melena  No dysuria, frequency or urgency  No hot/cold intolerance or polyria  No anxiety or depression    PROBLEM LIST  Patient Active Problem List    Diagnosis Date Noted     SBO (small bowel obstruction) (H) 04/19/2021     Priority: Medium     Nonintractable epilepsy without status epilepticus, unspecified epilepsy type (H) 04/19/2021     Priority: Medium     Vomiting 05/28/2020     Priority: Medium     CP (cerebral palsy), spastic, diplegic (H) 07/20/2017     Priority: Medium     Sleep disorder 07/20/2017     Priority: Medium     Seizure disorder (H) 07/20/2017     Priority: Medium     Other idiopathic scoliosis, unspecified spinal region 07/20/2017     Priority: Medium     Periventricular leukomalacia, associated with prematurity, chronic 07/20/2017     Priority: Medium     Oral aversion 07/20/2017     Priority: Medium     Nutritional disorder 07/20/2017     Priority: Medium     Mild persistent asthma in adult without complication 07/20/2017     Priority: Medium     History of prematurity, 23 wk 400 grams 07/20/2017     Priority: Medium     History of behavioral and mental health problems 07/20/2017     Priority: Medium     Abnormal gait 07/20/2017     Priority: Medium     Feeding by G-tube (H) 07/20/2017     Priority: Medium     Constipation, unspecified constipation type 07/20/2017     Priority: Medium     Chronic lung disease of prematurity 07/20/2017     Priority: Medium      Bilateral blindness 07/20/2017     Priority: Medium     Autism spectrum disorder associated with known medical or genetic condition or environmental factor, requiring substantial support (level 2) 07/20/2017     Priority: Medium     Anxiety 07/20/2017     Priority: Medium     Short stature 03/23/2012     Priority: Medium       PERTINENT PAST MEDICAL HISTORY:  Past Medical History:   Diagnosis Date     Chronic lung disease     off nebs in 2012     Depression     fluoxetine, risperidone     Epilepsy (H) 5/2011    on keppra (Janaczek)     Feeding by G-tube (H)      GERD (gastroesophageal reflux disease)      Insomnia     melatonin, clonazepam     Retinopathy of prematurity     led to blindness       PREVIOUS SURGERIES:  Past Surgical History:   Procedure Laterality Date     APPENDECTOMY OPEN N/A 4/19/2021    Procedure: Appendectomy open;  Surgeon: Fabiola Pop MD;  Location: [facility] OR     GASTROSTOMY TUBE       LAPAROTOMY EXPLORATORY N/A 4/19/2021    Procedure: LAPAROTOMY, enterolysis;  Surgeon: Fabiola Pop MD;  Location: [facility] OR       PREVIOUS ENDOSCOPY:  none    ALLERGIES:   No Known Allergies    PERTINENT MEDICATIONS:    Current Outpatient Medications:      albuterol (PROAIR HFA/PROVENTIL HFA/VENTOLIN HFA) 108 (90 Base) MCG/ACT inhaler, Inhale 2 puffs into the lungs every 6 hours as needed for shortness of breath / dyspnea or wheezing, Disp: 2 Inhaler, Rfl: 3     albuterol (PROVENTIL) (2.5 MG/3ML) 0.083% neb solution, Take 1 vial (2.5 mg) by nebulization every 6 hours as needed for shortness of breath / dyspnea or wheezing, Disp: 9 mL, Rfl: 3     benzoyl peroxide 5 % EX topical gel, Apply topically 2 times daily as needed (acne), Disp: 50 g, Rfl: 11     blood glucose monitoring (NO BRAND SPECIFIED) meter device kit, Use to test blood sugar 4 times daily or as directed., Disp: 1 kit, Rfl: 0     blood glucose monitoring (NO BRAND SPECIFIED) meter device kit, Use to test blood sugar 4 times daily or as  directed., Disp: 1 kit, Rfl: 0     clindamycin 1 % EX external gel, Apply topically 2 times daily as needed (acne), Disp: 50 g, Rfl: 11     clobazam (,ONFI,) 2.5 MG/ML suspension, 5 mg by Per G Tube route At Bedtime (3 mL in AM / 2 mL in PM), Disp: , Rfl:      clobazam (ONFI) 2.5 MG/ML suspension, 7.5 mg by Per G Tube route every morning (3 mL in AM / 2 mL in PM), Disp: , Rfl:      famotidine (PEPCID) 40 MG/5ML suspension, 20 mg by Per G Tube route 2 times daily, Disp: , Rfl:      fluticasone-salmeterol (ADVAIR HFA) 230-21 MCG/ACT inhaler, Inhale 2 puffs into the lungs 2 times daily , Disp: 12 g, Rfl: 3     gabapentin (NEURONTIN) 250 MG/5ML solution, 750 mg by Per G Tube route At Bedtime , Disp: , Rfl:      hydrOXYzine (ATARAX) 10 MG/5ML syrup, 50 mg by Per G Tube route At Bedtime (25 mL), Disp: , Rfl:      Lancets 30G MISC, 120 30 gauge lancets (substitute as needed), Disp: 120 each, Rfl: 0     LORazepam (ATIVAN) 1 MG tablet, 1 mg by Per G Tube route every 6 hours as needed for anxiety Per mom: prn , Disp: , Rfl:      melatonin 1 MG TABS, 1 mg by Per G Tube route daily Daily between 4-5pm*, Disp: , Rfl:      melatonin 3 MG tablet, 6 mg by Per G Tube route At Bedtime , Disp: , Rfl:      multivitamins w/minerals (CERTAVITE) liquid, 15 mLs by Per G Tube route daily, Disp: , Rfl:      omeprazole (PRILOSEC) 2 mg/mL SUSP, 40 mg by Per G Tube route At Bedtime , Disp: 800 mL, Rfl: 11     polyethylene glycol (MIRALAX) 17 g packet, 17 g by Per G Tube route daily, Disp: , Rfl:      polyethylene glycol (MIRALAX) 17 GM/Dose powder, TAKE 17 GRAMS(ONE CAPFUL) BY MOUTH EVERY DAY as needed, Disp: 510 g, Rfl: 4     QUEtiapine (SEROQUEL) 300 MG tablet, 600 mg by Per G Tube route At Bedtime , Disp: , Rfl:      simethicone (MYLICON) 40 MG/0.6ML suspension, Take 0.6 mLs (40 mg) by mouth 4 times daily as needed (gassiness), Disp: 45 mL, Rfl: 1     sulfamethoxazole-trimethoprim (BACTRIM/SEPTRA) 8 mg/mL suspension, 20 mLs (160 mg) by Per  Feeding Tube route 2 times daily, Disp: 280 mL, Rfl: 0     Valproate Sodium (VALPROIC ACID) 250 MG/5ML, 300 mg by Per G Tube route every morning, Disp: , Rfl:      Valproate Sodium (VALPROIC ACID) 250 MG/5ML, 350 mg by Per G Tube route 2 times daily Afternoon and bedtime, Disp: , Rfl:     SOCIAL HISTORY:  Social History     Socioeconomic History     Marital status: Single     Spouse name: Not on file     Number of children: Not on file     Years of education: Not on file     Highest education level: Not on file   Occupational History     Not on file   Social Needs     Financial resource strain: Not on file     Food insecurity     Worry: Not on file     Inability: Not on file     Transportation needs     Medical: Not on file     Non-medical: Not on file   Tobacco Use     Smoking status: Never Smoker     Smokeless tobacco: Never Used   Substance and Sexual Activity     Alcohol use: No     Drug use: No     Sexual activity: Never   Lifestyle     Physical activity     Days per week: Not on file     Minutes per session: Not on file     Stress: Not on file   Relationships     Social connections     Talks on phone: Not on file     Gets together: Not on file     Attends Gnosticist service: Not on file     Active member of club or organization: Not on file     Attends meetings of clubs or organizations: Not on file     Relationship status: Not on file     Intimate partner violence     Fear of current or ex partner: Not on file     Emotionally abused: Not on file     Physically abused: Not on file     Forced sexual activity: Not on file   Other Topics Concern     Parent/sibling w/ CABG, MI or angioplasty before 65F 55M? Not Asked   Social History Narrative     Not on file       FAMILY HISTORY:  Family History   Problem Relation Age of Onset     Diabetes Father        Past/family/social history reviewed and no changes    PHYSICAL EXAMINATION:  Constitutional: aaox3, cooperative, pleasant, not dyspneic/diaphoretic, no acute  distress  Vitals reviewed: Wt 36.7 kg (81 lb)   BMI 15.30 kg/m    Wt:   Wt Readings from Last 2 Encounters:   06/30/21 36.7 kg (81 lb)   06/01/21 37.6 kg (83 lb)     General appearance  In no acute distress     Eyes  Sclera anicteric     Ears, nose, mouth and throat  No obvious external lesions of ears and nose  Hearing intact     Neck  No obvious external lesions     Respiratory  Normal respiration, no use of accessory muscles       PERTINENT STUDIES:    Office Visit on 05/28/2021   Component Date Value Ref Range Status     Specimen Description 05/28/2021 Abdominal Incision   Final     Special Requests 05/28/2021 Specimen collected in eSwab transport (white cap)   Final     Culture Micro 05/28/2021 *  Final                    Value:Light growth  Staphylococcus aureus                     Again, thank you for allowing me to participate in the care of your patient.        Sincerely,        Chela So MD

## 2021-07-28 ENCOUNTER — TELEPHONE (OUTPATIENT)
Dept: GASTROENTEROLOGY | Facility: OUTPATIENT CENTER | Age: 26
End: 2021-07-28

## 2021-07-28 NOTE — TELEPHONE ENCOUNTER
7/28/2021: Pt's mother states she will us back johanna to schedule colon-mac. Please see below for details.       ------Message-------  Chela So MD sent to Maia Macias; Yanira Vanegas, RN; P Endoscopy Scheduling Pool  I'll be on service Aug 13-19. He'll need MAC so the only options would be Aug 17 and Aug 19. I do want him to get a CTE before then, however. His mother is aware.   Thanks,   E      ----- Message -----   From: Maia Macias   Sent: 7/22/2021   6:30 PM CDT   To: Chela So MD, Yanira Vanegas, RN, *   Subject: Colonoscopy at [facility]                               Good evening Dr. So,   You placed an order to complete Rj's colonoscopy at [facility] instead of the [facility]. What dates and times will you be available to complete this procedure? Please let us know.     Thanks,   Maia

## 2021-08-16 ENCOUNTER — TELEPHONE (OUTPATIENT)
Dept: PEDIATRICS | Facility: CLINIC | Age: 26
End: 2021-08-16

## 2021-08-16 NOTE — TELEPHONE ENCOUNTER
General Call:     Who is calling:  Darcy Carrillo    Reason for Call:  Medical health coverage retainer form    What are your questions or concerns:  n/a    Date of last appointment with provider: ongoing    Okay to leave a detailed message:Yes at Home number on file [phone] (home)

## 2021-08-17 NOTE — TELEPHONE ENCOUNTER
Placed in provider's inbasket. Mom is aware that provider is out of clinic until next week.  Ava WILSON, JASON,AAMA

## 2021-08-26 NOTE — TELEPHONE ENCOUNTER
The mother is aware the forms have been completed and are ready for  at the clinic .   Shelby Madrigal on 8/26/2021 at 10:49 AM

## 2021-09-09 DIAGNOSIS — G40.909 SEIZURE DISORDER (H): Primary | ICD-10-CM

## 2021-09-10 RX ORDER — VALPROIC ACID 250 MG/5ML
SOLUTION ORAL
Qty: 600 ML | Refills: 3 | Status: SHIPPED | OUTPATIENT
Start: 2021-09-10 | End: 2022-11-29

## 2021-09-10 NOTE — TELEPHONE ENCOUNTER
Routing refill request to provider for review/approval because:  Drug not on [place] [facility] [place]  Rx not active on medication list.    Last Written Prescription Date:  ???  Last Fill Quantity: ???,  # refills: ???   Last office visit provider:  3/17/21     Requested Prescriptions   Pending Prescriptions Disp Refills     Valproate Sodium (VALPROIC ACID) 250 MG/5ML [Pharmacy Med Name: VALPROIC ACID 250MG/5ML SOLUTION] 600 mL      Sig: TAKE 6ML BY MOUTH EVERY MORNING AND 7ML IN THE AFTERNOON AND AT NIGHT       There is no refill protocol information for this order          Alexus Maher RN 09/09/21 9:55 PM

## 2021-09-22 DIAGNOSIS — K59.09 CHRONIC CONSTIPATION: ICD-10-CM

## 2021-09-23 RX ORDER — POLYETHYLENE GLYCOL 3350 17 G/17G
POWDER, FOR SOLUTION ORAL
Qty: 510 G | Refills: 4 | Status: SHIPPED | OUTPATIENT
Start: 2021-09-23 | End: 2022-03-08

## 2021-09-24 ENCOUNTER — APPOINTMENT (OUTPATIENT)
Dept: GENERAL RADIOLOGY | Facility: CLINIC | Age: 26
End: 2021-09-24
Attending: SURGERY
Payer: COMMERCIAL

## 2021-09-24 ENCOUNTER — APPOINTMENT (OUTPATIENT)
Dept: CT IMAGING | Facility: CLINIC | Age: 26
End: 2021-09-24
Attending: EMERGENCY MEDICINE
Payer: COMMERCIAL

## 2021-09-24 ENCOUNTER — TELEPHONE (OUTPATIENT)
Dept: PEDIATRICS | Facility: CLINIC | Age: 26
End: 2021-09-24

## 2021-09-24 ENCOUNTER — HOSPITAL ENCOUNTER (INPATIENT)
Facility: CLINIC | Age: 26
LOS: 5 days | Discharge: HOME | End: 2021-09-29
Attending: EMERGENCY MEDICINE | Admitting: INTERNAL MEDICINE
Payer: COMMERCIAL

## 2021-09-24 ENCOUNTER — NURSE TRIAGE (OUTPATIENT)
Dept: NURSING | Facility: CLINIC | Age: 26
End: 2021-09-24

## 2021-09-24 ENCOUNTER — APPOINTMENT (OUTPATIENT)
Dept: GENERAL RADIOLOGY | Facility: CLINIC | Age: 26
End: 2021-09-24
Attending: EMERGENCY MEDICINE
Payer: COMMERCIAL

## 2021-09-24 DIAGNOSIS — N17.9 ACUTE KIDNEY INJURY (H): ICD-10-CM

## 2021-09-24 DIAGNOSIS — G40.909 NONINTRACTABLE EPILEPSY WITHOUT STATUS EPILEPTICUS, UNSPECIFIED EPILEPSY TYPE (H): ICD-10-CM

## 2021-09-24 DIAGNOSIS — E63.9 NUTRITIONAL DISORDER: ICD-10-CM

## 2021-09-24 DIAGNOSIS — R11.2 NON-INTRACTABLE VOMITING WITH NAUSEA, UNSPECIFIED VOMITING TYPE: ICD-10-CM

## 2021-09-24 DIAGNOSIS — K56.609 SBO (SMALL BOWEL OBSTRUCTION) (H): ICD-10-CM

## 2021-09-24 DIAGNOSIS — R09.02 HYPOXIA: ICD-10-CM

## 2021-09-24 DIAGNOSIS — J18.9 PNEUMONIA OF RIGHT LOWER LOBE DUE TO INFECTIOUS ORGANISM: Primary | ICD-10-CM

## 2021-09-24 DIAGNOSIS — E86.0 DEHYDRATION: ICD-10-CM

## 2021-09-24 DIAGNOSIS — J18.9 PNEUMONIA OF RIGHT LOWER LOBE DUE TO INFECTIOUS ORGANISM: ICD-10-CM

## 2021-09-24 DIAGNOSIS — F84.0 AUTISM SPECTRUM DISORDER ASSOCIATED WITH KNOWN MEDICAL OR GENETIC CONDITION OR ENVIRONMENTAL FACTOR, REQUIRING SUBSTANTIAL SUPPORT (LEVEL 2): ICD-10-CM

## 2021-09-24 DIAGNOSIS — R79.89 ELEVATED LACTIC ACID LEVEL: ICD-10-CM

## 2021-09-24 LAB
ALBUMIN SERPL-MCNC: 3.3 G/DL (ref 3.4–5)
ALBUMIN UR-MCNC: 20 MG/DL
ALP SERPL-CCNC: 77 U/L (ref 40–150)
ALT SERPL W P-5'-P-CCNC: 20 U/L (ref 0–70)
ANION GAP SERPL CALCULATED.3IONS-SCNC: 7 MMOL/L (ref 3–14)
APPEARANCE UR: CLEAR
AST SERPL W P-5'-P-CCNC: 15 U/L (ref 0–45)
ATRIAL RATE - MUSE: 152 BPM
BASOPHILS # BLD MANUAL: 0 10E3/UL (ref 0–0.2)
BASOPHILS NFR BLD MANUAL: 0 %
BILIRUB SERPL-MCNC: 1.2 MG/DL (ref 0.2–1.3)
BILIRUB UR QL STRIP: NEGATIVE
BUN SERPL-MCNC: 32 MG/DL (ref 7–30)
CALCIUM SERPL-MCNC: 9.4 MG/DL (ref 8.5–10.1)
CHLORIDE BLD-SCNC: 103 MMOL/L (ref 94–109)
CO2 SERPL-SCNC: 30 MMOL/L (ref 20–32)
COLOR UR AUTO: YELLOW
CREAT BLD-MCNC: 1.4 MG/DL (ref 0.7–1.3)
CREAT SERPL-MCNC: 1.25 MG/DL (ref 0.66–1.25)
DIASTOLIC BLOOD PRESSURE - MUSE: NORMAL MMHG
EOSINOPHIL # BLD MANUAL: 0 10E3/UL (ref 0–0.7)
EOSINOPHIL NFR BLD MANUAL: 0 %
ERYTHROCYTE [DISTWIDTH] IN BLOOD BY AUTOMATED COUNT: 14 % (ref 10–15)
GFR SERPL CREATININE-BSD FRML MDRD: 80 ML/MIN/1.73M2
GFR SERPL CREATININE-BSD FRML MDRD: >60 ML/MIN/1.73M2
GLUCOSE BLD-MCNC: 96 MG/DL (ref 70–99)
GLUCOSE UR STRIP-MCNC: NEGATIVE MG/DL
HCT VFR BLD AUTO: 52.7 % (ref 40–53)
HGB BLD-MCNC: 17.2 G/DL (ref 13.3–17.7)
HGB UR QL STRIP: NEGATIVE
INTERPRETATION ECG - MUSE: NORMAL
KETONES UR STRIP-MCNC: NEGATIVE MG/DL
LACTATE SERPL-SCNC: 1.8 MMOL/L (ref 0.7–2)
LACTATE SERPL-SCNC: 2.2 MMOL/L (ref 0.7–2)
LEUKOCYTE ESTERASE UR QL STRIP: NEGATIVE
LIPASE SERPL-CCNC: 49 U/L (ref 73–393)
LYMPHOCYTES # BLD MANUAL: 0.4 10E3/UL (ref 0.8–5.3)
LYMPHOCYTES NFR BLD MANUAL: 13 %
MCH RBC QN AUTO: 34.8 PG (ref 26.5–33)
MCHC RBC AUTO-ENTMCNC: 32.6 G/DL (ref 31.5–36.5)
MCV RBC AUTO: 107 FL (ref 78–100)
MONOCYTES # BLD MANUAL: 2 10E3/UL (ref 0–1.3)
MONOCYTES NFR BLD MANUAL: 66 %
MUCOUS THREADS #/AREA URNS LPF: PRESENT /LPF
NEUTROPHILS # BLD MANUAL: 0.7 10E3/UL (ref 1.6–8.3)
NEUTROPHILS NFR BLD MANUAL: 21 %
NITRATE UR QL: NEGATIVE
NT-PROBNP SERPL-MCNC: 383 PG/ML (ref 0–450)
P AXIS - MUSE: 48 DEGREES
PH UR STRIP: 6.5 [PH] (ref 5–7)
PLAT MORPH BLD: ABNORMAL
PLATELET # BLD AUTO: 76 10E3/UL (ref 150–450)
POTASSIUM BLD-SCNC: 4.1 MMOL/L (ref 3.4–5.3)
PR INTERVAL - MUSE: 142 MS
PROT SERPL-MCNC: 7.7 G/DL (ref 6.8–8.8)
QRS DURATION - MUSE: 70 MS
QT - MUSE: 306 MS
QTC - MUSE: 486 MS
R AXIS - MUSE: 8 DEGREES
RBC # BLD AUTO: 4.94 10E6/UL (ref 4.4–5.9)
RBC MORPH BLD: ABNORMAL
RBC URINE: 1 /HPF
SARS-COV-2 RNA RESP QL NAA+PROBE: NEGATIVE
SODIUM SERPL-SCNC: 140 MMOL/L (ref 133–144)
SP GR UR STRIP: 1.03 (ref 1–1.03)
SQUAMOUS EPITHELIAL: <1 /HPF
SYSTOLIC BLOOD PRESSURE - MUSE: NORMAL MMHG
T AXIS - MUSE: 19 DEGREES
TROPONIN I SERPL-MCNC: <0.015 UG/L (ref 0–0.04)
UROBILINOGEN UR STRIP-MCNC: 3 MG/DL
VALPROATE SERPL-MCNC: 138 MG/L
VENTRICULAR RATE- MUSE: 152 BPM
WBC # BLD AUTO: 3.1 10E3/UL (ref 4–11)
WBC URINE: <1 /HPF

## 2021-09-24 PROCEDURE — 74177 CT ABD & PELVIS W/CONTRAST: CPT

## 2021-09-24 PROCEDURE — 36415 COLL VENOUS BLD VENIPUNCTURE: CPT | Performed by: EMERGENCY MEDICINE

## 2021-09-24 PROCEDURE — 82565 ASSAY OF CREATININE: CPT

## 2021-09-24 PROCEDURE — 99222 1ST HOSP IP/OBS MODERATE 55: CPT | Performed by: SURGERY

## 2021-09-24 PROCEDURE — 94640 AIRWAY INHALATION TREATMENT: CPT | Mod: 76

## 2021-09-24 PROCEDURE — 250N000011 HC RX IP 250 OP 636: Performed by: PHYSICIAN ASSISTANT

## 2021-09-24 PROCEDURE — 83880 ASSAY OF NATRIURETIC PEPTIDE: CPT | Performed by: EMERGENCY MEDICINE

## 2021-09-24 PROCEDURE — 87040 BLOOD CULTURE FOR BACTERIA: CPT | Performed by: PHYSICIAN ASSISTANT

## 2021-09-24 PROCEDURE — 96361 HYDRATE IV INFUSION ADD-ON: CPT

## 2021-09-24 PROCEDURE — 93005 ELECTROCARDIOGRAM TRACING: CPT

## 2021-09-24 PROCEDURE — 999N000157 HC STATISTIC RCP TIME EA 10 MIN

## 2021-09-24 PROCEDURE — 250N000011 HC RX IP 250 OP 636: Performed by: EMERGENCY MEDICINE

## 2021-09-24 PROCEDURE — 120N000001 HC R&B MED SURG/OB

## 2021-09-24 PROCEDURE — 99285 EMERGENCY DEPT VISIT HI MDM: CPT | Mod: 25

## 2021-09-24 PROCEDURE — C9113 INJ PANTOPRAZOLE SODIUM, VIA: HCPCS | Performed by: PHYSICIAN ASSISTANT

## 2021-09-24 PROCEDURE — 36415 COLL VENOUS BLD VENIPUNCTURE: CPT | Performed by: INTERNAL MEDICINE

## 2021-09-24 PROCEDURE — 81001 URINALYSIS AUTO W/SCOPE: CPT | Performed by: EMERGENCY MEDICINE

## 2021-09-24 PROCEDURE — 80164 ASSAY DIPROPYLACETIC ACD TOT: CPT | Performed by: EMERGENCY MEDICINE

## 2021-09-24 PROCEDURE — 258N000003 HC RX IP 258 OP 636: Performed by: EMERGENCY MEDICINE

## 2021-09-24 PROCEDURE — 250N000009 HC RX 250: Performed by: EMERGENCY MEDICINE

## 2021-09-24 PROCEDURE — 74018 RADEX ABDOMEN 1 VIEW: CPT

## 2021-09-24 PROCEDURE — 87086 URINE CULTURE/COLONY COUNT: CPT | Performed by: EMERGENCY MEDICINE

## 2021-09-24 PROCEDURE — 87635 SARS-COV-2 COVID-19 AMP PRB: CPT | Performed by: EMERGENCY MEDICINE

## 2021-09-24 PROCEDURE — 96374 THER/PROPH/DIAG INJ IV PUSH: CPT | Mod: 59

## 2021-09-24 PROCEDURE — 999N000065 XR ABDOMEN 1 VIEW

## 2021-09-24 PROCEDURE — 84484 ASSAY OF TROPONIN QUANT: CPT | Performed by: EMERGENCY MEDICINE

## 2021-09-24 PROCEDURE — 83605 ASSAY OF LACTIC ACID: CPT | Performed by: EMERGENCY MEDICINE

## 2021-09-24 PROCEDURE — 83605 ASSAY OF LACTIC ACID: CPT | Performed by: INTERNAL MEDICINE

## 2021-09-24 PROCEDURE — 71275 CT ANGIOGRAPHY CHEST: CPT

## 2021-09-24 PROCEDURE — 83690 ASSAY OF LIPASE: CPT | Performed by: EMERGENCY MEDICINE

## 2021-09-24 PROCEDURE — 87493 C DIFF AMPLIFIED PROBE: CPT | Performed by: PHYSICIAN ASSISTANT

## 2021-09-24 PROCEDURE — 250N000013 HC RX MED GY IP 250 OP 250 PS 637: Performed by: PHYSICIAN ASSISTANT

## 2021-09-24 PROCEDURE — 94640 AIRWAY INHALATION TREATMENT: CPT

## 2021-09-24 PROCEDURE — 258N000003 HC RX IP 258 OP 636: Performed by: PHYSICIAN ASSISTANT

## 2021-09-24 PROCEDURE — 87506 IADNA-DNA/RNA PROBE TQ 6-11: CPT | Performed by: PHYSICIAN ASSISTANT

## 2021-09-24 PROCEDURE — 250N000009 HC RX 250: Performed by: PHYSICIAN ASSISTANT

## 2021-09-24 PROCEDURE — 87040 BLOOD CULTURE FOR BACTERIA: CPT | Performed by: EMERGENCY MEDICINE

## 2021-09-24 PROCEDURE — 85027 COMPLETE CBC AUTOMATED: CPT | Performed by: EMERGENCY MEDICINE

## 2021-09-24 PROCEDURE — C9803 HOPD COVID-19 SPEC COLLECT: HCPCS

## 2021-09-24 PROCEDURE — 36415 COLL VENOUS BLD VENIPUNCTURE: CPT | Performed by: PHYSICIAN ASSISTANT

## 2021-09-24 PROCEDURE — 82040 ASSAY OF SERUM ALBUMIN: CPT | Performed by: EMERGENCY MEDICINE

## 2021-09-24 PROCEDURE — 99223 1ST HOSP IP/OBS HIGH 75: CPT | Mod: AI | Performed by: PHYSICIAN ASSISTANT

## 2021-09-24 RX ORDER — DEXTROSE MONOHYDRATE, SODIUM CHLORIDE, AND POTASSIUM CHLORIDE 50; 1.49; 4.5 G/1000ML; G/1000ML; G/1000ML
INJECTION, SOLUTION INTRAVENOUS CONTINUOUS
Status: DISCONTINUED | OUTPATIENT
Start: 2021-09-24 | End: 2021-09-27

## 2021-09-24 RX ORDER — ALBUTEROL SULFATE 0.83 MG/ML
2.5 SOLUTION RESPIRATORY (INHALATION) EVERY 6 HOURS PRN
Status: DISCONTINUED | OUTPATIENT
Start: 2021-09-24 | End: 2021-09-29 | Stop reason: HOSPADM

## 2021-09-24 RX ORDER — ONDANSETRON 2 MG/ML
4 INJECTION INTRAMUSCULAR; INTRAVENOUS EVERY 6 HOURS PRN
Status: DISCONTINUED | OUTPATIENT
Start: 2021-09-24 | End: 2021-09-29 | Stop reason: HOSPADM

## 2021-09-24 RX ORDER — PROCHLORPERAZINE MALEATE 5 MG
10 TABLET ORAL EVERY 6 HOURS PRN
Status: DISCONTINUED | OUTPATIENT
Start: 2021-09-24 | End: 2021-09-29 | Stop reason: HOSPADM

## 2021-09-24 RX ORDER — ACETAMINOPHEN 650 MG/1
650 SUPPOSITORY RECTAL EVERY 4 HOURS PRN
Status: DISCONTINUED | OUTPATIENT
Start: 2021-09-24 | End: 2021-09-29 | Stop reason: HOSPADM

## 2021-09-24 RX ORDER — LIDOCAINE 40 MG/G
CREAM TOPICAL
Status: DISCONTINUED | OUTPATIENT
Start: 2021-09-24 | End: 2021-09-29 | Stop reason: HOSPADM

## 2021-09-24 RX ORDER — BISACODYL 10 MG
10 SUPPOSITORY, RECTAL RECTAL DAILY PRN
Status: DISCONTINUED | OUTPATIENT
Start: 2021-09-24 | End: 2021-09-29 | Stop reason: HOSPADM

## 2021-09-24 RX ORDER — HYDROMORPHONE HCL IN WATER/PF 6 MG/30 ML
0.2 PATIENT CONTROLLED ANALGESIA SYRINGE INTRAVENOUS
Status: DISCONTINUED | OUTPATIENT
Start: 2021-09-24 | End: 2021-09-29 | Stop reason: HOSPADM

## 2021-09-24 RX ORDER — NALOXONE HYDROCHLORIDE 0.4 MG/ML
0.4 INJECTION, SOLUTION INTRAMUSCULAR; INTRAVENOUS; SUBCUTANEOUS
Status: DISCONTINUED | OUTPATIENT
Start: 2021-09-24 | End: 2021-09-29 | Stop reason: HOSPADM

## 2021-09-24 RX ORDER — LORAZEPAM 2 MG/ML
0.5 INJECTION INTRAMUSCULAR EVERY 6 HOURS
Status: DISCONTINUED | OUTPATIENT
Start: 2021-09-24 | End: 2021-09-26

## 2021-09-24 RX ORDER — LIDOCAINE 40 MG/G
CREAM TOPICAL
Status: DISCONTINUED | OUTPATIENT
Start: 2021-09-24 | End: 2021-09-24

## 2021-09-24 RX ORDER — IOPAMIDOL 755 MG/ML
500 INJECTION, SOLUTION INTRAVASCULAR ONCE
Status: COMPLETED | OUTPATIENT
Start: 2021-09-24 | End: 2021-09-24

## 2021-09-24 RX ORDER — ONDANSETRON 4 MG/1
4 TABLET, ORALLY DISINTEGRATING ORAL EVERY 6 HOURS PRN
Status: DISCONTINUED | OUTPATIENT
Start: 2021-09-24 | End: 2021-09-29 | Stop reason: HOSPADM

## 2021-09-24 RX ORDER — NALOXONE HYDROCHLORIDE 0.4 MG/ML
0.2 INJECTION, SOLUTION INTRAMUSCULAR; INTRAVENOUS; SUBCUTANEOUS
Status: DISCONTINUED | OUTPATIENT
Start: 2021-09-24 | End: 2021-09-29 | Stop reason: HOSPADM

## 2021-09-24 RX ORDER — CEFTRIAXONE 2 G/1
2 INJECTION, POWDER, FOR SOLUTION INTRAMUSCULAR; INTRAVENOUS ONCE
Status: COMPLETED | OUTPATIENT
Start: 2021-09-24 | End: 2021-09-24

## 2021-09-24 RX ORDER — ONDANSETRON 2 MG/ML
4 INJECTION INTRAMUSCULAR; INTRAVENOUS ONCE
Status: COMPLETED | OUTPATIENT
Start: 2021-09-24 | End: 2021-09-24

## 2021-09-24 RX ORDER — ALBUTEROL SULFATE 0.83 MG/ML
2.5 SOLUTION RESPIRATORY (INHALATION)
Status: DISCONTINUED | OUTPATIENT
Start: 2021-09-24 | End: 2021-09-24

## 2021-09-24 RX ORDER — LORAZEPAM 2 MG/ML
0.5 INJECTION INTRAMUSCULAR ONCE
Status: COMPLETED | OUTPATIENT
Start: 2021-09-24 | End: 2021-09-24

## 2021-09-24 RX ORDER — AZITHROMYCIN 500 MG/5ML
500 INJECTION, POWDER, LYOPHILIZED, FOR SOLUTION INTRAVENOUS ONCE
Status: COMPLETED | OUTPATIENT
Start: 2021-09-24 | End: 2021-09-24

## 2021-09-24 RX ORDER — IPRATROPIUM BROMIDE AND ALBUTEROL SULFATE 2.5; .5 MG/3ML; MG/3ML
3 SOLUTION RESPIRATORY (INHALATION) EVERY 4 HOURS
Status: DISCONTINUED | OUTPATIENT
Start: 2021-09-24 | End: 2021-09-24

## 2021-09-24 RX ORDER — IPRATROPIUM BROMIDE AND ALBUTEROL SULFATE 2.5; .5 MG/3ML; MG/3ML
3 SOLUTION RESPIRATORY (INHALATION) 4 TIMES DAILY
Status: DISCONTINUED | OUTPATIENT
Start: 2021-09-25 | End: 2021-09-29 | Stop reason: HOSPADM

## 2021-09-24 RX ORDER — PROCHLORPERAZINE 25 MG
25 SUPPOSITORY, RECTAL RECTAL EVERY 12 HOURS PRN
Status: DISCONTINUED | OUTPATIENT
Start: 2021-09-24 | End: 2021-09-29 | Stop reason: HOSPADM

## 2021-09-24 RX ORDER — LORAZEPAM 2 MG/ML
1 INJECTION INTRAMUSCULAR
Status: DISCONTINUED | OUTPATIENT
Start: 2021-09-24 | End: 2021-09-29 | Stop reason: HOSPADM

## 2021-09-24 RX ADMIN — POTASSIUM CHLORIDE, DEXTROSE MONOHYDRATE AND SODIUM CHLORIDE: 150; 5; 450 INJECTION, SOLUTION INTRAVENOUS at 21:27

## 2021-09-24 RX ADMIN — SODIUM CHLORIDE 1000 ML: 9 INJECTION, SOLUTION INTRAVENOUS at 05:41

## 2021-09-24 RX ADMIN — SODIUM CHLORIDE 1000 ML: 9 INJECTION, SOLUTION INTRAVENOUS at 05:36

## 2021-09-24 RX ADMIN — SODIUM CHLORIDE 65 ML: 9 INJECTION, SOLUTION INTRAVENOUS at 07:05

## 2021-09-24 RX ADMIN — IPRATROPIUM BROMIDE AND ALBUTEROL SULFATE 3 ML: .5; 3 SOLUTION RESPIRATORY (INHALATION) at 15:47

## 2021-09-24 RX ADMIN — VALPROATE SODIUM 250 MG: 100 INJECTION, SOLUTION INTRAVENOUS at 20:05

## 2021-09-24 RX ADMIN — TAZOBACTAM SODIUM AND PIPERACILLIN SODIUM 4.5 G: 500; 4 INJECTION, SOLUTION INTRAVENOUS at 13:18

## 2021-09-24 RX ADMIN — DIATRIZOATE MEGLUMINE AND DIATRIZOATE SODIUM 75 ML: 660; 100 SOLUTION ORAL; RECTAL at 14:13

## 2021-09-24 RX ADMIN — AZITHROMYCIN MONOHYDRATE 500 MG: 500 INJECTION, POWDER, LYOPHILIZED, FOR SOLUTION INTRAVENOUS at 09:22

## 2021-09-24 RX ADMIN — ACETAMINOPHEN 650 MG: 650 SUPPOSITORY RECTAL at 15:01

## 2021-09-24 RX ADMIN — IOPAMIDOL 42 ML: 755 INJECTION, SOLUTION INTRAVENOUS at 07:05

## 2021-09-24 RX ADMIN — POTASSIUM CHLORIDE, DEXTROSE MONOHYDRATE AND SODIUM CHLORIDE: 150; 5; 450 INJECTION, SOLUTION INTRAVENOUS at 13:18

## 2021-09-24 RX ADMIN — LORAZEPAM 0.5 MG: 2 INJECTION INTRAMUSCULAR; INTRAVENOUS at 09:15

## 2021-09-24 RX ADMIN — SODIUM CHLORIDE 1000 ML: 9 INJECTION, SOLUTION INTRAVENOUS at 15:01

## 2021-09-24 RX ADMIN — ONDANSETRON 4 MG: 2 INJECTION INTRAMUSCULAR; INTRAVENOUS at 06:13

## 2021-09-24 RX ADMIN — LORAZEPAM 0.5 MG: 2 INJECTION INTRAMUSCULAR; INTRAVENOUS at 17:36

## 2021-09-24 RX ADMIN — PANTOPRAZOLE SODIUM 40 MG: 40 INJECTION, POWDER, FOR SOLUTION INTRAVENOUS at 13:18

## 2021-09-24 RX ADMIN — CEFTRIAXONE 2 G: 2 INJECTION, POWDER, FOR SOLUTION INTRAMUSCULAR; INTRAVENOUS at 08:34

## 2021-09-24 RX ADMIN — TAZOBACTAM SODIUM AND PIPERACILLIN SODIUM 4.5 G: 500; 4 INJECTION, SOLUTION INTRAVENOUS at 18:05

## 2021-09-24 RX ADMIN — VALPROATE SODIUM 250 MG: 100 INJECTION, SOLUTION INTRAVENOUS at 14:13

## 2021-09-24 ASSESSMENT — ACTIVITIES OF DAILY LIVING (ADL)
ADLS_ACUITY_SCORE: 12
ADLS_ACUITY_SCORE: 14
ADLS_ACUITY_SCORE: 14

## 2021-09-24 ASSESSMENT — ENCOUNTER SYMPTOMS
VOMITING: 1
SHORTNESS OF BREATH: 1
NAUSEA: 1

## 2021-09-24 NOTE — H&P
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    Recurrent SBO - pt presents with nausea and emesis.  No BM for the past 2 days.  CT imaging revealed multiple loops of mildly thickened distal small bowel in the right lower quadrant suggest enteritis and multiple dilated loops of gas and fluid-filled small bowel proximal to the thickened loops of distal small bowel are consistent with an associated small bowel obstruction.  Hx of SBO twice in the past, first improved with conservative management and most recently 4/2021 required operative management.  - patient with a G-tube in place.  Discontinue NGT placement and vent G-tube.  - npo  - antiemetics, analgesics prn  - General Surgery consult    Sepsis  Acute Hypoxic Respiratory Failure 2/2 Aspiration PNA - pt developed fever and hypoxia overnight after multiple episodes of emesis.  No prior respiratory symptoms, likely aspiration PNA.  Hx of chronic lung disease, not on oxygen at baseline.  - start scheduled nebs and prn Albuterol and continue pta Advair  - start IV Zosyn  - IVF hydration    SALLY - 2/2 hypovolemia.  IVF hydration and repeat BMP in am    Autism Spectrum Disorder  Cerebral Palsy  Depression/Anxiety - with language impairment and intellectual disability.  Currently Atarax, Seroquel and Gabapentin which unfortunately have no IV formulation    GERD - hold pta po Famotidine and Omeprazole.  Start IV Protonix    Epilepsy - currently on Valproic Acid and Clobazam.  -  will consult pharmacy for assistance with converting to IV formulation.        CODE: full; confirmed with mother at the bedside  Diet/IVF: npo, D51/2NS w/20mEq KCL  GI ppx:  protonix  DVT ppx: SCD    Patient discussed with Dr. 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    Patient was born a premature infant at 24 weeks with with language impairment and intellectual disability, seizure disorder, retinopathy of Prematurity let to Blindness, strict npo and has had a G-tube in place since the age of 2-3 years old.  He presents with his mother who provides the history.  Yesterday, he appeared more pale and lethargic.  He developed multiple episodes of non bloody emesis.  Last BM was two days ago.  Overnight, he developed a fever of 104 and oxygen saturations down to 82-83%.  He is not on oxygen at baseline and had no cough or shortness of breath prior to last evening.  He has chronic lung disease mother reports is due to prematurity.  He was hospitalized 4/2021 for SBO which required surgical intervention.  Mother reports this is his 3rd episode of sbo.      ED work up revealed patient tachycardic 152, bp 90-100s, 97% on 4 liters.  Laboratory work up revealed BUN 32, Creatinine 1.25, lactic acid 2.2, wbc 3.1, plt 76 with otherwise normal CMP, BNP, Troponin.  UA negative for infection.  CT c/a/p revealed SBO, PNA, enteritis.  Patient received IVF, Ceftriaxone, Azithromycin, Zofran and admitted for further management.         Past Medical History:     Past Medical History:   Diagnosis Date     Chronic lung disease     off nebs in 2012     Depression     fluoxetine, risperidone      Epilepsy 05/01/2011     Feeding by G-tube      GERD (gastroesophageal reflux disease)      Insomnia     melatonin, clonazepam     Kidney stone      Premature infant - 24 weeks      Retinopathy of prematurity     led to blindness     SBO (small bowel obstruction)           Past Surgical History:     Past Surgical History:   Procedure Laterality Date     APPENDECTOMY OPEN N/A 4/19/2021    Procedure: Appendectomy open;  Surgeon: Fabiola Pop MD;  Location: [facility] OR     GASTROSTOMY TUBE       LAPAROTOMY EXPLORATORY N/A 4/19/2021    Procedure: LAPAROTOMY, enterolysis;  Surgeon: Fabiola Pop MD;  Location: [facility] OR          Social History:     Social History     Socioeconomic History     Marital status: Single     Spouse name: Not on file     Number of children: Not on file     Years of education: Not on file     Highest education level: Not on file   Occupational History     Not on file   Tobacco Use     Smoking status: Never Smoker     Smokeless tobacco: Never Used   Substance and Sexual Activity     Alcohol use: No     Drug use: No     Sexual activity: Never   Other Topics Concern     Parent/sibling w/ CABG, MI or angioplasty before 65F 55M? Not Asked   Social History Narrative     Not on file     Social Determinants of Health     Financial Resource Strain:      Difficulty of Paying Living Expenses:    Food Insecurity:      Worried About Running Out of Food in the Last Year:      Ran Out of Food in the Last Year:    Transportation Needs:      Lack of Transportation (Medical):      Lack of Transportation (Non-Medical):    Physical Activity:      Days of Exercise per Week:      Minutes of Exercise per Session:    Stress:      Feeling of Stress :    Social Connections:      Frequency of Communication with Friends and Family:      Frequency of Social Gatherings with Friends and Family:      Attends Congregational Services:      Active Member of Clubs or Organizations:      Attends Club or Organization Meetings:       Marital Status:    Intimate Partner Violence:      Fear of Current or Ex-Partner:      Emotionally Abused:      Physically Abused:      Sexually Abused:           Family History:     Family History   Problem Relation Age of Onset     Diabetes Father           Allergies:    No Known Allergies       Medications:     Prior to Admission medications    Medication Sig Last Dose Taking? Auth Provider   albuterol (PROAIR HFA/PROVENTIL HFA/VENTOLIN HFA) 108 (90 Base) MCG/ACT inhaler Inhale 2 puffs into the lungs every 6 hours as needed for shortness of breath / dyspnea or wheezing  Yes Micky Leyva MD   albuterol (PROVENTIL) (2.5 MG/3ML) 0.083% neb solution Take 1 vial (2.5 mg) by nebulization every 6 hours as needed for shortness of breath / dyspnea or wheezing  Yes Micky Leyva MD   benzoyl peroxide 5 % EX topical gel Apply topically 2 times daily as needed (acne)  Yes Micky Leyva MD   clindamycin 1 % EX external gel Apply topically 2 times daily as needed (acne)  Yes Micky Leyva MD   clobazam (,ONFI,) 2.5 MG/ML suspension 5 mg by Per G Tube route At Bedtime (3 mL in AM / 2 mL in PM)  Yes Unknown, Entered By History   clobazam (ONFI) 2.5 MG/ML suspension 7.5 mg by Per G Tube route every morning (3 mL in AM / 2 mL in PM)  Yes Reported, Patient   famotidine (PEPCID) 40 MG/5ML suspension 20 mg by Per G Tube route 2 times daily  Yes Unknown, Entered By History   fluticasone-salmeterol (ADVAIR HFA) 230-21 MCG/ACT inhaler Inhale 2 puffs into the lungs 2 times daily   Yes Micky Leyva MD   gabapentin (NEURONTIN) 250 MG/5ML solution 750 mg by Per G Tube route At Bedtime   Yes Unknown, Entered By History   hydrOXYzine (ATARAX) 10 MG/5ML syrup 50 mg by Per G Tube route At Bedtime (25 mL)  Yes Reported, Patient   LORazepam (ATIVAN) 1 MG tablet 1 mg by Per G Tube route every 6 hours as needed for anxiety Per mom: prn   Yes Unknown, Entered By History   melatonin 1 MG TABS 1 mg by Per G Tube route daily  Daily between 4-5pm*  Yes Reported, Patient   melatonin 3 MG tablet 6 mg by Per G Tube route At Bedtime   Yes Unknown, Entered By History   multivitamins w/minerals (CERTAVITE) liquid 15 mLs by Per G Tube route daily  Yes Unknown, Entered By History   omeprazole (PRILOSEC) 2 mg/mL SUSP 40 mg by Per G Tube route At Bedtime   Yes Micky Leyva MD   polyethylene glycol (MIRALAX) 17 g packet 17 g by Per G Tube route daily  Yes Unknown, Entered By History   polyethylene glycol (MIRALAX) 17 GM/Dose powder TAKE 17 GM BY MOUTH EVERY DAY AS NEEDED  Yes Micky Leyva MD   QUEtiapine (SEROQUEL) 300 MG tablet 600 mg by Per G Tube route At Bedtime   Yes Reported, Patient   simethicone (MYLICON) 40 MG/0.6ML suspension Take 0.6 mLs (40 mg) by mouth 4 times daily as needed (gassiness)  Yes Micky Leyva MD   Valproate Sodium (VALPROIC ACID) 250 MG/5ML TAKE 6ML BY MOUTH EVERY MORNING AND 7ML IN THE AFTERNOON AND AT NIGHT  Yes Micky Leyva MD   Valproate Sodium (VALPROIC ACID) 250 MG/5ML 300 mg by Per G Tube route every morning  Yes Unknown, Entered By History   Valproate Sodium (VALPROIC ACID) 250 MG/5ML 350 mg by Per G Tube route 2 times daily Afternoon and bedtime  Yes Unknown, Entered By History   Lancets 30G MISC 120 30 gauge lancets (substitute as needed)   Matilde Bagley MD          Review of Systems:   A complete ROS was performed and is negative other than what is stated in the HPI.       Physical Exam:   Blood pressure 94/63, pulse (!) 133, temperature 98.1  F (36.7  C), temperature source Tympanic, resp. rate 29, SpO2 100 %.  General: Alert, calls out for his mom  HEENT: AT/NC  Chest/Resp: few coarse breath sounds at the bases, no wheezing  Heart/CV: regular rate and rhythm, no murmur  Abdomen/GI: Soft, mild diffuse tenderness, mild distension. Decreased BS.  No rebound or guarding.  Extremities/MSK: No LE edema  Skin: Warm and dry  Neuro: Alert & calls out for his mother, moves all extremities  equally         Labs:   ROUTINE ICU LABS (Last four results)  CMP  Recent Labs   Lab 09/24/21  0537 09/24/21  0531   NA  --  140   POTASSIUM  --  4.1   CHLORIDE  --  103   CO2  --  30   ANIONGAP  --  7   GLC  --  96   BUN  --  32*   CR 1.4* 1.25   GFRESTIMATED >60 80   ALICE  --  9.4   PROTTOTAL  --  7.7   ALBUMIN  --  3.3*   BILITOTAL  --  1.2   ALKPHOS  --  77   AST  --  15   ALT  --  20     CBC  Recent Labs   Lab 09/24/21  0531   WBC 3.1*   RBC 4.94   HGB 17.2   HCT 52.7   *   MCH 34.8*   MCHC 32.6   RDW 14.0   PLT 76*     INRNo lab results found in last 7 days.  Arterial Blood GasNo lab results found in last 7 days.       Imaging/Procedures:     Results for orders placed or performed during the hospital encounter of 09/24/21   CT Chest (PE) Abdomen Pelvis w Contrast    Narrative    CT CHEST PULMONARY EMBOLUS ABDOMEN AND PELVIS WITH CONTRAST 9/24/2021  7:04 AM    CLINICAL HISTORY: Hypoxia. Abdominal distention. Nausea and vomiting.  History of small bowel obstruction.    TECHNIQUE: CT angiogram chest and routine CT abdomen pelvis with IV  contrast. Arterial phase through the chest and venous phase through  the abdomen and pelvis. 2D and 3D MIP reconstructions were preformed  by the CT technologist. Dose reduction techniques were used.     CONTRAST: 42 mL Isovue-370    COMPARISON: CT of the abdomen and pelvis performed 4/19/2021.    FINDINGS:  ANGIOGRAM CHEST: Pulmonary arteries are normal caliber and negative  for pulmonary emboli. Thoracic aorta is negative for dissection. No CT  evidence of right heart strain.     LUNGS AND PLEURA: No pleural effusions. Ill-defined region of  consolidation at the right lung base posteriorly is new since the  previous exam. There is also patchy groundglass opacity in both lungs,  greater on the right. No pneumothorax is identified. Multiple images  through the chest are degraded due to patient motion artifact.    MEDIASTINUM/AXILLAE: No enlarged lymph nodes are identified  in the  chest. No pericardial effusion.    CORONARY ARTERY CALCIFICATION: None.    HEPATOBILIARY: Unremarkable. No hepatic masses are seen.    PANCREAS: Normal.    SPLEEN: Normal.    ADRENAL GLANDS: Normal.    KIDNEYS/BLADDER: Nonobstructing 0.3 cm stone in the upper pole of the  right kidney. The kidneys are otherwise unremarkable. No  hydronephrosis.    BOWEL: There are multiple mildly thickened loops of distal small bowel  in the right lower quadrant, suggesting enteritis. Proximal to these  thickened distal small bowel loops, there are multiple loops of  dilated gas and fluid-filled small bowel, consistent with associated  small bowel obstruction. Moderate amount of stool in the rectum. Prior  appendectomy.    PELVIC ORGANS: Unremarkable.    LYMPH NODES: No enlarged lymph nodes are identified in the abdomen or  pelvis.    VASCULATURE: Left-sided IVC.    ADDITIONAL FINDINGS: None.    MUSCULOSKELETAL: Thoracolumbar curve, convex right.      Impression    IMPRESSION:  1.  Multiple loops of mildly thickened distal small bowel in the right  lower quadrant suggest enteritis, and are most likely infectious or  inflammatory in etiology.  2.  Multiple dilated loops of gas and fluid-filled small bowel  proximal to the thickened loops of distal small bowel are consistent  with an associated small bowel obstruction.  3.  Ill-defined area of consolidation is noted at the right lung base  posteriorly, and there is patchy groundglass opacity in both lungs,  greater on the right. Findings are suspicious for pneumonia.  4.  No evidence for pulmonary embolism.  5.  Nonobstructing 0.3 cm stone in the upper pole of the right kidney.

## 2021-09-24 NOTE — PHARMACY-ADMISSION MEDICATION HISTORY
Admission medication history interview status for this patient is complete. See [facility] admission navigator for allergy information, prior to admission medications and immunization status.     Medication history interview done, indicate source(s): Family  Medication history resources (including written lists, pill bottles, clinic record):[facility]   Pharmacy: [facility]    Changes made to PTA medication list:  Added:   Changed:   Reported as Not Taking:   Removed: duplicate [facility] orders    Actions taken by pharmacist (provider contacted, etc):sticky noted for provider     Additional medication history information:None    Medication reconciliation/reorder completed by provider prior to medication history?  Y   (Y/N)     For patients on insulin therapy: NA       Prior to Admission medications    Medication Sig Last Dose Taking? Auth Provider   albuterol (PROAIR HFA/PROVENTIL HFA/VENTOLIN HFA) 108 (90 Base) MCG/ACT inhaler Inhale 2 puffs into the lungs every 6 hours as needed for shortness of breath / dyspnea or wheezing  Yes Micky Leyva MD   albuterol (PROVENTIL) (2.5 MG/3ML) 0.083% neb solution Take 1 vial (2.5 mg) by nebulization every 6 hours as needed for shortness of breath / dyspnea or wheezing 9/24/2021 at Unknown time Yes Micky Leyva MD   benzoyl peroxide 5 % EX topical gel Apply topically 2 times daily as needed (acne) not recent Yes Micky Leyva MD   clindamycin 1 % EX external gel Apply topically 2 times daily as needed (acne) not recent Yes Micky Leyva MD   clobazam (,ONFI,) 2.5 MG/ML suspension 5 mg by Per G Tube route At Bedtime (3 mL in AM / 2 mL in PM) 9/23/2021 at Unknown time Yes Unknown, Entered By History   clobazam (ONFI) 2.5 MG/ML suspension 7.5 mg by Per G Tube route every morning (3 mL in AM / 2 mL in PM) 9/23/2021 at Unknown time Yes Reported, Patient   famotidine (PEPCID) 40 MG/5ML suspension 20 mg by Per G Tube route 2 times daily 9/23/2021  at Unknown time Yes Unknown, Entered By History   fluticasone-salmeterol (ADVAIR HFA) 230-21 MCG/ACT inhaler Inhale 2 puffs into the lungs 2 times daily  9/23/2021 at Unknown time Yes Micky Leyva MD   gabapentin (NEURONTIN) 250 MG/5ML solution 750 mg by Per G Tube route At Bedtime  9/23/2021 at Unknown time Yes Unknown, Entered By History   hydrOXYzine (ATARAX) 10 MG/5ML syrup 50 mg by Per G Tube route At Bedtime (25 mL) 9/23/2021 at Unknown time Yes Reported, Patient   LORazepam (ATIVAN) 1 MG tablet 1 mg by Per G Tube route every 6 hours as needed for anxiety Per mom: prn  9/22/2021 Yes Unknown, Entered By History   melatonin 1 MG TABS 1 mg by Per G Tube route daily Daily between 4-5pm* 9/23/2021 at Unknown time Yes Reported, Patient   melatonin 3 MG tablet 6 mg by Per G Tube route At Bedtime  9/23/2021 at Unknown time Yes Unknown, Entered By History   multivitamins w/minerals (CERTAVITE) liquid 15 mLs by Per G Tube route daily Past Week at Unknown time Yes Unknown, Entered By History   omeprazole (PRILOSEC) 2 mg/mL SUSP 40 mg by Per G Tube route At Bedtime  9/23/2021 at Unknown time Yes Micky Leyva MD   polyethylene glycol (MIRALAX) 17 g packet 17 g by Per G Tube route daily 9/23/2021 at Unknown time Yes Unknown, Entered By History   QUEtiapine (SEROQUEL) 300 MG tablet 600 mg by Per G Tube route At Bedtime  9/23/2021 at Unknown time Yes Reported, Patient   simethicone (MYLICON) 40 MG/0.6ML suspension Take 0.6 mLs (40 mg) by mouth 4 times daily as needed (gassiness)  Yes Micky Leyva MD   Valproate Sodium (VALPROIC ACID) 250 MG/5ML TAKE 6ML BY MOUTH EVERY MORNING AND 7ML IN THE AFTERNOON AND AT NIGHT  Yes Micky Leyva MD   Lancets 30G MISC 120 30 gauge lancets (substitute as needed)   Matilde Bagley MD   polyethylene glycol (MIRALAX) 17 GM/Dose powder TAKE 17 GM BY MOUTH EVERY DAY AS NEEDED   Micky Leyva MD

## 2021-09-24 NOTE — TELEPHONE ENCOUNTER
Pt's mother/guardian, is calling.    Nausea, vomiting that started yesterday afternoon. Has vomited x 4 since last night. Has a G-Tube but anything that mom puts in it, he vomits right back out. Unable to keep any fluids down at all. Unable to give any of his medications as he vomits them right back out as well.  Possible aspiration PNA.  He has a history of this in the past and aspirates when he vomits.  Temp just over 103 now. Heart rate of 166.  Oxygen sats are 86% on the pulse oximeter, on room air and dips even lower at times.  I advised mom that he needs to be seen in the ED now.  Advised her to call 911 now.  They can assess him quicker and get him to the hospital quicker as well.  She will call them now. That is what she is wanting to do as well.      COVID 19 Nurse Triage Plan/Patient Instructions    Please be aware that novel coronavirus (COVID-19) may be circulating in the community. If you develop symptoms such as fever, cough, or SOB or if you have concerns about the presence of another infection including coronavirus (COVID-19), please contact your health care provider or visit [email or web address].     Disposition/Instructions    ED Visit recommended. Call 911. Follow protocol based instructions.     Bring Your Own Device:  Please also bring your smart device(s) (smart phones, tablets, laptops) and their charging cables for your personal use and to communicate with your care team during your visit.    Thank you for taking steps to prevent the spread of this virus.  o Limit your contact with others.  o Wear a simple mask to cover your cough.  o Wash your hands well and often.    Resources    [facility]: About COVID-19: [email or web address]    CDC: What to Do If You're Sick: www.cdc.gov/coronavirus/2019-ncov/about/steps-when-sick.html    CDC: Ending Home Isolation: www.cdc.gov/coronavirus/2019-ncov/hcp/disposition-in-home-patients.html     CDC: Caring for Someone:  www.cdc.gov/coronavirus/2019-ncov/if-you-are-sick/care-for-someone.html     [facility]: Interim Guidance for Hospital Discharge to Home: www.health.[facility].[place]/diseases/coronavirus/hcp/hospdischarge.pdf    [facility] clinical trials (COVID-19 research studies): [email or web address]     Below are the COVID-19 hotlines at the [place] Department of [facility] ([facility]). Interpreters are available.   o For health questions: Call 005-490-1298 or 1-310.402.3549 (7 a.m. to 7 p.m.)  o For questions about schools and childcare: Call 995-396-4355 or 1-573.389.6294 (7 a.m. to 7 p.m.)     Reason for Disposition    Sounds like a life-threatening emergency to the triager    Additional Information    Negative: [1] Breathing stopped AND [2] hasn't returned    Negative: Choking on something    Negative: Severe difficulty breathing (e.g., struggling for each breath, speaks in single words)    Negative: Bluish (or gray) lips or face now    Negative: Difficult to awaken or acting confused (e.g., disoriented, slurred speech)    Negative: Passed out (i.e., lost consciousness, collapsed and was not responding)    Negative: Wheezing started suddenly after medicine, an allergic food or bee sting    Negative: Stridor    Negative: Slow, shallow and weak breathing    Protocols used: BREATHING DIFFICULTY-A-AH    Darcy Johns RN  [facility] Nurse Advisor  9/24/2021 at 4:26 AM

## 2021-09-24 NOTE — PLAN OF CARE
Patient arrived to floor from ED at 1130, mother at bedside. Weaned to RA, sats in the high 90s. GT placed to vent. NG tube removed per order, and soft restraints removed as well. No output from GT vent, gastrografin study started at 1430, GT clamped after gastrografin contrast instilled. On tele, ST HR in the 150s, max temp 102.7. PA notified, rectal Tylenol given and 1,000 fluid bolus started. Neuro checks WDL. In enteric isolation, needing stool sample. Continuing IV Zosyn. General surgery consulted. No vomiting or signs and symptoms of nausea.

## 2021-09-24 NOTE — ED PROVIDER NOTES
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 The patient lives at home where his family cares for him. He has been experiencing nausea and his mother states that he has been vomiting frequently at home, prompting 911 call. EMS states that his blood pressure and oxygen sats have been low.  He is on face mask oxygen now with sats in the 90's.  .     Review of Systems   Respiratory: Positive for shortness of breath.    Gastrointestinal: Positive for nausea and vomiting.   All other systems reviewed and are negative.    Allergies:  The patient has no known allergies.     Medications:  Pepcid   Neurontin   Ativan   Omeprazole   Seroquel   Mylicon   Valproic acid   Atarax   Melatonin     Past Medical History:    Chronic lung disease   Depression   Epilepsy   GERD   Insomnia   SBO   Seizures disorder   Asthma   Autism   Anxiety   Bilateral blindness      Past Surgical History:    Appendectomy   Gastrostomy   Laparotomy    Family History:    Diabetes     Social History:  Patient presents alone via EMS     Physical Exam     Patient Vitals for the past 24 hrs:   BP Temp Temp src Pulse Resp SpO2   09/24/21 0815 91/60 -- -- 138 30 100 %   09/24/21 0800 96/59 -- -- 138 22 100 %   09/24/21 0730 97/55 -- -- 138 13 98 %   09/24/21 0725 94/61 -- -- 139 -- 98 %   09/24/21 0700 -- -- -- 137 25 95 %   09/24/21 0600 95/73 -- -- 140 24 98 %   09/24/21 0530 -- 98.1  F (36.7  C) Tympanic -- -- --   09/24/21 0523 100/67 -- -- 152 22 97 %     Physical Exam  Nursing note and vitals reviewed.  Constitutional: Cooperative.   HENT:   Mouth/Throat: Very dry mucous membranes.   Cardiovascular: Tachycardic. Normal heart sounds.  No murmur.  Pulmonary/Chest: Effort normal . Diffuse crackles on right side.    Abdominal: G tube in place. Abdomen distended.   Musculoskeletal:  Chronic atrophy in extremities. Normal range of motion.   Neurological: Alert. Oriented x3.   Skin: Skin is warm and dry. No rash noted.   Psychiatric: Normal mood and affect.     Emergency Department Course   ECG  ECG taken at 0524, ECG read at 0530  Sinus tachycardia   Nonspecific ST abnormality  Rate 152 bpm. CA interval 142 ms. QRS duration 70 ms. QT/QTc 306/486 ms. P-R-T axes 48 8 19.     Imaging:  CT Chest (PE) Abdomen Pelvis w Contrast   1.  Multiple loops of mildly thickened distal small bowel in the right  lower quadrant suggest enteritis, and are most likely infectious or  inflammatory in etiology.  2.  Multiple dilated loops of gas and fluid-filled small bowel  proximal to the thickened loops of distal small bowel are consistent  with an associated small bowel obstruction.  3.  Ill-defined area of consolidation is noted at the right lung base  posteriorly, and there is patchy groundglass opacity in both lungs,  greater on the right. Findings are suspicious for pneumonia.  4.  No evidence for pulmonary embolism.  5.  Nonobstructing 0.3 cm stone in the upper pole of the right kidney.  Per radiology     Laboratory:  Creatinine POCT: Creatinine 1.4 (H), GFR estimate >60     CBC: WBC 3.1 (L), HGB 17.2, PLT 76 (L)   CMP: urea 32 (H), albumin 3.3 (L) o/w WNL (Creatinine 1.25)   Lipase: 49 (L)    Symptomatic Influenza A/B & SARS-CoV2 (COVID19) Virus PCR Multiplex: negative     Valproic acid: 138    Lactic acid (result time 0542) 2.2 (H)     Troponin (Collected 0531): <0.015    BNP: 383    UA with microscopic: pending    Blood Culture x2: pending     Urine culture: pending    Reviewed:  I reviewed nursing notes, vitals, past medical history and care everywhere    Assessments:  0520 I obtained history and examined the patient as noted above.   0530 I rechecked the patient and explained findings.   0809 I rechecked and updated the patient.    0816 I placed an order for an NG tube to be placed for the patient.      Consult:  6799 I spoke with BRAD Joseph, hospitalist, who agreed to admit the patient.     Interventions:  0541 0.9% sodium chloride bolus, 1000 mL, IV  0536 0.9% sodium chloride bolus, 1000 mL, IV  0613 Zofran, 4 mg, IV  Rocephin 2 g IV, ordered to be given in ED  Zithromax 500 mg IV, ordered to be given in ED     Disposition:  The patient was admitted to the hospital under the care of BRAD Joseph for Dr. Campoverde.       Impression & Plan     Medical Decision Making:  Rj Licea is a 25 year old male with the above history who presents with hypoxia and significant tachycardia. Work up here is consistent with mechanical small bowel obstruction as well as right lower pneumonia. I suspect aspiration as an etiology given the story of vomiting and presence of the G-tube. He had a similar present too, last April when he was admitted. Will place NG tube for proximal decompression of the bowel with the hope to proceed with medical management of his bowel obstruction. Surgery is a consideration, but he does not require surgical intervention at this time with improvement in exam. Blood pressure is stable in the systolic at approximately 100. He will be admitted in stable condition.     Covid-19  Rj Licea was evaluated during a global COVID-19 pandemic, which necessitated consideration that the patient might be at risk for infection with the SARS-CoV-2 virus that causes COVID-19.   Applicable protocols for evaluation were followed during the patient's care.   COVID-19 was considered as part of the patient's evaluation. The plan for testing is:  a test was obtained during this visit.    Diagnosis:    ICD-10-CM    1. Hypoxia  R09.02    2. Non-intractable vomiting with nausea, unspecified vomiting type  R11.2    3. Dehydration  E86.0    4. Acute kidney injury  N17.9    5. SBO (small bowel obstruction)   K56.609    6. Aspiration Pneumonia of right lower lobe due to infectious organism  J18.9    7. Elevated  lactic acid level  R79.89        Scribe Disclosure:  I, Patricia Huertas, am serving as a scribe at 6:03 AM on 9/24/2021 to document services personally performed by Zechariah King MD based on my observations and the provider's statements to me.     I, Jerson Valiente, am serving as a scribe on 9/24/2021 at 8:10 AM to personally document services performed by Zechariah King MD based on my observations and the provider's statements to me.      Zechariah King MD  09/24/21 0837

## 2021-09-24 NOTE — PROVIDER NOTIFICATION
Dr Nayak paged: Bladder scan shows 555cc. Do you want to order straight cath prn or indwelling? He was straight cathed in ER earlier today

## 2021-09-24 NOTE — ED NOTES
[facility]  ED Nurse Handoff Report    Rj Licea is a 25 year old male   ED Chief complaint: Shortness of Breath  . ED Diagnosis:   Final diagnoses:   Hypoxia   Non-intractable vomiting with nausea, unspecified vomiting type   Dehydration   Acute kidney injury (H)     Allergies: No Known Allergies    Code Status: Full Code  Activity level - Baseline/Home:  Assist X 1. Activity Level - Current:   Assist X 1. Lift room needed: No. Bariatric: No   Needed: No   Isolation: No. Infection: Not Applicable.     Vital Signs:   Vitals:    09/24/21 0523 09/24/21 0530   BP: 100/67    Pulse: (!) 152    Resp: 22    Temp:  98.1  F (36.7  C)   TempSrc:  Tympanic   SpO2: 97%        Cardiac Rhythm:  ,      Pain level:    Patient confused: No. Patient Falls Risk: Yes.   Elimination Status: Unable to void   Patient Report - Initial Complaint: Pt to ER with c/o n/v SOB. Focused Assessment: Pt from home were family cares for him, mother state pt vomited 5 times, had decrease in his LOC, pt is blind and autistic , pt weighs approx 70lbs, pt is fed thru his GTube, pt does speak, but does not answer questions . Pt had low sats pt EMS and was placed on 4l per n/c, pt now weaned down to 2l o2 with sats 95%. Pt is also tachycardic despite 2 liters of IVFs   Tests Performed: Labs EKG CT . Abnormal Results: . Lactic 2.2, WBC 3.1, Creat 1.4  Treatments provided: IVFs and meds  Family Comments: Mother at bedside  OBS brochure/video discussed/provided to patient:  Yes  ED Medications:   Medications   lidocaine 1 % 0.1-1 mL (has no administration in time range)   lidocaine (LMX4) cream (has no administration in time range)   sodium chloride (PF) 0.9% PF flush 3 mL (has no administration in time range)   sodium chloride (PF) 0.9% PF flush 3 mL (has no administration in time range)   0.9% sodium chloride BOLUS (1,000 mLs Intravenous New Bag 9/24/21 0541)   0.9% sodium chloride BOLUS (1,000 mLs Intravenous New Bag 9/24/21  0536)   ondansetron (ZOFRAN) injection 4 mg (4 mg Intravenous Given 9/24/21 0613)     Drips infusing:  Yes  For the majority of the shift, the patient's behavior Green. Interventions performed wereN/A.    Sepsis treatment initiated: No     Patient tested for COVID 19 prior to admission: YES    ED Nurse Name/Phone Number: Quyen Sosa RN,   6:39 AM  RECEIVING UNIT ED HANDOFF REVIEW    Above ED Nurse Handoff Report was reviewed: Yes  Reviewed by: Sarah Menjivar RN on September 24, 2021 at 10:42 AM

## 2021-09-24 NOTE — ED TRIAGE NOTES
Pt to ER with c/o n/v low sats, blood pressure low. Pt is special needs with hx of autism and is blind, pt upon arrival requiring 4l of o2 to maintain sats in the high 90s, mother was concerned for aspiration, pt had been vomiting quite a bit at home

## 2021-09-24 NOTE — CONSULTS
Consult Date: 09/24/2021    SURGERY CONSULTATOIN    REASON FOR CONSULTATION:  Small bowel obstruction.    HISTORY OF PRESENT ILLNESS:  Mr. Licea is a 25-year-old gentleman known to our service from a prior bowel obstruction earlier this year, who came to our ED with nausea with bloating, vomiting, fever as well as dyspnea and hypoxia.  The patient has medical history notable for severe developmental delay and the history was therefore obtained from his mother.  By her report, the patient had less energy than normal.  Starting yesterday, he began having several bouts of vomiting and overnight had a temperature at home of 104 degrees.  His abdomen seems more bloated than normal.  He has not had a bowel movement for 2 days, which was somewhat unusual.  He has had a previous bowel obstruction in 05/2020 and again in 04/2021 where he had increasing lactate and tachycardia and underwent an exploration, which showed some accordion-like banding phenomenon near the terminal ileum.  On this occasion, the patient had a white count 3,100 and his CT shows a distal thickening of the small bowel with proximal dilation.     PAST MEDICAL AND SURGICAL HISTORY:  This is notable for an exploratory laparotomy and adhesiolysis with incidental appendectomy in April of this year.  He has had a longstanding percutaneous gastrostomy tube.  Medical history is notable for chronic lung disease, likely restrictive in nature, epilepsy, reflux, kidney stones, prematurity and retinopathy related to his prematurity.  He is nonverbal at baseline.    PHYSICAL EXAMINATION:    VITAL SIGNS:  Mr. Licea initially was afebrile on presentation, but just spiked a temperature of 102.7.  Pulse has been in the 120s-150 range.  Most current blood pressure was 96/57, respiratory rate is 28 with 96% saturations initially on 2 liters and subsequently on room air.  GENERAL:  He is sleeping, comfortable and nontoxic.  CARDIOVASCULAR:  His heart sounds are  regular.  LUNGS:  His breath sounds are clear.  ABDOMEN:  Soft with mild distention, he has a well-healed midline laparotomy without overt hernias.  He has a G-tube in place, which is currently connected to thin tubing and a gravity bag without any drainage within it.  He has no firmness or focal tenderness/peritonitis.  His bowel tones are present in all quadrants, although slightly hypoactive.    LABORATORY EXAMINATION:  Notable for white blood cell count of 3,100, hemoglobin 17.2.  Electrolytes are for the most part unremarkable.  Lactate was 2.2.  Asymptomatic COVID swab was negative.    IMAGING:  I personally reviewed the patient's chest CT with abdomen and pelvis. He has a consolidation in the right lung base, which may be consistent with pneumonia or pneumonitis.  He has a mechanical bowel obstruction with dilated bowel and stomach proximally and some thickened bowel near the distal ileum.  This may be inflammatory in nature, although it seems more likely to a simple mechanical obstruction.    IMPRESSION AND RECOMMENDATIONS:  This is a 25-year-old gentleman with a history of an exploratory laparotomy for bowel obstruction earlier this year, who presents with a similar constellation of symptoms, i.e., vomiting, distention, fever and hypoxemia.  I suspect that he has overriding pneumonitis/pneumonia likely due to aspiration.  I also suspect he has a mechanical bowel obstruction as opposed to infectious enteritis as he has very little in the way of sick contacts.  I have advised connecting his G-tube to gravity drainage for decompression and ordered a Gastrografin challenge to be initiated this afternoon.  I have also offered a suppository in hopes that he may have some reflex arc initiated by rectal stimulation.  I explained my plan and findings to his mother who is interested in pursuing this nonoperative stance initially.  He is otherwise being treated empirically for pneumonia.    Thank you very much for  this consultation.  We will follow along with you during the course of his hospitalization and followup on his Gastrografin challenge films as well.    Dank Shafer MD        D: 2021   T: 2021   MT: JOHN    Name:     AYLIN MCCANN  MRN:      [id number]        Account:      [id number]   :      1995           Consult Date: 2021     Document: [id number]    cc:  Micky Leyva MD

## 2021-09-24 NOTE — CONSULTS
Care Management Initial Consult    General Information  Assessment completed with: Parents, Mom  Type of CM/SW Visit: Initial Assessment    Primary Care Provider verified and updated as needed: Yes   Readmission within the last 30 days: no previous admission in last 30 days      Reason for Consult: care coordination/care conference, discharge planning    Communication Assessment  Patient's communication style: spoken language (English or Bilingual)             Cognitive  Cognitive/Neuro/Behavioral:                        Living Environment:   People in home: sibling(s), spouse     Current living Arrangements: house      Able to return to prior arrangements: yes       Family/Social Support:  Care provided by: parent(s), other (see comments) (sibling)  Provides care for: no one, unable/limited ability to care for self     Parent(s), Sibling(s)          Description of Support System: Supportive         Current Resources:   Patient receiving home care services: No     Community Resources: [employer], [employer]  Equipment currently used at home: none  Supplies currently used at home: None    Employment/Financial:    Financial Concerns: No concerns identified           Lifestyle & Psychosocial Needs:  Social Determinants of Health     Tobacco Use: Low Risk      Smoking Tobacco Use: Never Smoker     Smokeless Tobacco Use: Never Used   Alcohol Use:      Frequency of Alcohol Consumption:      Average Number of Drinks:      Frequency of Binge Drinking:    Financial Resource Strain:      Difficulty of Paying Living Expenses:    Food Insecurity:      Worried About Running Out of Food in the Last Year:      Ran Out of Food in the Last Year:    Transportation Needs:      Lack of Transportation (Medical):      Lack of Transportation (Non-Medical):    Physical Activity:      Days of Exercise per Week:      Minutes of Exercise per Session:    Stress:      Feeling of Stress :    Social Connections:      Frequency of  Communication with Friends and Family:      Frequency of Social Gatherings with Friends and Family:      Attends Christian Services:      Active Member of Clubs or Organizations:      Attends Club or Organization Meetings:      Marital Status:    Intimate Partner Violence:      Fear of Current or Ex-Partner:      Emotionally Abused:      Physically Abused:      Sexually Abused:    Depression: Not at risk     PHQ-2 Score: 2   Housing Stability:      Unable to Pay for Housing in the Last Year:      Number of Places Lived in the Last Year:      Unstable Housing in the Last Year:        Functional Status:  Prior to admission patient needed assistance:   no    Mental Health Status:  Mental Health Status: No Current Concerns       Chemical Dependency Status:  Chemical Dependency Status: No Current Concerns         Additional Information:  Met with mother, Darcy, at bedside to introduce self and care coordination role. Mother verifies that pt lives at home with her. She and pt's sister Shani are Co Guardians for pt and provide total cares for him. Pt is blind.  Pt is able to ambulate independently, but they do stand by him as he ambulates since he is blind. Pt's mother works during that day and his sister stays with him as his caregiver during that time. Pt has PCA services provided by his sister for up to 40 hrs/[facility]. They get all TF formula and supplies through Pediatric home services [phone]  Fax: [phone].  He gets all nutrition and medications through his GT. Mother confirms that they have all equipment and supplies that they need at home and do not have any difficulties getting what they needs. Pt's mother has a close relationship with pt's PCP Dr Leyva and he is very helpful to the family. She does not anticipate any needs on discharge. Pt's sister will pick them up and provide transport for pt on day of discharge.      Care Management Note    Pt identified as a Service Bundle #3. Pt  follows with [facility].      Tess Wilder RN BSN   Inpatient Care Coordination  [facility]  [phone]    Radha Wilder, RN

## 2021-09-25 ENCOUNTER — APPOINTMENT (OUTPATIENT)
Dept: GENERAL RADIOLOGY | Facility: CLINIC | Age: 26
End: 2021-09-25
Attending: INTERNAL MEDICINE
Payer: COMMERCIAL

## 2021-09-25 ENCOUNTER — APPOINTMENT (OUTPATIENT)
Dept: GENERAL RADIOLOGY | Facility: CLINIC | Age: 26
End: 2021-09-25
Attending: SURGERY
Payer: COMMERCIAL

## 2021-09-25 LAB
ANION GAP SERPL CALCULATED.3IONS-SCNC: 3 MMOL/L (ref 3–14)
BUN SERPL-MCNC: 13 MG/DL (ref 7–30)
C COLI+JEJUNI+LARI FUSA STL QL NAA+PROBE: NOT DETECTED
C DIFF TOX B STL QL: NEGATIVE
CALCIUM SERPL-MCNC: 8.2 MG/DL (ref 8.5–10.1)
CHLORIDE BLD-SCNC: 111 MMOL/L (ref 94–109)
CO2 SERPL-SCNC: 29 MMOL/L (ref 20–32)
CREAT SERPL-MCNC: 0.84 MG/DL (ref 0.66–1.25)
EC STX1 GENE STL QL NAA+PROBE: NOT DETECTED
EC STX2 GENE STL QL NAA+PROBE: NOT DETECTED
ERYTHROCYTE [DISTWIDTH] IN BLOOD BY AUTOMATED COUNT: 13.6 % (ref 10–15)
GFR SERPL CREATININE-BSD FRML MDRD: >90 ML/MIN/1.73M2
GLUCOSE BLD-MCNC: 106 MG/DL (ref 70–99)
HCT VFR BLD AUTO: 40.6 % (ref 40–53)
HGB BLD-MCNC: 13.2 G/DL (ref 13.3–17.7)
MCH RBC QN AUTO: 36.3 PG (ref 26.5–33)
MCHC RBC AUTO-ENTMCNC: 32.5 G/DL (ref 31.5–36.5)
MCV RBC AUTO: 112 FL (ref 78–100)
NOROV GI+II ORF1-ORF2 JNC STL QL NAA+PR: NOT DETECTED
PLATELET # BLD AUTO: 64 10E3/UL (ref 150–450)
POTASSIUM BLD-SCNC: 4.1 MMOL/L (ref 3.4–5.3)
RBC # BLD AUTO: 3.64 10E6/UL (ref 4.4–5.9)
RVA NSP5 STL QL NAA+PROBE: NOT DETECTED
SALMONELLA SP RPOD STL QL NAA+PROBE: NOT DETECTED
SHIGELLA SP+EIEC IPAH STL QL NAA+PROBE: NOT DETECTED
SODIUM SERPL-SCNC: 143 MMOL/L (ref 133–144)
V CHOL+PARA RFBL+TRKH+TNAA STL QL NAA+PR: NOT DETECTED
WBC # BLD AUTO: 7.7 10E3/UL (ref 4–11)
Y ENTERO RECN STL QL NAA+PROBE: NOT DETECTED

## 2021-09-25 PROCEDURE — 85027 COMPLETE CBC AUTOMATED: CPT | Performed by: PHYSICIAN ASSISTANT

## 2021-09-25 PROCEDURE — 99207 PR APP CREDIT; MD BILLING SHARED VISIT: CPT | Performed by: INTERNAL MEDICINE

## 2021-09-25 PROCEDURE — C9113 INJ PANTOPRAZOLE SODIUM, VIA: HCPCS | Performed by: PHYSICIAN ASSISTANT

## 2021-09-25 PROCEDURE — 74018 RADEX ABDOMEN 1 VIEW: CPT | Mod: 77

## 2021-09-25 PROCEDURE — 250N000009 HC RX 250: Performed by: INTERNAL MEDICINE

## 2021-09-25 PROCEDURE — 74018 RADEX ABDOMEN 1 VIEW: CPT

## 2021-09-25 PROCEDURE — 94640 AIRWAY INHALATION TREATMENT: CPT

## 2021-09-25 PROCEDURE — 94640 AIRWAY INHALATION TREATMENT: CPT | Mod: 76

## 2021-09-25 PROCEDURE — 99232 SBSQ HOSP IP/OBS MODERATE 35: CPT | Performed by: SURGERY

## 2021-09-25 PROCEDURE — 36415 COLL VENOUS BLD VENIPUNCTURE: CPT | Performed by: PHYSICIAN ASSISTANT

## 2021-09-25 PROCEDURE — 80048 BASIC METABOLIC PNL TOTAL CA: CPT | Performed by: PHYSICIAN ASSISTANT

## 2021-09-25 PROCEDURE — 258N000003 HC RX IP 258 OP 636: Performed by: PHYSICIAN ASSISTANT

## 2021-09-25 PROCEDURE — 120N000001 HC R&B MED SURG/OB

## 2021-09-25 PROCEDURE — 999N000157 HC STATISTIC RCP TIME EA 10 MIN

## 2021-09-25 PROCEDURE — 250N000009 HC RX 250: Performed by: PHYSICIAN ASSISTANT

## 2021-09-25 PROCEDURE — 99291 CRITICAL CARE FIRST HOUR: CPT | Performed by: INTERNAL MEDICINE

## 2021-09-25 PROCEDURE — 250N000011 HC RX IP 250 OP 636: Performed by: PHYSICIAN ASSISTANT

## 2021-09-25 RX ORDER — FLUTICASONE PROPIONATE AND SALMETEROL XINAFOATE 115; 21 UG/1; UG/1
2 AEROSOL, METERED RESPIRATORY (INHALATION) EVERY 12 HOURS
Status: DISCONTINUED | OUTPATIENT
Start: 2021-09-25 | End: 2021-09-29 | Stop reason: HOSPADM

## 2021-09-25 RX ADMIN — IPRATROPIUM BROMIDE AND ALBUTEROL SULFATE 3 ML: .5; 3 SOLUTION RESPIRATORY (INHALATION) at 07:27

## 2021-09-25 RX ADMIN — LORAZEPAM 0.5 MG: 2 INJECTION INTRAMUSCULAR; INTRAVENOUS at 06:34

## 2021-09-25 RX ADMIN — IPRATROPIUM BROMIDE AND ALBUTEROL SULFATE 3 ML: .5; 3 SOLUTION RESPIRATORY (INHALATION) at 19:49

## 2021-09-25 RX ADMIN — VALPROATE SODIUM 250 MG: 100 INJECTION, SOLUTION INTRAVENOUS at 09:32

## 2021-09-25 RX ADMIN — IPRATROPIUM BROMIDE AND ALBUTEROL SULFATE 3 ML: .5; 3 SOLUTION RESPIRATORY (INHALATION) at 17:01

## 2021-09-25 RX ADMIN — IPRATROPIUM BROMIDE AND ALBUTEROL SULFATE 3 ML: .5; 3 SOLUTION RESPIRATORY (INHALATION) at 11:31

## 2021-09-25 RX ADMIN — POTASSIUM CHLORIDE, DEXTROSE MONOHYDRATE AND SODIUM CHLORIDE: 150; 5; 450 INJECTION, SOLUTION INTRAVENOUS at 05:12

## 2021-09-25 RX ADMIN — PANTOPRAZOLE SODIUM 40 MG: 40 INJECTION, POWDER, FOR SOLUTION INTRAVENOUS at 12:36

## 2021-09-25 RX ADMIN — POTASSIUM CHLORIDE, DEXTROSE MONOHYDRATE AND SODIUM CHLORIDE 1000 ML: 150; 5; 450 INJECTION, SOLUTION INTRAVENOUS at 14:04

## 2021-09-25 RX ADMIN — LORAZEPAM 0.5 MG: 2 INJECTION INTRAMUSCULAR; INTRAVENOUS at 00:20

## 2021-09-25 RX ADMIN — TAZOBACTAM SODIUM AND PIPERACILLIN SODIUM 4.5 G: 500; 4 INJECTION, SOLUTION INTRAVENOUS at 12:42

## 2021-09-25 RX ADMIN — LORAZEPAM 0.5 MG: 2 INJECTION INTRAMUSCULAR; INTRAVENOUS at 12:35

## 2021-09-25 RX ADMIN — TAZOBACTAM SODIUM AND PIPERACILLIN SODIUM 4.5 G: 500; 4 INJECTION, SOLUTION INTRAVENOUS at 06:34

## 2021-09-25 RX ADMIN — POTASSIUM CHLORIDE, DEXTROSE MONOHYDRATE AND SODIUM CHLORIDE: 150; 5; 450 INJECTION, SOLUTION INTRAVENOUS at 22:50

## 2021-09-25 RX ADMIN — TAZOBACTAM SODIUM AND PIPERACILLIN SODIUM 4.5 G: 500; 4 INJECTION, SOLUTION INTRAVENOUS at 00:20

## 2021-09-25 RX ADMIN — TAZOBACTAM SODIUM AND PIPERACILLIN SODIUM 4.5 G: 500; 4 INJECTION, SOLUTION INTRAVENOUS at 18:18

## 2021-09-25 RX ADMIN — VALPROATE SODIUM 250 MG: 100 INJECTION, SOLUTION INTRAVENOUS at 20:05

## 2021-09-25 RX ADMIN — VALPROATE SODIUM 250 MG: 100 INJECTION, SOLUTION INTRAVENOUS at 02:15

## 2021-09-25 RX ADMIN — VALPROATE SODIUM 250 MG: 100 INJECTION, SOLUTION INTRAVENOUS at 15:12

## 2021-09-25 ASSESSMENT — ACTIVITIES OF DAILY LIVING (ADL)
ADLS_ACUITY_SCORE: 26
ADLS_ACUITY_SCORE: 26
ADLS_ACUITY_SCORE: 28
ADLS_ACUITY_SCORE: 26
ADLS_ACUITY_SCORE: 26
ADLS_ACUITY_SCORE: 28

## 2021-09-25 NOTE — PROGRESS NOTES
[facility]  General Surgery Progress Note           Assessment and Plan:   Assessment:   HD#2 with SBO vs adynamic ileus.    Gastrografin challenge with contrast in right colon now, but also contrast remains in stomach and small bowel diffusely, suggesting overall delayed transit/dysmotility.       Plan:   - Continue G tube to gravity and hold tube feeds given bowel distension  - Repeat AXR tomorrow AM to assess for further contrast in colon  - If no further progression of contrast or vomiting returns, may still need to consider exploration.   - Try to get the patient up and out of bed more to encourage bowel function. Patient is ambulatory at baseline.   - Optimize electrolytes.   - Clinical picture is clouded by what Is likely a degree of chronic bowel distension/dismotility.   - In the long term, patient should be evaluated for gastroparesis, small bowel dysmotility and may benefit from pre-motility agents. Recommend outpatient GI consult.   - Will continue to follow    Charlene Fulton MD         Interval History:   2 bowel movements yesterday, C diff negative. Minimal output from G tube to gravity, no further emesis since admission. Abdomen remains distended. Difficult to assess pain, but patient seems to deny pain and there is no gaurding on exam.          Physical Exam:   Blood pressure 99/58, pulse (!) 121, temperature 97.9  F (36.6  C), temperature source Axillary, resp. rate 20, SpO2 99 %.    I/O last 3 completed shifts:  In: 2081 [I.V.:1881; NG/GT:200]  Out: 200 [Urine:200]    General: Disabled male laying in bed, in no acute distress, though somewhat withdrawn  Abdomen: Soft, but distended and tympanitic, no gaurding, healed midline incision with no  hernia.           Data:     Recent Labs   Lab 09/25/21 0818 09/24/21  0531   WBC 7.7 3.1*   HGB 13.2* 17.2   HCT 40.6 52.7   * 107*   PLT 64* 76*     Recent Labs   Lab 09/25/21 0818 09/24/21  0537 09/24/21  0531     --  140    POTASSIUM 4.1  --  4.1   CHLORIDE 111*  --  103   CO2 29  --  30   ANIONGAP 3  --  7   *  --  96   BUN 13  --  32*   CR 0.84 1.4* 1.25   GFRESTIMATED >90 >60 80   ALICE 8.2*  --  9.4   PROTTOTAL  --   --  7.7   ALBUMIN  --   --  3.3*   BILITOTAL  --   --  1.2   ALKPHOS  --   --  77   AST  --   --  15   ALT  --   --  20     Abdominal films: Final read for most recent xray is pending, but there is dilute contrast in the right colon extending up to the hepatic flexure, somewhat increased from early morning xray. There continues to be dilute contrast in the small intestine and some concentrated contrast in the gastric fundus.       Charlene Fulton MD

## 2021-09-25 NOTE — PLAN OF CARE
Mother remains at bedside.  Pt slept most of shift.  VSS on RA w/ exception of being tachy.  GT output 175 mL.  Bowel sounds faint/hypoactive.  Incontinent of bowel and bladder.  Straight cathed for 540 mL retention; pt started voiding during process.  Large, loose BM x2 this shift.    Major Shift Events:  Abdominal Xray     Treatment Plan: IV fluids, antibiotics; ab Xray tomorrow    Teri Williamson RN

## 2021-09-25 NOTE — PLAN OF CARE
End of Shift Summary  For vital signs and complete assessments, please see documentation flowsheets.     Pertinent assessments: Mother remains at bedside overnight. O2 sats stable on 1L NC. No output from GT vent. Tmax 99.3. Bowel sounds faint/hypoactive. Incontinent of bowel and bladder.    Major Shift Events: gastrographen xray, repeat abdominal xray at 0300. Additional xray scheduled for 0700. C. Diff results negative.    Treatment Plan: IV fluids, antibiotics  Bedside Nurse: Velma Mcclure RN

## 2021-09-25 NOTE — PROGRESS NOTES
Brief Hospitalist Cross cover note    Reason for the call : Need for follow-up abdominal x-ray as recommended by radiologist    Reason for admission : Small bowel obstruction    CurrentSignificant findings or change:       Vital signs : /68 (BP Location: Right arm)   Pulse (!) 129   Temp 99.6  F (37.6  C) (Axillary)   Resp 20   SpO2 99%      Labs and Diagnostic Studies reviewed.  o Gastrografin challenge via G-tube on 9/24/2021 at 11:27 PM was resulted which indicated multiple dilated small bowel with no enteric contrast seen within the large bowel.  Subsequent x-ray was recommended in 1 or 2 hours to assess for the progression of the contrast.      Actions taken:    EMR reviewed     Abdominal x-ray ordered      Assessment: Subsequent abdominal x-ray on September 25 at 3 AM showed multiple loops of small bowel throughout the abdomen with no definitive colonic contrast visualized.    Recommendation/Plan:    Continue to monitor for now    Abdominal x-ray planned for 7:00 this morning    Surgery team and hospitalist to see patient for further recommendations    Critical Care time: was 35 minutes for this patient excluding procedures.

## 2021-09-25 NOTE — PROGRESS NOTES
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    Problem List:   1. Recurrent small bowel obstruction.  -Patient has no nausea or vomiting today .  -No bowel movement, bowel sound is absent .  -Gastrografin challenge with contrast now seen in the right colon with contrast of remaining stomach show overall delayed transit.  -Continue G-tube to gravity .  -Surgery is consulted input appreciated, planning to repeat abdominal x-ray tomorrow.  -Patient has history of small bowel obstruction in the past, the last 1 in April 2021 required operative management.  - NPO  -Nausea vomiting protocol .     2.  Sepsis secondary to aspiration pneumonia  3.   Acute Hypoxic Respiratory Failure 2/2 Aspiration PNA.  --Continue IV antibiotic Zosyn.  -Gentle hydration.  -Monitor pulse oximetry.  -Oxygen as needed.  -Hx of chronic lung disease, not on oxygen at baseline.  -Continue nebulizers and bronchodilators .     4.  SALLY - 2/2 hypovolemia.  IVF hydration and repeat BMP in am     5. Autism Spectrum Disorder/Cerebral Palsy/Depression/Anxiety.  -He has underlying language impairment and disability.  -Does not give any history.  -His mother at bedside.  -He is on  Atarax, Seroquel and Gabapentin which unfortunately have no IV formulation.  -We will try some Ativan for anxiety.     6. GERD - hold pta po Famotidine and Omeprazole.  Start IV Protonix     7. Epilepsy - currently on Valproic Acid and Clobazam.         DVT Prophylaxis: Pneumatic Compression Devices  Code Status: Full Code  Discharge Dispo: Continue inpatient  care  Estimated Disch Date / # of Days until Disch: Yet to be determined, when bowel obstruction resolves and patient tolerates tube feeding        Interval History (Subjective):      Patient seen and examined, no nausea or vomiting, his mother at bedside, does not give any history.                  Physical Exam:      Last Vital Signs:  /70 (BP Location: Right arm)   Pulse 115   Temp 98  F (36.7  C) (Axillary)   Resp 20   SpO2 97%     I/O last 3 completed shifts:  In: 2081 [I.V.:1881; NG/GT:200]  Out: 200 [Urine:200]  Wt Readings from Last 1 Encounters:   06/30/21 36.7 kg (81 lb)     Current Facility-Administered Medications   Medication     acetaminophen (TYLENOL) Suppository 650 mg     albuterol (PROVENTIL) neb solution 2.5 mg     bisacodyl (DULCOLAX) Suppository 10 mg     dextrose 5% and 0.45% NaCl + KCl 20 mEq/L infusion     fluticasone-vilanterol (BREO ELLIPTA) 200-25 MCG/INH inhaler 1 puff     HYDROmorphone (DILAUDID) injection 0.2 mg     ipratropium - albuterol 0.5 mg/2.5 mg/3 mL (DUONEB) neb solution 3 mL     lidocaine (LMX4) cream     lidocaine 1 % 0.1-1 mL     LORazepam (ATIVAN) injection 0.5 mg     LORazepam (ATIVAN) injection 1 mg     naloxone (NARCAN) injection 0.2 mg    Or     naloxone (NARCAN) injection 0.4 mg    Or     naloxone (NARCAN) injection 0.2 mg    Or     naloxone (NARCAN) injection 0.4 mg     ondansetron (ZOFRAN-ODT) ODT tab 4 mg    Or     ondansetron (ZOFRAN) injection 4 mg     pantoprazole (PROTONIX) IV push injection 40 mg     piperacillin-tazobactam (ZOSYN) intermittent infusion 4.5 g     prochlorperazine (COMPAZINE) injection 10 mg    Or     prochlorperazine (COMPAZINE) tablet 10 mg    Or     prochlorperazine (COMPAZINE) suppository 25 mg     sodium chloride (PF) 0.9% PF flush 3 mL     sodium chloride (PF) 0.9% PF flush 3 mL     valproate (DEPACON) 250 mg in sodium chloride 0.9 % 50 mL intermittent infusion       Constitutional: Sleeping, opens his eyes, does not give any  history, no agitation.   Respiratory: Clear to auscultation bilaterally, no crackles or wheezing   Cardiovascular: Regular rate and rhythm, normal S1 and S2, and no murmur noted   Abdomen: Soft, distended, no bowel sounds, G-tube in place, draining to gravity.   Skin: No rashes, no cyanosis, dry to touch   Neuro: Unable to assess, patient is nonverbal does not give any history, does not follow any instruction.   Extremities: No edema, normal range of motion   Other(s):HEENT        All other systems: Negative          Medications:      All current medications were reviewed with changes reflected in problem list.         Data:      All new lab and imaging data was reviewed.   Labs:  Recent Labs   Lab 09/25/21 0818 09/24/21  0531   WBC 7.7 3.1*   HGB 13.2* 17.2   HCT 40.6 52.7   * 107*   PLT 64* 76*     Recent Labs   Lab 09/25/21 0818 09/24/21  0537 09/24/21  0531     --  140   POTASSIUM 4.1  --  4.1   CHLORIDE 111*  --  103   CO2 29  --  30   ANIONGAP 3  --  7   *  --  96   BUN 13  --  32*   CR 0.84 1.4* 1.25   GFRESTIMATED >90 >60 80   ALICE 8.2*  --  9.4   PROTTOTAL  --   --  7.7   ALBUMIN  --   --  3.3*   BILITOTAL  --   --  1.2   ALKPHOS  --   --  77   AST  --   --  15   ALT  --   --  20      Imaging:   Results for orders placed or performed during the hospital encounter of 09/24/21   CT Chest (PE) Abdomen Pelvis w Contrast    Narrative    CT CHEST PULMONARY EMBOLUS ABDOMEN AND PELVIS WITH CONTRAST 9/24/2021  7:04 AM    CLINICAL HISTORY: Hypoxia. Abdominal distention. Nausea and vomiting.  History of small bowel obstruction.    TECHNIQUE: CT angiogram chest and routine CT abdomen pelvis with IV  contrast. Arterial phase through the chest and venous phase through  the abdomen and pelvis. 2D and 3D MIP reconstructions were preformed  by the CT technologist. Dose reduction techniques were used.     CONTRAST: 42 mL [id number]    COMPARISON: CT of the abdomen and pelvis performed  4/19/2021.    FINDINGS:  ANGIOGRAM CHEST: Pulmonary arteries are normal caliber and negative  for pulmonary emboli. Thoracic aorta is negative for dissection. No CT  evidence of right heart strain.     LUNGS AND PLEURA: No pleural effusions. Ill-defined region of  consolidation at the right lung base posteriorly is new since the  previous exam. There is also patchy groundglass opacity in both lungs,  greater on the right. No pneumothorax is identified. Multiple images  through the chest are degraded due to patient motion artifact.    MEDIASTINUM/AXILLAE: No enlarged lymph nodes are identified in the  chest. No pericardial effusion.    CORONARY ARTERY CALCIFICATION: None.    HEPATOBILIARY: Unremarkable. No hepatic masses are seen.    PANCREAS: Normal.    SPLEEN: Normal.    ADRENAL GLANDS: Normal.    KIDNEYS/BLADDER: Nonobstructing 0.3 cm stone in the upper pole of the  right kidney. The kidneys are otherwise unremarkable. No  hydronephrosis.    BOWEL: There are multiple mildly thickened loops of distal small bowel  in the right lower quadrant, suggesting enteritis. Proximal to these  thickened distal small bowel loops, there are multiple loops of  dilated gas and fluid-filled small bowel, consistent with associated  small bowel obstruction. Moderate amount of stool in the rectum. Prior  appendectomy.    PELVIC ORGANS: Unremarkable.    LYMPH NODES: No enlarged lymph nodes are identified in the abdomen or  pelvis.    VASCULATURE: Left-sided IVC.    ADDITIONAL FINDINGS: None.    MUSCULOSKELETAL: Thoracolumbar curve, convex right.      Impression    IMPRESSION:  1.  Multiple loops of mildly thickened distal small bowel in the right  lower quadrant suggest enteritis, and are most likely infectious or  inflammatory in etiology.  2.  Multiple dilated loops of gas and fluid-filled small bowel  proximal to the thickened loops of distal small bowel are consistent  with an associated distal small bowel obstruction.  3.   Ill-defined area of consolidation is noted at the right lung base  posteriorly, and there is patchy groundglass opacity in both lungs,  greater on the right. Findings are suspicious for pneumonia.  4.  No evidence for pulmonary embolism.  5.  Nonobstructing 0.3 cm stone in the upper pole of the right kidney.    WON LÓPEZ MD         SYSTEM ID:  [id number]   Abdomen XR 1 vw    Narrative    ABDOMEN ONE VIEW  9/24/2021 10:01 AM     HISTORY: NG tube placement.    COMPARISON: CT of the chest, abdomen, and pelvis performed earlier  today.      Impression    IMPRESSION: No enteric tube is visualized on this exam. Multiple  dilated loops of small bowel throughout the abdomen are consistent  with a distal small bowel obstruction.    WON LÓPEZ MD         SYSTEM ID:  [id number]   XR Gastrografin  Challenge    Narrative    EXAM: XR GASTROGRAFIN CHALLENGE  LOCATION: [facility]  DATE/TIME: 9/24/2021 1:24 PM    INDICATION: SBO, place Gastrografin via G tube  COMPARISON: Abdominal x-ray on same day earlier  TECHNIQUE: Routine.    FINDINGS:  FLUOROSCOPIC TIME: 0 minutes.  NUMBER OF IMAGES: 2.      Impression    IMPRESSION: Supine views of the abdomen and pelvis were obtained. Enteric contrast seen within a few dilated small bowel loops. Multiple other dilated small bowel loops are also noted. No enteric contrast seen within the large bowel. Recommend obtaining   subsequent x-ray in 1 or 2 hours to assess for progression of contrast. Contrast within the urinary bladder from recent contrast administration.   XR Abdomen 1 View    Narrative    EXAM: XR ABDOMEN 1 VIEW  LOCATION: [facility]  DATE/TIME: 09/25/2021, 8:45 AM    INDICATION: Follow up gastro challenge.  COMPARISON: 09/25/2021 at 2:53 AM.      Impression    IMPRESSION: Contrast is present in the right colon. Contrast also present throughout diffuse dilated small bowel.      XR Abdomen 1 View    Narrative    EXAM: XR ABDOMEN  1 VIEW  LOCATION: [facility]  DATE/TIME: 9/25/2021 2:39 AM    INDICATION: Follow-up of Gastrografin challenge  COMPARISON: 09/24/2021.      Impression    IMPRESSION: Multiple loops of dilated small bowel throughout the abdomen. Oral contrast fully opacifies multiple distended loops of small bowel in the left hemiabdomen, with dilute contrast opacifying multiple small bowel loops in the right hemiabdomen,   though no definite colonic contrast visualized at this time. There is retained contrast within the gastric fundus.

## 2021-09-25 NOTE — PLAN OF CARE
Bowel sounds rare. Incontinent of two liquid bowel movements. Sample send for Cdiff and Enteric panel, results pending. Abdomen distended. Agitation noted intermittently. Ativan given and mother verbally calms patient. Lung sounds diminished bases. Desats on room air to 83%. Placed on 2L NC and maintains oxygen greater than 92%. Temp max 100.0. Tele Sinus Tach, rate 115-120s. Pt has gastrograffin xray at end of shift, night shift to follow up with results.

## 2021-09-25 NOTE — PROGRESS NOTES
Pt remains on 1.5 lpm nasal cannula satting 98%  Breath sounds clear   Duo neb given as scheduled this shift. No change after treatment.  RT to monitor and assess as needed.     Lillie Chapa, RRT

## 2021-09-25 NOTE — PROGRESS NOTES
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  Lungs clear, changed to QID  Tobacco history:     Tobacco Use      Smoking status: Never Smoker      Smokeless tobacco: Never Used       Aerosol Therapy initiated:  Duoneb QID and prn      Pulmonary Hygiene initiated:  NA      Volume Expansion initiated:  NA      Current Oxygen Requirements:  2L/min  Current SpO2:  96%    Re-evaluation date:  9/27/21    Patient Education:  Discussed frequency with patients mother      See \"RT Assessments\" flow sheet for patient assessment scoring and Acuity Level Details.    Preeti Sosa, RT         "

## 2021-09-26 ENCOUNTER — APPOINTMENT (OUTPATIENT)
Dept: GENERAL RADIOLOGY | Facility: CLINIC | Age: 26
End: 2021-09-26
Attending: INTERNAL MEDICINE
Payer: COMMERCIAL

## 2021-09-26 LAB
BACTERIA UR CULT: NO GROWTH
GLUCOSE BLDC GLUCOMTR-MCNC: 75 MG/DL (ref 70–99)
GLUCOSE BLDC GLUCOMTR-MCNC: 93 MG/DL (ref 70–99)
MAGNESIUM SERPL-MCNC: 1.6 MG/DL (ref 1.6–2.3)
PHOSPHATE SERPL-MCNC: 2 MG/DL (ref 2.5–4.5)

## 2021-09-26 PROCEDURE — 250N000009 HC RX 250: Performed by: INTERNAL MEDICINE

## 2021-09-26 PROCEDURE — 94640 AIRWAY INHALATION TREATMENT: CPT

## 2021-09-26 PROCEDURE — 84100 ASSAY OF PHOSPHORUS: CPT | Performed by: SURGERY

## 2021-09-26 PROCEDURE — 99231 SBSQ HOSP IP/OBS SF/LOW 25: CPT | Performed by: SURGERY

## 2021-09-26 PROCEDURE — 250N000013 HC RX MED GY IP 250 OP 250 PS 637: Performed by: PHYSICIAN ASSISTANT

## 2021-09-26 PROCEDURE — 250N000013 HC RX MED GY IP 250 OP 250 PS 637: Performed by: INTERNAL MEDICINE

## 2021-09-26 PROCEDURE — 250N000011 HC RX IP 250 OP 636: Performed by: PHYSICIAN ASSISTANT

## 2021-09-26 PROCEDURE — 94640 AIRWAY INHALATION TREATMENT: CPT | Mod: 76

## 2021-09-26 PROCEDURE — 999N000157 HC STATISTIC RCP TIME EA 10 MIN

## 2021-09-26 PROCEDURE — 74018 RADEX ABDOMEN 1 VIEW: CPT

## 2021-09-26 PROCEDURE — 258N000003 HC RX IP 258 OP 636: Performed by: PHYSICIAN ASSISTANT

## 2021-09-26 PROCEDURE — 36415 COLL VENOUS BLD VENIPUNCTURE: CPT | Performed by: SURGERY

## 2021-09-26 PROCEDURE — 120N000001 HC R&B MED SURG/OB

## 2021-09-26 PROCEDURE — 250N000009 HC RX 250: Performed by: PHYSICIAN ASSISTANT

## 2021-09-26 PROCEDURE — 99232 SBSQ HOSP IP/OBS MODERATE 35: CPT | Performed by: INTERNAL MEDICINE

## 2021-09-26 PROCEDURE — 83735 ASSAY OF MAGNESIUM: CPT | Performed by: SURGERY

## 2021-09-26 PROCEDURE — C9113 INJ PANTOPRAZOLE SODIUM, VIA: HCPCS | Performed by: PHYSICIAN ASSISTANT

## 2021-09-26 RX ORDER — HYDROXYZINE HCL 10 MG/5 ML
50 SOLUTION, ORAL ORAL AT BEDTIME
Status: DISCONTINUED | OUTPATIENT
Start: 2021-09-26 | End: 2021-09-29 | Stop reason: HOSPADM

## 2021-09-26 RX ORDER — CLOBAZAM 2.5 MG/ML
7.5 SUSPENSION ORAL EVERY MORNING
Status: DISCONTINUED | OUTPATIENT
Start: 2021-09-26 | End: 2021-09-29 | Stop reason: HOSPADM

## 2021-09-26 RX ORDER — SIMETHICONE 40MG/0.6ML
40 SUSPENSION, DROPS(FINAL DOSAGE FORM)(ML) ORAL 4 TIMES DAILY PRN
Status: DISCONTINUED | OUTPATIENT
Start: 2021-09-26 | End: 2021-09-29 | Stop reason: HOSPADM

## 2021-09-26 RX ORDER — QUETIAPINE FUMARATE 200 MG/1
600 TABLET, FILM COATED ORAL AT BEDTIME
Status: DISCONTINUED | OUTPATIENT
Start: 2021-09-26 | End: 2021-09-29 | Stop reason: HOSPADM

## 2021-09-26 RX ORDER — GABAPENTIN 250 MG/5ML
750 SOLUTION ORAL AT BEDTIME
Status: DISCONTINUED | OUTPATIENT
Start: 2021-09-26 | End: 2021-09-29 | Stop reason: HOSPADM

## 2021-09-26 RX ORDER — SIMETHICONE 40MG/0.6ML
40 SUSPENSION, DROPS(FINAL DOSAGE FORM)(ML) ORAL 4 TIMES DAILY PRN
Status: DISCONTINUED | OUTPATIENT
Start: 2021-09-26 | End: 2021-09-26

## 2021-09-26 RX ORDER — CLOBAZAM 2.5 MG/ML
5 SUSPENSION ORAL AT BEDTIME
Status: DISCONTINUED | OUTPATIENT
Start: 2021-09-26 | End: 2021-09-29 | Stop reason: HOSPADM

## 2021-09-26 RX ORDER — DEXTROSE MONOHYDRATE 100 MG/ML
INJECTION, SOLUTION INTRAVENOUS CONTINUOUS PRN
Status: DISCONTINUED | OUTPATIENT
Start: 2021-09-26 | End: 2021-09-29 | Stop reason: HOSPADM

## 2021-09-26 RX ORDER — LORAZEPAM 1 MG/1
1 TABLET ORAL EVERY 6 HOURS PRN
Status: DISCONTINUED | OUTPATIENT
Start: 2021-09-26 | End: 2021-09-29 | Stop reason: HOSPADM

## 2021-09-26 RX ORDER — POLYETHYLENE GLYCOL 3350 17 G/17G
17 POWDER, FOR SOLUTION ORAL DAILY
Status: DISCONTINUED | OUTPATIENT
Start: 2021-09-26 | End: 2021-09-29 | Stop reason: HOSPADM

## 2021-09-26 RX ADMIN — VALPROATE SODIUM 250 MG: 100 INJECTION, SOLUTION INTRAVENOUS at 08:44

## 2021-09-26 RX ADMIN — POLYETHYLENE GLYCOL 3350 17 G: 17 POWDER, FOR SOLUTION ORAL at 12:15

## 2021-09-26 RX ADMIN — IPRATROPIUM BROMIDE AND ALBUTEROL SULFATE 3 ML: .5; 3 SOLUTION RESPIRATORY (INHALATION) at 20:32

## 2021-09-26 RX ADMIN — TAZOBACTAM SODIUM AND PIPERACILLIN SODIUM 4.5 G: 500; 4 INJECTION, SOLUTION INTRAVENOUS at 18:25

## 2021-09-26 RX ADMIN — IPRATROPIUM BROMIDE AND ALBUTEROL SULFATE 3 ML: .5; 3 SOLUTION RESPIRATORY (INHALATION) at 11:48

## 2021-09-26 RX ADMIN — POTASSIUM CHLORIDE, DEXTROSE MONOHYDRATE AND SODIUM CHLORIDE: 150; 5; 450 INJECTION, SOLUTION INTRAVENOUS at 06:22

## 2021-09-26 RX ADMIN — ACETAMINOPHEN 650 MG: 650 SUPPOSITORY RECTAL at 15:27

## 2021-09-26 RX ADMIN — VALPROATE SODIUM 250 MG: 100 INJECTION, SOLUTION INTRAVENOUS at 02:00

## 2021-09-26 RX ADMIN — IPRATROPIUM BROMIDE AND ALBUTEROL SULFATE 3 ML: .5; 3 SOLUTION RESPIRATORY (INHALATION) at 07:35

## 2021-09-26 RX ADMIN — CLOBAZAM 5 MG: 2.5 SUSPENSION ORAL at 21:54

## 2021-09-26 RX ADMIN — PANTOPRAZOLE SODIUM 40 MG: 40 INJECTION, POWDER, FOR SOLUTION INTRAVENOUS at 11:18

## 2021-09-26 RX ADMIN — TAZOBACTAM SODIUM AND PIPERACILLIN SODIUM 4.5 G: 500; 4 INJECTION, SOLUTION INTRAVENOUS at 00:17

## 2021-09-26 RX ADMIN — IPRATROPIUM BROMIDE AND ALBUTEROL SULFATE 3 ML: .5; 3 SOLUTION RESPIRATORY (INHALATION) at 15:13

## 2021-09-26 RX ADMIN — TAZOBACTAM SODIUM AND PIPERACILLIN SODIUM 4.5 G: 500; 4 INJECTION, SOLUTION INTRAVENOUS at 12:23

## 2021-09-26 RX ADMIN — QUETIAPINE FUMARATE 600 MG: 200 TABLET ORAL at 21:54

## 2021-09-26 RX ADMIN — CLOBAZAM 7.5 MG: 2.5 SUSPENSION ORAL at 12:15

## 2021-09-26 RX ADMIN — GABAPENTIN 750 MG: 250 SUSPENSION ORAL at 21:54

## 2021-09-26 RX ADMIN — TAZOBACTAM SODIUM AND PIPERACILLIN SODIUM 4.5 G: 500; 4 INJECTION, SOLUTION INTRAVENOUS at 05:54

## 2021-09-26 RX ADMIN — VALPROATE SODIUM 250 MG: 100 INJECTION, SOLUTION INTRAVENOUS at 15:03

## 2021-09-26 RX ADMIN — HYDROXYZINE HYDROCHLORIDE 50 MG: 10 SOLUTION ORAL at 21:54

## 2021-09-26 RX ADMIN — VALPROATE SODIUM 250 MG: 100 INJECTION, SOLUTION INTRAVENOUS at 19:51

## 2021-09-26 RX ADMIN — LORAZEPAM 0.5 MG: 2 INJECTION INTRAMUSCULAR; INTRAVENOUS at 05:53

## 2021-09-26 ASSESSMENT — ACTIVITIES OF DAILY LIVING (ADL)
ADLS_ACUITY_SCORE: 26
ADLS_ACUITY_SCORE: 27
ADLS_ACUITY_SCORE: 26
ADLS_ACUITY_SCORE: 27

## 2021-09-26 NOTE — PLAN OF CARE
End of Shift Summary  For vital signs and complete assessments, please see documentation flowsheets.     Pertinent assessments: Mother remains at bedside.  Pt slept most of shift.  VSS on RA w/ exception of being tachy.  GT output 100 mL.  Bowel sounds faint/hypoactive.  Incontinent of bowel and bladder.  Straight cathed for  mL retention; pt started voiding during process.      Major Shift Events:  lethargic, too sleepy      Treatment Plan: IV fluids, antibiotics; ab Xray tomorrow    Bedside Nurse: Crystal Enriquez RN

## 2021-09-26 NOTE — CONSULTS
NUTRITION ASSESSMENT    REASON FOR NUTRITION CONSULT:  Provider Order  - TF assess and order    ASSESSMENT:  Defer complete nutrition assessment - writer off site, on call   Resume trophic rate and increase slowly per MD request  Typically boluses Pediasure 1.5 at baseline, patient familiar to [facility] RD team from past admits    INTERVENTIONS:  TF: Osmolite 1.5 (sub for Pediasure 1.5 until able to obtain at [facility] or bring from home) at 10 mL/hr and increase by 10 mL q8 hours as tolerated  Free water flush 60 mL q4 hours  Goal rate, free water orders to be updated as tolerance re-established.   Phos and Mg add on  Discussed with Darcy (mother) on phone, in agreement with plan    FOLLOW UP:   Will follow up with complete assessment as schedule, patient availability allows 9/27    Ny Phelps MS, RDN-AP, LD, [facility]  Pager - 3rd floor/ICU: [phone]  Pager - All other floors: [phone]  Pager - Weekend/holiday: [phone]  Office: [phone]

## 2021-09-26 NOTE — PROGRESS NOTES
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    Problem List:   1. Recurrent small bowel obstruction.  -Patient has no nausea or vomiting today .  -He had large bowel movement last night, bowel sound is present today and his abdomen is soft.  -Gastrografin challenge with contrast now seen in the colon with delayed transit time.  -G-tube was to gravity until this morning.  It will be flushed and will start tube feeding at a lower rate and will advance as he tolerates.  -The bowel obstruction seems to be resolving  -Surgery is consulted input appreciated.  -Patient has history of small bowel obstruction in the past, the last 1 in April 2021 required operative management.  - NPO  -Nausea vomiting protocol.  -We will resume his prior to admission home medications, will give him via G-tube.  -Continue IV valproic acid for today, may transition to per G-tube tomorrow.  -Nutrition is consulted for tube feeding.     2.  Sepsis secondary to aspiration pneumonia  3.   Acute Hypoxic Respiratory Failure 2/2 Aspiration PNA.  -Continue IV antibiotic Zosyn.  -Gentle hydration, will decrease IV fluids to 50 mils per hour, will start it when patient is able to take nutrition and fluids through G-tube.  -Monitor pulse oximetry.  -Oxygen as needed currently on 1L via oxygen mask, expect to come off oxygen soon.  -Hx of chronic lung disease, not on oxygen at baseline.  -Continue nebulizers and bronchodilators .     4.  SALLY - 2/2 hypovolemia.  IVF hydration and repeat BMP in am     5. Autism Spectrum Disorder/Cerebral  Palsy/Depression/Anxiety.  -He has underlying language impairment and disability.  -Does not give any history.  -His mother at bedside.  -He is on  Atarax, Seroquel and Gabapentin which unfortunately have no IV formulation.  -We will try some Ativan for anxiety.     6. GERD -continue IV Protonix for today, transition to prior to admission p.o. famotidine and omeprazole tomorrow.     7. Epilepsy - Currently on Valproic Acid and started clobazam.  Discontinue IV Ativan.  -May transition valproate to valproic acid per G tube  Tomorrow.         DVT Prophylaxis: Pneumatic Compression Devices  Code Status: Full Code  Discharge Dispo: Continue inpatient care.  Estimated Disch Date / # of Days until Disch: Yet to be determined, when bowel obstruction resolves and patient tolerates tube feeding        Interval History (Subjective):      Patient seen and examined, sleeping this morning, mother at bedside, overall improving, had bowel movement, no nausea or vomiting.                  Physical Exam:      Last Vital Signs:  /75 (BP Location: Left arm)   Pulse (!) 125   Temp 99.7  F (37.6  C) (Axillary)   Resp 28   SpO2 95%     I/O last 3 completed shifts:  In: 3018 [I.V.:3018]  Out: 375 [Urine:100; Emesis/NG output:275]  Wt Readings from Last 1 Encounters:   06/30/21 36.7 kg (81 lb)     Current Facility-Administered Medications   Medication     acetaminophen (TYLENOL) Suppository 650 mg     albuterol (PROVENTIL) neb solution 2.5 mg     bisacodyl (DULCOLAX) Suppository 10 mg     clobazam ((ONFI)) suspension 5 mg     clobazam ((ONFI)) suspension 7.5 mg     dextrose 10% infusion     dextrose 5% and 0.45% NaCl + KCl 20 mEq/L infusion     fluticasone-salmeterol (ADVAIR-HFA) 115-21 MCG/ACT Inhaler 2 puff     gabapentin (NEURONTIN) solution 750 mg     HYDROmorphone (DILAUDID) injection 0.2 mg     hydrOXYzine (ATARAX) syrup 50 mg     ipratropium - albuterol 0.5 mg/2.5 mg/3 mL (DUONEB) neb solution 3 mL     lidocaine (LMX4)  cream     lidocaine 1 % 0.1-1 mL     LORazepam (ATIVAN) injection 1 mg     LORazepam (ATIVAN) tablet 1 mg     naloxone (NARCAN) injection 0.2 mg    Or     naloxone (NARCAN) injection 0.4 mg    Or     naloxone (NARCAN) injection 0.2 mg    Or     naloxone (NARCAN) injection 0.4 mg     ondansetron (ZOFRAN-ODT) ODT tab 4 mg    Or     ondansetron (ZOFRAN) injection 4 mg     pantoprazole (PROTONIX) IV push injection 40 mg     piperacillin-tazobactam (ZOSYN) intermittent infusion 4.5 g     polyethylene glycol (MIRALAX) Packet 17 g     prochlorperazine (COMPAZINE) injection 10 mg    Or     prochlorperazine (COMPAZINE) tablet 10 mg    Or     prochlorperazine (COMPAZINE) suppository 25 mg     QUEtiapine (SEROquel) tablet 600 mg     simethicone (MYLICON) suspension 40 mg     sodium chloride (PF) 0.9% PF flush 3 mL     sodium chloride (PF) 0.9% PF flush 3 mL     valproate (DEPACON) 250 mg in sodium chloride 0.9 % 50 mL intermittent infusion       Constitutional: Sleeping, opens his eyes, does not give any history, no agitation.   Respiratory: Clear to auscultation bilaterally, no crackles or wheezing   Cardiovascular: Regular rate and rhythm, normal S1 and S2, and no murmur noted   Abdomen: Soft, distended, no bowel sounds, G-tube in place, draining to gravity.   Skin: No rashes, no cyanosis, dry to touch   Neuro: Unable to assess, patient is nonverbal does not give any history, does not follow any instruction.   Extremities: No edema, normal range of motion   Other(s):HEENT        All other systems: Negative          Medications:      All current medications were reviewed with changes reflected in problem list.         Data:      All new lab and imaging data was reviewed.   Labs:  Recent Labs   Lab 09/25/21  0818 09/24/21  0531   WBC 7.7 3.1*   HGB 13.2* 17.2   HCT 40.6 52.7   * 107*   PLT 64* 76*     Recent Labs   Lab 09/26/21  1205 09/25/21  0818 09/24/21  0537 09/24/21  0531   NA  --  143  --  140   POTASSIUM  --   4.1  --  4.1   CHLORIDE  --  111*  --  103   CO2  --  29  --  30   ANIONGAP  --  3  --  7   GLC  --  106*  --  96   BUN  --  13  --  32*   CR  --  0.84 1.4* 1.25   GFRESTIMATED  --  >90 >60 80   ALICE  --  8.2*  --  9.4   MAG 1.6  --   --   --    PHOS 2.0*  --   --   --    PROTTOTAL  --   --   --  7.7   ALBUMIN  --   --   --  3.3*   BILITOTAL  --   --   --  1.2   ALKPHOS  --   --   --  77   AST  --   --   --  15   ALT  --   --   --  20      Imaging:   Results for orders placed or performed during the hospital encounter of 09/24/21   CT Chest (PE) Abdomen Pelvis w Contrast    Narrative    CT CHEST PULMONARY EMBOLUS ABDOMEN AND PELVIS WITH CONTRAST 9/24/2021  7:04 AM    CLINICAL HISTORY: Hypoxia. Abdominal distention. Nausea and vomiting.  History of small bowel obstruction.    TECHNIQUE: CT angiogram chest and routine CT abdomen pelvis with IV  contrast. Arterial phase through the chest and venous phase through  the abdomen and pelvis. 2D and 3D MIP reconstructions were preformed  by the CT technologist. Dose reduction techniques were used.     CONTRAST: 42 mL [id number]    COMPARISON: CT of the abdomen and pelvis performed 4/19/2021.    FINDINGS:  ANGIOGRAM CHEST: Pulmonary arteries are normal caliber and negative  for pulmonary emboli. Thoracic aorta is negative for dissection. No CT  evidence of right heart strain.     LUNGS AND PLEURA: No pleural effusions. Ill-defined region of  consolidation at the right lung base posteriorly is new since the  previous exam. There is also patchy groundglass opacity in both lungs,  greater on the right. No pneumothorax is identified. Multiple images  through the chest are degraded due to patient motion artifact.    MEDIASTINUM/AXILLAE: No enlarged lymph nodes are identified in the  chest. No pericardial effusion.    CORONARY ARTERY CALCIFICATION: None.    HEPATOBILIARY: Unremarkable. No hepatic masses are seen.    PANCREAS: Normal.    SPLEEN: Normal.    ADRENAL GLANDS:  Normal.    KIDNEYS/BLADDER: Nonobstructing 0.3 cm stone in the upper pole of the  right kidney. The kidneys are otherwise unremarkable. No  hydronephrosis.    BOWEL: There are multiple mildly thickened loops of distal small bowel  in the right lower quadrant, suggesting enteritis. Proximal to these  thickened distal small bowel loops, there are multiple loops of  dilated gas and fluid-filled small bowel, consistent with associated  small bowel obstruction. Moderate amount of stool in the rectum. Prior  appendectomy.    PELVIC ORGANS: Unremarkable.    LYMPH NODES: No enlarged lymph nodes are identified in the abdomen or  pelvis.    VASCULATURE: Left-sided IVC.    ADDITIONAL FINDINGS: None.    MUSCULOSKELETAL: Thoracolumbar curve, convex right.      Impression    IMPRESSION:  1.  Multiple loops of mildly thickened distal small bowel in the right  lower quadrant suggest enteritis, and are most likely infectious or  inflammatory in etiology.  2.  Multiple dilated loops of gas and fluid-filled small bowel  proximal to the thickened loops of distal small bowel are consistent  with an associated distal small bowel obstruction.  3.  Ill-defined area of consolidation is noted at the right lung base  posteriorly, and there is patchy groundglass opacity in both lungs,  greater on the right. Findings are suspicious for pneumonia.  4.  No evidence for pulmonary embolism.  5.  Nonobstructing 0.3 cm stone in the upper pole of the right kidney.    WON LÓPEZ MD         SYSTEM ID:  [id number]   Abdomen XR 1 vw    Narrative    ABDOMEN ONE VIEW  9/24/2021 10:01 AM     HISTORY: NG tube placement.    COMPARISON: CT of the chest, abdomen, and pelvis performed earlier  today.      Impression    IMPRESSION: No enteric tube is visualized on this exam. Multiple  dilated loops of small bowel throughout the abdomen are consistent  with a distal small bowel obstruction.    WON LÓPEZ MD         SYSTEM ID:  [id number]   XR Gastrografin   Challenge    Narrative    EXAM: XR GASTROGRAFIN CHALLENGE  LOCATION: [facility]  DATE/TIME: 9/24/2021 1:24 PM    INDICATION: SBO, place Gastrografin via G tube  COMPARISON: Abdominal x-ray on same day earlier  TECHNIQUE: Routine.    FINDINGS:  FLUOROSCOPIC TIME: 0 minutes.  NUMBER OF IMAGES: 2.      Impression    IMPRESSION: Supine views of the abdomen and pelvis were obtained. Enteric contrast seen within a few dilated small bowel loops. Multiple other dilated small bowel loops are also noted. No enteric contrast seen within the large bowel. Recommend obtaining   subsequent x-ray in 1 or 2 hours to assess for progression of contrast. Contrast within the urinary bladder from recent contrast administration.   XR Abdomen 1 View    Narrative    EXAM: XR ABDOMEN 1 VIEW  LOCATION: [facility]  DATE/TIME: 09/25/2021, 8:45 AM    INDICATION: Follow up gastro challenge.  COMPARISON: 09/25/2021 at 2:53 AM.      Impression    IMPRESSION: Contrast is present in the right colon. Contrast also present throughout diffuse dilated small bowel.      XR Abdomen 1 View    Narrative    EXAM: XR ABDOMEN 1 VIEW  LOCATION: [facility]  DATE/TIME: 9/25/2021 2:39 AM    INDICATION: Follow-up of Gastrografin challenge  COMPARISON: 09/24/2021.      Impression    IMPRESSION: Multiple loops of dilated small bowel throughout the abdomen. Oral contrast fully opacifies multiple distended loops of small bowel in the left hemiabdomen, with dilute contrast opacifying multiple small bowel loops in the right hemiabdomen,   though no definite colonic contrast visualized at this time. There is retained contrast within the gastric fundus.

## 2021-09-26 NOTE — PLAN OF CARE
End of Shift Summary  For vital signs and complete assessments, please see documentation flowsheets.     Pertinent assessments: Mother remains at bedside.  Pt slept most of shift.  VSS on RA w/ exception of being tachy.  GT output 100 mL.  Bowel sounds faint/hypoactive.  Incontinent of bowel and bladder. PVR-214 cc,; pt started voiding.      Major Shift Events:  lethargic, too sleepy      Treatment Plan: IV fluids, antibiotics; ab Xray tomorrow    Bedside Nurse: Crystal Enriquez RN

## 2021-09-26 NOTE — PLAN OF CARE
Mother remains at bedside.  Pt slept most of shift; very lethargic.  O2 stable on 1L NC.  Tachy.  Tylenol supp given x1 for elevated temp.  Dyspnea w/ movement in bed.  Strong, unproductive cough w/ movement.  Incontinent of bowel and bladder.  Pt voiding adequately.  Tolerating tube feeding well.  BG 75 and 93.    Major Shift Events:  Tube feeding resumed this afternoon at 10 mL/hr     Treatment Plan: IV fluids, antibiotics; Tube feeding    Teri Williamson RN

## 2021-09-26 NOTE — PROGRESS NOTES
[facility]  General Surgery Progress Note           Assessment and Plan:   Assessment:   HD#3 with SBO vs adynamic ileus.    Gastrografin challenge with contrast throughout colon now, and multiple bowel movements      Plan:   - May begin trickle tube feeds. Normally the patient does 40-48 ounces of tube feeds daily (average 60 ml/hr). Will start with 10 ml/hr and increase by 10 ml/hr every 8 hours today as tolerated.  - May begin meds by G tube (appreciate hospitalist assistance with switching these over)  - If vomiting returns, may still need to consider exploration.   - Try to get the patient up and out of bed more to encourage bowel function. Patient is ambulatory at baseline.   - Optimize electrolytes.   - In the long term, patient should be evaluated for gastroparesis, small bowel dysmotility and may benefit from pre-motility agents. Recommend outpatient GI consult.   - Will continue to follow    Charlene Fulton MD         Interval History:   3 bowel movements yesterday, Minimal output from G tube to gravity, no further emesis since admission. Abdomen less distended today and closer to baseline per mom. Difficult to assess pain, but patient seems to deny pain and there is no gaurding on exam.          Physical Exam:   Blood pressure 105/64, pulse 114, temperature 98.4  F (36.9  C), temperature source Axillary, resp. rate 28, SpO2 95 %.    I/O last 3 completed shifts:  In: 3018 [I.V.:3018]  Out: 375 [Urine:100; Emesis/NG output:275]    General: Disabled male laying in bed, in no acute distress, though somnolent  Abdomen: Soft, mildly distended and tympanitic, no gaurding, healed midline incision with no  hernia.           Data:     Recent Labs   Lab 09/25/21 0818 09/24/21  0531   WBC 7.7 3.1*   HGB 13.2* 17.2   HCT 40.6 52.7   * 107*   PLT 64* 76*     Recent Labs   Lab 09/25/21 0818 09/24/21  0537 09/24/21  0531     --  140   POTASSIUM 4.1  --  4.1   CHLORIDE 111*  --  103   CO2 29  --   30   ANIONGAP 3  --  7   *  --  96   BUN 13  --  32*   CR 0.84 1.4* 1.25   GFRESTIMATED >90 >60 80   ALICE 8.2*  --  9.4   PROTTOTAL  --   --  7.7   ALBUMIN  --   --  3.3*   BILITOTAL  --   --  1.2   ALKPHOS  --   --  77   AST  --   --  15   ALT  --   --  20     Abdominal films:   Contrast throughout colon and rectum      Charlene Fulton MD        no

## 2021-09-27 ENCOUNTER — MYC MEDICAL ADVICE (OUTPATIENT)
Dept: PEDIATRICS | Facility: CLINIC | Age: 26
End: 2021-09-27

## 2021-09-27 ENCOUNTER — APPOINTMENT (OUTPATIENT)
Dept: CT IMAGING | Facility: CLINIC | Age: 26
End: 2021-09-27
Attending: INTERNAL MEDICINE
Payer: COMMERCIAL

## 2021-09-27 LAB
ANION GAP SERPL CALCULATED.3IONS-SCNC: 3 MMOL/L (ref 3–14)
BUN SERPL-MCNC: 3 MG/DL (ref 7–30)
CALCIUM SERPL-MCNC: 8.1 MG/DL (ref 8.5–10.1)
CHLORIDE BLD-SCNC: 109 MMOL/L (ref 94–109)
CO2 SERPL-SCNC: 32 MMOL/L (ref 20–32)
CREAT SERPL-MCNC: 0.71 MG/DL (ref 0.66–1.25)
D DIMER PPP FEU-MCNC: 4.82 UG/ML FEU (ref 0–0.5)
GFR SERPL CREATININE-BSD FRML MDRD: >90 ML/MIN/1.73M2
GLUCOSE BLD-MCNC: 94 MG/DL (ref 70–99)
MAGNESIUM SERPL-MCNC: 1.8 MG/DL (ref 1.6–2.3)
PHOSPHATE SERPL-MCNC: 1.8 MG/DL (ref 2.5–4.5)
PHOSPHATE SERPL-MCNC: 1.8 MG/DL (ref 2.5–4.5)
POTASSIUM BLD-SCNC: 3.5 MMOL/L (ref 3.4–5.3)
SODIUM SERPL-SCNC: 144 MMOL/L (ref 133–144)

## 2021-09-27 PROCEDURE — 250N000009 HC RX 250: Performed by: PHYSICIAN ASSISTANT

## 2021-09-27 PROCEDURE — 120N000001 HC R&B MED SURG/OB

## 2021-09-27 PROCEDURE — 250N000011 HC RX IP 250 OP 636: Performed by: INTERNAL MEDICINE

## 2021-09-27 PROCEDURE — 84100 ASSAY OF PHOSPHORUS: CPT | Performed by: INTERNAL MEDICINE

## 2021-09-27 PROCEDURE — 250N000013 HC RX MED GY IP 250 OP 250 PS 637: Performed by: INTERNAL MEDICINE

## 2021-09-27 PROCEDURE — 83735 ASSAY OF MAGNESIUM: CPT | Performed by: INTERNAL MEDICINE

## 2021-09-27 PROCEDURE — 999N000157 HC STATISTIC RCP TIME EA 10 MIN

## 2021-09-27 PROCEDURE — 85379 FIBRIN DEGRADATION QUANT: CPT | Performed by: INTERNAL MEDICINE

## 2021-09-27 PROCEDURE — 250N000009 HC RX 250: Performed by: INTERNAL MEDICINE

## 2021-09-27 PROCEDURE — 36415 COLL VENOUS BLD VENIPUNCTURE: CPT | Performed by: INTERNAL MEDICINE

## 2021-09-27 PROCEDURE — 93005 ELECTROCARDIOGRAM TRACING: CPT

## 2021-09-27 PROCEDURE — 258N000003 HC RX IP 258 OP 636: Performed by: INTERNAL MEDICINE

## 2021-09-27 PROCEDURE — 94640 AIRWAY INHALATION TREATMENT: CPT | Mod: 76

## 2021-09-27 PROCEDURE — 71275 CT ANGIOGRAPHY CHEST: CPT

## 2021-09-27 PROCEDURE — 250N000011 HC RX IP 250 OP 636: Performed by: PHYSICIAN ASSISTANT

## 2021-09-27 PROCEDURE — C9113 INJ PANTOPRAZOLE SODIUM, VIA: HCPCS | Performed by: PHYSICIAN ASSISTANT

## 2021-09-27 PROCEDURE — 258N000003 HC RX IP 258 OP 636: Performed by: PHYSICIAN ASSISTANT

## 2021-09-27 PROCEDURE — 99233 SBSQ HOSP IP/OBS HIGH 50: CPT | Performed by: INTERNAL MEDICINE

## 2021-09-27 PROCEDURE — 80048 BASIC METABOLIC PNL TOTAL CA: CPT | Performed by: INTERNAL MEDICINE

## 2021-09-27 PROCEDURE — 94640 AIRWAY INHALATION TREATMENT: CPT

## 2021-09-27 PROCEDURE — 272N000078 HC NUTRITION PRODUCT INTERMEDIATE LITER

## 2021-09-27 RX ORDER — SODIUM CHLORIDE 9 MG/ML
INJECTION, SOLUTION INTRAVENOUS CONTINUOUS
Status: DISCONTINUED | OUTPATIENT
Start: 2021-09-27 | End: 2021-09-29

## 2021-09-27 RX ORDER — IOPAMIDOL 755 MG/ML
500 INJECTION, SOLUTION INTRAVASCULAR ONCE
Status: COMPLETED | OUTPATIENT
Start: 2021-09-27 | End: 2021-09-27

## 2021-09-27 RX ADMIN — IPRATROPIUM BROMIDE AND ALBUTEROL SULFATE 3 ML: .5; 3 SOLUTION RESPIRATORY (INHALATION) at 08:03

## 2021-09-27 RX ADMIN — VALPROATE SODIUM 250 MG: 100 INJECTION, SOLUTION INTRAVENOUS at 08:36

## 2021-09-27 RX ADMIN — CLOBAZAM 7.5 MG: 2.5 SUSPENSION ORAL at 09:13

## 2021-09-27 RX ADMIN — GABAPENTIN 750 MG: 250 SUSPENSION ORAL at 21:40

## 2021-09-27 RX ADMIN — TAZOBACTAM SODIUM AND PIPERACILLIN SODIUM 4.5 G: 500; 4 INJECTION, SOLUTION INTRAVENOUS at 23:58

## 2021-09-27 RX ADMIN — TAZOBACTAM SODIUM AND PIPERACILLIN SODIUM 4.5 G: 500; 4 INJECTION, SOLUTION INTRAVENOUS at 06:02

## 2021-09-27 RX ADMIN — TAZOBACTAM SODIUM AND PIPERACILLIN SODIUM 4.5 G: 500; 4 INJECTION, SOLUTION INTRAVENOUS at 17:42

## 2021-09-27 RX ADMIN — IPRATROPIUM BROMIDE AND ALBUTEROL SULFATE 3 ML: .5; 3 SOLUTION RESPIRATORY (INHALATION) at 19:31

## 2021-09-27 RX ADMIN — SODIUM CHLORIDE 500 ML: 9 INJECTION, SOLUTION INTRAVENOUS at 11:19

## 2021-09-27 RX ADMIN — CLOBAZAM 5 MG: 2.5 SUSPENSION ORAL at 21:40

## 2021-09-27 RX ADMIN — SODIUM CHLORIDE: 9 INJECTION, SOLUTION INTRAVENOUS at 20:32

## 2021-09-27 RX ADMIN — SODIUM CHLORIDE 64 ML: 9 INJECTION, SOLUTION INTRAVENOUS at 13:50

## 2021-09-27 RX ADMIN — TAZOBACTAM SODIUM AND PIPERACILLIN SODIUM 4.5 G: 500; 4 INJECTION, SOLUTION INTRAVENOUS at 12:25

## 2021-09-27 RX ADMIN — PANTOPRAZOLE SODIUM 40 MG: 40 INJECTION, POWDER, FOR SOLUTION INTRAVENOUS at 11:16

## 2021-09-27 RX ADMIN — IPRATROPIUM BROMIDE AND ALBUTEROL SULFATE 3 ML: .5; 3 SOLUTION RESPIRATORY (INHALATION) at 15:27

## 2021-09-27 RX ADMIN — POTASSIUM & SODIUM PHOSPHATES POWDER PACK 280-160-250 MG 2 PACKET: 280-160-250 PACK at 21:40

## 2021-09-27 RX ADMIN — QUETIAPINE FUMARATE 600 MG: 200 TABLET ORAL at 21:39

## 2021-09-27 RX ADMIN — LORAZEPAM 1 MG: 1 TABLET ORAL at 21:39

## 2021-09-27 RX ADMIN — IPRATROPIUM BROMIDE AND ALBUTEROL SULFATE 3 ML: .5; 3 SOLUTION RESPIRATORY (INHALATION) at 11:51

## 2021-09-27 RX ADMIN — HYDROXYZINE HYDROCHLORIDE 50 MG: 10 SOLUTION ORAL at 21:40

## 2021-09-27 RX ADMIN — IOPAMIDOL 41 ML: 755 INJECTION, SOLUTION INTRAVENOUS at 13:50

## 2021-09-27 RX ADMIN — VALPROATE SODIUM 250 MG: 100 INJECTION, SOLUTION INTRAVENOUS at 14:16

## 2021-09-27 RX ADMIN — VALPROATE SODIUM 250 MG: 100 INJECTION, SOLUTION INTRAVENOUS at 20:28

## 2021-09-27 RX ADMIN — VALPROATE SODIUM 250 MG: 100 INJECTION, SOLUTION INTRAVENOUS at 01:53

## 2021-09-27 RX ADMIN — TAZOBACTAM SODIUM AND PIPERACILLIN SODIUM 4.5 G: 500; 4 INJECTION, SOLUTION INTRAVENOUS at 00:01

## 2021-09-27 RX ADMIN — POTASSIUM & SODIUM PHOSPHATES POWDER PACK 280-160-250 MG 2 PACKET: 280-160-250 PACK at 15:30

## 2021-09-27 RX ADMIN — LORAZEPAM 1 MG: 1 TABLET ORAL at 15:30

## 2021-09-27 ASSESSMENT — ACTIVITIES OF DAILY LIVING (ADL)
ADLS_ACUITY_SCORE: 26
ADLS_ACUITY_SCORE: 30
ADLS_ACUITY_SCORE: 30
ADLS_ACUITY_SCORE: 26
ADLS_ACUITY_SCORE: 30
ADLS_ACUITY_SCORE: 30

## 2021-09-27 ASSESSMENT — MIFFLIN-ST. JEOR: SCORE: 1253.88

## 2021-09-27 NOTE — PLAN OF CARE
End of Shift Summary  For vital signs and complete assessments, please see documentation flowsheets.     Pertinent assessments: Mother remains at bedside.  Pt slept most of shift; very lethargic.  O2 stable on 1L  mask ,  Tachy..  Dyspnea w/ movement in bed.  Strong, unproductive cough w/ movement.  Incontinent of bowel and bladder.  Pt voiding adequately.  Tolerating tube feeding well.     Major Shift Events:  Tube feeding adjust this night at 20 mL/hr     Treatment Plan: IV fluids, antibiotics; Tube feeding    Bedside Nurse: Crystal Enriquez RN

## 2021-09-27 NOTE — PLAN OF CARE
DX: SBO  Tele: S-Tach HR low 100's to 130's  A&O to self  Activity: bed rest  Diet: NPO G-tube continuous feeding increased to 40 ml/hr at 1730 increase Q8 until at goal rate of 50mL/hr  VSS: Q4 vitals and neuros  O2: RA  BG: na  PIV: NS 75ml/hr-RA, SL LA  Pain: denies  GI/: incontinent external catheter on, 1loose bm today   Labs: Phos 1.8, 1 of 3 replacements given, needs to be rechecked 6 hrs after last replacment which is 9/28 am,  D-Dim 4.82, CT chest see results, 9/28 am labs scheduled    Discharge: TBD continue plan of care.   IV Zosyn Q6, pt is legally blind  Ativan given x1 1530 prn q6   IV Zosyn Q6

## 2021-09-27 NOTE — PROGRESS NOTES
End of Shift Summary  For vital signs and complete assessments, please see documentation flowsheets.     Pertinent assessments: Mother stay in room whole shift.  Pt slept most of shift; very lethargic.  O2 stable on 1L  mask ,  Tachy.  Dyspnea w/ movement in bed.  Strong, unproductive cough w/ movement.  Incontinent of bowel and bladder.  Pt voiding adequately.  Tolerating tube feeding well. .    Major Shift Events:  Tube feeding resumed this night at 20 mL/hr     Treatment Plan: IV fluids, antibiotics; Tube feeding    Bedside Nurse: Crystal Enriquez RN

## 2021-09-27 NOTE — PROGRESS NOTES
[facility]  C/S for SBO  Pt seen and examined, imaging reviewed, weekend progress noted  Continues to have multiple loose stools, abd less distended per mother, no emesis since admission, currently running TF at 20mL/hr  AXR from yesterday showed contrast in colon  Tmax 100.3, current 99.8 P136  NAD, resting, comfortable  Abd soft, minimally distended, bowel tones present in all quadrants.  Improving, resolving SBO, agree with Dr Fulton that this episode may represent an underlying dysmotility issue.  Should be able to gradually increase TF to goal today  Continue treatment of pneumonia  Encouraged pt's mother to discuss prokinetics when he sees his gastroenterologist next

## 2021-09-27 NOTE — CONSULTS
CLINICAL NUTRITION SERVICES  -  ASSESSMENT NOTE      Recommendations:   Diet per MD.  Strict NPO at baseline.    Update goal rate to meet home calorie needs:  - Access: GT  - Frequency: Continuous  - Osmolite 1.5 at 50 mL/hr   - 1200 mL provides 1800 kcal (45 kcal/kg), 76 g protein (1.9 g protein/kg), 245 g CHO, 0 g fiber, and 914 mL free water daily  - Free water flushes: 60 mL q 4 hrs while on IVFs, increase to 150 mL q 4 hrs when off IVFs  - Meets 100% RDIs  - Continue advancement of 10 mL q 8 hrs until goal rate reached    On D5, paged MD to change to non-dextrose with unclear refeeding risk.      Phosphorus yesterday low at 2.0, replace per policy/protocol (discussed with RN).  BMP, phosphorus, and magnesium ordered for today.      Admit wt ordered.       MALNUTRITION:  % Weight Loss:  None noted  % Intake:  Decreased intake does not meet criteria for malnutrition   Subcutaneous Fat Loss:  Orbital region mild to moderate depletion --> not using as indicator with only 1 region present, CP hx  Muscle Loss:  Temporal region moderate depletion --> not using as indicator with only 1 region present, CP hx  Fluid Retention: None documented    Malnutrition Diagnosis: Patient does not meet two of the above criteria necessary for diagnosing malnutrition        REASON FOR ASSESSMENT  Rj Licea is a 26 year old male seen by Registered Dietitian for Provider Order - Registered Dietitian to Assess and Order TF per Medical Nutrition Therapy Protocol (refer to RD brief note from yesterday).    PMH of: GERD, autism, retinopathy of prematurity w/ blindness, seizure disorder, SBO.    Admit 2/2: N/V, found to have recurrent SBO, aspiration PNA.    NUTRITION HISTORY  - Information obtained from mom in room.  Patient has a developmental delay, not able to provide any history.  - Known to Nutrition Services and this writer with previous admits.  During 5/202 admit SBO managed conservatively but he required surgical intervention  "in 4/2021, of note he was briefly bridged with PN.    - Resides with mom in home setting.  Mom and Shani (sister) are co-guardians based on Care Coordinator documentation.    - Patient is 100% enteral reliant, strict NPO.   - Home regimen:    - Pediasure 1.5 with fiber, 6 cartons/day, bolused via syringe 6x/day.  Water flushes reported as 8 oz in total with feedings and also references diluting feedings at times.  - 1185 mL provides 1778 kcal, 70 g protein, 195 g CHO, 15 g fiber, and 901 mL free water daily.    - Home infusion: [facility].    - Barriers to enteral infusion: Vomiting the day prior to admit.  Describes that on average Rj receives at least 5 cartons/day, does note he has motility issues and may have residuals at times with symptoms of vomiting.  - Allergies: NKFA.      CURRENT NUTRITION ORDERS  Diet Order:     NPO  Osmolite 1.5 at goal rate of 40 mL/hr x 24 hrs  Free water flush: 60 mL q 4 hrs    Current Intake/Tolerance:  Upon admit GT vented.  Surgery team consulted and noted concern for degree of chronic bowel distention/dismotility, gastrografin challenge completed.  Conservative management thus far and recommended outpatient follow-up with GI, gastroparesis concerns and small bowel dysmotility.  RD consulted yesterday/surgery team ok'ed low-rate start with advancement by 10 mL/hr q 8 hrs.  Currently at 30 mL/hr.      NUTRITION FOCUSED PHYSICAL ASSESSMENT FOR DIAGNOSING MALNUTRITION)  Yes     Obtained from Chart/Interdisciplinary Team:  - No documentation of PI  - Stooling patterns reviewed, BMs daily since 9/24    ANTHROPOMETRICS  Height: 5' 2\"  Weight: 87 lbs 0 oz  Body mass index is 15.91 kg/m .  Weight Status:  Underweight BMI <18.5  Weight History:  Wt Readings from Last 10 Encounters:   06/30/21 36.7 kg (81 lb)   06/01/21 37.6 kg (83 lb)   05/28/21 37.6 kg (83 lb)   05/06/21 44 kg (97 lb)   04/27/21 44.6 kg (98 lb 6.4 oz)   03/17/21 37.9 kg (83 lb 9.6 oz)   05/29/20 41.5 kg " (91 lb 9.6 oz)   02/06/20 39.4 kg (86 lb 14.4 oz)   06/07/19 36 kg (79 lb 4.8 oz)   04/23/19 37.2 kg (82 lb)     - Mom reports UBW is between 82-87#.    LABS  Labs reviewed:  Electrolytes  Potassium (mmol/L)   Date Value   09/25/2021 4.1   09/24/2021 4.1   04/27/2021 4.2   04/26/2021 3.6   04/25/2021 3.6     Phosphorus (mg/dL)   Date Value   09/26/2021 2.0 (L)   04/27/2021 2.2 (L)   04/26/2021 2.5   04/25/2021 3.4   04/24/2021 2.3 (L)   04/23/2021 0.7 (LL)    Blood Glucose  Glucose (mg/dL)   Date Value   09/25/2021 106 (H)   09/24/2021 96   04/27/2021 244 (H)   04/26/2021 172 (H)   04/25/2021 131 (H)   04/24/2021 117 (H)   04/23/2021 86     GLUCOSE BY METER POCT (mg/dL)   Date Value   09/26/2021 93   09/26/2021 75     Hemoglobin A1C (%)   Date Value   03/17/2021 5.0   02/06/2020 4.9   06/07/2019 4.5   04/06/2019 5.4   01/15/2019 4.8    Inflammatory Markers  CRP Inflammation (mg/L)   Date Value   04/05/2019 13.0 (H)   01/15/2019 <2.9     WBC (10e9/L)   Date Value   05/06/2021 5.7   04/27/2021 8.5   04/26/2021 7.2     WBC Count (10e3/uL)   Date Value   09/25/2021 7.7   09/24/2021 3.1 (L)     Albumin (g/dL)   Date Value   09/24/2021 3.3 (L)   04/26/2021 1.9 (L)   04/25/2021 1.7 (L)   04/24/2021 2.1 (L)      Magnesium (mg/dL)   Date Value   09/26/2021 1.6   04/27/2021 1.8   04/26/2021 1.8   04/25/2021 1.6     Sodium (mmol/L)   Date Value   09/25/2021 143   09/24/2021 140   04/27/2021 137   04/26/2021 144   04/25/2021 142    Renal  Urea Nitrogen (mg/dL)   Date Value   09/25/2021 13   09/24/2021 32 (H)   04/27/2021 9   04/26/2021 10   04/25/2021 8     Creatinine (mg/dL)   Date Value   09/25/2021 0.84   09/24/2021 1.25   04/27/2021 0.58 (L)   04/26/2021 0.56 (L)   04/25/2021 0.58 (L)     Creatinine POCT (mg/dL)   Date Value   09/24/2021 1.4 (H)     Additional  Triglycerides (mg/dL)   Date Value   03/17/2021 57   02/06/2020 73     Ketones Urine (mg/dL)   Date Value   09/24/2021 Negative   04/19/2021 Trace (A)        B/P:  108/67, T: 99.7, P: 115, R: 28      MEDICATIONS  Medications reviewed:    clobazam  5 mg Per G Tube At Bedtime     clobazam  7.5 mg Per G Tube QAM     [Held by provider] fluticasone-salmeterol  2 puff Inhalation Q12H     gabapentin  750 mg Per G Tube At Bedtime     hydrOXYzine  50 mg Per G Tube At Bedtime     ipratropium - albuterol 0.5 mg/2.5 mg/3 mL  3 mL Nebulization 4x Daily     pantoprazole  40 mg Intravenous Q24H     piperacillin-tazobactam  4.5 g Intravenous Q6H     polyethylene glycol  17 g Per G Tube Daily     QUEtiapine  600 mg Per G Tube At Bedtime     sodium chloride (PF)  3 mL Intracatheter Q8H     valproate (DEPACON) in NS intermittent infusion  250 mg Intravenous Q6H        dextrose       dextrose 5% and 0.45% NaCl + KCl 20 mEq/L 50 mL/hr at 09/26/21 1950          ASSESSED NUTRITION NEEDS PER APPROVED PRACTICE GUIDELINES:    Dosing Weight 40 kg   Estimated Energy Needs: >/=1600 kcals (>/=40 Kcal/Kg)  Justification: maintenance  Estimated Protein Needs: >/=60 grams protein (>/=1.5 g pro/Kg)  Justification: preservation of lean body mass  Estimated Fluid Needs: per MD      NUTRITION DIAGNOSIS:  Inadequate protein-energy intake related to altered GI function with SBO admit, slow enteral advancement thus far as evidenced by meeting <50% needs x 3 day admit, highest rate of 30 mL/hr.    NUTRITION INTERVENTIONS  Recommendations / Nutrition Prescription  Diet per MD.  Strict NPO at baseline.    Update goal rate to meet home calorie needs:  - Access: GT  - Frequency: Continuous  - Osmolite 1.5 at 50 mL/hr   - 1200 mL provides 1800 kcal (45 kcal/kg), 76 g protein (1.9 g protein/kg), 245 g CHO, 0 g fiber, and 914 mL free water daily  - Free water flushes: 60 mL q 4 hrs while on IVFs, increase to 150 mL q 4 hrs when off IVFs  - Meets 100% RDIs  - Continue advancement of 10 mL q 8 hrs until goal rate reached    On D5, paged MD to change to non-dextrose with unclear refeeding risk.      Phosphorus yesterday low at  2.0, replace per policy/protocol (discussed with RN).  BMP, phosphorus, and magnesium ordered for today.      Admit wt ordered.        Implementation  Nutrition education: Provided education on above with Mom.  Formula needs discussed with clinical manager and given option to have Pediasure 1.5 without fiber ordered.  Per mom, she would continue substituting Osmolite 1.5 while admitted and will change back to Pediasure 1.5 with fiber in the home setting.  We discussed the updated goal rate and fact this is equivalent to 5 cartons/average home regimen in terms of concentration, calories, and protein.  Also relayed the ok from Surgery team to begin boluses in home setting as she does not have an updated pump.    Collaboration and Referral of Nutrition care: Discussed POC with team during rounds.  Discussed electrolyte replacement with RN and overall nutrition-related POC with Surgery PA.  Per Surgery team: ok for continuous while admitted and can resume boluses in home setting with further direction from outpatient GI team if intolerance concerns.  Or mom can contact home infusion company.    EN Schedule and Feeding Tube Flush: As above.    Nutrition Goals  EN to reach goal rate w/in the next 24-48 hrs.   EN at goal rate to meet % daily needs.       MONITORING AND EVALUATION:  Progress towards goals will be monitored and evaluated per protocol and Practice Guidelines          Afshan Scott RDN, LD  Clinical Dietitian  3rd floor/ICU: [phone]  All other floors: [phone]  Weekend/holiday: [phone]

## 2021-09-27 NOTE — PROGRESS NOTES
[facility]  Hospitalist Progress Note  Matilde Bagley MD 09/27/2021    Reason for Stay (Diagnosis): SBO         Assessment and Plan:      Summary of Stay: Rj Licea is a 26 year old male is a 25 year old male with a history of Depression, Anxiety, Insomnia, GERD, Autism, Retinopathy of Prematurity let to Blindness, Seizure Disorder, Premature Infant, SBO, G-tube placement who presents via EMS for shortness of breath.admitted on 9/24/2021.  Patient tachycardic    9/27/2021: Patient tachycardic check D-dimer which was elevated ordered CT chest for PE also IV fluids to treat for suspected prerenal dehydration    Problem List:   1. Recurrent small bowel obstruction.  -Patient has no nausea or vomiting today .  -Has multiple BMs  -Gastrografin challenge with contrast now seen in the colon with delayed transit time.  -Tube feeds advanced to goal  -The bowel obstruction seems to be resolving  -Surgery is consulted input appreciated.  Recommended further evaluation for dysmotility issues  -Patient has history of small bowel obstruction in the past, the last 1 in April 2021 required operative management.  -Nausea vomiting protocol.  -Resumed most home meds  -Transition to p.o. valproic acid   -Nutrition is consulted for tube feeding.     2.  Sepsis secondary to aspiration pneumonia  3.   Acute Hypoxic Respiratory Failure 2/2 Aspiration PNA.  -Continue IV antibiotic Zosyn.  -Gentle hydration, will DC IV fluids as tube feed will be increased to  goal today  -Monitor pulse oximetry.  -Oxygen as needed currently on 1L via oxygen mask, expect to come off oxygen soon.  -Hx of chronic lung disease, not on oxygen at baseline.  -Continue nebulizers and bronchodilators .  -CT chest ordered to rule out pulmonary embolism     4.  SALLY - 2/2 hypovolemia.  IVF hydration and repeat BMP in am  -IV fluid bolus ordered     5. Autism Spectrum Disorder/Cerebral Palsy/Depression/Anxiety.  -He has underlying language  impairment and disability.  -Does not give any history.  -His mother at bedside.  -He is on  Atarax, Seroquel and Gabapentin which unfortunately have no IV formulation.  -We will try some Ativan for anxiety.     6. GERD -continue IV Protonix for today, transition to prior to admission p.o. famotidine and omeprazole tomorrow.     7. Epilepsy - Currently on Valproic Acid and started clobazam.  Discontinue IV Ativan.  -May transition valproate to valproic acid per G tube  Tomorrow.         DVT Prophylaxis: Pneumatic Compression Devices  Code Status: Full Code  Discharge Dispo: Continue inpatient care.  Estimated Disch Date / # of Days until Disch: To be decided        Interval History (Subjective):      Assumed care reviewed chart patient was sleeping.  Offers no complaints, unable to get more than 10 point review of systems.  Care plan discussed with patient's mom at bedside.  Discussed tachycardia and plan for CT angio if D-dimer is elevated.  Discussed care plan with patient's mom and answered all her questions.                Physical Exam:      Last Vital Signs:  /74 (BP Location: Right arm)   Pulse (!) 136   Temp 99.8  F (37.7  C) (Axillary)   Resp 26   SpO2 93%     I/O last 3 completed shifts:  In: 940 [I.V.:720; NG/GT:220]  Out: 100 [Emesis/NG output:100]  Wt Readings from Last 1 Encounters:   06/30/21 36.7 kg (81 lb)     Current Facility-Administered Medications   Medication     acetaminophen (TYLENOL) Suppository 650 mg     albuterol (PROVENTIL) neb solution 2.5 mg     bisacodyl (DULCOLAX) Suppository 10 mg     clobazam ((ONFI)) suspension 5 mg     clobazam ((ONFI)) suspension 7.5 mg     dextrose 10% infusion     [Held by provider] fluticasone-salmeterol (ADVAIR-HFA) 115-21 MCG/ACT Inhaler 2 puff     gabapentin (NEURONTIN) solution 750 mg     HYDROmorphone (DILAUDID) injection 0.2 mg     hydrOXYzine (ATARAX) syrup 50 mg     ipratropium - albuterol 0.5 mg/2.5 mg/3 mL (DUONEB) neb solution 3 mL      lidocaine (LMX4) cream     lidocaine 1 % 0.1-1 mL     LORazepam (ATIVAN) injection 1 mg     LORazepam (ATIVAN) tablet 1 mg     naloxone (NARCAN) injection 0.2 mg    Or     naloxone (NARCAN) injection 0.4 mg    Or     naloxone (NARCAN) injection 0.2 mg    Or     naloxone (NARCAN) injection 0.4 mg     ondansetron (ZOFRAN-ODT) ODT tab 4 mg    Or     ondansetron (ZOFRAN) injection 4 mg     pantoprazole (PROTONIX) IV push injection 40 mg     piperacillin-tazobactam (ZOSYN) intermittent infusion 4.5 g     polyethylene glycol (MIRALAX) Packet 17 g     prochlorperazine (COMPAZINE) injection 10 mg    Or     prochlorperazine (COMPAZINE) tablet 10 mg    Or     prochlorperazine (COMPAZINE) suppository 25 mg     QUEtiapine (SEROquel) tablet 600 mg     simethicone (MYLICON) suspension 40 mg     sodium chloride (PF) 0.9% PF flush 3 mL     sodium chloride (PF) 0.9% PF flush 3 mL     valproate (DEPACON) 250 mg in sodium chloride 0.9 % 50 mL intermittent infusion       Constitutional: Sleeping, opens his eyes, does not give any history, no agitation.   Respiratory: Clear to auscultation bilaterally, no crackles or wheezing   Cardiovascular: Regular rate and rhythm, normal S1 and S2, and no murmur noted   Abdomen: Soft, distended, no bowel sounds, G-tube in place, draining to gravity.   Skin: No rashes, no cyanosis, dry to touch   Neuro: Unable to assess, patient is nonverbal does not give any history, does not follow any instruction.   Extremities: No edema, normal range of motion   Other(s):HEENT        All other systems: Negative          Medications:      All current medications were reviewed with changes reflected in problem list.         Data:      All new lab and imaging data was reviewed.   Labs:  Recent Labs   Lab 09/25/21  0818 09/24/21  0531   WBC 7.7 3.1*   HGB 13.2* 17.2   HCT 40.6 52.7   * 107*   PLT 64* 76*     Recent Labs   Lab 09/26/21  1748 09/26/21  1549 09/26/21  1205 09/25/21  0818 09/24/21  0537  09/24/21  0531 09/24/21  0531   NA  --   --   --  143  --   --  140   POTASSIUM  --   --   --  4.1  --   --  4.1   CHLORIDE  --   --   --  111*  --   --  103   CO2  --   --   --  29  --   --  30   ANIONGAP  --   --   --  3  --   --  7   GLC 93 75  --  106*  --    < > 96   BUN  --   --   --  13  --   --  32*   CR  --   --   --  0.84 1.4*  --  1.25   GFRESTIMATED  --   --   --  >90 >60  --  80   ALICE  --   --   --  8.2*  --   --  9.4   MAG  --   --  1.6  --   --   --   --    PHOS  --   --  2.0*  --   --   --   --    PROTTOTAL  --   --   --   --   --   --  7.7   ALBUMIN  --   --   --   --   --   --  3.3*   BILITOTAL  --   --   --   --   --   --  1.2   ALKPHOS  --   --   --   --   --   --  77   AST  --   --   --   --   --   --  15   ALT  --   --   --   --   --   --  20    < > = values in this interval not displayed.      Imaging:   Results for orders placed or performed during the hospital encounter of 09/24/21   CT Chest (PE) Abdomen Pelvis w Contrast    Narrative    CT CHEST PULMONARY EMBOLUS ABDOMEN AND PELVIS WITH CONTRAST 9/24/2021  7:04 AM    CLINICAL HISTORY: Hypoxia. Abdominal distention. Nausea and vomiting.  History of small bowel obstruction.    TECHNIQUE: CT angiogram chest and routine CT abdomen pelvis with IV  contrast. Arterial phase through the chest and venous phase through  the abdomen and pelvis. 2D and 3D MIP reconstructions were preformed  by the CT technologist. Dose reduction techniques were used.     CONTRAST: 42 mL [id number]    COMPARISON: CT of the abdomen and pelvis performed 4/19/2021.    FINDINGS:  ANGIOGRAM CHEST: Pulmonary arteries are normal caliber and negative  for pulmonary emboli. Thoracic aorta is negative for dissection. No CT  evidence of right heart strain.     LUNGS AND PLEURA: No pleural effusions. Ill-defined region of  consolidation at the right lung base posteriorly is new since the  previous exam. There is also patchy groundglass opacity in both lungs,  greater on the right.  No pneumothorax is identified. Multiple images  through the chest are degraded due to patient motion artifact.    MEDIASTINUM/AXILLAE: No enlarged lymph nodes are identified in the  chest. No pericardial effusion.    CORONARY ARTERY CALCIFICATION: None.    HEPATOBILIARY: Unremarkable. No hepatic masses are seen.    PANCREAS: Normal.    SPLEEN: Normal.    ADRENAL GLANDS: Normal.    KIDNEYS/BLADDER: Nonobstructing 0.3 cm stone in the upper pole of the  right kidney. The kidneys are otherwise unremarkable. No  hydronephrosis.    BOWEL: There are multiple mildly thickened loops of distal small bowel  in the right lower quadrant, suggesting enteritis. Proximal to these  thickened distal small bowel loops, there are multiple loops of  dilated gas and fluid-filled small bowel, consistent with associated  small bowel obstruction. Moderate amount of stool in the rectum. Prior  appendectomy.    PELVIC ORGANS: Unremarkable.    LYMPH NODES: No enlarged lymph nodes are identified in the abdomen or  pelvis.    VASCULATURE: Left-sided IVC.    ADDITIONAL FINDINGS: None.    MUSCULOSKELETAL: Thoracolumbar curve, convex right.      Impression    IMPRESSION:  1.  Multiple loops of mildly thickened distal small bowel in the right  lower quadrant suggest enteritis, and are most likely infectious or  inflammatory in etiology.  2.  Multiple dilated loops of gas and fluid-filled small bowel  proximal to the thickened loops of distal small bowel are consistent  with an associated distal small bowel obstruction.  3.  Ill-defined area of consolidation is noted at the right lung base  posteriorly, and there is patchy groundglass opacity in both lungs,  greater on the right. Findings are suspicious for pneumonia.  4.  No evidence for pulmonary embolism.  5.  Nonobstructing 0.3 cm stone in the upper pole of the right kidney.    WON LÓPEZ MD         SYSTEM ID:  [id number]   Abdomen XR 1 vw    Narrative    ABDOMEN ONE VIEW  9/24/2021 10:01 AM      HISTORY: NG tube placement.    COMPARISON: CT of the chest, abdomen, and pelvis performed earlier  today.      Impression    IMPRESSION: No enteric tube is visualized on this exam. Multiple  dilated loops of small bowel throughout the abdomen are consistent  with a distal small bowel obstruction.    WON LÓPEZ MD         SYSTEM ID:  [id number]   XR Gastrografin  Challenge    Narrative    EXAM: XR GASTROGRAFIN CHALLENGE  LOCATION: [facility]  DATE/TIME: 9/24/2021 1:24 PM    INDICATION: SBO, place Gastrografin via G tube  COMPARISON: Abdominal x-ray on same day earlier  TECHNIQUE: Routine.    FINDINGS:  FLUOROSCOPIC TIME: 0 minutes.  NUMBER OF IMAGES: 2.      Impression    IMPRESSION: Supine views of the abdomen and pelvis were obtained. Enteric contrast seen within a few dilated small bowel loops. Multiple other dilated small bowel loops are also noted. No enteric contrast seen within the large bowel. Recommend obtaining   subsequent x-ray in 1 or 2 hours to assess for progression of contrast. Contrast within the urinary bladder from recent contrast administration.   XR Abdomen 1 View    Narrative    EXAM: XR ABDOMEN 1 VIEW  LOCATION: [facility]  DATE/TIME: 09/25/2021, 8:45 AM    INDICATION: Follow up gastro challenge.  COMPARISON: 09/25/2021 at 2:53 AM.      Impression    IMPRESSION: Contrast is present in the right colon. Contrast also present throughout diffuse dilated small bowel.      XR Abdomen 1 View    Narrative    EXAM: XR ABDOMEN 1 VIEW  LOCATION: [facility]  DATE/TIME: 9/25/2021 2:39 AM    INDICATION: Follow-up of Gastrografin challenge  COMPARISON: 09/24/2021.      Impression    IMPRESSION: Multiple loops of dilated small bowel throughout the abdomen. Oral contrast fully opacifies multiple distended loops of small bowel in the left hemiabdomen, with dilute contrast opacifying multiple small bowel loops in the right hemiabdomen,    though no definite colonic contrast visualized at this time. There is retained contrast within the gastric fundus.

## 2021-09-28 LAB
ANION GAP SERPL CALCULATED.3IONS-SCNC: 2 MMOL/L (ref 3–14)
ATRIAL RATE - MUSE: 138 BPM
BASOPHILS # BLD AUTO: 0.1 10E3/UL (ref 0–0.2)
BASOPHILS NFR BLD AUTO: 1 %
BUN SERPL-MCNC: 4 MG/DL (ref 7–30)
CALCIUM SERPL-MCNC: 8 MG/DL (ref 8.5–10.1)
CHLORIDE BLD-SCNC: 104 MMOL/L (ref 94–109)
CO2 SERPL-SCNC: 34 MMOL/L (ref 20–32)
CREAT SERPL-MCNC: 0.62 MG/DL (ref 0.66–1.25)
DIASTOLIC BLOOD PRESSURE - MUSE: NORMAL MMHG
EOSINOPHIL # BLD AUTO: 0.1 10E3/UL (ref 0–0.7)
EOSINOPHIL NFR BLD AUTO: 1 %
ERYTHROCYTE [DISTWIDTH] IN BLOOD BY AUTOMATED COUNT: 13.1 % (ref 10–15)
GFR SERPL CREATININE-BSD FRML MDRD: >90 ML/MIN/1.73M2
GLUCOSE BLD-MCNC: 110 MG/DL (ref 70–99)
HCT VFR BLD AUTO: 35.5 % (ref 40–53)
HGB BLD-MCNC: 11.6 G/DL (ref 13.3–17.7)
IMM GRANULOCYTES # BLD: 0.1 10E3/UL
IMM GRANULOCYTES NFR BLD: 2 %
INTERPRETATION ECG - MUSE: NORMAL
LACTATE SERPL-SCNC: 1.8 MMOL/L (ref 0.7–2)
LYMPHOCYTES # BLD AUTO: 1.8 10E3/UL (ref 0.8–5.3)
LYMPHOCYTES NFR BLD AUTO: 23 %
MAGNESIUM SERPL-MCNC: 1.9 MG/DL (ref 1.6–2.3)
MCH RBC QN AUTO: 35 PG (ref 26.5–33)
MCHC RBC AUTO-ENTMCNC: 32.7 G/DL (ref 31.5–36.5)
MCV RBC AUTO: 107 FL (ref 78–100)
MONOCYTES # BLD AUTO: 0.9 10E3/UL (ref 0–1.3)
MONOCYTES NFR BLD AUTO: 11 %
NEUTROPHILS # BLD AUTO: 4.9 10E3/UL (ref 1.6–8.3)
NEUTROPHILS NFR BLD AUTO: 62 %
NRBC # BLD AUTO: 0 10E3/UL
NRBC BLD AUTO-RTO: 0 /100
P AXIS - MUSE: 47 DEGREES
PHOSPHATE SERPL-MCNC: 3.5 MG/DL (ref 2.5–4.5)
PLATELET # BLD AUTO: 69 10E3/UL (ref 150–450)
POTASSIUM BLD-SCNC: 3.9 MMOL/L (ref 3.4–5.3)
PR INTERVAL - MUSE: 136 MS
QRS DURATION - MUSE: 82 MS
QT - MUSE: 244 MS
QTC - MUSE: 369 MS
R AXIS - MUSE: 27 DEGREES
RBC # BLD AUTO: 3.31 10E6/UL (ref 4.4–5.9)
SODIUM SERPL-SCNC: 140 MMOL/L (ref 133–144)
SYSTOLIC BLOOD PRESSURE - MUSE: NORMAL MMHG
T AXIS - MUSE: -60 DEGREES
VENTRICULAR RATE- MUSE: 138 BPM
WBC # BLD AUTO: 7.7 10E3/UL (ref 4–11)
WBC # BLD AUTO: 7.7 10E3/UL (ref 4–11)

## 2021-09-28 PROCEDURE — 36415 COLL VENOUS BLD VENIPUNCTURE: CPT | Performed by: INTERNAL MEDICINE

## 2021-09-28 PROCEDURE — 250N000011 HC RX IP 250 OP 636: Performed by: PHYSICIAN ASSISTANT

## 2021-09-28 PROCEDURE — 999N000157 HC STATISTIC RCP TIME EA 10 MIN

## 2021-09-28 PROCEDURE — 83735 ASSAY OF MAGNESIUM: CPT | Performed by: INTERNAL MEDICINE

## 2021-09-28 PROCEDURE — 250N000013 HC RX MED GY IP 250 OP 250 PS 637: Performed by: INTERNAL MEDICINE

## 2021-09-28 PROCEDURE — C9113 INJ PANTOPRAZOLE SODIUM, VIA: HCPCS | Performed by: PHYSICIAN ASSISTANT

## 2021-09-28 PROCEDURE — 84100 ASSAY OF PHOSPHORUS: CPT | Performed by: INTERNAL MEDICINE

## 2021-09-28 PROCEDURE — 83605 ASSAY OF LACTIC ACID: CPT | Performed by: INTERNAL MEDICINE

## 2021-09-28 PROCEDURE — 120N000001 HC R&B MED SURG/OB

## 2021-09-28 PROCEDURE — 94640 AIRWAY INHALATION TREATMENT: CPT

## 2021-09-28 PROCEDURE — 250N000009 HC RX 250: Performed by: INTERNAL MEDICINE

## 2021-09-28 PROCEDURE — 99231 SBSQ HOSP IP/OBS SF/LOW 25: CPT | Performed by: SURGERY

## 2021-09-28 PROCEDURE — 250N000009 HC RX 250: Performed by: PHYSICIAN ASSISTANT

## 2021-09-28 PROCEDURE — 258N000003 HC RX IP 258 OP 636: Performed by: PHYSICIAN ASSISTANT

## 2021-09-28 PROCEDURE — 272N000078 HC NUTRITION PRODUCT INTERMEDIATE LITER

## 2021-09-28 PROCEDURE — 94640 AIRWAY INHALATION TREATMENT: CPT | Mod: 76

## 2021-09-28 PROCEDURE — 99233 SBSQ HOSP IP/OBS HIGH 50: CPT | Performed by: INTERNAL MEDICINE

## 2021-09-28 PROCEDURE — 82374 ASSAY BLOOD CARBON DIOXIDE: CPT | Performed by: INTERNAL MEDICINE

## 2021-09-28 PROCEDURE — 85025 COMPLETE CBC W/AUTO DIFF WBC: CPT | Performed by: INTERNAL MEDICINE

## 2021-09-28 RX ADMIN — CLOBAZAM 7.5 MG: 2.5 SUSPENSION ORAL at 10:20

## 2021-09-28 RX ADMIN — CLOBAZAM 5 MG: 2.5 SUSPENSION ORAL at 22:10

## 2021-09-28 RX ADMIN — IPRATROPIUM BROMIDE AND ALBUTEROL SULFATE 3 ML: .5; 3 SOLUTION RESPIRATORY (INHALATION) at 07:53

## 2021-09-28 RX ADMIN — TAZOBACTAM SODIUM AND PIPERACILLIN SODIUM 4.5 G: 500; 4 INJECTION, SOLUTION INTRAVENOUS at 06:24

## 2021-09-28 RX ADMIN — PANTOPRAZOLE SODIUM 40 MG: 40 INJECTION, POWDER, FOR SOLUTION INTRAVENOUS at 12:03

## 2021-09-28 RX ADMIN — TAZOBACTAM SODIUM AND PIPERACILLIN SODIUM 4.5 G: 500; 4 INJECTION, SOLUTION INTRAVENOUS at 18:31

## 2021-09-28 RX ADMIN — HYDROXYZINE HYDROCHLORIDE 50 MG: 10 SOLUTION ORAL at 21:32

## 2021-09-28 RX ADMIN — TAZOBACTAM SODIUM AND PIPERACILLIN SODIUM 4.5 G: 500; 4 INJECTION, SOLUTION INTRAVENOUS at 12:05

## 2021-09-28 RX ADMIN — VALPROATE SODIUM 250 MG: 100 INJECTION, SOLUTION INTRAVENOUS at 08:34

## 2021-09-28 RX ADMIN — IPRATROPIUM BROMIDE AND ALBUTEROL SULFATE 3 ML: .5; 3 SOLUTION RESPIRATORY (INHALATION) at 11:50

## 2021-09-28 RX ADMIN — POTASSIUM & SODIUM PHOSPHATES POWDER PACK 280-160-250 MG 2 PACKET: 280-160-250 PACK at 08:40

## 2021-09-28 RX ADMIN — QUETIAPINE FUMARATE 600 MG: 200 TABLET ORAL at 21:32

## 2021-09-28 RX ADMIN — IPRATROPIUM BROMIDE AND ALBUTEROL SULFATE 3 ML: .5; 3 SOLUTION RESPIRATORY (INHALATION) at 20:27

## 2021-09-28 RX ADMIN — VALPROATE SODIUM 250 MG: 100 INJECTION, SOLUTION INTRAVENOUS at 20:33

## 2021-09-28 RX ADMIN — GABAPENTIN 750 MG: 250 SUSPENSION ORAL at 21:32

## 2021-09-28 RX ADMIN — IPRATROPIUM BROMIDE AND ALBUTEROL SULFATE 3 ML: .5; 3 SOLUTION RESPIRATORY (INHALATION) at 15:53

## 2021-09-28 RX ADMIN — VALPROATE SODIUM 250 MG: 100 INJECTION, SOLUTION INTRAVENOUS at 14:26

## 2021-09-28 RX ADMIN — VALPROATE SODIUM 250 MG: 100 INJECTION, SOLUTION INTRAVENOUS at 02:27

## 2021-09-28 ASSESSMENT — ACTIVITIES OF DAILY LIVING (ADL)
ADLS_ACUITY_SCORE: 27
ADLS_ACUITY_SCORE: 29
ADLS_ACUITY_SCORE: 30
ADLS_ACUITY_SCORE: 29

## 2021-09-28 NOTE — PLAN OF CARE
End of Shift Summary  For vital signs and complete assessments, please see documentation flowsheets.     Pertinent assessments: Mother remains at bedside. Soft Bp. On 2L O2 via oxymask/PRN. Tachy Low 100-s. Incontinent, external catheter on. Tolerating tube feeding well increased to goal rate of 50ml/hr at 0130.  NS is infusing at 75ml/hr.     Major Shift Events: uneventful     Treatment Plan: IV fluids, antibiotics; Tube feeding, phos replacement protocol

## 2021-09-28 NOTE — PROGRESS NOTES
"[facility] [place]   General Surgery Progress Note          Assessment and Plan:   Assessment:   Recurrent SBO - resolving  Possible underlying dysmotility issue  SBFT shows contrast in colon  Sepsis secondary to aspiration pneumonia      Plan:   -Diet: TF at goal, RD following  -Mother plans to see gastroenterologist in the near future, will discuss prokinetics  -Hospitalist managing medically  -No surgical interventions indicated. Will follow peripherally.         Interval History:   Resting in bed, mother at bedside. Last BM was two days ago. Tube feeding at goal rate.          Physical Exam:   Blood pressure 108/74, pulse (!) 127, temperature 98.7  F (37.1  C), temperature source Axillary, resp. rate 20, height 1.575 m (5' 2\"), weight 39.5 kg (87 lb), SpO2 94 %.    I/O last 3 completed shifts:  In: 3761 [I.V.:2169; NG/GT:1562]  Out: 2150 [Urine:2150]    Abdomen: soft, +mildly distended, NT, +BS          Data:     Recent Labs   Lab Test 09/28/21  0921 09/25/21  0818 09/24/21  0531   HGB 11.6* 13.2* 17.2   WBC 7.7 7.7 3.1*     SBFT 9/26/21  IMPRESSION: Contrast present throughout the colon. No definite dilated small bowel visualized.     Lorne Schulte PA-C    Tolerating TF at goal, discharge per hospitalist depending on pneumonia resolution  Please call if we can be of help  Rashi Betancourt MD  9/28/2021 3:41 PM    "

## 2021-09-28 NOTE — PROGRESS NOTES
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  Patient tachycardic    9/27/2021: Patient tachycardic check D-dimer which was elevated ordered CT chest for PE also IV fluids to treat for suspected prerenal dehydration    Problem List:   1. Recurrent small bowel obstruction.  -Patient has no nausea or vomiting today .  -Has multiple BMs  -Gastrografin challenge with contrast now seen in the colon with delayed transit time.  -Tube feeds advanced to goal  -The bowel obstruction seems to be resolving  -Surgery is consulted input appreciated.  Recommended further evaluation for dysmotility issues  -Patient has history of small bowel obstruction in the past, the last 1 in April 2021 required operative management.  -Nausea vomiting protocol.  -Resumed most home meds  -Transition to p.o. valproic acid   -Nutrition is consulted for tube feeding.  Feeding rate appears to be at goal     2.  Sepsis secondary to aspiration pneumonia  3.   Acute Hypoxic Respiratory Failure 2/2 Aspiration PNA.  -Continue IV antibiotic Zosyn.  -Gentle hydration, will DC IV fluids as tube feed will be increased to  goal today  -Monitor pulse oximetry.  -Oxygen as needed currently on 1L via oxygen mask, expect to come off oxygen soon.  -Hx of chronic lung disease, not on oxygen at baseline.  -Continue nebulizers and bronchodilators .  -CT chest no embolism \"Increased small to moderate bilateral pleural fluid.  3.  Increased bibasilar patchy areas of dense consolidation that could  represent progression of multifocal pneumonia along with " "atelectasis.\"  -New IV antibiotic for now     4.  SALLY - 2/2 hypovolemia.  IVF hydration and repeat BMP in am  -IV fluid bolus ordered     5. Autism Spectrum Disorder/Cerebral Palsy/Depression/Anxiety.  -He has underlying language impairment and disability.  -Does not give any history.  -His mother at bedside.  -He is on  Atarax, Seroquel and Gabapentin which unfortunately have no IV formulation.  -We will try some Ativan for anxiety.     6. GERD -continue IV Protonix for today, transition to prior to admission p.o. famotidine and omeprazole tomorrow.     7. Epilepsy - Currently on Valproic Acid and started clobazam.  Discontinue IV Ativan.  -May transition valproate to valproic acid per G tube  Tomorrow.         DVT Prophylaxis: Pneumatic Compression Devices  Code Status: Full Code  Discharge Dispo: Continue inpatient care.  Estimated Disch Date / # of Days until Disch: To be decided        Interval History (Subjective):      reviewed chart patient was sleeping.  Offers no complaints, unable to get more than 10 point review of systems.  Care plan discussed with patient's mom at bedside.  Discussed clinical status with patient's mom.        Physical Exam:      Last Vital Signs:  /74 (BP Location: Left arm)   Pulse (!) 127   Temp 98.7  F (37.1  C) (Axillary)   Resp 20   Ht 1.575 m (5' 2\")   Wt 39.5 kg (87 lb)   SpO2 94%   BMI 15.91 kg/m      I/O last 3 completed shifts:  In: 3761 [I.V.:2169; NG/GT:1562]  Out: 2150 [Urine:2150]  Wt Readings from Last 1 Encounters:   09/27/21 39.5 kg (87 lb)     Current Facility-Administered Medications   Medication     acetaminophen (TYLENOL) Suppository 650 mg     albuterol (PROVENTIL) neb solution 2.5 mg     bisacodyl (DULCOLAX) Suppository 10 mg     clobazam ((ONFI)) suspension 5 mg     clobazam ((ONFI)) suspension 7.5 mg     dextrose 10% infusion     [Held by provider] fluticasone-salmeterol (ADVAIR-HFA) 115-21 MCG/ACT Inhaler 2 puff     gabapentin (NEURONTIN) solution " 750 mg     HYDROmorphone (DILAUDID) injection 0.2 mg     hydrOXYzine (ATARAX) syrup 50 mg     ipratropium - albuterol 0.5 mg/2.5 mg/3 mL (DUONEB) neb solution 3 mL     lidocaine (LMX4) cream     lidocaine 1 % 0.1-1 mL     LORazepam (ATIVAN) injection 1 mg     LORazepam (ATIVAN) tablet 1 mg     naloxone (NARCAN) injection 0.2 mg    Or     naloxone (NARCAN) injection 0.4 mg    Or     naloxone (NARCAN) injection 0.2 mg    Or     naloxone (NARCAN) injection 0.4 mg     ondansetron (ZOFRAN-ODT) ODT tab 4 mg    Or     ondansetron (ZOFRAN) injection 4 mg     pantoprazole (PROTONIX) IV push injection 40 mg     piperacillin-tazobactam (ZOSYN) intermittent infusion 4.5 g     polyethylene glycol (MIRALAX) Packet 17 g     prochlorperazine (COMPAZINE) injection 10 mg    Or     prochlorperazine (COMPAZINE) tablet 10 mg    Or     prochlorperazine (COMPAZINE) suppository 25 mg     QUEtiapine (SEROquel) tablet 600 mg     simethicone (MYLICON) suspension 40 mg     sodium chloride (PF) 0.9% PF flush 3 mL     sodium chloride (PF) 0.9% PF flush 3 mL     sodium chloride 0.9% infusion     valproate (DEPACON) 250 mg in sodium chloride 0.9 % 50 mL intermittent infusion       Constitutional: Sleeping, opens his eyes, does not give any history, no agitation.   Respiratory: Clear to auscultation bilaterally, no crackles or wheezing   Cardiovascular: Regular rate and rhythm, normal S1 and S2, and no murmur noted   Abdomen: Soft, distended, no bowel sounds, G-tube in place, draining to gravity.   Skin: No rashes, no cyanosis, dry to touch   Neuro: Unable to assess, patient is nonverbal does not give any history, does not follow any instruction.   Extremities: No edema, normal range of motion   Other(s):HEENT        All other systems: Negative          Medications:      All current medications were reviewed with changes reflected in problem list.         Data:      All new lab and imaging data was reviewed.   Labs:  Recent Labs   Lab  09/25/21 0818 09/24/21 0531   WBC 7.7 3.1*   HGB 13.2* 17.2   HCT 40.6 52.7   * 107*   PLT 64* 76*     Recent Labs   Lab 09/27/21  1128 09/27/21  0940 09/26/21  1748 09/26/21  1549 09/26/21  1205 09/25/21  0818 09/24/21  0537 09/24/21  0531 09/24/21 0531   NA  --  144  --   --   --  143  --   --  140   POTASSIUM  --  3.5  --   --   --  4.1  --   --  4.1   CHLORIDE  --  109  --   --   --  111*  --   --  103   CO2  --  32  --   --   --  29  --   --  30   ANIONGAP  --  3  --   --   --  3  --   --  7   GLC  --  94 93 75  --  106*  --    < > 96   BUN  --  3*  --   --   --  13  --   --  32*   CR  --  0.71  --   --   --  0.84 1.4*  --  1.25   GFRESTIMATED  --  >90  --   --   --  >90 >60  --  80   ALICE  --  8.1*  --   --   --  8.2*  --   --  9.4   MAG  --  1.8  --   --  1.6  --   --   --   --    PHOS 1.8* 1.8*  --   --  2.0*  --   --   --   --    PROTTOTAL  --   --   --   --   --   --   --   --  7.7   ALBUMIN  --   --   --   --   --   --   --   --  3.3*   BILITOTAL  --   --   --   --   --   --   --   --  1.2   ALKPHOS  --   --   --   --   --   --   --   --  77   AST  --   --   --   --   --   --   --   --  15   ALT  --   --   --   --   --   --   --   --  20    < > = values in this interval not displayed.      Imaging:   Results for orders placed or performed during the hospital encounter of 09/24/21   CT Chest (PE) Abdomen Pelvis w Contrast    Narrative    CT CHEST PULMONARY EMBOLUS ABDOMEN AND PELVIS WITH CONTRAST 9/24/2021  7:04 AM    CLINICAL HISTORY: Hypoxia. Abdominal distention. Nausea and vomiting.  History of small bowel obstruction.    TECHNIQUE: CT angiogram chest and routine CT abdomen pelvis with IV  contrast. Arterial phase through the chest and venous phase through  the abdomen and pelvis. 2D and 3D MIP reconstructions were preformed  by the CT technologist. Dose reduction techniques were used.     CONTRAST: 42 mL [id number]    COMPARISON: CT of the abdomen and pelvis performed  4/19/2021.    FINDINGS:  ANGIOGRAM CHEST: Pulmonary arteries are normal caliber and negative  for pulmonary emboli. Thoracic aorta is negative for dissection. No CT  evidence of right heart strain.     LUNGS AND PLEURA: No pleural effusions. Ill-defined region of  consolidation at the right lung base posteriorly is new since the  previous exam. There is also patchy groundglass opacity in both lungs,  greater on the right. No pneumothorax is identified. Multiple images  through the chest are degraded due to patient motion artifact.    MEDIASTINUM/AXILLAE: No enlarged lymph nodes are identified in the  chest. No pericardial effusion.    CORONARY ARTERY CALCIFICATION: None.    HEPATOBILIARY: Unremarkable. No hepatic masses are seen.    PANCREAS: Normal.    SPLEEN: Normal.    ADRENAL GLANDS: Normal.    KIDNEYS/BLADDER: Nonobstructing 0.3 cm stone in the upper pole of the  right kidney. The kidneys are otherwise unremarkable. No  hydronephrosis.    BOWEL: There are multiple mildly thickened loops of distal small bowel  in the right lower quadrant, suggesting enteritis. Proximal to these  thickened distal small bowel loops, there are multiple loops of  dilated gas and fluid-filled small bowel, consistent with associated  small bowel obstruction. Moderate amount of stool in the rectum. Prior  appendectomy.    PELVIC ORGANS: Unremarkable.    LYMPH NODES: No enlarged lymph nodes are identified in the abdomen or  pelvis.    VASCULATURE: Left-sided IVC.    ADDITIONAL FINDINGS: None.    MUSCULOSKELETAL: Thoracolumbar curve, convex right.      Impression    IMPRESSION:  1.  Multiple loops of mildly thickened distal small bowel in the right  lower quadrant suggest enteritis, and are most likely infectious or  inflammatory in etiology.  2.  Multiple dilated loops of gas and fluid-filled small bowel  proximal to the thickened loops of distal small bowel are consistent  with an associated distal small bowel obstruction.  3.   Ill-defined area of consolidation is noted at the right lung base  posteriorly, and there is patchy groundglass opacity in both lungs,  greater on the right. Findings are suspicious for pneumonia.  4.  No evidence for pulmonary embolism.  5.  Nonobstructing 0.3 cm stone in the upper pole of the right kidney.    WON LÓPEZ MD         SYSTEM ID:  [id number]   Abdomen XR 1 vw    Narrative    ABDOMEN ONE VIEW  9/24/2021 10:01 AM     HISTORY: NG tube placement.    COMPARISON: CT of the chest, abdomen, and pelvis performed earlier  today.      Impression    IMPRESSION: No enteric tube is visualized on this exam. Multiple  dilated loops of small bowel throughout the abdomen are consistent  with a distal small bowel obstruction.    WON LÓPEZ MD         SYSTEM ID:  [id number]   XR Gastrografin  Challenge    Narrative    EXAM: XR GASTROGRAFIN CHALLENGE  LOCATION: [facility]  DATE/TIME: 9/24/2021 1:24 PM    INDICATION: SBO, place Gastrografin via G tube  COMPARISON: Abdominal x-ray on same day earlier  TECHNIQUE: Routine.    FINDINGS:  FLUOROSCOPIC TIME: 0 minutes.  NUMBER OF IMAGES: 2.      Impression    IMPRESSION: Supine views of the abdomen and pelvis were obtained. Enteric contrast seen within a few dilated small bowel loops. Multiple other dilated small bowel loops are also noted. No enteric contrast seen within the large bowel. Recommend obtaining   subsequent x-ray in 1 or 2 hours to assess for progression of contrast. Contrast within the urinary bladder from recent contrast administration.   XR Abdomen 1 View    Narrative    EXAM: XR ABDOMEN 1 VIEW  LOCATION: [facility]  DATE/TIME: 09/25/2021, 8:45 AM    INDICATION: Follow up gastro challenge.  COMPARISON: 09/25/2021 at 2:53 AM.      Impression    IMPRESSION: Contrast is present in the right colon. Contrast also present throughout diffuse dilated small bowel.      XR Abdomen 1 View    Narrative    EXAM: XR ABDOMEN  1 VIEW  LOCATION: [facility]  DATE/TIME: 9/25/2021 2:39 AM    INDICATION: Follow-up of Gastrografin challenge  COMPARISON: 09/24/2021.      Impression    IMPRESSION: Multiple loops of dilated small bowel throughout the abdomen. Oral contrast fully opacifies multiple distended loops of small bowel in the left hemiabdomen, with dilute contrast opacifying multiple small bowel loops in the right hemiabdomen,   though no definite colonic contrast visualized at this time. There is retained contrast within the gastric fundus.

## 2021-09-28 NOTE — PROGRESS NOTES
For vital sings and assessment, please see documentation flow sheet.    pertinent assignments: Mother remains at bedside. Tolerating tube feeding at current/goal rate of 50 ml/hr. Heart rate is tachy.    Treatment plan: Have the patient get up and walk around the room.

## 2021-09-28 NOTE — PROGRESS NOTES
Pt on RA.  LS are Clear on the ZHANNA Dim/CRS on the RUL.  Neb given with Mask.  DPI on hold.     Pt on RA Sleeping SATs 86%  2l/m Oxymask placed SATs now 98%.  Neb given with mask.    RT will follow.

## 2021-09-29 ENCOUNTER — APPOINTMENT (OUTPATIENT)
Dept: PHYSICAL THERAPY | Facility: CLINIC | Age: 26
End: 2021-09-29
Attending: INTERNAL MEDICINE
Payer: COMMERCIAL

## 2021-09-29 VITALS
SYSTOLIC BLOOD PRESSURE: 115 MMHG | DIASTOLIC BLOOD PRESSURE: 76 MMHG | WEIGHT: 87 LBS | OXYGEN SATURATION: 94 % | HEART RATE: 90 BPM | BODY MASS INDEX: 16.01 KG/M2 | RESPIRATION RATE: 26 BRPM | HEIGHT: 62 IN | TEMPERATURE: 98.1 F

## 2021-09-29 LAB
ANION GAP SERPL CALCULATED.3IONS-SCNC: 1 MMOL/L (ref 3–14)
BACTERIA BLD CULT: NO GROWTH
BUN SERPL-MCNC: 5 MG/DL (ref 7–30)
CALCIUM SERPL-MCNC: 8.5 MG/DL (ref 8.5–10.1)
CHLORIDE BLD-SCNC: 105 MMOL/L (ref 94–109)
CO2 SERPL-SCNC: 33 MMOL/L (ref 20–32)
CREAT SERPL-MCNC: 0.63 MG/DL (ref 0.66–1.25)
ERYTHROCYTE [DISTWIDTH] IN BLOOD BY AUTOMATED COUNT: 12.8 % (ref 10–15)
GFR SERPL CREATININE-BSD FRML MDRD: >90 ML/MIN/1.73M2
GLUCOSE BLD-MCNC: 188 MG/DL (ref 70–99)
HCT VFR BLD AUTO: 36 % (ref 40–53)
HGB BLD-MCNC: 11.9 G/DL (ref 13.3–17.7)
MCH RBC QN AUTO: 35.4 PG (ref 26.5–33)
MCHC RBC AUTO-ENTMCNC: 33.1 G/DL (ref 31.5–36.5)
MCV RBC AUTO: 107 FL (ref 78–100)
PHOSPHATE SERPL-MCNC: 2.7 MG/DL (ref 2.5–4.5)
PLATELET # BLD AUTO: 92 10E3/UL (ref 150–450)
POTASSIUM BLD-SCNC: 4.4 MMOL/L (ref 3.4–5.3)
RBC # BLD AUTO: 3.36 10E6/UL (ref 4.4–5.9)
SODIUM SERPL-SCNC: 139 MMOL/L (ref 133–144)
WBC # BLD AUTO: 5.8 10E3/UL (ref 4–11)

## 2021-09-29 PROCEDURE — 250N000009 HC RX 250: Performed by: PHYSICIAN ASSISTANT

## 2021-09-29 PROCEDURE — 90686 IIV4 VACC NO PRSV 0.5 ML IM: CPT | Performed by: INTERNAL MEDICINE

## 2021-09-29 PROCEDURE — 250N000009 HC RX 250: Performed by: INTERNAL MEDICINE

## 2021-09-29 PROCEDURE — 97162 PT EVAL MOD COMPLEX 30 MIN: CPT | Mod: GP | Performed by: PHYSICAL THERAPIST

## 2021-09-29 PROCEDURE — 94640 AIRWAY INHALATION TREATMENT: CPT | Mod: 76

## 2021-09-29 PROCEDURE — 250N000011 HC RX IP 250 OP 636: Performed by: INTERNAL MEDICINE

## 2021-09-29 PROCEDURE — 258N000003 HC RX IP 258 OP 636: Performed by: INTERNAL MEDICINE

## 2021-09-29 PROCEDURE — 94640 AIRWAY INHALATION TREATMENT: CPT

## 2021-09-29 PROCEDURE — 250N000013 HC RX MED GY IP 250 OP 250 PS 637: Performed by: INTERNAL MEDICINE

## 2021-09-29 PROCEDURE — 999N000157 HC STATISTIC RCP TIME EA 10 MIN

## 2021-09-29 PROCEDURE — 84100 ASSAY OF PHOSPHORUS: CPT | Performed by: INTERNAL MEDICINE

## 2021-09-29 PROCEDURE — 36415 COLL VENOUS BLD VENIPUNCTURE: CPT | Performed by: INTERNAL MEDICINE

## 2021-09-29 PROCEDURE — 97530 THERAPEUTIC ACTIVITIES: CPT | Mod: GP | Performed by: PHYSICAL THERAPIST

## 2021-09-29 PROCEDURE — 258N000003 HC RX IP 258 OP 636: Performed by: PHYSICIAN ASSISTANT

## 2021-09-29 PROCEDURE — 85027 COMPLETE CBC AUTOMATED: CPT | Performed by: INTERNAL MEDICINE

## 2021-09-29 PROCEDURE — 80048 BASIC METABOLIC PNL TOTAL CA: CPT | Performed by: INTERNAL MEDICINE

## 2021-09-29 PROCEDURE — 99239 HOSP IP/OBS DSCHRG MGMT >30: CPT | Performed by: INTERNAL MEDICINE

## 2021-09-29 PROCEDURE — C9113 INJ PANTOPRAZOLE SODIUM, VIA: HCPCS | Performed by: PHYSICIAN ASSISTANT

## 2021-09-29 PROCEDURE — 272N000078 HC NUTRITION PRODUCT INTERMEDIATE LITER

## 2021-09-29 PROCEDURE — 250N000011 HC RX IP 250 OP 636: Performed by: PHYSICIAN ASSISTANT

## 2021-09-29 PROCEDURE — G0008 ADMIN INFLUENZA VIRUS VAC: HCPCS | Performed by: INTERNAL MEDICINE

## 2021-09-29 RX ADMIN — SODIUM CHLORIDE: 9 INJECTION, SOLUTION INTRAVENOUS at 04:04

## 2021-09-29 RX ADMIN — TAZOBACTAM SODIUM AND PIPERACILLIN SODIUM 4.5 G: 500; 4 INJECTION, SOLUTION INTRAVENOUS at 00:17

## 2021-09-29 RX ADMIN — VALPROATE SODIUM 250 MG: 100 INJECTION, SOLUTION INTRAVENOUS at 04:07

## 2021-09-29 RX ADMIN — INFLUENZA A VIRUS A/VICTORIA/2570/2019 IVR-215 (H1N1) ANTIGEN (FORMALDEHYDE INACTIVATED), INFLUENZA A VIRUS A/TASMANIA/503/2020 IVR-221 (H3N2) ANTIGEN (FORMALDEHYDE INACTIVATED), INFLUENZA B VIRUS B/PHUKET/3073/2013 ANTIGEN (FORMALDEHYDE INACTIVATED), AND INFLUENZA B VIRUS B/WASHINGTON/02/2019 ANTIGEN (FORMALDEHYDE INACTIVATED) 0.5 ML: 15; 15; 15; 15 INJECTION, SUSPENSION INTRAMUSCULAR at 12:03

## 2021-09-29 RX ADMIN — TAZOBACTAM SODIUM AND PIPERACILLIN SODIUM 4.5 G: 500; 4 INJECTION, SOLUTION INTRAVENOUS at 12:01

## 2021-09-29 RX ADMIN — CLOBAZAM 7.5 MG: 2.5 SUSPENSION ORAL at 09:07

## 2021-09-29 RX ADMIN — TAZOBACTAM SODIUM AND PIPERACILLIN SODIUM 4.5 G: 500; 4 INJECTION, SOLUTION INTRAVENOUS at 06:20

## 2021-09-29 RX ADMIN — VALPROATE SODIUM 250 MG: 100 INJECTION, SOLUTION INTRAVENOUS at 09:09

## 2021-09-29 RX ADMIN — IPRATROPIUM BROMIDE AND ALBUTEROL SULFATE 3 ML: .5; 3 SOLUTION RESPIRATORY (INHALATION) at 12:57

## 2021-09-29 RX ADMIN — PANTOPRAZOLE SODIUM 40 MG: 40 INJECTION, POWDER, FOR SOLUTION INTRAVENOUS at 11:58

## 2021-09-29 ASSESSMENT — ACTIVITIES OF DAILY LIVING (ADL)
ADLS_ACUITY_SCORE: 29

## 2021-09-29 NOTE — PROGRESS NOTES
09/29/21 1111   Quick Adds   Type of Visit Initial PT Evaluation   Living Environment   Living Environment Comments Pt lives with mom, has stairs unilateral railing. Gets assistance with all mobility from mother/sister who is PCA.    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    Existing Precautions/Restrictions fall   Cognition   Orientation Status (Cognition) oriented to;person   Affect/Mental Status (Cognition)   (at baseline per mother)   Follows Commands (Cognition) follows one-step commands;repetition of directions required;physical/tactile prompts required;75-90% accuracy;verbal cues/prompting required   Pain Assessment   Patient Currently in Pain No   Posture    Posture Protracted shoulders;Forward head position;Kyphosis   Range of Motion (ROM)   ROM Comment globally decreased ROM, WFL   Strength   Strength Comments B LE functionally >3/5 strength; decreased functional strength   Bed Mobility   Comment (Bed Mobility) Natalie supine to sit   Transfers   Transfer Safety Comments HHA x2 sit to stand   Gait/Stairs (Locomotion)   Distance in Feet (Required for LE Total Joints) 12   Pattern (Gait) step-to   Deviations/Abnormal Patterns (Gait) base of support, narrow;gait speed decreased    Comment (Gait/Stairs) HHA x2    Balance   Balance Comments fair+ unsupported sitting; fair- standing with HHAx2   Clinical Impression   Criteria for Skilled Therapeutic Intervention yes, treatment indicated   PT Diagnosis (PT) impaired functional mobility   Influenced by the following impairments decreased B LE strength, ROM   Functional limitations due to impairments impaired bed mobility, transfers, ambulation, stairs   Clinical Presentation Evolving/Changing   Clinical Presentation Rationale decrease in functional status; pmhx; good home/social support   Clinical Decision Making (Complexity) moderate complexity   Therapy Frequency (PT) Daily   Predicted Duration of Therapy Intervention (days/wks) 3 days   Planned Therapy Interventions (PT) balance training;bed mobility training;gait training;home exercise program;patient/family education;ROM (range of motion);stair training;strengthening;transfer training   Risk & Benefits of therapy have been explained evaluation/treatment results reviewed;care plan/treatment goals reviewed;risks/benefits reviewed;current/potential barriers reviewed;participants voiced agreement with care plan;participants included;patient;mother   PT Discharge Planning    PT Discharge Recommendation (DC Rec) home with assist   PT Rationale for DC Rec Patient is below baseline level of mobility would benefit from HHPT, but mother declining services at this time d/t pt's unvaccinated status. Unable to trial extended mobility this date but anticipate that with continued PT services patient will be safe to d/c home with support of family. Pt's mother not concerned with current mobility status and ability to care for patient at home.    Total Evaluation Time   Total Evaluation Time (Minutes) 12

## 2021-09-29 NOTE — PROGRESS NOTES
Care Management Discharge Note    Discharge Date: 09/29/2021       Discharge Disposition:  Home with mom and sister/pCA    Discharge Services: PCA    Discharge DME:  none    Discharge Transportation: family or friend will provide    Education Provided on the Discharge Plan:  mother  Persons Notified of Discharge Plans:mother  Patient/Family in Agreement with the Plan: yes      Additional Information:  Pt discharged with Mom and sister.  They decline option for home care PT.  Orders faxed to [facility] home [facility] [phone]  Fax: [phone]    Tess Wilder RN BSN   Inpatient Care Coordination  [facility]  [phone]    Radha Wilder, RN

## 2021-09-29 NOTE — PLAN OF CARE
Patient is discharging to home w/ transportation from his sister. Discharge instructions provided. All questions answered. All belongings w/ patient.

## 2021-09-29 NOTE — DISCHARGE SUMMARY
[facility]  Discharge Summary  Hospitalist      Date of Admission:  9/24/2021  Date of Discharge:  9/29/2021  Provider:  Matilde Bagley MD  Date of Service (when I last saw the patient): 09/29/21      Primary Provider: Micky Leyva          Discharge Diagnosis:   Discharge Diagnoses   Recurrent small bowel obstruction  Sepsis secondary to aspiration pneumonia  Acute hypoxic respiratory failure secondary to aspiration pneumonia  Acute kidney injury  Autism spectrum disorder cerebral palsy and depression anxiety  GERD  Epilepsy    Other medical issues:  Past Medical History:   Diagnosis Date     Chronic lung disease     off nebs in 2012     Depression     fluoxetine, risperidone     Epilepsy 05/01/2011     Feeding by G-tube      GERD (gastroesophageal reflux disease)      Insomnia     melatonin, clonazepam     Kidney stone      Premature infant - 24 weeks      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  Please see the admission history and physical for full details.    Hospital Course     Rj Licea was admitted on 9/24/2021.  26-year-old male with past medical history significant for depression, anxiety, insomnia, GERD, autism, retinopathy of prematurity associated blindness, seizure disorder, premature infant, SBO, G-tube placement presented with shortness of breath was found to have recurrent small bowel obstruction and acute hypoxic respiratory failure secondary to aspiration pneumonia.      The following problems were addressed during his hospitalization:    1.Recurrent small bowel obstruction.  -Patient has no nausea or vomiting today .  -Has multiple BMs  -Gastrografin challenge showed delayed transit time.  -Severity of treatment with slowly advancing tube feeds to goal  -The bowel obstruction seems to have resolved  "patient tolerating tube feeds  -Surgery was consulted input appreciated.  Recommended further evaluation for dysmotility issues  -Patient has history of small bowel obstruction in the past, the last 1 in April 2021 required operative management.  -Nausea vomiting.  With antiemetics  -Resumed most home meds       2.  Sepsis secondary to aspiration pneumonia  3.   Acute Hypoxic Respiratory Failure 2/2 Aspiration PNA.  -Treated with IV Zosyn while in hospital was discharged on oral Augmentin  -Patient was initially hypoxic gradually weaned off on discharge is maintaining sats above 90%  -Hx of chronic lung disease, not on oxygen at baseline.  -Continue nebulizers and bronchodilators .  -CT chest no embolism \"Increased small to moderate bilateral pleural fluid.  3.  Increased bibasilar patchy areas of dense consolidation that could  represent progression of multifocal pneumonia along with atelectasis.\"       4.  SALLY - 2/2 hypovolemia.   Resolved with IVF hydration      5. Autism Spectrum Disorder/Cerebral Palsy/Depression/Anxiety.  -He has underlying language impairment and disability.  -Does not give any history.  -His mother at bedside.  -He is on  Atarax, Seroquel and Gabapentin which unfortunately have no IV formulation.  -We will try some Ativan for anxiety.     6. GERD -initially was on IV Protonix  transitioned to prior to admission p.o. famotidine and omeprazole tomorrow.     7. Epilepsy - Currently on Valproic Acid and started clobazam.   - transitioned to oral valproic acid once able to tolerate tube feeds.     Significant Results and Procedures   As noted above    Pending Results   Unresulted Labs Ordered in the Past 30 Days of this Admission     Date and Time Order Name Status Description    9/24/2021 11:08 AM Blood Culture Arm, Left Preliminary     9/24/2021 11:08 AM Blood Culture Arm, Right Preliminary           Code Status   Full Code       Primary Care Physician   Micky Leyva    Physical Exam "   Temp: 98.1  F (36.7  C) Temp src: Axillary BP: 115/76 Pulse: 112   Resp: 28 SpO2: 94 % O2 Device: None (Room air) Oxygen Delivery: 2 LPM (While patient sleeping)  Vitals:    09/27/21 1106   Weight: 39.5 kg (87 lb)     Vital Signs with Ranges  Temp:  [98.1  F (36.7  C)-99.2  F (37.3  C)] 98.1  F (36.7  C)  Pulse:  [107-125] 112  Resp:  [16-28] 28  BP: (100-122)/(59-76) 115/76  SpO2:  [90 %-98 %] 94 %  I/O last 3 completed shifts:  In: 3282 [I.V.:1843; NG/GT:335]  Out: 2150 [Urine:2150]    Constitutional: Awake, alert, cooperative, no apparent distress, and appears stated age.  Respiratory: No increased work of breathing, good air exchange, clear to auscultation bilaterally, no crackles or wheezing.  Cardiovascular: Normal apical impulse,normal S1 and S2,   GI: No scars, normal bowel sounds, soft, non-distended, non-tender, no masses palpated, no hepatosplenomegaly.      Discharge Disposition   Discharged to home    Consultations This Hospital Stay   SURGERY GENERAL IP CONSULT  CARE MANAGEMENT / SOCIAL WORK IP CONSULT  PHARMACY IP CONSULT  NUTRITION SERVICES ADULT IP CONSULT  PHARMACY IP CONSULT  PHYSICAL THERAPY ADULT IP CONSULT    Time Spent on this Encounter   IMatilde MD, personally saw the patient today and spent greater than 30 minutes discharging this patient.    Discharge Orders      Care Coordination Referral      Home infusion referral      Reason for your hospital stay    Please refer to discharge summary for bowel obstruction and pneumonia.  Patient is clinically better maintaining oxygenation on room air, tolerating tube feeds.  Patient is significantly deconditioned TCU was recommended however given patient's overall health mom would like to take patient home.  Mom has been caring for jR 24/7 and feels confident that she could safely take care of him at home.  We had detailed conversations about risks and needs.  She understands.  Patient will be discharged with reservation.  PT  recommended TCU initially given that patient can benefit from additional support and care that can be provided at a rehab facility.  Discharged home upon request by patient's mom.     Follow-up and recommended labs and tests     Follow up with primary care provider, Micky Leyva, within 7 days for hospital follow- up.  No follow up labs or test are needed.  Will benefit from COVID-19 vaccine  Please call or come if there are any new or worsening symptoms     Activity    Your activity upon discharge: activity as tolerated     MD face to face encounter    Documentation of Face to Face and Certification for Home Health Services    I certify that patient: Rj Licea is under my care and that I, or a nurse practitioner or physician's assistant working with me, had a face-to-face encounter that meets the physician face-to-face encounter requirements with this patient on: 9/29/2021.    This encounter with the patient was in whole, or in part, for the following medical condition, which is the primary reason for home health care: Recurrent small bowel obstruction, deconditioning weakness.    I certify that, based on my findings, the following services are medically necessary home health services: Nursing.    My clinical findings support the need for the above services because: Nurse is needed: For complex aftercare of  procedures because the patient needs instruction and cannot perform care on their own due to: Cognitive impairment, and dependence on support 24/7.  Severe deconditioning and malnutrition    Further, I certify that my clinical findings support that this patient is homebound (i.e. absences from home require considerable and taxing effort and are for medical reasons or [facility] services or infrequently or of short duration when for other reasons) because: Requires assistance of another person or specialized equipment to access medical services because patient: Requires supervision of another for safe  transfer...    Based on the above findings. I certify that this patient is confined to the home and needs intermittent skilled nursing care, physical therapy and/or speech therapy.  The patient is under my care, and I have initiated the establishment of the plan of care.  This patient will be followed by a physician who will periodically review the plan of care.  Physician/Provider to provide follow up care: Micky Leyva    Attending hospital physician (the Medicare certified [facility] provider): Matilde Bagley MD  Physician Signature: See electronic signature associated with these discharge orders.  Date: 9/29/2021     Diet    Follow this diet upon discharge: Orders Placed This Encounter      Adult Formula Drip Feeding: Continuous Osmolite 1.5; Gastrostomy; Goal Rate: 50; mL/hr; Medication - Feeding Tube Flush Frequency: At least 15-30 mL water before and after medication administration and with tube clogging; Start at 10 mL/hr and inc...      NPO for Medical/Clinical Reasons Except for: No Exceptions     Discharge Medications   Current Discharge Medication List      START taking these medications    Details   amoxicillin-clavulanate (AUGMENTIN) 125-31.25 MG/5ML suspension Take 19.2 mLs (480 mg) by mouth 2 times daily for 2 days  Qty: 76.8 mL, Refills: 0    Associated Diagnoses: Pneumonia of right lower lobe due to infectious organism         CONTINUE these medications which have NOT CHANGED    Details   albuterol (PROAIR HFA/PROVENTIL HFA/VENTOLIN HFA) 108 (90 Base) MCG/ACT inhaler Inhale 2 puffs into the lungs every 6 hours as needed for shortness of breath / dyspnea or wheezing  Qty: 2 Inhaler, Refills: 3    Comments: Pharmacy may dispense brand covered by insurance (Proair, or proventil or ventolin or generic albuterol inhaler)  Associated Diagnoses: Chronic lung disease of prematurity      albuterol (PROVENTIL) (2.5 MG/3ML) 0.083% neb solution Take 1 vial (2.5 mg) by nebulization every 6 hours as needed  for shortness of breath / dyspnea or wheezing  Qty: 9 mL, Refills: 3    Associated Diagnoses: Wheezing      benzoyl peroxide 5 % EX topical gel Apply topically 2 times daily as needed (acne)  Qty: 50 g, Refills: 11    Associated Diagnoses: 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 clobazam (,ONFI,) 2.5 MG/ML suspension 5 mg by Per G Tube route At Bedtime (3 mL in AM / 2 mL in PM)      !! clobazam (ONFI) 2.5 MG/ML suspension 7.5 mg by Per G Tube route every morning (3 mL in AM / 2 mL in PM)      famotidine (PEPCID) 40 MG/5ML suspension 20 mg by Per G Tube route 2 times daily      fluticasone-salmeterol (ADVAIR HFA) 230-21 MCG/ACT inhaler Inhale 2 puffs into the lungs 2 times daily   Qty: 12 g, Refills: 3      gabapentin (NEURONTIN) 250 MG/5ML solution 750 mg by Per G Tube route At Bedtime       hydrOXYzine (ATARAX) 10 MG/5ML syrup 50 mg by Per G Tube route At Bedtime (25 mL)      LORazepam (ATIVAN) 1 MG tablet 1 mg by Per G Tube route every 6 hours as needed for anxiety Per mom: prn       !! melatonin 1 MG TABS 1 mg by Per G Tube route daily Daily between 4-5pm*    Associated Diagnoses: Short stature      !! melatonin 3 MG tablet 6 mg by Per G Tube route At Bedtime       multivitamins w/minerals (CERTAVITE) liquid 15 mLs by Per G Tube route daily      omeprazole (PRILOSEC) 2 mg/mL SUSP 40 mg by Per G Tube route At Bedtime   Qty: 800 mL, Refills: 11      !! polyethylene glycol (MIRALAX) 17 g packet 17 g by Per G Tube route daily      QUEtiapine (SEROQUEL) 300 MG tablet 600 mg by Per G Tube route At Bedtime       simethicone (MYLICON) 40 MG/0.6ML suspension Take 0.6 mLs (40 mg) by mouth 4 times daily as needed (gassiness)  Qty: 45 mL, Refills: 1    Associated Diagnoses: Feeding by G-tube (H); SBO (small  bowel obstruction) (H)      Valproate Sodium (VALPROIC ACID) 250 MG/5ML TAKE 6ML BY MOUTH EVERY MORNING AND 7ML IN THE AFTERNOON AND AT NIGHT  Qty: 600 mL, Refills: 3    Associated Diagnoses: Seizure disorder (H)      Lancets 30G MISC 120 30 gauge lancets (substitute as needed)  Qty: 120 each, Refills: 0    Associated Diagnoses: Hyperglycemia      !! polyethylene glycol (MIRALAX) 17 GM/Dose powder TAKE 17 GM BY MOUTH EVERY DAY AS NEEDED  Qty: 510 g, Refills: 4    Associated Diagnoses: Chronic constipation       !! - Potential duplicate medications found. Please discuss with provider.        Allergies   No Known Allergies  Data   Most Recent 3 CBC's:Recent Labs   Lab Test 09/29/21  0934 09/28/21  0921 09/25/21  0818   WBC 5.8 7.7  7.7 7.7   HGB 11.9* 11.6* 13.2*   * 107* 112*   PLT 92* 69* 64*      Most Recent 3 BMP's:  Recent Labs   Lab Test 09/29/21  0934 09/28/21  0921 09/27/21  0940    140 144   POTASSIUM 4.4 3.9 3.5   CHLORIDE 105 104 109   CO2 33* 34* 32   BUN 5* 4* 3*   CR 0.63* 0.62* 0.71   ANIONGAP 1* 2* 3   ALICE 8.5 8.0* 8.1*   * 110* 94     Most Recent 2 LFT's:  Recent Labs   Lab Test 09/24/21  0531 04/26/21  0600   AST 15 11   ALT 20 11   ALKPHOS 77 49   BILITOTAL 1.2 0.3     Most Recent INR's and Anticoagulation Dosing History:  Anticoagulation Dose History     Recent Dosing and Labs Latest Ref Rng & Units 4/19/2021 4/25/2021 4/26/2021    INR 0.86 - 1.14 1.10 1.53(H) 1.14        Most Recent 3 Troponin's:  Recent Labs   Lab Test 09/24/21  0531   TROPONIN <0.015     Most Recent Cholesterol Panel:  Recent Labs   Lab Test 03/17/21  1351   CHOL 179   LDL 73   HDL 95   TRIG 57     Most Recent 6 Bacteria Isolates From Any Culture (See EPIC Reports for Culture Details):  Recent Labs   Lab Test 05/28/21  1128 04/26/21  0150 04/26/21  0143 04/19/21  1326 04/19/21  1321 05/29/20  1929   CULT Light growth  Staphylococcus aureus  * No growth No growth No growth No growth No growth     Most Recent  TSH, T4 and A1c Labs:  Recent Labs   Lab Test 03/17/21  1351 02/06/20  1147 06/07/19  1315   TSH 0.94   < > 4.45*   T4  --   --  0.88   A1C 5.0   < > 4.5    < > = values in this interval not displayed.     Results for orders placed or performed during the hospital encounter of 09/24/21   CT Chest (PE) Abdomen Pelvis w Contrast    Narrative    CT CHEST PULMONARY EMBOLUS ABDOMEN AND PELVIS WITH CONTRAST 9/24/2021  7:04 AM    CLINICAL HISTORY: Hypoxia. Abdominal distention. Nausea and vomiting.  History of small bowel obstruction.    TECHNIQUE: CT angiogram chest and routine CT abdomen pelvis with IV  contrast. Arterial phase through the chest and venous phase through  the abdomen and pelvis. 2D and 3D MIP reconstructions were preformed  by the CT technologist. Dose reduction techniques were used.     CONTRAST: 42 mL Isovue-370    COMPARISON: CT of the abdomen and pelvis performed 4/19/2021.    FINDINGS:  ANGIOGRAM CHEST: Pulmonary arteries are normal caliber and negative  for pulmonary emboli. Thoracic aorta is negative for dissection. No CT  evidence of right heart strain.     LUNGS AND PLEURA: No pleural effusions. Ill-defined region of  consolidation at the right lung base posteriorly is new since the  previous exam. There is also patchy groundglass opacity in both lungs,  greater on the right. No pneumothorax is identified. Multiple images  through the chest are degraded due to patient motion artifact.    MEDIASTINUM/AXILLAE: No enlarged lymph nodes are identified in the  chest. No pericardial effusion.    CORONARY ARTERY CALCIFICATION: None.    HEPATOBILIARY: Unremarkable. No hepatic masses are seen.    PANCREAS: Normal.    SPLEEN: Normal.    ADRENAL GLANDS: Normal.    KIDNEYS/BLADDER: Nonobstructing 0.3 cm stone in the upper pole of the  right kidney. The kidneys are otherwise unremarkable. No  hydronephrosis.    BOWEL: There are multiple mildly thickened loops of distal small bowel  in the right lower quadrant,  suggesting enteritis. Proximal to these  thickened distal small bowel loops, there are multiple loops of  dilated gas and fluid-filled small bowel, consistent with associated  small bowel obstruction. Moderate amount of stool in the rectum. Prior  appendectomy.    PELVIC ORGANS: Unremarkable.    LYMPH NODES: No enlarged lymph nodes are identified in the abdomen or  pelvis.    VASCULATURE: Left-sided IVC.    ADDITIONAL FINDINGS: None.    MUSCULOSKELETAL: Thoracolumbar curve, convex right.      Impression    IMPRESSION:  1.  Multiple loops of mildly thickened distal small bowel in the right  lower quadrant suggest enteritis, and are most likely infectious or  inflammatory in etiology.  2.  Multiple dilated loops of gas and fluid-filled small bowel  proximal to the thickened loops of distal small bowel are consistent  with an associated distal small bowel obstruction.  3.  Ill-defined area of consolidation is noted at the right lung base  posteriorly, and there is patchy groundglass opacity in both lungs,  greater on the right. Findings are suspicious for pneumonia.  4.  No evidence for pulmonary embolism.  5.  Nonobstructing 0.3 cm stone in the upper pole of the right kidney.    WON LÓPEZ MD         SYSTEM ID:  [id number]   Abdomen XR 1 vw    Narrative    ABDOMEN ONE VIEW  9/24/2021 10:01 AM     HISTORY: NG tube placement.    COMPARISON: CT of the chest, abdomen, and pelvis performed earlier  today.      Impression    IMPRESSION: No enteric tube is visualized on this exam. Multiple  dilated loops of small bowel throughout the abdomen are consistent  with a distal small bowel obstruction.    WON LÓPEZ MD         SYSTEM ID:  [id number]   XR Gastrografin  Challenge    Narrative    EXAM: XR GASTROGRAFIN CHALLENGE  LOCATION: [facility]  DATE/TIME: 9/24/2021 1:24 PM    INDICATION: SBO, place Gastrografin via G tube  COMPARISON: Abdominal x-ray on same day earlier  TECHNIQUE:  Routine.    FINDINGS:  FLUOROSCOPIC TIME: 0 minutes.  NUMBER OF IMAGES: 2.      Impression    IMPRESSION: Supine views of the abdomen and pelvis were obtained. Enteric contrast seen within a few dilated small bowel loops. Multiple other dilated small bowel loops are also noted. No enteric contrast seen within the large bowel. Recommend obtaining   subsequent x-ray in 1 or 2 hours to assess for progression of contrast. Contrast within the urinary bladder from recent contrast administration.   XR Abdomen 1 View    Narrative    EXAM: XR ABDOMEN 1 VIEW  LOCATION: [facility]  DATE/TIME: 09/25/2021, 8:45 AM    INDICATION: Follow up gastro challenge.  COMPARISON: 09/25/2021 at 2:53 AM.      Impression    IMPRESSION: Contrast is present in the right colon. Contrast also present throughout diffuse dilated small bowel.      XR Abdomen 1 View    Narrative    EXAM: XR ABDOMEN 1 VIEW  LOCATION: [facility]  DATE/TIME: 9/25/2021 2:39 AM    INDICATION: Follow-up of Gastrografin challenge  COMPARISON: 09/24/2021.      Impression    IMPRESSION: Multiple loops of dilated small bowel throughout the abdomen. Oral contrast fully opacifies multiple distended loops of small bowel in the left hemiabdomen, with dilute contrast opacifying multiple small bowel loops in the right hemiabdomen,   though no definite colonic contrast visualized at this time. There is retained contrast within the gastric fundus.   X-ray Abdomen flat port    Narrative    EXAM: XR ABDOMEN PORTABLE 1 VIEWS  LOCATION: [facility]  DATE/TIME: 09/26/2021, 7:22 AM    INDICATION: Small bowel obstruction.  COMPARISON: None.      Impression    IMPRESSION: Contrast present throughout the colon. No definite dilated small bowel visualized.     CT Chest Pulmonary Embolism w Contrast    Narrative    CT CHEST PULMONARY EMBOLISM WITH CONTRAST 9/27/2021 2:04 PM    CLINICAL HISTORY: PE suspected, high probability.  Irregular cardiac  activity. Shortness of breath.  TECHNIQUE: CT angiogram chest during arterial phase injection IV  contrast. 2D and 3D MIP reconstructions were performed by the CT  technologist. Dose reduction techniques were used.     CONTRAST: 41mL Isovue-370    COMPARISON: CT chest, abdomen and pelvis 9/24/2021.    FINDINGS:  ANGIOGRAM CHEST: Motion artifact limits detailed assessment, but no  clear pulmonary embolism can be identified. The peripheral lungs are  limited in assessment. Thoracic aorta is negative for dissection.    LUNGS AND PLEURA: Bilateral increased small to moderate pleural fluid.  Bilateral dependent atelectasis. Streaky consolidation at the right  greater than left lung base also appears increased in the interval.  Motion artifact limits detailed assessment of the lung parenchyma.    MEDIASTINUM/AXILLAE: No enlarged lymph nodes identified. No acute  additional abnormality is seen within the mediastinum.    CORONARY ARTERY CALCIFICATION: Cannot evaluate.    UPPER ABDOMEN: A few distended bowel loops with wall thickening  identified primarily involving the small bowel. Small ascites.    MUSCULOSKELETAL: Normal.      Impression    IMPRESSION:  1.  Assessment severely limited by motion artifact. No convincing  central pulmonary embolism, but the peripheral small branch vessels  not fully assessed.  2.  Increased small to moderate bilateral pleural fluid.  3.  Increased bibasilar patchy areas of dense consolidation that could  represent progression of multifocal pneumonia along with atelectasis.  4.  Wall thickened bowel loops at the upper abdomen again noted.  Correlate with enteritis.    LOLITA MCKENZIE MD         SYSTEM ID:  [id number]           Disclaimer: This note consists of symbols derived from keyboarding, dictation and/or voice recognition software. As a result, there may be errors in the script that have gone undetected. Please consider this when interpreting information found in this  chart.

## 2021-09-29 NOTE — PLAN OF CARE
To Do:  End of Shift Summary  For vital signs and complete assessments, please see documentation flowsheets.     Pertinent assessments: Mother remains at bedside. Tachycardic, otherwise vss. 2 lpm PRN, patient frequently removes, Low 90's while awake. external catheter on. Tolerating tube feeding well.  NS is infusing at 75ml/hr.     Major Shift Events: uneventful     Treatment Plan: IV fluids, antibiotics; Tube feeding, phos replacement protocol     Bedside Nurse: Zia Larkin RN

## 2021-09-29 NOTE — TELEPHONE ENCOUNTER
I can huddle with Brenda and also se patient quickly to help coordinate plan. My schedule is completely full/overbooked. Is that okay?

## 2021-09-29 NOTE — TELEPHONE ENCOUNTER
Pt on my schedule for hospital follow-up on Wed 10/6. Mom wants to see Dr. Leyva, any availability?  Brenda Flowers, APRN, CNP

## 2021-09-29 NOTE — PROGRESS NOTES
End of Shift Summary  For vital signs and complete assessments, please see documentation flowsheets.     Pertinent assessments: Pt more alert this shift, max temp 99.2 axillary, recheck 98.8, HR tachy, soft BP, on RA, no signs of pain. LS diminshed. TF @ goal rate, tolerating well. BS hypo, incont BM x 1 this shift. External cath in place, adequate urine output, incont. Pt mom at bedside assisting with cares.     Major Shift Events: uneventful     Treatment Plan: IVF, IV Zosyn, TF, Phosp recheck this AM     Bedside Nurse: Tess Torres RN

## 2021-09-30 ENCOUNTER — PATIENT OUTREACH (OUTPATIENT)
Dept: NURSING | Facility: CLINIC | Age: 26
End: 2021-09-30
Payer: MEDICAID

## 2021-09-30 NOTE — LETTER
[facility] CARE COORDINATION  [facility]  September 30, 2021    Rj Licea  [id number] [address] [address]      Dear Rj,    I am a clinic community health worker who works with Micky Leyva MD at the [facility]. I wanted to thank you for spending the time to talk with me.  Below is a description of clinic care coordination and how I can further assist you.      The clinic care coordination team is made up of a registered nurse,  and community health worker who understand the health care system. The goal of clinic care coordination is to help you manage your health and improve access to the health care system in the most efficient manner. The team can assist you in meeting your health care goals by providing education, coordinating services, strengthening the communication among your providers and supporting you with any resource needs.    Please feel free to contact me at [phone] with any questions or concerns. We are focused on providing you with the highest-quality healthcare experience possible and that all starts with you.     Sincerely,     Terese Devries, NATHEN, B.S. [facility]  Clinic Care Coordination  [facility]:  [facility] and [place]  [phone]  [email or web address]

## 2021-09-30 NOTE — PROGRESS NOTES
Clinic Care Coordination Contact  [facility]: Post-Discharge Note  SITUATION                                                      Admission:    Admission Date: 09/24/21   Reason for Admission: pnuemonia and bowel obstruction  Discharge:   Discharge Date: 09/29/21  Discharge Diagnosis: pnuemonia and bowel obstruction    BACKGROUND                                                      Rj Licea was admitted on 9/24/2021.  26-year-old male with past medical history significant for depression, anxiety, insomnia, GERD, autism, retinopathy of prematurity associated blindness, seizure disorder, premature infant, SBO, G-tube placement presented with shortness of breath was found to have recurrent small bowel obstruction and acute hypoxic respiratory failure secondary to aspiration pneumonia.    ASSESSMENT      Enrollment  Primary Care Care Coordination Status: Declined    Discharge Assessment  How are you doing now that you are home?: pt is doing well, hanging in there, cough is still bad and feeling weak but no fever.  How are your symptoms? (Red Flag symptoms escalate to triage hotline per guidelines): Improved  Do you feel your condition is stable enough to be safe at home until your provider visit?: Yes  Does the patient have their discharge instructions? : Yes  Does the patient have questions regarding their discharge instructions? : No  Were you started on any new medications or were there changes to any of your previous medications? : Yes  Does the patient have all of their medications?: Yes  Do you have questions regarding any of your medications? : No  Do you have all of your needed medical supplies or equipment (DME)?  (i.e. oxygen tank, CPAP, cane, etc.): Yes  Discharge follow-up appointment scheduled within 14 calendar days? : Yes  Discharge Follow Up Appointment Date: 10/06/21  Discharge Follow Up Appointment Scheduled with?: Primary Care Provider    Post-op (CHW CTA Only)  If the patient had a  surgery or procedure, do they have any questions for a nurse?: No         Care Management   Community Health Worker Initial Outreach  Chart Review: Referral from IP handoff, in ED to hospital on 9/24-9/29 for pnuemonia and bowel obstruction. Discharged to home, f/u 10/6 with clinic provider and GI. PCP on 11/17.    CHW introduced self and role with CC  Patients mother Darcy states that they are hanging in there, trying to make patient comfortable. Knows it may take a few days but he is doing a lot better.   She has no outstanding questions or concerns, states that her daughter cares for patient while she is at work and they do a great job.   CHW went over PCP appt scheduled for 11/17, Darcy is very happy to hear this.   CHW inquired if patient/family is in need of any additional support or resources however Darcy declines.  CHW provided contact information and encouraged to reach out with any future needs or concerns, Darcy agrees.    Patient accepts CC: No, Darcy declines needing CC support or connection to resources for patient . Patient will be sent Care Coordination introduction letter for future reference.     PLAN                                                      Outpatient Plan:  As below    Future Appointments   Date Time Provider Department Center   10/6/2021 11:30 AM Brenda Flowers NP [facility]   10/6/2021  2:20 PM Chela So MD [facility]   11/17/2021 11:50 AM Micky Leyva MD [facility]         For any urgent concerns, please contact our 24 hour nurse triage line: [phone] ([phone])       NATHEN Downs, B.S. [facility] Care Coordination  [facility]:  [place], [place] and [place]  [phone]  [email or web address]

## 2021-09-30 NOTE — PLAN OF CARE
Physical Therapy Discharge Summary    Reason for therapy discharge:    Discharged to home.    Progress towards therapy goal(s). See goals on Care Plan in [facility] electronic health record for goal details.  Goals partially met.  Barriers to achieving goals:   discharge from facility.    Therapy recommendation(s):    Continued therapy is recommended.  Rationale/Recommendations:  PT recommending continued HH PT for further improvement in functional mobility, mother declining at this time d/t pt unvaccinated status.

## 2021-10-06 ENCOUNTER — VIRTUAL VISIT (OUTPATIENT)
Dept: GASTROENTEROLOGY | Facility: CLINIC | Age: 26
End: 2021-10-06
Payer: COMMERCIAL

## 2021-10-06 ENCOUNTER — OFFICE VISIT (OUTPATIENT)
Dept: PEDIATRICS | Facility: CLINIC | Age: 26
End: 2021-10-06
Payer: COMMERCIAL

## 2021-10-06 VITALS
RESPIRATION RATE: 18 BRPM | HEART RATE: 104 BPM | DIASTOLIC BLOOD PRESSURE: 58 MMHG | BODY MASS INDEX: 15.25 KG/M2 | WEIGHT: 83.4 LBS | OXYGEN SATURATION: 93 % | TEMPERATURE: 98.1 F | SYSTOLIC BLOOD PRESSURE: 108 MMHG

## 2021-10-06 VITALS — WEIGHT: 83.41 LBS | HEIGHT: 62 IN | BODY MASS INDEX: 15.35 KG/M2

## 2021-10-06 DIAGNOSIS — K56.609 SBO (SMALL BOWEL OBSTRUCTION) (H): ICD-10-CM

## 2021-10-06 DIAGNOSIS — F84.0 AUTISM SPECTRUM DISORDER ASSOCIATED WITH KNOWN MEDICAL OR GENETIC CONDITION OR ENVIRONMENTAL FACTOR, REQUIRING SUBSTANTIAL SUPPORT (LEVEL 2): ICD-10-CM

## 2021-10-06 DIAGNOSIS — A41.9 SEPSIS, DUE TO UNSPECIFIED ORGANISM, UNSPECIFIED WHETHER ACUTE ORGAN DYSFUNCTION PRESENT (H): ICD-10-CM

## 2021-10-06 DIAGNOSIS — R53.81 PHYSICAL DECONDITIONING: ICD-10-CM

## 2021-10-06 DIAGNOSIS — Z09 HOSPITAL DISCHARGE FOLLOW-UP: Primary | ICD-10-CM

## 2021-10-06 DIAGNOSIS — G40.909 SEIZURE DISORDER (H): ICD-10-CM

## 2021-10-06 DIAGNOSIS — Z23 HIGH PRIORITY FOR 2019-NCOV VACCINE: ICD-10-CM

## 2021-10-06 DIAGNOSIS — R09.02 HYPOXIA: ICD-10-CM

## 2021-10-06 DIAGNOSIS — J69.0 ASPIRATION PNEUMONIA OF BOTH LUNGS, UNSPECIFIED ASPIRATION PNEUMONIA TYPE, UNSPECIFIED PART OF LUNG (H): ICD-10-CM

## 2021-10-06 DIAGNOSIS — R62.7 FAILURE TO THRIVE IN ADULT: Primary | ICD-10-CM

## 2021-10-06 DIAGNOSIS — N17.9 ACUTE KIDNEY INJURY (H): ICD-10-CM

## 2021-10-06 DIAGNOSIS — Z93.1 FEEDING BY G-TUBE (H): ICD-10-CM

## 2021-10-06 PROCEDURE — 91301 COVID-19,PF,MODERNA (18+ YRS): CPT | Performed by: NURSE PRACTITIONER

## 2021-10-06 PROCEDURE — 0011A COVID-19,PF,MODERNA (18+ YRS): CPT | Performed by: NURSE PRACTITIONER

## 2021-10-06 PROCEDURE — 99495 TRANSJ CARE MGMT MOD F2F 14D: CPT | Performed by: NURSE PRACTITIONER

## 2021-10-06 PROCEDURE — 99214 OFFICE O/P EST MOD 30 MIN: CPT | Mod: 95 | Performed by: INTERNAL MEDICINE

## 2021-10-06 ASSESSMENT — MIFFLIN-ST. JEOR: SCORE: 1237.61

## 2021-10-06 NOTE — NURSING NOTE
"No chief complaint on file.      Vitals:    10/06/21 1403   Weight: 83 lb 6.6 oz   Height: 5' 2\"       Body mass index is 15.26 kg/m .      Jerson [place], [facility]  Surgery Clinic                      "

## 2021-10-06 NOTE — PROGRESS NOTES
Assessment & Plan     Hospital discharge follow-up    SBO (small bowel obstruction) (H)  Hx of recurrent SBO with exploratory laparotomy earlier this year. Surgery consulted during hospitalization. Gastrografin challenge showed delayed transit time. Bowel obstruction resolved.  Since discharge, he continues to have daily BMs (softer), passing gas, and is tolerating his tube feeds. Appointment scheduled today with GI for further eval of dysmotility issue.    Sepsis, due to unspecified organism, unspecified whether acute organ dysfunction present (H)  Aspiration pneumonia of both lungs, unspecified aspiration pneumonia type, unspecified part of lung (H)  Chronic lung disease of prematurity  Hypoxia  Continues to give neb 3x per day and having cough only with activity. O2 sats normal range, afebrile, and lungs clear today. Mother has reached out to pulmonologist for follow-up as he is due to see anyway.    Acute kidney injury (H)  Labs normal before discharge.    Seizure disorder (H)  He had x1 seizure in the past week. This happened after previous hospitalization as well. Held seizure med Onfi during hospitalizations due to no IV formulation so likely cause. Monitor for recurrence and if continues will follow-up with his neurologist.    Autism spectrum disorder associated with known medical or genetic condition or environmental factor, requiring substantial support (level 2)  History collected from mother.    Feeding by G-tube (H)  Tolerating feeds.    Physical deconditioning  Mom purchased a wheelchair and using with longer trips/outings. Would like to hold off on PT for now.    High priority for 2019-nCoV vaccine  - COVID-19,PF,MODERNA     Patient Instructions   Keep appointment with GI today  Neb supplies today  Let me know if you want PT services before seeing Dr. Leyva      No follow-ups on file.    Brenda Flowers NP  [facility] JEAN    Roro De La Rosa is a 26 year old who presents for  the following health issues  accompanied by his mother:    History of Present Illness      He is taking medications regularly.       Post Discharge Outreach 9/30/2021   Admission Date 9/24/2021   Reason for Admission pnuemonia and bowel obstruction   Discharge Date 9/29/2021   Discharge Diagnosis pnuemonia and bowel obstruction   How are you doing now that you are home? pt is doing well, hanging in there, cough is still bad and feeling weak but no fever.   How are your symptoms? (Red Flag symptoms escalate to triage hotline per guidelines) Improved   Do you feel your condition is stable enough to be safe at home until your provider visit? Yes   Does the patient have their discharge instructions?  Yes   Does the patient have questions regarding their discharge instructions?  No   Were you started on any new medications or were there changes to any of your previous medications?  Yes   Does the patient have all of their medications? Yes   Do you have questions regarding any of your medications?  No   Do you have all of your needed medical supplies or equipment (DME)?  (i.e. oxygen tank, CPAP, cane, etc.) Yes   Discharge follow-up appointment scheduled within 14 calendar days?  Yes   Discharge Follow Up Appointment Date 10/6/2021   Discharge Follow Up Appointment Scheduled with? Primary Care Provider     Hospital Follow-up Visit:    Hospital/Nursing Home/IP Rehab Facility: [facility]  Date of Admission: 09/24/2021  Date of Discharge: 09/29/2021  Reason(s) for Admission: Recurrent small bowel obstruction  Sepsis secondary to aspiration pneumonia  Acute hypoxic respiratory failure secondary to aspiration pneumonia  Acute kidney injury  Autism spectrum disorder cerebral palsy and depression anxiety  GERD  Epilepsy      Was your hospitalization related to COVID-19? No   Problems taking medications regularly:  None.  Medication changes since discharge: AMOX-CLAV 400-57 MG/5 ML SUSP  Problems adhering  to non-medication therapy:  Pt had a seizure on  10/03/2021    Summary of hospitalization:  [facility] discharge summary reviewed  Diagnostic Tests/Treatments reviewed.  Follow up needed: none  Other Healthcare Providers Involved in Patient s Care:         Specialist appointment - GI appointment today  Update since discharge: improved.       Post Discharge Medication Reconciliation: discharge medications reconciled, continue medications without change.  Plan of care communicated with patient and family              Appointment with GI today at 2 pm  Nebs 3x per day  Advair BID  [facility] Due to see her.  No fevers  Cough with activity  4 days of Augmentin now completed  Denies breathing issues, O2 sats at home are normal.    Seizure on Sunday morning.  Tube feedings as usual meds as usual  [place] Epilepsy-neurologist retired. Needs a new neurologist.  Off onfi during hospitalization    Blood sugars checking at home once per week,  in the 80s.    Continues to be weak. Mom ordered wheelchair. Troubles with longer trips.    Review of Systems   Constitutional, HEENT, cardiovascular, pulmonary, GI, , musculoskeletal, neuro, skin, endocrine and psych systems are negative, except as otherwise noted.      Objective    /58   Pulse 104   Temp 98.1  F (36.7  C) (Axillary)   Resp 18   Wt 37.8 kg (83 lb 6.4 oz)   SpO2 93%   BMI 15.25 kg/m    Body mass index is 15.25 kg/m .  Physical Exam   GENERAL: healthy, alert and no distress. Sitting comfortably in wheelchair.   RESP: lungs clear to auscultation - no rales, rhonchi or wheezes. Breathing non-labored.  CV: regular rate and rhythm, normal S1 S2, no S3 or S4, no murmur, click or rub  ABDOMEN: soft, nontender, no hepatosplenomegaly, no masses and bowel sounds normal. G tube in place without surrounding swelling, drainage, redness, pain. lower abd incision well healed/scarred.    No results  found for this or any previous visit (from the past 24 hour(s)).

## 2021-10-06 NOTE — LETTER
10/6/2021         RE: Rj Licea  [address] [address] [address]        Dear Colleague,    Thank you for referring your patient, Rj Licea, to the [facility] [place]. Please see a copy of my visit note below.    GI CLINIC VISIT    CC/REFERRING MD:  Micky Leyva  REASON FOR CONSULTATION: recurrent small bowel obstructions    ASSESSMENT/PLAN:    Rj Licea is a 27 yo M with a complicated PMHx who is presenting for evaluation of recurrent SBOs (in 4/2021 had surgical JANEEN and most recently in 9/2021 managed conservatively) and FTT. His recurrent SBOs are likely due to adhesive disease, but it would be prudent to rule out an inflammatory process and a mass as a cause Rj's symptoms.     We will proceed with the following:    PLAN  --OK to increase Miralax to 1.5 capfuls daily  --Schedule CTE  -----------Pending this, will likely plan for a colonoscopy  --Referral to colorectal surgery for consideration of elective ex lap   --Nutrition referral, given malnutrition    RTC: Return in about 3 months (around 1/6/2022).     30 minutes spent on the date of the encounter performing chart review, history and exam, documentation and further activities as noted above.    Chela So MD   of Medicine  Division of Gastroenterology, Hepatology and Nutrition  [facility]      HPI    Rj Licea is a 24 yo man presenting for evaluation of recurrent SBOs and failure to thrive. Rj has a complicated PMHx. 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 Darcy (Mom and guardian) provided the history during today's virtual appointment.      Admitted to hospital 5/2020 with an SBO. CT a/p showed a transition point at the ICV. Managed  "conservatively.  In 4/2021, admitted for SBO. CT a/p showed a SBO in the distal SB in RLQ. Had exploratory laparotomy with resolution without resection found likely to have an intra-abdominal compartment syndrome due to massive dilation from his obstruction. Per the op note:  \"When we reached the terminal ileum, there was an accordion-like adhesion amongst the distal, terminal ileum mesentery, creating an obstructing mechanism, which once , opened and freely allowed the distended proximal small bowel content to fill into that decompressed distal bowel...Prior to closure, we did perform an incidental appendectomy in the usual fashion by ligating the mesoappendix and dividing the appendiceal base with the DORINA stapler\"    Rj previously weight >100 lb, then dropped to 73 lb 6/2020, now 82 lb.   Rj has constipation. Takes Miralax daily. Has a BM EOD or every three days, liquidy. Has never had a colonoscopy.   Had blood in the stool prior to last ED visit.    Uses pediasure with fiber. Used to get 6 cans per day, now 5 cans per day (since the hospital), due to reflux.   No hx of inflammatory process or radiation.    Interval history, 10/2021  Had another SBO and was hospitalized on 9/24-9/29. Gastrin challenge did show contrast in the colon after a few hours. He was managed conservatively. His hospital course was c/b aspiration PNA.     Takes Miralax daily. Has a BM EOD or every three days, liquidy.  Weight has been stable.    ROS:    No fevers or chills  No weight loss  No blurry vision, double vision or change in vision  No sore throat  No lymphadenopathy  No headache, paraesthesias, or weakness in a limb  No shortness of breath or wheezing  No chest pain or pressure  No arthralgias or myalgias  No rashes or skin changes  No odynophagia or dysphagia  No BRBPR, hematochezia, melena  No dysuria, frequency or urgency  No hot/cold intolerance or polyria  No anxiety or depression    PROBLEM LIST  Patient " Active Problem List    Diagnosis Date Noted     Dehydration 09/24/2021     Priority: Medium     Hypoxia 09/24/2021     Priority: Medium     Acute kidney injury (H) 09/24/2021     Priority: Medium     Elevated lactic acid level 09/24/2021     Priority: Medium     Pneumonia of right lower lobe due to infectious organism 09/24/2021     Priority: Medium     SBO (small bowel obstruction) (H) 04/19/2021     Priority: Medium     Nonintractable epilepsy without status epilepticus, unspecified epilepsy type (H) 04/19/2021     Priority: Medium     Vomiting 05/28/2020     Priority: Medium     CP (cerebral palsy), spastic, diplegic (H) 07/20/2017     Priority: Medium     Sleep disorder 07/20/2017     Priority: Medium     Seizure disorder (H) 07/20/2017     Priority: Medium     Other idiopathic scoliosis, unspecified spinal region 07/20/2017     Priority: Medium     Periventricular leukomalacia, associated with prematurity, chronic 07/20/2017     Priority: Medium     Oral aversion 07/20/2017     Priority: Medium     Nutritional disorder 07/20/2017     Priority: Medium     Mild persistent asthma in adult without complication 07/20/2017     Priority: Medium     History of prematurity, 23 wk 400 grams 07/20/2017     Priority: Medium     History of behavioral and mental health problems 07/20/2017     Priority: Medium     Abnormal gait 07/20/2017     Priority: Medium     Feeding by G-tube (H) 07/20/2017     Priority: Medium     Constipation, unspecified constipation type 07/20/2017     Priority: Medium     Chronic lung disease of prematurity 07/20/2017     Priority: Medium     Bilateral blindness 07/20/2017     Priority: Medium     Autism spectrum disorder associated with known medical or genetic condition or environmental factor, requiring substantial support (level 2) 07/20/2017     Priority: Medium     Anxiety 07/20/2017     Priority: Medium     Short stature 03/23/2012     Priority: Medium       PERTINENT PAST MEDICAL  HISTORY:  Past Medical History:   Diagnosis Date     Chronic lung disease     off nebs in 2012     Depression     fluoxetine, risperidone     Epilepsy 05/01/2011     Feeding by G-tube      GERD (gastroesophageal reflux disease)      Insomnia     melatonin, clonazepam     Kidney stone      Premature infant - 24 weeks      Retinopathy of prematurity     led to blindness     SBO (small bowel obstruction)        PREVIOUS SURGERIES:  Past Surgical History:   Procedure Laterality Date     APPENDECTOMY OPEN N/A 4/19/2021    Procedure: Appendectomy open;  Surgeon: Fabiola Pop MD;  Location: [facility] OR     GASTROSTOMY TUBE       LAPAROTOMY EXPLORATORY N/A 4/19/2021    Procedure: LAPAROTOMY, enterolysis;  Surgeon: Fabiola Pop MD;  Location: [facility] OR       PREVIOUS ENDOSCOPY:  none    ALLERGIES:   No Known Allergies    PERTINENT MEDICATIONS:    Current Outpatient Medications:      albuterol (PROAIR HFA/PROVENTIL HFA/VENTOLIN HFA) 108 (90 Base) MCG/ACT inhaler, Inhale 2 puffs into the lungs every 6 hours as needed for shortness of breath / dyspnea or wheezing, Disp: 2 Inhaler, Rfl: 3     albuterol (PROVENTIL) (2.5 MG/3ML) 0.083% neb solution, Take 1 vial (2.5 mg) by nebulization every 6 hours as needed for shortness of breath / dyspnea or wheezing, Disp: 9 mL, Rfl: 3     benzoyl peroxide 5 % EX topical gel, Apply topically 2 times daily as needed (acne), Disp: 50 g, Rfl: 11     clindamycin 1 % EX external gel, Apply topically 2 times daily as needed (acne), Disp: 50 g, Rfl: 11     clobazam (,ONFI,) 2.5 MG/ML suspension, 5 mg by Per G Tube route At Bedtime (3 mL in AM / 2 mL in PM), Disp: , Rfl:      clobazam (ONFI) 2.5 MG/ML suspension, 7.5 mg by Per G Tube route every morning (3 mL in AM / 2 mL in PM), Disp: , Rfl:      famotidine (PEPCID) 40 MG/5ML suspension, 20 mg by Per G Tube route 2 times daily, Disp: , Rfl:      fluticasone-salmeterol (ADVAIR HFA) 230-21 MCG/ACT inhaler, Inhale 2 puffs into the lungs 2 times daily  , Disp: 12 g, Rfl: 3     gabapentin (NEURONTIN) 250 MG/5ML solution, 750 mg by Per G Tube route At Bedtime , Disp: , Rfl:      hydrOXYzine (ATARAX) 10 MG/5ML syrup, 50 mg by Per G Tube route At Bedtime (25 mL), Disp: , Rfl:      Lancets 30G MISC, 120 30 gauge lancets (substitute as needed), Disp: 120 each, Rfl: 0     LORazepam (ATIVAN) 1 MG tablet, 1 mg by Per G Tube route every 6 hours as needed for anxiety Per mom: prn , Disp: , Rfl:      melatonin 1 MG TABS, 1 mg by Per G Tube route daily Daily between 4-5pm*, Disp: , Rfl:      melatonin 3 MG tablet, 6 mg by Per G Tube route At Bedtime , Disp: , Rfl:      multivitamins w/minerals (CERTAVITE) liquid, 15 mLs by Per G Tube route daily, Disp: , Rfl:      omeprazole (PRILOSEC) 2 mg/mL SUSP, 40 mg by Per G Tube route At Bedtime , Disp: 800 mL, Rfl: 11     polyethylene glycol (MIRALAX) 17 g packet, 17 g by Per G Tube route daily, Disp: , Rfl:      polyethylene glycol (MIRALAX) 17 GM/Dose powder, TAKE 17 GM BY MOUTH EVERY DAY AS NEEDED, Disp: 510 g, Rfl: 4     QUEtiapine (SEROQUEL) 300 MG tablet, 600 mg by Per G Tube route At Bedtime , Disp: , Rfl:      simethicone (MYLICON) 40 MG/0.6ML suspension, Take 0.6 mLs (40 mg) by mouth 4 times daily as needed (gassiness), Disp: 45 mL, Rfl: 1     Valproate Sodium (VALPROIC ACID) 250 MG/5ML, TAKE 6ML BY MOUTH EVERY MORNING AND 7ML IN THE AFTERNOON AND AT NIGHT, Disp: 600 mL, Rfl: 3    SOCIAL HISTORY:  Social History     Socioeconomic History     Marital status: Single     Spouse name: Not on file     Number of children: Not on file     Years of education: Not on file     Highest education level: Not on file   Occupational History     Not on file   Tobacco Use     Smoking status: Never Smoker     Smokeless tobacco: Never Used   Substance and Sexual Activity     Alcohol use: No     Drug use: No     Sexual activity: Never   Other Topics Concern     Parent/sibling w/ CABG, MI or angioplasty before 65F 55M? Not Asked   Social History  "Narrative     Not on file     Social Determinants of Health     Financial Resource Strain:      Difficulty of Paying Living Expenses:    Food Insecurity:      Worried About Running Out of Food in the Last Year:      Ran Out of Food in the Last Year:    Transportation Needs:      Lack of Transportation (Medical):      Lack of Transportation (Non-Medical):    Physical Activity:      Days of Exercise per Week:      Minutes of Exercise per Session:    Stress:      Feeling of Stress :    Social Connections:      Frequency of Communication with Friends and Family:      Frequency of Social Gatherings with Friends and Family:      Attends [employer] Services:      Active Member of Clubs or Organizations:      Attends Club or Organization Meetings:      Marital Status:    Intimate Partner Violence:      Fear of Current or Ex-Partner:      Emotionally Abused:      Physically Abused:      Sexually Abused:        FAMILY HISTORY:  Family History   Problem Relation Age of Onset     Diabetes Father        Past/family/social history reviewed and no changes    PHYSICAL EXAMINATION:  Constitutional: aaox3, cooperative, pleasant, not dyspneic/diaphoretic, no acute distress  Vitals reviewed: Ht 1.575 m (5' 2\")   Wt 37.8 kg (83 lb 6.6 oz)   BMI 15.26 kg/m    Wt:   Wt Readings from Last 2 Encounters:   10/06/21 37.8 kg (83 lb 6.6 oz)   10/06/21 37.8 kg (83 lb 6.4 oz)     General appearance  In no acute distress     Eyes  Sclera anicteric     Ears, nose, mouth and throat  No obvious external lesions of ears and nose  Hearing intact     Neck  No obvious external lesions     Respiratory  Normal respiration, no use of accessory muscles       PERTINENT STUDIES:    Office Visit on 05/28/2021   Component Date Value Ref Range Status     Specimen Description 05/28/2021 Abdominal Incision   Final     Special Requests 05/28/2021 Specimen collected in eSwab transport (white cap)   Final     Culture Micro 05/28/2021 *  Final                    " Value:Light growth  Staphylococcus aureus           Again, thank you for allowing me to participate in the care of your patient.      Sincerely,    Chela So MD

## 2021-10-06 NOTE — PATIENT INSTRUCTIONS
Keep appointment with GI today  Neb supplies today  Let me know if you want PT services before seeing Dr. Leyva

## 2021-10-06 NOTE — PROGRESS NOTES
Rj is a 26 year old who is being evaluated via a billable video visit.      How would you like to obtain your AVS? MyChart  If the video visit is dropped, the invitation should be resent by: Text to cell phone: [phone]  Will anyone else be joining your video visit? No      Video Start Time: 2:35     Video-Visit Details    Type of service:  Video Visit    Video End Time:3:00    Originating Location (pt. Location): Home    Distant Location (provider location):  [facility] [place]     Platform used for Video Visit: GoSporty     During this virtual visit the patient is located in [place], patient verifies this as the location during the entirety of this visit.     Jerson Montana, EMT  Surgery Clinic    GI CLINIC VISIT    CC/REFERRING MD:  Micky Leyva  REASON FOR CONSULTATION: recurrent small bowel obstructions    ASSESSMENT/PLAN:    Rj Licea is a 25 yo M with a complicated PMHx who is presenting for evaluation of recurrent SBOs (in 4/2021 had surgical JANEEN and most recently in 9/2021 managed conservatively) and FTT. His recurrent SBOs are likely due to adhesive disease, but it would be prudent to rule out an inflammatory process and a mass as a cause Rj's symptoms.     We will proceed with the following:    PLAN  --OK to increase Miralax to 1.5 capfuls daily  --Schedule CTE  -----------Pending this, will likely plan for a colonoscopy  --Referral to colorectal surgery for consideration of elective ex lap   --Nutrition referral, given malnutrition    RTC: Return in about 3 months (around 1/6/2022).     30 minutes spent on the date of the encounter performing chart review, history and exam, documentation and further activities as noted above.    Chela So MD   of Medicine  Division of Gastroenterology, Hepatology and Nutrition  [facility]      HPI    Rj Licea is a 24 yo man presenting for evaluation of recurrent SBOs and failure to  "thrive. Rj has a complicated PMHx. 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 Darcy (Mom and guardian) provided the history during today's virtual appointment.      Admitted to hospital 5/2020 with an SBO. CT a/p showed a transition point at the ICV. Managed conservatively.  In 4/2021, admitted for SBO. CT a/p showed a SBO in the distal SB in RLQ. Had exploratory laparotomy with resolution without resection found likely to have an intra-abdominal compartment syndrome due to massive dilation from his obstruction. Per the op note:  \"When we reached the terminal ileum, there was an accordion-like adhesion amongst the distal, terminal ileum mesentery, creating an obstructing mechanism, which once , opened and freely allowed the distended proximal small bowel content to fill into that decompressed distal bowel...Prior to closure, we did perform an incidental appendectomy in the usual fashion by ligating the mesoappendix and dividing the appendiceal base with the DORINA stapler\"    Rj previously weight >100 lb, then dropped to 73 lb 6/2020, now 82 lb.   Rj has constipation. Takes Miralax daily. Has a BM EOD or every three days, liquidy. Has never had a colonoscopy.   Had blood in the stool prior to last ED visit.    Uses pediasure with fiber. Used to get 6 cans per day, now 5 cans per day (since the hospital), due to reflux.   No hx of inflammatory process or radiation.    Interval history, 10/2021  Had another SBO and was hospitalized on 9/24-9/29. Gastrin challenge did show contrast in the colon after a few hours. He was managed conservatively. His hospital course was c/b aspiration PNA.     Takes Miralax daily. Has a BM EOD or every three days, liquidy.  Weight has been stable.    ROS:    No fevers or " chills  No weight loss  No blurry vision, double vision or change in vision  No sore throat  No lymphadenopathy  No headache, paraesthesias, or weakness in a limb  No shortness of breath or wheezing  No chest pain or pressure  No arthralgias or myalgias  No rashes or skin changes  No odynophagia or dysphagia  No BRBPR, hematochezia, melena  No dysuria, frequency or urgency  No hot/cold intolerance or polyria  No anxiety or depression    PROBLEM LIST  Patient Active Problem List    Diagnosis Date Noted     Dehydration 09/24/2021     Priority: Medium     Hypoxia 09/24/2021     Priority: Medium     Acute kidney injury (H) 09/24/2021     Priority: Medium     Elevated lactic acid level 09/24/2021     Priority: Medium     Pneumonia of right lower lobe due to infectious organism 09/24/2021     Priority: Medium     SBO (small bowel obstruction) (H) 04/19/2021     Priority: Medium     Nonintractable epilepsy without status epilepticus, unspecified epilepsy type (H) 04/19/2021     Priority: Medium     Vomiting 05/28/2020     Priority: Medium     CP (cerebral palsy), spastic, diplegic (H) 07/20/2017     Priority: Medium     Sleep disorder 07/20/2017     Priority: Medium     Seizure disorder (H) 07/20/2017     Priority: Medium     Other idiopathic scoliosis, unspecified spinal region 07/20/2017     Priority: Medium     Periventricular leukomalacia, associated with prematurity, chronic 07/20/2017     Priority: Medium     Oral aversion 07/20/2017     Priority: Medium     Nutritional disorder 07/20/2017     Priority: Medium     Mild persistent asthma in adult without complication 07/20/2017     Priority: Medium     History of prematurity, 23 wk 400 grams 07/20/2017     Priority: Medium     History of behavioral and mental health problems 07/20/2017     Priority: Medium     Abnormal gait 07/20/2017     Priority: Medium     Feeding by G-tube (H) 07/20/2017     Priority: Medium     Constipation, unspecified constipation type  07/20/2017     Priority: Medium     Chronic lung disease of prematurity 07/20/2017     Priority: Medium     Bilateral blindness 07/20/2017     Priority: Medium     Autism spectrum disorder associated with known medical or genetic condition or environmental factor, requiring substantial support (level 2) 07/20/2017     Priority: Medium     Anxiety 07/20/2017     Priority: Medium     Short stature 03/23/2012     Priority: Medium       PERTINENT PAST MEDICAL HISTORY:  Past Medical History:   Diagnosis Date     Chronic lung disease     off nebs in 2012     Depression     fluoxetine, risperidone     Epilepsy 05/01/2011     Feeding by G-tube      GERD (gastroesophageal reflux disease)      Insomnia     melatonin, clonazepam     Kidney stone      Premature infant - 24 weeks      Retinopathy of prematurity     led to blindness     SBO (small bowel obstruction)        PREVIOUS SURGERIES:  Past Surgical History:   Procedure Laterality Date     APPENDECTOMY OPEN N/A 4/19/2021    Procedure: Appendectomy open;  Surgeon: Fabiola Pop MD;  Location: [facility] OR     GASTROSTOMY TUBE       LAPAROTOMY EXPLORATORY N/A 4/19/2021    Procedure: LAPAROTOMY, enterolysis;  Surgeon: Fabiola Pop MD;  Location: [facility] OR       PREVIOUS ENDOSCOPY:  none    ALLERGIES:   No Known Allergies    PERTINENT MEDICATIONS:    Current Outpatient Medications:      albuterol (PROAIR HFA/PROVENTIL HFA/VENTOLIN HFA) 108 (90 Base) MCG/ACT inhaler, Inhale 2 puffs into the lungs every 6 hours as needed for shortness of breath / dyspnea or wheezing, Disp: 2 Inhaler, Rfl: 3     albuterol (PROVENTIL) (2.5 MG/3ML) 0.083% neb solution, Take 1 vial (2.5 mg) by nebulization every 6 hours as needed for shortness of breath / dyspnea or wheezing, Disp: 9 mL, Rfl: 3     benzoyl peroxide 5 % EX topical gel, Apply topically 2 times daily as needed (acne), Disp: 50 g, Rfl: 11     clindamycin 1 % EX external gel, Apply topically 2 times daily as needed (acne), Disp: 50 g,  Rfl: 11     clobazam (,ONFI,) 2.5 MG/ML suspension, 5 mg by Per G Tube route At Bedtime (3 mL in AM / 2 mL in PM), Disp: , Rfl:      clobazam (ONFI) 2.5 MG/ML suspension, 7.5 mg by Per G Tube route every morning (3 mL in AM / 2 mL in PM), Disp: , Rfl:      famotidine (PEPCID) 40 MG/5ML suspension, 20 mg by Per G Tube route 2 times daily, Disp: , Rfl:      fluticasone-salmeterol (ADVAIR HFA) 230-21 MCG/ACT inhaler, Inhale 2 puffs into the lungs 2 times daily , Disp: 12 g, Rfl: 3     gabapentin (NEURONTIN) 250 MG/5ML solution, 750 mg by Per G Tube route At Bedtime , Disp: , Rfl:      hydrOXYzine (ATARAX) 10 MG/5ML syrup, 50 mg by Per G Tube route At Bedtime (25 mL), Disp: , Rfl:      Lancets 30G MISC, 120 30 gauge lancets (substitute as needed), Disp: 120 each, Rfl: 0     LORazepam (ATIVAN) 1 MG tablet, 1 mg by Per G Tube route every 6 hours as needed for anxiety Per mom: prn , Disp: , Rfl:      melatonin 1 MG TABS, 1 mg by Per G Tube route daily Daily between 4-5pm*, Disp: , Rfl:      melatonin 3 MG tablet, 6 mg by Per G Tube route At Bedtime , Disp: , Rfl:      multivitamins w/minerals (CERTAVITE) liquid, 15 mLs by Per G Tube route daily, Disp: , Rfl:      omeprazole (PRILOSEC) 2 mg/mL SUSP, 40 mg by Per G Tube route At Bedtime , Disp: 800 mL, Rfl: 11     polyethylene glycol (MIRALAX) 17 g packet, 17 g by Per G Tube route daily, Disp: , Rfl:      polyethylene glycol (MIRALAX) 17 GM/Dose powder, TAKE 17 GM BY MOUTH EVERY DAY AS NEEDED, Disp: 510 g, Rfl: 4     QUEtiapine (SEROQUEL) 300 MG tablet, 600 mg by Per G Tube route At Bedtime , Disp: , Rfl:      simethicone (MYLICON) 40 MG/0.6ML suspension, Take 0.6 mLs (40 mg) by mouth 4 times daily as needed (gassiness), Disp: 45 mL, Rfl: 1     Valproate Sodium (VALPROIC ACID) 250 MG/5ML, TAKE 6ML BY MOUTH EVERY MORNING AND 7ML IN THE AFTERNOON AND AT NIGHT, Disp: 600 mL, Rfl: 3    SOCIAL HISTORY:  Social History     Socioeconomic History     Marital status: Single      "Spouse name: Not on file     Number of children: Not on file     Years of education: Not on file     Highest education level: Not on file   Occupational History     Not on file   Tobacco Use     Smoking status: Never Smoker     Smokeless tobacco: Never Used   Substance and Sexual Activity     Alcohol use: No     Drug use: No     Sexual activity: Never   Other Topics Concern     Parent/sibling w/ CABG, MI or angioplasty before 65F 55M? Not Asked   Social History Narrative     Not on file     Social Determinants of Health     Financial Resource Strain:      Difficulty of Paying Living Expenses:    Food Insecurity:      Worried About Running Out of Food in the Last Year:      Ran Out of Food in the Last Year:    Transportation Needs:      Lack of Transportation (Medical):      Lack of Transportation (Non-Medical):    Physical Activity:      Days of Exercise per Week:      Minutes of Exercise per Session:    Stress:      Feeling of Stress :    Social Connections:      Frequency of Communication with Friends and Family:      Frequency of Social Gatherings with Friends and Family:      Attends [employer] Services:      Active Member of Clubs or Organizations:      Attends Club or Organization Meetings:      Marital Status:    Intimate Partner Violence:      Fear of Current or Ex-Partner:      Emotionally Abused:      Physically Abused:      Sexually Abused:        FAMILY HISTORY:  Family History   Problem Relation Age of Onset     Diabetes Father        Past/family/social history reviewed and no changes    PHYSICAL EXAMINATION:  Constitutional: aaox3, cooperative, pleasant, not dyspneic/diaphoretic, no acute distress  Vitals reviewed: Ht 1.575 m (5' 2\")   Wt 37.8 kg (83 lb 6.6 oz)   BMI 15.26 kg/m    Wt:   Wt Readings from Last 2 Encounters:   10/06/21 37.8 kg (83 lb 6.6 oz)   10/06/21 37.8 kg (83 lb 6.4 oz)     General appearance  In no acute distress     Eyes  Sclera anicteric     Ears, nose, mouth and throat  No " obvious external lesions of ears and nose  Hearing intact     Neck  No obvious external lesions     Respiratory  Normal respiration, no use of accessory muscles       PERTINENT STUDIES:    Office Visit on 05/28/2021   Component Date Value Ref Range Status     Specimen Description 05/28/2021 Abdominal Incision   Final     Special Requests 05/28/2021 Specimen collected in eSwab transport (white cap)   Final     Culture Micro 05/28/2021 *  Final                    Value:Light growth  Staphylococcus aureus

## 2021-10-06 NOTE — PATIENT INSTRUCTIONS
PLAN  --OK to increase Miralax to 1.5 capfuls daily  --Schedule CTE  --Referral to colorectal surgery for consideration of elective ex lap   --Nutrition

## 2021-10-07 ENCOUNTER — TELEPHONE (OUTPATIENT)
Dept: GASTROENTEROLOGY | Facility: CLINIC | Age: 26
End: 2021-10-07

## 2021-10-07 ASSESSMENT — ASTHMA QUESTIONNAIRES: ACT_TOTALSCORE: 22

## 2021-10-07 NOTE — TELEPHONE ENCOUNTER
lvm to schedule 3 month follow up with Dr. HUGH So, left call center phone number and sent [facility]

## 2021-10-21 NOTE — PROGRESS NOTES
Spoke to Darcy today in regards to Candice upcoming CT scan.  She was informed by scheduling that the scan would take over thirty minutes and if this is the case she will need sedation for the paitent.    Called and clarified with the CT room at [place].  Rj states that the process of placing the patient on the scanning table to the end of the CT scan will only take 3 to 5 minutes of total time.     I did call Darcy back and explained this to her and she states that for this short of time she will not need any sedation for the patient, as rj will be able to lay still for this short of period.

## 2021-10-25 ENCOUNTER — HOSPITAL ENCOUNTER (OUTPATIENT)
Dept: CT IMAGING | Facility: CLINIC | Age: 26
Discharge: HOME | End: 2021-10-25
Attending: INTERNAL MEDICINE | Admitting: INTERNAL MEDICINE
Payer: COMMERCIAL

## 2021-10-25 DIAGNOSIS — K56.609 SBO (SMALL BOWEL OBSTRUCTION) (H): ICD-10-CM

## 2021-10-25 PROCEDURE — 74177 CT ABD & PELVIS W/CONTRAST: CPT

## 2021-10-25 PROCEDURE — 250N000011 HC RX IP 250 OP 636: Performed by: INTERNAL MEDICINE

## 2021-10-25 RX ORDER — IOPAMIDOL 755 MG/ML
60 INJECTION, SOLUTION INTRAVASCULAR ONCE
Status: COMPLETED | OUTPATIENT
Start: 2021-10-25 | End: 2021-10-25

## 2021-10-25 RX ADMIN — IOPAMIDOL 60 ML: 755 INJECTION, SOLUTION INTRAVENOUS at 09:52

## 2021-11-17 ENCOUNTER — OFFICE VISIT (OUTPATIENT)
Dept: PEDIATRICS | Facility: CLINIC | Age: 26
End: 2021-11-17
Payer: COMMERCIAL

## 2021-11-17 VITALS
SYSTOLIC BLOOD PRESSURE: 102 MMHG | HEIGHT: 62 IN | WEIGHT: 83 LBS | OXYGEN SATURATION: 92 % | RESPIRATION RATE: 16 BRPM | TEMPERATURE: 97.3 F | HEART RATE: 88 BPM | DIASTOLIC BLOOD PRESSURE: 74 MMHG | BODY MASS INDEX: 15.27 KG/M2

## 2021-11-17 DIAGNOSIS — J69.0 ASPIRATION PNEUMONIA, UNSPECIFIED ASPIRATION PNEUMONIA TYPE, UNSPECIFIED LATERALITY, UNSPECIFIED PART OF LUNG (H): ICD-10-CM

## 2021-11-17 DIAGNOSIS — E44.1 MILD PROTEIN-CALORIE MALNUTRITION (H): ICD-10-CM

## 2021-11-17 DIAGNOSIS — J90 PLEURAL EFFUSION: ICD-10-CM

## 2021-11-17 DIAGNOSIS — R05.9 COUGH: Primary | ICD-10-CM

## 2021-11-17 DIAGNOSIS — Z93.1 FEEDING BY G-TUBE (H): ICD-10-CM

## 2021-11-17 LAB
BASOPHILS # BLD AUTO: 0 10E3/UL (ref 0–0.2)
BASOPHILS NFR BLD AUTO: 0 %
EOSINOPHIL # BLD AUTO: 0 10E3/UL (ref 0–0.7)
EOSINOPHIL NFR BLD AUTO: 0 %
ERYTHROCYTE [DISTWIDTH] IN BLOOD BY AUTOMATED COUNT: 12.7 % (ref 10–15)
HCT VFR BLD AUTO: 40.4 % (ref 40–53)
HGB BLD-MCNC: 13.7 G/DL (ref 13.3–17.7)
LYMPHOCYTES # BLD AUTO: 2.7 10E3/UL (ref 0.8–5.3)
LYMPHOCYTES NFR BLD AUTO: 30 %
MCH RBC QN AUTO: 35.2 PG (ref 26.5–33)
MCHC RBC AUTO-ENTMCNC: 33.9 G/DL (ref 31.5–36.5)
MCV RBC AUTO: 104 FL (ref 78–100)
MONOCYTES # BLD AUTO: 1.3 10E3/UL (ref 0–1.3)
MONOCYTES NFR BLD AUTO: 14 %
NEUTROPHILS # BLD AUTO: 5.1 10E3/UL (ref 1.6–8.3)
NEUTROPHILS NFR BLD AUTO: 56 %
PLATELET # BLD AUTO: 114 10E3/UL (ref 150–450)
RBC # BLD AUTO: 3.89 10E6/UL (ref 4.4–5.9)
WBC # BLD AUTO: 9.1 10E3/UL (ref 4–11)

## 2021-11-17 PROCEDURE — 36415 COLL VENOUS BLD VENIPUNCTURE: CPT | Performed by: INTERNAL MEDICINE

## 2021-11-17 PROCEDURE — 84134 ASSAY OF PREALBUMIN: CPT | Performed by: INTERNAL MEDICINE

## 2021-11-17 PROCEDURE — 80053 COMPREHEN METABOLIC PANEL: CPT | Performed by: INTERNAL MEDICINE

## 2021-11-17 PROCEDURE — 85025 COMPLETE CBC W/AUTO DIFF WBC: CPT | Performed by: INTERNAL MEDICINE

## 2021-11-17 PROCEDURE — 99215 OFFICE O/P EST HI 40 MIN: CPT | Performed by: INTERNAL MEDICINE

## 2021-11-17 ASSESSMENT — MIFFLIN-ST. JEOR: SCORE: 1235.74

## 2021-11-17 NOTE — PROGRESS NOTES
Assessment & Plan     ICD-10-CM    1. Cough  R05.9 CT Chest w Contrast     Comprehensive metabolic panel (BMP + Alb, Alk Phos, ALT, AST, Total. Bili, TP)     CBC with platelets and differential     Prealbumin     Comprehensive metabolic panel (BMP + Alb, Alk Phos, ALT, AST, Total. Bili, TP)     CBC with platelets and differential     Prealbumin   2. Feeding by G-tube (H)  Z93.1 CT Chest w Contrast     Prealbumin     Prealbumin   3. Chronic lung disease of prematurity  P27.9 CT Chest w Contrast    P07.30 Prealbumin     Prealbumin   4. Pleural effusion  J90 CT Chest w Contrast     Prealbumin     Prealbumin   5. Aspiration pneumonia, unspecified aspiration pneumonia type, unspecified laterality, unspecified part of lung (H)  J69.0 CT Chest w Contrast     Comprehensive metabolic panel (BMP + Alb, Alk Phos, ALT, AST, Total. Bili, TP)     CBC with platelets and differential     Prealbumin     Comprehensive metabolic panel (BMP + Alb, Alk Phos, ALT, AST, Total. Bili, TP)     CBC with platelets and differential     Prealbumin   6. Mild protein-calorie malnutrition (H)  E44.1 Prealbumin     Prealbumin   discussed with patient (or patient's parents/caregiver) pathophysiology of condition and treatment options.  reviwed hospital course in detail with patient's mother, he has improved some but cough still a concern. Exam limited today, will get chest CT to evaluation for resolution of pleural effusions, nutrition labs as well (to help distinguish between transudate vs parapneumonic if present) as well as other labs to help point us in right direction for next steps. F/u with Dr Soto as well, patient's mother will call to make an appointment. Reviwed somewhat reassuring no fever, etc and tolerating feeds, persistent cough could be post-infectious and may last sometime, but given Rj's complex hisotry and risk factors need to exclude other causes, etc. Pts mother agrees and will keep me posted, plan to get CT scan asap  and labs today. follow-up appointment made.         Return in about 3 months (around 2/17/2022) for Next scheduled follow-up visit, or sooner if symptoms persist or worsen.    Micky Leyva MD  [facility] JEAN De La Rosa is a 26 year old who presents for the following health issues     History of Present Illness       He eats 0-1 servings of fruits and vegetables daily.He consumes 0 sweetened beverage(s) daily.He exercises with enough effort to increase his heart rate 9 or less minutes per day.  He exercises with enough effort to increase his heart rate 3 or less days per week.   He is taking medications regularly.       -SOB / cough still  -Nebulizer at times not really helping stats are staying at 92    -mom wondering about next steps, ? follow-up with Dr Soto    [facility]    Hospital[facility]Nursing Home/IP Rehab Facility: [facility]  Date of Admission: 9/24/21  Date of Discharge: 9/29/21  Reason(s) for Admission: pneumonia       Was your hospitalization related to COVID-19? No   Problems taking medications regularly:  None  Medication changes since discharge: None  Problems adhering to non-medication therapy:  None    Summary of hospitalization:  [facility] [employer] discharge summary reviewed  Diagnostic Tests/Treatments reviewed.  Follow up needed: pulmonary and GI  Other Healthcare Providers Involved in Patient s Care:         multiple  Update since discharge: stable.       Post Discharge Medication Reconciliation: discharge medications reconciled, continue medications without change.  Plan of care communicated with patient and family              patient here for hospital follow-up and check in, overall doing about the same, tolerating feeds and weight up a little, cough persistas and this is Mom's biggest concern today. no fevers, takes nothing by mouth, she isn't sure if hte nebs help or not, plans to make follow-up appointment  "with Dr. Soto as well. no new symptoms, did have good appointment with GI and plans to follow-up as scheduled. No other concerns or complaints today.     Review of Systems   Constitutional, HEENT, cardiovascular, pulmonary, GI, , musculoskeletal, neuro, skin, endocrine and psych systems are negative, except as otherwise noted.      Objective    /74 (BP Location: Right arm, Patient Position: Chair, Cuff Size: Adult Small)   Pulse 88   Temp 97.3  F (36.3  C) (Tympanic)   Resp 16   Ht 1.575 m (5' 2\")   Wt 37.6 kg (83 lb)   SpO2 92%   BMI 15.18 kg/m    Body mass index is 15.18 kg/m .  Physical Exam   GENERAL: healthy, alert and no distress  HEENT: normocephalic and atrumatic, mouth and nose covered with mask due to covid  NECK: no adenopathy, no asymmetry  RESP: lungs clear to auscultation in upper lobs anteriorly and psoteriorly, ? soft bibasilar cracks but exam limited due to positioning- no rhonchi or wheezes appreciated. no dulness to percussion. patient is not in distress and has normal insp/exp effort, occational cough.   CV: regular rate and rhythm, normal S1 S2, no S3 or S4, no murmur, click or rub, no peripheral edema and peripheral pulses 5/5 and symmetric  ABDOMEN: soft, nontender, no hepatosplenomegaly, no masses and bowel sounds normal; GT in place  MS: no gross musculoskeletal defects noted, no edema  SKIN: no suspicious lesions or rashes  PSYCH: mentation appears at baseline, patient interactive        Total time spent with patient and his mother was 50 min, of which greater than 40 minutes of face to face time and/or coordination of care discussing the above issue(s) including imaging, labs, follow-up appts and hospital results.          "

## 2021-11-18 LAB
ALBUMIN SERPL-MCNC: 3.1 G/DL (ref 3.4–5)
ALP SERPL-CCNC: 77 U/L (ref 40–150)
ALT SERPL W P-5'-P-CCNC: 16 U/L (ref 0–70)
ANION GAP SERPL CALCULATED.3IONS-SCNC: 5 MMOL/L (ref 3–14)
AST SERPL W P-5'-P-CCNC: 27 U/L (ref 0–45)
BILIRUB SERPL-MCNC: 0.4 MG/DL (ref 0.2–1.3)
BUN SERPL-MCNC: 16 MG/DL (ref 7–30)
CALCIUM SERPL-MCNC: 9.1 MG/DL (ref 8.5–10.1)
CHLORIDE BLD-SCNC: 104 MMOL/L (ref 94–109)
CO2 SERPL-SCNC: 29 MMOL/L (ref 20–32)
CREAT SERPL-MCNC: 0.7 MG/DL (ref 0.66–1.25)
GFR SERPL CREATININE-BSD FRML MDRD: >90 ML/MIN/1.73M2
GLUCOSE BLD-MCNC: 78 MG/DL (ref 70–99)
POTASSIUM BLD-SCNC: 4.1 MMOL/L (ref 3.4–5.3)
PREALB SERPL IA-MCNC: 23 MG/DL (ref 15–45)
PROT SERPL-MCNC: 7.2 G/DL (ref 6.8–8.8)
SODIUM SERPL-SCNC: 138 MMOL/L (ref 133–144)

## 2021-11-22 ENCOUNTER — HOSPITAL ENCOUNTER (OUTPATIENT)
Dept: CT IMAGING | Facility: CLINIC | Age: 26
Discharge: HOME | End: 2021-11-22
Attending: INTERNAL MEDICINE | Admitting: INTERNAL MEDICINE
Payer: COMMERCIAL

## 2021-11-22 DIAGNOSIS — R05.9 COUGH: ICD-10-CM

## 2021-11-22 DIAGNOSIS — Z93.1 FEEDING BY G-TUBE (H): ICD-10-CM

## 2021-11-22 DIAGNOSIS — J69.0 ASPIRATION PNEUMONIA, UNSPECIFIED ASPIRATION PNEUMONIA TYPE, UNSPECIFIED LATERALITY, UNSPECIFIED PART OF LUNG (H): ICD-10-CM

## 2021-11-22 DIAGNOSIS — J90 PLEURAL EFFUSION: ICD-10-CM

## 2021-11-22 PROCEDURE — 71260 CT THORAX DX C+: CPT

## 2021-11-22 PROCEDURE — 250N000009 HC RX 250: Performed by: INTERNAL MEDICINE

## 2021-11-22 PROCEDURE — 250N000011 HC RX IP 250 OP 636: Performed by: INTERNAL MEDICINE

## 2021-11-22 RX ORDER — IOPAMIDOL 755 MG/ML
500 INJECTION, SOLUTION INTRAVASCULAR ONCE
Status: COMPLETED | OUTPATIENT
Start: 2021-11-22 | End: 2021-11-22

## 2021-11-22 RX ADMIN — SODIUM CHLORIDE 53 ML: 9 INJECTION, SOLUTION INTRAVENOUS at 14:53

## 2021-11-22 RX ADMIN — IOPAMIDOL 42 ML: 755 INJECTION, SOLUTION INTRAVENOUS at 14:51

## 2021-12-06 NOTE — TELEPHONE ENCOUNTER
Please review patient mychart message and advise. Thanks!  Anjelica BONNER RN, BSN     [facility]

## 2021-12-17 ENCOUNTER — TELEPHONE (OUTPATIENT)
Dept: PEDIATRICS | Facility: CLINIC | Age: 26
End: 2021-12-17
Payer: MEDICAID

## 2021-12-17 NOTE — TELEPHONE ENCOUNTER
Forms/Letter Request    Name of form/letter: disabled dependant authorization bcbs    Have you been seen for this request: No    Do we have the form/letter: Yes: Dr Leyva    When is form/letter needed by: asap    How would you like the form/letter returned:     Patient Notified form requests are processed in 3-5 business days:Yes    Okay to leave a detailed message? Yes Cell number on file:    Telephone Information:   Mobile [phone]

## 2022-01-05 NOTE — TELEPHONE ENCOUNTER
done and given to MA, mom will pick them up today around 2 and I said we would have for her at . thanks!

## 2022-01-22 ENCOUNTER — HOSPITAL ENCOUNTER (INPATIENT)
Facility: CLINIC | Age: 27
LOS: 2 days | Discharge: HOME | End: 2022-01-24
Attending: EMERGENCY MEDICINE | Admitting: HOSPITALIST
Payer: COMMERCIAL

## 2022-01-22 ENCOUNTER — APPOINTMENT (OUTPATIENT)
Dept: GENERAL RADIOLOGY | Facility: CLINIC | Age: 27
End: 2022-01-22
Attending: EMERGENCY MEDICINE
Payer: COMMERCIAL

## 2022-01-22 ENCOUNTER — APPOINTMENT (OUTPATIENT)
Dept: CT IMAGING | Facility: CLINIC | Age: 27
End: 2022-01-22
Attending: EMERGENCY MEDICINE
Payer: COMMERCIAL

## 2022-01-22 DIAGNOSIS — A41.9 SEPSIS, DUE TO UNSPECIFIED ORGANISM, UNSPECIFIED WHETHER ACUTE ORGAN DYSFUNCTION PRESENT (H): ICD-10-CM

## 2022-01-22 DIAGNOSIS — J18.9 PNEUMONIA OF RIGHT LOWER LOBE DUE TO INFECTIOUS ORGANISM: Primary | ICD-10-CM

## 2022-01-22 DIAGNOSIS — J18.9 PNEUMONIA DUE TO INFECTIOUS ORGANISM, UNSPECIFIED LATERALITY, UNSPECIFIED PART OF LUNG: ICD-10-CM

## 2022-01-22 DIAGNOSIS — J96.01 ACUTE RESPIRATORY FAILURE WITH HYPOXIA (H): ICD-10-CM

## 2022-01-22 LAB
ALBUMIN SERPL-MCNC: 2.8 G/DL (ref 3.4–5)
ALBUMIN UR-MCNC: 20 MG/DL
ALP SERPL-CCNC: 75 U/L (ref 40–150)
ALT SERPL W P-5'-P-CCNC: 20 U/L (ref 0–70)
ANION GAP SERPL CALCULATED.3IONS-SCNC: 6 MMOL/L (ref 3–14)
APPEARANCE UR: CLEAR
AST SERPL W P-5'-P-CCNC: 18 U/L (ref 0–45)
BASOPHILS # BLD MANUAL: 0 10E3/UL (ref 0–0.2)
BASOPHILS NFR BLD MANUAL: 0 %
BILIRUB SERPL-MCNC: 0.8 MG/DL (ref 0.2–1.3)
BILIRUB UR QL STRIP: NEGATIVE
BUN SERPL-MCNC: 15 MG/DL (ref 7–30)
CALCIUM SERPL-MCNC: 9.4 MG/DL (ref 8.5–10.1)
CHLORIDE BLD-SCNC: 103 MMOL/L (ref 94–109)
CO2 SERPL-SCNC: 29 MMOL/L (ref 20–32)
COLOR UR AUTO: YELLOW
CREAT SERPL-MCNC: 0.9 MG/DL (ref 0.66–1.25)
EOSINOPHIL # BLD MANUAL: 0 10E3/UL (ref 0–0.7)
EOSINOPHIL NFR BLD MANUAL: 0 %
ERYTHROCYTE [DISTWIDTH] IN BLOOD BY AUTOMATED COUNT: 13.9 % (ref 10–15)
FLUAV RNA SPEC QL NAA+PROBE: NEGATIVE
FLUBV RNA RESP QL NAA+PROBE: NEGATIVE
GFR SERPL CREATININE-BSD FRML MDRD: >90 ML/MIN/1.73M2
GLUCOSE BLD-MCNC: 120 MG/DL (ref 70–99)
GLUCOSE BLDC GLUCOMTR-MCNC: 106 MG/DL (ref 70–99)
GLUCOSE UR STRIP-MCNC: NEGATIVE MG/DL
HCT VFR BLD AUTO: 45.8 % (ref 40–53)
HGB BLD-MCNC: 14.5 G/DL (ref 13.3–17.7)
HGB UR QL STRIP: NEGATIVE
HOLD SPECIMEN: NORMAL
KETONES UR STRIP-MCNC: NEGATIVE MG/DL
LACTATE SERPL-SCNC: 1.7 MMOL/L (ref 0.7–2)
LACTATE SERPL-SCNC: 2.6 MMOL/L (ref 0.7–2)
LEUKOCYTE ESTERASE UR QL STRIP: NEGATIVE
LYMPHOCYTES # BLD MANUAL: 1.6 10E3/UL (ref 0.8–5.3)
LYMPHOCYTES NFR BLD MANUAL: 20 %
MAGNESIUM SERPL-MCNC: 2.5 MG/DL (ref 1.6–2.3)
MCH RBC QN AUTO: 34 PG (ref 26.5–33)
MCHC RBC AUTO-ENTMCNC: 31.7 G/DL (ref 31.5–36.5)
MCV RBC AUTO: 107 FL (ref 78–100)
METAMYELOCYTES # BLD MANUAL: 0.5 10E3/UL
METAMYELOCYTES NFR BLD MANUAL: 6 %
MONOCYTES # BLD MANUAL: 2.1 10E3/UL (ref 0–1.3)
MONOCYTES NFR BLD MANUAL: 26 %
NEUTROPHILS # BLD MANUAL: 3.9 10E3/UL (ref 1.6–8.3)
NEUTROPHILS NFR BLD MANUAL: 48 %
NITRATE UR QL: NEGATIVE
NT-PROBNP SERPL-MCNC: 398 PG/ML (ref 0–450)
PH UR STRIP: 6.5 [PH] (ref 5–7)
PHOSPHATE SERPL-MCNC: 3.6 MG/DL (ref 2.5–4.5)
PLAT MORPH BLD: ABNORMAL
PLATELET # BLD AUTO: 68 10E3/UL (ref 150–450)
POTASSIUM BLD-SCNC: 4 MMOL/L (ref 3.4–5.3)
PROT SERPL-MCNC: 7.1 G/DL (ref 6.8–8.8)
RBC # BLD AUTO: 4.27 10E6/UL (ref 4.4–5.9)
RBC MORPH BLD: ABNORMAL
RBC URINE: 2 /HPF
SARS-COV-2 RNA RESP QL NAA+PROBE: NEGATIVE
SODIUM SERPL-SCNC: 138 MMOL/L (ref 133–144)
SP GR UR STRIP: 1.03 (ref 1–1.03)
SQUAMOUS EPITHELIAL: <1 /HPF
TROPONIN I SERPL HS-MCNC: 3 NG/L
UROBILINOGEN UR STRIP-MCNC: 8 MG/DL
WBC # BLD AUTO: 8.2 10E3/UL (ref 4–11)
WBC URINE: 3 /HPF

## 2022-01-22 PROCEDURE — 99285 EMERGENCY DEPT VISIT HI MDM: CPT | Mod: 25

## 2022-01-22 PROCEDURE — 83735 ASSAY OF MAGNESIUM: CPT | Performed by: HOSPITALIST

## 2022-01-22 PROCEDURE — 96365 THER/PROPH/DIAG IV INF INIT: CPT

## 2022-01-22 PROCEDURE — 36415 COLL VENOUS BLD VENIPUNCTURE: CPT | Performed by: HOSPITALIST

## 2022-01-22 PROCEDURE — 258N000003 HC RX IP 258 OP 636: Performed by: EMERGENCY MEDICINE

## 2022-01-22 PROCEDURE — 250N000011 HC RX IP 250 OP 636: Performed by: HOSPITALIST

## 2022-01-22 PROCEDURE — C9803 HOPD COVID-19 SPEC COLLECT: HCPCS

## 2022-01-22 PROCEDURE — 250N000011 HC RX IP 250 OP 636: Performed by: EMERGENCY MEDICINE

## 2022-01-22 PROCEDURE — 250N000013 HC RX MED GY IP 250 OP 250 PS 637: Performed by: EMERGENCY MEDICINE

## 2022-01-22 PROCEDURE — 258N000001 HC RX 258: Performed by: HOSPITALIST

## 2022-01-22 PROCEDURE — 81003 URINALYSIS AUTO W/O SCOPE: CPT | Performed by: EMERGENCY MEDICINE

## 2022-01-22 PROCEDURE — 120N000001 HC R&B MED SURG/OB

## 2022-01-22 PROCEDURE — 96368 THER/DIAG CONCURRENT INF: CPT

## 2022-01-22 PROCEDURE — 250N000009 HC RX 250: Performed by: EMERGENCY MEDICINE

## 2022-01-22 PROCEDURE — 83605 ASSAY OF LACTIC ACID: CPT | Performed by: HOSPITALIST

## 2022-01-22 PROCEDURE — 250N000009 HC RX 250: Performed by: HOSPITALIST

## 2022-01-22 PROCEDURE — 87636 SARSCOV2 & INF A&B AMP PRB: CPT | Performed by: EMERGENCY MEDICINE

## 2022-01-22 PROCEDURE — 84484 ASSAY OF TROPONIN QUANT: CPT | Performed by: EMERGENCY MEDICINE

## 2022-01-22 PROCEDURE — 93005 ELECTROCARDIOGRAM TRACING: CPT

## 2022-01-22 PROCEDURE — 80053 COMPREHEN METABOLIC PANEL: CPT | Performed by: EMERGENCY MEDICINE

## 2022-01-22 PROCEDURE — 83880 ASSAY OF NATRIURETIC PEPTIDE: CPT | Performed by: EMERGENCY MEDICINE

## 2022-01-22 PROCEDURE — 83605 ASSAY OF LACTIC ACID: CPT | Performed by: EMERGENCY MEDICINE

## 2022-01-22 PROCEDURE — 85027 COMPLETE CBC AUTOMATED: CPT | Performed by: EMERGENCY MEDICINE

## 2022-01-22 PROCEDURE — 87040 BLOOD CULTURE FOR BACTERIA: CPT | Performed by: EMERGENCY MEDICINE

## 2022-01-22 PROCEDURE — 36415 COLL VENOUS BLD VENIPUNCTURE: CPT | Performed by: EMERGENCY MEDICINE

## 2022-01-22 PROCEDURE — 96361 HYDRATE IV INFUSION ADD-ON: CPT

## 2022-01-22 PROCEDURE — 84100 ASSAY OF PHOSPHORUS: CPT | Performed by: HOSPITALIST

## 2022-01-22 PROCEDURE — 74177 CT ABD & PELVIS W/CONTRAST: CPT

## 2022-01-22 PROCEDURE — 99223 1ST HOSP IP/OBS HIGH 75: CPT | Performed by: HOSPITALIST

## 2022-01-22 PROCEDURE — 71045 X-RAY EXAM CHEST 1 VIEW: CPT

## 2022-01-22 PROCEDURE — 250N000013 HC RX MED GY IP 250 OP 250 PS 637: Performed by: HOSPITALIST

## 2022-01-22 RX ORDER — FAMOTIDINE 40 MG/5ML
20 POWDER, FOR SUSPENSION ORAL 2 TIMES DAILY
Status: DISCONTINUED | OUTPATIENT
Start: 2022-01-22 | End: 2022-01-24 | Stop reason: HOSPADM

## 2022-01-22 RX ORDER — IOPAMIDOL 755 MG/ML
500 INJECTION, SOLUTION INTRAVASCULAR ONCE
Status: COMPLETED | OUTPATIENT
Start: 2022-01-22 | End: 2022-01-22

## 2022-01-22 RX ORDER — ACETAMINOPHEN 500 MG
1000 TABLET ORAL ONCE
Status: DISCONTINUED | OUTPATIENT
Start: 2022-01-22 | End: 2022-01-22

## 2022-01-22 RX ORDER — QUETIAPINE FUMARATE 200 MG/1
600 TABLET, FILM COATED ORAL AT BEDTIME
Status: DISCONTINUED | OUTPATIENT
Start: 2022-01-22 | End: 2022-01-24 | Stop reason: HOSPADM

## 2022-01-22 RX ORDER — FLUTICASONE PROPIONATE AND SALMETEROL XINAFOATE 230; 21 UG/1; UG/1
2 AEROSOL, METERED RESPIRATORY (INHALATION) 2 TIMES DAILY
Status: DISCONTINUED | OUTPATIENT
Start: 2022-01-22 | End: 2022-01-24 | Stop reason: HOSPADM

## 2022-01-22 RX ORDER — LORAZEPAM 1 MG/1
1 TABLET ORAL EVERY 6 HOURS PRN
Status: DISCONTINUED | OUTPATIENT
Start: 2022-01-22 | End: 2022-01-24 | Stop reason: HOSPADM

## 2022-01-22 RX ORDER — AZITHROMYCIN 500 MG/5ML
500 INJECTION, POWDER, LYOPHILIZED, FOR SOLUTION INTRAVENOUS EVERY 24 HOURS
Status: DISCONTINUED | OUTPATIENT
Start: 2022-01-23 | End: 2022-01-24 | Stop reason: HOSPADM

## 2022-01-22 RX ORDER — CEFTRIAXONE 2 G/1
2 INJECTION, POWDER, FOR SOLUTION INTRAMUSCULAR; INTRAVENOUS ONCE
Status: COMPLETED | OUTPATIENT
Start: 2022-01-22 | End: 2022-01-22

## 2022-01-22 RX ORDER — LORAZEPAM 2 MG/ML
0.5 INJECTION INTRAMUSCULAR ONCE
Status: COMPLETED | OUTPATIENT
Start: 2022-01-22 | End: 2022-01-22

## 2022-01-22 RX ORDER — DEXTROSE MONOHYDRATE 100 MG/ML
INJECTION, SOLUTION INTRAVENOUS CONTINUOUS PRN
Status: DISCONTINUED | OUTPATIENT
Start: 2022-01-22 | End: 2022-01-24 | Stop reason: HOSPADM

## 2022-01-22 RX ORDER — LIDOCAINE 40 MG/G
CREAM TOPICAL
Status: DISCONTINUED | OUTPATIENT
Start: 2022-01-22 | End: 2022-01-24 | Stop reason: HOSPADM

## 2022-01-22 RX ORDER — LANOLIN ALCOHOL/MO/W.PET/CERES
6 CREAM (GRAM) TOPICAL AT BEDTIME
Status: DISCONTINUED | OUTPATIENT
Start: 2022-01-22 | End: 2022-01-24 | Stop reason: HOSPADM

## 2022-01-22 RX ORDER — ACETAMINOPHEN 325 MG/10.15ML
1000 LIQUID ORAL ONCE
Status: COMPLETED | OUTPATIENT
Start: 2022-01-22 | End: 2022-01-22

## 2022-01-22 RX ORDER — GABAPENTIN 250 MG/5ML
750 SOLUTION ORAL AT BEDTIME
Status: DISCONTINUED | OUTPATIENT
Start: 2022-01-22 | End: 2022-01-24 | Stop reason: HOSPADM

## 2022-01-22 RX ORDER — HYDROXYZINE HCL 10 MG/5 ML
50 SOLUTION, ORAL ORAL AT BEDTIME
Status: DISCONTINUED | OUTPATIENT
Start: 2022-01-22 | End: 2022-01-24 | Stop reason: HOSPADM

## 2022-01-22 RX ORDER — ALBUTEROL SULFATE 90 UG/1
2 AEROSOL, METERED RESPIRATORY (INHALATION) EVERY 6 HOURS PRN
Status: DISCONTINUED | OUTPATIENT
Start: 2022-01-22 | End: 2022-01-24 | Stop reason: HOSPADM

## 2022-01-22 RX ORDER — AZITHROMYCIN 500 MG/5ML
500 INJECTION, POWDER, LYOPHILIZED, FOR SOLUTION INTRAVENOUS ONCE
Status: COMPLETED | OUTPATIENT
Start: 2022-01-22 | End: 2022-01-22

## 2022-01-22 RX ORDER — POLYETHYLENE GLYCOL 3350 17 G/17G
17 POWDER, FOR SOLUTION ORAL DAILY PRN
Status: DISCONTINUED | OUTPATIENT
Start: 2022-01-22 | End: 2022-01-24 | Stop reason: HOSPADM

## 2022-01-22 RX ORDER — CLOBAZAM 2.5 MG/ML
5 SUSPENSION ORAL AT BEDTIME
Status: DISCONTINUED | OUTPATIENT
Start: 2022-01-22 | End: 2022-01-24 | Stop reason: HOSPADM

## 2022-01-22 RX ORDER — CLOBAZAM 2.5 MG/ML
7.5 SUSPENSION ORAL EVERY MORNING
Status: DISCONTINUED | OUTPATIENT
Start: 2022-01-23 | End: 2022-01-24 | Stop reason: HOSPADM

## 2022-01-22 RX ORDER — ALBUTEROL SULFATE 0.83 MG/ML
2.5 SOLUTION RESPIRATORY (INHALATION) EVERY 6 HOURS PRN
Status: DISCONTINUED | OUTPATIENT
Start: 2022-01-22 | End: 2022-01-24 | Stop reason: HOSPADM

## 2022-01-22 RX ADMIN — QUETIAPINE FUMARATE 600 MG: 200 TABLET ORAL at 22:37

## 2022-01-22 RX ADMIN — FAMOTIDINE 20 MG: 40 POWDER, FOR SUSPENSION ORAL at 20:47

## 2022-01-22 RX ADMIN — SODIUM CHLORIDE 1000 ML: 9 INJECTION, SOLUTION INTRAVENOUS at 07:16

## 2022-01-22 RX ADMIN — Medication 6 MG: at 20:50

## 2022-01-22 RX ADMIN — FAMOTIDINE 20 MG: 40 POWDER, FOR SUSPENSION ORAL at 14:38

## 2022-01-22 RX ADMIN — SODIUM CHLORIDE 1000 ML: 9 INJECTION, SOLUTION INTRAVENOUS at 10:53

## 2022-01-22 RX ADMIN — GABAPENTIN 750 MG: 250 SUSPENSION ORAL at 22:31

## 2022-01-22 RX ADMIN — SODIUM BICARBONATE 40 MG: 84 INJECTION, SOLUTION INTRAVENOUS at 20:46

## 2022-01-22 RX ADMIN — LORAZEPAM 0.5 MG: 2 INJECTION INTRAMUSCULAR; INTRAVENOUS at 12:20

## 2022-01-22 RX ADMIN — ACETAMINOPHEN 1000 MG: 325 SOLUTION ORAL at 09:50

## 2022-01-22 RX ADMIN — VALPROIC ACID 350 MG: 250 SOLUTION ORAL at 14:38

## 2022-01-22 RX ADMIN — TAZOBACTAM SODIUM AND PIPERACILLIN SODIUM 3.38 G: 375; 3 INJECTION, SOLUTION INTRAVENOUS at 20:31

## 2022-01-22 RX ADMIN — SODIUM CHLORIDE 64 ML: 9 INJECTION, SOLUTION INTRAVENOUS at 10:41

## 2022-01-22 RX ADMIN — Medication 1 MG: at 15:56

## 2022-01-22 RX ADMIN — HYDROXYZINE HYDROCHLORIDE 50 MG: 10 SOLUTION ORAL at 22:36

## 2022-01-22 RX ADMIN — FLUTICASONE PROPIONATE AND SALMETEROL XINAFOATE 2 PUFF: 230; 21 AEROSOL, METERED RESPIRATORY (INHALATION) at 20:57

## 2022-01-22 RX ADMIN — AZITHROMYCIN MONOHYDRATE 500 MG: 500 INJECTION, POWDER, LYOPHILIZED, FOR SOLUTION INTRAVENOUS at 08:35

## 2022-01-22 RX ADMIN — CLOBAZAM 5 MG: 2.5 SUSPENSION ORAL at 22:35

## 2022-01-22 RX ADMIN — VALPROIC ACID 350 MG: 250 SOLUTION ORAL at 20:42

## 2022-01-22 RX ADMIN — TAZOBACTAM SODIUM AND PIPERACILLIN SODIUM 3.38 G: 375; 3 INJECTION, SOLUTION INTRAVENOUS at 14:32

## 2022-01-22 RX ADMIN — IOPAMIDOL 41 ML: 755 INJECTION, SOLUTION INTRAVENOUS at 10:41

## 2022-01-22 RX ADMIN — CEFTRIAXONE 2 G: 2 INJECTION, POWDER, FOR SOLUTION INTRAMUSCULAR; INTRAVENOUS at 08:37

## 2022-01-22 ASSESSMENT — ACTIVITIES OF DAILY LIVING (ADL)
ADLS_ACUITY_SCORE: 16
ADLS_ACUITY_SCORE: 14
ADLS_ACUITY_SCORE: 16
ADLS_ACUITY_SCORE: 14
ADLS_ACUITY_SCORE: 16

## 2022-01-22 NOTE — PHARMACY-CONSULT NOTE
Pharmacy consult - review/monitor for feeding tube issues    - Medications are reviewed as per policy, and all current orders are appropriate as per G-tube.  - No further feeding tube issues identified.

## 2022-01-22 NOTE — CONSULTS
CLINICAL NUTRITION SERVICES - ASSESSMENT NOTE     Nutrition Prescription    Malnutrition Status:    % Intake: No decreased intake noted  % Weight Loss: None noted  Subcutaneous Fat Loss: Unable to assess  Muscle Loss: Unable to assess  Fluid Accumulation/Edema: None noted    Malnutrition Diagnosis: Unable to determine due to need for NFPE, will obtain when able    Recommendations already ordered by Registered Dietitian (RD):  Nutrition Education: Introduced self and role to patient's mom/guardian. Discussed nutrition PTA and plan during admission.    Enteral Nutrition - Initiate- Osmolite 1.5 Junior @ goal of  50ml/hr  (1200ml/day)  will provide: 1800 kcals (48 kcal/kg), 75 g PRO (2 g/kg), 914 ml free H20, 244 g CHO, and 0 g fiber daily.    Flushes of 150 ml q 4 hrs= 900 ml + 914= 1814 ml total free water    Future/Additional Recommendations:  Monitor tolerance of new TF formula, consider adding fiber modular if patient tolerates new TF formula (pt receives fiber-containing product at home, but equivalent product here provides double what patient is accustomed to getting)     REASON FOR ASSESSMENT  Rj Licea is a/an 26 year old male assessed by the dietitian for Provider Order - Registered Dietitian to Assess and Order TF per Medical Nutrition Therapy Protocol  Pt presents with SOB and cough. PMH significant for cerebral palsy, autism, retinopathy of prematurity with associated blindness, seizure disorder, depression/anxiety, history of SBOs     NUTRITION HISTORY  - Information obtained from chart and Rj's mom, Darcy. Rj is familiar to nutrition therapy department from previous admissions. Pt receives 5 cans of Pediasure 1.5 with fiber daily. He has 5 bolus feeds daily as he cannot tolerate a higher volume feeding.   - Pt's pediatrician manages his enteral nutrition  - NKFA    CURRENT NUTRITION ORDERS  Diet: NPO    LABS    Electrolytes  Sodium (mmol/L)   Date Value   01/22/2022 138   04/27/2021 137      Potassium (mmol/L)   Date Value   01/22/2022 4.0   04/27/2021 4.2     Magnesium (mg/dL)   Date Value   09/28/2021 1.9   04/27/2021 1.8     Phosphorus (mg/dL)   Date Value   09/29/2021 2.7   04/27/2021 2.2 (L)    Renal  Urea Nitrogen (mg/dL)   Date Value   01/22/2022 15   04/27/2021 9     Creatinine (mg/dL)   Date Value   01/22/2022 0.90   04/27/2021 0.58 (L)     Inflammatory Markers  CRP Inflammation (mg/L)   Date Value   04/05/2019 13.0 (H)   01/15/2019 <2.9     WBC (10e9/L)   Date Value   05/06/2021 5.7     WBC Count (10e3/uL)   Date Value   01/22/2022 8.2     Albumin (g/dL)   Date Value   01/22/2022 2.8 (L)   04/26/2021 1.9 (L)      Blood Glucose  Glucose (mg/dL)   Date Value   01/22/2022 120 (H)   04/27/2021 244 (H)     GLUCOSE BY METER POCT (mg/dL)   Date Value   01/22/2022 106 (H)     Hemoglobin A1C POCT (%)   Date Value   03/17/2021 5.0   02/06/2020 4.9   06/07/2019 4.5    Hepatic  ALT (U/L)   Date Value   01/22/2022 20   04/26/2021 11     AST (U/L)   Date Value   01/22/2022 18   04/26/2021 11     Alkaline Phosphatase (U/L)   Date Value   01/22/2022 75   04/26/2021 49     Bilirubin Total (mg/dL)   Date Value   01/22/2022 0.8   04/26/2021 0.3    Additional  Triglycerides (mg/dL)   Date Value   03/17/2021 57     Ketones Urine (mg/dL)   Date Value   01/22/2022 Negative   04/19/2021 Trace (A)        MEDICATIONS    azithromycin  500 mg Intravenous Q24H     clobazam  5 mg Per G Tube At Bedtime     [START ON 1/23/2022] clobazam  7.5 mg Per G Tube QAM     famotidine  20 mg Per G Tube BID     fluticasone-salmeterol  2 puff Inhalation BID     gabapentin  750 mg Per G Tube At Bedtime     hydrOXYzine  50 mg Per G Tube At Bedtime     LORazepam  0.5 mg Intravenous Once     omeprazole  40 mg Per G Tube At Bedtime     piperacillin-tazobactam  3.375 g Intravenous Q6H     QUEtiapine  600 mg Per G Tube At Bedtime     sodium chloride (PF)  3 mL Intracatheter Q8H     valproic acid  350 mg Oral BID     [START ON 1/23/2022]  "valproic acid  300 mg Per G Tube QAM        ANTHROPOMETRICS  Height: 157.5 cm (5' 2\")  Most Recent Weight:  37.6 kg (83 lb)- last weight not current--from 1/17/21    IBW: 53.6 kg  BMI: Underweight BMI <18.5  UBW: 82-87 lb per patient's mom-- he has been around 83 lbs for quite some time.  Weight History:   Wt Readings from Last 10 Encounters:   11/17/21 37.6 kg (83 lb)   10/06/21 37.8 kg (83 lb 6.6 oz)   10/06/21 37.8 kg (83 lb 6.4 oz)   09/27/21 39.5 kg (87 lb)   06/30/21 36.7 kg (81 lb)   06/01/21 37.6 kg (83 lb)   05/28/21 37.6 kg (83 lb)   05/06/21 44 kg (97 lb)   04/27/21 44.6 kg (98 lb 6.4 oz)   03/17/21 37.9 kg (83 lb 9.6 oz)     Dosing Weight: 37.6 kg    ASSESSED NUTRITION NEEDS  Estimated Energy Needs: 1505+ kcals/day (40+ kcal/kg)  Justification: Maintenance  Estimated Protein Needs: 56-75 grams protein/day (1.5-2+ g/kg)  Justification: preservation of lean mass  Estimated Fluid Needs: (1 mL/kcal)   Justification: Maintenance    MALNUTRITION  % Intake: No decreased intake noted  % Weight Loss: None noted  Subcutaneous Fat Loss: Unable to assess  Muscle Loss: Unable to assess  Fluid Accumulation/Edema: None noted  Malnutrition Diagnosis: Unable to determine due to need for NFPE    NUTRITION DIAGNOSIS  Inadequate oral intake related to oral aversion vs dysphagia (records not consistent) as evidenced by chronic enteral nutrition reliance      INTERVENTIONS  Implementation  Nutrition Education: Introduced self and role to patient's mom/guardian. Discussed nutrition PTA and plan during admission.    Enteral Nutrition - Initiate- Osmolite 1.5 Junior @ goal of  50ml/hr  (1200ml/day)  will provide: 1800 kcals (48 kcal/kg), 75 g PRO (2 g/kg), 914 ml free H20, 244 g CHO, and 0 g fiber daily.    Flushes of 150 ml q 4 hrs= 900 ml + 914= 1814 ml total free water    Will order baseline Phos and Mg++--pt does not appear to be at risk for refeeding syndrome based on mother's report    Goals  TF will meet % estimated " needs     Monitoring/Evaluation  Progress toward goals will be monitored and evaluated per protocol.  Kathrine Jesus RD, LD  Pager - 3rd floor/ICU: [phone]  Pager - All other floors: [phone]  Pager - Weekend/holiday: [phone]  Office: [phone]

## 2022-01-22 NOTE — ED TRIAGE NOTES
Patient arrives via EMS for complaints of respiratory distress. Mom reports hypoxia at home, administered prednisone at home. Morning medications also given. Duoneb given in route by EMS.

## 2022-01-22 NOTE — PHARMACY-ADMISSION MEDICATION HISTORY
Admission medication history interview status for this patient is complete. See [facility] admission navigator for allergy information, prior to admission medications and immunization status.     Medication history interview done, indicate source(s): Guardian/mother Darcy [phone]  Medication history resources (including written lists, pill bottles, clinic record): Patient's medication list  [facility]: [facility]    Changes made to PTA medication list:  Added: nothing  Changed: nothing  Reported as Not Taking: nothing  Removed: multivitamin, simethicone    Actions taken by pharmacist (provider contacted, etc): called motherDarcy     Additional medication history information: Patient's mother mentioned that the patient was having increased anxiety this past week, and he's taken 2 lorazepam tablets in the past week. His mother explained that he had trouble taking his certavite, so it was removed from his list. Guardian confirmed NPO, all meds via G-tube (quetiapine is crushed).    Medication reconciliation/reorder completed by provider prior to medication history?  Y   (Y/N)     Prior to Admission medications    Medication Sig Last Dose Taking? Auth Provider   albuterol (PROAIR HFA/PROVENTIL HFA/VENTOLIN HFA) 108 (90 Base) MCG/ACT inhaler Inhale 2 puffs into the lungs every 6 hours as needed for shortness of breath / dyspnea or wheezing 1/22/2022 at am Yes Micky Leyva MD   albuterol (PROVENTIL) (2.5 MG/3ML) 0.083% neb solution Take 1 vial (2.5 mg) by nebulization every 6 hours as needed for shortness of breath / dyspnea or wheezing 1/22/2022 at am Yes Micky Leyva MD   benzoyl peroxide 5 % EX topical gel Apply topically 2 times daily as needed (acne)  Yes Micky Leyva MD   clindamycin 1 % EX external gel Apply topically 2 times daily as needed (acne)  Yes Micky Leyva MD   clobazam (,ONFI,) 2.5 MG/ML suspension 5 mg by Per G Tube route At Bedtime (3 mL in AM / 2 mL in PM) 1/22/2022  at am Yes Unknown, Entered By History   clobazam (ONFI) 2.5 MG/ML suspension 7.5 mg by Per G Tube route every morning (3 mL in AM / 2 mL in PM) 1/22/2022 at am Yes Reported, Patient   famotidine (PEPCID) 40 MG/5ML suspension 20 mg by Per G Tube route 2 times daily 1/21/2022 at pm Yes Unknown, Entered By History   fluticasone-salmeterol (ADVAIR HFA) 230-21 MCG/ACT inhaler Inhale 2 puffs into the lungs 2 times daily  1/22/2022 at am - patient has with Yes Micky Leyva MD   gabapentin (NEURONTIN) 250 MG/5ML solution 750 mg by Per G Tube route At Bedtime  1/21/2022 at pm Yes Unknown, Entered By History   hydrOXYzine (ATARAX) 10 MG/5ML syrup 50 mg by Per G Tube route At Bedtime (25 mL) 1/21/2022 at pm Yes Reported, Patient   LORazepam (ATIVAN) 1 MG tablet 1 mg by Per G Tube route every 6 hours as needed for anxiety Per mom: prn  1/21/2022 at Unknown time Yes Unknown, Entered By History   melatonin 1 MG TABS 1 mg by Per G Tube route daily Daily between 4-5pm* 1/21/2022 at pm Yes Reported, Patient   melatonin 3 MG tablet 6 mg by Per G Tube route At Bedtime  1/21/2022 at pm Yes Unknown, Entered By History   omeprazole (PRILOSEC) 2 mg/mL SUSP 40 mg by Per G Tube route At Bedtime  1/21/2022 at pm Yes Micky Leyva MD   polyethylene glycol (MIRALAX) 17 g packet 17 g by Per G Tube route daily 1/22/2022 at am Yes Unknown, Entered By History   polyethylene glycol (MIRALAX) 17 GM/Dose powder TAKE 17 GM BY MOUTH EVERY DAY AS NEEDED  Yes Micky Leyva MD   QUEtiapine (SEROQUEL) 300 MG tablet 600 mg by Per G Tube route At Bedtime  1/21/2022 at pm Yes Reported, Patient   Valproate Sodium (VALPROIC ACID) 250 MG/5ML TAKE 6ML BY MOUTH EVERY MORNING AND 7ML IN THE AFTERNOON AND AT NIGHT 1/22/2022 at 0430 Yes Micky Leyva MD   Lancets 30G MISC 120 30 gauge lancets (substitute as needed)   Matilde Bagley MD

## 2022-01-22 NOTE — ED NOTES
Bed: ED02  Expected date: 1/22/22  Expected time: 6:58 AM  Means of arrival:   Comments:  MH- 26M resp distress

## 2022-01-22 NOTE — ED PROVIDER NOTES
History   Chief Complaint:  Shortness of Breath    The history is provided by the EMS personnel.      Rj Licea is a 26 year old male with a history of cerebral palsy, seizure disorder, feeding by G-tube, and nonintractable epilepsy without status epilepticus who presents with by EMS with his mother for shortness of breath. Per EMS Report, the patient arrives from home after having complaints about respiratory distress.  He began having a fever last night.  He woke up in respiratory distress around 4 AM this morning.  His mother administered prednisone at home for which she has a standing order.  His history of pneumonia and small bowel obstructions in the past.  He has some minor constipation but not complaining of any abdominal pain or vomiting.  No recent illnesses.  No known sick contacts.  He is vaccinating his COVID-19.    Review of Systems   Constitutional: Positive for fever.   Respiratory: Positive for cough and shortness of breath.    Cardiovascular: Negative for chest pain.   Gastrointestinal: Positive for constipation. Negative for abdominal pain and vomiting.   All other systems reviewed and are negative.        Allergies:  The patient denies having any allergies.    Medications:  albuterol   clobazam   famotidine  fluticasone-salmetero  gabapentin   hydrOXYzine  Lancets  LORazepam  melatonin 1  melatonin 3  omeprazole  polyethylene glycol   QUEtiapine   simethicone  Valproate Sodium    Past Medical History:     Chronic lung disease   Depression   Epilepsy   Feeding by G-tube   GERD (gastroesophageal reflux disease)   Insomnia   Kidney stone   Premature infant - 24 weeks   Retinopathy of prematurity  SBO (small bowel obstruction)  Short stature  CP (cerebral palsy), spastic, diplegic   Sleep disorder  Seizure disorder   Other idiopathic scoliosis, unspecified spinal region  Periventricular leukomalacia, associated with prematurity, chronic  Oral aversion  Nutritional disorder  Mild persistent  asthma in adult without complication  History of prematurity, 23 wk 400 grams  History of behavioral and mental health problems  Abnormal gait  Feeding by G-tube   Constipation, unspecified constipation type  Chronic lung disease of prematurity  Bilateral blindness  Autism spectrum disorder associated with known medical or genetic condition or environmental factor, requiring substantial support (level 2)  Anxiety  SBO (small bowel obstruction)  Nonintractable epilepsy without status epilepticus, unspecified epilepsy type   Dehydration  Hypoxia  Acute kidney injury   Elevated lactic acid level  Pneumonia of right lower lobe due to infectious organism      Past Surgical History:    Appendectomy open  Gastrostomy tube  Laparotomy exploratory      Family History:    Diabetes - Father    Social History:  The patient presents by EMS with his mother.  The patient is not a smoker.    Physical Exam     Patient Vitals for the past 24 hrs:   BP Temp Temp src Pulse Resp SpO2   01/22/22 1015 97/55 -- -- 120 -- 91 %   01/22/22 0810 -- -- -- (!) 121 20 95 %   01/22/22 0805 -- -- -- (!) 123 (!) 57 95 %   01/22/22 0800 129/78 -- -- (!) 124 29 95 %   01/22/22 0741 -- -- -- (!) 128 21 96 %   01/22/22 0725 -- (!) 100.5  F (38.1  C) Rectal -- -- --   01/22/22 0716 114/76 -- -- (!) 131 22 96 %     Physical Exam  Constitutional: Non toxic appearing.  HEENT: Atraumatic.    Moist mucous membranes.  Neck: Soft.  Supple.  No JVD.  Cardiac: Tachycardic rate with a regular rhythm.  No murmur or rub.  Respiratory: Clear to auscultation bilaterally.  No respiratory distress.   Abdomen: Soft and nontender.  No rebound or guarding.  Nondistended.  Musculoskeletal: No edema.  Normal range of motion.  Neurologic: Alert and oriented to self and place and mother.  Moving upper extremities.  Skin: No rashes.  No edema.  Psych: Normal affect.  Normal behavior.    Emergency Department Course   ECG  ECG obtained at 0711, ECG read at 0727  Sinus  tachycardia  Inferior infarct, age undetermined  Anterolateral infarct, age undetermined  Abnormal ECG   No significant changes as compared to prior, dated 09/27/2021.  Rate 132 bpm. MA interval 188 ms. QRS duration 118 ms. QT/QTc 280/414 ms. P-R-T axes 103 26 217.     Imaging:  CT Chest (PE) Abdomen Pelvis w Contrast   Final Result   IMPRESSION:   1.  Findings suspicious for bilateral pneumonia. The distribution would be compatible with aspiration.   2.  Negative for PE   3.  No bowel obstruction      XR Chest Port 1 View   Final Result   IMPRESSION: Heart size appears normal. Unchanged right basilar opacity. Improved left retrocardiac opacity. These could represent recurrent pneumonia/atelectasis, or lung base scarring.      No pulmonary edema. No pleural effusion. Air within bowel, similar to previous.        Report per radiology    Laboratory:  Labs Ordered and Resulted from Time of ED Arrival to Time of ED Departure   COMPREHENSIVE METABOLIC PANEL - Abnormal       Result Value    Sodium 138      Potassium 4.0      Chloride 103      Carbon Dioxide (CO2) 29      Anion Gap 6      Urea Nitrogen 15      Creatinine 0.90      Calcium 9.4      Glucose 120 (*)     Alkaline Phosphatase 75      AST 18      ALT 20      Protein Total 7.1      Albumin 2.8 (*)     Bilirubin Total 0.8      GFR Estimate >90     LACTIC ACID WHOLE BLOOD - Abnormal    Lactic Acid 2.6 (*)    ROUTINE UA WITH MICROSCOPIC REFLEX TO CULTURE - Abnormal    Color Urine Yellow      Appearance Urine Clear      Glucose Urine Negative      Bilirubin Urine Negative      Ketones Urine Negative      Specific Gravity Urine 1.027      Blood Urine Negative      pH Urine 6.5      Protein Albumin Urine 20  (*)     Urobilinogen Urine 8.0 (*)     Nitrite Urine Negative      Leukocyte Esterase Urine Negative      RBC Urine 2      WBC Urine 3      Squamous Epithelials Urine <1     CBC WITH PLATELETS AND DIFFERENTIAL - Abnormal    WBC Count 8.2      RBC Count 4.27 (*)      Hemoglobin 14.5      Hematocrit 45.8       (*)     MCH 34.0 (*)     MCHC 31.7      RDW 13.9      Platelet Count 68 (*)    GLUCOSE BY METER - Abnormal    GLUCOSE BY METER POCT 106 (*)    DIFFERENTIAL - Abnormal    % Neutrophils 48      % Lymphocytes 20      % Monocytes 26      % Eosinophils 0      % Basophils 0      % Metamyelocytes 6      Absolute Neutrophils 3.9      Absolute Lymphocytes 1.6      Absolute Monocytes 2.1 (*)     Absolute Eosinophils 0.0      Absolute Basophils 0.0      Absolute Metamyelocytes 0.5 (*)     RBC Morphology Confirmed RBC Indices      Platelet Assessment        Value: Automated Count Confirmed. Platelet morphology is normal.   TROPONIN I - Normal    Troponin I High Sensitivity 3     NT PROBNP INPATIENT - Normal    N terminal Pro BNP Inpatient 398     INFLUENZA A/B & SARS-COV2 PCR MULTIPLEX - Normal    Influenza A PCR Negative      Influenza B PCR Negative      SARS CoV2 PCR Negative     BLOOD CULTURE   BLOOD CULTURE     Emergency Department Course:         Reviewed:  I reviewed nursing notes, vitals, past medical history, Care Everywhere and [facility]    Assessments:  0700 I obtained history and examined the patient as noted above.   0820 I rechecked the patient and explained findings.     Consults:  0853 I consulted with Dillon Ayala MD from Hospitalist.    Interventions:  0716 0.9% sodium chloride BOLUS, IV  0835 azithromycin 500 mg (ZITHROMAX) in 0.9% NaCl 250 mL intermittent infusion 500 mg, IV  0837 cefTRIAXone (ROCEPHIN) 2 g vial to attach to  ml bag for ADULTS or NS 50 ml bag for PEDS, IV  0950 acetaminophen (TYLENOL) solution 1,000 mg, PO  1053 0.9% sodium chloride BOLUS, IV    Disposition:  The patient was admitted to the hospital under the care of Dillon Ayala MD from Hospitalist.    Impression & Plan   Medical Decision Making:  Rj Licea is a 26-year-old man who presents febrile, tachycardic, and hypoxic.  Broad septic work-up was initiated.  X-ray imaging  findings consistent with pneumonia.  He was given antibiotics to treat for community-acquired pneumonia.  He remained stable on nasal cannula oxygen.  Discussed the results with mother was in agreement.  Discussed plan for admission to the hospital service who accepts the patient for admission.  They are requesting a CT PE/abdomen/pelvis which was ordered.  He is in stable condition at time of transfer to the hospital service.    Diagnosis:    ICD-10-CM    1. Acute respiratory failure with hypoxia (H)  J96.01    2. Pneumonia due to infectious organism, unspecified laterality, unspecified part of lung  J18.9    3. Sepsis, due to unspecified organism, unspecified whether acute organ dysfunction present (H)  A41.9        Scribe Disclosure:  Virginia MILLER, am serving as a scribe at 7:08 AM on 1/22/2022 to document services personally performed by Jerry Mathews MD based on my observations and the provider's statements to me.      Jerry Mathews MD  01/25/22 1006

## 2022-01-22 NOTE — ED NOTES
Straight catheterized patient to obtain UA. UA collect and sent. Catheter removed. PT tolerated well.

## 2022-01-22 NOTE — H&P
[facility]    History and Physical  Hospitalist       Date of Admission:  1/22/2022    Assessment & Plan   Rj Licea is a 26 year old male with a past medical history of autism, retinopathy of prematurity with associated blindness, seizure disorder, depression/anxiety, history of SBOs and asthma who presents with shortness of breath.    #Sepsis and acute hypoxemic respiratory failure secondary to CAP vs. Aspiration pna: Patient's mother notes that on the evening of 1/21, patient had low-grade fever and was having cough.  She had given him some ibuprofen.  She had also noticed that he was more short of breath.  She had tried giving him nebulizers but he had continued shortness of breath and fever this AM.  She called EMS this AM due to his symptoms.  She denies any sick contacts.  He received 1 dose of his COVID-19 vaccine but was not able to receive his second dose due to being ill at the time.  He has not had any nausea or vomiting.  Bowel movements have been normal.  He is urinating okay.  He is anxious and mother notes this is his baseline.  She does note that she stays with him during his hospitalizations.  -ER, patient febrile to 100.5.  Tachycardic into the 120s.  Normotensive.  Needs 3 L oxygen via nasal cannula to maintain saturations.  No leukocytosis. BNP and troponin negative.  Lactic acid mildly elevated at 2.6.  He had a chest x-ray done that showed right basilar opacity and possible left retrocardiac opacity. CT PE abdomen pelvis negative for any bowel obstruction and shows bilateral pneumonia.  He was given ceftriaxone and azithromycin in the ER.  -We will continue Zosyn and azithromycin given concern for aspiration.  He does not have significant wheeze on exam.  We will continue his home inhalers.    -Wean oxygen as able  -Pt received IVF in ED.  Hold off on further continuous IVF. Bolus prn. Re-starting tube feeds.     #Lactic acidosis: Likely secondary to infection as  above.  Received IV fluids and started on antibiotics in the ED.  We will recheck lactic acid this evening.  Bolus as needed.    #Autism Spectrum Disorder, Cerebral Palsy, Depression/Anxiety: At baseline he has language impairment and intellectual disability.  We will resume his home Atarax, Seroquel, gabapentin.  We also have as needed Ativan available.  His mother will be staying at his bedside during his hospitalization; otherwise he would need a sitter.  All meds through G-tube.    #Dysphagia with G tube dependence: Nutrition consulted for resumption of tube feeds.  All medications through G-tube.  Strict NPO.     #GERD: continue PTA famotidine and omeprazole.      #Epilepsy: Continue home valproic acid and clobazam     DVT Prophylaxis: Pneumatic Compression Devices  Code Status: Full Code.  Discussed on admission  Dispo: Admit to inpatient.     Dillon Cunningham MD    Primary Care Physician   Micky Leyva    Chief Complaint   SOB    History is obtained from the patient, patient's mother, patient's chart and discussed with ER physician    History of Present Illness   Rj Licea is a 26 year old male with a past medical history of cerebral palsy, autism, retinopathy of prematurity with associated blindness, seizure disorder, depression/anxiety, history of SBOs who presents with shortness of breath.    Patient's mother notes that on the evening of 1/21, patient had low-grade fever and was having cough.  She had given him some ibuprofen.  She had also noticed that he was more short of breath.  She had tried giving him nebulizers but he had continued shortness of breath and fever this AM.  She called EMS this AM due to his symptoms.  She denies any sick contacts.  He received 1 dose of his COVID-19 vaccine but was not able to receive his second dose due to being ill at the time.  He has not had any nausea or vomiting.  Bowel movements have been normal.  He is urinating okay.  He is anxious and mother notes this  is his baseline.  She does note that she stays with him during his hospitalizations.    In the ER, patient febrile to 100.5.  Tachycardic into the 120s.  Normotensive.  Needs 3 L oxygen via nasal cannula to maintain saturations.  CBC notable for chronic thrombocytopenia.  CMP showed normal kidney function.  BNP and troponin negative.  Lactic acid mildly elevated at 2.6.  His UA not indicative of infection.  He had a chest x-ray done that showed right basilar opacity and possible left retrocardiac opacity.  He was given ceftriaxone and azithromycin.  CT PE abdomen pelvis negative for any bowel obstruction and shows bilateral pneumonia.    Past Medical History    I have reviewed this patient's medical history and updated it with pertinent information if needed.   Past Medical History:   Diagnosis Date     Chronic lung disease     off nebs in      Depression     fluoxetine, risperidone     Epilepsy 2011     Feeding by G-tube      GERD (gastroesophageal reflux disease)      Insomnia     melatonin, clonazepam     Kidney stone      Premature infant - 24 weeks      Retinopathy of prematurity     led to blindness     SBO (small bowel obstruction)        Past Surgical History   I have reviewed this patient's surgical history and updated it with pertinent information if needed.  Past Surgical History:   Procedure Laterality Date     APPENDECTOMY OPEN N/A 2021    Procedure: Appendectomy open;  Surgeon: Fabiola Pop MD;  Location: [facility] OR     GASTROSTOMY TUBE       LAPAROTOMY EXPLORATORY N/A 2021    Procedure: LAPAROTOMY, enterolysis;  Surgeon: Fabiola Pop MD;  Location: [facility] OR       Prior to Admission Medications   Prior to Admission Medications   Prescriptions Last Dose Informant Patient Reported? Taking?   LORazepam (ATIVAN) 1 MG tablet 2022 at Unknown time  Yes Yes   Si mg by Per G Tube route every 6 hours as needed for anxiety Per mom: prn    Lancets 30G MISC   No No   Si 30  gauge lancets (substitute as needed)   QUEtiapine (SEROQUEL) 300 MG tablet 2022 at pm  Yes Yes   Si mg by Per G Tube route At Bedtime    Valproate Sodium (VALPROIC ACID) 250 MG/5ML 2022 at 0430  No Yes   Sig: TAKE 6ML BY MOUTH EVERY MORNING AND 7ML IN THE AFTERNOON AND AT NIGHT   albuterol (PROAIR HFA/PROVENTIL HFA/VENTOLIN HFA) 108 (90 Base) MCG/ACT inhaler 2022 at am  No Yes   Sig: Inhale 2 puffs into the lungs every 6 hours as needed for shortness of breath / dyspnea or wheezing   albuterol (PROVENTIL) (2.5 MG/3ML) 0.083% neb solution 2022 at am  No Yes   Sig: Take 1 vial (2.5 mg) by nebulization every 6 hours as needed for shortness of breath / dyspnea or wheezing   benzoyl peroxide 5 % EX topical gel   No Yes   Sig: Apply topically 2 times daily as needed (acne)   clindamycin 1 % EX external gel   No Yes   Sig: Apply topically 2 times daily as needed (acne)   clobazam (,ONFI,) 2.5 MG/ML suspension 2022 at am  Yes Yes   Si mg by Per G Tube route At Bedtime (3 mL in AM / 2 mL in PM)   clobazam (ONFI) 2.5 MG/ML suspension 2022 at am  Yes Yes   Si.5 mg by Per G Tube route every morning (3 mL in AM / 2 mL in PM)   famotidine (PEPCID) 40 MG/5ML suspension 2022 at pm  Yes Yes   Si mg by Per G Tube route 2 times daily   fluticasone-salmeterol (ADVAIR HFA) 230-21 MCG/ACT inhaler 2022 at am - patient has with  Yes Yes   Sig: Inhale 2 puffs into the lungs 2 times daily    gabapentin (NEURONTIN) 250 MG/5ML solution 2022 at pm  Yes Yes   Si mg by Per G Tube route At Bedtime    hydrOXYzine (ATARAX) 10 MG/5ML syrup 2022 at pm  Yes Yes   Si mg by Per G Tube route At Bedtime (25 mL)   melatonin 1 MG TABS 2022 at pm  Yes Yes   Si mg by Per G Tube route daily Daily between 4-5pm*   melatonin 3 MG tablet 2022 at pm  Yes Yes   Si mg by Per G Tube route At Bedtime    omeprazole (PRILOSEC) 2 mg/mL SUSP 2022 at pm  Yes Yes   Si  mg by Per G Tube route At Bedtime    polyethylene glycol (MIRALAX) 17 GM/Dose powder   No Yes   Sig: TAKE 17 GM BY MOUTH EVERY DAY AS NEEDED   polyethylene glycol (MIRALAX) 17 g packet 2022 at am  Yes Yes   Si g by Per G Tube route daily      Facility-Administered Medications: None     Allergies   No Known Allergies    Social History   I have reviewed this patient's social history and updated it with pertinent information if needed. Rj Licea  reports that he has never smoked. He has never used smokeless tobacco. He reports that he does not drink alcohol and does not use drugs.    Family History   I have reviewed this patient's family history and updated it with pertinent information if needed.   Family History   Problem Relation Age of Onset     Diabetes Father        Review of Systems   The 10 point Review of Systems is negative other than noted in the HPI or here.     Physical Exam   Temp: (!) 100.5  F (38.1  C) Temp src: Rectal BP: 119/76 Pulse: (!) 121   Resp: 20 SpO2: 97 % O2 Device: Oxymask    Vital Signs with Ranges  Temp:  [100.5  F (38.1  C)] 100.5  F (38.1  C)  Pulse:  [120-131] 121  Resp:  [20-57] 20  BP: ()/(55-78) 119/76  FiO2 (%):  [3 %] 3 %  SpO2:  [91 %-97 %] 97 %  0 lbs 0 oz    Constitutional: Not able to give history. Asking for his mother. Anxious   HEENT: Normocephalic, Dry mucous membranes. No elevation of JVD noted  Respiratory: Nl WOB, Some crackles at bases. No wheeze. Mildly prolonged expiratory phase noted.   Cardiovascular: Tachy, Regular, no murmur  GI: G tube in place, Soft, BS+, NT, ND, no rebound or guarding  Lymph/Hematologic: No bruising. No cervical LAD  Skin: No rash  Musculoskeletal: Thin extremities, no edema.   Neurologic: Asking to go home. Moves all extremities. No tremor. Not alert to situation. Anxious.   Psychiatric: Anxious    Data   Data reviewed today:  I personally reviewed   Recent Labs   Lab 22  0712 22  0708   WBC 8.2  --    HGB  14.5  --    *  --    PLT 68*  --      --    POTASSIUM 4.0  --    CHLORIDE 103  --    CO2 29  --    BUN 15  --    CR 0.90  --    ANIONGAP 6  --    ALICE 9.4  --    * 106*   ALBUMIN 2.8*  --    PROTTOTAL 7.1  --    BILITOTAL 0.8  --    ALKPHOS 75  --    ALT 20  --    AST 18  --        Recent Results (from the past 24 hour(s))   XR Chest Port 1 View    Narrative    EXAM: XR CHEST PORT 1 VIEW  LOCATION: [facility]  DATE/TIME: 1/22/2022 7:49 AM    INDICATION: respiratory distress  COMPARISON: 04/23/2021      Impression    IMPRESSION: Heart size appears normal. Unchanged right basilar opacity. Improved left retrocardiac opacity. These could represent recurrent pneumonia/atelectasis, or lung base scarring.    No pulmonary edema. No pleural effusion. Air within bowel, similar to previous.   CT Chest (PE) Abdomen Pelvis w Contrast    Narrative    EXAM: CT CHEST PE ABDOMEN PELVIS W CONTRAST  LOCATION: [facility]  DATE/TIME: 1/22/2022 10:37 AM    INDICATION: hypoxia and constipation,  history of aspiration pneumonia secondary to small bowel obstructions, eval hypoxia, bowel obstruction  COMPARISON: Chest is compared with 11/22/2021. Abdomen and pelvis are compared with 9/24/2021.  TECHNIQUE: CT chest pulmonary angiogram and routine CT abdomen pelvis with IV contrast. Arterial phase through the chest and venous phase through the abdomen and pelvis. Multiplanar reformats and MIP reconstructions were performed. Dose reduction   techniques were used.   CONTRAST: 41mL [id number]-370    FINDINGS:  ANGIOGRAM CHEST: Pulmonary arteries are normal caliber and negative for pulmonary emboli. Thoracic aorta is negative for dissection.    LUNGS AND PLEURA: Moderate diffuse bronchial wall thickening. Areas of dense airspace consolidation in both lower lobes.    MEDIASTINUM/AXILLAE: Normal.    CORONARY ARTERY CALCIFICATION: None.    HEPATOBILIARY: Normal.    PANCREAS:  Normal.    SPLEEN: Normal.    ADRENAL GLANDS: Normal.    KIDNEYS/BLADDER: Small nonobstructing right kidney stone.    BOWEL: Percutaneous gastrostomy tube. Otherwise normal.    LYMPH NODES: Normal.    VASCULATURE: Normal.    PELVIC ORGANS: Pelvic phleboliths.    MUSCULOSKELETAL: Shallow left acetabulum. Scoliosis.      Impression    IMPRESSION:  1.  Findings suspicious for bilateral pneumonia. The distribution would be compatible with aspiration.  2.  Negative for PE  3.  No bowel obstruction       Clinically Significant Risk Factors Present on Admission              # Thrombocytopenia: Plts = 68 10e3/uL (Ref range: 150 - 450 10e3/uL) on admission, will monitor for bleeding

## 2022-01-22 NOTE — ED NOTES
[facility]  ED Nurse Handoff Report    Rj Licea is a 26 year old male   ED Chief complaint: Shortness of Breath  . ED Diagnosis:   Final diagnoses:   Acute respiratory failure with hypoxia (H)   Pneumonia due to infectious organism, unspecified laterality, unspecified part of lung   Sepsis, due to unspecified organism, unspecified whether acute organ dysfunction present (H)     Allergies: No Known Allergies    Code Status: Full Code  Activity level - Baseline/Home:  Assist X 1. Activity Level - Current:   Assist X 1. Lift room needed: No. Bariatric: No   Needed: No   Isolation: No. Infection: Not Applicable.     Vital Signs:   Vitals:    01/22/22 0741 01/22/22 0800 01/22/22 0805 01/22/22 0810   BP:  129/78     Pulse: (!) 128 (!) 124 (!) 123 (!) 121   Resp: 21 29 (!) 57 20   Temp:       TempSrc:       SpO2: 96% 95% 95% 95%       Cardiac Rhythm:  ,      Pain level:    Patient confused: Yes. Patient Falls Risk: Yes.   Elimination Status: Has voided   Patient Report - Initial Complaint: Came in by EMS for hypoxia and respiratory distress. Focused Assessment: Rj Licea is a 26 year old male with a history of cerebral palsy, seizure disorder, feeding by G-tube, and nonintractable epilepsy without status epilepticus who presents with by EMS with his mother for shortness of breath. Per EMS Report, the patient arrives from home after having complaints about respiratory distress. His mother administered prednisone at home and was given Duoneb by EMS.    Tests Performed:   Labs Ordered and Resulted from Time of ED Arrival to Time of ED Departure   COMPREHENSIVE METABOLIC PANEL - Abnormal       Result Value    Sodium 138      Potassium 4.0      Chloride 103      Carbon Dioxide (CO2) 29      Anion Gap 6      Urea Nitrogen 15      Creatinine 0.90      Calcium 9.4      Glucose 120 (*)     Alkaline Phosphatase 75      AST 18      ALT 20      Protein Total 7.1      Albumin 2.8 (*)     Bilirubin  Total 0.8      GFR Estimate >90     LACTIC ACID WHOLE BLOOD - Abnormal    Lactic Acid 2.6 (*)    ROUTINE UA WITH MICROSCOPIC REFLEX TO CULTURE - Abnormal    Color Urine Yellow      Appearance Urine Clear      Glucose Urine Negative      Bilirubin Urine Negative      Ketones Urine Negative      Specific Gravity Urine 1.027      Blood Urine Negative      pH Urine 6.5      Protein Albumin Urine 20  (*)     Urobilinogen Urine 8.0 (*)     Nitrite Urine Negative      Leukocyte Esterase Urine Negative      RBC Urine 2      WBC Urine 3      Squamous Epithelials Urine <1     CBC WITH PLATELETS AND DIFFERENTIAL - Abnormal    WBC Count 8.2      RBC Count 4.27 (*)     Hemoglobin 14.5      Hematocrit 45.8       (*)     MCH 34.0 (*)     MCHC 31.7      RDW 13.9      Platelet Count 68 (*)    GLUCOSE BY METER - Abnormal    GLUCOSE BY METER POCT 106 (*)    DIFFERENTIAL - Abnormal    % Neutrophils 48      % Lymphocytes 20      % Monocytes 26      % Eosinophils 0      % Basophils 0      % Metamyelocytes 6      Absolute Neutrophils 3.9      Absolute Lymphocytes 1.6      Absolute Monocytes 2.1 (*)     Absolute Eosinophils 0.0      Absolute Basophils 0.0      Absolute Metamyelocytes 0.5 (*)     RBC Morphology Confirmed RBC Indices      Platelet Assessment        Value: Automated Count Confirmed. Platelet morphology is normal.   TROPONIN I - Normal    Troponin I High Sensitivity 3     NT PROBNP INPATIENT - Normal    N terminal Pro BNP Inpatient 398     INFLUENZA A/B & SARS-COV2 PCR MULTIPLEX - Normal    Influenza A PCR Negative      Influenza B PCR Negative      SARS CoV2 PCR Negative     BLOOD CULTURE   BLOOD CULTURE   . Abnormal Results:   XR Chest Port 1 View   Final Result   IMPRESSION: Heart size appears normal. Unchanged right basilar opacity. Improved left retrocardiac opacity. These could represent recurrent pneumonia/atelectasis, or lung base scarring.      No pulmonary edema. No pleural effusion. Air within bowel, similar  to previous.      CT Chest (PE) Abdomen Pelvis w Contrast    (Results Pending)   .   Treatments provided: Meds, fluids, oxygen, imaging  Family Comments: Mom at bedside  OBS brochure/video discussed/provided to patient:  N/A  ED Medications:   Medications   0.9% sodium chloride BOLUS (has no administration in time range)   0.9% sodium chloride BOLUS (1,000 mLs Intravenous New Bag 1/22/22 0716)   cefTRIAXone (ROCEPHIN) 2 g vial to attach to  ml bag for ADULTS or NS 50 ml bag for PEDS (0 g Intravenous Stopped 1/22/22 0942)   azithromycin 500 mg (ZITHROMAX) in 0.9% NaCl 250 mL intermittent infusion 500 mg (0 mg Intravenous Stopped 1/22/22 0942)   acetaminophen (TYLENOL) solution 1,000 mg (1,000 mg Oral Given 1/22/22 0950)     Drips infusing:  Yes  For the majority of the shift, the patient's behavior Green. Interventions performed were Care explained.    Sepsis treatment initiated: No     Patient tested for COVID 19 prior to admission: YES    ED Nurse Name/Phone Number: Dominique Oswald RN,   10:10 AM    RECEIVING UNIT ED HANDOFF REVIEW    Above ED Nurse Handoff Report was reviewed: Yes  Reviewed by: Velia Atwood RN on January 22, 2022 at 11:49 AM

## 2022-01-23 LAB
ANION GAP SERPL CALCULATED.3IONS-SCNC: 6 MMOL/L (ref 3–14)
BUN SERPL-MCNC: 10 MG/DL (ref 7–30)
CALCIUM SERPL-MCNC: 8.5 MG/DL (ref 8.5–10.1)
CHLORIDE BLD-SCNC: 110 MMOL/L (ref 94–109)
CO2 SERPL-SCNC: 28 MMOL/L (ref 20–32)
CREAT SERPL-MCNC: 0.72 MG/DL (ref 0.66–1.25)
ERYTHROCYTE [DISTWIDTH] IN BLOOD BY AUTOMATED COUNT: 14.1 % (ref 10–15)
GFR SERPL CREATININE-BSD FRML MDRD: >90 ML/MIN/1.73M2
GLUCOSE BLD-MCNC: 160 MG/DL (ref 70–99)
HCT VFR BLD AUTO: 40.2 % (ref 40–53)
HGB BLD-MCNC: 13 G/DL (ref 13.3–17.7)
MCH RBC QN AUTO: 34.6 PG (ref 26.5–33)
MCHC RBC AUTO-ENTMCNC: 32.3 G/DL (ref 31.5–36.5)
MCV RBC AUTO: 107 FL (ref 78–100)
PLATELET # BLD AUTO: 75 10E3/UL (ref 150–450)
POTASSIUM BLD-SCNC: 3.4 MMOL/L (ref 3.4–5.3)
RBC # BLD AUTO: 3.76 10E6/UL (ref 4.4–5.9)
SODIUM SERPL-SCNC: 144 MMOL/L (ref 133–144)
WBC # BLD AUTO: 9.4 10E3/UL (ref 4–11)

## 2022-01-23 PROCEDURE — 250N000013 HC RX MED GY IP 250 OP 250 PS 637: Performed by: HOSPITALIST

## 2022-01-23 PROCEDURE — 250N000009 HC RX 250: Performed by: HOSPITALIST

## 2022-01-23 PROCEDURE — 999N000157 HC STATISTIC RCP TIME EA 10 MIN

## 2022-01-23 PROCEDURE — 120N000001 HC R&B MED SURG/OB

## 2022-01-23 PROCEDURE — 250N000011 HC RX IP 250 OP 636: Performed by: HOSPITALIST

## 2022-01-23 PROCEDURE — 85027 COMPLETE CBC AUTOMATED: CPT | Performed by: HOSPITALIST

## 2022-01-23 PROCEDURE — 99233 SBSQ HOSP IP/OBS HIGH 50: CPT | Performed by: HOSPITALIST

## 2022-01-23 PROCEDURE — 36415 COLL VENOUS BLD VENIPUNCTURE: CPT | Performed by: HOSPITALIST

## 2022-01-23 PROCEDURE — 258N000003 HC RX IP 258 OP 636: Performed by: HOSPITALIST

## 2022-01-23 PROCEDURE — 272N000078 HC NUTRITION PRODUCT INTERMEDIATE LITER

## 2022-01-23 PROCEDURE — 250N000012 HC RX MED GY IP 250 OP 636 PS 637: Performed by: HOSPITALIST

## 2022-01-23 PROCEDURE — 94640 AIRWAY INHALATION TREATMENT: CPT

## 2022-01-23 PROCEDURE — 80048 BASIC METABOLIC PNL TOTAL CA: CPT | Performed by: HOSPITALIST

## 2022-01-23 RX ORDER — PREDNISONE 5 MG/ML
40 SOLUTION ORAL DAILY
Status: DISCONTINUED | OUTPATIENT
Start: 2022-01-23 | End: 2022-01-23 | Stop reason: CLARIF

## 2022-01-23 RX ORDER — LEVALBUTEROL INHALATION SOLUTION 1.25 MG/3ML
1.25 SOLUTION RESPIRATORY (INHALATION) 4 TIMES DAILY
Status: DISCONTINUED | OUTPATIENT
Start: 2022-01-23 | End: 2022-01-24 | Stop reason: HOSPADM

## 2022-01-23 RX ORDER — PREDNISONE 20 MG/1
40 TABLET ORAL DAILY
Status: DISCONTINUED | OUTPATIENT
Start: 2022-01-23 | End: 2022-01-24 | Stop reason: HOSPADM

## 2022-01-23 RX ADMIN — LEVALBUTEROL HYDROCHLORIDE 1.25 MG: 1.25 SOLUTION RESPIRATORY (INHALATION) at 18:00

## 2022-01-23 RX ADMIN — FAMOTIDINE 20 MG: 40 POWDER, FOR SUSPENSION ORAL at 08:48

## 2022-01-23 RX ADMIN — TAZOBACTAM SODIUM AND PIPERACILLIN SODIUM 3.38 G: 375; 3 INJECTION, SOLUTION INTRAVENOUS at 13:09

## 2022-01-23 RX ADMIN — GUAIFENESIN 10 ML: 200 SOLUTION ORAL at 09:23

## 2022-01-23 RX ADMIN — SODIUM BICARBONATE 40 MG: 84 INJECTION, SOLUTION INTRAVENOUS at 08:48

## 2022-01-23 RX ADMIN — TAZOBACTAM SODIUM AND PIPERACILLIN SODIUM 3.38 G: 375; 3 INJECTION, SOLUTION INTRAVENOUS at 08:36

## 2022-01-23 RX ADMIN — VALPROIC ACID 350 MG: 250 SOLUTION ORAL at 13:10

## 2022-01-23 RX ADMIN — QUETIAPINE FUMARATE 600 MG: 200 TABLET ORAL at 20:36

## 2022-01-23 RX ADMIN — GABAPENTIN 750 MG: 250 SUSPENSION ORAL at 20:35

## 2022-01-23 RX ADMIN — LEVALBUTEROL HYDROCHLORIDE 1.25 MG: 1.25 SOLUTION RESPIRATORY (INHALATION) at 11:14

## 2022-01-23 RX ADMIN — VALPROIC ACID 300 MG: 250 SOLUTION ORAL at 08:48

## 2022-01-23 RX ADMIN — TAZOBACTAM SODIUM AND PIPERACILLIN SODIUM 3.38 G: 375; 3 INJECTION, SOLUTION INTRAVENOUS at 02:21

## 2022-01-23 RX ADMIN — Medication 1 MG: at 16:18

## 2022-01-23 RX ADMIN — CLOBAZAM 5 MG: 2.5 SUSPENSION ORAL at 22:44

## 2022-01-23 RX ADMIN — CLOBAZAM 7.5 MG: 2.5 SUSPENSION ORAL at 08:47

## 2022-01-23 RX ADMIN — Medication 6 MG: at 20:36

## 2022-01-23 RX ADMIN — HYDROXYZINE HYDROCHLORIDE 50 MG: 10 SOLUTION ORAL at 20:35

## 2022-01-23 RX ADMIN — PREDNISONE 40 MG: 20 TABLET ORAL at 09:23

## 2022-01-23 RX ADMIN — TAZOBACTAM SODIUM AND PIPERACILLIN SODIUM 3.38 G: 375; 3 INJECTION, SOLUTION INTRAVENOUS at 20:35

## 2022-01-23 RX ADMIN — VALPROIC ACID 350 MG: 250 SOLUTION ORAL at 20:35

## 2022-01-23 RX ADMIN — FLUTICASONE PROPIONATE AND SALMETEROL XINAFOATE 2 PUFF: 230; 21 AEROSOL, METERED RESPIRATORY (INHALATION) at 18:01

## 2022-01-23 RX ADMIN — FLUTICASONE PROPIONATE AND SALMETEROL XINAFOATE 2 PUFF: 230; 21 AEROSOL, METERED RESPIRATORY (INHALATION) at 11:14

## 2022-01-23 RX ADMIN — FAMOTIDINE 20 MG: 40 POWDER, FOR SUSPENSION ORAL at 20:35

## 2022-01-23 RX ADMIN — AZITHROMYCIN MONOHYDRATE 500 MG: 500 INJECTION, POWDER, LYOPHILIZED, FOR SOLUTION INTRAVENOUS at 09:21

## 2022-01-23 RX ADMIN — SODIUM BICARBONATE 40 MG: 84 INJECTION, SOLUTION INTRAVENOUS at 20:54

## 2022-01-23 ASSESSMENT — ACTIVITIES OF DAILY LIVING (ADL)
ADLS_ACUITY_SCORE: 16

## 2022-01-23 NOTE — PLAN OF CARE
End of Shift Summary  For vital signs and complete assessments, please see documentation flowsheets.     Pertinent assessments: VSS.  Resting well tonight. LS diminished, was able to wean patient off of O2 tonight, sats in low to mid 90's.  BS active, TF running rate increased to 40 at 0430, due to be increased to 50ml/hr which is goal rate at 1030.   Mother at bedside.  Up A1 with mom, using urinal in bed.      Major Shift Events: uneventful    Treatment Plan: IV zosyn, strict NPO, Advance tube feeds as tolerated,  monitor respiratory status.     Bedside Nurse: Asia Kruse RN

## 2022-01-23 NOTE — PLAN OF CARE
For vital signs and complete assessments, please see documentation flowsheets.     Pertinent assessments: Alert. Patient requiring frequent redirection, will at times take off his oxymask and become anxious/restless. Mother at bedside. VSS. With g-tube, on tube feeding with flushes.   Major Shift Events: Started TF with initial rate of 20mL/hr, tolerated well. Weaned O2 to 0.5 L via oxymask.  Treatment Plan: IV zosyn, strict NPO, Advance tube feeds as tolerated, wean O2 as tolerated.  Bedside Nurse: Courtney Hensley RN

## 2022-01-23 NOTE — PLAN OF CARE
/67 (BP Location: Left arm)   Pulse 111   Temp 98.8  F (37.1  C) (Axillary)   Resp 22   SpO2 94%     Alert. VSS on RA. LS, dim/wheezes. Nonproductive cough. Mother at bedside. TF running @ goal of 50mls/hr thr/G tube. Up A1. Uses urinal in bed. Plan for discharge tomorrow. Will cont to monitor.     Velia Atwood RN

## 2022-01-23 NOTE — PROGRESS NOTES
[facility]    Hospitalist Progress Note      Assessment & Plan   Rj Licea is a 26 year old male with a past medical history of autism, retinopathy of prematurity with associated blindness, seizure disorder, depression/anxiety, history of SBOs and asthma who presents with shortness of breath.     #Sepsis and acute hypoxemic respiratory failure secondary to CAP vs. Aspiration pna: Patient's mother notes that on the evening of 1/21, patient had low-grade fever and was having cough.  She had also noticed that he was more short of breath.  She had tried giving him nebulizers but he had continued shortness of breath and fever on AM of admission.  She called EMS this AM due to his symptoms.  He is anxious and mother notes this is his baseline.  She does note that she stays with him during his hospitalizations.  -ER, patient febrile to 100.5.  Tachycardic into the 120s.  Normotensive.  Needs 3 L oxygen via nasal cannula to maintain saturations.  No leukocytosis. BNP and troponin negative.  Lactic acid mildly elevated at 2.6.  He had a chest x-ray done that showed right basilar opacity and possible left retrocardiac opacity. CT PE abdomen pelvis negative for any bowel obstruction and shows bilateral pneumonia.    -A.m. of 1/23, patient hemodynamically improved.  He has now been weaned off of oxygen.  Mother notes he does seem better but still with cough and some wheezing.  -We will continue IV antibiotics.  We will schedule Xopenex given tachycardia which per chart review seems to be consistent with his prior hospitalizations as well.  Started on 5-day course of prednisone through G-tube given pna on superimposed asthma.   -Guaifenesin to thin secretions.      #Lactic acidosis: Likely secondary to infection as above.  Received IV fluids and started on antibiotics in the ED.  Recheck normalized.      #Autism Spectrum Disorder, Cerebral Palsy, Depression/Anxiety: At baseline he has language  impairment and intellectual disability.  We will resume his home Atarax, Seroquel, gabapentin.  We also have as needed Ativan available.  His mother will be staying at his bedside during his hospitalization; otherwise he would need a sitter.  All meds through G-tube.     #Dysphagia with G tube dependence: Nutrition consulted for resumption of tube feeds.  All medications through G-tube.  Strict NPO.     #GERD: continue PTA famotidine and omeprazole.      #Epilepsy: Continue home valproic acid and clobazam     #Chronic thrombocytopenia: Per chart review this is also been noted in the past.  We will monitor.  No evidence of bleeding.     DVT Prophylaxis: Pneumatic Compression Devices  Code Status: Full Code.  Discussed on admission  Dispo: Hopeful for discharge tomorrow if patient does well overnight and shortness of breath and cough continues to improve.     Dillon Cunningham MD  Text Page    Interval History   Seen at bedside with mother.  Patient a bit better.  Still seems short of breath with cough.  Seemingly some production as well.  Mother notes she feels he is trying to bring something up but cannot.  Not in distress.  He is still anxious at times but redirectable by mother.    -Data reviewed today: I reviewed all new labs and imaging results over the last 24 hours.    Physical Exam   Temp: 99.4  F (37.4  C) Temp src: Axillary BP: 112/66 Pulse: (!) 127   Resp: 20 SpO2: 95 % O2 Device: None (Room air) Oxygen Delivery: 1/2 LPM  There were no vitals filed for this visit.  Vital Signs with Ranges  Temp:  [96.9  F (36.1  C)-99.4  F (37.4  C)] 99.4  F (37.4  C)  Pulse:  [102-127] 127  Resp:  [20-24] 20  BP: ()/(49-76) 112/66  SpO2:  [91 %-97 %] 95 %  I/O last 3 completed shifts:  In: 627 [NG/GT:587]  Out: 300 [Urine:300]    Constitutional: Not able to give history. A bit more calm today.   HEENT: Normocephalic, Dry mucous membranes. No elevation of JVD noted  Respiratory: Mild tachypnea. Some wheeze noted today  bilaterally. Prolonged expiratory phase.    Cardiovascular: Tachy, Regular, no murmur  GI: G tube in place, Soft, BS+, NT, ND, no rebound or guarding  Lymph/Hematologic: No bruising. No cervical LAD  Skin: No rash  Musculoskeletal: Thin extremities, no edema.   Neurologic: Asking to go home. Moves all extremities. No tremor. Not alert to situation. Anxious.     Medications     dextrose         azithromycin  500 mg Intravenous Q24H     clobazam  5 mg Per G Tube At Bedtime     clobazam  7.5 mg Per G Tube QAM     famotidine  20 mg Per G Tube BID     fluticasone-salmeterol  2 puff Inhalation BID     gabapentin  750 mg Per G Tube At Bedtime     hydrOXYzine  50 mg Per G Tube At Bedtime     levalbuterol  1.25 mg Nebulization 4x Daily     melatonin  1 mg Per G Tube Q24H     melatonin  6 mg Per G Tube At Bedtime     pantoprazole  40 mg Per G Tube BID     piperacillin-tazobactam  3.375 g Intravenous Q6H     predniSONE  40 mg Per G Tube Daily     QUEtiapine  600 mg Per G Tube At Bedtime     sodium chloride (PF)  3 mL Intracatheter Q8H     valproic acid  300 mg Per G Tube QAM     valproic acid  350 mg Per G Tube BID       Data   Recent Labs   Lab 01/23/22  0734 01/22/22  0712 01/22/22  0708   WBC 9.4 8.2  --    HGB 13.0* 14.5  --    * 107*  --    PLT 75* 68*  --     138  --    POTASSIUM 3.4 4.0  --    CHLORIDE 110* 103  --    CO2 28 29  --    BUN 10 15  --    CR 0.72 0.90  --    ANIONGAP 6 6  --    ALICE 8.5 9.4  --    * 120* 106*   ALBUMIN  --  2.8*  --    PROTTOTAL  --  7.1  --    BILITOTAL  --  0.8  --    ALKPHOS  --  75  --    ALT  --  20  --    AST  --  18  --        Recent Results (from the past 24 hour(s))   CT Chest (PE) Abdomen Pelvis w Contrast    Narrative    EXAM: CT CHEST PE ABDOMEN PELVIS W CONTRAST  LOCATION: [facility]  DATE/TIME: 1/22/2022 10:37 AM    INDICATION: hypoxia and constipation,  history of aspiration pneumonia secondary to small bowel obstructions, eval  hypoxia, bowel obstruction  COMPARISON: Chest is compared with 11/22/2021. Abdomen and pelvis are compared with 9/24/2021.  TECHNIQUE: CT chest pulmonary angiogram and routine CT abdomen pelvis with IV contrast. Arterial phase through the chest and venous phase through the abdomen and pelvis. Multiplanar reformats and MIP reconstructions were performed. Dose reduction   techniques were used.   CONTRAST: 41mL Isovue-370    FINDINGS:  ANGIOGRAM CHEST: Pulmonary arteries are normal caliber and negative for pulmonary emboli. Thoracic aorta is negative for dissection.    LUNGS AND PLEURA: Moderate diffuse bronchial wall thickening. Areas of dense airspace consolidation in both lower lobes.    MEDIASTINUM/AXILLAE: Normal.    CORONARY ARTERY CALCIFICATION: None.    HEPATOBILIARY: Normal.    PANCREAS: Normal.    SPLEEN: Normal.    ADRENAL GLANDS: Normal.    KIDNEYS/BLADDER: Small nonobstructing right kidney stone.    BOWEL: Percutaneous gastrostomy tube. Otherwise normal.    LYMPH NODES: Normal.    VASCULATURE: Normal.    PELVIC ORGANS: Pelvic phleboliths.    MUSCULOSKELETAL: Shallow left acetabulum. Scoliosis.      Impression    IMPRESSION:  1.  Findings suspicious for bilateral pneumonia. The distribution would be compatible with aspiration.  2.  Negative for PE  3.  No bowel obstruction

## 2022-01-24 VITALS
SYSTOLIC BLOOD PRESSURE: 126 MMHG | DIASTOLIC BLOOD PRESSURE: 79 MMHG | RESPIRATION RATE: 20 BRPM | OXYGEN SATURATION: 95 % | HEART RATE: 96 BPM | TEMPERATURE: 97.9 F

## 2022-01-24 LAB
ANION GAP SERPL CALCULATED.3IONS-SCNC: 2 MMOL/L (ref 3–14)
ATRIAL RATE - MUSE: 132 BPM
BUN SERPL-MCNC: 11 MG/DL (ref 7–30)
CALCIUM SERPL-MCNC: 8.6 MG/DL (ref 8.5–10.1)
CHLORIDE BLD-SCNC: 105 MMOL/L (ref 94–109)
CO2 SERPL-SCNC: 32 MMOL/L (ref 20–32)
CREAT SERPL-MCNC: 0.66 MG/DL (ref 0.66–1.25)
DIASTOLIC BLOOD PRESSURE - MUSE: NORMAL MMHG
GFR SERPL CREATININE-BSD FRML MDRD: >90 ML/MIN/1.73M2
GLUCOSE BLD-MCNC: 353 MG/DL (ref 70–99)
INTERPRETATION ECG - MUSE: NORMAL
MAGNESIUM SERPL-MCNC: 2.6 MG/DL (ref 1.6–2.3)
P AXIS - MUSE: 103 DEGREES
PHOSPHATE SERPL-MCNC: 2.4 MG/DL (ref 2.5–4.5)
POTASSIUM BLD-SCNC: 4.5 MMOL/L (ref 3.4–5.3)
PR INTERVAL - MUSE: 188 MS
QRS DURATION - MUSE: 118 MS
QT - MUSE: 280 MS
QTC - MUSE: 414 MS
R AXIS - MUSE: 26 DEGREES
SODIUM SERPL-SCNC: 139 MMOL/L (ref 133–144)
SYSTOLIC BLOOD PRESSURE - MUSE: NORMAL MMHG
T AXIS - MUSE: 217 DEGREES
VENTRICULAR RATE- MUSE: 132 BPM

## 2022-01-24 PROCEDURE — 258N000003 HC RX IP 258 OP 636: Performed by: HOSPITALIST

## 2022-01-24 PROCEDURE — 272N000078 HC NUTRITION PRODUCT INTERMEDIATE LITER

## 2022-01-24 PROCEDURE — 999N000157 HC STATISTIC RCP TIME EA 10 MIN

## 2022-01-24 PROCEDURE — 94640 AIRWAY INHALATION TREATMENT: CPT

## 2022-01-24 PROCEDURE — 250N000009 HC RX 250: Performed by: HOSPITALIST

## 2022-01-24 PROCEDURE — 94640 AIRWAY INHALATION TREATMENT: CPT | Mod: 76

## 2022-01-24 PROCEDURE — 36415 COLL VENOUS BLD VENIPUNCTURE: CPT | Performed by: HOSPITALIST

## 2022-01-24 PROCEDURE — 83735 ASSAY OF MAGNESIUM: CPT | Performed by: HOSPITALIST

## 2022-01-24 PROCEDURE — 80048 BASIC METABOLIC PNL TOTAL CA: CPT | Performed by: HOSPITALIST

## 2022-01-24 PROCEDURE — 84100 ASSAY OF PHOSPHORUS: CPT | Performed by: HOSPITALIST

## 2022-01-24 PROCEDURE — 250N000011 HC RX IP 250 OP 636: Performed by: HOSPITALIST

## 2022-01-24 PROCEDURE — 250N000012 HC RX MED GY IP 250 OP 636 PS 637: Performed by: HOSPITALIST

## 2022-01-24 PROCEDURE — 250N000009 HC RX 250: Performed by: STUDENT IN AN ORGANIZED HEALTH CARE EDUCATION/TRAINING PROGRAM

## 2022-01-24 PROCEDURE — 99239 HOSP IP/OBS DSCHRG MGMT >30: CPT | Performed by: STUDENT IN AN ORGANIZED HEALTH CARE EDUCATION/TRAINING PROGRAM

## 2022-01-24 PROCEDURE — 250N000013 HC RX MED GY IP 250 OP 250 PS 637: Performed by: HOSPITALIST

## 2022-01-24 RX ORDER — AZITHROMYCIN 200 MG/5ML
500 POWDER, FOR SUSPENSION ORAL ONCE
Qty: 12.5 ML | Refills: 0 | Status: SHIPPED | OUTPATIENT
Start: 2022-01-25 | End: 2022-01-25

## 2022-01-24 RX ORDER — ACETYLCYSTEINE 100 MG/ML
4 SOLUTION ORAL; RESPIRATORY (INHALATION) 2 TIMES DAILY
Status: DISCONTINUED | OUTPATIENT
Start: 2022-01-24 | End: 2022-01-24 | Stop reason: HOSPADM

## 2022-01-24 RX ORDER — ACETYLCYSTEINE 100 MG/ML
4 SOLUTION ORAL; RESPIRATORY (INHALATION) 2 TIMES DAILY PRN
Qty: 30 ML | Refills: 0 | Status: SHIPPED | OUTPATIENT
Start: 2022-01-24

## 2022-01-24 RX ORDER — AZITHROMYCIN 250 MG/1
500 TABLET, FILM COATED ORAL DAILY
Qty: 4 TABLET | Refills: 0 | Status: SHIPPED | OUTPATIENT
Start: 2022-01-25 | End: 2022-01-24

## 2022-01-24 RX ORDER — PREDNISONE 20 MG/1
40 TABLET ORAL DAILY
Qty: 4 TABLET | Refills: 0 | Status: SHIPPED | OUTPATIENT
Start: 2022-01-25 | End: 2022-01-27

## 2022-01-24 RX ORDER — CEFUROXIME AXETIL 500 MG/1
500 TABLET ORAL 2 TIMES DAILY
Qty: 6 TABLET | Refills: 0 | Status: SHIPPED | OUTPATIENT
Start: 2022-01-25 | End: 2022-01-24

## 2022-01-24 RX ORDER — CEFPROZIL 250 MG/5ML
500 POWDER, FOR SUSPENSION ORAL 2 TIMES DAILY
Qty: 100 ML | Refills: 0 | Status: SHIPPED | OUTPATIENT
Start: 2022-01-25 | End: 2022-01-30

## 2022-01-24 RX ADMIN — TAZOBACTAM SODIUM AND PIPERACILLIN SODIUM 3.38 G: 375; 3 INJECTION, SOLUTION INTRAVENOUS at 08:09

## 2022-01-24 RX ADMIN — CLOBAZAM 7.5 MG: 2.5 SUSPENSION ORAL at 08:21

## 2022-01-24 RX ADMIN — TAZOBACTAM SODIUM AND PIPERACILLIN SODIUM 3.38 G: 375; 3 INJECTION, SOLUTION INTRAVENOUS at 01:54

## 2022-01-24 RX ADMIN — LEVALBUTEROL HYDROCHLORIDE 1.25 MG: 1.25 SOLUTION RESPIRATORY (INHALATION) at 12:07

## 2022-01-24 RX ADMIN — SODIUM BICARBONATE 40 MG: 84 INJECTION, SOLUTION INTRAVENOUS at 08:21

## 2022-01-24 RX ADMIN — FLUTICASONE PROPIONATE AND SALMETEROL XINAFOATE 2 PUFF: 230; 21 AEROSOL, METERED RESPIRATORY (INHALATION) at 08:27

## 2022-01-24 RX ADMIN — GUAIFENESIN 10 ML: 200 SOLUTION ORAL at 13:03

## 2022-01-24 RX ADMIN — Medication 1 MG: at 16:24

## 2022-01-24 RX ADMIN — AZITHROMYCIN MONOHYDRATE 500 MG: 500 INJECTION, POWDER, LYOPHILIZED, FOR SOLUTION INTRAVENOUS at 09:12

## 2022-01-24 RX ADMIN — TAZOBACTAM SODIUM AND PIPERACILLIN SODIUM 3.38 G: 375; 3 INJECTION, SOLUTION INTRAVENOUS at 13:03

## 2022-01-24 RX ADMIN — VALPROIC ACID 350 MG: 250 SOLUTION ORAL at 13:03

## 2022-01-24 RX ADMIN — LEVALBUTEROL HYDROCHLORIDE 1.25 MG: 1.25 SOLUTION RESPIRATORY (INHALATION) at 15:19

## 2022-01-24 RX ADMIN — PREDNISONE 40 MG: 20 TABLET ORAL at 08:19

## 2022-01-24 RX ADMIN — ACETYLCYSTEINE 4 ML: 100 INHALANT RESPIRATORY (INHALATION) at 15:19

## 2022-01-24 RX ADMIN — FAMOTIDINE 20 MG: 40 POWDER, FOR SUSPENSION ORAL at 08:21

## 2022-01-24 RX ADMIN — LEVALBUTEROL HYDROCHLORIDE 1.25 MG: 1.25 SOLUTION RESPIRATORY (INHALATION) at 05:00

## 2022-01-24 RX ADMIN — VALPROIC ACID 300 MG: 250 SOLUTION ORAL at 08:21

## 2022-01-24 ASSESSMENT — ACTIVITIES OF DAILY LIVING (ADL)
ADLS_ACUITY_SCORE: 16

## 2022-01-24 NOTE — CONSULTS
Care Management Initial Consult    General Information  Assessment completed with: Patient,Parents, Mom/Guardian Darcy  Type of CM/SW Visit: Initial Assessment    Primary Care Provider verified and updated as needed: Yes   Readmission within the last 30 days: no previous admission in last 30 days      Reason for Consult: discharge planning    Communication Assessment  Patient's communication style: spoken language (English or Bilingual)    Hearing Difficulty or Deaf: no   Wear Glasses or Blind: yes    Cognitive  Cognitive/Neuro/Behavioral: .WDL except  Level of Consciousness: lethargic  Arousal Level: arouses to touch/gentle shaking  Orientation: other (see comments) (SAHIL)  Mood/Behavior: calm  Best Language: 0 - No aphasia  Speech: slow    Living Environment:   People in home: parent(s)     Current living Arrangements: house      Able to return to prior arrangements: yes       Family/Social Support:  Care provided by: parent(s),other (see comments) (sister)  Provides care for: no one, unable/limited ability to care for self     Parent(s),Sibling(s)          Description of Support System: Supportive         Current Resources:   Patient receiving home care services: Yes     Community Resources: PCA  Equipment currently used at home: none  Supplies currently used at home:      Employment/Financial:  Employment Status:          Financial Concerns: No concerns identified           Lifestyle & Psychosocial Needs:  Social Determinants of Health     Tobacco Use: Low Risk      Smoking Tobacco Use: Never Smoker     Smokeless Tobacco Use: Never Used   Alcohol Use: Not on file   Financial Resource Strain: Not on file   Food Insecurity: Not on file   Transportation Needs: Not on file   Physical Activity: Not on file   Stress: Not on file   Social Connections: Not on file   Intimate Partner Violence: Not on file   Depression: Not at risk     PHQ-2 Score: 0   Housing Stability: Not on file       Functional Status:  Prior to  admission patient needed assistance:    yes     Mental Health Status:  Mental Health Status: No Current Concerns       Chemical Dependency Status:  Chemical Dependency Status: No Current Concerns           Additional Information:  Met with mother, Darcy, at bedside to introduce self and care coordination role. Mother verifies that pt lives at home with her. She and pt's sister Shani are Co Guardians for pt and provide total cares for him. Pt is blind.  Pt is able to ambulate independently, but they do stand by him as he ambulates since he is blind. Pt's mother works during that day and his sister stays with him as his caregiver during that time. Pt has PCA services provided by his sister for up to 40 hrs/week through [facility]. They get all TF formula and supplies through Pediatric home services [phone]  Fax: [phone]. I notified intake of admission.  He gets all nutrition and medications through his GT. Mother confirms that they have all equipment and supplies that they need at home and do not have any difficulties getting what they needs. Pt's mother has a close relationship with pt's PCP Dr Leyva and he is very helpful to the family. She does not anticipate any needs on discharge. Pt's sister will pick them up and provide transport for pt on day of discharge.    Care Management Discharge Note    Pt is scheduled with [facility]monary on [facility] with [facility].  Pt is also scheduled with Dr Leyva on Feb 9th.    Tess Wilder RN BSN   Inpatient Care Coordination  [facility]  [phone]      Radha Wilder RN

## 2022-01-24 NOTE — PLAN OF CARE
End of Shift Summary  For vital signs and complete assessments, please see documentation flowsheets.     Pertinent assessments: VSS.   LS diminished, on RA.   BS active, TF running at 50ml/hr which is goal rate.  Mother at bedside.  Up A1 with mom, using urinal in bed.      Major Shift Events: uneventful    Treatment Plan: IV zosyn, and zithromax.  strict NPO, TF,  monitor respiratory status.     Bedside Nurse: Asia Kruse RN

## 2022-01-25 ENCOUNTER — PATIENT OUTREACH (OUTPATIENT)
Dept: CARE COORDINATION | Facility: CLINIC | Age: 27
End: 2022-01-25
Payer: MEDICAID

## 2022-01-25 ASSESSMENT — ENCOUNTER SYMPTOMS
CONSTIPATION: 1
COUGH: 1
ABDOMINAL PAIN: 0
SHORTNESS OF BREATH: 1
VOMITING: 0
FEVER: 1

## 2022-01-25 NOTE — PROGRESS NOTES
Clinic Care Coordination Contact  Community Health Worker Initial Outreach    Spoke to Patient's Mother (Darcy)     CHW Introduced intent of call regarding recent discharge.     Darcy reports that Patient is doing okay. Further expressed that nothing has not changed but is hoping for things to get better with recommendations and plans made with Patient's Hospital stay. Darcy indicated that Patient has an Pulmonologist appointment on Friday (01/28/2022). Further indicating that Patient has not been able to take all the antibiotics due to Patient's Pharmacy ([facility]) has some of the antibiotics on back order. CHW acknowledged and reach out to CHW or PCP/ Care Team if additional concerns or support is needed. Darcy acknowledged and expressed that she will just awaiting for additional update from Pharmacy regarding medication. CHW acknowledged.     CHW confirms upcoming appointment; Provider Visit 02/09/2022 11:10AM ([facility] [place] Leyva MD). Darcy Acknowledged.      CHW introduce Clinic Care Coordination and Darcy responded in Patient not needing additional support or resources at this time.     CHW acknowledged and provide Darcy with CHW contact information for future reference.     Chart Review    Referral: IP Handoff (01/22/2022 - 01/24/2022)     Reason: Aspiration pneumonia    Discharge Diagnoses: Acute respiratory failure with hypoxia (H), Pneumonia due to infectious organism, unspecified laterality, unspecified part of lung, and Sepsis, due to unspecified organism, unspecified whether acute organ dysfunction present (H)     New Medication:  medications at [id number] - [place], [place] - [address] AT [place] [address] & [facility]    Upcoming Appointments;   o Provider Visit 02/09/2022 11:10AM ([facility] [place] Leyva MD)     CHW Initial Information Gathering:  Referral Source: IP Handoff  [facility]: [facility]  [facility]  [phone]  Preferred Urgent Care: [facility] - [place], [phone]  Current living arrangement:: Not Assessed  No PCP office visit in Past Year: No  Transportation means:: Family  CHW Additional Questions  If ED/Hospital discharge, follow-up appointment scheduled as recommended?: Yes  Medication changes made following ED/Hospital discharge?: Yes, patient to be scheduled with CC RN/SW within approx 1 business day  MyChart active?: Yes  Patient sent Social Determinants of Health questionnaire?: No    Patient accepts CC: No, outstanding need of CCC support at this time. Patient will be sent Care Coordination introduction letter for future reference.     [facility]: Post-Discharge Note  SITUATION                                                      Admission:    Admission Date: 01/22/22   Reason for Admission: Aspiration pneumonia  Discharge:   Discharge Date: 01/24/22  Discharge Diagnosis: Acute respiratory failure with hypoxia (H), Pneumonia due to infectious organism, unspecified laterality, unspecified part of lung, and Sepsis, due to unspecified organism, unspecified whether acute organ dysfunction present (H)    BACKGROUND                                                      Rj Licea is a 26 year old male with a past medical history of autism, retinopathy of prematurity with associated blindness, seizure disorder, depression/anxiety, history of SBOs and asthma who presents with shortness of breath.    ASSESSMENT      Enrollment  Primary Care Care Coordination Status: Declined    Discharge Assessment  How are you doing now that you are home?: Patient's Mother expressed that Patient is doing okay.  How are your symptoms? (Red Flag symptoms escalate to triage hotline per guidelines): Unchanged  Do you feel your condition is stable enough to be safe at home until your provider visit?: Yes  Does the patient have their discharge instructions? : Yes  Does the patient  have questions regarding their discharge instructions? : No  Were you started on any new medications or were there changes to any of your previous medications? : Yes  Does the patient have all of their medications?: No (see comment) (couple of medication are on back order.)  Do you have questions regarding any of your medications? : No  Do you have all of your needed medical supplies or equipment (DME)?  (i.e. oxygen tank, CPAP, cane, etc.): Yes  Discharge follow-up appointment scheduled within 14 calendar days? : Yes  Discharge Follow Up Appointment Date: 02/09/22  Discharge Follow Up Appointment Scheduled with?: Primary Care Provider    PLAN                                                      Outpatient Plan:  CC Above     Future Appointments   Date Time Provider Department Center   2/9/2022 11:30 AM Micky Leyva MD [facility]       For any urgent concerns, please contact our 24 hour nurse triage line: [phone] ([phone])       NATHEN Cadet  Clinic Care Coordination   [facility]   Phone: [phone]  [email or web address]

## 2022-01-27 LAB
BACTERIA BLD CULT: NO GROWTH
BACTERIA BLD CULT: NO GROWTH

## 2022-01-28 ENCOUNTER — TRANSFERRED RECORDS (OUTPATIENT)
Dept: HEALTH INFORMATION MANAGEMENT | Facility: CLINIC | Age: 27
End: 2022-01-28
Payer: MEDICAID

## 2022-01-29 NOTE — DISCHARGE SUMMARY
[facility]  Hospitalist Discharge Summary      Date of Admission:  1/22/2022  Date of Discharge:  1/24/2022  6:22 PM  Discharging Provider: Javier Fofana MD  Discharge Service: Hospitalist Service    Discharge Diagnoses   Sepsis, resolved  Acute Hypoxemic Respiratory Failure, improved  Lactic Acidosis, resolved  Autism Spectrum Disorder  Cerebral Palsy  MDD/RIKKI  GERD  Epilepsy  Chronic Thrombocytopenia      Follow-ups Needed After Discharge   Follow-up Appointments     Follow-up and recommended labs and tests       Follow up with primary care provider and pulmonologist as previously   scheduled.    Consider talking with GI specialist about the recurrent aspiration   pneumonias and see if there is a way to prevent these in the future.           Discharge Disposition   Discharged to home  Condition at discharge: Stable    Hospital Course   Rj Licea is a 26 year old male with a past medical history of autism, retinopathy of prematurity with associated blindness, seizure disorder, depression/anxiety, history of SBOs and asthma who presents with shortness of breath. He was found to have sepsis from probable CAP vs aspiration pneumonia. Started on zosyn, azithromycin and xopenex nebs. He was requiring supplemental oxygen overnight, but was able to wean to room air the following morning.  He did continue to have significant amounts of thick secretions, for which we started Mucomyst nebulizers.  This allowed him to be able to liberalize his secretions with coughing and he was able to maintain his oxygenation on room air.  He was discharged home with Cefzil + azithromycin + Mucomyst nebulizer.  He will follow up with his primary pulmonologist for his previously scheduled appointment this Friday.    Per discussion with patient's legal guardian, who is also his mother, he has had multiple admissions for similar presentations.  These have always been suspected to be aspiration pneumonias in the past.   "The patient does have a G-tube for his nutrition, but wonder if he could potentially be having silent aspirations and GERD contributing to his clinical picture.  Recommend following up with outpatient GI provider to see if there is anything that can be done to prevent silent aspiration in the future.    Consultations This Hospital Stay   NUTRITION SERVICES ADULT IP CONSULT  PHARMACY IP CONSULT  CARE MANAGEMENT / SOCIAL WORK IP CONSULT    Code Status   Prior    Time Spent on this Encounter   I, Javier Fofana MD, personally saw the patient today and spent greater than 30 minutes discharging this patient.       Javier Fofana MD  [place] [address] MEDICAL SURGICAL  [address] [facility] [address]  Phone: [phone]  Fax: [phone]  ______________________________________________________________________    Physical Exam   /79 (BP Location: Left arm)   Pulse 96   Temp 97.9  F (36.6  C) (Axillary)   Resp 20   SpO2 95%     General: Very pleasant developmentally delayed male.  Resting in bed.  Awake, alert, interactive.  Keeps requesting to speak to \"grandma b.\"  HEENT: Normocephalic, atraumatic.  Left-sided lens opacity.  Eyes open but does not appear to be looking at anyone in particular.  Cardiac: Regular rate and rhythm without murmur, gallop, rub.  No peripheral edema.  Respiratory: Rales likely with coughing.  Secretions somewhat thick.  Lungs otherwise clear.    Abdominal: Soft, nontender, nondistended.  Skin: No rashes or abrasions on exposed skin.  Neurologic: Alert and oriented to person only.  Psychiatric: Quite anxious.         Primary Care Physician   Micky Leyva    Discharge Orders      Care Coordination Referral      Home infusion referral      Reason for your hospital stay    Aspiration pneumonia     Follow-up and recommended labs and tests     Follow up with primary care provider and pulmonologist as previously scheduled.    Consider talking with GI specialist about the " recurrent aspiration pneumonias and see if there is a way to prevent these in the future.     Activity    Your activity upon discharge: activity as tolerated     Diet    Follow this diet upon discharge: Orders Placed This Encounter      Adult Formula Drip Feeding: Continuous Osmolite 1.5; Gastrostomy; Goal Rate: 50; mL/hr; Medication - Feeding Tube Flush Frequency: At least 15-30 mL water before and after medication administration and with tube clogging; Amount to Send (Nutrition...      NPO for Medical/Clinical Reasons Except for: NPO but receiving Tube Feeding       Significant Results and Procedures   Most Recent 3 CBC's:Recent Labs   Lab Test 01/23/22  0734 01/22/22  0712 11/17/21  1228   WBC 9.4 8.2 9.1   HGB 13.0* 14.5 13.7   * 107* 104*   PLT 75* 68* 114*     Most Recent 3 BMP's:Recent Labs   Lab Test 01/24/22  0654 01/23/22  0734 01/22/22  0712    144 138   POTASSIUM 4.5 3.4 4.0   CHLORIDE 105 110* 103   CO2 32 28 29   BUN 11 10 15   CR 0.66 0.72 0.90   ANIONGAP 2* 6 6   ALICE 8.6 8.5 9.4   * 160* 120*   ,   Results for orders placed or performed during the hospital encounter of 01/22/22   XR Chest Port 1 View    Narrative    EXAM: XR CHEST PORT 1 VIEW  LOCATION: [facility]  DATE/TIME: 1/22/2022 7:49 AM    INDICATION: respiratory distress  COMPARISON: 04/23/2021      Impression    IMPRESSION: Heart size appears normal. Unchanged right basilar opacity. Improved left retrocardiac opacity. These could represent recurrent pneumonia/atelectasis, or lung base scarring.    No pulmonary edema. No pleural effusion. Air within bowel, similar to previous.   CT Chest (PE) Abdomen Pelvis w Contrast    Narrative    EXAM: CT CHEST PE ABDOMEN PELVIS W CONTRAST  LOCATION: [facility]  DATE/TIME: 1/22/2022 10:37 AM    INDICATION: hypoxia and constipation,  history of aspiration pneumonia secondary to small bowel obstructions, eval hypoxia, bowel  obstruction  COMPARISON: Chest is compared with 11/22/2021. Abdomen and pelvis are compared with 9/24/2021.  TECHNIQUE: CT chest pulmonary angiogram and routine CT abdomen pelvis with IV contrast. Arterial phase through the chest and venous phase through the abdomen and pelvis. Multiplanar reformats and MIP reconstructions were performed. Dose reduction   techniques were used.   CONTRAST: 41mL Isovue-370    FINDINGS:  ANGIOGRAM CHEST: Pulmonary arteries are normal caliber and negative for pulmonary emboli. Thoracic aorta is negative for dissection.    LUNGS AND PLEURA: Moderate diffuse bronchial wall thickening. Areas of dense airspace consolidation in both lower lobes.    MEDIASTINUM/AXILLAE: Normal.    CORONARY ARTERY CALCIFICATION: None.    HEPATOBILIARY: Normal.    PANCREAS: Normal.    SPLEEN: Normal.    ADRENAL GLANDS: Normal.    KIDNEYS/BLADDER: Small nonobstructing right kidney stone.    BOWEL: Percutaneous gastrostomy tube. Otherwise normal.    LYMPH NODES: Normal.    VASCULATURE: Normal.    PELVIC ORGANS: Pelvic phleboliths.    MUSCULOSKELETAL: Shallow left acetabulum. Scoliosis.      Impression    IMPRESSION:  1.  Findings suspicious for bilateral pneumonia. The distribution would be compatible with aspiration.  2.  Negative for PE  3.  No bowel obstruction       Discharge Medications   Discharge Medication List as of 1/24/2022  5:49 PM      START taking these medications    Details   acetylcysteine (MUCOMYST) 10 % nebulizer solution Inhale 4 mLs into the lungs 2 times daily as needed for mucolysis/respiratory distress or other (Excess mucus production; inability to cough up mucus), Disp-30 mL, R-0, E-Prescribe      predniSONE (DELTASONE) 20 MG tablet 2 tablets (40 mg) by Per G Tube route daily for 2 days, Disp-4 tablet, R-0, E-Prescribe      azithromycin (ZITHROMAX) 250 MG tablet Take 2 tablets (500 mg) by mouth daily for 2 days, Disp-4 tablet, R-0, E-Prescribe      cefuroxime (CEFTIN) 500 MG tablet Take  1 tablet (500 mg) by mouth 2 times daily for 3 days, Disp-6 tablet, R-0, E-Prescribe         CONTINUE these medications which have NOT CHANGED    Details   albuterol (PROAIR HFA/PROVENTIL HFA/VENTOLIN HFA) 108 (90 Base) MCG/ACT inhaler Inhale 2 puffs into the lungs every 6 hours as needed for shortness of breath / dyspnea or wheezing, Disp-2 Inhaler, R-3, E-PrescribePharmacy may dispense brand covered by insurance (Proair, or proventil or ventolin or generic albuterol inhaler)      albuterol (PROVENTIL) (2.5 MG/3ML) 0.083% neb solution Take 1 vial (2.5 mg) by nebulization every 6 hours as needed for shortness of breath / dyspnea or wheezing, Disp-9 mL, R-3, E-Prescribe      benzoyl peroxide 5 % EX topical gel Apply topically 2 times daily as needed (acne)Disp-50 g, [id number]      clindamycin 1 % EX external gel Apply topically 2 times daily as needed (acne)Disp-50 g, [id number]      !! clobazam (,ONFI,) 2.5 MG/ML suspension 5 mg by Per G Tube route At Bedtime (3 mL in AM / 2 mL in PM), Historical      !! clobazam (ONFI) 2.5 MG/ML suspension 7.5 mg by Per G Tube route every morning (3 mL in AM / 2 mL in PM), Historical      famotidine (PEPCID) 40 MG/5ML suspension 20 mg by Per G Tube route 2 times daily, Historical      fluticasone-salmeterol (ADVAIR HFA) 230-21 MCG/ACT inhaler Inhale 2 puffs into the lungs 2 times daily , Disp-12 g,R-3, Historical      gabapentin (NEURONTIN) 250 MG/5ML solution 750 mg by Per G Tube route At Bedtime , Historical      hydrOXYzine (ATARAX) 10 MG/5ML syrup 50 mg by Per G Tube route At Bedtime (25 mL), Historical      LORazepam (ATIVAN) 1 MG tablet 1 mg by Per G Tube route every 6 hours as needed for anxiety Per mom: prn , Historical      !! melatonin 1 MG TABS 1 mg by Per G Tube route daily Daily between 4-5pm*, Historical      !! melatonin 3 MG tablet 6 mg by Per G Tube route At Bedtime , Historical      omeprazole (PRILOSEC) 2 mg/mL SUSP 40 mg by Per G Tube route At Bedtime  , Disp-800 mL,R-11, Historical      !! polyethylene glycol (MIRALAX) 17 g packet 17 g by Per G Tube route daily, Historical      !! polyethylene glycol (MIRALAX) 17 GM/Dose powder TAKE 17 GM BY MOUTH EVERY DAY AS NEEDED, Disp-510 g, R-4, E-Prescribe      QUEtiapine (SEROQUEL) 300 MG tablet 600 mg by Per G Tube route At Bedtime , Historical      Valproate Sodium (VALPROIC ACID) 250 MG/5ML TAKE 6ML BY MOUTH EVERY MORNING AND 7ML IN THE AFTERNOON AND AT NIGHT, Disp-600 mL, R-3, E-Prescribe      Lancets 30G MISC 120 30 gauge lancets (substitute as needed), Disp-120 each, R-0, E-Prescribe       !! - Potential duplicate medications found. Please discuss with provider.        Allergies   No Known Allergies

## 2022-02-01 ENCOUNTER — MYC MEDICAL ADVICE (OUTPATIENT)
Dept: PEDIATRICS | Facility: CLINIC | Age: 27
End: 2022-02-01
Payer: MEDICAID

## 2022-02-02 RX ORDER — AZITHROMYCIN 200 MG/5ML
11 POWDER, FOR SUSPENSION ORAL DAILY
COMMUNITY

## 2022-02-02 RX ORDER — CEFDINIR 250 MG/5ML
5 POWDER, FOR SUSPENSION ORAL 2 TIMES DAILY
COMMUNITY
Start: 2022-02-02 | End: 2022-02-11

## 2022-02-09 ENCOUNTER — OFFICE VISIT (OUTPATIENT)
Dept: PEDIATRICS | Facility: CLINIC | Age: 27
End: 2022-02-09
Payer: COMMERCIAL

## 2022-02-09 VITALS
SYSTOLIC BLOOD PRESSURE: 122 MMHG | WEIGHT: 84 LBS | TEMPERATURE: 98.6 F | HEIGHT: 62 IN | OXYGEN SATURATION: 98 % | DIASTOLIC BLOOD PRESSURE: 78 MMHG | RESPIRATION RATE: 20 BRPM | BODY MASS INDEX: 15.46 KG/M2 | HEART RATE: 99 BPM

## 2022-02-09 DIAGNOSIS — E44.1 MILD PROTEIN-CALORIE MALNUTRITION (H): ICD-10-CM

## 2022-02-09 DIAGNOSIS — J69.0 ASPIRATION PNEUMONIA OF BOTH LUNGS, UNSPECIFIED ASPIRATION PNEUMONIA TYPE, UNSPECIFIED PART OF LUNG (H): Primary | ICD-10-CM

## 2022-02-09 DIAGNOSIS — Z79.899 ENCOUNTER FOR LONG-TERM (CURRENT) USE OF HIGH-RISK MEDICATION: ICD-10-CM

## 2022-02-09 DIAGNOSIS — J69.0 ASPIRATION PNEUMONIA OF BOTH LUNGS, UNSPECIFIED ASPIRATION PNEUMONIA TYPE, UNSPECIFIED PART OF LUNG (H): ICD-10-CM

## 2022-02-09 DIAGNOSIS — G80.1 CP (CEREBRAL PALSY), SPASTIC, DIPLEGIC (H): ICD-10-CM

## 2022-02-09 DIAGNOSIS — Z93.1 FEEDING BY G-TUBE (H): ICD-10-CM

## 2022-02-09 DIAGNOSIS — F33.1 MODERATE EPISODE OF RECURRENT MAJOR DEPRESSIVE DISORDER (H): ICD-10-CM

## 2022-02-09 DIAGNOSIS — Z23 HIGH PRIORITY FOR 2019-NCOV VACCINE: ICD-10-CM

## 2022-02-09 PROBLEM — F33.9 MAJOR DEPRESSION, RECURRENT (H): Status: ACTIVE | Noted: 2022-02-09

## 2022-02-09 LAB
BASOPHILS # BLD AUTO: 0 10E3/UL (ref 0–0.2)
BASOPHILS NFR BLD AUTO: 1 %
EOSINOPHIL # BLD AUTO: 0 10E3/UL (ref 0–0.7)
EOSINOPHIL NFR BLD AUTO: 0 %
ERYTHROCYTE [DISTWIDTH] IN BLOOD BY AUTOMATED COUNT: 14.5 % (ref 10–15)
HBA1C MFR BLD: 5.2 % (ref 0–5.6)
HCT VFR BLD AUTO: 43.5 % (ref 40–53)
HGB BLD-MCNC: 14.6 G/DL (ref 13.3–17.7)
LYMPHOCYTES # BLD AUTO: 2.3 10E3/UL (ref 0.8–5.3)
LYMPHOCYTES NFR BLD AUTO: 50 %
MCH RBC QN AUTO: 34.8 PG (ref 26.5–33)
MCHC RBC AUTO-ENTMCNC: 33.6 G/DL (ref 31.5–36.5)
MCV RBC AUTO: 104 FL (ref 78–100)
MONOCYTES # BLD AUTO: 0.6 10E3/UL (ref 0–1.3)
MONOCYTES NFR BLD AUTO: 12 %
NEUTROPHILS # BLD AUTO: 1.8 10E3/UL (ref 1.6–8.3)
NEUTROPHILS NFR BLD AUTO: 38 %
PLATELET # BLD AUTO: 108 10E3/UL (ref 150–450)
RBC # BLD AUTO: 4.2 10E6/UL (ref 4.4–5.9)
WBC # BLD AUTO: 4.7 10E3/UL (ref 4–11)

## 2022-02-09 PROCEDURE — 99215 OFFICE O/P EST HI 40 MIN: CPT | Mod: 25 | Performed by: INTERNAL MEDICINE

## 2022-02-09 PROCEDURE — 91301 COVID-19,PF,MODERNA (18+ YRS PRIMARY SERIES .5ML): CPT | Performed by: INTERNAL MEDICINE

## 2022-02-09 PROCEDURE — 80053 COMPREHEN METABOLIC PANEL: CPT

## 2022-02-09 PROCEDURE — 83036 HEMOGLOBIN GLYCOSYLATED A1C: CPT

## 2022-02-09 PROCEDURE — 0012A COVID-19,PF,MODERNA (18+ YRS PRIMARY SERIES .5ML): CPT | Performed by: INTERNAL MEDICINE

## 2022-02-09 PROCEDURE — 83735 ASSAY OF MAGNESIUM: CPT

## 2022-02-09 PROCEDURE — 85025 COMPLETE CBC W/AUTO DIFF WBC: CPT

## 2022-02-09 PROCEDURE — 84134 ASSAY OF PREALBUMIN: CPT

## 2022-02-09 PROCEDURE — 36415 COLL VENOUS BLD VENIPUNCTURE: CPT

## 2022-02-09 PROCEDURE — 84100 ASSAY OF PHOSPHORUS: CPT

## 2022-02-09 PROCEDURE — 80061 LIPID PANEL: CPT

## 2022-02-09 ASSESSMENT — ANXIETY QUESTIONNAIRES
GAD7 TOTAL SCORE: 18
3. WORRYING TOO MUCH ABOUT DIFFERENT THINGS: NEARLY EVERY DAY
7. FEELING AFRAID AS IF SOMETHING AWFUL MIGHT HAPPEN: NOT AT ALL
2. NOT BEING ABLE TO STOP OR CONTROL WORRYING: NEARLY EVERY DAY
5. BEING SO RESTLESS THAT IT IS HARD TO SIT STILL: NEARLY EVERY DAY
GAD7 TOTAL SCORE: 18
7. FEELING AFRAID AS IF SOMETHING AWFUL MIGHT HAPPEN: NOT AT ALL
6. BECOMING EASILY ANNOYED OR IRRITABLE: NEARLY EVERY DAY
GAD7 TOTAL SCORE: 18
1. FEELING NERVOUS, ANXIOUS, OR ON EDGE: NEARLY EVERY DAY
4. TROUBLE RELAXING: NEARLY EVERY DAY

## 2022-02-09 ASSESSMENT — PATIENT HEALTH QUESTIONNAIRE - PHQ9
SUM OF ALL RESPONSES TO PHQ QUESTIONS 1-9: 13
10. IF YOU CHECKED OFF ANY PROBLEMS, HOW DIFFICULT HAVE THESE PROBLEMS MADE IT FOR YOU TO DO YOUR WORK, TAKE CARE OF THINGS AT HOME, OR GET ALONG WITH OTHER PEOPLE: NOT DIFFICULT AT ALL
SUM OF ALL RESPONSES TO PHQ QUESTIONS 1-9: 13

## 2022-02-09 ASSESSMENT — MIFFLIN-ST. JEOR: SCORE: 1240.27

## 2022-02-09 NOTE — PROGRESS NOTES
Assessment & Plan     Aspiration pneumonia of both lungs, unspecified aspiration pneumonia type, unspecified part of lung (H)  .[id number] reviwed and showed patient's mother CT scan results from our last office visit and fro hospitalization showing bilateral lower lob posterior infiltrates like from aspiration, reviwed GT does not improve risk of aspiration at all, mom notes they offered Richard a nissen many years ago, no notation of hiatal hernia on imaging. Reviwed Richard's lung disease pf prematurity likely makes his reserves less, and when get gets a pulmonary insult gary take longer to fully recover, and the lower oxygen at night could be a ventilation and/or a diffusion issue. Patient is well connected with Dr. Saleh and will follow-up with her clinic about options next week, QHS O2 may be a good start. May need CPAP. head of saida is at 30 degrees and they are following reflux precautions. Will get hospital emergency room to help with postioning, etc as I do think this will be critical to keeping richard at > 30 degrees consistently.   - Hospital Bed Order for DME - ONLY FOR DME  - Lipid panel reflex to direct LDL Fasting; Future  - Hemoglobin A1c; Future  - Prealbumin; Future  - Comprehensive metabolic panel (BMP + Alb, Alk Phos, ALT, AST, Total. Bili, TP); Future  - CBC with platelets and differential; Future  - Magnesium; Future  - Phosphorus; Future    Moderate episode of recurrent major depressive disorder (H)  discussed with patient (or patient's parents/caregiver) pathophysiology of condition and treatment options.  will get labs today, contineu seroquel and follow-up with christophe as scheduled.   - Hospital Bed Order for DME - ONLY FOR DME  - Lipid panel reflex to direct LDL Fasting; Future  - Hemoglobin A1c; Future  - Prealbumin; Future  - Comprehensive metabolic panel (BMP + Alb, Alk Phos, ALT, AST, Total. Bili, TP); Future  - CBC with platelets and differential; Future  - Magnesium; Future  - Phosphorus;  Future    CP (cerebral palsy), spastic, diplegic (H)  as above re: hosptial bed.   - Hospital Bed Order for DME - ONLY FOR DME  - Lipid panel reflex to direct LDL Fasting; Future  - Hemoglobin A1c; Future  - Prealbumin; Future  - Comprehensive metabolic panel (BMP + Alb, Alk Phos, ALT, AST, Total. Bili, TP); Future  - CBC with platelets and differential; Future  - Magnesium; Future  - Phosphorus; Future    Feeding by G-tube (H)  will await nutrition's reccs, labs today.   - Hospital Bed Order for DME - ONLY FOR DME  - Lipid panel reflex to direct LDL Fasting; Future  - Hemoglobin A1c; Future  - Prealbumin; Future  - Comprehensive metabolic panel (BMP + Alb, Alk Phos, ALT, AST, Total. Bili, TP); Future  - CBC with platelets and differential; Future  - Magnesium; Future  - Phosphorus; Future    Mild protein-calorie malnutrition (H)  as abvoe, weight stable but not back to baseline for a couple years ago. mg/phos improved from hospitalization. Await neutirtion's reccs. follow-up with GI as scheduled as well.   - Hospital Bed Order for DME - ONLY FOR DME  - Lipid panel reflex to direct LDL Fasting; Future  - Hemoglobin A1c; Future  - Prealbumin; Future  - Comprehensive metabolic panel (BMP + Alb, Alk Phos, ALT, AST, Total. Bili, TP); Future  - CBC with platelets and differential; Future  - Magnesium; Future  - Phosphorus; Future    High priority for 2019-nCoV vaccine  booster today.   - COVID-19,PF,MODERNA (18+ Yrs Primary Series .5mL)    Encounter for long-term (current) use of high-risk medication  as above  - Lipid panel reflex to direct LDL Fasting; Future  - Hemoglobin A1c; Future  - Hospital Bed Order for DME - ONLY FOR DME  - Lipid panel reflex to direct LDL Fasting; Future  - Hemoglobin A1c; Future  - Comprehensive metabolic panel (BMP + Alb, Alk Phos, ALT, AST, Total. Bili, TP); Future  0956}    Return in about 3 months (around 5/9/2022). appointment made    Total time spent with patient was 60 min, of which  "greater than 50 minutes of face to face time and/or coordination of care discussing the above issue(s) including pneumonia, nutrition, hospital bed, hospital course and follow-up, options for next steps, and long-term implications of his illness.     Micky Leyva MD  [facility] JEAN De La Rosa is a 26 year old who presents for the following health issues  accompanied by his mother.    Follow-up Multiple Issues:  -O2 stats lower readings in the 80s, has seen pulmonology about this (dr. Soto) readings in upper 80s per Mom, still seems to be not back to his baseline from a few years ago, every subsequent hospitalization seems to be taking us a few steps back. weight is stable but still down from baseline, mom does have appointment with nutrition to discuss this and she is willing to go back to continuous feeds if needed. She would liek to discuss my thoughts on possible next steps. He did complete the full course of antibiotics and predniosne for the aspiration pneumonia. he is getting all other medications and seems to be tolerating these fairly well. Would also like to discuss covid booster today. Needs lipids to be checked for neuro medication. No other concerns or complaints today.     History of Present Illness       He eats 0-1 servings of fruits and vegetables daily.He consumes 0 sweetened beverage(s) daily.   He is taking medications regularly.       Review of Systems   Constitutional, HEENT, cardiovascular, pulmonary, gi and gu systems are negative, except as otherwise noted.      Objective    /78 (BP Location: Right arm, Patient Position: Chair, Cuff Size: Adult Small)   Pulse 99   Temp 98.6  F (37  C) (Temporal)   Resp 20   Ht 1.575 m (5' 2\")   Wt 38.1 kg (84 lb)   SpO2 98%   BMI 15.36 kg/m    Body mass index is 15.36 kg/m .     Wt Readings from Last 8 Encounters:   02/09/22 38.1 kg (84 lb)   11/17/21 37.6 kg (83 lb)   10/06/21 37.8 kg (83 lb 6.6 oz) "   10/06/21 37.8 kg (83 lb 6.4 oz)   09/27/21 39.5 kg (87 lb)   06/30/21 36.7 kg (81 lb)   06/01/21 37.6 kg (83 lb)   05/28/21 37.6 kg (83 lb)       Physical Exam   GENERAL: Sitting up in roller chair, listening to his music CDs, he is alert and no distress; does answer yes/no questions appropraiately  HEENT: normocephalic and atrumatic, mouth and nose covered with mask due to covid   NECK: no adenopathy, no asymmetry  RESP: lungs clear to auscultation - no rales, rhonchi or wheezes; normal insp/exp effort and good air movement  CV: regular rate and rhythm, normal S1 S2, no S3 or S4, no murmur, click or rub, no peripheral edema and peripheral pulses strong  ABDOMEN: soft, nontender, no hepatosplenomegaly, no masses and bowel sounds normal  MS: no gross musculoskeletal defects noted, no edema; + mm wasting and atrophy present  PSYCH: mentation appears normal, affect at baseline    Results for orders placed or performed in visit on 02/09/22   Lipid panel reflex to direct LDL Fasting     Status: None   Result Value Ref Range    Cholesterol 156 <200 mg/dL    Triglycerides 57 <150 mg/dL    Direct Measure HDL 81 >=40 mg/dL    LDL Cholesterol Calculated 64 <=100 mg/dL    Non HDL Cholesterol 75 <130 mg/dL    Patient Fasting > 8hrs? No     Narrative    Cholesterol  Desirable:  <200 mg/dL    Triglycerides  Normal:  Less than 150 mg/dL  Borderline High:  150-199 mg/dL  High:  200-499 mg/dL  Very High:  Greater than or equal to 500 mg/dL    Direct Measure HDL  Female:  Greater than or equal to 50 mg/dL   Male:  Greater than or equal to 40 mg/dL    LDL Cholesterol  Desirable:  <100mg/dL  Above Desirable:  100-129 mg/dL   Borderline High:  130-159 mg/dL   High:  160-189 mg/dL   Very High:  >= 190 mg/dL    Non HDL Cholesterol  Desirable:  130 mg/dL  Above Desirable:  130-159 mg/dL  Borderline High:  160-189 mg/dL  High:  190-219 mg/dL  Very High:  Greater than or equal to 220 mg/dL   Hemoglobin A1c     Status: Normal   Result Value  Ref Range    Hemoglobin A1C 5.2 0.0 - 5.6 %   Prealbumin     Status: Normal   Result Value Ref Range    Prealbumin 25 15 - 45 mg/dL   Comprehensive metabolic panel (BMP + Alb, Alk Phos, ALT, AST, Total. Bili, TP)     Status: Abnormal   Result Value Ref Range    Sodium 139 133 - 144 mmol/L    Potassium 4.1 3.4 - 5.3 mmol/L    Chloride 105 94 - 109 mmol/L    Carbon Dioxide (CO2) 32 20 - 32 mmol/L    Anion Gap 2 (L) 3 - 14 mmol/L    Urea Nitrogen 13 7 - 30 mg/dL    Creatinine 0.73 0.66 - 1.25 mg/dL    Calcium 9.3 8.5 - 10.1 mg/dL    Glucose 68 (L) 70 - 99 mg/dL    Alkaline Phosphatase 76 40 - 150 U/L    AST 60 (H) 0 - 45 U/L    ALT 43 0 - 70 U/L    Protein Total 7.6 6.8 - 8.8 g/dL    Albumin 3.3 (L) 3.4 - 5.0 g/dL    Bilirubin Total 0.7 0.2 - 1.3 mg/dL    GFR Estimate >90 >60 mL/min/1.73m2   Magnesium     Status: Normal   Result Value Ref Range    Magnesium 2.0 1.8 - 2.6 mg/dL   Phosphorus     Status: Normal   Result Value Ref Range    Phosphorus 4.1 2.5 - 4.5 mg/dL   CBC with platelets and differential     Status: Abnormal   Result Value Ref Range    WBC Count 4.7 4.0 - 11.0 10e3/uL    RBC Count 4.20 (L) 4.40 - 5.90 10e6/uL    Hemoglobin 14.6 13.3 - 17.7 g/dL    Hematocrit 43.5 40.0 - 53.0 %     (H) 78 - 100 fL    MCH 34.8 (H) 26.5 - 33.0 pg    MCHC 33.6 31.5 - 36.5 g/dL    RDW 14.5 10.0 - 15.0 %    Platelet Count 108 (L) 150 - 450 10e3/uL    % Neutrophils 38 %    % Lymphocytes 50 %    % Monocytes 12 %    % Eosinophils 0 %    % Basophils 1 %    Absolute Neutrophils 1.8 1.6 - 8.3 10e3/uL    Absolute Lymphocytes 2.3 0.8 - 5.3 10e3/uL    Absolute Monocytes 0.6 0.0 - 1.3 10e3/uL    Absolute Eosinophils 0.0 0.0 - 0.7 10e3/uL    Absolute Basophils 0.0 0.0 - 0.2 10e3/uL    Narrative    Result confirmed by repeat test.     CBC with platelets and differential     Status: Abnormal    Narrative    The following orders were created for panel order CBC with platelets and differential.  Procedure                                Abnormality         Status                     ---------                               -----------         ------                     CBC with platelets and d...[[id number]]  Abnormal            Final result                 Please view results for these tests on the individual orders.

## 2022-02-10 LAB
ALBUMIN SERPL-MCNC: 3.3 G/DL (ref 3.4–5)
ALP SERPL-CCNC: 76 U/L (ref 40–150)
ALT SERPL W P-5'-P-CCNC: 43 U/L (ref 0–70)
ANION GAP SERPL CALCULATED.3IONS-SCNC: 2 MMOL/L (ref 3–14)
AST SERPL W P-5'-P-CCNC: 60 U/L (ref 0–45)
BILIRUB SERPL-MCNC: 0.7 MG/DL (ref 0.2–1.3)
BUN SERPL-MCNC: 13 MG/DL (ref 7–30)
CALCIUM SERPL-MCNC: 9.3 MG/DL (ref 8.5–10.1)
CHLORIDE BLD-SCNC: 105 MMOL/L (ref 94–109)
CHOLEST SERPL-MCNC: 156 MG/DL
CO2 SERPL-SCNC: 32 MMOL/L (ref 20–32)
CREAT SERPL-MCNC: 0.73 MG/DL (ref 0.66–1.25)
FASTING STATUS PATIENT QL REPORTED: NO
GFR SERPL CREATININE-BSD FRML MDRD: >90 ML/MIN/1.73M2
GLUCOSE BLD-MCNC: 68 MG/DL (ref 70–99)
HDLC SERPL-MCNC: 81 MG/DL
LDLC SERPL CALC-MCNC: 64 MG/DL
MAGNESIUM SERPL-MCNC: 2 MG/DL (ref 1.8–2.6)
NONHDLC SERPL-MCNC: 75 MG/DL
PHOSPHATE SERPL-MCNC: 4.1 MG/DL (ref 2.5–4.5)
POTASSIUM BLD-SCNC: 4.1 MMOL/L (ref 3.4–5.3)
PREALB SERPL IA-MCNC: 25 MG/DL (ref 15–45)
PROT SERPL-MCNC: 7.6 G/DL (ref 6.8–8.8)
SODIUM SERPL-SCNC: 139 MMOL/L (ref 133–144)
TRIGL SERPL-MCNC: 57 MG/DL

## 2022-02-10 ASSESSMENT — PATIENT HEALTH QUESTIONNAIRE - PHQ9: SUM OF ALL RESPONSES TO PHQ QUESTIONS 1-9: 13

## 2022-02-10 ASSESSMENT — ANXIETY QUESTIONNAIRES: GAD7 TOTAL SCORE: 18

## 2022-02-11 ENCOUNTER — TELEPHONE (OUTPATIENT)
Dept: PEDIATRICS | Facility: CLINIC | Age: 27
End: 2022-02-11
Payer: MEDICAID

## 2022-02-11 NOTE — TELEPHONE ENCOUNTER
Prior Authorization Retail Medication Request    Medication/Dose: omeprazole 2mg/ml oral / first omeprazole 2mg/ml susp 90ml  ICD code (if different than what is on RX):    Previously Tried and Failed:    Rationale:      Insurance Name:  [facility] OUT OF STATE  Insurance ID:  [id number]      Pharmacy Information (if different than what is on RX)  Name:    Phone:

## 2022-02-17 ENCOUNTER — TELEPHONE (OUTPATIENT)
Dept: PEDIATRICS | Facility: CLINIC | Age: 27
End: 2022-02-17
Payer: MEDICAID

## 2022-02-17 NOTE — TELEPHONE ENCOUNTER
Dr. Soto a pulmonologist called at [phone].     Patient had an over night pulse oximetry reading done. Patient spent over 75% with oximetry readings in the 80's. Dr. Soto is going to start patient on oxygen of 1-5L only to be used at night. Mother will titrate patient based on patient's oxygen saturation levels. Patient is maintaining good oxygen levels during the day.     Based on the chest X-ray Dr. Leyva did, Dr. Soto is trailing patient on lung volume recruitment. With plans to do a repeat chest X-ray in a month.     If Dr. Leyva has any further questions or would like to discuss this with Dr. Soto, you can call her at [phone].    Christel Cade RN on 2/17/2022 at 2:40 PM

## 2022-02-18 NOTE — TELEPHONE ENCOUNTER
PRIOR AUTHORIZATION DENIED    Medication: omeprazole 2mg/ml oral / first omeprazole 2mg/ml susp 90ml    Denial Date: 2/18/2022    Denial Rational:              Appeal Information:    If you would like to appeal, please supply P/A team with a letter of medical necessity with clinical reason.

## 2022-02-18 NOTE — TELEPHONE ENCOUNTER
Central Prior Authorization Team   Phone: [phone]    PA Initiation    Medication: omeprazole 2mg/ml oral / first omeprazole 2mg/ml susp 90ml  Insurance Company: [employer] [place] - Phone [phone] Fax [phone]  Pharmacy Filling the Rx: "[id number] - [place], [place] - [address] AT [place] [address] & [facility]  Filling Pharmacy Phone: [phone]  Filling Pharmacy Fax:    Start Date: 2/18/2022

## 2022-02-22 ENCOUNTER — PATIENT OUTREACH (OUTPATIENT)
Dept: NURSING | Facility: CLINIC | Age: 27
End: 2022-02-22
Payer: MEDICAID

## 2022-02-22 NOTE — PROGRESS NOTES
Clinic Care Coordination Contact  Community Health Worker Initial Outreach  Spoke with patients Darcy lane     Chart Review: Referral from PCP, check if able to obtain hospital bed, and find out if there's a way to have in his chart that mother needs to be with patient in the hospital despite covid rules.     CHW Initial Information Gathering:  Referral Source: PCP  Preferred Hospital: [facility]  [phone]  Current living arrangement:: I live in a private home with family  Supplies used at home:: Enteral Nutrition & Supplies, Oxygen Tubing/Supplies, Incontinence Supplies  Informal Support system:: Parent, Family  No PCP office visit in Past Year: No  Transportation means:: Family  CHW Additional Questions  MyChart active?: Yes  Patient sent Social Determinants of Health questionnaire?: No    CHW introduced self and role with CC, explained reason for referral.  Darcy states that she isn't sure where things are at with hospital bed, she thought the piece of paper PCP gave meant it was in the works. CHW encouraged her to check insurance coverage first.   CHW offered RNCC follow up to assist with process along with PCPs other request, Darcy agrees.   Darcy also requests CHW to send a message along to PCP saying that they heard back from pulmonology who is recommending patient to use O2 at night. They are sending a pulse ox machine and O2, waiting for the delivery. CHW will route encounter to PCP with message from Darcy. No action needed.   CHW inquired day/time for RNCC f/u, Darcy responds that anytime works.     Patient accepts CC: Yes. Patient scheduled for assessment with RNCC on 2/28/22 at 11:00am. Patient noted desire to discuss care coordination and obtaining DME.     Terese Devries, NATHEN, B.S. [facility] Care Coordination  [facility]:  [place], [place] and [place]arthik  [phone]  [email or web address]

## 2022-02-28 ENCOUNTER — PATIENT OUTREACH (OUTPATIENT)
Dept: NURSING | Facility: CLINIC | Age: 27
End: 2022-02-28
Attending: INTERNAL MEDICINE
Payer: MEDICAID

## 2022-02-28 DIAGNOSIS — G80.1 CP (CEREBRAL PALSY), SPASTIC, DIPLEGIC (H): ICD-10-CM

## 2022-02-28 DIAGNOSIS — J69.0 ASPIRATION PNEUMONIA OF BOTH LUNGS, UNSPECIFIED ASPIRATION PNEUMONIA TYPE, UNSPECIFIED PART OF LUNG (H): ICD-10-CM

## 2022-02-28 DIAGNOSIS — E44.1 MILD PROTEIN-CALORIE MALNUTRITION (H): ICD-10-CM

## 2022-02-28 DIAGNOSIS — Z93.1 FEEDING BY G-TUBE (H): ICD-10-CM

## 2022-02-28 NOTE — PROGRESS NOTES
Clinic Care Coordination Contact  OUTREACH    Referral Information:  Referral Source: PCP  Primary Diagnosis: Congenital Disabilities    Chief Complaint   Patient presents with     Clinic Care Coordination - Initial     Scheduled RNCC assessment        Universal Utilization: 88% Risk of Admission or ED Visit.   Clinic Utilization  Difficulty keeping appointments:: No  Compliance Concerns: No  No-Show Concerns: No  No PCP office visit in Past Year: No  Utilization    Hospital Admissions  3             ED Visits  3             No Show Count (past year)  1                Current as of: 2/28/2022 12:59 AM            Clinical Concerns:  Current Medical Concerns:    Patient Active Problem List   Diagnosis     Short stature     CP (cerebral palsy), spastic, diplegic (H)     Sleep disorder     Seizure disorder (H)     Other idiopathic scoliosis, unspecified spinal region     Periventricular leukomalacia, associated with prematurity, chronic     Oral aversion     Nutritional disorder     Mild persistent asthma in adult without complication     History of prematurity, 23 wk 400 grams     History of behavioral and mental health problems     Abnormal gait     Feeding by G-tube (H)     Constipation, unspecified constipation type     Chronic lung disease of prematurity     Bilateral blindness     Autism spectrum disorder associated with known medical or genetic condition or environmental factor, requiring substantial support (level 2)     Anxiety     Vomiting     SBO (small bowel obstruction) (H)     Nonintractable epilepsy without status epilepticus, unspecified epilepsy type (H)     Dehydration     Hypoxia     Acute kidney injury (H)     Elevated lactic acid level     Pneumonia of right lower lobe due to infectious organism     Acute respiratory failure with hypoxia (H)     Pneumonia due to infectious organism, unspecified laterality, unspecified part of lung     Sepsis, due to unspecified organism, unspecified whether acute  organ dysfunction present (H)     Major depression, recurrent (H)      Mother reports delivery of oxygen and supplies scheduled tonight.   Mother shares plans to call [facility] and follow up regarding status of hospital bed for patient.   Mother requests RNCC send patient relations contact information via [employer] message and communicate with inpatient care team regarding Primary Care Provider recommendation in flagging patient's medical record and development of care plan allowing legal guardian visitation outside of visiting hours/covid visitor policies.   Patient's mother denies any questions, concerns, additional resource(s), support and/or assistance needs from clinic care coordination team at this time.      Current Behavioral Concerns: none noted.     Education Provided to patient: CC role, clinic after hours, appointments, patient relations contact information.    Pain  Pain (GOAL):: No  Health Maintenance Reviewed: Due/Overdue   Health Maintenance Due   Topic Date Due     DEPRESSION ACTION PLAN  Never done     Pneumococcal Vaccine: Pediatrics (0 to 5 Years) and At-Risk Patients (6 to 64 Years) (1 of 2 - PPSV23) Never done     HPV IMMUNIZATION (2 - Male 3-dose series) 02/05/2016     ASTHMA ACTION PLAN  12/28/2021     PREVENTIVE CARE VISIT  03/17/2022      Clinical Pathway: None    Medication Management:  Medication review status: Medications not discussed/reviewed during RNCC outreach. Patient's mother/caregiver/guardian denied any questions, concerns, side effects regarding medications at this time.     Functional Status:  Dependent ADLs:: Bathing, Dressing, Eating, Grooming, Incontinence, Positioning, Transfers  Dependent IADLs:: Cooking, Cleaning, Laundry, Shopping, Meal Preparation, Medication Management, Transportation, Money Management, Incontinence  Bed or wheelchair confined:: Yes  Mobility Status: Dependent/Assisted by Another    Living Situation:  Current living arrangement:: I live in a private  home with family  Type of residence:: Private home - stairs    Lifestyle & Psychosocial Needs:    Social Determinants of Health     Tobacco Use: Low Risk      Smoking Tobacco Use: Never Smoker     Smokeless Tobacco Use: Never Used   Alcohol Use: Not on file   Financial Resource Strain: Not on file   Food Insecurity: Not on file   Transportation Needs: Not on file   Physical Activity: Not on file   Stress: Not on file   Social Connections: Not on file   Intimate Partner Violence: Not on file   Depression: Not at risk     PHQ-2 Score: 2   Housing Stability: Not on file     Tube Feeding: Yes  Transportation means:: Family     Pentecostal or spiritual beliefs that impact treatment:: No  Mental health DX:: Yes  Mental health DX how managed:: Medication  Mental health management concern (GOAL):: No  Chemical Dependency Status: No Current Concerns  Chemical Dependency Management:  (NA)  Informal Support system:: Parent, Family      Resources and Interventions:  Current Resources:   Community Resources: DME, PCA, County Worker, County Programs  Supplies Currently Used at Home: Enteral Nutrition & Supplies, Oxygen Tubing/Supplies, Incontinence Supplies, Nebulizer tubing     Employment Status: disabled    Advance Care Plan/Directive  Advanced Care Plans/Directives on file:: Yes  Type Advanced Care Plans/Directives: Other (Legal Guardian)  Advanced Care Plan/Directive Status: Not Applicable    Referrals Placed: None    Patient/Caregiver understanding: Patient's mother verbalized understanding and denies any additional questions or concerns at this time. RNCC engaged in [employer] communications during encounter.       Future Appointments              In 1 month Cira Melton RD, LD [facility]    In 2 months Micky Leyva MD [facility] CHELSEA Solomon        Plan: RNCC will send [employer] message of patient relations contact information per mother's  request. RNCC will send inpatient care management team message regarding development of care plan to flag patient's medical record indicating recommendation of no visitor restrictions. No further outreaches will be made at this time unless a new referral is made or a change in the patient's status occurs. Patient's mother was provided with this writer's contact information and encouraged to call with any questions or concerns.     Kinza Ponce RN Care Coordinator  [facility]  Email: [email or web address]  Phone: [phone]

## 2022-03-07 DIAGNOSIS — K59.09 CHRONIC CONSTIPATION: ICD-10-CM

## 2022-03-08 RX ORDER — POLYETHYLENE GLYCOL 3350 17 G/17G
POWDER, FOR SOLUTION ORAL
Qty: 510 G | Refills: 1 | Status: SHIPPED | OUTPATIENT
Start: 2022-03-08 | End: 2022-05-03

## 2022-03-22 ENCOUNTER — TELEPHONE (OUTPATIENT)
Dept: PEDIATRICS | Facility: CLINIC | Age: 27
End: 2022-03-22
Payer: MEDICAID

## 2022-03-22 NOTE — TELEPHONE ENCOUNTER
Received a phone call from Steffany with [employer]. She stated that information was faxed to PCP for patient's hospital bed on 3/14/22 and has not been receive back yet. Provided [facility] fax number to refax paperwork.    TC: Please watch for incoming fax and place in provider in-basket for completion.  Thanks!  Anjelica BONNER RN, BSN

## 2022-04-11 ENCOUNTER — HOSPITAL ENCOUNTER (OUTPATIENT)
Dept: NUTRITION | Facility: CLINIC | Age: 27
Discharge: HOME | End: 2022-04-11
Attending: INTERNAL MEDICINE | Admitting: INTERNAL MEDICINE
Payer: COMMERCIAL

## 2022-04-11 VITALS — WEIGHT: 82 LBS | BODY MASS INDEX: 15 KG/M2

## 2022-04-11 DIAGNOSIS — Z93.1 FEEDING BY G-TUBE (H): ICD-10-CM

## 2022-04-11 DIAGNOSIS — E63.9 NUTRITIONAL DISORDER: ICD-10-CM

## 2022-04-11 DIAGNOSIS — G80.1 CP (CEREBRAL PALSY), SPASTIC, DIPLEGIC (H): ICD-10-CM

## 2022-04-11 PROCEDURE — 97802 MEDICAL NUTRITION INDIV IN: CPT | Mod: GT,95 | Performed by: DIETITIAN, REGISTERED

## 2022-04-11 NOTE — PROGRESS NOTES
"Video-Visit Details    Type of service:  Video Visit    Video Start Time (time video started): 4:51    Video End Time (time video stopped): 5:05    Originating Location (pt. Location): Home    Distant Location (provider location):  [facility] NUTRITION SERVICES     Mode of Communication:  Video Conference via [employer] telephone (20 minutes)     OUTPATIENT NUTRITION ASSESSMENT (VIDEO VISIT DUE TO COVID-19)  REASON FOR ASSESSMENT  Rj Licea referred by Dr. Leyva for MNT related to   Nutritional disorder [E63.9]       CP (cerebral palsy), spastic, diplegic (H) [G80.1]       Feeding by G-tube (H) [Z93.1]         Patient accompanied by mom, Darcy NIEVES   Nutrition History:  - Information obtained from mother.  - Patient is on a NPO at home. Patient only eats for pleasure.  - Tube feeding history: Pediasure 1.5 with fiber (4-5 cartons per day)    Patient with history of small bowel obstruction.  Patient takes 1.5 capfuls of Miralax daily.  If does not have bowel movement within 2-3 days, patient's mother will increase Miralax to 2.5 capfuls per day.  Patient's TF administration times are: 5:15-5:30 AM, 10 AM, 2 PM, 5 PM and 7 PM if able to tolerate.    Patient's mother states sometimes when administrating TF, TF does not flow through and backs up.  Patient receives 8 oz (240 mL) minimum of 4 times per day with each feeding.     Current weight 82 lbs (37.2 kg)    NUTRITION FOCUSED PHYSICAL ASSESSMENT (NFPA) FOR DIAGNOSING MALNUTRITION  Yes         Observed:   Muscle wasting (refer to documentation in Malnutrition section) and Subcutaneous fat loss (refer to documentation in Malnutrition section)    Obtained from Chart/Interdisciplinary Team:  Mild protein calorie malnutrition     LABS  Labs reviewed    MEDICATIONS  Medications reviewed    ANTHROPOMETRICS   Height: 5'2\"  Weight: 82 lbs   BMI (kg/m2): 15 kg/m2   Weight Status:  Underweight BMI <18.5  %IBW: 69%  Weight History:   Wt " Readings from Last 10 Encounters:   04/11/22 37.2 kg (82 lb)   02/09/22 38.1 kg (84 lb)   11/17/21 37.6 kg (83 lb)   10/06/21 37.8 kg (83 lb 6.6 oz)   10/06/21 37.8 kg (83 lb 6.4 oz)   09/27/21 39.5 kg (87 lb)   06/30/21 36.7 kg (81 lb)   06/01/21 37.6 kg (83 lb)   05/28/21 37.6 kg (83 lb)   05/06/21 44 kg (97 lb)     ASSESSED NUTRITION NEEDS  Estimated Energy Needs: 2447-9483 kcals/day (35-40 Kcal/Kg)  Justification:  (repletion)  Estimated Protein Needs: 55-74 grams protein/day (1.5-2 g pro/Kg)  Justification:  (Repletion)  Estimated Fluid Needs: 7264-3520 mL/day (30-35 mL/kg)    NUTRITION SUPPORT ENTERAL:  Type of Feeding Tube: G tube  Enteral Frequency: Bolus (gravity through syringe)   Enteral Regimen: Pediasure 1.5 with fiber   Total Enteral Provisions: 4 cartons Pediasure 1.5 with fiber provides 1400 kcals (37 kcals/kg) 56 grams (1.5 gm/kg) protein and 739 mL free fluid 12 grams fiber OR  5 cartons Pediasure 1.5 with fiber provides 1750 kcals (47 kcals/kg) 70 grams protein (1.8 gm/kg)  and 925 mL free fluid 15 grams fiber (TF administration varies depending of patient can tolerate)    Free Water Flush: 240 mL flush with each feeding (960-1200 mL)  Total flush 960-1200 mL water fluid + 739-925 mL free fluid provides 1885-9509 mL free fluid (45-57 mL/kg)    Suggest change in TF product with no fiber: Omolite 1.5 with 5 cartons per day to provide 1780 kcals (47 kcals/kg) 75 gm protein (2.0 gm/kg) 902 mL free fluid 0 grams fiber   60 mL water flush before and after eat feeding (600 mL)  60 mL water flush 4 times per day (8 AM, 12 PM, 4 PM , 6 PM) (240 mL)  Total water: 902 mL free water + TF flushes 600 mL + additional water 240 mL = 1742 mL (47 mL/kg)    ASSESSED MALNUTRITION STATUS  % Weight Loss:  Weight loss does not meet criteria for malnutrition (6% weight loss in 6 months)  % Intake:  No decreased intake noted (NPO)  Subcutaneous Fat Loss:  Orbital region moderate depletion  Loss of Muscle Mass:  Temporal  region moderate depletion and Dorsal hand region moderate depletion  Fluid Retention:  None noted    Malnutrition Diagnosis:  Moderate malnutrition  In the Context of:  Chronic illness or disease    DIAGNOSIS   Nutrition Diagnosis:  Inadequate protein-energy intake related to ability to eat to maintain weight as evidenced by need for enteral nutrition       INTERVENTIONS   Nutrition Prescription   Recommend change in TF product: Osmolite 1.5 (5 cartons per day)     IMPLEMENTATION   Assessed learning needs and learning preference  Teaching Method(s) used: Explanation  Vitamin and Mineral Supplements: TF meets 100% DRI   Diet Education:  Provided education on suggested TF recommendations   Nutrition Education (Content):   a)  Discussed current TF regimen with mother.  Suggest no fiber formulary due to history of small bowel obstruction. If does have constipation, may need to add fiber modular.  Also suggest reduction in fluids with TF administration.  Note hospital bed planned to elevate head at 30% at all times.  Suggested G-J placement for feeding placement due to reflux and previous aspiration pneumonia hospitalizations.  Mother states she's not opposed to it but would like it as last resort if changing TF product and flushes does not improve status of patient.  Patient's mother concerned about dehydration if does not have flushes.  Explained reduction of water flush at feeding but adding additional water flushes during the day.  Informed mother that PCP would need to make adjustments with current order for new TF product and flushes before any changes could occur.  Patient's mother verbalized understanding.   Expected patient engagement: N/A    GOALS  5 cartons Osmolite 1.5 5 times per day (5:30 AM, 10 AM,  2 PM, 5 PM and 7  PM)  60 mL flush before and after each feeding  60 mL additional flushes at 8 AM, 12 PM, 2 PM, 6 PM     FOLLOW UP/MONITORING   Progress towards goals will be monitored and evaluated per  protocol and Practice Guideline  Patient to follow up in 2-4 weeks once TF regimen adjusted   RD name and number provided     Time Spent with Patient  14 minutes (time spent with patient present)    Darius Patel, RD, LD  [facility] Outpatient Dietitian  [phone] (office phone)

## 2022-04-20 ENCOUNTER — TELEPHONE (OUTPATIENT)
Dept: PEDIATRICS | Facility: CLINIC | Age: 27
End: 2022-04-20
Payer: MEDICAID

## 2022-04-20 DIAGNOSIS — Z93.1 FEEDING BY G-TUBE (H): Primary | ICD-10-CM

## 2022-04-20 DIAGNOSIS — E63.9 NUTRITIONAL DISORDER: ICD-10-CM

## 2022-04-20 DIAGNOSIS — E44.1 MILD PROTEIN-CALORIE MALNUTRITION (H): ICD-10-CM

## 2022-04-20 RX ORDER — ELECTROLYTES/DEXTROSE
SOLUTION, ORAL ORAL
Qty: 5000 ML | Refills: 11 | Status: SHIPPED | OUTPATIENT
Start: 2022-04-20

## 2022-04-20 NOTE — TELEPHONE ENCOUNTER
Hi Dr. Leyva,     I met with Rj and his mother today.  Here are my suggested changes:     Switch product to 5 cartons Osmolite 1.5 5 times per day (5:30 AM, 10 AM,  2 PM, 5 PM and 7  PM) (contains no fiber)   60 mL flush before and after each feeding   60 mL additional flushes at 8 AM, 12 PM, 2 PM, 6 PM     [phone].

## 2022-04-20 NOTE — TELEPHONE ENCOUNTER
Team: please fax order to [facility].  Order can be faxed to [phone].       Thanks!

## 2022-04-22 ENCOUNTER — TRANSFERRED RECORDS (OUTPATIENT)
Dept: HEALTH INFORMATION MANAGEMENT | Facility: CLINIC | Age: 27
End: 2022-04-22
Payer: MEDICAID

## 2022-04-27 ENCOUNTER — MYC MEDICAL ADVICE (OUTPATIENT)
Dept: PEDIATRICS | Facility: CLINIC | Age: 27
End: 2022-04-27
Payer: MEDICAID

## 2022-04-28 NOTE — PROGRESS NOTES
"Previsit Planning:  Pharmacy Pended  Patient is no longer taking omeprazole (PRILOSEC) 2 mg/mL SUSP    Would like to discuss: \"diet for Rj, hospital bed, increasing pain in Rj s legs and arms.\"  Anjelica BONNER RN, BSN      "

## 2022-05-01 DIAGNOSIS — K59.09 CHRONIC CONSTIPATION: ICD-10-CM

## 2022-05-03 RX ORDER — POLYETHYLENE GLYCOL 3350 17 G/17G
POWDER, FOR SOLUTION ORAL
Qty: 510 G | Refills: 1 | Status: SHIPPED | OUTPATIENT
Start: 2022-05-03 | End: 2022-07-08

## 2022-05-11 ENCOUNTER — OFFICE VISIT (OUTPATIENT)
Dept: PEDIATRICS | Facility: CLINIC | Age: 27
End: 2022-05-11
Payer: MEDICAID

## 2022-05-11 VITALS
TEMPERATURE: 97.4 F | RESPIRATION RATE: 16 BRPM | WEIGHT: 87.2 LBS | OXYGEN SATURATION: 100 % | HEART RATE: 91 BPM | DIASTOLIC BLOOD PRESSURE: 70 MMHG | SYSTOLIC BLOOD PRESSURE: 102 MMHG | BODY MASS INDEX: 15.95 KG/M2

## 2022-05-11 DIAGNOSIS — G80.1 CP (CEREBRAL PALSY), SPASTIC, DIPLEGIC (H): ICD-10-CM

## 2022-05-11 DIAGNOSIS — E44.1 MILD PROTEIN-CALORIE MALNUTRITION (H): ICD-10-CM

## 2022-05-11 DIAGNOSIS — G40.909 SEIZURE DISORDER (H): ICD-10-CM

## 2022-05-11 DIAGNOSIS — Z93.1 FEEDING BY G-TUBE (H): Primary | ICD-10-CM

## 2022-05-11 DIAGNOSIS — D53.9 MACROCYTIC ANEMIA: ICD-10-CM

## 2022-05-11 DIAGNOSIS — Z93.1 GASTROSTOMY TUBE DEPENDENT (H): ICD-10-CM

## 2022-05-11 DIAGNOSIS — J69.0 ASPIRATION PNEUMONIA, UNSPECIFIED ASPIRATION PNEUMONIA TYPE, UNSPECIFIED LATERALITY, UNSPECIFIED PART OF LUNG (H): ICD-10-CM

## 2022-05-11 DIAGNOSIS — R09.02 HYPOXIA: ICD-10-CM

## 2022-05-11 LAB
BASOPHILS # BLD AUTO: 0 10E3/UL (ref 0–0.2)
BASOPHILS NFR BLD AUTO: 1 %
EOSINOPHIL # BLD AUTO: 0 10E3/UL (ref 0–0.7)
EOSINOPHIL NFR BLD AUTO: 0 %
ERYTHROCYTE [DISTWIDTH] IN BLOOD BY AUTOMATED COUNT: 13 % (ref 10–15)
HCT VFR BLD AUTO: 42.5 % (ref 40–53)
HGB BLD-MCNC: 14.6 G/DL (ref 13.3–17.7)
IMM GRANULOCYTES # BLD: 0 10E3/UL
IMM GRANULOCYTES NFR BLD: 1 %
LYMPHOCYTES # BLD AUTO: 3.1 10E3/UL (ref 0.8–5.3)
LYMPHOCYTES NFR BLD AUTO: 57 %
MCH RBC QN AUTO: 35.6 PG (ref 26.5–33)
MCHC RBC AUTO-ENTMCNC: 34.4 G/DL (ref 31.5–36.5)
MCV RBC AUTO: 104 FL (ref 78–100)
MONOCYTES # BLD AUTO: 0.6 10E3/UL (ref 0–1.3)
MONOCYTES NFR BLD AUTO: 12 %
NEUTROPHILS # BLD AUTO: 1.6 10E3/UL (ref 1.6–8.3)
NEUTROPHILS NFR BLD AUTO: 29 %
NRBC # BLD AUTO: 0 10E3/UL
NRBC BLD AUTO-RTO: 0 /100
PLATELET # BLD AUTO: 68 10E3/UL (ref 150–450)
RBC # BLD AUTO: 4.1 10E6/UL (ref 4.4–5.9)
WBC # BLD AUTO: 5.4 10E3/UL (ref 4–11)

## 2022-05-11 PROCEDURE — 82607 VITAMIN B-12: CPT | Performed by: INTERNAL MEDICINE

## 2022-05-11 PROCEDURE — 84100 ASSAY OF PHOSPHORUS: CPT | Performed by: INTERNAL MEDICINE

## 2022-05-11 PROCEDURE — 82728 ASSAY OF FERRITIN: CPT | Performed by: INTERNAL MEDICINE

## 2022-05-11 PROCEDURE — 99215 OFFICE O/P EST HI 40 MIN: CPT | Performed by: INTERNAL MEDICINE

## 2022-05-11 PROCEDURE — 83550 IRON BINDING TEST: CPT | Performed by: INTERNAL MEDICINE

## 2022-05-11 PROCEDURE — 83735 ASSAY OF MAGNESIUM: CPT | Performed by: INTERNAL MEDICINE

## 2022-05-11 PROCEDURE — 82746 ASSAY OF FOLIC ACID SERUM: CPT | Performed by: INTERNAL MEDICINE

## 2022-05-11 PROCEDURE — 36415 COLL VENOUS BLD VENIPUNCTURE: CPT | Performed by: INTERNAL MEDICINE

## 2022-05-11 PROCEDURE — 80050 GENERAL HEALTH PANEL: CPT | Performed by: INTERNAL MEDICINE

## 2022-05-11 PROCEDURE — 84134 ASSAY OF PREALBUMIN: CPT | Performed by: INTERNAL MEDICINE

## 2022-05-11 NOTE — PROGRESS NOTES
Assessment & Plan     Feeding by G-tube (H)  discussed with patient (or patient's parents/caregiver) pathophysiology of condition and treatment options.  will try to get hospital bed filled by other medical supply company, labs today, reviwed RD's reccs again as well. follow wts.Pt REQUIRES a hospital bed as head of bed needs to be > 30 degrees at all times, this is to prevent aspiration. Rj has caregivers that can use electric controls and will help with the frequent changes in body position he requires to avoid pressure ulcers and other complications from his physical disabilities and chronic disease. He requires frequent changes in body position and/or immediate changes in body position are required to alleviate pain or address a medical condition including his breathing/oxygenation, feeds, and avoiding skin breakdown.  - REVIEW OF HEALTH MAINTENANCE PROTOCOL ORDERS  - Hospital Bed Order for DME - ONLY FOR DME  - TSH with free T4 reflex; Future  - TSH with free T4 reflex  - Iron and iron binding capacity; Future  - Ferritin; Future  - Vitamin B12; Future  - Folate; Future  - Ferritin  - Folate  - Iron and iron binding capacity  - Vitamin B12    Mild protein-calorie malnutrition (H)  discussed with patient (or patient's parents/caregiver) pathophysiology of condition and treatment options.  labs today, continue follow-up with RD as scheduled. reviwed hospitalizations really use up reserves, if we cranial nerve(s) keep healthy goal is to rebuild some nutritional resiliency.   - Hospital Bed Order for DME - ONLY FOR DME  - CBC with platelets and differential; Future  - Prealbumin; Future  - Comprehensive metabolic panel (BMP + Alb, Alk Phos, ALT, AST, Total. Bili, TP); Future  - Phosphorus; Future  - Magnesium; Future  - TSH with free T4 reflex; Future  - CBC with platelets and differential  - Prealbumin  - Comprehensive metabolic panel (BMP + Alb, Alk Phos, ALT, AST, Total. Bili, TP)  - Phosphorus  -  Magnesium  - TSH with free T4 reflex    CP (cerebral palsy), spastic, diplegic (H)  will resubmit, patient needs hospital bed for multipl issues, postiioning reflux avoidance, safety and preventuon of decubitous ulcers, etc.   - Hospital Bed Order for DME - ONLY FOR DME    Chronic lung disease of prematurity  as above Dr. Soto agrees better positioning at night vis hospital bed will be helpful.   - Hospital Bed Order for DME - ONLY FOR DME    Aspiration pneumonia, unspecified aspiration pneumonia type, unspecified laterality, unspecified part of lung (H)  as above, continue precations. mom hesitant to change to GJ at this time as patient would need sedation, etc. Patient NEEDS a hospital bed (pillows do not work) to keep HOB > 30 degrees at all times to prevent recurrence, this also will help with avoid aspiration for tube feeds AND help with ventilation due to his lung disease of prematurity.   - Hospital Bed Order for DME - ONLY FOR DME  - CBC with platelets and differential; Future  - Prealbumin; Future  - Comprehensive metabolic panel (BMP + Alb, Alk Phos, ALT, AST, Total. Bili, TP); Future  - Phosphorus; Future  - Magnesium; Future  - CBC with platelets and differential  - Prealbumin  - Comprehensive metabolic panel (BMP + Alb, Alk Phos, ALT, AST, Total. Bili, TP)  - Phosphorus  - Magnesium    Seizure disorder (H)  as above, continue cares and plan and follow-up with neurol*ogy  - Hospital Bed Order for DME - ONLY FOR DME    Hypoxia  as above, has been doing well. continue cares and plan.   - REVIEW OF HEALTH MAINTENANCE PROTOCOL ORDERS  - Hospital Bed Order for DME - ONLY FOR DME  - CBC with platelets and differential; Future  - TSH with free T4 reflex; Future  - CBC with platelets and differential  - TSH with free T4 reflex    Gastrostomy tube dependent (H)  as above, continue follow-up with RD, consider GJ to better prevent aspiration ( but reviwed there is now ay to prevent 100%)  - Hospital Bed Order  for DME - ONLY FOR DME  - CBC with platelets and differential; Future  - Prealbumin; Future  - Comprehensive metabolic panel (BMP + Alb, Alk Phos, ALT, AST, Total. Bili, TP); Future  - Phosphorus; Future  - Magnesium; Future  - TSH with free T4 reflex; Future  - CBC with platelets and differential  - Prealbumin  - Comprehensive metabolic panel (BMP + Alb, Alk Phos, ALT, AST, Total. Bili, TP)  - Phosphorus  - Magnesium  - TSH with free T4 reflex    Macrocytic anemia  due for labs, ? Fe, vs b12/folate, ACD likely also playing a role.   - Iron and iron binding capacity; Future  - Ferritin; Future  - Vitamin B12; Future  - Folate; Future  - Ferritin  - Folate  - Iron and iron binding capacity  - Vitamin B12        Return in about 3 months (around 8/11/2022) for Next scheduled follow-up visit, or sooner if symptoms persist or worsen.      Total time spent with patient and his mother was 50 min, of which greater than 45 minutes of face to face time and/or coordination of care discussing the above issue(s) including labs, pulmonary plans and next steps, hospital bed, and nutrition.     Micky Leyva MD  [facility] [place] is a 26 year old who presents for the following health issues  accompanied by his mother.    HPI     Discuss diet for Rj, hospital bed, increasing pain in Rj s legs and arms and discuss echo testing. overall is doing prettty well, no major events for months now. Following closely with Dr Soto, okay to try different home medical supply for bed and [facility] seems to be declining to fill the prescription. feeds are going okay, had a good visit with the RD, mom lost her job and this has been stressful, coping pretty well and has some options. Rj is tolerating the 3 timea  week azthromycin, no side effects noted. No other concerns or complaints today.         Review of Systems         Objective    /70   Pulse 91   Temp 97.4  F (36.3  C) (Tympanic)    Resp 16   Wt 39.6 kg (87 lb 3.2 oz)   SpO2 100%   BMI 15.95 kg/m    Body mass index is 15.95 kg/m .  Physical Exam   GENERAL: healthy, alert and no distress; sitting up in chair listening to his CD player  HENT: normocephalic and atrumatic  mouth and nose covered with mask due to covid   NECK: no adenopathy, no asymmetry  RESP: lungs clear to auscultation - no rales, rhonchi or wheezes  CV: regular rate and rhythm, normal S1 S2, no S3 or S4, no murmur, click or rub, no peripheral edema   ABDOMEN: soft, nontender, no hepatosplenomegaly, no masses and bowel sounds normal; GT in place  MS: no gross musculoskeletal defects noted, no edema  SKIN: no suspicious lesions or rashes  NEURO: Normal strength and tone, mentation at baseline/intact and speech normal  PSYCH: mentation appears normal, affect normal/bright      Results for orders placed or performed in visit on 05/11/22   Prealbumin     Status: Normal   Result Value Ref Range    Prealbumin 26 15 - 45 mg/dL   Comprehensive metabolic panel (BMP + Alb, Alk Phos, ALT, AST, Total. Bili, TP)     Status: Abnormal   Result Value Ref Range    Sodium 140 133 - 144 mmol/L    Potassium 4.0 3.4 - 5.3 mmol/L    Chloride 106 94 - 109 mmol/L    Carbon Dioxide (CO2) 30 20 - 32 mmol/L    Anion Gap 4 3 - 14 mmol/L    Urea Nitrogen 19 7 - 30 mg/dL    Creatinine 0.70 0.66 - 1.25 mg/dL    Calcium 9.0 8.5 - 10.1 mg/dL    Glucose 79 70 - 99 mg/dL    Alkaline Phosphatase 68 40 - 150 U/L    AST 31 0 - 45 U/L    ALT 26 0 - 70 U/L    Protein Total 6.5 (L) 6.8 - 8.8 g/dL    Albumin 3.3 (L) 3.4 - 5.0 g/dL    Bilirubin Total 0.6 0.2 - 1.3 mg/dL    GFR Estimate >90 >60 mL/min/1.73m2   Phosphorus     Status: Normal   Result Value Ref Range    Phosphorus 4.0 2.5 - 4.5 mg/dL   Magnesium     Status: Normal   Result Value Ref Range    Magnesium 2.2 1.6 - 2.3 mg/dL   TSH with free T4 reflex     Status: Normal   Result Value Ref Range    TSH 1.92 0.40 - 4.00 mU/L   CBC with platelets and  differential     Status: Abnormal   Result Value Ref Range    WBC Count 5.4 4.0 - 11.0 10e3/uL    RBC Count 4.10 (L) 4.40 - 5.90 10e6/uL    Hemoglobin 14.6 13.3 - 17.7 g/dL    Hematocrit 42.5 40.0 - 53.0 %     (H) 78 - 100 fL    MCH 35.6 (H) 26.5 - 33.0 pg    MCHC 34.4 31.5 - 36.5 g/dL    RDW 13.0 10.0 - 15.0 %    Platelet Count 68 (L) 150 - 450 10e3/uL    % Neutrophils 29 %    % Lymphocytes 57 %    % Monocytes 12 %    % Eosinophils 0 %    % Basophils 1 %    % Immature Granulocytes 1 %    NRBCs per 100 WBC 0 <1 /100    Absolute Neutrophils 1.6 1.6 - 8.3 10e3/uL    Absolute Lymphocytes 3.1 0.8 - 5.3 10e3/uL    Absolute Monocytes 0.6 0.0 - 1.3 10e3/uL    Absolute Eosinophils 0.0 0.0 - 0.7 10e3/uL    Absolute Basophils 0.0 0.0 - 0.2 10e3/uL    Absolute Immature Granulocytes 0.0 <=0.4 10e3/uL    Absolute NRBCs 0.0 10e3/uL   Ferritin     Status: Normal   Result Value Ref Range    Ferritin 64 26 - 388 ng/mL   Folate     Status: Normal   Result Value Ref Range    Folic Acid 26.8 >=5.4 ng/mL   Iron and iron binding capacity     Status: Normal   Result Value Ref Range    Iron 119 35 - 180 ug/dL    Iron Binding Capacity 378 240 - 430 ug/dL    Iron Sat Index 31 15 - 46 %   Vitamin B12     Status: Abnormal   Result Value Ref Range    Vitamin B12 1,393 (H) 193 - 986 pg/mL   CBC with platelets and differential     Status: Abnormal    Narrative    The following orders were created for panel order CBC with platelets and differential.  Procedure                               Abnormality         Status                     ---------                               -----------         ------                     CBC with platelets and d...[[id number]]  Abnormal            Final result                 Please view results for these tests on the individual orders.

## 2022-05-12 LAB
ALBUMIN SERPL-MCNC: 3.3 G/DL (ref 3.4–5)
ALP SERPL-CCNC: 68 U/L (ref 40–150)
ALT SERPL W P-5'-P-CCNC: 26 U/L (ref 0–70)
ANION GAP SERPL CALCULATED.3IONS-SCNC: 4 MMOL/L (ref 3–14)
AST SERPL W P-5'-P-CCNC: 31 U/L (ref 0–45)
BILIRUB SERPL-MCNC: 0.6 MG/DL (ref 0.2–1.3)
BUN SERPL-MCNC: 19 MG/DL (ref 7–30)
CALCIUM SERPL-MCNC: 9 MG/DL (ref 8.5–10.1)
CHLORIDE BLD-SCNC: 106 MMOL/L (ref 94–109)
CO2 SERPL-SCNC: 30 MMOL/L (ref 20–32)
CREAT SERPL-MCNC: 0.7 MG/DL (ref 0.66–1.25)
GFR SERPL CREATININE-BSD FRML MDRD: >90 ML/MIN/1.73M2
GLUCOSE BLD-MCNC: 79 MG/DL (ref 70–99)
MAGNESIUM SERPL-MCNC: 2.2 MG/DL (ref 1.6–2.3)
PHOSPHATE SERPL-MCNC: 4 MG/DL (ref 2.5–4.5)
POTASSIUM BLD-SCNC: 4 MMOL/L (ref 3.4–5.3)
PREALB SERPL IA-MCNC: 26 MG/DL (ref 15–45)
PROT SERPL-MCNC: 6.5 G/DL (ref 6.8–8.8)
SODIUM SERPL-SCNC: 140 MMOL/L (ref 133–144)
TSH SERPL DL<=0.005 MIU/L-ACNC: 1.92 MU/L (ref 0.4–4)

## 2022-05-14 ENCOUNTER — HEALTH MAINTENANCE LETTER (OUTPATIENT)
Age: 27
End: 2022-05-14

## 2022-05-15 LAB
FERRITIN SERPL-MCNC: 64 NG/ML (ref 26–388)
FOLATE SERPL-MCNC: 26.8 NG/ML
IRON SATN MFR SERPL: 31 % (ref 15–46)
IRON SERPL-MCNC: 119 UG/DL (ref 35–180)
TIBC SERPL-MCNC: 378 UG/DL (ref 240–430)
VIT B12 SERPL-MCNC: 1393 PG/ML (ref 193–986)

## 2022-05-29 ENCOUNTER — DOCUMENTATION ONLY (OUTPATIENT)
Dept: PEDIATRICS | Facility: CLINIC | Age: 27
End: 2022-05-29

## 2022-06-08 ENCOUNTER — MYC MEDICAL ADVICE (OUTPATIENT)
Dept: PEDIATRICS | Facility: CLINIC | Age: 27
End: 2022-06-08
Payer: MEDICAID

## 2022-06-08 NOTE — LETTER
Re: Rj Licea  June 28, 2022    To Whom It May Concern.  Rj Licea has diagnoses attached below, including CP, which requires positioning of the body to alleviate pain that cannot be accommodated in an ordiatry bed and requires the head of bed to be elevated more than 30 degrees most of the time and pillows or wedges do not meet patient needs.     Patient Active Problem List   Diagnosis     Short stature     CP (cerebral palsy), spastic, diplegic (H)     Sleep disorder     Seizure disorder (H)     Other idiopathic scoliosis, unspecified spinal region     Periventricular leukomalacia, associated with prematurity, chronic     Oral aversion     Nutritional disorder     Mild persistent asthma in adult without complication     History of prematurity, 23 wk 400 grams     History of behavioral and mental health problems     Abnormal gait     Feeding by G-tube (H)     Constipation, unspecified constipation type     Chronic lung disease of prematurity     Bilateral blindness     Autism spectrum disorder associated with known medical or genetic condition or environmental factor, requiring substantial support (level 2)     Anxiety     Vomiting     SBO (small bowel obstruction) (H)     Nonintractable epilepsy without status epilepticus, unspecified epilepsy type (H)     Dehydration     Hypoxia     Acute kidney injury (H)     Elevated lactic acid level     Pneumonia of right lower lobe due to infectious organism     Acute respiratory failure with hypoxia (H)     Pneumonia due to infectious organism, unspecified laterality, unspecified part of lung     Sepsis, due to unspecified organism, unspecified whether acute organ dysfunction present (H)     Major depression, recurrent (H)   Thank you,  Anna Medina, [facility]

## 2022-06-15 NOTE — TELEPHONE ENCOUNTER
Called and spoke to patient's mom. Patient's mom received a message on 6/07/22 stating she will lose mychart access.    Routing to [facility]: Please assist with necessary forms so patient's mom does not lose mychart access for patient.    Thanks!  Anjelica BONNER RN, BSN

## 2022-07-06 DIAGNOSIS — K59.09 CHRONIC CONSTIPATION: ICD-10-CM

## 2022-07-08 RX ORDER — POLYETHYLENE GLYCOL 3350 17 G/17G
POWDER, FOR SOLUTION ORAL
Qty: 510 G | Refills: 1 | Status: SHIPPED | OUTPATIENT
Start: 2022-07-08 | End: 2022-09-08

## 2022-07-13 ENCOUNTER — TRANSFERRED RECORDS (OUTPATIENT)
Dept: HEALTH INFORMATION MANAGEMENT | Facility: CLINIC | Age: 27
End: 2022-07-13

## 2022-07-26 ENCOUNTER — MYC MEDICAL ADVICE (OUTPATIENT)
Dept: PEDIATRICS | Facility: CLINIC | Age: 27
End: 2022-07-26

## 2022-08-31 ENCOUNTER — OFFICE VISIT (OUTPATIENT)
Dept: PEDIATRICS | Facility: CLINIC | Age: 27
End: 2022-08-31
Payer: MEDICAID

## 2022-08-31 VITALS
RESPIRATION RATE: 20 BRPM | SYSTOLIC BLOOD PRESSURE: 104 MMHG | OXYGEN SATURATION: 96 % | BODY MASS INDEX: 15.09 KG/M2 | HEART RATE: 102 BPM | HEIGHT: 62 IN | DIASTOLIC BLOOD PRESSURE: 76 MMHG | WEIGHT: 82 LBS | TEMPERATURE: 98.3 F

## 2022-08-31 DIAGNOSIS — F84.0 AUTISM SPECTRUM DISORDER ASSOCIATED WITH KNOWN MEDICAL OR GENETIC CONDITION OR ENVIRONMENTAL FACTOR, REQUIRING SUBSTANTIAL SUPPORT (LEVEL 2): ICD-10-CM

## 2022-08-31 DIAGNOSIS — F33.9 EPISODE OF RECURRENT MAJOR DEPRESSIVE DISORDER, UNSPECIFIED DEPRESSION EPISODE SEVERITY (H): ICD-10-CM

## 2022-08-31 DIAGNOSIS — G80.1 CP (CEREBRAL PALSY), SPASTIC, DIPLEGIC (H): Primary | ICD-10-CM

## 2022-08-31 DIAGNOSIS — Z93.1 FEEDING BY G-TUBE (H): ICD-10-CM

## 2022-08-31 PROCEDURE — 99214 OFFICE O/P EST MOD 30 MIN: CPT | Performed by: INTERNAL MEDICINE

## 2022-08-31 ASSESSMENT — PATIENT HEALTH QUESTIONNAIRE - PHQ9: SUM OF ALL RESPONSES TO PHQ QUESTIONS 1-9: 7

## 2022-08-31 ASSESSMENT — PAIN SCALES - GENERAL: PAINLEVEL: NO PAIN (0)

## 2022-08-31 NOTE — LETTER
My Depression Action Plan  Name: Rj Licea   Date of Birth 1995  Date: 8/31/2022    My doctor: Micky Leyva   My clinic: [facility]  [address] [place]  [address]  [place] [address] [phone]          GREEN    ZONE   Good Control    What it looks like:     Things are going generally well. You have normal ups and downs. You may even feel depressed from time to time, but bad moods usually last less than a day.   What you need to do:  1. Continue to care for yourself (see self care plan)  2. Check your depression survival kit and update it as needed  3. Follow your physician s recommendations including any medication.  4. Do not stop taking medication unless you consult with your physician first.           YELLOW         ZONE Getting Worse    What it looks like:     Depression is starting to interfere with your life.     It may be hard to get out of bed; you may be starting to isolate yourself from others.    Symptoms of depression are starting to last most all day and this has happened for several days.     You may have suicidal thoughts but they are not constant.   What you need to do:     1. Call your care team. Your response to treatment will improve if you keep your care team informed of your progress. Yellow periods are signs an adjustment may need to be made.     2. Continue your self-care.  Just get dressed and ready for the day.  Don't give yourself time to talk yourself out of it.    3. Talk to someone in your support network.    4. Open up your Depression Self-Care Plan/Wellness Kit.           RED    ZONE Medical Alert - Get Help    What it looks like:     Depression is seriously interfering with your life.     You may experience these or other symptoms: You can t get out of bed most days, can t work or engage in other necessary activities, you have trouble taking care of basic hygiene, or basic responsibilities, thoughts of suicide or death  that will not go away, self-injurious behavior.     What you need to do:  1. Call your care team and request a same-day appointment. If they are not available (weekends or after hours) call your local crisis line, emergency room or 911.          Depression Self-Care Plan / Wellness Kit    Many people find that medication and therapy are helpful treatments for managing depression. In addition, making small changes to your everyday life can help to boost your mood and improve your wellbeing. Below are some tips for you to consider. Be sure to talk with your medical provider and/or behavioral health consultant if your symptoms are worsening or not improving.     Sleep   Sleep hygiene  means all of the habits that support good, restful sleep. It includes maintaining a consistent bedtime and wake time, using your bedroom only for sleeping or sex, and keeping the bedroom dark and free of distractions like a computer, smartphone, or television.     Develop a Healthy Routine  Maintain good hygiene. Get out of bed in the morning, make your bed, brush your teeth, take a shower, and get dressed. Don t spend too much time viewing media that makes you feel stressed. Find time to relax each day.    Exercise  Get some form of exercise every day. This will help reduce pain and release endorphins, the  feel good  chemicals in your brain. It can be as simple as just going for a walk or doing some gardening, anything that will get you moving.      Diet  Strive to eat healthy foods, including fruits and vegetables. Drink plenty of water. Avoid excessive sugar, caffeine, alcohol, and other mood-altering substances.     Stay Connected with Others  Stay in touch with friends and family members.    Manage Your Mood  Try deep breathing, massage therapy, biofeedback, or meditation. Take part in fun activities when you can. Try to find something to smile about each day.     Psychotherapy  Be open to working with a therapist if your provider  recommends it.     Medication  Be sure to take your medication as prescribed. Most anti-depressants need to be taken every day. It usually takes several weeks for medications to work. Not all medicines work for all people. It is important to follow-up with your provider to make sure you have a treatment plan that is working for you. Do not stop your medication abruptly without first discussing it with your provider.    Crisis Resources   These hotlines are for both adults and children. They and are open 24 hours a day, 7 days a week unless noted otherwise.      National Suicide Prevention Lifeline   988 or 9-794-874-DBDY (2925)      Crisis Text Line    www.crisistextline.org  Text HOME to 271523 from anywhere in the [place], anytime, about any type of crisis. A live, trained crisis counselor will receive the text and respond quickly.      [facility] Lifeline for LGBTQ Youth  A national crisis intervention and suicide lifeline for LGBTQ youth under 25. Provides a safe place to talk without judgement. Call 1-918.751.3385; text START to 437824 or visit www.thetrevorproject.org to talk to a trained counselor.      For [facility] crisis numbers, visit the [facility] website at:  https://[place].gov/dhs/people-we-serve/adults/health-care/mental-health/resources/crisis-contacts.jsp

## 2022-08-31 NOTE — PROGRESS NOTES
Assessment & Plan       ICD-10-CM    1. CP (cerebral palsy), spastic, diplegic (H)  G80.1    2. Major depression, recurrent (H)  F33.9 DEPRESSION ACTION PLAN (DAP)   3. Feeding by G-tube (H)  Z93.1    4. Autism spectrum disorder associated with known medical or genetic condition or environmental factor, requiring substantial support (level 2)  F84.0    discussed with patient (or patient's parents/caregiver) pathophysiology of condition and treatment options.  Reviewed interim history today in detail with Rj and his mother.  Overall he has been doing extremely well especially relative to how he been doing the last couple of years.  I do think his hospital bed and his new pulmonary regimen seems to be greatly helping we will continue this.  He has gained a little bit of weight and overall does seem to be improved from an overall health perspective today I do not see the need to make any changes today and the patient's mother agrees.  We will plan to see each other again in 3 months and this appointment was made.  Can we will get his COVID booster as soon as the new omicron specific variant options are available likely in the next week or 2.  They can reach out anytime via [employer] if any new thing.  I am getting Dr. Armstrong from psychiatry as well as Dr. Eliel baker notes from pulmonary and this has been greatly helpful and I feel if we are on the same page. Patient's parent(s) verbalized understanding and are agreeable to this plan.        Return in about 3 months (around 11/30/2022) for Next scheduled follow-up visit, or sooner if symptoms persist or worsen.    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  He exercises with enough effort to increase his heart rate 3 or less days per week.   He is taking medications regularly.     Rj Presents today with his mother for regular scheduled visit.  Overall Rj's mother is happy to report that he is doing quite well.  They were able to get a hospital bed and this seems to have made a significant difference.  He continues to take his 3 times a week antibiotic and other medications as part of his pulmonary care plan and this seems to be working well to.  He is tolerating his feeds well.  He does seem to be more active and his mood seems to be improved.  He is taking all his medications and no side effects have been noted.  He is due for a COVID booster did discuss today he will be due boosters coming out shortly that are on the current specific and recommend we wait for 1 of those.  The patient's mother and Rj himself deny any specific concerns or complaints today.        Review of Systems   Constitutional, HEENT, cardiovascular, pulmonary, gi and gu systems are negative, except as otherwise noted.      Objective    /76   Pulse 102   Temp 98.3  F (36.8  C) (Oral)   Resp 20   Ht 1.575 m (5' 2\")   Wt 37.2 kg (82 lb)   SpO2 96%   BMI 15.00 kg/m    Body mass index is 15 kg/m .  Physical Exam   GENERAL: healthy, alert and no distress; ;listening to music and smiling  HENT: normocephalic and atrumatic , mouth and nose covered with mask due to covid   RESP: lungs clear to auscultation - no rales, rhonchi or wheezes; no crackles  CV: regular rate and rhythm, normal S1 S2, no S3 or S4, no murmur, click or rub, no peripheral edema   ABDOMEN: soft, nontender, no hepatosplenomegaly, no masses and bowel sounds normal9; GT in place  MS: no gross musculoskeletal defects noted, no edema  NEURO: Normal strength and tone, mentation intact and speech at basline  PSYCH: mentation appears normal/at " baseline, affect normal/bright                      .  ..

## 2022-09-03 ENCOUNTER — HEALTH MAINTENANCE LETTER (OUTPATIENT)
Age: 27
End: 2022-09-03

## 2022-09-06 DIAGNOSIS — K59.09 CHRONIC CONSTIPATION: ICD-10-CM

## 2022-09-08 RX ORDER — POLYETHYLENE GLYCOL 3350 17 G/17G
POWDER, FOR SOLUTION ORAL
Qty: 510 G | Refills: 2 | Status: SHIPPED | OUTPATIENT
Start: 2022-09-08 | End: 2022-12-13

## 2022-09-08 NOTE — TELEPHONE ENCOUNTER
Prescription approved per [facility] Refill Protocol.  Gray CABRERA RN

## 2022-10-07 ENCOUNTER — TRANSFERRED RECORDS (OUTPATIENT)
Dept: HEALTH INFORMATION MANAGEMENT | Facility: CLINIC | Age: 27
End: 2022-10-07

## 2022-10-07 LAB
ASTHMA CONTROL TEST SCORE: 18
ER ASTHMA: 0
HOSPITALIZATION ASTHMA: 0

## 2022-11-28 DIAGNOSIS — G40.909 SEIZURE DISORDER (H): ICD-10-CM

## 2022-11-29 RX ORDER — VALPROIC ACID 250 MG/5ML
SOLUTION ORAL
Qty: 600 ML | Refills: 3 | Status: SHIPPED | OUTPATIENT
Start: 2022-11-29

## 2022-11-29 NOTE — TELEPHONE ENCOUNTER
Routing refill request to provider for review/approval because:  Drug not on the [facility] refill protocol     Last Written Prescription Date:  9/10/21  Last Fill Quantity: 600 ml,  # refills: 3   Last office visit provider:  8/31/22     Requested Prescriptions   Pending Prescriptions Disp Refills     Valproate Sodium (VALPROIC ACID) 250 MG/5ML [Pharmacy Med Name: VALPROIC ACID 250MG/5ML SOLUTION] 600 mL 3     Sig: TAKE 6ML BY MOUTH EVERY MORNING AND 7ML IN THE AFTERNOON AND AT NIGHT       There is no refill protocol information for this order          Moise Leblanc RN 11/29/22 9:44 AM

## 2022-12-11 DIAGNOSIS — K59.09 CHRONIC CONSTIPATION: ICD-10-CM

## 2022-12-13 RX ORDER — POLYETHYLENE GLYCOL 3350 17 G/17G
POWDER, FOR SOLUTION ORAL
Qty: 510 G | Refills: 2 | Status: SHIPPED | OUTPATIENT
Start: 2022-12-13 | End: 2023-03-08

## 2022-12-13 NOTE — TELEPHONE ENCOUNTER
Prescription approved per [facility] Refill Protocol.  Gray CABRERA RN 12/13/2022 at 2:20 PM

## 2022-12-28 ENCOUNTER — TRANSFERRED RECORDS (OUTPATIENT)
Dept: HEALTH INFORMATION MANAGEMENT | Facility: CLINIC | Age: 27
End: 2022-12-28

## 2022-12-28 ENCOUNTER — OFFICE VISIT (OUTPATIENT)
Dept: PEDIATRICS | Facility: CLINIC | Age: 27
End: 2022-12-28
Payer: MEDICAID

## 2022-12-28 VITALS
OXYGEN SATURATION: 94 % | HEIGHT: 62 IN | SYSTOLIC BLOOD PRESSURE: 94 MMHG | HEART RATE: 84 BPM | WEIGHT: 87 LBS | TEMPERATURE: 97.7 F | BODY MASS INDEX: 16.01 KG/M2 | RESPIRATION RATE: 20 BRPM | DIASTOLIC BLOOD PRESSURE: 64 MMHG

## 2022-12-28 DIAGNOSIS — Z23 NEED FOR PNEUMOCOCCAL VACCINE: ICD-10-CM

## 2022-12-28 DIAGNOSIS — J45.30 MILD PERSISTENT ASTHMA IN ADULT WITHOUT COMPLICATION: ICD-10-CM

## 2022-12-28 DIAGNOSIS — J96.01 ACUTE RESPIRATORY FAILURE WITH HYPOXIA (H): ICD-10-CM

## 2022-12-28 DIAGNOSIS — G80.1 CP (CEREBRAL PALSY), SPASTIC, DIPLEGIC (H): ICD-10-CM

## 2022-12-28 DIAGNOSIS — R09.02 HYPOXIA: ICD-10-CM

## 2022-12-28 DIAGNOSIS — Z23 HIGH PRIORITY FOR COVID-19 VACCINATION: ICD-10-CM

## 2022-12-28 DIAGNOSIS — Z00.00 ROUTINE GENERAL MEDICAL EXAMINATION AT A HEALTH CARE FACILITY: Primary | ICD-10-CM

## 2022-12-28 PROCEDURE — 91313 COVID-19 VACCINE BIVALENT BOOSTER 18+ (MODERNA): CPT | Performed by: INTERNAL MEDICINE

## 2022-12-28 PROCEDURE — 99395 PREV VISIT EST AGE 18-39: CPT | Mod: 25 | Performed by: INTERNAL MEDICINE

## 2022-12-28 PROCEDURE — 0134A COVID-19 VACCINE BIVALENT BOOSTER 18+ (MODERNA): CPT | Performed by: INTERNAL MEDICINE

## 2022-12-28 PROCEDURE — 90472 IMMUNIZATION ADMIN EACH ADD: CPT | Performed by: INTERNAL MEDICINE

## 2022-12-28 PROCEDURE — 90471 IMMUNIZATION ADMIN: CPT | Performed by: INTERNAL MEDICINE

## 2022-12-28 PROCEDURE — 90677 PCV20 VACCINE IM: CPT | Performed by: INTERNAL MEDICINE

## 2022-12-28 PROCEDURE — 90686 IIV4 VACC NO PRSV 0.5 ML IM: CPT | Performed by: INTERNAL MEDICINE

## 2022-12-28 ASSESSMENT — PATIENT HEALTH QUESTIONNAIRE - PHQ9
10. IF YOU CHECKED OFF ANY PROBLEMS, HOW DIFFICULT HAVE THESE PROBLEMS MADE IT FOR YOU TO DO YOUR WORK, TAKE CARE OF THINGS AT HOME, OR GET ALONG WITH OTHER PEOPLE: NOT DIFFICULT AT ALL
SUM OF ALL RESPONSES TO PHQ QUESTIONS 1-9: 6
SUM OF ALL RESPONSES TO PHQ QUESTIONS 1-9: 6

## 2022-12-28 NOTE — PROGRESS NOTES
Assessment & Plan     Routine general medical examination at a health care facility  d/w pt preventative care measures including seat belt use,need to screen for lipid disorders, mood disorders, CAD risk factors, etc. Also discussed accident prevention and future RHM schedule. also discussed long term care planning and options if things get worse including palliative care vs hospice. patient is DNR/DNI and confirmed this with patient's mother today.     Acute respiratory failure with hypoxia (H)  discussed with patient (or patient's parents/caregiver) pathophysiology of condition and treatment options.  reviwed pulmonary's notes and reccs, continue cares and plan, reviwed that as long as sats > 88% O2 delivery to body is adequate, continue blow by as needed at bedtime and close follow-up with pulmonary as scheduled. update immunizations today.   - Pneumococcal 20 Valent Conjugate (Prevnar 20)  - INFLUENZA VACCINE IM > 6 MONTHS VALENT IIV4 (AFLURIA/FLUZONE)  - COVID-19,PF,MODERNA BIVALENT (18+YRS)    Mild persistent asthma in adult without complication  as above, continue cares and plan    Hypoxia  as above, continue home O2 and good at bedtime positioning.   - Pneumococcal 20 Valent Conjugate (Prevnar 20)  - INFLUENZA VACCINE IM > 6 MONTHS VALENT IIV4 (AFLURIA/FLUZONE)  - COVID-19,PF,MODERNA BIVALENT (18+YRS)    High priority for COVID-19 vaccination  - COVID-19,PF,MODERNA BIVALENT (18+YRS)    Need for pneumococcal vaccine  - Pneumococcal 20 Valent Conjugate (Prevnar 20)    CP (cerebral palsy), spastic, diplegic (H)  as above, continue cares and plan.       Return in about 3 months (around 3/28/2023) for Next scheduled follow-up visit, or sooner if symptoms persist or worsen.    Micky Leyva MD  [facility] JEAN De La Rosa is a 27 year old, presenting for the following health issues:  Follow Up and Wellness Visit      History of Present Illness       Reason for visit:  Follow  "up    He eats 0-1 servings of fruits and vegetables daily.He consumes 0 sweetened beverage(s) daily.He exercises with enough effort to increase his heart rate 9 or less minutes per day.  He exercises with enough effort to increase his heart rate 3 or less days per week.   He is not taking prescribed medications regularly due to Not applicable.    Today's PHQ-9         PHQ-9 Total Score: 6    PHQ-9 Q9 Thoughts of better off dead/self-harm past 2 weeks :   Not at all    How difficult have these problems made it for you to do your work, take care of things at home, or get along with other people: Not difficult at all  Healthy Habits:     Getting at least 3 servings of Calcium per day:  NO    Bi-annual eye exam:  Yes (not since covid)    Dental care twice a year:  NO    Sleep apnea or symptoms of sleep apnea:  None    Diet:  Other (tube feed)    Frequency of exercise:  None    Duration of exercise:  N/A    Taking medications regularly:  Yes    Barriers to taking medications:  Not applicable    Medication side effects:  None    PHQ-2 Total Score: 0    Additional concerns today:: fu probiotics and overall health.     pty here for scheduled follow-up, still lower O2 sats at night, seems to be okay and then they can drop, have a care plan with Dr Soto and has had to use steroids a few times, mom notes at the same time HR goes up to 110s and 120s. tolerating feeds well, takes nothing by mouth but will occasionally sick on a pretzel. will need dental work at some point, mom worried about general anesthesia for this is on an antibiotic regimen for his lungs, this is likely helping keep the dental issues for getting worse.  No other concerns or complaints today.           Review of Systems   Constitutional, HEENT, cardiovascular, pulmonary, gi and gu systems are negative, except as otherwise noted.      Objective    BP 94/64   Pulse 84   Temp 97.7  F (36.5  C) (Axillary)   Resp 20   Ht 1.575 m (5' 2\")   Wt 39.5 kg (87 " lb)   SpO2 94%   BMI 15.91 kg/m    Body mass index is 15.91 kg/m .  Physical Exam   GENERAL: healthy, alert and no distress; sitting up in wheelchair listening to music on his CD player and moving with the music. nad   HEENT: normocephalic and atrumatic mucous membranes moist neck supple  RESP: lungs clear to auscultation - no rales, rhonchi or wheezes  CV: regular rate and rhythm, normal S1 S2, no S3 or S4, no murmur, click or rub, no peripheral edema and peripheral pulses strong  ABDOMEN: soft, nontender, no hepatosplenomegaly, no masses and bowel sounds normal  SKin: no suspicious lesions/rashes  PSYCH: mentation appears at baseline, affect normal/bright

## 2023-01-04 ENCOUNTER — MYC MEDICAL ADVICE (OUTPATIENT)
Dept: PEDIATRICS | Facility: CLINIC | Age: 28
End: 2023-01-04

## 2023-01-05 ENCOUNTER — TRANSFERRED RECORDS (OUTPATIENT)
Dept: HEALTH INFORMATION MANAGEMENT | Facility: CLINIC | Age: 28
End: 2023-01-05

## 2023-01-05 NOTE — TELEPHONE ENCOUNTER
Spoke to [facility] and gave a verbal for the Pediasure 1.5 regular. (see mychart reply from patient's mom). Advised [facility] that will need this until the Osmolite is back in stock.  Anjelica BONNER RN, BSN

## 2023-01-05 NOTE — TELEPHONE ENCOUNTER
Please review [employer] message. Patient's osmolite is on back order without a stock date. Patient's mom is requesting an order sent for Pediasure 1.5.    Per chart review, last order for pediasure:  Adult Formula Bolus Feedinx Daily Other; Pediasure 1.5; Route: Gastrostomy; 6; Can(s); Medication - Feeding Tube Flush Frequency: At least 15-30 mL water before and after medication administration and with tube clogging    Spoke to [facility] and patient had the pediasure with fiber. Please advise if ok to do a verbal order for above with [facility] and if you would like the patient to have the fiber pediasure or regular.    Thanks!  Anjelica BONNER RN, BSN

## 2023-02-24 ENCOUNTER — MYC MEDICAL ADVICE (OUTPATIENT)
Dept: PEDIATRICS | Facility: CLINIC | Age: 28
End: 2023-02-24
Payer: MEDICAID

## 2023-02-24 ENCOUNTER — TRANSFERRED RECORDS (OUTPATIENT)
Dept: HEALTH INFORMATION MANAGEMENT | Facility: CLINIC | Age: 28
End: 2023-02-24

## 2023-02-24 NOTE — TELEPHONE ENCOUNTER
Spoke to [facility]. They have a virtual visit available today at 1:30pm. (back number to reach scheduling - do not give to patient: [phone]) Sent [employer] message and left message for patient's mom to call back.  Anjelica BONNER RN, BSN

## 2023-02-24 NOTE — TELEPHONE ENCOUNTER
Huddled with provider who suggested to call Dr. Soto's office. Called [facility]. Left message for care team requesting a call back.    Anjelica BONNER RN, BSN

## 2023-03-22 ASSESSMENT — ANXIETY QUESTIONNAIRES
6. BECOMING EASILY ANNOYED OR IRRITABLE: NOT AT ALL
GAD7 TOTAL SCORE: 0
2. NOT BEING ABLE TO STOP OR CONTROL WORRYING: NOT AT ALL
3. WORRYING TOO MUCH ABOUT DIFFERENT THINGS: NOT AT ALL
1. FEELING NERVOUS, ANXIOUS, OR ON EDGE: NOT AT ALL
GAD7 TOTAL SCORE: 0
GAD7 TOTAL SCORE: 0
8. IF YOU CHECKED OFF ANY PROBLEMS, HOW DIFFICULT HAVE THESE MADE IT FOR YOU TO DO YOUR WORK, TAKE CARE OF THINGS AT HOME, OR GET ALONG WITH OTHER PEOPLE?: NOT DIFFICULT AT ALL
IF YOU CHECKED OFF ANY PROBLEMS ON THIS QUESTIONNAIRE, HOW DIFFICULT HAVE THESE PROBLEMS MADE IT FOR YOU TO DO YOUR WORK, TAKE CARE OF THINGS AT HOME, OR GET ALONG WITH OTHER PEOPLE: NOT DIFFICULT AT ALL
7. FEELING AFRAID AS IF SOMETHING AWFUL MIGHT HAPPEN: NOT AT ALL
4. TROUBLE RELAXING: NOT AT ALL
7. FEELING AFRAID AS IF SOMETHING AWFUL MIGHT HAPPEN: NOT AT ALL
5. BEING SO RESTLESS THAT IT IS HARD TO SIT STILL: NOT AT ALL

## 2023-03-22 ASSESSMENT — PATIENT HEALTH QUESTIONNAIRE - PHQ9
SUM OF ALL RESPONSES TO PHQ QUESTIONS 1-9: 9
SUM OF ALL RESPONSES TO PHQ QUESTIONS 1-9: 9
10. IF YOU CHECKED OFF ANY PROBLEMS, HOW DIFFICULT HAVE THESE PROBLEMS MADE IT FOR YOU TO DO YOUR WORK, TAKE CARE OF THINGS AT HOME, OR GET ALONG WITH OTHER PEOPLE: NOT DIFFICULT AT ALL

## 2023-03-29 ENCOUNTER — OFFICE VISIT (OUTPATIENT)
Dept: PEDIATRICS | Facility: CLINIC | Age: 28
End: 2023-03-29
Payer: MEDICAID

## 2023-03-29 VITALS
TEMPERATURE: 97.5 F | HEART RATE: 96 BPM | RESPIRATION RATE: 20 BRPM | SYSTOLIC BLOOD PRESSURE: 112 MMHG | BODY MASS INDEX: 15.83 KG/M2 | HEIGHT: 62 IN | OXYGEN SATURATION: 95 % | WEIGHT: 86 LBS | DIASTOLIC BLOOD PRESSURE: 74 MMHG

## 2023-03-29 DIAGNOSIS — G40.909 SEIZURE DISORDER (H): ICD-10-CM

## 2023-03-29 DIAGNOSIS — J96.01 ACUTE RESPIRATORY FAILURE WITH HYPOXIA (H): Primary | ICD-10-CM

## 2023-03-29 DIAGNOSIS — J45.30 MILD PERSISTENT ASTHMA IN ADULT WITHOUT COMPLICATION: ICD-10-CM

## 2023-03-29 DIAGNOSIS — E44.1 MILD PROTEIN-CALORIE MALNUTRITION (H): ICD-10-CM

## 2023-03-29 DIAGNOSIS — Z93.1 FEEDING BY G-TUBE (H): ICD-10-CM

## 2023-03-29 PROCEDURE — 99214 OFFICE O/P EST MOD 30 MIN: CPT | Performed by: INTERNAL MEDICINE

## 2023-03-29 ASSESSMENT — ASTHMA QUESTIONNAIRES: ACT_TOTALSCORE: 21

## 2023-03-29 ASSESSMENT — PATIENT HEALTH QUESTIONNAIRE - PHQ9
SUM OF ALL RESPONSES TO PHQ QUESTIONS 1-9: 9
10. IF YOU CHECKED OFF ANY PROBLEMS, HOW DIFFICULT HAVE THESE PROBLEMS MADE IT FOR YOU TO DO YOUR WORK, TAKE CARE OF THINGS AT HOME, OR GET ALONG WITH OTHER PEOPLE: NOT DIFFICULT AT ALL

## 2023-03-29 ASSESSMENT — PAIN SCALES - GENERAL: PAINLEVEL: NO PAIN (0)

## 2023-03-29 ASSESSMENT — ANXIETY QUESTIONNAIRES: GAD7 TOTAL SCORE: 0

## 2023-03-29 NOTE — PROGRESS NOTES
Assessment & Plan     Acute respiratory failure with hypoxia (H)  discussed with patient (or patient's parents/caregiver) pathophysiology of condition and treatment options.  Reviewed the most recent recommendations from Dr. Diaz including continued azithromycin 3 times a week as well as additional antibiotics and steroids pending any exacerbation.  Continue with blow-by oxygen at night and spotcheck O2 sats as needed.  We will go ahead and check labs today including nutritional labs.  - Prealbumin; Future  - Comprehensive metabolic panel (BMP + Alb, Alk Phos, ALT, AST, Total. Bili, TP); Future  - Vitamin B12; Future  - Folate; Future  - CBC with platelets and differential; Future  - Phosphorus; Future  - Magnesium; Future  - TSH with free T4 reflex; Future  - Ferritin; Future    Mild persistent asthma in adult without complication  as above, continue cares and plan, labs today.   - Prealbumin; Future  - Comprehensive metabolic panel (BMP + Alb, Alk Phos, ALT, AST, Total. Bili, TP); Future  - Vitamin B12; Future  - Folate; Future  - CBC with platelets and differential; Future  - Phosphorus; Future  - Magnesium; Future  - TSH with free T4 reflex; Future  - Ferritin; Future    Feeding by G-tube (H)  tolerating feeds well  - Prealbumin; Future  - Comprehensive metabolic panel (BMP + Alb, Alk Phos, ALT, AST, Total. Bili, TP); Future  - Vitamin B12; Future  - Folate; Future  - CBC with platelets and differential; Future  - Phosphorus; Future  - Magnesium; Future  - TSH with free T4 reflex; Future  - Ferritin; Future    Seizure disorder (H)  discussed with patient (or patient's parents/caregiver) pathophysiology of condition and treatment options.  Well controlled on current medication(s).  Follow-up with neurology as scheduled.  - Prealbumin; Future  - Comprehensive metabolic panel (BMP + Alb, Alk Phos, ALT, AST, Total. Bili, TP); Future  - Vitamin B12; Future  - Folate; Future  - CBC with platelets and  differential; Future  - Phosphorus; Future  - Magnesium; Future  - TSH with free T4 reflex; Future  - Ferritin; Future    Mild protein-calorie malnutrition (H)  as above, due for labs  - Prealbumin; Future  - Comprehensive metabolic panel (BMP + Alb, Alk Phos, ALT, AST, Total. Bili, TP); Future  - Vitamin B12; Future  - Folate; Future  - CBC with platelets and differential; Future  - Phosphorus; Future  - Magnesium; Future  - TSH with free T4 reflex; Future  - Ferritin; Future             See Patient Instructions    Micky Leyva MD  [facility] JEAN De La Rosa is a 27 year old, presenting for the following health issues:  Foot Problems (Discoloration in bilateral feet)  No flowsheet data found.  History of Present Illness       Mental Health Follow-up:  Patient presents to follow-up on Depression & Anxiety.Patient's depression since last visit has been:  No change  The patient is not having other symptoms associated with depression.  Patient's anxiety since last visit has been:  No change  The patient is not having other symptoms associated with anxiety.  Any significant life events: No  Patient is feeling anxious or having panic attacks.  Patient has no concerns about alcohol or drug use.    Reason for visit:  Follw up    He eats 0-1 servings of fruits and vegetables daily.He consumes 0 sweetened beverage(s) daily.He exercises with enough effort to increase his heart rate 9 or less minutes per day.  He exercises with enough effort to increase his heart rate 3 or less days per week.   He is taking medications regularly.    Today's PHQ-9         PHQ-9 Total Score: 9    PHQ-9 Q9 Thoughts of better off dead/self-harm past 2 weeks :   Not at all    How difficult have these problems made it for you to do your work, take care of things at home, or get along with other people: Not difficult at all  Today's RIKKI-7 Score: 0     did have a cold and did do steroid and bnebs and did better. has  "gained some weight and happy about this. they had longerm backorder on the tube fed solution so he went back on pediasure fo awile and this did disrupt his GI tract some. seems better now. is looking forward to getting richard out of the house more. tolerting all medications without side effects. No other concerns or complaints today.         Review of Systems   Constitutional, HEENT, cardiovascular, pulmonary, gi and gu systems are negative, except as otherwise noted.      Objective    /74   Pulse 96   Temp 97.5  F (36.4  C) (Axillary)   Resp 20   Ht 1.575 m (5' 2\")   Wt 39 kg (86 lb)   SpO2 95%   BMI 15.73 kg/m    Body mass index is 15.73 kg/m .     Wt Readings from Last 4 Encounters:   03/29/23 39 kg (86 lb)   12/28/22 39.5 kg (87 lb)   08/31/22 37.2 kg (82 lb)   05/11/22 39.6 kg (87 lb 3.2 oz)       Physical Exam   GENERAL:  alert and no distress  Heent: normocephalic and atrumatic mucous membranes moist   RESP: lungs clear to auscultation - no rales, rhonchi or wheezes  CV: regular rate and rhythm, normal S1 S2, no S3 or S4, no murmur, click or rub, no peripheral edema   ABDOMEN: soft, nontender, no hepatosplenomegaly, no masses and bowel sounds normal; GT in place  MS: no new gross musculoskeletal defects noted, no edema  SKIN: no suspicious lesions or rashes  NEURO:  mentation intact and speech at basleine  PSYCH: mentation appears normal, affect normal/bright                     "

## 2023-03-30 DIAGNOSIS — Z79.899 NEED FOR PROPHYLACTIC CHEMOTHERAPY: Primary | ICD-10-CM

## 2023-04-04 ENCOUNTER — LAB (OUTPATIENT)
Dept: LAB | Facility: CLINIC | Age: 28
End: 2023-04-04
Payer: MEDICAID

## 2023-04-04 DIAGNOSIS — E44.1 MILD PROTEIN-CALORIE MALNUTRITION (H): ICD-10-CM

## 2023-04-04 DIAGNOSIS — G40.909 SEIZURE DISORDER (H): ICD-10-CM

## 2023-04-04 DIAGNOSIS — J45.30 MILD PERSISTENT ASTHMA IN ADULT WITHOUT COMPLICATION: ICD-10-CM

## 2023-04-04 DIAGNOSIS — Z93.1 FEEDING BY G-TUBE (H): ICD-10-CM

## 2023-04-04 DIAGNOSIS — Z79.899 NEED FOR PROPHYLACTIC CHEMOTHERAPY: ICD-10-CM

## 2023-04-04 DIAGNOSIS — J96.01 ACUTE RESPIRATORY FAILURE WITH HYPOXIA (H): ICD-10-CM

## 2023-04-04 LAB
ALBUMIN SERPL BCG-MCNC: 4.9 G/DL (ref 3.5–5.2)
ALP SERPL-CCNC: 94 U/L (ref 40–129)
ALT SERPL W P-5'-P-CCNC: 18 U/L (ref 10–50)
AMMONIA PLAS-SCNC: 30 UMOL/L (ref 16–60)
ANION GAP SERPL CALCULATED.3IONS-SCNC: 12 MMOL/L (ref 7–15)
AST SERPL W P-5'-P-CCNC: 50 U/L (ref 10–50)
BASOPHILS # BLD AUTO: 0 10E3/UL (ref 0–0.2)
BASOPHILS NFR BLD AUTO: 0 %
BILIRUB DIRECT SERPL-MCNC: 0.41 MG/DL (ref 0–0.3)
BILIRUB SERPL-MCNC: 1.1 MG/DL
BUN SERPL-MCNC: 14.7 MG/DL (ref 6–20)
CALCIUM SERPL-MCNC: 10.8 MG/DL (ref 8.6–10)
CHLORIDE SERPL-SCNC: 100 MMOL/L (ref 98–107)
CREAT SERPL-MCNC: 0.87 MG/DL (ref 0.67–1.17)
DEPRECATED HCO3 PLAS-SCNC: 29 MMOL/L (ref 22–29)
EOSINOPHIL # BLD AUTO: 0 10E3/UL (ref 0–0.7)
EOSINOPHIL NFR BLD AUTO: 1 %
ERYTHROCYTE [DISTWIDTH] IN BLOOD BY AUTOMATED COUNT: 13.8 % (ref 10–15)
FERRITIN SERPL-MCNC: 97 NG/ML (ref 31–409)
FOLATE SERPL-MCNC: 16.7 NG/ML (ref 4.6–34.8)
GFR SERPL CREATININE-BSD FRML MDRD: >90 ML/MIN/1.73M2
GLUCOSE SERPL-MCNC: 77 MG/DL (ref 70–99)
HCT VFR BLD AUTO: 50.6 % (ref 40–53)
HGB BLD-MCNC: 16.5 G/DL (ref 13.3–17.7)
IMM GRANULOCYTES # BLD: 0 10E3/UL
IMM GRANULOCYTES NFR BLD: 1 %
LYMPHOCYTES # BLD AUTO: 2.5 10E3/UL (ref 0.8–5.3)
LYMPHOCYTES NFR BLD AUTO: 54 %
MAGNESIUM SERPL-MCNC: 2.3 MG/DL (ref 1.7–2.3)
MCH RBC QN AUTO: 35.5 PG (ref 26.5–33)
MCHC RBC AUTO-ENTMCNC: 32.6 G/DL (ref 31.5–36.5)
MCV RBC AUTO: 109 FL (ref 78–100)
MONOCYTES # BLD AUTO: 0.3 10E3/UL (ref 0–1.3)
MONOCYTES NFR BLD AUTO: 7 %
NEUTROPHILS # BLD AUTO: 1.7 10E3/UL (ref 1.6–8.3)
NEUTROPHILS NFR BLD AUTO: 37 %
NRBC # BLD AUTO: 0 10E3/UL
NRBC BLD AUTO-RTO: 0 /100
PHOSPHATE SERPL-MCNC: 3.8 MG/DL (ref 2.5–4.5)
PLATELET # BLD AUTO: 87 10E3/UL (ref 150–450)
POTASSIUM SERPL-SCNC: 4.5 MMOL/L (ref 3.4–5.3)
PROT SERPL-MCNC: 8.3 G/DL (ref 6.4–8.3)
RBC # BLD AUTO: 4.65 10E6/UL (ref 4.4–5.9)
SODIUM SERPL-SCNC: 141 MMOL/L (ref 136–145)
T4 FREE SERPL-MCNC: 0.84 NG/DL (ref 0.9–1.7)
TSH SERPL DL<=0.005 MIU/L-ACNC: 5.21 UIU/ML (ref 0.3–4.2)
VALPROATE SERPL-MCNC: 137 UG/ML
VIT B12 SERPL-MCNC: 1538 PG/ML (ref 232–1245)
WBC # BLD AUTO: 4.6 10E3/UL (ref 4–11)

## 2023-04-04 PROCEDURE — 80165 DIPROPYLACETIC ACID FREE: CPT | Mod: 90

## 2023-04-04 PROCEDURE — 82728 ASSAY OF FERRITIN: CPT

## 2023-04-04 PROCEDURE — 82746 ASSAY OF FOLIC ACID SERUM: CPT

## 2023-04-04 PROCEDURE — 83735 ASSAY OF MAGNESIUM: CPT

## 2023-04-04 PROCEDURE — 80339 ANTIEPILEPTICS NOS 1-3: CPT | Mod: 90

## 2023-04-04 PROCEDURE — 84100 ASSAY OF PHOSPHORUS: CPT

## 2023-04-04 PROCEDURE — 80164 ASSAY DIPROPYLACETIC ACD TOT: CPT

## 2023-04-04 PROCEDURE — 82607 VITAMIN B-12: CPT

## 2023-04-04 PROCEDURE — 80050 GENERAL HEALTH PANEL: CPT

## 2023-04-04 PROCEDURE — 36415 COLL VENOUS BLD VENIPUNCTURE: CPT

## 2023-04-04 PROCEDURE — 82140 ASSAY OF AMMONIA: CPT

## 2023-04-04 PROCEDURE — 84134 ASSAY OF PREALBUMIN: CPT

## 2023-04-04 PROCEDURE — 84439 ASSAY OF FREE THYROXINE: CPT

## 2023-04-04 PROCEDURE — 82248 BILIRUBIN DIRECT: CPT

## 2023-04-04 PROCEDURE — 99000 SPECIMEN HANDLING OFFICE-LAB: CPT

## 2023-04-05 LAB — PREALB SERPL IA-MCNC: 33 MG/DL (ref 15–45)

## 2023-04-06 DIAGNOSIS — Z79.899 ENCOUNTER FOR LONG-TERM (CURRENT) USE OF HIGH-RISK MEDICATION: Primary | ICD-10-CM

## 2023-04-07 LAB
VALPROATE FREE MFR SERPL: 22 %
VALPROATE FREE SERPL-MCNC: 34 UG/ML
VALPROATE SERPL-MCNC: 158 UG/ML

## 2023-04-10 LAB
CLOBAZAM SERPL-MCNC: 206 NG/ML
NORCLOBAZAM SERPL-MCNC: 707 NG/ML

## 2023-06-28 ENCOUNTER — OFFICE VISIT (OUTPATIENT)
Dept: PEDIATRICS | Facility: CLINIC | Age: 28
End: 2023-06-28
Payer: MEDICAID

## 2023-06-28 VITALS
HEIGHT: 62 IN | BODY MASS INDEX: 18.11 KG/M2 | RESPIRATION RATE: 18 BRPM | WEIGHT: 98.4 LBS | SYSTOLIC BLOOD PRESSURE: 94 MMHG | HEART RATE: 80 BPM | OXYGEN SATURATION: 96 % | TEMPERATURE: 97.9 F | DIASTOLIC BLOOD PRESSURE: 72 MMHG

## 2023-06-28 DIAGNOSIS — E44.1 MILD PROTEIN-CALORIE MALNUTRITION (H): ICD-10-CM

## 2023-06-28 DIAGNOSIS — G40.909 SEIZURE DISORDER (H): ICD-10-CM

## 2023-06-28 DIAGNOSIS — Z93.1 FEEDING BY G-TUBE (H): ICD-10-CM

## 2023-06-28 DIAGNOSIS — D69.6 THROMBOCYTOPENIA (H): ICD-10-CM

## 2023-06-28 DIAGNOSIS — Z93.1 GASTROSTOMY TUBE DEPENDENT (H): ICD-10-CM

## 2023-06-28 DIAGNOSIS — E03.9 HYPOTHYROIDISM, UNSPECIFIED TYPE: Primary | ICD-10-CM

## 2023-06-28 LAB
ALBUMIN SERPL BCG-MCNC: 4.5 G/DL (ref 3.5–5.2)
ALP SERPL-CCNC: 82 U/L (ref 40–129)
ALT SERPL W P-5'-P-CCNC: 16 U/L (ref 0–70)
ANION GAP SERPL CALCULATED.3IONS-SCNC: 9 MMOL/L (ref 7–15)
AST SERPL W P-5'-P-CCNC: 36 U/L (ref 0–45)
BASOPHILS # BLD AUTO: 0 10E3/UL (ref 0–0.2)
BASOPHILS NFR BLD AUTO: 1 %
BILIRUB SERPL-MCNC: 0.6 MG/DL
BUN SERPL-MCNC: 16.3 MG/DL (ref 6–20)
CALCIUM SERPL-MCNC: 9.7 MG/DL (ref 8.6–10)
CHLORIDE SERPL-SCNC: 102 MMOL/L (ref 98–107)
CREAT SERPL-MCNC: 0.79 MG/DL (ref 0.67–1.17)
DEPRECATED HCO3 PLAS-SCNC: 29 MMOL/L (ref 22–29)
EOSINOPHIL # BLD AUTO: 0 10E3/UL (ref 0–0.7)
EOSINOPHIL NFR BLD AUTO: 0 %
ERYTHROCYTE [DISTWIDTH] IN BLOOD BY AUTOMATED COUNT: 13.4 % (ref 10–15)
GFR SERPL CREATININE-BSD FRML MDRD: >90 ML/MIN/1.73M2
GLUCOSE SERPL-MCNC: 84 MG/DL (ref 70–99)
HBA1C MFR BLD: 5.3 % (ref 0–5.6)
HCT VFR BLD AUTO: 47.5 % (ref 40–53)
HGB BLD-MCNC: 15.8 G/DL (ref 13.3–17.7)
IMM GRANULOCYTES # BLD: 0.1 10E3/UL
IMM GRANULOCYTES NFR BLD: 1 %
INR PPP: 1.02 (ref 0.85–1.15)
LYMPHOCYTES # BLD AUTO: 2.4 10E3/UL (ref 0.8–5.3)
LYMPHOCYTES NFR BLD AUTO: 43 %
MCH RBC QN AUTO: 35.2 PG (ref 26.5–33)
MCHC RBC AUTO-ENTMCNC: 33.3 G/DL (ref 31.5–36.5)
MCV RBC AUTO: 106 FL (ref 78–100)
MONOCYTES # BLD AUTO: 0.7 10E3/UL (ref 0–1.3)
MONOCYTES NFR BLD AUTO: 13 %
NEUTROPHILS # BLD AUTO: 2.2 10E3/UL (ref 1.6–8.3)
NEUTROPHILS NFR BLD AUTO: 42 %
NRBC # BLD AUTO: 0 10E3/UL
NRBC BLD AUTO-RTO: 0 /100
PLATELET # BLD AUTO: 82 10E3/UL (ref 150–450)
POTASSIUM SERPL-SCNC: 4.4 MMOL/L (ref 3.4–5.3)
PROT SERPL-MCNC: 7.2 G/DL (ref 6.4–8.3)
RBC # BLD AUTO: 4.49 10E6/UL (ref 4.4–5.9)
SODIUM SERPL-SCNC: 140 MMOL/L (ref 136–145)
TSH SERPL DL<=0.005 MIU/L-ACNC: 1.61 UIU/ML (ref 0.3–4.2)
WBC # BLD AUTO: 5.3 10E3/UL (ref 4–11)

## 2023-06-28 PROCEDURE — 36415 COLL VENOUS BLD VENIPUNCTURE: CPT | Performed by: INTERNAL MEDICINE

## 2023-06-28 PROCEDURE — 84134 ASSAY OF PREALBUMIN: CPT | Performed by: INTERNAL MEDICINE

## 2023-06-28 PROCEDURE — 80061 LIPID PANEL: CPT | Performed by: INTERNAL MEDICINE

## 2023-06-28 PROCEDURE — 99214 OFFICE O/P EST MOD 30 MIN: CPT | Performed by: INTERNAL MEDICINE

## 2023-06-28 PROCEDURE — 80050 GENERAL HEALTH PANEL: CPT | Performed by: INTERNAL MEDICINE

## 2023-06-28 PROCEDURE — 83036 HEMOGLOBIN GLYCOSYLATED A1C: CPT | Performed by: INTERNAL MEDICINE

## 2023-06-28 PROCEDURE — 85610 PROTHROMBIN TIME: CPT | Performed by: INTERNAL MEDICINE

## 2023-06-28 ASSESSMENT — PATIENT HEALTH QUESTIONNAIRE - PHQ9
SUM OF ALL RESPONSES TO PHQ QUESTIONS 1-9: 3
SUM OF ALL RESPONSES TO PHQ QUESTIONS 1-9: 3
10. IF YOU CHECKED OFF ANY PROBLEMS, HOW DIFFICULT HAVE THESE PROBLEMS MADE IT FOR YOU TO DO YOUR WORK, TAKE CARE OF THINGS AT HOME, OR GET ALONG WITH OTHER PEOPLE: NOT DIFFICULT AT ALL

## 2023-06-28 ASSESSMENT — PAIN SCALES - GENERAL: PAINLEVEL: NO PAIN (0)

## 2023-06-28 NOTE — PROGRESS NOTES
Assessment & Plan     Hypothyroidism, unspecified type  discussed with patient (or patient's parents/caregiver) pathophysiology of condition and treatment options.  Given his physical Jair today is unremarkable and his chronic medical additions to previous baseline.  I am glad he has close follow-up with Dr. Monet coming up and weight adjusting his medications does make sense given his weight gain.  We did start him on levothyroxine recently due to an increased TSH and his TSH today is normal.  Did review the patient's mother it is possible now that he is due thyroid he may be able to absorb or maintain calories and weight better than when he was hypothyroid.  Rj's complex history and his small habitus may make exacerbations or impacts of thyroid disorders more prevalent.  Did review that hypothyroidism usually causes weight gain and sluggishness etc. however these things are not set in stone.  I am happy he has had some weight gain.  I am happy is tolerating his feeds well.  His prealbumin and albumin are within normal limits and his platelets are stable at 82.  Mom will share this with the patient's neurologist as well as Dr. Soto we will plan to see Rj again in 3 months sooner if anything comes up.  Patient's mother is well aware that I will add the patient onto my schedule at any time if he needs an acute visit.    Mild protein-calorie malnutrition (H)  as above, continue tube feeds.   - INR; Future  - CBC with platelets and differential; Future  - Hemoglobin A1c; Future  - Lipid panel reflex to direct LDL Non-fasting; Future  - Prealbumin; Future  - Comprehensive metabolic panel (BMP + Alb, Alk Phos, ALT, AST, Total. Bili, TP); Future  - TSH with free T4 reflex; Future  - INR  - CBC with platelets and differential  - Hemoglobin A1c  - Lipid panel reflex to direct LDL Non-fasting  - Prealbumin  - Comprehensive metabolic panel (BMP + Alb, Alk Phos, ALT, AST, Total. Bili, TP)  - TSH with free T4  "reflex    Thrombocytopenia (H)  as above, pkts stable. will follow.   - INR; Future  - CBC with platelets and differential; Future  - INR  - CBC with platelets and differential    Feeding by G-tube (H)  as above, continue tube feeds.     Seizure disorder (H)  discussed with patient (or patient's parents/caregiver) pathophysiology of condition and treatment options.  follow-up with neuro as scheduled, patient's thrombocytopenia could be related to the valproic acid however this is worked very well for the patient has been seizure-free for quite some time.  Did review the patient's mother that platelets in the 80s are less than the \"normal\" range however this is a safe range and should not result in any bleeding diatheses.  Patient I will review this again with the neurologist at the next follow-up visit.  We will continue to follow platelets as well.    Gastrostomy tube dependent (H)  as above        Return in about 3 months (around 9/28/2023) for Next scheduled follow-up visit, or sooner if symptoms persist or worsen.      Micky Leyva MD  [facility] JEAN De La Rosa is a 27 year old, presenting for the following health issues:  RECHECK (Fatigue and sleep)        6/28/2023    12:53 PM   Additional Questions   Roomed by Ava STEVE   Accompanied by mother         6/28/2023    12:53 PM   Patient Reported Additional Medications   Patient reports taking the following new medications med list reviwed     History of Present Illness       Reason for visit:  Follow up    He eats 0-1 servings of fruits and vegetables daily.He consumes 0 sweetened beverage(s) daily.He exercises with enough effort to increase his heart rate 9 or less minutes per day.  He exercises with enough effort to increase his heart rate 3 or less days per week.   He is taking medications regularly.    Today's PHQ-9         PHQ-9 Total Score: 3    PHQ-9 Q9 Thoughts of better off dead/self-harm past 2 weeks :   Not at " "all    How difficult have these problems made it for you to do your work, take care of things at home, or get along with other people: Not difficult at all     Rj presents today with his mother for regular scheduled 3-month follow-up.  Of note his weight is up to almost 99 pounds.  He did recently see Dr. Kyler Squires in pulmonary and they did increase his azithromycin dose given his weight gain.  This does seem to help the coughing that he has some.  They are using the oxygen at night but only when his sats do go down below 80 to 90%.  Patient's mother is somewhat worried about him as she cannot really understand why he is gaining this weight and she has noted occasional bruising on his lower extremities.  She has not noted any blood in his urine stool or in his mouth.  She denies any evidence of petechiae.  Overall he seems to be otherwise his usual self and he does have follow-up appoint with pulmonary coming up in the next 2 weeks.  He has not any fevers.  Continues to get tube feeds which he tolerates well.  He is due for recheck of his thyroid.  No other concerns or complaints today.        Review of Systems   Constitutional, HEENT, cardiovascular, pulmonary, gi and gu systems are negative, except as otherwise noted.      Objective    BP 94/72   Pulse 96   Temp 97.9  F (36.6  C) (Axillary)   Resp 24   Ht 1.575 m (5' 2\")   SpO2 96%   BMI 15.73 kg/m    Body mass index is 15.73 kg/m .     Wt Readings from Last 5 Encounters:   06/28/23 44.6 kg (98 lb 6.4 oz)   03/29/23 39 kg (86 lb)   12/28/22 39.5 kg (87 lb)   08/31/22 37.2 kg (82 lb)   05/11/22 39.6 kg (87 lb 3.2 oz)       Physical Exam   GENERAL: awake and alert, sitting up in chair lisnting to music.  HENT: normocephalic and atrumatic, mucous membranes moist   RESP: lungs clear to auscultation - no rales, rhonchi or wheezes; no cough during visit.   CV: regular rate and rhythm, normal S1 S2, no S3 or S4, no murmur, click or rub, no peripheral edema "   ABDOMEN: soft, nontender bowel sounds normal; GT in place  MS: no gross musculoskeletal defects noted, no edema' mm mass symmetirc  SKIN: no suspicious lesions or rashes; a few small < 2 cm ecchymoses on antrior legs; no petiachae.  NEURO: at baseline for speech, tone, and strength  PSYCH: mentation appears at baseline, is able to answer yes/no questions        Results for orders placed or performed in visit on 06/28/23   INR     Status: Normal   Result Value Ref Range    INR 1.02 0.85 - 1.15   Hemoglobin A1c     Status: Normal   Result Value Ref Range    Hemoglobin A1C 5.3 0.0 - 5.6 %   Lipid panel reflex to direct LDL Non-fasting     Status: Normal   Result Value Ref Range    Cholesterol 163 <200 mg/dL    Triglycerides 72 <150 mg/dL    Direct Measure HDL 70 >=40 mg/dL    LDL Cholesterol Calculated 79 <=100 mg/dL    Non HDL Cholesterol 93 <130 mg/dL    Narrative    Cholesterol  Desirable:  <200 mg/dL    Triglycerides  Normal:  Less than 150 mg/dL  Borderline High:  150-199 mg/dL  High:  200-499 mg/dL  Very High:  Greater than or equal to 500 mg/dL    Direct Measure HDL  Female:  Greater than or equal to 50 mg/dL   Male:  Greater than or equal to 40 mg/dL    LDL Cholesterol  Desirable:  <100mg/dL  Above Desirable:  100-129 mg/dL   Borderline High:  130-159 mg/dL   High:  160-189 mg/dL   Very High:  >= 190 mg/dL    Non HDL Cholesterol  Desirable:  130 mg/dL  Above Desirable:  130-159 mg/dL  Borderline High:  160-189 mg/dL  High:  190-219 mg/dL  Very High:  Greater than or equal to 220 mg/dL   Prealbumin     Status: Normal   Result Value Ref Range    Prealbumin 29 15 - 45 mg/dL   Comprehensive metabolic panel (BMP + Alb, Alk Phos, ALT, AST, Total. Bili, TP)     Status: Normal   Result Value Ref Range    Sodium 140 136 - 145 mmol/L    Potassium 4.4 3.4 - 5.3 mmol/L    Chloride 102 98 - 107 mmol/L    Carbon Dioxide (CO2) 29 22 - 29 mmol/L    Anion Gap 9 7 - 15 mmol/L    Urea Nitrogen 16.3 6.0 - 20.0 mg/dL     Creatinine 0.79 0.67 - 1.17 mg/dL    Calcium 9.7 8.6 - 10.0 mg/dL    Glucose 84 70 - 99 mg/dL    Alkaline Phosphatase 82 40 - 129 U/L    AST 36 0 - 45 U/L    ALT 16 0 - 70 U/L    Protein Total 7.2 6.4 - 8.3 g/dL    Albumin 4.5 3.5 - 5.2 g/dL    Bilirubin Total 0.6 <=1.2 mg/dL    GFR Estimate >90 >60 mL/min/1.73m2   TSH with free T4 reflex     Status: Normal   Result Value Ref Range    TSH 1.61 0.30 - 4.20 uIU/mL   CBC with platelets and differential     Status: Abnormal   Result Value Ref Range    WBC Count 5.3 4.0 - 11.0 10e3/uL    RBC Count 4.49 4.40 - 5.90 10e6/uL    Hemoglobin 15.8 13.3 - 17.7 g/dL    Hematocrit 47.5 40.0 - 53.0 %     (H) 78 - 100 fL    MCH 35.2 (H) 26.5 - 33.0 pg    MCHC 33.3 31.5 - 36.5 g/dL    RDW 13.4 10.0 - 15.0 %    Platelet Count 82 (L) 150 - 450 10e3/uL    % Neutrophils 42 %    % Lymphocytes 43 %    % Monocytes 13 %    % Eosinophils 0 %    % Basophils 1 %    % Immature Granulocytes 1 %    NRBCs per 100 WBC 0 <1 /100    Absolute Neutrophils 2.2 1.6 - 8.3 10e3/uL    Absolute Lymphocytes 2.4 0.8 - 5.3 10e3/uL    Absolute Monocytes 0.7 0.0 - 1.3 10e3/uL    Absolute Eosinophils 0.0 0.0 - 0.7 10e3/uL    Absolute Basophils 0.0 0.0 - 0.2 10e3/uL    Absolute Immature Granulocytes 0.1 <=0.4 10e3/uL    Absolute NRBCs 0.0 10e3/uL   CBC with platelets and differential     Status: Abnormal    Narrative    The following orders were created for panel order CBC with platelets and differential.  Procedure                               Abnormality         Status                     ---------                               -----------         ------                     CBC with platelets and d...[[id number]]  Abnormal            Final result                 Please view results for these tests on the individual orders.

## 2023-06-29 LAB
CHOLEST SERPL-MCNC: 163 MG/DL
HDLC SERPL-MCNC: 70 MG/DL
LDLC SERPL CALC-MCNC: 79 MG/DL
NONHDLC SERPL-MCNC: 93 MG/DL
PREALB SERPL IA-MCNC: 29 MG/DL (ref 15–45)
TRIGL SERPL-MCNC: 72 MG/DL

## 2023-07-03 DIAGNOSIS — K59.09 CHRONIC CONSTIPATION: ICD-10-CM

## 2023-07-05 RX ORDER — POLYETHYLENE GLYCOL 3350 17 G/17G
POWDER, FOR SOLUTION ORAL
Qty: 510 G | Refills: 3 | Status: SHIPPED | OUTPATIENT
Start: 2023-07-05 | End: 2023-10-31

## 2023-07-05 NOTE — TELEPHONE ENCOUNTER
Prescription approved per [facility] Refill Protocol.  Anjelica BONNER RN, BSN

## 2023-07-21 ENCOUNTER — ANCILLARY PROCEDURE (OUTPATIENT)
Dept: GENERAL RADIOLOGY | Facility: CLINIC | Age: 28
End: 2023-07-21
Attending: NURSE PRACTITIONER
Payer: MEDICAID

## 2023-07-21 ENCOUNTER — OFFICE VISIT (OUTPATIENT)
Dept: PEDIATRICS | Facility: CLINIC | Age: 28
End: 2023-07-21
Payer: MEDICAID

## 2023-07-21 ENCOUNTER — MYC MEDICAL ADVICE (OUTPATIENT)
Dept: PEDIATRICS | Facility: CLINIC | Age: 28
End: 2023-07-21

## 2023-07-21 ENCOUNTER — NURSE TRIAGE (OUTPATIENT)
Dept: PEDIATRICS | Facility: CLINIC | Age: 28
End: 2023-07-21

## 2023-07-21 VITALS
OXYGEN SATURATION: 94 % | HEART RATE: 119 BPM | SYSTOLIC BLOOD PRESSURE: 104 MMHG | TEMPERATURE: 98.2 F | DIASTOLIC BLOOD PRESSURE: 70 MMHG | RESPIRATION RATE: 16 BRPM

## 2023-07-21 DIAGNOSIS — G80.1 CP (CEREBRAL PALSY), SPASTIC, DIPLEGIC (H): Primary | ICD-10-CM

## 2023-07-21 DIAGNOSIS — S99.922A FOOT INJURY, LEFT, INITIAL ENCOUNTER: ICD-10-CM

## 2023-07-21 DIAGNOSIS — S99.912A ANKLE INJURY, LEFT, INITIAL ENCOUNTER: ICD-10-CM

## 2023-07-21 DIAGNOSIS — R62.52 SHORT STATURE: ICD-10-CM

## 2023-07-21 DIAGNOSIS — Z91.81 AT HIGH RISK FOR FALLS: ICD-10-CM

## 2023-07-21 DIAGNOSIS — D69.6 THROMBOCYTOPENIA (H): ICD-10-CM

## 2023-07-21 LAB
ANION GAP SERPL CALCULATED.3IONS-SCNC: 13 MMOL/L (ref 7–15)
BUN SERPL-MCNC: 17.4 MG/DL (ref 6–20)
CALCIUM SERPL-MCNC: 9.8 MG/DL (ref 8.6–10)
CHLORIDE SERPL-SCNC: 102 MMOL/L (ref 98–107)
CREAT SERPL-MCNC: 0.8 MG/DL (ref 0.67–1.17)
DEPRECATED HCO3 PLAS-SCNC: 25 MMOL/L (ref 22–29)
ERYTHROCYTE [DISTWIDTH] IN BLOOD BY AUTOMATED COUNT: 14.1 % (ref 10–15)
GFR SERPL CREATININE-BSD FRML MDRD: >90 ML/MIN/1.73M2
GLUCOSE SERPL-MCNC: 97 MG/DL (ref 70–99)
HCT VFR BLD AUTO: 46.3 % (ref 40–53)
HGB BLD-MCNC: 15.2 G/DL (ref 13.3–17.7)
MCH RBC QN AUTO: 34.8 PG (ref 26.5–33)
MCHC RBC AUTO-ENTMCNC: 32.8 G/DL (ref 31.5–36.5)
MCV RBC AUTO: 106 FL (ref 78–100)
PLATELET # BLD AUTO: 72 10E3/UL (ref 150–450)
POTASSIUM SERPL-SCNC: 4.1 MMOL/L (ref 3.4–5.3)
RBC # BLD AUTO: 4.37 10E6/UL (ref 4.4–5.9)
SODIUM SERPL-SCNC: 140 MMOL/L (ref 136–145)
WBC # BLD AUTO: 5.6 10E3/UL (ref 4–11)

## 2023-07-21 PROCEDURE — 99215 OFFICE O/P EST HI 40 MIN: CPT | Performed by: NURSE PRACTITIONER

## 2023-07-21 PROCEDURE — 73610 X-RAY EXAM OF ANKLE: CPT | Mod: TC | Performed by: RADIOLOGY

## 2023-07-21 PROCEDURE — 73630 X-RAY EXAM OF FOOT: CPT | Mod: TC | Performed by: RADIOLOGY

## 2023-07-21 PROCEDURE — 36415 COLL VENOUS BLD VENIPUNCTURE: CPT | Performed by: NURSE PRACTITIONER

## 2023-07-21 PROCEDURE — 99417 PROLNG OP E/M EACH 15 MIN: CPT | Performed by: NURSE PRACTITIONER

## 2023-07-21 PROCEDURE — 80048 BASIC METABOLIC PNL TOTAL CA: CPT | Performed by: NURSE PRACTITIONER

## 2023-07-21 PROCEDURE — 85027 COMPLETE CBC AUTOMATED: CPT | Performed by: NURSE PRACTITIONER

## 2023-07-21 ASSESSMENT — ENCOUNTER SYMPTOMS
CONSTITUTIONAL NEGATIVE: 1
HEMATOLOGIC/LYMPHATIC NEGATIVE: 1
ARTHRALGIAS: 1
RESPIRATORY NEGATIVE: 1
CARDIOVASCULAR NEGATIVE: 1
COLOR CHANGE: 0
WOUND: 0

## 2023-07-21 NOTE — TELEPHONE ENCOUNTER
"Patient's mom is concerned that patient is unable to bear weight on ankle. She states that there is redness and the ankle/foot is warm to the touch. She would like patient to be seen. Scheduled patient to see VÍCTOR Kelley at 10:30am (10:10 arrival). Patient mom agreeable to plan and would like patient evaluated today.    Reason for Disposition    Redness and painful when touched and no fever    Answer Assessment - Initial Assessment Questions  1. LOCATION: \"Which ankle is swollen?\" \"Where is the swelling?\"      Left ankle, Bruising on the outside, swelling on outside \"below the ankle bone\"  2. ONSET: \"When did the swelling start?\"      Last Night  3. SWELLING: \"How bad is the swelling?\" Or, \"How large is it?\" (e.g., mild, moderate, severe; size of localized swelling)     - NONE: No joint swelling.    - LOCALIZED: Localized; small area of puffy or swollen skin (e.g., insect bite, skin irritation).    - MILD: Joint looks or feels mildly swollen or puffy.    - MODERATE: Swollen; interferes with normal activities (e.g., work or school); decreased range of movement; may be limping.    - SEVERE: Very swollen; can't move swollen joint at all; limping a lot or unable to walk.      Mild  4. PAIN: \"Is there any pain?\" If Yes, ask: \"How bad is it?\" (Scale 1-10; or mild, moderate, severe)    - NONE (0): no pain.    - MILD (1-3): doesn't interfere with normal activities.     - MODERATE (4-7): interferes with normal activities (e.g., work or school) or awakens from sleep, limping.     - SEVERE (8-10): excruciating pain, unable to do any normal activities, unable to walk.       Moderate Pain - can not stand on it or put pressure  5. CAUSE: \"What do you think caused the ankle swelling?\"      Patient was kicking yesterday due to a \"tremendous melt down\". He may have hit his ankle then  6. OTHER SYMPTOMS: \"Do you have any other symptoms?\" (e.g., fever, chest pain, difficulty breathing, calf pain)      Nope  7. PREGNANCY: \"Is there " "any chance you are pregnant?\" \"When was your last menstrual period?\"      No    Protocols used: ANKLE SWELLING-A-[place]    Anjelica BONNER RN, BSN    "

## 2023-07-21 NOTE — PROGRESS NOTES
Chief Complaint: left ankle, foot pain      Foot injury, left, initial encounter  Left Ankle Injury-unwitnessed  Ankle injury, left, initial encounter  CP (cerebral palsy), spastic, diplegic (H)  (primary encounter diagnosis)      Patient endured unwitnessed injury left ankle or foot last night.    Mother denies fall and guesses it is likely patient injured left ankle by possibly thrashing against the wall last night due to agitation.  Due to limitations associated with patient inability to articulate, details regarding pain. Unable to determine if pain is originating in ankle, lateral anterior or medial aspect of foot or ankle  Also, nature of injury is unknown due to unwittnesseed injury.    Patient bilateral lower extremities warmth as expected per baseline indicating normal perfusion capillary refill <2 seconds.     Pertinent negatives:  Notably, patient remains afebrile. No evidence of cellulitis, open wound, new phsycial deformity.     No bleeding, deformity, hematoma, edema, laceration noted. Left ankle and  to touch. Patient reports constant pain of left foot.    Patient mother reports per baseline patient is unable to articulate or determine pain severity.       PLAN:  CBC to inquire if leukocytosis is present.      XR Foot Left G/E 3 Views    Differentials: metatarsal fracture, ankle sprain, hairline fracture, currently not visible via radiograph.    Imaging Results review:  Xrays inconclusive due to no comparable xray, severe osteo degenerative. Called patient motherDarcy discussed results:     ANKLE LEFT THREE OR MORE VIEWS  DATE/TIME: 7/21/2023 11:00 AM     INDICATION: Ankle injury, left, initial encounter.  COMPARISON: None available.                                                                       IMPRESSION: Profound diffuse bone demineralization, particularly  periarticular. Evaluation for nondisplaced fractures is therefore  severely compromised.     Intact appearing ankle mortise  and distal syndesmosis. No acute  displaced ankle fracture identified. Mild lateral malleolus soft  tissue swelling. Tibiotalar and subtalar as well as calcaneocuboid  joint spaces appear normal.     ___________________    FOOT LEFT THREE OR MORE VIEWS July 21, 2023 11:07 AM     INDICATION: Foot injury, left, initial encounter.     COMPARISON: None available.                                                                       IMPRESSION: Profound diffuse bone demineralization, particularly  periarticular. Evaluation for nondisplaced fractures is severelf  compromised. Forefoot malalignment deformity of the third toe. No  visible displaced fracture. No appreciable soft tissue swelling. No  significant joint space narrowing.         FINAL PLAN:     recommend [facility] urgent care follow-up with orthopedic specialist given Xray interpretation inconclusive, patient is unable to bare weight, no overt signs associated with sprain, no edema, unknown pain site, per mother report: unknown mechanism of injury (trip?, fall?, vs banging foot against the wall?)    Possible CT or MRI    Patient mother declines brace wrap or immobilization, Mother indicates patient will not tolerate and will remove. Mother prefers to keep patient non-weight baring.    OUTCOME:    Patient wife, Darcy verbalizes understanding, is agreeable.      Chronic lung disease of prematurity  history of hypoxia    LAB review  Thrombocytopenia (H)  Comment: denies signs or symptoms associated with bleeding, PCP follow-up.  Notably, patient valproic acid is likely a contributing factor thrombocytopenia.    Plan: CBC with platelets        Lab results platelets remain low per ongoing chronic trends, 72 compared to 82 on 6/28/23. Patient platelets 1/23/22 : 75 consistent with trends. Patient may follow-up with hematology however, it is likely this is medication induced possible. Notable, liver function reflective fro 6/23/23,  is within normal limited.       At high  risk for falls  Comment: recommend to maintain fall precautions. Patient functional status reviewed.    Plan: recommend to maintain fall precautions, refer to ortho scpecialty: CT, evalaution ngiven patient baseline deformity.        Roro De La Rosa is a 27 year old, presenting for the following health issues: left ankle injury.        6/28/2023    12:53 PM   Additional Questions   Roomed by Ava WILSON   Accompanied by mother     History of Present Illness       Reason for visit:  Left ankle injury  Symptom onset:  1-3 days ago  Symptoms include:  Bruised and painful  Symptom intensity:  Moderate  Symptom progression:  Worsening  Had these symptoms before:  No    He eats 0-1 servings of fruits and vegetables daily.He consumes 0 sweetened beverage(s) daily.He exercises with enough effort to increase his heart rate 9 or less minutes per day.  He exercises with enough effort to increase his heart rate 3 or less days per week.   He is taking medications regularly.        Review of Systems   Constitutional: Negative.    Respiratory: Negative.    Cardiovascular: Negative.    Genitourinary: Negative.    Musculoskeletal: Positive for arthralgias and gait problem.   Skin: Negative.  Negative for color change, rash and wound.   Hematological: Negative.           Objective      /70 (BP Location: Right arm, Patient Position: Sitting, Cuff Size: Adult Small)   Pulse 119   Temp 98.2  F (36.8  C) (Tympanic)   Resp 16   SpO2 94%       Current Outpatient Medications:      acetylcysteine (MUCOMYST) 10 % nebulizer solution, Inhale 4 mLs into the lungs 2 times daily as needed for mucolysis/respiratory distress or other (Excess mucus production; inability to cough up mucus), Disp: 30 mL, Rfl: 0     albuterol (PROAIR HFA/PROVENTIL HFA/VENTOLIN HFA) 108 (90 Base) MCG/ACT inhaler, Inhale 2 puffs into the lungs every 6 hours as needed for shortness of breath / dyspnea or wheezing, Disp: 2 Inhaler, Rfl: 3     albuterol (PROVENTIL)  (2.5 MG/3ML) 0.083% neb solution, Take 1 vial (2.5 mg) by nebulization every 6 hours as needed for shortness of breath / dyspnea or wheezing, Disp: 9 mL, Rfl: 3     azithromycin (ZITHROMAX) 200 MG/5ML suspension, Take 9 mLs by mouth daily Monday, Wednesday and Friday, Disp: , Rfl:      benzoyl peroxide 5 % EX topical gel, Apply topically 2 times daily as needed (acne), Disp: 50 g, Rfl: 11     clindamycin 1 % EX external gel, Apply topically 2 times daily as needed (acne), Disp: 50 g, Rfl: 11     clobazam (,ONFI,) 2.5 MG/ML suspension, 5 mg by Per G Tube route At Bedtime (3 mL in AM / 2 mL in PM), Disp: , Rfl:      clobazam (ONFI) 2.5 MG/ML suspension, 7.5 mg by Per G Tube route every morning (3 mL in AM / 2 mL in PM), Disp: , Rfl:      famotidine (PEPCID) 40 MG/5ML suspension, 20 mg by Per G Tube route 2 times daily, Disp: , Rfl:      fluticasone-salmeterol (ADVAIR HFA) 230-21 MCG/ACT inhaler, Inhale 2 puffs into the lungs 2 times daily , Disp: 12 g, Rfl: 3     gabapentin (NEURONTIN) 250 MG/5ML solution, 750 mg by Per G Tube route At Bedtime , Disp: , Rfl:      hydrOXYzine (ATARAX) 10 MG/5ML syrup, 50 mg by Per G Tube route At Bedtime (25 mL), Disp: , Rfl:      Lancets 30G MISC, 120 30 gauge lancets (substitute as needed), Disp: 120 each, Rfl: 0     levothyroxine (SYNTHROID/LEVOTHROID) 25 MCG tablet, Take 1 tablet (25 mcg) by mouth daily, Disp: 90 tablet, Rfl: 3     LORazepam (ATIVAN) 1 MG tablet, 1 mg by Per G Tube route every 6 hours as needed for anxiety Per mom: prn , Disp: , Rfl:      melatonin 1 MG TABS, 1 mg by Per G Tube route daily Daily between 4-5pm*, Disp: , Rfl:      melatonin 3 MG tablet, 6 mg by Per G Tube route At Bedtime , Disp: , Rfl:      Nutritional Supplements (OSMOLITE 1.5 ALICE) LIQD, 5 cartons Osmolite 1.5 5 times per day (5:30 AM, 10 AM,  2 PM, 5 PM and 7  PM) (contains no fiber)  60 mL flush before and after each feeding  60 mL additional flushes at 8 AM, 12 PM, 2 PM, 6 PM, Disp: 5000 mL,  Rfl: 11     omeprazole (PRILOSEC) 2 mg/mL SUSP, 40 mg by Per G Tube route At Bedtime , Disp: 800 mL, Rfl: 11     polyethylene glycol (MIRALAX) 17 g packet, 17 g by Per G Tube route daily, Disp: , Rfl:      polyethylene glycol (MIRALAX) 17 GM/Dose powder, MIX AND TAKE 17 GRAMS BY MOUTH DAILY AS DIRECTED AS NEEDED, Disp: 510 g, Rfl: 3     QUEtiapine (SEROQUEL) 300 MG tablet, 600 mg by Per G Tube route At Bedtime , Disp: , Rfl:      Valproate Sodium (VALPROIC ACID) 250 MG/5ML, TAKE 6ML BY MOUTH EVERY MORNING AND 7ML IN THE AFTERNOON AND AT NIGHT, Disp: 600 mL, Rfl: 3.        Physical Exam  Constitutional:       General: He is in acute distress.      Appearance: He is toxic-appearing. He is not ill-appearing or diaphoretic.   Musculoskeletal:         General: Tenderness and signs of injury present. No swelling or deformity.      Right lower leg: No edema.      Left lower leg: No edema.   Skin:     General: Skin is warm and dry.      Capillary Refill: Capillary refill takes less than 2 seconds.      Coloration: Skin is not jaundiced or pale.      Findings: No bruising, lesion or rash.   Neurological:      Mental Status: He is alert. Mental status is at baseline.      Gait: Gait abnormal.        Lab Results   Component Value Date    HGB 15.8 06/28/2023    HGB 12.0 05/06/2021         I spent 45 minutes with the patient, greater than 50% of that time was spent in counseling or coordination of the above issues.        On the date of service, I spent an additional 80 minutes conducting lab and diagnostic results interpretation.

## 2023-07-24 ENCOUNTER — PATIENT OUTREACH (OUTPATIENT)
Dept: ONCOLOGY | Facility: CLINIC | Age: 28
End: 2023-07-24

## 2023-07-24 ENCOUNTER — VIRTUAL VISIT (OUTPATIENT)
Dept: PEDIATRICS | Facility: CLINIC | Age: 28
End: 2023-07-24
Payer: MEDICAID

## 2023-07-24 DIAGNOSIS — S99.912A ANKLE INJURY, LEFT, INITIAL ENCOUNTER: Primary | ICD-10-CM

## 2023-07-24 PROCEDURE — 99207 PR NO CHARGE LOS: CPT | Mod: 25 | Performed by: NURSE PRACTITIONER

## 2023-07-24 RX ORDER — TIOTROPIUM BROMIDE INHALATION SPRAY 1.56 UG/1
SPRAY, METERED RESPIRATORY (INHALATION)
COMMUNITY
Start: 2023-07-14

## 2023-07-24 RX ORDER — SODIUM CHLORIDE FOR INHALATION 3 %
VIAL, NEBULIZER (ML) INHALATION
COMMUNITY
Start: 2022-01-28

## 2023-07-24 RX ORDER — TRIAMCINOLONE ACETONIDE 1 MG/G
OINTMENT TOPICAL
COMMUNITY
Start: 2022-10-07

## 2023-07-24 RX ORDER — ATROPINE SULFATE 10 MG/ML
SOLUTION/ DROPS OPHTHALMIC
COMMUNITY
Start: 2022-07-13

## 2023-07-24 RX ORDER — CLONAZEPAM 0.12 MG/1
TABLET, ORALLY DISINTEGRATING ORAL
COMMUNITY
Start: 2023-04-05

## 2023-07-24 RX ORDER — CEFDINIR 250 MG/5ML
POWDER, FOR SUSPENSION ORAL
COMMUNITY
Start: 2023-07-14

## 2023-07-24 RX ORDER — POLYETHYLENE GLYCOL 3350 17 G/17G
POWDER, FOR SOLUTION ORAL SEE ADMIN INSTRUCTIONS
COMMUNITY

## 2023-07-24 RX ORDER — PREDNISOLONE 15 MG/5 ML
SOLUTION, ORAL ORAL
COMMUNITY
Start: 2023-01-05

## 2023-07-24 NOTE — PROGRESS NOTES
Secure message received from patient Mother Darcy providing update: [facility] recommends scheduling an MRI. Patient mother prefers to defer further imaging at this time.      ACTION  Placed call to patient Mother Darcy.        Denies new or worsening signs, symptoms, or medication side effects.  Patient symptoms are improving. Patient mother, Darcy elects to hold-off/ defer additional imaging at this time she since Mother indicates injury is likely a sprain. Patient is partial weight-bearing at this time. Darcy will continue to RICE injury. Declines wrap or brace at this time. No erythema, swelling or hematoma.        I spent 25 minutes with the patient, greater than 50% of that time was spent in counseling or coordination of the above issues.

## 2023-07-24 NOTE — PROGRESS NOTES
Referral received for benign heme services, see below.    Referral reason: Thrombocytopenia, intermittently chronic for years    Current abnormal labs: Available in Chart Review    Outreach: Call not placed to patient regarding referral.    Plan: Triage instructions updated and sent to NPS for completion.

## 2023-07-26 NOTE — TELEPHONE ENCOUNTER
RECORDS STATUS - ALL OTHER DIAGNOSIS    New, Thrombocytopenia,  RECORDS RECEIVED FROM:    Appt Date: 8/14/2023    NOTES STATUS DETAILS   OFFICE NOTE from referring provider Complete Bonita Morgan APRN CNP      DISCHARGE SUMMARY from hospital     DISCHARGE REPORT from the ER     OPERATIVE REPORT     MEDICATION LIST Complete [facility]   CLINICAL TRIAL TREATMENTS TO DATE     LABS     PATHOLOGY REPORTS     ANYTHING RELATED TO DIAGNOSIS Complete Labs last updated on 7/21/2023    GENONOMIC TESTING     TYPE:     IMAGING (NEED IMAGES & REPORT)     CT SCANS     MRI     MAMMO     ULTRASOUND     PET

## 2023-07-27 ENCOUNTER — MYC MEDICAL ADVICE (OUTPATIENT)
Dept: PEDIATRICS | Facility: CLINIC | Age: 28
End: 2023-07-27
Payer: MEDICAID

## 2023-07-31 ENCOUNTER — LAB (OUTPATIENT)
Dept: LAB | Facility: CLINIC | Age: 28
End: 2023-07-31
Payer: MEDICAID

## 2023-07-31 DIAGNOSIS — Z79.899 ENCOUNTER FOR LONG-TERM (CURRENT) USE OF HIGH-RISK MEDICATION: ICD-10-CM

## 2023-07-31 LAB
CHOLEST SERPL-MCNC: 166 MG/DL
HBA1C MFR BLD: 5.3 % (ref 0–5.6)
HDLC SERPL-MCNC: 72 MG/DL
LDLC SERPL CALC-MCNC: 81 MG/DL
NONHDLC SERPL-MCNC: 94 MG/DL
TRIGL SERPL-MCNC: 67 MG/DL

## 2023-07-31 PROCEDURE — 36415 COLL VENOUS BLD VENIPUNCTURE: CPT

## 2023-07-31 PROCEDURE — 83036 HEMOGLOBIN GLYCOSYLATED A1C: CPT

## 2023-07-31 PROCEDURE — 80061 LIPID PANEL: CPT

## 2023-08-01 NOTE — TELEPHONE ENCOUNTER
Patient's mom inquiring about valproic acid level lab. Advised that lab was not ordered/drawn for lab visit. Patient's mom will reach out to ordering provider to get lab ordered.  Anjelica BONNER RN, BSN

## 2023-08-02 DIAGNOSIS — G40.814 LENNOX-GASTAUT SYNDROME, INTRACTABLE, WITHOUT STATUS EPILEPTICUS (H): Primary | ICD-10-CM

## 2023-08-10 ENCOUNTER — LAB (OUTPATIENT)
Dept: LAB | Facility: CLINIC | Age: 28
End: 2023-08-10
Payer: MEDICAID

## 2023-08-10 DIAGNOSIS — G40.814 LENNOX-GASTAUT SYNDROME, INTRACTABLE, WITHOUT STATUS EPILEPTICUS (H): ICD-10-CM

## 2023-08-10 PROCEDURE — 80165 DIPROPYLACETIC ACID FREE: CPT | Mod: 90

## 2023-08-10 PROCEDURE — 80164 ASSAY DIPROPYLACETIC ACD TOT: CPT | Mod: 90

## 2023-08-10 PROCEDURE — 99000 SPECIMEN HANDLING OFFICE-LAB: CPT

## 2023-08-10 PROCEDURE — G0480 DRUG TEST DEF 1-7 CLASSES: HCPCS | Mod: 90

## 2023-08-10 PROCEDURE — 36415 COLL VENOUS BLD VENIPUNCTURE: CPT

## 2023-08-11 LAB
VALPROATE FREE MFR SERPL: 21 %
VALPROATE FREE SERPL-MCNC: 26 UG/ML
VALPROATE SERPL-MCNC: 128 UG/ML

## 2023-08-13 LAB
CLOBAZAM SERPL-MCNC: 182 NG/ML
NORCLOBAZAM SERPL-MCNC: 615 NG/ML

## 2023-08-13 NOTE — PROGRESS NOTES
Assessment & Plan   Intermittent mild-moderate thrombocytopenia, possibly intermittent consumption (ITP or other) with adequate marrow compensation    Peripheral smear for morphology and counts today  Unlikely to need additional hematologic assessment or follow up    History  This is an initial hematology consultation visit for this severely disabled young man with several years of intermittent mild - moderate thrombocytopenia without bleeding.      He was born at 24 weeks gestation (14 OUNCE birth weight) and has multiple neurocognitive problems plus blindness.  He is lovingly cared for by his mother and sister.  He is chronically thin and nourished entirely by tube feeding due to prior recurrent aspiration pneumonias.    He recently injured his left ankle (soft tissue injury only) but has not had meaningful bleeding.  Low platelet count was noted at assessment, and that triggered the consult.    His last abdominal imaging 2019 showed a normal sized spleen.    ECOG 0    Patient Active Problem List   Diagnosis    Short stature    CP (cerebral palsy), spastic, diplegic (H)    Sleep disorder    Seizure disorder (H)    Other idiopathic scoliosis, unspecified spinal region    Periventricular leukomalacia, associated with prematurity, chronic    Oral aversion    Nutritional disorder    Mild persistent asthma in adult without complication    History of prematurity, 23 wk 400 grams    History of behavioral and mental health problems    Abnormal gait    Feeding by G-tube (H)    Constipation, unspecified constipation type    Chronic lung disease of prematurity    Bilateral blindness    Autism spectrum disorder associated with known medical or genetic condition or environmental factor, requiring substantial support (level 2)    Anxiety    Vomiting    SBO (small bowel obstruction) (H)    Nonintractable epilepsy without status epilepticus, unspecified epilepsy type (H)    Dehydration    Hypoxia    Acute kidney injury (H)     Elevated lactic acid level    Pneumonia of right lower lobe due to infectious organism    Acute respiratory failure with hypoxia (H)    Pneumonia due to infectious organism, unspecified laterality, unspecified part of lung    Sepsis, due to unspecified organism, unspecified whether acute organ dysfunction present (H)    Major depression, recurrent (H)    Hypothyroidism, unspecified type    Gastrostomy tube dependent (H)    Mild protein-calorie malnutrition (H)    Thrombocytopenia (H)     Current Outpatient Medications   Medication    acetylcysteine (MUCOMYST) 10 % nebulizer solution    albuterol (PROAIR HFA/PROVENTIL HFA/VENTOLIN HFA) 108 (90 Base) MCG/ACT inhaler    albuterol (PROVENTIL) (2.5 MG/3ML) 0.083% neb solution    atropine 1 % ophthalmic solution    azithromycin (ZITHROMAX) 200 MG/5ML suspension    benzoyl peroxide 5 % EX topical gel    cefdinir (OMNICEF) 250 MG/5ML suspension    clindamycin 1 % EX external gel    clobazam (,ONFI,) 2.5 MG/ML suspension    clobazam (ONFI) 2.5 MG/ML suspension    clonazePAM (KLONOPIN) 0.125 MG TBDP ODT tab    famotidine (PEPCID) 40 MG/5ML suspension    fluticasone-salmeterol (ADVAIR HFA) 230-21 MCG/ACT inhaler    gabapentin (NEURONTIN) 250 MG/5ML solution    hydrOXYzine (ATARAX) 10 MG/5ML syrup    Lancets 30G MISC    levothyroxine (SYNTHROID/LEVOTHROID) 25 MCG tablet    LORazepam (ATIVAN) 1 MG tablet    melatonin 1 MG TABS    melatonin 3 MG tablet    Nutritional Supplements (OSMOLITE 1.5 ALICE) LIQD    omeprazole (PRILOSEC) 2 mg/mL SUSP    polyethylene glycol (MIRALAX) 17 g packet    polyethylene glycol (MIRALAX) 17 g packet    polyethylene glycol (MIRALAX) 17 GM/Dose powder    prednisoLONE (ORAPRED/PRELONE) 15 MG/5ML solution    QUEtiapine (SEROQUEL) 300 MG tablet    sodium chloride (NEBUSAL) 3 % neb solution    SPIRIVA RESPIMAT 1.25 MCG/ACT inhaler    triamcinolone (KENALOG) 0.1 % external ointment    Valproate Sodium (VALPROIC ACID) 250 MG/5ML     No current  "facility-administered medications for this visit.     Past Medical History:   Diagnosis Date    Chronic lung disease     off nebs in 2012    Depression     fluoxetine, risperidone    Epilepsy 05/01/2011    Feeding by G-tube     GERD (gastroesophageal reflux disease)     Insomnia     melatonin, clonazepam    Kidney stone     Premature infant - 24 weeks     Retinopathy of prematurity     led to blindness    SBO (small bowel obstruction)      Past Surgical History:   Procedure Laterality Date    APPENDECTOMY OPEN N/A 4/19/2021    Procedure: Appendectomy open;  Surgeon: Fabiola Pop MD;  Location: [facility] OR    GASTROSTOMY TUBE      LAPAROTOMY EXPLORATORY N/A 4/19/2021    Procedure: LAPAROTOMY, enterolysis;  Surgeon: Fabiola Pop MD;  Location:  OR     Socioeconomic History    Marital status: Single     Review of Systems   Unable to perform ROS: Patient nonverbal (cognitively impaired)   Constitutional: Negative.    HENT:  Negative.     Eyes:  Positive for eye problems.   Respiratory: Negative.     Cardiovascular: Negative.    Gastrointestinal: Negative.    Endocrine: Negative.    Genitourinary: Negative.     Musculoskeletal:  Positive for gait problem.        Recent sprained left ankle   Skin: Negative.    Neurological:  Positive for gait problem and seizures.   Hematological: Negative.    Psychiatric/Behavioral:          Occasional \"melt downs\"   All other systems reviewed and are negative.      /77   Pulse 108   Resp 16   Ht 1.575 m (5' 2\")   Wt 43.5 kg (96 lb)   SpO2 94%   BMI 17.56 kg/m      Physical Exam  Vitals and nursing note reviewed. Exam conducted with a chaperone present.   Constitutional:       Appearance: He is underweight.      Comments: Extremely thin and distracted young male in wheelchair   HENT:      Head: Normocephalic and atraumatic.   Eyes:      Comments: Blind   Cardiovascular:      Rate and Rhythm: Normal rate and regular rhythm.      Heart sounds: Normal heart sounds. "   Pulmonary:      Effort: Pulmonary effort is normal.      Breath sounds: Normal breath sounds.   Abdominal:      Palpations: Abdomen is soft. There is no mass.      Tenderness: There is no abdominal tenderness. There is no guarding.       Musculoskeletal:         General: Signs of injury present.      Cervical back: Neck supple.      Right lower leg: No edema.      Left lower leg: No edema.   Lymphadenopathy:      Cervical: No cervical adenopathy.   Skin:     General: Skin is warm and dry.      Findings: No bruising.   Neurological:      Mental Status: He is easily aroused. He is disoriented.   Psychiatric:         Attention and Perception: He is inattentive.         Mood and Affect: Affect is inappropriate.         Speech: He is noncommunicative.         Behavior: Behavior is withdrawn.         Cognition and Memory: Cognition is impaired.       Recent Results (from the past 720 hour(s))   CBC with platelets    Collection Time: 07/21/23 10:36 AM   Result Value Ref Range    WBC Count 5.6 4.0 - 11.0 10e3/uL    RBC Count 4.37 (L) 4.40 - 5.90 10e6/uL    Hemoglobin 15.2 13.3 - 17.7 g/dL    Hematocrit 46.3 40.0 - 53.0 %     (H) 78 - 100 fL    MCH 34.8 (H) 26.5 - 33.0 pg    MCHC 32.8 31.5 - 36.5 g/dL    RDW 14.1 10.0 - 15.0 %    Platelet Count 72 (L) 150 - 450 10e3/uL   Basic metabolic panel  (Ca, Cl, CO2, Creat, Gluc, K, Na, BUN)    Collection Time: 07/21/23 10:36 AM   Result Value Ref Range    Sodium 140 136 - 145 mmol/L    Potassium 4.1 3.4 - 5.3 mmol/L    Chloride 102 98 - 107 mmol/L    Carbon Dioxide (CO2) 25 22 - 29 mmol/L    Anion Gap 13 7 - 15 mmol/L    Urea Nitrogen 17.4 6.0 - 20.0 mg/dL    Creatinine 0.80 0.67 - 1.17 mg/dL    Calcium 9.8 8.6 - 10.0 mg/dL    Glucose 97 70 - 99 mg/dL    GFR Estimate >90 >60 mL/min/1.73m2   Lipid panel reflex to direct LDL Fasting    Collection Time: 07/31/23  9:25 AM   Result Value Ref Range    Cholesterol 166 <200 mg/dL    Triglycerides 67 <150 mg/dL    Direct Measure HDL  72 >=40 mg/dL    LDL Cholesterol Calculated 81 <=100 mg/dL    Non HDL Cholesterol 94 <130 mg/dL   Hemoglobin A1c    Collection Time: 07/31/23  9:25 AM   Result Value Ref Range    Hemoglobin A1C 5.3 0.0 - 5.6 %   Valproic Acid Free & Total    Collection Time: 08/10/23  9:04 AM   Result Value Ref Range    Valproic Acid Free 26 (H) 7 - 23 ug/mL    Valproic Acid Total 128 (H) 50 - 125 ug/mL    Valproic Acid, Percent Free 21 (H) 5 - 18 %   Clobazam and Metabolite, Quantitative, Serum or Plasma    Collection Time: 08/10/23  9:04 AM   Result Value Ref Range    Clobazam 182 30 - 300 ng/mL    N-Desmethylclobazam 615 300 - 3000 ng/mL     Recent Results (from the past 744 hour(s))   XR Ankle Left G/E 3 Views    Narrative    ANKLE LEFT THREE OR MORE VIEWS  DATE/TIME: 7/21/2023 11:00 AM    INDICATION: Ankle injury, left, initial encounter.  COMPARISON: None available.       Impression    IMPRESSION: Profound diffuse bone demineralization, particularly  periarticular. Evaluation for nondisplaced fractures is therefore  severely compromised.    Intact appearing ankle mortise and distal syndesmosis. No acute  displaced ankle fracture identified. Mild lateral malleolus soft  tissue swelling. Tibiotalar and subtalar as well as calcaneocuboid  joint spaces appear normal.       OWEN LANZA MD         SYSTEM ID:  [id number]   XR Foot Left G/E 3 Views    Narrative    FOOT LEFT THREE OR MORE VIEWS July 21, 2023 11:07 AM    INDICATION: Foot injury, left, initial encounter.    COMPARISON: None available.       Impression    IMPRESSION: Profound diffuse bone demineralization, particularly  periarticular. Evaluation for nondisplaced fractures is severely  compromised. Forefoot malalignment deformity of the third toe. No  visible displaced fracture. No appreciable soft tissue swelling. No  significant joint space narrowing.       OWEN LANZA MD         SYSTEM ID:  [id number]

## 2023-08-14 ENCOUNTER — PRE VISIT (OUTPATIENT)
Dept: ONCOLOGY | Facility: CLINIC | Age: 28
End: 2023-08-14
Payer: MEDICAID

## 2023-08-14 ENCOUNTER — ONCOLOGY VISIT (OUTPATIENT)
Dept: ONCOLOGY | Facility: CLINIC | Age: 28
End: 2023-08-14
Attending: NURSE PRACTITIONER
Payer: MEDICAID

## 2023-08-14 VITALS
RESPIRATION RATE: 16 BRPM | HEART RATE: 108 BPM | HEIGHT: 62 IN | DIASTOLIC BLOOD PRESSURE: 77 MMHG | WEIGHT: 96 LBS | BODY MASS INDEX: 17.66 KG/M2 | SYSTOLIC BLOOD PRESSURE: 110 MMHG | OXYGEN SATURATION: 94 %

## 2023-08-14 DIAGNOSIS — D69.6 THROMBOCYTOPENIA (H): Primary | ICD-10-CM

## 2023-08-14 LAB
ALBUMIN SERPL-MCNC: 3.7 G/DL (ref 3.5–5)
ALP SERPL-CCNC: 104 U/L (ref 45–120)
ALT SERPL W P-5'-P-CCNC: 16 U/L (ref 0–45)
ANION GAP SERPL CALCULATED.3IONS-SCNC: 11 MMOL/L (ref 5–18)
AST SERPL W P-5'-P-CCNC: 30 U/L (ref 0–40)
BASOPHILS # BLD AUTO: 0 10E3/UL (ref 0–0.2)
BASOPHILS NFR BLD AUTO: 0 %
BILIRUB SERPL-MCNC: 0.7 MG/DL (ref 0–1)
BUN SERPL-MCNC: 17 MG/DL (ref 8–22)
CALCIUM SERPL-MCNC: 10.1 MG/DL (ref 8.5–10.5)
CHLORIDE BLD-SCNC: 103 MMOL/L (ref 98–107)
CO2 SERPL-SCNC: 28 MMOL/L (ref 22–31)
CREAT SERPL-MCNC: 0.84 MG/DL (ref 0.7–1.3)
EOSINOPHIL # BLD AUTO: 0 10E3/UL (ref 0–0.7)
EOSINOPHIL NFR BLD AUTO: 0 %
ERYTHROCYTE [DISTWIDTH] IN BLOOD BY AUTOMATED COUNT: 13.2 % (ref 10–15)
GFR SERPL CREATININE-BSD FRML MDRD: >90 ML/MIN/1.73M2
GLUCOSE BLD-MCNC: 127 MG/DL (ref 70–125)
HCT VFR BLD AUTO: 46.4 % (ref 40–53)
HGB BLD-MCNC: 15.4 G/DL (ref 13.3–17.7)
IMM GRANULOCYTES # BLD: 0.1 10E3/UL
IMM GRANULOCYTES NFR BLD: 1 %
LYMPHOCYTES # BLD AUTO: 1.2 10E3/UL (ref 0.8–5.3)
LYMPHOCYTES NFR BLD AUTO: 22 %
MCH RBC QN AUTO: 34.9 PG (ref 26.5–33)
MCHC RBC AUTO-ENTMCNC: 33.2 G/DL (ref 31.5–36.5)
MCV RBC AUTO: 105 FL (ref 78–100)
MONOCYTES # BLD AUTO: 1 10E3/UL (ref 0–1.3)
MONOCYTES NFR BLD AUTO: 18 %
NEUTROPHILS # BLD AUTO: 3.1 10E3/UL (ref 1.6–8.3)
NEUTROPHILS NFR BLD AUTO: 59 %
NRBC # BLD AUTO: 0 10E3/UL
NRBC BLD AUTO-RTO: 0 /100
PATH REPORT.COMMENTS IMP SPEC: NORMAL
PATH REPORT.COMMENTS IMP SPEC: NORMAL
PATH REPORT.FINAL DX SPEC: NORMAL
PATH REPORT.RELEVANT HX SPEC: NORMAL
PLATELET # BLD AUTO: 98 10E3/UL (ref 150–450)
POTASSIUM BLD-SCNC: 4.2 MMOL/L (ref 3.5–5)
PROT SERPL-MCNC: 7.2 G/DL (ref 6–8)
RBC # BLD AUTO: 4.41 10E6/UL (ref 4.4–5.9)
RETICS # AUTO: 0.15 10E6/UL (ref 0.01–0.11)
RETICS/RBC NFR AUTO: 3.5 % (ref 0.8–2.7)
SODIUM SERPL-SCNC: 142 MMOL/L (ref 136–145)
WBC # BLD AUTO: 5.3 10E3/UL (ref 4–11)

## 2023-08-14 PROCEDURE — G0463 HOSPITAL OUTPT CLINIC VISIT: HCPCS | Performed by: INTERNAL MEDICINE

## 2023-08-14 PROCEDURE — 36415 COLL VENOUS BLD VENIPUNCTURE: CPT | Performed by: INTERNAL MEDICINE

## 2023-08-14 PROCEDURE — 85045 AUTOMATED RETICULOCYTE COUNT: CPT | Performed by: INTERNAL MEDICINE

## 2023-08-14 PROCEDURE — 85025 COMPLETE CBC W/AUTO DIFF WBC: CPT | Performed by: INTERNAL MEDICINE

## 2023-08-14 PROCEDURE — 99204 OFFICE O/P NEW MOD 45 MIN: CPT | Performed by: INTERNAL MEDICINE

## 2023-08-14 PROCEDURE — 85060 BLOOD SMEAR INTERPRETATION: CPT | Performed by: PATHOLOGY

## 2023-08-14 PROCEDURE — 80053 COMPREHEN METABOLIC PANEL: CPT | Performed by: INTERNAL MEDICINE

## 2023-08-14 ASSESSMENT — ENCOUNTER SYMPTOMS
CONSTITUTIONAL NEGATIVE: 1
ENDOCRINE NEGATIVE: 1
RESPIRATORY NEGATIVE: 1
GASTROINTESTINAL NEGATIVE: 1
CARDIOVASCULAR NEGATIVE: 1
SEIZURES: 1
HEMATOLOGIC/LYMPHATIC NEGATIVE: 1
EYE PROBLEMS: 1

## 2023-08-14 ASSESSMENT — PAIN SCALES - GENERAL: PAINLEVEL: MILD PAIN (3)

## 2023-08-14 NOTE — PROGRESS NOTES
"Oncology Rooming Note    August 14, 2023 10:51 AM   Rj Licea is a 27 year old male who presents for:    Chief Complaint   Patient presents with    Hematology     New consult thrombocytopenia      Initial Vitals: /77   Pulse 108   Resp 16   Ht 1.575 m (5' 2\")   Wt 43.5 kg (96 lb)   SpO2 94%   BMI 17.56 kg/m   Estimated body mass index is 17.56 kg/m  as calculated from the following:    Height as of this encounter: 1.575 m (5' 2\").    Weight as of this encounter: 43.5 kg (96 lb). Body surface area is 1.38 meters squared.  Mild Pain (3) Comment: Data Unavailable   No LMP for male patient.  Allergies reviewed: Yes  Medications reviewed: Yes    Medications: Medication refills not needed today.  Pharmacy name entered into [facility]:    [id number] [place], [place] - [address] AT [place] [address] & [facility] [address] [place]    Clinical concerns:  new consult thrombocytopenia       Kamille Dumas              "

## 2023-08-14 NOTE — LETTER
8/14/2023         RE: Rj Licea  [id number] [facility]  [place] [address]        Dear Colleague,    Thank you for referring your patient, Rj Licea, to the [facility]. Please see a copy of my visit note below.    Assessment & Plan  Intermittent mild-moderate thrombocytopenia, possibly intermittent consumption (ITP or other) with adequate marrow compensation    Peripheral smear for morphology and counts today  Unlikely to need additional hematologic assessment or follow up    History  This is an initial hematology consultation visit for this severely disabled young man with several years of intermittent mild - moderate thrombocytopenia without bleeding.      He was born at 24 weeks gestation (14# birth weight) and has multiple neurocognitive problems plus blindness.  He is lovingly cared for by his mother and sister.  He is chronically thin and nourished entirely by tube feeding due to prior recurrent aspiration pneumonias.    He recently injured his left ankle (soft tissue injury only) but has not had meaningful bleeding.  Low platelet count was noted at assessment, and that triggered the consult.    ECOG 0    Patient Active Problem List   Diagnosis     Short stature     CP (cerebral palsy), spastic, diplegic (H)     Sleep disorder     Seizure disorder (H)     Other idiopathic scoliosis, unspecified spinal region     Periventricular leukomalacia, associated with prematurity, chronic     Oral aversion     Nutritional disorder     Mild persistent asthma in adult without complication     History of prematurity, 23 wk 400 grams     History of behavioral and mental health problems     Abnormal gait     Feeding by G-tube (H)     Constipation, unspecified constipation type     Chronic lung disease of prematurity     Bilateral blindness     Autism spectrum disorder associated with known medical or genetic condition or environmental factor, requiring substantial support (level  2)     Anxiety     Vomiting     SBO (small bowel obstruction) (H)     Nonintractable epilepsy without status epilepticus, unspecified epilepsy type (H)     Dehydration     Hypoxia     Acute kidney injury (H)     Elevated lactic acid level     Pneumonia of right lower lobe due to infectious organism     Acute respiratory failure with hypoxia (H)     Pneumonia due to infectious organism, unspecified laterality, unspecified part of lung     Sepsis, due to unspecified organism, unspecified whether acute organ dysfunction present (H)     Major depression, recurrent (H)     Hypothyroidism, unspecified type     Gastrostomy tube dependent (H)     Mild protein-calorie malnutrition (H)     Thrombocytopenia (H)     Current Outpatient Medications   Medication     acetylcysteine (MUCOMYST) 10 % nebulizer solution     albuterol (PROAIR HFA/PROVENTIL HFA/VENTOLIN HFA) 108 (90 Base) MCG/ACT inhaler     albuterol (PROVENTIL) (2.5 MG/3ML) 0.083% neb solution     atropine 1 % ophthalmic solution     azithromycin (ZITHROMAX) 200 MG/5ML suspension     benzoyl peroxide 5 % EX topical gel     cefdinir (OMNICEF) 250 MG/5ML suspension     clindamycin 1 % EX external gel     clobazam (,ONFI,) 2.5 MG/ML suspension     clobazam (ONFI) 2.5 MG/ML suspension     clonazePAM (KLONOPIN) 0.125 MG TBDP ODT tab     famotidine (PEPCID) 40 MG/5ML suspension     fluticasone-salmeterol (ADVAIR HFA) 230-21 MCG/ACT inhaler     gabapentin (NEURONTIN) 250 MG/5ML solution     hydrOXYzine (ATARAX) 10 MG/5ML syrup     Lancets 30G MISC     levothyroxine (SYNTHROID/LEVOTHROID) 25 MCG tablet     LORazepam (ATIVAN) 1 MG tablet     melatonin 1 MG TABS     melatonin 3 MG tablet     Nutritional Supplements (OSMOLITE 1.5 ALICE) LIQD     omeprazole (PRILOSEC) 2 mg/mL SUSP     polyethylene glycol (MIRALAX) 17 g packet     polyethylene glycol (MIRALAX) 17 g packet     polyethylene glycol (MIRALAX) 17 GM/Dose powder     prednisoLONE (ORAPRED/PRELONE) 15 MG/5ML solution      "QUEtiapine (SEROQUEL) 300 MG tablet     sodium chloride (NEBUSAL) 3 % neb solution     SPIRIVA RESPIMAT 1.25 MCG/ACT inhaler     triamcinolone (KENALOG) 0.1 % external ointment     Valproate Sodium (VALPROIC ACID) 250 MG/5ML     No current facility-administered medications for this visit.     Past Medical History:   Diagnosis Date     Chronic lung disease     off nebs in 2012     Depression     fluoxetine, risperidone     Epilepsy 05/01/2011     Feeding by G-tube      GERD (gastroesophageal reflux disease)      Insomnia     melatonin, clonazepam     Kidney stone      Premature infant - 24 weeks      Retinopathy of prematurity     led to blindness     SBO (small bowel obstruction)      Past Surgical History:   Procedure Laterality Date     APPENDECTOMY OPEN N/A 4/19/2021    Procedure: Appendectomy open;  Surgeon: Fabiola Pop MD;  Location: [facility] OR     GASTROSTOMY TUBE       LAPAROTOMY EXPLORATORY N/A 4/19/2021    Procedure: LAPAROTOMY, enterolysis;  Surgeon: Fabiola Pop MD;  Location:  OR     Socioeconomic History     Marital status: Single     Review of Systems   Unable to perform ROS: Patient nonverbal (cognitively impaired)   Constitutional: Negative.    HENT:  Negative.     Eyes:  Positive for eye problems.   Respiratory: Negative.     Cardiovascular: Negative.    Gastrointestinal: Negative.    Endocrine: Negative.    Genitourinary: Negative.     Musculoskeletal:  Positive for gait problem.        Recent sprained left ankle   Skin: Negative.    Neurological:  Positive for gait problem and seizures.   Hematological: Negative.    Psychiatric/Behavioral:          Occasional \"melt downs\"   All other systems reviewed and are negative.      /77   Pulse 108   Resp 16   Ht 1.575 m (5' 2\")   Wt 43.5 kg (96 lb)   SpO2 94%   BMI 17.56 kg/m      Physical Exam  Vitals and nursing note reviewed. Exam conducted with a chaperone present.   Constitutional:       Appearance: He is underweight.      " Comments: Extremely thin and distracted young male in wheelchair   HENT:      Head: Normocephalic and atraumatic.   Eyes:      Comments: Blind   Cardiovascular:      Rate and Rhythm: Normal rate and regular rhythm.      Heart sounds: Normal heart sounds.   Pulmonary:      Effort: Pulmonary effort is normal.      Breath sounds: Normal breath sounds.   Abdominal:      Palpations: Abdomen is soft. There is no mass.      Tenderness: There is no abdominal tenderness. There is no guarding.       Musculoskeletal:         General: Signs of injury present.      Cervical back: Neck supple.      Right lower leg: No edema.      Left lower leg: No edema.   Lymphadenopathy:      Cervical: No cervical adenopathy.   Skin:     General: Skin is warm and dry.      Findings: No bruising.   Neurological:      Mental Status: He is easily aroused. He is disoriented.   Psychiatric:         Attention and Perception: He is inattentive.         Mood and Affect: Affect is inappropriate.         Speech: He is noncommunicative.         Behavior: Behavior is withdrawn.         Cognition and Memory: Cognition is impaired.       Recent Results (from the past 720 hour(s))   CBC with platelets    Collection Time: 07/21/23 10:36 AM   Result Value Ref Range    WBC Count 5.6 4.0 - 11.0 10e3/uL    RBC Count 4.37 (L) 4.40 - 5.90 10e6/uL    Hemoglobin 15.2 13.3 - 17.7 g/dL    Hematocrit 46.3 40.0 - 53.0 %     (H) 78 - 100 fL    MCH 34.8 (H) 26.5 - 33.0 pg    MCHC 32.8 31.5 - 36.5 g/dL    RDW 14.1 10.0 - 15.0 %    Platelet Count 72 (L) 150 - 450 10e3/uL   Basic metabolic panel  (Ca, Cl, CO2, Creat, Gluc, K, Na, BUN)    Collection Time: 07/21/23 10:36 AM   Result Value Ref Range    Sodium 140 136 - 145 mmol/L    Potassium 4.1 3.4 - 5.3 mmol/L    Chloride 102 98 - 107 mmol/L    Carbon Dioxide (CO2) 25 22 - 29 mmol/L    Anion Gap 13 7 - 15 mmol/L    Urea Nitrogen 17.4 6.0 - 20.0 mg/dL    Creatinine 0.80 0.67 - 1.17 mg/dL    Calcium 9.8 8.6 - 10.0 mg/dL     Glucose 97 70 - 99 mg/dL    GFR Estimate >90 >60 mL/min/1.73m2   Lipid panel reflex to direct LDL Fasting    Collection Time: 07/31/23  9:25 AM   Result Value Ref Range    Cholesterol 166 <200 mg/dL    Triglycerides 67 <150 mg/dL    Direct Measure HDL 72 >=40 mg/dL    LDL Cholesterol Calculated 81 <=100 mg/dL    Non HDL Cholesterol 94 <130 mg/dL   Hemoglobin A1c    Collection Time: 07/31/23  9:25 AM   Result Value Ref Range    Hemoglobin A1C 5.3 0.0 - 5.6 %   Valproic Acid Free & Total    Collection Time: 08/10/23  9:04 AM   Result Value Ref Range    Valproic Acid Free 26 (H) 7 - 23 ug/mL    Valproic Acid Total 128 (H) 50 - 125 ug/mL    Valproic Acid, Percent Free 21 (H) 5 - 18 %   Clobazam and Metabolite, Quantitative, Serum or Plasma    Collection Time: 08/10/23  9:04 AM   Result Value Ref Range    Clobazam 182 30 - 300 ng/mL    N-Desmethylclobazam 615 300 - 3000 ng/mL     Recent Results (from the past 744 hour(s))   XR Ankle Left G/E 3 Views    Narrative    ANKLE LEFT THREE OR MORE VIEWS  DATE/TIME: 7/21/2023 11:00 AM    INDICATION: Ankle injury, left, initial encounter.  COMPARISON: None available.       Impression    IMPRESSION: Profound diffuse bone demineralization, particularly  periarticular. Evaluation for nondisplaced fractures is therefore  severely compromised.    Intact appearing ankle mortise and distal syndesmosis. No acute  displaced ankle fracture identified. Mild lateral malleolus soft  tissue swelling. Tibiotalar and subtalar as well as calcaneocuboid  joint spaces appear normal.       OWEN LANZA MD         SYSTEM ID:  [id number]   XR Foot Left G/E 3 Views    Narrative    FOOT LEFT THREE OR MORE VIEWS July 21, 2023 11:07 AM    INDICATION: Foot injury, left, initial encounter.    COMPARISON: None available.       Impression    IMPRESSION: Profound diffuse bone demineralization, particularly  periarticular. Evaluation for nondisplaced fractures is severely  compromised. Forefoot  "malalignment deformity of the third toe. No  visible displaced fracture. No appreciable soft tissue swelling. No  significant joint space narrowing.       OWEN LANZA MD         SYSTEM ID:  [id number]             Oncology Rooming Note    August 14, 2023 10:51 AM   Rj Licea is a 27 year old male who presents for:    Chief Complaint   Patient presents with     Hematology     New consult thrombocytopenia      Initial Vitals: /77   Pulse 108   Resp 16   Ht 1.575 m (5' 2\")   Wt 43.5 kg (96 lb)   SpO2 94%   BMI 17.56 kg/m   Estimated body mass index is 17.56 kg/m  as calculated from the following:    Height as of this encounter: 1.575 m (5' 2\").    Weight as of this encounter: 43.5 kg (96 lb). Body surface area is 1.38 meters squared.  Mild Pain (3) Comment: Data Unavailable   No LMP for male patient.  Allergies reviewed: Yes  Medications reviewed: Yes    Medications: Medication refills not needed today.  Pharmacy name entered into [employer]:    [id number] [address] AT [place] [address] & [facility]    Clinical concerns:  new consult thrombocytopenia       Kamille Dumas                Again, thank you for allowing me to participate in the care of your patient.        Sincerely,        Evelyne Barahona MD  "

## 2023-08-16 NOTE — PHARMACY-VANCOMYCIN DOSING SERVICE
08/16/23 0927   Post-Acute Status   Post-Acute Authorization Placement   Post-Acute Placement Status Pending payor review/awaiting authorization (if required)   Discharge Plan   Discharge Plan A Long-term acute care facility (LTAC)     SW called Jacob w/ [facility] #[phone] to follow up on authorization, received no answer left voice message.    9:29 AM  Jacob admittedly visited with JANEL at nursing station, informed authorization remains pending.     2:29 PM  SW informed pt at bedside of continued delay, awaiting authorization.     2:56 PM  SW spoke to Jacob, reports authorization remains pending.     Faye Weaver, JAIMEE  Case Management   [facility]   Ext. [phone]       Pharmacy Vancomycin Initial Note  Date of Service 2021  Patient's  1995  25 year old, male    Indication: Sepsis    Current estimated CrCl = Estimated Creatinine Clearance: 66.2 mL/min (based on SCr of 0.98 mg/dL).    Creatinine for last 3 days  2021:  5:37 AM Creatinine Canceled, Test credited, specimen discarded mg/dL;  8:50 AM Creatinine 0.98 mg/dL    Recent Vancomycin Level(s) for last 3 days  No results found for requested labs within last 72 hours.      Vancomycin IV Administrations (past 72 hours)                   vancomycin (VANCOCIN) 1000 mg in dextrose 5% 200 mL PREMIX (mg) 1,000 mg New Bag 21                Nephrotoxins and other renal medications (From now, onward)    Start     Dose/Rate Route Frequency Ordered Stop    21 0900  vancomycin (VANCOCIN) 750 mg in sodium chloride 0.9 % 250 mL intermittent infusion      750 mg  over 90 Minutes Intravenous EVERY 12 HOURS 21 0216            Contrast Orders - past 72 hours (72h ago, onward)    Start     Dose/Rate Route Frequency Ordered Stop    21 0640  iopamidol (ISOVUE-370) solution 500 mL  Status:  Discontinued      500 mL Intravenous ONCE 21 0635 21 0649    21 0555  iopamidol (ISOVUE-370) solution 500 mL      500 mL Intravenous ONCE 21 0552 21 0643                Plan:  1. Start vancomycin  750 mg IV q12h.  Adjusted down from earlier dosing due to pt's SCr higher than expected with low body weight and also higher than past admissions.  2. Vancomycin monitoring method: Trough (Method 2 = manual dose calculation)  3. Vancomycin therapeutic monitoring goal: 15-20 mg/L  4. Pharmacy will check vancomycin levels as appropriate in 1-3 Days.    5. Serum creatinine levels will be ordered daily for the first week of therapy and at least twice weekly for subsequent weeks.      Kendra Lopez [facility]

## 2023-09-13 NOTE — PROGRESS NOTES
1900 - 2300    Heart rate tachy at times.External catheter on, good output.  Tolerating tube feeding, rate at 40ml/hr, will be increased at 0130. Per report of patient's mom, patient has good hand grasps, but difficult to assess neuros since patient is blind and does not follow commands.     Treatment Plan: IV fluids, antibiotics; Tube feeding       SSKI Counseling:  I discussed with the patient the risks of SSKI including but not limited to thyroid abnormalities, metallic taste, GI upset, fever, headache, acne, arthralgias, paraesthesias, lymphadenopathy, easy bleeding, arrhythmias, and allergic reaction.

## 2023-09-21 DIAGNOSIS — Z79.899 NEED FOR PROPHYLACTIC CHEMOTHERAPY: Primary | ICD-10-CM

## 2023-09-25 ASSESSMENT — ASTHMA QUESTIONNAIRES: ACT_TOTALSCORE: 17

## 2023-09-27 ENCOUNTER — MEDICAL CORRESPONDENCE (OUTPATIENT)
Dept: HEALTH INFORMATION MANAGEMENT | Facility: CLINIC | Age: 28
End: 2023-09-27

## 2023-09-27 ENCOUNTER — OFFICE VISIT (OUTPATIENT)
Dept: PEDIATRICS | Facility: CLINIC | Age: 28
End: 2023-09-27
Payer: MEDICAID

## 2023-09-27 VITALS
DIASTOLIC BLOOD PRESSURE: 52 MMHG | SYSTOLIC BLOOD PRESSURE: 110 MMHG | HEART RATE: 119 BPM | BODY MASS INDEX: 18.05 KG/M2 | HEIGHT: 62 IN | WEIGHT: 98.1 LBS | RESPIRATION RATE: 20 BRPM | TEMPERATURE: 98.2 F | OXYGEN SATURATION: 96 %

## 2023-09-27 DIAGNOSIS — G80.1 CP (CEREBRAL PALSY), SPASTIC, DIPLEGIC (H): ICD-10-CM

## 2023-09-27 DIAGNOSIS — Z23 HIGH PRIORITY FOR 2019-NCOV VACCINE: ICD-10-CM

## 2023-09-27 DIAGNOSIS — D69.6 THROMBOCYTOPENIA (H): ICD-10-CM

## 2023-09-27 LAB
ALBUMIN SERPL BCG-MCNC: 4.3 G/DL (ref 3.5–5.2)
ALP SERPL-CCNC: 94 U/L (ref 40–129)
ALT SERPL W P-5'-P-CCNC: 13 U/L (ref 0–70)
ANION GAP SERPL CALCULATED.3IONS-SCNC: 10 MMOL/L (ref 7–15)
AST SERPL W P-5'-P-CCNC: 35 U/L (ref 0–45)
BASOPHILS # BLD AUTO: 0 10E3/UL (ref 0–0.2)
BASOPHILS NFR BLD AUTO: 1 %
BILIRUB SERPL-MCNC: 0.6 MG/DL
BUN SERPL-MCNC: 19.4 MG/DL (ref 6–20)
CALCIUM SERPL-MCNC: 9.7 MG/DL (ref 8.6–10)
CHLORIDE SERPL-SCNC: 102 MMOL/L (ref 98–107)
CREAT SERPL-MCNC: 0.74 MG/DL (ref 0.67–1.17)
DEPRECATED HCO3 PLAS-SCNC: 30 MMOL/L (ref 22–29)
EGFRCR SERPLBLD CKD-EPI 2021: >90 ML/MIN/1.73M2
EOSINOPHIL # BLD AUTO: 0 10E3/UL (ref 0–0.7)
EOSINOPHIL NFR BLD AUTO: 0 %
ERYTHROCYTE [DISTWIDTH] IN BLOOD BY AUTOMATED COUNT: 13.4 % (ref 10–15)
GLUCOSE SERPL-MCNC: 92 MG/DL (ref 70–99)
HCT VFR BLD AUTO: 50.9 % (ref 40–53)
HGB BLD-MCNC: 16.9 G/DL (ref 13.3–17.7)
IMM GRANULOCYTES # BLD: 0 10E3/UL
IMM GRANULOCYTES NFR BLD: 1 %
LYMPHOCYTES # BLD AUTO: 2.3 10E3/UL (ref 0.8–5.3)
LYMPHOCYTES NFR BLD AUTO: 44 %
MCH RBC QN AUTO: 34.1 PG (ref 26.5–33)
MCHC RBC AUTO-ENTMCNC: 33.2 G/DL (ref 31.5–36.5)
MCV RBC AUTO: 103 FL (ref 78–100)
MONOCYTES # BLD AUTO: 0.7 10E3/UL (ref 0–1.3)
MONOCYTES NFR BLD AUTO: 13 %
NEUTROPHILS # BLD AUTO: 2.2 10E3/UL (ref 1.6–8.3)
NEUTROPHILS NFR BLD AUTO: 41 %
PLATELET # BLD AUTO: 106 10E3/UL (ref 150–450)
POTASSIUM SERPL-SCNC: 4.7 MMOL/L (ref 3.4–5.3)
PROT SERPL-MCNC: 7.1 G/DL (ref 6.4–8.3)
RBC # BLD AUTO: 4.96 10E6/UL (ref 4.4–5.9)
SODIUM SERPL-SCNC: 142 MMOL/L (ref 135–145)
WBC # BLD AUTO: 5.3 10E3/UL (ref 4–11)

## 2023-09-27 PROCEDURE — 36415 COLL VENOUS BLD VENIPUNCTURE: CPT | Performed by: INTERNAL MEDICINE

## 2023-09-27 PROCEDURE — 99214 OFFICE O/P EST MOD 30 MIN: CPT | Mod: 25 | Performed by: INTERNAL MEDICINE

## 2023-09-27 PROCEDURE — 90480 ADMN SARSCOV2 VAC 1/ONLY CMP: CPT | Performed by: INTERNAL MEDICINE

## 2023-09-27 PROCEDURE — 90471 IMMUNIZATION ADMIN: CPT | Performed by: INTERNAL MEDICINE

## 2023-09-27 PROCEDURE — 80053 COMPREHEN METABOLIC PANEL: CPT | Performed by: INTERNAL MEDICINE

## 2023-09-27 PROCEDURE — 85025 COMPLETE CBC W/AUTO DIFF WBC: CPT | Performed by: INTERNAL MEDICINE

## 2023-09-27 PROCEDURE — 91320 SARSCV2 VAC 30MCG TRS-SUC IM: CPT | Performed by: INTERNAL MEDICINE

## 2023-09-27 PROCEDURE — 90686 IIV4 VACC NO PRSV 0.5 ML IM: CPT | Performed by: INTERNAL MEDICINE

## 2023-09-27 ASSESSMENT — PAIN SCALES - GENERAL: PAINLEVEL: NO PAIN (0)

## 2023-09-27 NOTE — PROGRESS NOTES
Assessment & Plan     Chronic lung disease of prematurity  discussed with patient (or patient's parents/caregiver) pathophysiology of condition and treatment options. has been stable wit no recent flares.  follow-up with Dr Anne as scheduled, continue home ot O2 prna s well as nighttime O2 monitoring.   - Comprehensive metabolic panel (BMP + Alb, Alk Phos, ALT, AST, Total. Bili, TP); Future  - CBC with platelets and differential; Future  - Comprehensive metabolic panel (BMP + Alb, Alk Phos, ALT, AST, Total. Bili, TP)  - CBC with platelets and differential    High priority for 2019-nCoV vaccine  covdi vaccine today    Thrombocytopenia (H)  discussed with patient (or patient's parents/caregiver) pathophysiology of condition and treatment options.  reviwed oncology's consult, likely multifactorial, medications and chronic disease/consumption. will continue to monitor, reviwed not typically at risk for spontaneous bleeding unless < 10. reviwed what to look for (petechiae) and to let me know iasap if these develop. Patient's parent(s) verbalized understanding and are agreeable to this plan.    - Comprehensive metabolic panel (BMP + Alb, Alk Phos, ALT, AST, Total. Bili, TP); Future  - CBC with platelets and differential; Future  - Comprehensive metabolic panel (BMP + Alb, Alk Phos, ALT, AST, Total. Bili, TP)  - CBC with platelets and differential    CP (cerebral palsy), spastic, diplegic (H)  discussed with patient (or patient's parents/caregiver) pathophysiology of condition and treatment options.  overall things are stable. continue cares and plan.   - Comprehensive metabolic panel (BMP + Alb, Alk Phos, ALT, AST, Total. Bili, TP); Future  - CBC with platelets and differential; Future  - Comprehensive metabolic panel (BMP + Alb, Alk Phos, ALT, AST, Total. Bili, TP)  - CBC with platelets and differential    Return to clinic as needed or if symptoms persist, change, worsen or if any new symptoms develop.       Return  "in about 3 months (around 12/27/2023) for Next scheduled follow-up visit, or sooner if symptoms persist or worsen.      Micky Leyva MD  [facility] is a 28 year [facility], presenting for the following health issues:  RECHECK      9/27/2023     1:59 PM   Additional Questions   Roomed by Zoey   Accompanied by Mom         9/27/2023     1:59 PM   Patient Reported Additional Medications   Patient reports taking the following new medications no       History of Present Illness       Reason for visit:  Follow up    He eats 0-1 servings of fruits and vegetables daily.He consumes 0 sweetened beverage(s) daily.He exercises with enough effort to increase his heart rate 9 or less minutes per day.  He exercises with enough effort to increase his heart rate 3 or less days per week.   He is taking medications regularly.       Rj presents with his Mother today for scheduled follow-up visit, overall has been doing okay, has been able to maintain the increased weight and his mother is verry happy about this. does seem to get agitated at night, not sure why. patient did see oncology for the low plts, no change or recommendations from this and mom isn't exactly sure why they went to this appointment. does have follow-up with Dr Soto scheduled. would like covid vaccine today if able. No other concerns or complaints today.     Review of Systems   Constitutional, HEENT, cardiovascular, pulmonary, gi and gu systems are negative, except as otherwise noted.      Objective    /52 (BP Location: Right arm, Patient Position: Sitting, Cuff Size: Adult Small)   Pulse 119   Temp 98.2  F (36.8  C) (Tympanic)   Resp 20   Ht 1.575 m (5' 2\")   Wt 44.5 kg (98 lb 1.6 oz)   SpO2 96%   BMI 17.94 kg/m    Body mass index is 17.94 kg/m .  Wt Readings from Last 5 Encounters:   09/27/23 44.5 kg (98 lb 1.6 oz)   08/14/23 43.5 kg (96 lb)   06/28/23 44.6 kg (98 lb 6.4 oz)   03/29/23 39 kg (86 lb) "   12/28/22 39.5 kg (87 lb)       Physical Exam   GENERAL: awake alert and no distress; sitting up in chair and listneing to his CD player.   HENT: normocephalic and atrumatic, mucous membranes moist   RESP: lungs clear to auscultation - no rales, rhonchi or wheezes  CV: regular rate and rhythm, normal S1 S2, no S3 or S4, no murmur, click or rub, no peripheral edema   ABDOMEN: soft, nontender bowel sounds normal; GT in place  MS: no gross musculoskeletal defects noted, no edema; + global atrophy  SKIN: no suspicious lesions or rashes  NEURO: baseline strength and tone, mentation intact/baseline and speech normal  PSYCH: mentation appears normal, affect normal        Results for orders placed or performed in visit on 09/27/23   Comprehensive metabolic panel (BMP + Alb, Alk Phos, ALT, AST, Total. Bili, TP)     Status: Abnormal   Result Value Ref Range    Sodium 142 135 - 145 mmol/L    Potassium 4.7 3.4 - 5.3 mmol/L    Carbon Dioxide (CO2) 30 (H) 22 - 29 mmol/L    Anion Gap 10 7 - 15 mmol/L    Urea Nitrogen 19.4 6.0 - 20.0 mg/dL    Creatinine 0.74 0.67 - 1.17 mg/dL    GFR Estimate >90 >60 mL/min/1.73m2    Calcium 9.7 8.6 - 10.0 mg/dL    Chloride 102 98 - 107 mmol/L    Glucose 92 70 - 99 mg/dL    Alkaline Phosphatase 94 40 - 129 U/L    AST 35 0 - 45 U/L    ALT 13 0 - 70 U/L    Protein Total 7.1 6.4 - 8.3 g/dL    Albumin 4.3 3.5 - 5.2 g/dL    Bilirubin Total 0.6 <=1.2 mg/dL   CBC with platelets and differential     Status: Abnormal   Result Value Ref Range    WBC Count 5.3 4.0 - 11.0 10e3/uL    RBC Count 4.96 4.40 - 5.90 10e6/uL    Hemoglobin 16.9 13.3 - 17.7 g/dL    Hematocrit 50.9 40.0 - 53.0 %     (H) 78 - 100 fL    MCH 34.1 (H) 26.5 - 33.0 pg    MCHC 33.2 31.5 - 36.5 g/dL    RDW 13.4 10.0 - 15.0 %    Platelet Count 106 (L) 150 - 450 10e3/uL    % Neutrophils 41 %    % Lymphocytes 44 %    % Monocytes 13 %    % Eosinophils 0 %    % Basophils 1 %    % Immature Granulocytes 1 %    Absolute Neutrophils 2.2 1.6 - 8.3  10e3/uL    Absolute Lymphocytes 2.3 0.8 - 5.3 10e3/uL    Absolute Monocytes 0.7 0.0 - 1.3 10e3/uL    Absolute Eosinophils 0.0 0.0 - 0.7 10e3/uL    Absolute Basophils 0.0 0.0 - 0.2 10e3/uL    Absolute Immature Granulocytes 0.0 <=0.4 10e3/uL   CBC with platelets and differential     Status: Abnormal    Narrative    The following orders were created for panel order CBC with platelets and differential.  Procedure                               Abnormality         Status                     ---------                               -----------         ------                     CBC with platelets and d...[[id number]]  Abnormal            Final result                 Please view results for these tests on the individual orders.

## 2023-10-06 NOTE — PROGRESS NOTES
Advised provider that I will ask for assistance with this, as I am unable to change the bundle either without doing a transfer request.  GRACIELA Arnold RN    
I see the recent SB5 request is for considering an SB5 status change and not to the SB5 complex provider team. Hold on establishing to SB5 complex provider team as no transitions are accepted at this time.     The patient does meet criteria for an SB5 status change. Patient will be staying with current PCP. PCP may consider changing to SB5 designation for possible NP resources for acute care visits.     Kranthi Martino MD  Internal Medicine  [facility] ROHITH Solomon    
There is no way that I am aware of to make this patient SB5 unless I place the referral, this patient will stay with me and I would like him to get the additional time, etc that comes with SB5 on my schedule, Can you please change this patient to SB5? Or other ideas how I may do this?  
no

## 2023-10-11 ENCOUNTER — PATIENT OUTREACH (OUTPATIENT)
Dept: PEDIATRICS | Facility: CLINIC | Age: 28
End: 2023-10-11
Payer: MEDICAID

## 2023-10-11 NOTE — TELEPHONE ENCOUNTER
1st / 2nd Attempt Patient Quality Outreach Health Maintenance - PAL RN    Summary:    PAL RN attempted (attempt # 1) to contact pt regarding overdue health maintenance    Patient is due/failing the following:       Topic Date Due    HPV Vaccine (2 - Male 3-dose series) 02/05/2016       Health Maintenance Due   Topic Date Due    HPV IMMUNIZATION (2 - Male 3-dose series) 02/05/2016       Type of outreach:    Chart review performed, no outreach needed.    Next 5 appointments (look out 90 days)      Dec 27, 2023  1:00 PM  (Arrive by 12:40 PM)  Adult Preventative Visit with Micky Leyva MD  [facility] ([facility] [address]  [address]  [place] [address] [phone]          Next Steps:  Max number of attempts reached: No. Will try again in 90 days if patient still on fail list.    Reach out within 90 days via [employer].    Questions for provider review:    None                                                                                       Anjelica BONNER RN, BSN

## 2023-10-31 ENCOUNTER — MYC MEDICAL ADVICE (OUTPATIENT)
Dept: PEDIATRICS | Facility: CLINIC | Age: 28
End: 2023-10-31
Payer: MEDICAID

## 2023-10-31 ENCOUNTER — MYC REFILL (OUTPATIENT)
Dept: PEDIATRICS | Facility: CLINIC | Age: 28
End: 2023-10-31
Payer: MEDICAID

## 2023-10-31 DIAGNOSIS — K59.09 CHRONIC CONSTIPATION: ICD-10-CM

## 2023-10-31 RX ORDER — POLYETHYLENE GLYCOL 3350 17 G/17G
POWDER, FOR SOLUTION ORAL SEE ADMIN INSTRUCTIONS
OUTPATIENT
Start: 2023-10-31

## 2023-10-31 RX ORDER — POLYETHYLENE GLYCOL 3350 17 G/17G
POWDER, FOR SOLUTION ORAL
Qty: 510 G | Refills: 3 | Status: SHIPPED | OUTPATIENT
Start: 2023-10-31 | End: 2024-03-27

## 2023-12-24 ASSESSMENT — ASTHMA QUESTIONNAIRES: ACT_TOTALSCORE: 23

## 2023-12-27 ENCOUNTER — LAB (OUTPATIENT)
Dept: LAB | Facility: CLINIC | Age: 28
End: 2023-12-27
Payer: MEDICAID

## 2023-12-27 ENCOUNTER — OFFICE VISIT (OUTPATIENT)
Dept: PEDIATRICS | Facility: CLINIC | Age: 28
End: 2023-12-27
Payer: MEDICAID

## 2023-12-27 VITALS
HEART RATE: 72 BPM | DIASTOLIC BLOOD PRESSURE: 64 MMHG | RESPIRATION RATE: 24 BRPM | SYSTOLIC BLOOD PRESSURE: 112 MMHG | TEMPERATURE: 98 F | OXYGEN SATURATION: 95 % | HEIGHT: 62 IN | BODY MASS INDEX: 19.21 KG/M2 | WEIGHT: 104.4 LBS

## 2023-12-27 DIAGNOSIS — G80.1 CP (CEREBRAL PALSY), SPASTIC, DIPLEGIC (H): ICD-10-CM

## 2023-12-27 DIAGNOSIS — F41.9 ANXIETY: ICD-10-CM

## 2023-12-27 DIAGNOSIS — Z79.899 NEED FOR PROPHYLACTIC CHEMOTHERAPY: ICD-10-CM

## 2023-12-27 DIAGNOSIS — Z93.1 FEEDING BY G-TUBE (H): ICD-10-CM

## 2023-12-27 DIAGNOSIS — Z00.00 ROUTINE GENERAL MEDICAL EXAMINATION AT A HEALTH CARE FACILITY: Primary | ICD-10-CM

## 2023-12-27 LAB
ALBUMIN SERPL BCG-MCNC: 4.5 G/DL (ref 3.5–5.2)
ALP SERPL-CCNC: 88 U/L (ref 40–150)
ALT SERPL W P-5'-P-CCNC: 15 U/L (ref 0–70)
ANION GAP SERPL CALCULATED.3IONS-SCNC: 8 MMOL/L (ref 7–15)
AST SERPL W P-5'-P-CCNC: 32 U/L (ref 0–45)
BASOPHILS # BLD AUTO: 0.1 10E3/UL (ref 0–0.2)
BASOPHILS NFR BLD AUTO: 1 %
BILIRUB DIRECT SERPL-MCNC: 0.26 MG/DL (ref 0–0.3)
BILIRUB SERPL-MCNC: 0.7 MG/DL
BUN SERPL-MCNC: 19.4 MG/DL (ref 6–20)
CALCIUM SERPL-MCNC: 10.2 MG/DL (ref 8.6–10)
CHLORIDE SERPL-SCNC: 100 MMOL/L (ref 98–107)
CHOLEST SERPL-MCNC: 185 MG/DL
CREAT SERPL-MCNC: 0.88 MG/DL (ref 0.67–1.17)
DEPRECATED HCO3 PLAS-SCNC: 33 MMOL/L (ref 22–29)
EGFRCR SERPLBLD CKD-EPI 2021: >90 ML/MIN/1.73M2
EOSINOPHIL # BLD AUTO: 0 10E3/UL (ref 0–0.7)
EOSINOPHIL NFR BLD AUTO: 0 %
ERYTHROCYTE [DISTWIDTH] IN BLOOD BY AUTOMATED COUNT: 13.2 % (ref 10–15)
FASTING STATUS PATIENT QL REPORTED: YES
GLUCOSE SERPL-MCNC: 83 MG/DL (ref 70–99)
HCT VFR BLD AUTO: 52.4 % (ref 40–53)
HDLC SERPL-MCNC: 76 MG/DL
HGB BLD-MCNC: 17.3 G/DL (ref 13.3–17.7)
IMM GRANULOCYTES # BLD: 0 10E3/UL
IMM GRANULOCYTES NFR BLD: 1 %
LDLC SERPL CALC-MCNC: 85 MG/DL
LYMPHOCYTES # BLD AUTO: 2.7 10E3/UL (ref 0.8–5.3)
LYMPHOCYTES NFR BLD AUTO: 55 %
MCH RBC QN AUTO: 34.8 PG (ref 26.5–33)
MCHC RBC AUTO-ENTMCNC: 33 G/DL (ref 31.5–36.5)
MCV RBC AUTO: 105 FL (ref 78–100)
MONOCYTES # BLD AUTO: 0.7 10E3/UL (ref 0–1.3)
MONOCYTES NFR BLD AUTO: 14 %
NEUTROPHILS # BLD AUTO: 1.4 10E3/UL (ref 1.6–8.3)
NEUTROPHILS NFR BLD AUTO: 29 %
NONHDLC SERPL-MCNC: 109 MG/DL
NRBC # BLD AUTO: 0 10E3/UL
NRBC BLD AUTO-RTO: 0 /100
PLATELET # BLD AUTO: 103 10E3/UL (ref 150–450)
POTASSIUM SERPL-SCNC: 4.6 MMOL/L (ref 3.4–5.3)
PREALB SERPL-MCNC: 31.2 MG/DL (ref 20–40)
PROT SERPL-MCNC: 7.5 G/DL (ref 6.4–8.3)
RBC # BLD AUTO: 4.97 10E6/UL (ref 4.4–5.9)
SODIUM SERPL-SCNC: 141 MMOL/L (ref 135–145)
TRIGL SERPL-MCNC: 119 MG/DL
TSH SERPL DL<=0.005 MIU/L-ACNC: 1.47 UIU/ML (ref 0.3–4.2)
WBC # BLD AUTO: 4.9 10E3/UL (ref 4–11)

## 2023-12-27 PROCEDURE — 99214 OFFICE O/P EST MOD 30 MIN: CPT | Mod: 25 | Performed by: INTERNAL MEDICINE

## 2023-12-27 PROCEDURE — 80053 COMPREHEN METABOLIC PANEL: CPT

## 2023-12-27 PROCEDURE — 82248 BILIRUBIN DIRECT: CPT

## 2023-12-27 PROCEDURE — 85025 COMPLETE CBC W/AUTO DIFF WBC: CPT

## 2023-12-27 PROCEDURE — 84134 ASSAY OF PREALBUMIN: CPT

## 2023-12-27 PROCEDURE — 99395 PREV VISIT EST AGE 18-39: CPT | Performed by: INTERNAL MEDICINE

## 2023-12-27 PROCEDURE — 80061 LIPID PANEL: CPT

## 2023-12-27 PROCEDURE — 84443 ASSAY THYROID STIM HORMONE: CPT

## 2023-12-27 PROCEDURE — 36415 COLL VENOUS BLD VENIPUNCTURE: CPT

## 2023-12-27 RX ORDER — BUSPIRONE HYDROCHLORIDE 10 MG/1
10 TABLET ORAL 2 TIMES DAILY
COMMUNITY
Start: 2023-12-21 | End: 2024-12-26

## 2023-12-27 ASSESSMENT — PAIN SCALES - GENERAL: PAINLEVEL: NO PAIN (0)

## 2023-12-27 NOTE — PROGRESS NOTES
SUBJECTIVE:   Richard is a 28 year old, presenting for the following:  Wellness Visit        12/27/2023    12:53 PM   Additional Questions   Roomed by Zoey   Accompanied by Mom         12/27/2023    12:53 PM   Patient Reported Additional Medications   Patient reports taking the following new medications yes       Are you in the first 12 months of your Medicare coverage?  No    Healthy Habits:     Getting at least 3 servings of Calcium per day:  NO    Bi-annual eye exam:  Yes    Dental care twice a year:  Yes    Sleep apnea or symptoms of sleep apnea:  Daytime drowsiness    Diet:  Other    Frequency of exercise:  None    Taking medications regularly:  Not Applicable    Medication side effects:  Not applicable    Additional concerns today:  No    here for annual visit, mm has several concerns:    bowels: getting the same amount of miralax but now stolls every 3 pimentel, usually has one every one to two days. some accidents at night as well. no change in his tube feeds and no change in free water. mom did try to increase the water a little and din't seem to help much.     sleep/disposition mirtazapine not really helping, he is more agitated and difficult. anxiety is off the chart. bupar was started and plan to increase end of the week. has been  going on for about a month, one day in particular mom went to get the ativan and richard was trying to pull a shelf down off the wall or tip over his dresser. mom thinks the antifipation of xmass was part of it. mom did take most things out of his room and he has someone with him 24/7.     Overall mom notes things are stable and manageable but more stressful than over a month ago.  She has been working with psychiatry as well as myself from pulmonary as well.  She has in the past looked into respite care as well as day programs etc. and will think about this again as a possible option for Richard in the future however the 1 she visited by her S. aureus due to fears and she would not  even consider coming In there.  Of note she Has not any fevers chills night sweats weight loss, in fact his weight is up a little bit, and from a physical standpoint seems to doing quite well with mostly his emotional and mental standpoint that has been the issue since her last visit.        Today's PHQ-9 Score:       12/26/2023     1:42 PM   PHQ-9 SCORE   PHQ-9 Total Score MyChart 9 (Mild depression)   PHQ-9 Total Score 9           Have you ever done Advance Care Planning? (For example, a Health Directive, POLST, or a discussion with a medical provider or your loved ones about your wishes): No, advance care planning information given to patient to review.  Patient declined advance care planning discussion at this time.       Fall risk/ never walks alone        Cognitive Screening Not appropriate due to mental handicap    Do you have sleep apnea, excessive snoring or daytime drowsiness? : no    Reviewed and updated as needed this visit by clinical staff   Tobacco  Allergies  Meds              Reviewed and updated as needed this visit by Provider                 Social History     Tobacco Use    Smoking status: Never     Passive exposure: Never    Smokeless tobacco: Never   Substance Use Topics    Alcohol use: No             12/24/2023    12:44 PM   Alcohol Use   Prescreen: >3 drinks/day or >7 drinks/week? No     Do you have a current opioid prescription? No  Do you use any other controlled substances or medications that are not prescribed by a provider?         Current providers sharing in care for this patient include:   Patient Care Team:  Micky Leyva MD as PCP - General (Internal Medicine)  Micky Leyva MD as Assigned PCP  Anjelica Bell, RN as Personal Advocate & Liaison (PAL)  Evelyne Barahona MD as MD (Hematology & Oncology)  Evelyne Barahona MD as Assigned Cancer Care Provider    The following health maintenance items are reviewed in Epic and correct as of today:  Health Maintenance   Topic  "Date Due    HPV IMMUNIZATION (2 - Male 3-dose series) 02/05/2016    YEARLY PREVENTIVE VISIT  12/28/2023    ASTHMA CONTROL TEST  06/27/2024    PHQ-9  06/27/2024    TSH W/FREE T4 REFLEX  06/28/2024    ANNUAL REVIEW OF HM ORDERS  06/28/2024    ASTHMA ACTION PLAN  06/28/2024    ADVANCE CARE PLANNING  09/24/2026    DTAP/TDAP/TD IMMUNIZATION (8 - Td or Tdap) 02/06/2030    DEPRESSION ACTION PLAN  Completed    INFLUENZA VACCINE  Completed    Pneumococcal Vaccine: Pediatrics (0 to 5 Years) and At-Risk Patients (6 to 64 Years)  Completed    IPV IMMUNIZATION  Completed    HEPATITIS B IMMUNIZATION  Completed    COVID-19 Vaccine  Completed    MENINGITIS IMMUNIZATION  Aged Out    RSV MONOCLONAL ANTIBODY  Aged Out    HEPATITIS C SCREENING  Discontinued    HIV SCREENING  Discontinued     BP Readings from Last 3 Encounters:   12/27/23 112/64   09/27/23 110/52   08/14/23 110/77    Wt Readings from Last 3 Encounters:   12/27/23 47.4 kg (104 lb 6.4 oz)   09/27/23 44.5 kg (98 lb 1.6 oz)   08/14/23 43.5 kg (96 lb)               Review of Systems  Constitutional, HEENT, cardiovascular, pulmonary, gi and gu systems are negative, except as otherwise noted.    OBJECTIVE:   /64 (BP Location: Right arm, Patient Position: Sitting, Cuff Size: Adult Small)   Pulse 72   Temp 98  F (36.7  C) (Tympanic)   Resp 24   Ht 1.575 m (5' 2\")   Wt 47.4 kg (104 lb 6.4 oz)   SpO2 95%   BMI 19.10 kg/m   Estimated body mass index is 19.1 kg/m  as calculated from the following:    Height as of this encounter: 1.575 m (5' 2\").    Weight as of this encounter: 47.4 kg (104 lb 6.4 oz).    Wt Readings from Last 5 Encounters:   12/27/23 47.4 kg (104 lb 6.4 oz)   09/27/23 44.5 kg (98 lb 1.6 oz)   08/14/23 43.5 kg (96 lb)   06/28/23 44.6 kg (98 lb 6.4 oz)   03/29/23 39 kg (86 lb)       Physical Exam  GENERAL: awake alert and no distress; sitting up in chair and listneing to his CD player.   HENT: normocephalic and atrumatic, mucous membranes moist   RESP: " lungs clear to auscultation - no rales, rhonchi or wheezes  CV: regular rate and rhythm, normal S1 S2, no S3 or S4, no murmur, click or rub, no peripheral edema   ABDOMEN: soft, nontender bowel sounds normal; GT in place  MS: no gross musculoskeletal defects noted, no edema; + global atrophy  SKIN: no suspicious lesions or rashes  NEURO: baseline strength and tone, mentation intact/baseline and speech normal  PSYCH: mentation appears normal, affect normal         ASSESSMENT / PLAN:   (Z00.00) Routine general medical examination at a health care facility  (primary encounter diagnosis)  Comment: d/w pt preventative care measures including seat belt use, avoiding excessive sun exposure/sunscreen, wt management  Also discussed accident prevention and future RHM schedule.       (F41.9) Anxiety  (primary encounter diagnosis)  Comment: discussed with patient (or patient's parents/caregiver) pathophysiology of condition and treatment options.  Long discussion with patient and his mother about options to manage his anxiety and outbursts.  I think it is somewhat reassuring that it has had some possible weight gain including increased muscle mass however out things have been harder to manage at home.  Psychiatry has recently added mirtazapine and buspirone.  If after the increase in buspirone they do not see any improvement in 1 week I did review with go ahead and increase this again and patient's mother will reach out to me.  She will continue the mirtazapine for now as well.  Will get labs as ordered today to evaluate for possible metabolic contributors as well. Also d/w pt signs/symptoms of worsening of condition and need for urgent ED evaluation.  Also reviewed options for respite care for patient's mother and her daughter as well as be happy to place a care greater referral to see if they have any ideas for possible day programs or similar for In the future.  Patient's mom will consider this.  Patient verbalized  understanding and is agreeable to this plan.    Plan: CBC with platelets and differential,         Comprehensive metabolic panel (BMP + Alb, Alk         Phos, ALT, AST, Total. Bili, TP), TSH with free        T4 reflex, Prealbumin, Lipid panel reflex to         direct LDL Non-fasting            (P27.9,  P07.30) Chronic lung disease of prematurity   Comment: discussed with patient (or patient's parents/caregiver) pathophysiology of condition and treatment options.  Reviewed most recent recommendations from Dr. Soto.  Continue cares and plan to follow-up with her clinic as scheduled.  Plan: CBC with platelets and differential,         Comprehensive metabolic panel (BMP + Alb, Alk         Phos, ALT, AST, Total. Bili, TP), TSH with free        T4 reflex, Prealbumin, Lipid panel reflex to         direct LDL Non-fasting            (G80.1) CP (cerebral palsy), spastic, diplegic (H)  Comment: as above. continue cares and plan.  Plan: CBC with platelets and differential,         Comprehensive metabolic panel (BMP + Alb, Alk         Phos, ALT, AST, Total. Bili, TP), TSH with free        T4 reflex, Prealbumin, Lipid panel reflex to         direct LDL Non-fasting            (Z93.1) Feeding by G-tube (H)  Comment: as above/reviewed close and cons of getting a abdominal from today to evaluate for stool burden however this is very distracting and difficult for Him to do the patient's mother prefers we do not do this today.   Did review I think it is okay to give him some additional free water however reviewed safe amounts today and we will go ahead and see if the thyroid labs are okay.  Reviewed okay to titrate MiraLAX to stool frequency to help with the patient's cares and management. Patient's parent(s) verbalized understanding and are agreeable to this plan.    Plan: CBC with platelets and differential,         Comprehensive metabolic panel (BMP + Alb, Alk         Phos, ALT, AST, Total. Bili, TP), TSH with free        T4  reflex, Prealbumin, Lipid panel reflex to         direct LDL Non-fasting            Patient has been advised of split billing requirements and indicates understanding: Yes      COUNSELING:  Reviewed preventive health counseling, as reflected in patient instructions        He reports that he has never smoked. He has never been exposed to tobacco smoke. He has never used smokeless tobacco.      Appropriate preventive services were discussed with this patient, including applicable screening as appropriate for fall prevention, nutrition, physical activity, Tobacco-use cessation, weight loss and cognition.  Checklist reviewing preventive services available has been given to the patient.    Reviewed patients plan of care and provided an AVS. The Basic Care Plan (routine screening as documented in Health Maintenance) for Rj meets the Care Plan requirement. This Care Plan has been established and reviewed with the Patient and mother.    I have discussed with patient the risks, benefits, medications, treatment options and modalities.   I have instructed the patient to call or schedule a follow-up appointment if any problems or failure to improve.         Micky Leyva MD  [facility]    (*documentation in this chart was partially completed using Dragon PowerMic which could potentially lead to some misspellings or grammar/autocorrect issues in the narrative.)    Identified Health Risks:    Answers submitted by the patient for this visit:  Patient Health Questionnaire (Submitted on 12/26/2023)  If you checked off any problems, how difficult have these problems made it for you to do your work, take care of things at home, or get along with other people?: Not difficult at all  PHQ9 TOTAL SCORE: 9  Annual Preventive Visit (Submitted on 12/24/2023)  Chief Complaint: Annual Exam:

## 2023-12-28 ENCOUNTER — PATIENT OUTREACH (OUTPATIENT)
Dept: PEDIATRICS | Facility: CLINIC | Age: 28
End: 2023-12-28
Payer: MEDICAID

## 2024-02-23 ENCOUNTER — TRANSFERRED RECORDS (OUTPATIENT)
Dept: HEALTH INFORMATION MANAGEMENT | Facility: CLINIC | Age: 29
End: 2024-02-23
Payer: MEDICAID

## 2024-02-27 ENCOUNTER — TRANSFERRED RECORDS (OUTPATIENT)
Dept: HEALTH INFORMATION MANAGEMENT | Facility: CLINIC | Age: 29
End: 2024-02-27
Payer: MEDICAID

## 2024-02-29 ENCOUNTER — TRANSFERRED RECORDS (OUTPATIENT)
Dept: HEALTH INFORMATION MANAGEMENT | Facility: CLINIC | Age: 29
End: 2024-02-29
Payer: MEDICAID

## 2024-02-29 LAB
ASTHMA CONTROL TEST SCORE: 0
ER ASTHMA: 0
HOSPITALIZATION ASTHMA: 17

## 2024-03-27 ENCOUNTER — OFFICE VISIT (OUTPATIENT)
Dept: PEDIATRICS | Facility: CLINIC | Age: 29
End: 2024-03-27
Payer: MEDICAID

## 2024-03-27 VITALS
RESPIRATION RATE: 18 BRPM | DIASTOLIC BLOOD PRESSURE: 74 MMHG | TEMPERATURE: 98.5 F | HEART RATE: 63 BPM | BODY MASS INDEX: 20.3 KG/M2 | SYSTOLIC BLOOD PRESSURE: 116 MMHG | HEIGHT: 62 IN | OXYGEN SATURATION: 94 % | WEIGHT: 110.3 LBS

## 2024-03-27 DIAGNOSIS — R05.9 COUGH, UNSPECIFIED TYPE: Primary | ICD-10-CM

## 2024-03-27 DIAGNOSIS — Z93.1 FEEDING BY G-TUBE (H): ICD-10-CM

## 2024-03-27 DIAGNOSIS — K21.9 GASTROESOPHAGEAL REFLUX DISEASE WITHOUT ESOPHAGITIS: ICD-10-CM

## 2024-03-27 DIAGNOSIS — K59.09 CHRONIC CONSTIPATION: ICD-10-CM

## 2024-03-27 PROCEDURE — 99214 OFFICE O/P EST MOD 30 MIN: CPT | Performed by: INTERNAL MEDICINE

## 2024-03-27 RX ORDER — POLYETHYLENE GLYCOL 3350 17 G/17G
POWDER, FOR SOLUTION ORAL
Qty: 510 G | Refills: 3 | Status: SHIPPED | OUTPATIENT
Start: 2024-03-27 | End: 2024-04-29

## 2024-03-27 RX ORDER — ERYTHROMYCIN 5 MG/G
OINTMENT OPHTHALMIC DAILY
COMMUNITY
Start: 2024-03-10

## 2024-03-27 RX ORDER — TRAZODONE HYDROCHLORIDE 50 MG/1
50 TABLET, FILM COATED ORAL AT BEDTIME
COMMUNITY
Start: 2024-01-03 | End: 2025-01-02

## 2024-03-27 ASSESSMENT — PAIN SCALES - GENERAL: PAINLEVEL: NO PAIN (0)

## 2024-03-27 NOTE — PROGRESS NOTES
Assessment & Plan     Cough, unspecified type  discussed with patient (or patient's parents/caregiver) pathophysiology of condition and treatment options.  Reviewed history in detail today with mom and the patient.  Defer to Dr. Soto for adjustments of the patient's nebs and pulmonary support.  Continue with oxygen as needed and overnight oximetry.  Also reviewed that esophageal reflux disease can cause worsening cough including laryngeal spasm etc. and do think patient should restart the proton pump inhibitor.  Go and see if the compounding pharmacy can supply this.  Pulm exam today is unremarkable and did review signs and symptoms of worsening of condition and need for urgent evaluation.Patient's parent(s) verbalized understanding and are agreeable to this plan.      Chronic constipation  discussed with patient (or patient's parents/caregiver) pathophysiology of condition and treatment options.  Given history do think we need to cut back slightly on the MiraLAX to maybe add a little bit of psyllium and to help get better balance in the stooling.  Patient's mother will try this and will keep me posted.  Also reviewed other modalities to help with this including avoiding stimulant laxatives continue with the MiraLAX as needed as well as bulking agents etc.  - polyethylene glycol (MIRALAX) 17 GM/Dose powder; MIX AND TAKE 17 GRAMS BY MOUTH DAILY AS DIRECTED AS NEEDED  - psyllium (METAMUCIL/KONSYL) 58.6 % powder; Take 5 g by mouth 2 times daily    Feeding by G-tube (H)  as above, tolerating feeds well but at risk for reflux we will go and restart the PPI.  Patient's mom notes this did work well for him in the past we will see if our compounding pharmacy can make this.  - omeprazole (PRILOSEC) 2 mg/mL suspension; 20 mLs (40 mg) by Per G Tube route at bedtime    Gastroesophageal reflux disease without esophagitis  as above, also reviwed reflux precautions, etc.   - omeprazole (PRILOSEC) 2 mg/mL suspension; 20 mLs  (40 mg) by Per G Tube route at bedtime  - omeprazole-sodium bicarbonate (KONVOMEP) 2-84 MG/ML SUSR suspension; 20 mLs (40 mg) by Per G Tube route at bedtime    Return in about 3 months (around 6/27/2024) for Next scheduled follow-up visit, or sooner if symptoms persist or worsen.      Roro De La Rosa is a 28 year old, presenting for the following health issues:  RECHECK      3/27/2024     3:00 PM   Additional Questions   Roomed by Blanca Luz   Accompanied by Mom Darcy         3/27/2024     3:00 PM   Patient Reported Additional Medications   Patient reports taking the following new medications No     History of Present Illness       Reason for visit:  Follow up    He eats 0-1 servings of fruits and vegetables daily.He consumes 0 sweetened beverage(s) daily.He exercises with enough effort to increase his heart rate 9 or less minutes per day.  He exercises with enough effort to increase his heart rate 3 or less days per week.   He is taking medications regularly.  Patient was given his mom for scheduled follow-up.  Overall he is doing pretty well however they have noted some increasing desatting at night and they have adjusted some nebs including started tobramycin nebs twice a day.  This will for the first day over the next day seem to have significant issues with the neb and then his sats crash and they stopped this.  He had to go back on oxygen for some time at that point as well.  He did get a dose of prednisone for 5 days and did seem to help as well as 10 days of cefdinir to treat any possible occult infection and that seemed to make things better.  Mom is very reluctant to restart the tobramycin nebs but will talk with Dr. Soto about this.  Unfortunately lost her 13-year-old dog a few weeks ago and has been tough on the whole family including a cabin.  He is also continues to have some stool leakage and very wet stools and mom wants to talk about the MiraLAX and other bowel regimen today.  She notes  "his sleep also has been affected with some of the stooling issues.  He has not any fevers.  He can be tolerating feeds well.  No other concerns or complaints today.                  Objective    /74 (BP Location: Right arm, Patient Position: Sitting, Cuff Size: Adult Small)   Pulse 63   Temp 98.5  F (36.9  C) (Tympanic)   Resp 18   Ht 1.575 m (5' 2\")   Wt 50 kg (110 lb 4.8 oz)   SpO2 94%   BMI 20.17 kg/m    Body mass index is 20.17 kg/m .  Physical Exam   GENERAL: awake alert and no distress; sitting up in chair and listneing to his CD player.   HENT: normocephalic and atrumatic, mucous membranes moist   RESP: lungs clear to auscultation - no rales, rhonchi or wheezes  CV: regular rate and rhythm, normal S1 S2, no S3 or S4, no murmur, click or rub, no peripheral edema   ABDOMEN: soft, nontender bowel sounds normal; GT in place  MS: no gross musculoskeletal defects noted, no edema; + global atrophy  SKIN: no suspicious lesions or rashes  NEURO: baseline strength and tone, mentation intact/baseline and speech normal  PSYCH: mentation appears normal, affect normal     No results found for any visits on 03/27/24.          Signed Electronically by: Micky Leyva MD    (*documentation in this chart was partially completed using Dragon PowerMic which could potentially lead to some misspellings or grammar/autocorrect issues in the narrative.)      "

## 2024-03-29 RX ORDER — OMEPRAZOLE/SODIUM BICARBONATE 2-84 MG/ML
40 SUSPENSION, RECONSTITUTED, ORAL (ML) ORAL AT BEDTIME
Qty: 1600 ML | Refills: 3 | Status: SHIPPED | OUTPATIENT
Start: 2024-03-29

## 2024-04-01 ENCOUNTER — TELEPHONE (OUTPATIENT)
Dept: PEDIATRICS | Facility: CLINIC | Age: 29
End: 2024-04-01
Payer: MEDICAID

## 2024-04-01 NOTE — TELEPHONE ENCOUNTER
Prior Authorization Retail Medication Request    Medication/Dose: KONVOMEP 2mg/84ml oral sp kit 90ml  Diagnosis and ICD code (if different than what is on RX):    New/renewal/insurance change PA/secondary ins. PA:  Previously Tried and Failed:    Rationale:  patient needs suspension/liquid as all intake is thru g-tube    Insurance   Primary: MEDICAID [place]  Insurance ID:  [id number]    Secondary (if applicable):  Insurance ID:      Pharmacy Information (if different than what is on RX)  Name:    Phone:    Fax:    Fax states call plan at [phone] to initiate PA patient ID# [id number]    Teri Sosa CMA

## 2024-04-08 DIAGNOSIS — E03.9 HYPOTHYROIDISM, UNSPECIFIED TYPE: ICD-10-CM

## 2024-04-09 RX ORDER — LEVOTHYROXINE SODIUM 25 UG/1
25 TABLET ORAL DAILY
Qty: 90 TABLET | Refills: 1 | Status: SHIPPED | OUTPATIENT
Start: 2024-04-09 | End: 2024-07-12

## 2024-04-12 NOTE — TELEPHONE ENCOUNTER
PA Initiation    Medication: KONVOMEP 2-84 MG/ML PO SUSR  Insurance Company: [place] Medicaid ([facility] - Phone [phone] Fax [phone]  Pharmacy Filling the Rx: [id number] - [facility] [address] AT [place] [address] & [facility]  Filling Pharmacy Phone: [phone]  Filling Pharmacy Fax:    Start Date: 4/12/2024

## 2024-04-15 NOTE — TELEPHONE ENCOUNTER
PRIOR AUTHORIZATION DENIED    Medication: KONVOMEP 2-84 MG/ML PO SUSR  Insurance Company: [place] Medicaid ([facility]) - Phone [phone] Fax [phone]  Denial Date: 4/15/2024  Denial Reason(s): Insurance states that patient does not meet coverage criteria.   Appeal Information:   Patient Notified: NO

## 2024-04-15 NOTE — TELEPHONE ENCOUNTER
Cesilia Vu Ea/Rm32 minutes ago (11:36 AM)     Hi, this medication was denied. If the provider would like to appeal, please provide a letter of medical necessity  and route back to the team. Otherwise you can close the encounter. Thank you, [facility] PA Team    Retail Pharmacy Prior Authorization Team  Phone: [phone]

## 2024-05-29 ENCOUNTER — TELEPHONE (OUTPATIENT)
Dept: PEDIATRICS | Facility: CLINIC | Age: 29
End: 2024-05-29
Payer: MEDICAID

## 2024-05-29 NOTE — TELEPHONE ENCOUNTER
Forms/Letter Request    Type of form/letter: OTHER: Statement of Medical Necessity       Do we have the form/letter: Yes: Dr. Leyva's inbox    Who is the form from? [facility] (if other please explain)    Where did/will the form come from? form was faxed in    When is form/letter needed by: next available    How would you like the form/letter returned: Fax : [phone]    Patient Notified form requests are processed in 5-7 business days:No    Could we send this information to you in [place] or would you prefer to receive a phone call?:   NA

## 2024-05-30 ENCOUNTER — TELEPHONE (OUTPATIENT)
Dept: PEDIATRICS | Facility: CLINIC | Age: 29
End: 2024-05-30
Payer: MEDICAID

## 2024-05-30 NOTE — TELEPHONE ENCOUNTER
Forms/Letter Request    Type of form/letter: OTHER: [facility].  Needs ICD-10 codes.     Do we have the form/letter: Yes: Put in Dr Leyva's file in side room by .    Who is the form from? [facility]    Where did/will the form come from? form was faxed in    When is form/letter needed by: asap    How would you like the form/letter returned: Fax : [phone]    Patient Notified form requests are processed in 5-7 business days:No    Could we send this information to you in [employer] or would you prefer to receive a phone call?:   Patient would prefer a phone call   Okay to leave a detailed message?: Yes at Cell number on file:    Telephone Information:   Mobile [phone]

## 2024-05-30 NOTE — TELEPHONE ENCOUNTER
Printed problem list. Placed in Dr. Leyva's inbox for signature.    Thank you kindly,  David MADISON

## 2024-06-14 ASSESSMENT — ASTHMA QUESTIONNAIRES
QUESTION_4 LAST FOUR WEEKS HOW OFTEN HAVE YOU USED YOUR RESCUE INHALER OR NEBULIZER MEDICATION (SUCH AS ALBUTEROL): TWO OR THREE TIMES PER WEEK
ACT_TOTALSCORE: 16
ACT_TOTALSCORE: 16
QUESTION_1 LAST FOUR WEEKS HOW MUCH OF THE TIME DID YOUR ASTHMA KEEP YOU FROM GETTING AS MUCH DONE AT WORK, SCHOOL OR AT HOME: SOME OF THE TIME
QUESTION_5 LAST FOUR WEEKS HOW WOULD YOU RATE YOUR ASTHMA CONTROL: SOMEWHAT CONTROLLED
QUESTION_2 LAST FOUR WEEKS HOW OFTEN HAVE YOU HAD SHORTNESS OF BREATH: NOT AT ALL
QUESTION_3 LAST FOUR WEEKS HOW OFTEN DID YOUR ASTHMA SYMPTOMS (WHEEZING, COUGHING, SHORTNESS OF BREATH, CHEST TIGHTNESS OR PAIN) WAKE YOU UP AT NIGHT OR EARLIER THAN USUAL IN THE MORNING: TWO OR THREE NIGHTS A WEEK

## 2024-06-18 ENCOUNTER — LAB (OUTPATIENT)
Dept: LAB | Facility: CLINIC | Age: 29
End: 2024-06-18
Payer: MEDICAID

## 2024-06-18 DIAGNOSIS — D69.6 THROMBOCYTOPENIA (H): ICD-10-CM

## 2024-06-18 DIAGNOSIS — Z93.1 FEEDING BY G-TUBE (H): ICD-10-CM

## 2024-06-18 DIAGNOSIS — G80.1 CP (CEREBRAL PALSY), SPASTIC, DIPLEGIC (H): ICD-10-CM

## 2024-06-18 DIAGNOSIS — E03.9 HYPOTHYROIDISM, UNSPECIFIED TYPE: ICD-10-CM

## 2024-06-18 DIAGNOSIS — E44.1 MILD PROTEIN-CALORIE MALNUTRITION (H): ICD-10-CM

## 2024-06-18 LAB
ALBUMIN SERPL BCG-MCNC: 4.4 G/DL (ref 3.5–5.2)
ALP SERPL-CCNC: 97 U/L (ref 40–150)
ALT SERPL W P-5'-P-CCNC: 13 U/L (ref 0–70)
ANION GAP SERPL CALCULATED.3IONS-SCNC: 9 MMOL/L (ref 7–15)
AST SERPL W P-5'-P-CCNC: 33 U/L (ref 0–45)
BASOPHILS # BLD AUTO: 0 10E3/UL (ref 0–0.2)
BASOPHILS NFR BLD AUTO: 1 %
BILIRUB SERPL-MCNC: 0.8 MG/DL
BUN SERPL-MCNC: 17.9 MG/DL (ref 6–20)
CALCIUM SERPL-MCNC: 10.3 MG/DL (ref 8.6–10)
CHLORIDE SERPL-SCNC: 101 MMOL/L (ref 98–107)
CHOLEST SERPL-MCNC: 182 MG/DL
CREAT SERPL-MCNC: 0.87 MG/DL (ref 0.67–1.17)
DEPRECATED HCO3 PLAS-SCNC: 31 MMOL/L (ref 22–29)
EGFRCR SERPLBLD CKD-EPI 2021: >90 ML/MIN/1.73M2
EOSINOPHIL # BLD AUTO: 0 10E3/UL (ref 0–0.7)
EOSINOPHIL NFR BLD AUTO: 1 %
ERYTHROCYTE [DISTWIDTH] IN BLOOD BY AUTOMATED COUNT: 12.7 % (ref 10–15)
FASTING STATUS PATIENT QL REPORTED: YES
FASTING STATUS PATIENT QL REPORTED: YES
GLUCOSE SERPL-MCNC: 83 MG/DL (ref 70–99)
HBA1C MFR BLD: 5.5 % (ref 0–5.6)
HCT VFR BLD AUTO: 49 % (ref 40–53)
HDLC SERPL-MCNC: 62 MG/DL
HGB BLD-MCNC: 16.5 G/DL (ref 13.3–17.7)
IMM GRANULOCYTES # BLD: 0 10E3/UL
IMM GRANULOCYTES NFR BLD: 0 %
LDLC SERPL CALC-MCNC: 91 MG/DL
LYMPHOCYTES # BLD AUTO: 2.3 10E3/UL (ref 0.8–5.3)
LYMPHOCYTES NFR BLD AUTO: 48 %
MCH RBC QN AUTO: 33.8 PG (ref 26.5–33)
MCHC RBC AUTO-ENTMCNC: 33.7 G/DL (ref 31.5–36.5)
MCV RBC AUTO: 100 FL (ref 78–100)
MONOCYTES # BLD AUTO: 0.5 10E3/UL (ref 0–1.3)
MONOCYTES NFR BLD AUTO: 9 %
NEUTROPHILS # BLD AUTO: 2 10E3/UL (ref 1.6–8.3)
NEUTROPHILS NFR BLD AUTO: 41 %
NONHDLC SERPL-MCNC: 120 MG/DL
PLATELET # BLD AUTO: 119 10E3/UL (ref 150–450)
POTASSIUM SERPL-SCNC: 4.6 MMOL/L (ref 3.4–5.3)
PREALB SERPL-MCNC: 30.3 MG/DL (ref 20–40)
PROT SERPL-MCNC: 7.3 G/DL (ref 6.4–8.3)
RBC # BLD AUTO: 4.88 10E6/UL (ref 4.4–5.9)
SODIUM SERPL-SCNC: 141 MMOL/L (ref 135–145)
TRIGL SERPL-MCNC: 147 MG/DL
TSH SERPL DL<=0.005 MIU/L-ACNC: 3.4 UIU/ML (ref 0.3–4.2)
WBC # BLD AUTO: 4.8 10E3/UL (ref 4–11)

## 2024-06-18 PROCEDURE — 84443 ASSAY THYROID STIM HORMONE: CPT

## 2024-06-18 PROCEDURE — 85025 COMPLETE CBC W/AUTO DIFF WBC: CPT

## 2024-06-18 PROCEDURE — 80061 LIPID PANEL: CPT

## 2024-06-18 PROCEDURE — 36415 COLL VENOUS BLD VENIPUNCTURE: CPT

## 2024-06-18 PROCEDURE — 83036 HEMOGLOBIN GLYCOSYLATED A1C: CPT

## 2024-06-18 PROCEDURE — 84134 ASSAY OF PREALBUMIN: CPT

## 2024-06-18 PROCEDURE — 80053 COMPREHEN METABOLIC PANEL: CPT

## 2024-06-19 ENCOUNTER — OFFICE VISIT (OUTPATIENT)
Dept: PEDIATRICS | Facility: CLINIC | Age: 29
End: 2024-06-19
Payer: MEDICAID

## 2024-06-19 VITALS
SYSTOLIC BLOOD PRESSURE: 100 MMHG | OXYGEN SATURATION: 97 % | DIASTOLIC BLOOD PRESSURE: 66 MMHG | RESPIRATION RATE: 22 BRPM | WEIGHT: 107.3 LBS | HEIGHT: 61 IN | TEMPERATURE: 98.2 F | BODY MASS INDEX: 20.26 KG/M2 | HEART RATE: 112 BPM

## 2024-06-19 DIAGNOSIS — F33.1 MODERATE EPISODE OF RECURRENT MAJOR DEPRESSIVE DISORDER (H): ICD-10-CM

## 2024-06-19 DIAGNOSIS — J96.01 ACUTE RESPIRATORY FAILURE WITH HYPOXIA (H): ICD-10-CM

## 2024-06-19 DIAGNOSIS — E74.9 DISORDER OF CARBOHYDRATE METABOLISM (H): ICD-10-CM

## 2024-06-19 DIAGNOSIS — D69.6 THROMBOCYTOPENIA (H): ICD-10-CM

## 2024-06-19 DIAGNOSIS — F33.9 EPISODE OF RECURRENT MAJOR DEPRESSIVE DISORDER, UNSPECIFIED DEPRESSION EPISODE SEVERITY (H): ICD-10-CM

## 2024-06-19 DIAGNOSIS — Z93.1 FEEDING BY G-TUBE (H): Primary | ICD-10-CM

## 2024-06-19 DIAGNOSIS — G40.909 SEIZURE DISORDER (H): ICD-10-CM

## 2024-06-19 DIAGNOSIS — G80.1 CP (CEREBRAL PALSY), SPASTIC, DIPLEGIC (H): ICD-10-CM

## 2024-06-19 DIAGNOSIS — E44.1 MILD PROTEIN-CALORIE MALNUTRITION (H): ICD-10-CM

## 2024-06-19 PROCEDURE — 99214 OFFICE O/P EST MOD 30 MIN: CPT | Performed by: INTERNAL MEDICINE

## 2024-06-19 ASSESSMENT — PATIENT HEALTH QUESTIONNAIRE - PHQ9
SUM OF ALL RESPONSES TO PHQ QUESTIONS 1-9: 7
10. IF YOU CHECKED OFF ANY PROBLEMS, HOW DIFFICULT HAVE THESE PROBLEMS MADE IT FOR YOU TO DO YOUR WORK, TAKE CARE OF THINGS AT HOME, OR GET ALONG WITH OTHER PEOPLE: NOT DIFFICULT AT ALL
SUM OF ALL RESPONSES TO PHQ QUESTIONS 1-9: 7

## 2024-06-19 ASSESSMENT — PAIN SCALES - GENERAL: PAINLEVEL: NO PAIN (0)

## 2024-06-19 NOTE — PROGRESS NOTES
Assessment & Plan     Feeding by G-tube (H)  discussed with patient (or patient's parents/caregiver) pathophysiology of condition and treatment options.  Tolerating feeds well and weight is up and very happy to see this.  BMI is also up above 20 which is fantastic.  Patient is tender desatting at night as well some coughing and he has an appointment Dr. Soto scheduled.  Reviewed continue aspiration precautions etc. and they will let me know what Dr. Soto recommends his neck steps.    CP (cerebral palsy), spastic, diplegic (H)  discussed with patient (or patient's parents/caregiver) pathophysiology of condition and treatment options.  as above,continue cares and plan    Moderate episode of recurrent major depressive disorder (H)  discussed with patient (or patient's parents/caregiver) pathophysiology of condition and treatment options.  Well controlled on current medication(s).  Unfortunately current psychiatrist is retiring..  Patient's mother reach out to see if they get any recommendations for psychiatrist in the community as well as at the [facility] and be happy to weigh in on those options and we have them.  He reviewed to be happy to continue prescribing his medications if there is a gap in psychiatry coverage patient's mother is thankful for this.    Disorder of carbohydrate metabolism (H24)  asa martinez, reviwed most recent labs, etc    Mild protein-calorie malnutrition (H24)  as above, great job on the weight gain! continue feeds and will follow labs.    Chronic lung disease of prematurity  (H28)  discussed with patient (or patient's parents/caregiver) pathophysiology of condition and treatment options.  as above, follow-up with Dr Soto as scheduled.     Episode of recurrent major depressive disorder, unspecified depression episode severity (H24)  asa martinez    Acute respiratory failure with hypoxia (H)  discussed with patient (or patient's parents/caregiver) pathophysiology of condition and  treatment options.  continue O2 QHSand titrate as needed    Seizure disorder (H)  discussed with patient (or patient's parents/caregiver) pathophysiology of condition and treatment options.  Well controlled on current medication(s). follow-up with neuro as scheduled.    Thrombocytopenia (H24)  chronic,stable and no sx/sx of bleeding.continue to follow.      Return in about 3 months (around 9/19/2024) for Next scheduled follow-up visit, or sooner if symptoms persist or worsen.      Subjective   Rj is a 28 year old, presenting for the following health issues:  RECHECK (3 month follow up)        6/19/2024     3:29 PM   Additional Questions   Roomed by Zora   Accompanied by mom         6/19/2024     3:29 PM   Patient Reported Additional Medications   Patient reports taking the following new medications none     History of Present Illness       Reason for visit:  Recheck on overall health    He eats 0-1 servings of fruits and vegetables daily.He consumes 0 sweetened beverage(s) daily.He exercises with enough effort to increase his heart rate 9 or less minutes per day.  He exercises with enough effort to increase his heart rate 3 or less days per week.   He is taking medications regularly.    Rj presents today with his mom for our 3-month scheduled office visit.  Overall he has been doing well since her last office visit.  Does not upcoming appointment with Dr. Soto does continue to need oxygen at night.  Still occasionally has some O2 drops overnight with lots of coughing is intermediate to increase his oxygen at night.  His mom learned his psychiatrist is tearing in December and they plan to seek out a new one and wonder if I have any recommendations.  Did have labs drawn which all look excellent and either unchanged or improved from previous and mom is very thankful for this.  He does tolerate his tube feeds well as well as his medications.  No other concerns or complaints today.                 Objective   "  /66 (BP Location: Right arm, Patient Position: Sitting, Cuff Size: Adult Small)   Pulse 112   Temp 98.2  F (36.8  C) (Tympanic)   Resp 22   Ht 1.549 m (5' 1\")   Wt 48.7 kg (107 lb 4.8 oz)   SpO2 97%   BMI 20.27 kg/m    Body mass index is 20.27 kg/m .  Physical Exam   GENERAL: awake alert and no distress; sitting up in chair and listneing to his CD player.   HENT: normocephalic and atrumatic, mucous membranes moist   RESP: lungs clear to auscultation - no rales, rhonchi or wheezes  CV: regular rate and rhythm, normal S1 S2, no S3 or S4, no murmur, click or rub, no peripheral edema   ABDOMEN: soft, nontender bowel sounds normal; GT in place  MS: no gross musculoskeletal defects noted, no edema; + global atrophy  SKIN: no suspicious lesions or rashes; no ecchymoses or petechiae  NEURO: baseline strength and tone, mentation intact/baseline and speech normal  PSYCH: mentation appears normal, affect normal    No results found for any visits on 06/19/24.          Signed Electronically by: Micky Leyva MD    (*documentation in this chart was partially completed using Dragon PowerMic which could potentially lead to some misspellings or grammar/autocorrect issues in the narrative.)      "

## 2024-06-24 ENCOUNTER — TRANSFERRED RECORDS (OUTPATIENT)
Dept: HEALTH INFORMATION MANAGEMENT | Facility: CLINIC | Age: 29
End: 2024-06-24
Payer: MEDICAID

## 2024-06-26 ENCOUNTER — TELEPHONE (OUTPATIENT)
Dept: PEDIATRICS | Facility: CLINIC | Age: 29
End: 2024-06-26
Payer: MEDICAID

## 2024-06-26 NOTE — TELEPHONE ENCOUNTER
Forms/Letter Request    Type of form/letter: OTHER: Standard Written Order for rental bed rails    Do we have the form/letter: Yes: Dr. Leyva's inbox    Who is the form from? [facility] (if other please explain)    Where did/will the form come from? form was faxed in    When is form/letter needed by: next available    How would you like the form/letter returned: Fax : [phone]    Patient Notified form requests are processed in 5-7 business days:No    Could we send this information to you in [facility] or would you prefer to receive a phone call?:   NA

## 2024-06-28 ENCOUNTER — TELEPHONE (OUTPATIENT)
Dept: PEDIATRICS | Facility: CLINIC | Age: 29
End: 2024-06-28
Payer: MEDICAID

## 2024-06-28 DIAGNOSIS — K21.9 GASTROESOPHAGEAL REFLUX DISEASE WITHOUT ESOPHAGITIS: Primary | ICD-10-CM

## 2024-06-28 NOTE — TELEPHONE ENCOUNTER
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 PA:  Previously Tried and Failed:    Rationale:      Insurance   Primary:   Insurance ID:      Secondary (if applicable):  Insurance ID:      Pharmacy Information (if different than what is on RX)  Name:  [facility]  Phone:  [phone]  Fax: [phone]

## 2024-07-02 NOTE — TELEPHONE ENCOUNTER
PRIOR AUTHORIZATION DENIED    Medication: OMEPRAZOLE 2 MG/ML PO SUSP  Insurance Company: [place] Medicaid ([facility]) - Phone [phone] Fax [phone]  Denial Date: 7/2/2024  Denial Reason(s): Plan prefers BRAND Nexium suspension or lansoprazole ODT    Appeal Information:     Patient Notified: No, care team must notify on denials.

## 2024-07-02 NOTE — TELEPHONE ENCOUNTER
Will hold for PCP as he is back tomorrow. Looks like there might be some other options that would be approved.     Olga Mackay PA-C

## 2024-07-02 NOTE — TELEPHONE ENCOUNTER
Jair Chinchilla  Pa Lc/Rm56 minutes ago (3:13 PM)       Hello, the PA for this medication was denied. Please read the encounter for more details. If the provider would like to appeal, please provide a letter of medical necessity and route back to the PA team. Otherwise, please advise patient (and pharmacy) on next steps and you may close the encounter. Thank you!  NOTE: Please make sure your LMN referrences the denial criteria in the denial letter, otherwise it may result in further denial.

## 2024-07-02 NOTE — TELEPHONE ENCOUNTER
PA Initiation    Medication: OMEPRAZOLE 2 MG/ML PO SUSP - KONVOMEP  Insurance Company: [place] [employer] ([facility]) - Phone [phone] Fax [phone]  Pharmacy Filling the Rx: [id number] [address] AT [place] [address] & [facility]  Filling Pharmacy Phone: [phone]  Filling Pharmacy Fax: [phone]  Start Date: 7/2/2024    Pharmacies can no longer compound omeprazole susp. Must use Konvomep

## 2024-07-03 ENCOUNTER — TELEPHONE (OUTPATIENT)
Dept: PEDIATRICS | Facility: CLINIC | Age: 29
End: 2024-07-03
Payer: MEDICAID

## 2024-07-03 ENCOUNTER — MYC MEDICAL ADVICE (OUTPATIENT)
Dept: PEDIATRICS | Facility: CLINIC | Age: 29
End: 2024-07-03
Payer: MEDICAID

## 2024-07-03 ENCOUNTER — MEDICAL CORRESPONDENCE (OUTPATIENT)
Dept: HEALTH INFORMATION MANAGEMENT | Facility: CLINIC | Age: 29
End: 2024-07-03

## 2024-07-03 DIAGNOSIS — K21.9 GASTROESOPHAGEAL REFLUX DISEASE WITHOUT ESOPHAGITIS: ICD-10-CM

## 2024-07-03 DIAGNOSIS — Z87.898 HISTORY OF URINE COLOR CHANGES: ICD-10-CM

## 2024-07-03 NOTE — TELEPHONE ENCOUNTER
Dr. Leyva - All of the compounded omeprazole & konvomep were sent to [facility] pharmacy, not to our compound pharmacy. Are we planning to send it to our compound pharmacy? Please clarify. Thanks.    Katie JANG  Clinic RN  [facility]

## 2024-07-03 NOTE — TELEPHONE ENCOUNTER
Pal: can you check with our compounding pharmacy to see if sodium bicarbonate was also in the compounded omeprazole? this is in the new prescription for Konvomep (reason why the prior auth was denied)

## 2024-07-03 NOTE — TELEPHONE ENCOUNTER
Micky Leyva MD Ea Support Team A (Ma-Tc)42 minutes ago (9:42 AM)       Form(s) completed and in station out basket or given to TC

## 2024-07-03 NOTE — TELEPHONE ENCOUNTER
Called and spoke to [facility]. They state the following ingredients were/are in the compounded version of omeprazole:    Omeprazole powder   Sodium-bicarbonate   Sterile Water    Thanks!  Anjelica BONNER RN, BSN

## 2024-07-03 NOTE — TELEPHONE ENCOUNTER
the konvomep isn't compounded, its commercially available. I just want to know what is the compounded omeprazole he was getting before.

## 2024-07-03 NOTE — TELEPHONE ENCOUNTER
Great! thanks! can you let patient's mom know that I sent the new rx (the commercially available product) that is essentially the same thing as he was getting from the pharmacy when compounded.

## 2024-07-03 NOTE — TELEPHONE ENCOUNTER
-Received back from [facility] [employer].  -Please add valid diagnosis (ICD-10 code).  -Placed in Dr. Leyva's inbox.    Thank you kindly,  David MADISON

## 2024-07-12 ENCOUNTER — MYC REFILL (OUTPATIENT)
Dept: PEDIATRICS | Facility: CLINIC | Age: 29
End: 2024-07-12
Payer: MEDICAID

## 2024-07-12 DIAGNOSIS — E03.9 HYPOTHYROIDISM, UNSPECIFIED TYPE: ICD-10-CM

## 2024-07-12 RX ORDER — LEVOTHYROXINE SODIUM 25 UG/1
25 TABLET ORAL DAILY
Qty: 90 TABLET | Refills: 2 | Status: SHIPPED | OUTPATIENT
Start: 2024-07-12

## 2024-09-09 ENCOUNTER — TELEPHONE (OUTPATIENT)
Dept: PEDIATRICS | Facility: CLINIC | Age: 29
End: 2024-09-09
Payer: MEDICAID

## 2024-09-09 LAB
ALBUMIN UR-MCNC: ABNORMAL MG/DL
APPEARANCE UR: CLEAR
BILIRUB UR QL STRIP: NEGATIVE
COLOR UR AUTO: ABNORMAL
GLUCOSE UR STRIP-MCNC: NEGATIVE MG/DL
HGB UR QL STRIP: NEGATIVE
KETONES UR STRIP-MCNC: NEGATIVE MG/DL
LEUKOCYTE ESTERASE UR QL STRIP: NEGATIVE
NITRATE UR QL: NEGATIVE
PH UR STRIP: 7 [PH] (ref 5–7)
RBC #/AREA URNS AUTO: NORMAL /HPF
SP GR UR STRIP: 1.02 (ref 1–1.03)
UROBILINOGEN UR STRIP-ACNC: >=8 E.U./DL
WBC #/AREA URNS AUTO: NORMAL /HPF

## 2024-09-09 PROCEDURE — 81001 URINALYSIS AUTO W/SCOPE: CPT

## 2024-09-09 ASSESSMENT — ASTHMA QUESTIONNAIRES
ACT_TOTALSCORE: 14
QUESTION_4 LAST FOUR WEEKS HOW OFTEN HAVE YOU USED YOUR RESCUE INHALER OR NEBULIZER MEDICATION (SUCH AS ALBUTEROL): TWO OR THREE TIMES PER WEEK
QUESTION_1 LAST FOUR WEEKS HOW MUCH OF THE TIME DID YOUR ASTHMA KEEP YOU FROM GETTING AS MUCH DONE AT WORK, SCHOOL OR AT HOME: SOME OF THE TIME
QUESTION_5 LAST FOUR WEEKS HOW WOULD YOU RATE YOUR ASTHMA CONTROL: SOMEWHAT CONTROLLED
ACT_TOTALSCORE: 14
QUESTION_2 LAST FOUR WEEKS HOW OFTEN HAVE YOU HAD SHORTNESS OF BREATH: THREE TO SIX TIMES A WEEK
QUESTION_3 LAST FOUR WEEKS HOW OFTEN DID YOUR ASTHMA SYMPTOMS (WHEEZING, COUGHING, SHORTNESS OF BREATH, CHEST TIGHTNESS OR PAIN) WAKE YOU UP AT NIGHT OR EARLIER THAN USUAL IN THE MORNING: TWO OR THREE NIGHTS A WEEK

## 2024-09-09 NOTE — TELEPHONE ENCOUNTER
agree with plan, I have also been communicating with patient's mom via marinanow and have reviewed the same items. lab orders are in and I make appointment to see Dr Carrasco and myself 120 pm on Wednesday. She is aware that if worse or any new issues need to be seen sooner, in the ER if needed.

## 2024-09-09 NOTE — TELEPHONE ENCOUNTER
----- Message from Micky Leyva sent at 9/9/2024 12:44 PM CDT -----  triage RN: please call patient's mother and relay result note below. If things are going okay please schedule Rj with one of the residents this Wednesday afternoon when I am precepting. If needs to get in sooner, please scheule with RIVERA lester or another PCP partner today or tomorrow. thanks!

## 2024-09-09 NOTE — TELEPHONE ENCOUNTER
RN called and relayed provider message to patient's mother to close the loop. Patient's mother appreciated the call, verbalized understanding and agreement with plan for labs tomorrow and appointment on 9/11. Patient's mother aware to call back with any changes and bring patient to ER if needed.  Joselin WILSON RN  9/9/2024 at 2:18 PM

## 2024-09-09 NOTE — TELEPHONE ENCOUNTER
"Attempt #1: Called and left voice message for patient's mother to call [phone] and ask to speak to a nurse.    Upon call back please:  -relay provider message below  -schedule an appointment depending on patient's status    \"[facility]     The urine does NOT show an infection but there is a large amount of urobilinogen, this is likely who it is so dark (the specific gravity shows he is likely well hydrated). This can be elevated due to a few reasons, but the ones I want us to consider both red blood cell destruction which could lead to anemia and also liver issues as both can show up in this way.     How is he doing? If he is doing okay I'd like him to see me asap, Wednesday afternoon with one of the residents (he would see me too). If he seems worse or isn't doing well, I want him seen today or tomorrow.     Please let me know and what questions you have.     Micky Leyva M.D.\"    Jessica Mcgraw RN    "

## 2024-09-09 NOTE — TELEPHONE ENCOUNTER
"Patient's mother calling back regarding voice message. Patient's mother verbalized understanding of result note contents from Dr. Leyva on 9/9/2024.    Patient's mother states that she \"can't say he's doing a whole lot better.\" States he's been taking 5 days of additional antibiotics and duonebs and working with pulmonologist. States his O2 sats are 88-90% and she hasn't had him on O2 for the last few days. States she has been encouraging fluids and helping the patient to the bathroom as it's hard for him to walk on his own. States he is still only urinating once a day, except Saturday, and that the urine is concentrated. States he's overall weak and bruised his foot on the way to the bathroom.    Informed patient's mother that Dr. Leyva recommended the patient be seen sooner if he seems worse or is not doing well and that we can schedule him to be seen before Wednesday when he currently has an appointment to see Paul Carrasco MD at 1:40 on 9/11/2024. Patient's mother declined sooner appointment stating she \"would rather wait until Wednesday to be able to see Dr. Leyva\" and that at this time with his status she thinks it will \"not make a difference today to Wednesday.\" Instructed patient's mother to call and reschedule for a sooner or appointment or got to ED or Urgent Care if patient is getting worse and she does not feel comfortable with his condition. Patient's mother verbalized understanding and agrees with the plan.    See MyChart encounter from 9/4/2024 for lab appointment request and details. Patient's mother requesting to schedule a lab appointment at this time to prepare for visit Wednesday. Lab only appointment made for 9/9/2024 at 10:30.      9/11/2024 1:40 PM (Arrive by 1:20 PM) [facility]co [facility] please review for patient update. Are you comfortable with the patient not being seen until Wednesday?    Jessica Mcgraw RN       "

## 2024-09-10 ENCOUNTER — LAB (OUTPATIENT)
Dept: LAB | Facility: CLINIC | Age: 29
End: 2024-09-10
Payer: MEDICAID

## 2024-09-10 DIAGNOSIS — E44.1 MILD PROTEIN-CALORIE MALNUTRITION (H): ICD-10-CM

## 2024-09-10 DIAGNOSIS — D69.6 THROMBOCYTOPENIA (H): ICD-10-CM

## 2024-09-10 DIAGNOSIS — E03.9 HYPOTHYROIDISM, UNSPECIFIED TYPE: ICD-10-CM

## 2024-09-10 DIAGNOSIS — Z87.898 HISTORY OF URINE COLOR CHANGES: ICD-10-CM

## 2024-09-10 DIAGNOSIS — Z93.1 FEEDING BY G-TUBE (H): ICD-10-CM

## 2024-09-10 DIAGNOSIS — G80.1 CP (CEREBRAL PALSY), SPASTIC, DIPLEGIC (H): ICD-10-CM

## 2024-09-10 DIAGNOSIS — R53.1 WEAKNESS: ICD-10-CM

## 2024-09-10 DIAGNOSIS — E74.9 DISORDER OF CARBOHYDRATE METABOLISM (H): ICD-10-CM

## 2024-09-10 LAB
ALBUMIN SERPL BCG-MCNC: 4.2 G/DL (ref 3.5–5.2)
ALP SERPL-CCNC: 103 U/L (ref 40–150)
ALT SERPL W P-5'-P-CCNC: 12 U/L (ref 0–70)
ANION GAP SERPL CALCULATED.3IONS-SCNC: 9 MMOL/L (ref 7–15)
AST SERPL W P-5'-P-CCNC: 30 U/L (ref 0–45)
BASOPHILS # BLD AUTO: 0 10E3/UL (ref 0–0.2)
BASOPHILS NFR BLD AUTO: 1 %
BILIRUB SERPL-MCNC: 0.5 MG/DL
BUN SERPL-MCNC: 20.4 MG/DL (ref 6–20)
CALCIUM SERPL-MCNC: 9.4 MG/DL (ref 8.8–10.4)
CHLORIDE SERPL-SCNC: 100 MMOL/L (ref 98–107)
CK SERPL-CCNC: 86 U/L (ref 39–308)
CREAT SERPL-MCNC: 0.84 MG/DL (ref 0.67–1.17)
CRP SERPL-MCNC: 6.56 MG/L
EGFRCR SERPLBLD CKD-EPI 2021: >90 ML/MIN/1.73M2
EOSINOPHIL # BLD AUTO: 0.1 10E3/UL (ref 0–0.7)
EOSINOPHIL NFR BLD AUTO: 1 %
ERYTHROCYTE [DISTWIDTH] IN BLOOD BY AUTOMATED COUNT: 12.7 % (ref 10–15)
ERYTHROCYTE [SEDIMENTATION RATE] IN BLOOD BY WESTERGREN METHOD: 3 MM/HR (ref 0–15)
GLUCOSE SERPL-MCNC: 101 MG/DL (ref 70–99)
HCO3 SERPL-SCNC: 31 MMOL/L (ref 22–29)
HCT VFR BLD AUTO: 49.2 % (ref 40–53)
HGB BLD-MCNC: 16.2 G/DL (ref 13.3–17.7)
IMM GRANULOCYTES # BLD: 0 10E3/UL
IMM GRANULOCYTES NFR BLD: 1 %
LDH SERPL L TO P-CCNC: 215 U/L (ref 0–250)
LYMPHOCYTES # BLD AUTO: 1.4 10E3/UL (ref 0.8–5.3)
LYMPHOCYTES NFR BLD AUTO: 30 %
MCH RBC QN AUTO: 33.7 PG (ref 26.5–33)
MCHC RBC AUTO-ENTMCNC: 32.9 G/DL (ref 31.5–36.5)
MCV RBC AUTO: 102 FL (ref 78–100)
MONOCYTES # BLD AUTO: 0.7 10E3/UL (ref 0–1.3)
MONOCYTES NFR BLD AUTO: 14 %
NEUTROPHILS # BLD AUTO: 2.6 10E3/UL (ref 1.6–8.3)
NEUTROPHILS NFR BLD AUTO: 54 %
PLATELET # BLD AUTO: 118 10E3/UL (ref 150–450)
POTASSIUM SERPL-SCNC: 4.5 MMOL/L (ref 3.4–5.3)
PROT SERPL-MCNC: 7 G/DL (ref 6.4–8.3)
RBC # BLD AUTO: 4.81 10E6/UL (ref 4.4–5.9)
RETIC HEMOGLOBIN: 35.7 PG (ref 28.2–35.7)
RETICS # AUTO: 0.16 10E6/UL (ref 0.03–0.1)
RETICS # AUTO: 0.16 10E6/UL (ref 0.03–0.1)
RETICS/RBC NFR AUTO: 3.2 % (ref 0.5–2)
RETICS/RBC NFR AUTO: 3.3 % (ref 0.5–2)
SODIUM SERPL-SCNC: 140 MMOL/L (ref 135–145)
TSH SERPL DL<=0.005 MIU/L-ACNC: 2.65 UIU/ML (ref 0.3–4.2)
WBC # BLD AUTO: 4.9 10E3/UL (ref 4–11)

## 2024-09-10 PROCEDURE — 85045 AUTOMATED RETICULOCYTE COUNT: CPT

## 2024-09-10 PROCEDURE — 85652 RBC SED RATE AUTOMATED: CPT

## 2024-09-10 PROCEDURE — 85046 RETICYTE/HGB CONCENTRATE: CPT

## 2024-09-10 PROCEDURE — 85025 COMPLETE CBC W/AUTO DIFF WBC: CPT

## 2024-09-10 PROCEDURE — 85060 BLOOD SMEAR INTERPRETATION: CPT | Performed by: PATHOLOGY

## 2024-09-10 PROCEDURE — 86140 C-REACTIVE PROTEIN: CPT

## 2024-09-10 PROCEDURE — 84443 ASSAY THYROID STIM HORMONE: CPT

## 2024-09-10 PROCEDURE — 36415 COLL VENOUS BLD VENIPUNCTURE: CPT

## 2024-09-10 PROCEDURE — 80053 COMPREHEN METABOLIC PANEL: CPT

## 2024-09-10 PROCEDURE — 83615 LACTATE (LD) (LDH) ENZYME: CPT

## 2024-09-10 PROCEDURE — 82550 ASSAY OF CK (CPK): CPT

## 2024-09-11 ENCOUNTER — MYC MEDICAL ADVICE (OUTPATIENT)
Dept: PEDIATRICS | Facility: CLINIC | Age: 29
End: 2024-09-11

## 2024-09-11 ENCOUNTER — OFFICE VISIT (OUTPATIENT)
Dept: PEDIATRICS | Facility: CLINIC | Age: 29
End: 2024-09-11
Payer: MEDICAID

## 2024-09-11 VITALS
DIASTOLIC BLOOD PRESSURE: 70 MMHG | SYSTOLIC BLOOD PRESSURE: 107 MMHG | BODY MASS INDEX: 20.01 KG/M2 | WEIGHT: 106 LBS | HEART RATE: 100 BPM | RESPIRATION RATE: 20 BRPM | OXYGEN SATURATION: 97 % | TEMPERATURE: 99.9 F | HEIGHT: 61 IN

## 2024-09-11 DIAGNOSIS — E74.9 DISORDER OF CARBOHYDRATE METABOLISM (H): ICD-10-CM

## 2024-09-11 DIAGNOSIS — G80.1 CP (CEREBRAL PALSY), SPASTIC, DIPLEGIC (H): ICD-10-CM

## 2024-09-11 DIAGNOSIS — G40.909 SEIZURE DISORDER (H): ICD-10-CM

## 2024-09-11 DIAGNOSIS — Z93.1 FEEDING BY G-TUBE (H): ICD-10-CM

## 2024-09-11 DIAGNOSIS — R53.1 WEAKNESS: Primary | ICD-10-CM

## 2024-09-11 PROCEDURE — G2211 COMPLEX E/M VISIT ADD ON: HCPCS

## 2024-09-11 PROCEDURE — 99215 OFFICE O/P EST HI 40 MIN: CPT | Mod: GC

## 2024-09-11 ASSESSMENT — ENCOUNTER SYMPTOMS: FATIGUE: 1

## 2024-09-11 ASSESSMENT — PAIN SCALES - GENERAL: PAINLEVEL: SEVERE PAIN (6)

## 2024-09-11 NOTE — PROGRESS NOTES
Assessment & Plan     (R53.1) Weakness  (primary encounter diagnosis)  (G80.1) CP (cerebral palsy), spastic, diplegic (H)  (Z93.1) Feeding by G-tube (H)  Comment: Unclear acute cause of weakness.  No focal signs or focal deficits.  He does have mild abdominal protuberance, but nontender and no rebound or guarding.  Patient is currently having bowel movements, but decreased urination.  Mom concerned about increased weakness over the last few weeks.  Differential is very broad.  No acute causes for urobilinogen in his urine.  Does not appear like a hemolytic process and liver enzymes and liver function appear normal on initial lab studies.  Considering some sort of obstructive process in the GI or  tract he also has a chronic reticulocytosis, but no acute signs of anemia.  Additionally, can consider progression of his disease.  Will get CT C/A/P to evaluate for obstructive or other intra-abdominal pathology.  Patient additionally has a CT head ordered which mom will try to coordinate with CT C/A/P.  Recommend following up with Dr. Leyva in 1 week for reevaluation.  Plan: CT Chest/Abdomen/Pelvis w Contrast, CANCELED:         UA Macroscopic with reflex to Microscopic and         Culture - Clinic Collect    Follow up with Dr. Leyva in 1 week.    Roro De La Rosa is a 28 year old, presenting for the following health issues:  Lab Result Notice and Fatigue        9/11/2024     1:10 PM   Additional Questions   Roomed by Blanca MIDDLETON   Accompanied by Mom Darcy         9/11/2024     1:10 PM   Patient Reported Additional Medications   Patient reports taking the following new medications No     History of Present Illness       Reason for visit:  Check on overall health    He eats 0-1 servings of fruits and vegetables daily.He consumes 0 sweetened beverage(s) daily.He exercises with enough effort to increase his heart rate 9 or less minutes per day.  He exercises with enough effort to increase his heart rate 3 or less days  "per week.   He is taking medications regularly.     Just finished an antibiotic burst, needing to be on oxygen more often. Saturations have been lower overnight. Concerned about more shallow breathing. Sats are better after completing the antibiotic course the last 5 days.    Increased weakness, not able to get around the house, worsening in the last few weeks. Had a recent fall and his left foot is more swollen. Fall about 1 week ago. Not able to keep his head up during the day. Not able to keep himself up in the bathtub. Has gained some weight since being on synthroid. Being in bed most of the day now.    Decreased urination, only going once per day which has started in the past few weeks. Potentially having an increase amount of urine in that one episode. Used to urinate 4-5x per day. Still getting 5 containers of formula, then an additional 6-8 oz of free water. Very dark urine. Mom also concerned about increased abdominal distension.    Father recently diagnosed with retroperitoneal fibrosis (Ormond's Disease)        Objective    /70 (BP Location: Right arm, Patient Position: Sitting, Cuff Size: Adult Small)   Pulse 100   Temp 99.9  F (37.7  C) (Tympanic)   Resp 20   Ht 1.549 m (5' 1\")   Wt 48.1 kg (106 lb)   SpO2 97%   BMI 20.03 kg/m    Body mass index is 20.03 kg/m .  Physical Exam  Constitutional:       General: He is not in acute distress.     Appearance: Normal appearance. He is not ill-appearing.   HENT:      Head: Normocephalic and atraumatic.      Nose: Nose normal.   Cardiovascular:      Rate and Rhythm: Normal rate and regular rhythm.      Heart sounds: No murmur heard.     No friction rub. No gallop.   Pulmonary:      Breath sounds: Normal breath sounds. No stridor. No wheezing or rales.      Comments: Shallow breath sounds  Abdominal:      General: Abdomen is flat. Bowel sounds are normal. There is no distension.      Palpations: Abdomen is soft. There is no mass.      Tenderness: There " is no abdominal tenderness. There is no guarding or rebound.      Hernia: No hernia is present.   Musculoskeletal:      Right lower leg: No edema.      Left lower leg: Edema (Left lower foot) present.      Comments: Contractures in the b/l upper and lower extremities stable   Skin:     General: Skin is warm and dry.   Neurological:      Mental Status: He is alert. Mental status is at baseline.   Psychiatric:         Mood and Affect: Mood normal.         Behavior: Behavior normal.          Signed Electronically by: Paul Carrasco MD    I have seen this patient and I was present during key portions of the procedure/exam and immediately available to furnish services during the entire visit. I have reviewed the documentation and discussed the clinical decision making with . His progress note reflects our joint assessment and plan.     The longitudinal plan of care for the diagnosis(es)/condition(s) as documented were addressed during this visit. Due to the added complexity in care, I will continue to support Rj in the subsequent management and with ongoing continuity of care.    Total time spent with patient and his mother was 45 min, of which greater than 40 minutes of face to face time and/or coordination of care discussing the above issue(s) including chart review, reviwed of results, and planning next steps.       Micky Leyva M.D.  Internal Medicine-Pediatrics

## 2024-09-12 LAB
PATH REPORT.COMMENTS IMP SPEC: NORMAL
PATH REPORT.COMMENTS IMP SPEC: NORMAL
PATH REPORT.FINAL DX SPEC: NORMAL
PATH REPORT.MICROSCOPIC SPEC OTHER STN: NORMAL
PATH REPORT.MICROSCOPIC SPEC OTHER STN: NORMAL
PATH REPORT.RELEVANT HX SPEC: NORMAL

## 2024-09-12 NOTE — TELEPHONE ENCOUNTER
"Incoming call from Rj's mother, Darcy, following up on this [employer] message.     They were turned away from their appointment today because the CT was not ordered stat. CT needs to be reordered as stat if we want it completed within 7 days or they will turn them away from the appointment. They rescheduled for 9/16/24, but they said they will be turned away again if it is not ordered stat.    Looking for CT chest/abdomen/pelvis with contrast as was ordered during 9/11/24 visit with Paul Carrasco MD. Mother specifies \"NOT head because he can't do IV sedation.\"  Per chart review of this visit:  \"(R53.1) Weakness  (primary encounter diagnosis)  (G80.1) CP (cerebral palsy), spastic, diplegic (H)  (Z93.1) Feeding by G-tube (H)  Comment: Unclear acute cause of weakness.  No focal signs or focal deficits.  He does have mild abdominal protuberance, but nontender and no rebound or guarding.  Patient is currently having bowel movements, but decreased urination.  Mom concerned about increased weakness over the last few weeks.  Differential is very broad.  No acute causes for urobilinogen in his urine.  Does not appear like a hemolytic process and liver enzymes and liver function appear normal on initial lab studies.  Considering some sort of obstructive process in the GI or  tract he also has a chronic reticulocytosis, but no acute signs of anemia.  Additionally, can consider progression of his disease.  Will get CT C/A/P to evaluate for obstructive or other intra-abdominal pathology.  Patient additionally has a CT head ordered which mom will try to coordinate with CT C/A/P.  Recommend following up with Dr. Leyva in 1 week for reevaluation.\"    Olga Cook RN    "

## 2024-09-15 ASSESSMENT — ASTHMA QUESTIONNAIRES
ACT_TOTALSCORE: 12
QUESTION_5 LAST FOUR WEEKS HOW WOULD YOU RATE YOUR ASTHMA CONTROL: SOMEWHAT CONTROLLED
QUESTION_4 LAST FOUR WEEKS HOW OFTEN HAVE YOU USED YOUR RESCUE INHALER OR NEBULIZER MEDICATION (SUCH AS ALBUTEROL): TWO OR THREE TIMES PER WEEK
QUESTION_2 LAST FOUR WEEKS HOW OFTEN HAVE YOU HAD SHORTNESS OF BREATH: ONCE A DAY
ACT_TOTALSCORE: 12
QUESTION_3 LAST FOUR WEEKS HOW OFTEN DID YOUR ASTHMA SYMPTOMS (WHEEZING, COUGHING, SHORTNESS OF BREATH, CHEST TIGHTNESS OR PAIN) WAKE YOU UP AT NIGHT OR EARLIER THAN USUAL IN THE MORNING: FOUR OR MORE NIGHTS A WEEK
QUESTION_1 LAST FOUR WEEKS HOW MUCH OF THE TIME DID YOUR ASTHMA KEEP YOU FROM GETTING AS MUCH DONE AT WORK, SCHOOL OR AT HOME: SOME OF THE TIME

## 2024-09-16 ENCOUNTER — HOSPITAL ENCOUNTER (OUTPATIENT)
Dept: CT IMAGING | Facility: CLINIC | Age: 29
Discharge: HOME | End: 2024-09-16
Attending: INTERNAL MEDICINE | Admitting: INTERNAL MEDICINE
Payer: MEDICAID

## 2024-09-16 DIAGNOSIS — G80.1 CP (CEREBRAL PALSY), SPASTIC, DIPLEGIC (H): ICD-10-CM

## 2024-09-16 DIAGNOSIS — E74.9 DISORDER OF CARBOHYDRATE METABOLISM (H): ICD-10-CM

## 2024-09-16 DIAGNOSIS — G40.909 SEIZURE DISORDER (H): ICD-10-CM

## 2024-09-16 DIAGNOSIS — Z93.1 FEEDING BY G-TUBE (H): ICD-10-CM

## 2024-09-16 DIAGNOSIS — R53.1 WEAKNESS: ICD-10-CM

## 2024-09-16 PROCEDURE — 250N000009 HC RX 250: Performed by: INTERNAL MEDICINE

## 2024-09-16 PROCEDURE — 71260 CT THORAX DX C+: CPT

## 2024-09-16 PROCEDURE — 250N000011 HC RX IP 250 OP 636: Performed by: INTERNAL MEDICINE

## 2024-09-16 RX ORDER — IOPAMIDOL 755 MG/ML
500 INJECTION, SOLUTION INTRAVASCULAR ONCE
Status: COMPLETED | OUTPATIENT
Start: 2024-09-16 | End: 2024-09-16

## 2024-09-16 RX ADMIN — SODIUM CHLORIDE 53 ML: 9 INJECTION, SOLUTION INTRAVENOUS at 13:55

## 2024-09-16 RX ADMIN — IOPAMIDOL 52 ML: 755 INJECTION, SOLUTION INTRAVENOUS at 13:55

## 2024-09-17 DIAGNOSIS — K59.09 CHRONIC CONSTIPATION: ICD-10-CM

## 2024-09-17 RX ORDER — POLYETHYLENE GLYCOL 3350 17 G/17G
POWDER, FOR SOLUTION ORAL
Qty: 510 G | Refills: 2 | Status: SHIPPED | OUTPATIENT
Start: 2024-09-17

## 2024-09-18 ENCOUNTER — OFFICE VISIT (OUTPATIENT)
Dept: PEDIATRICS | Facility: CLINIC | Age: 29
End: 2024-09-18
Payer: MEDICAID

## 2024-09-18 ENCOUNTER — MYC MEDICAL ADVICE (OUTPATIENT)
Dept: PEDIATRICS | Facility: CLINIC | Age: 29
End: 2024-09-18

## 2024-09-18 VITALS
DIASTOLIC BLOOD PRESSURE: 72 MMHG | RESPIRATION RATE: 16 BRPM | HEART RATE: 51 BPM | BODY MASS INDEX: 19.69 KG/M2 | WEIGHT: 107 LBS | OXYGEN SATURATION: 97 % | HEIGHT: 62 IN | SYSTOLIC BLOOD PRESSURE: 105 MMHG

## 2024-09-18 DIAGNOSIS — J96.22 ACUTE AND CHRONIC RESPIRATORY FAILURE WITH HYPERCAPNIA (H): ICD-10-CM

## 2024-09-18 DIAGNOSIS — R53.1 WEAKNESS: Primary | ICD-10-CM

## 2024-09-18 DIAGNOSIS — G40.909 SEIZURE DISORDER (H): ICD-10-CM

## 2024-09-18 PROCEDURE — 99214 OFFICE O/P EST MOD 30 MIN: CPT | Performed by: INTERNAL MEDICINE

## 2024-09-18 PROCEDURE — G2211 COMPLEX E/M VISIT ADD ON: HCPCS | Performed by: INTERNAL MEDICINE

## 2024-09-18 ASSESSMENT — PATIENT HEALTH QUESTIONNAIRE - PHQ9
SUM OF ALL RESPONSES TO PHQ QUESTIONS 1-9: 0
10. IF YOU CHECKED OFF ANY PROBLEMS, HOW DIFFICULT HAVE THESE PROBLEMS MADE IT FOR YOU TO DO YOUR WORK, TAKE CARE OF THINGS AT HOME, OR GET ALONG WITH OTHER PEOPLE: NOT DIFFICULT AT ALL
SUM OF ALL RESPONSES TO PHQ QUESTIONS 1-9: 0

## 2024-09-18 NOTE — PATIENT INSTRUCTIONS
Check with Dr Soto about how he is ventilating, let her know we did a CT scan. Given the symptoms are worse at night (or early after waking up) please let her know I am wondering if CPAP or BiPAP possibly help at night or a few times a day to see if he may be retaining CO2 at night.

## 2024-09-18 NOTE — PROGRESS NOTES
Assessment & Plan       ICD-10-CM    1. Weakness  R53.1       2. Acute and chronic respiratory failure with hypercapnia (H)  J96.22       3. Chronic lung disease of prematurity (H)  P27.9     P07.30       4. Seizure disorder (H)  G40.909       discussed with patient (or patient's parents/caregiver) pathophysiology of condition and treatment options.  Had long discussion with mom and reviewed laboratory evaluation as well as the CT scan.  I do think it is reassuring CT scan does not show any specific issue including no progression or worsening of his lung disease.  Did review the lab labs and I do think he likely has chronic hypercapnia related to his respiratory issues including lung disease of prematurity and likely difficulty with ventilation especially at night.  Unfortunately Rj is unable to tolerate nasal cannula oxygen much less CPAP or BiPAP.  Encouraged his mother to reach out Dr. Soto to get her thoughts.  Also reviewed that his mood medications etc. could also be contributing reach out to his other specialist as well.  No signs of any seizures at this time reviewed that could be a possibility as well.  Patient's mother is aware that this could just be a progression of Rj's overall physical state and this could be a new normal.  She has good support at home and let her know that I am happy to reach out to her care coordinators or anybody anytime to get her additional resources if needed.  She is very thankful and again does feel well supported.  Will follow-up with again in a scheduled visit sooner if any new symptoms develop patient's mom is aware I am happy to add given on my schedule at any time if needed.Patient verbalized understanding and is agreeable to this plan.     The longitudinal plan of care for the diagnosis(es)/condition(s) as documented were addressed during this visit. Due to the added complexity in care, I will continue to support Rj in the subsequent management and with  "ongoing continuity of care.      Return in about 3 months (around 12/18/2024) for Next scheduled follow-up visit, or sooner if symptoms persist or worsen.      Roro De La Rosa is a 28 year old, presenting for the following health issues:  RECHECK (3 month follow up per TIFFANIE)        9/18/2024    12:25 PM   Additional Questions   Roomed by Liane Walter MA     HPI       Patient presents today with his mother for scheduled follow-up.  Things been about the same over the last week.  Did have CT scan which was fairly unremarkable mom has no idea what the very small skin thickening lesion may be on his chest.  She never noted any irregularity in the skin.  He has no new symptoms and is back to the usual antibiotic plan.  They had her going to reach out to Dr. Soto to get her thoughts on possible neck steps.  He send any fevers.  He is tolerating his feeds.  Continues to have decreased urine output from baseline but does seem to be well-hydrated per mom. No other concerns or complaints today.                     Objective    /72 (BP Location: Right arm, Patient Position: Sitting, Cuff Size: Adult Regular)   Pulse 51   Resp 16   Ht 1.562 m (5' 1.5\")   Wt 48.5 kg (107 lb)   SpO2 97%   BMI 19.89 kg/m    Body mass index is 19.89 kg/m .  Physical Exam   GENERAL: awake alert and no distress; sitting up in chair and listneing to his CD player.   HENT: normocephalic and atrumatic, mucous membranes moist   RESP: lungs clear to auscultation - no rales, rhonchi or wheezes  CV: regular rate and rhythm, normal S1 S2, no S3 or S4, no murmur, click or rub, no peripheral edema   ABDOMEN: soft, nontender bowel sounds normal; GT in place  MS: no gross musculoskeletal defects noted, no edema; + global atrophy  SKIN: no suspicious lesions or rashes  NEURO: baseline strength and tone, mentation intact/baseline and speech normal  PSYCH: mentation appears normal, affect normal             Signed Electronically by: Micky MOYER" MD Gordon    (*documentation in this chart was partially completed using Dragon PowerMic which could potentially lead to some misspellings or grammar/autocorrect issues in the narrative.)

## 2024-09-19 NOTE — TELEPHONE ENCOUNTER
Spoke with Xray tech to send images through  PACS network. Spoke with [facility] and they are requesting report to be faxed to [phone]. .   Report printed and faxed.  Naina Martino LPN

## 2024-09-20 ENCOUNTER — TRANSFERRED RECORDS (OUTPATIENT)
Dept: HEALTH INFORMATION MANAGEMENT | Facility: CLINIC | Age: 29
End: 2024-09-20
Payer: MEDICAID

## 2024-12-13 SDOH — HEALTH STABILITY: PHYSICAL HEALTH: ON AVERAGE, HOW MANY MINUTES DO YOU ENGAGE IN EXERCISE AT THIS LEVEL?: 0 MIN

## 2024-12-13 SDOH — HEALTH STABILITY: PHYSICAL HEALTH: ON AVERAGE, HOW MANY DAYS PER WEEK DO YOU ENGAGE IN MODERATE TO STRENUOUS EXERCISE (LIKE A BRISK WALK)?: 0 DAYS

## 2024-12-13 ASSESSMENT — SOCIAL DETERMINANTS OF HEALTH (SDOH): HOW OFTEN DO YOU GET TOGETHER WITH FRIENDS OR RELATIVES?: PATIENT DECLINED

## 2024-12-18 ENCOUNTER — OFFICE VISIT (OUTPATIENT)
Dept: PEDIATRICS | Facility: CLINIC | Age: 29
End: 2024-12-18
Payer: MEDICAID

## 2024-12-18 VITALS
DIASTOLIC BLOOD PRESSURE: 70 MMHG | SYSTOLIC BLOOD PRESSURE: 104 MMHG | HEART RATE: 85 BPM | OXYGEN SATURATION: 92 % | RESPIRATION RATE: 18 BRPM | TEMPERATURE: 98.2 F

## 2024-12-18 DIAGNOSIS — Z00.00 ROUTINE GENERAL MEDICAL EXAMINATION AT A HEALTH CARE FACILITY: Primary | ICD-10-CM

## 2024-12-18 DIAGNOSIS — Z93.1 FEEDING BY G-TUBE (H): ICD-10-CM

## 2024-12-18 DIAGNOSIS — G80.1 CP (CEREBRAL PALSY), SPASTIC, DIPLEGIC (H): ICD-10-CM

## 2024-12-18 DIAGNOSIS — K21.9 GASTROESOPHAGEAL REFLUX DISEASE WITHOUT ESOPHAGITIS: ICD-10-CM

## 2024-12-18 LAB
ANION GAP SERPL CALCULATED.3IONS-SCNC: 11 MMOL/L (ref 7–15)
BUN SERPL-MCNC: 15.5 MG/DL (ref 6–20)
CALCIUM SERPL-MCNC: 10 MG/DL (ref 8.8–10.4)
CHLORIDE SERPL-SCNC: 99 MMOL/L (ref 98–107)
CREAT SERPL-MCNC: 0.76 MG/DL (ref 0.67–1.17)
CRP SERPL-MCNC: <3 MG/L
EGFRCR SERPLBLD CKD-EPI 2021: >90 ML/MIN/1.73M2
ERYTHROCYTE [DISTWIDTH] IN BLOOD BY AUTOMATED COUNT: 12.3 % (ref 10–15)
FERRITIN SERPL-MCNC: 72 NG/ML (ref 31–409)
GLUCOSE SERPL-MCNC: 90 MG/DL (ref 70–99)
HCO3 SERPL-SCNC: 30 MMOL/L (ref 22–29)
HCT VFR BLD AUTO: 50.1 % (ref 40–53)
HGB BLD-MCNC: 16.6 G/DL (ref 13.3–17.7)
IRON BINDING CAPACITY (ROCHE): 412 UG/DL (ref 240–430)
IRON SATN MFR SERPL: 23 % (ref 15–46)
IRON SERPL-MCNC: 96 UG/DL (ref 61–157)
MCH RBC QN AUTO: 32.7 PG (ref 26.5–33)
MCHC RBC AUTO-ENTMCNC: 33.1 G/DL (ref 31.5–36.5)
MCV RBC AUTO: 99 FL (ref 78–100)
PLATELET # BLD AUTO: 128 10E3/UL (ref 150–450)
POTASSIUM SERPL-SCNC: 4.6 MMOL/L (ref 3.4–5.3)
RBC # BLD AUTO: 5.07 10E6/UL (ref 4.4–5.9)
SODIUM SERPL-SCNC: 140 MMOL/L (ref 135–145)
WBC # BLD AUTO: 5.5 10E3/UL (ref 4–11)

## 2024-12-18 PROCEDURE — 80164 ASSAY DIPROPYLACETIC ACD TOT: CPT | Mod: 90 | Performed by: INTERNAL MEDICINE

## 2024-12-18 PROCEDURE — 90480 ADMN SARSCOV2 VAC 1/ONLY CMP: CPT | Performed by: INTERNAL MEDICINE

## 2024-12-18 PROCEDURE — 99000 SPECIMEN HANDLING OFFICE-LAB: CPT | Performed by: INTERNAL MEDICINE

## 2024-12-18 PROCEDURE — 90471 IMMUNIZATION ADMIN: CPT | Performed by: INTERNAL MEDICINE

## 2024-12-18 PROCEDURE — 99395 PREV VISIT EST AGE 18-39: CPT | Mod: 25 | Performed by: INTERNAL MEDICINE

## 2024-12-18 PROCEDURE — 85027 COMPLETE CBC AUTOMATED: CPT | Performed by: INTERNAL MEDICINE

## 2024-12-18 PROCEDURE — 99214 OFFICE O/P EST MOD 30 MIN: CPT | Mod: 25 | Performed by: INTERNAL MEDICINE

## 2024-12-18 PROCEDURE — 82728 ASSAY OF FERRITIN: CPT | Performed by: INTERNAL MEDICINE

## 2024-12-18 PROCEDURE — 83550 IRON BINDING TEST: CPT | Performed by: INTERNAL MEDICINE

## 2024-12-18 PROCEDURE — 91320 SARSCV2 VAC 30MCG TRS-SUC IM: CPT | Performed by: INTERNAL MEDICINE

## 2024-12-18 PROCEDURE — 83540 ASSAY OF IRON: CPT | Performed by: INTERNAL MEDICINE

## 2024-12-18 PROCEDURE — 86140 C-REACTIVE PROTEIN: CPT | Performed by: INTERNAL MEDICINE

## 2024-12-18 PROCEDURE — 80165 DIPROPYLACETIC ACID FREE: CPT | Mod: 90 | Performed by: INTERNAL MEDICINE

## 2024-12-18 PROCEDURE — 90656 IIV3 VACC NO PRSV 0.5 ML IM: CPT | Performed by: INTERNAL MEDICINE

## 2024-12-18 PROCEDURE — 36415 COLL VENOUS BLD VENIPUNCTURE: CPT | Performed by: INTERNAL MEDICINE

## 2024-12-18 PROCEDURE — 80048 BASIC METABOLIC PNL TOTAL CA: CPT | Performed by: INTERNAL MEDICINE

## 2024-12-18 RX ORDER — OMEPRAZOLE/SODIUM BICARBONATE 2-84 MG/ML
40 SUSPENSION, RECONSTITUTED, ORAL (ML) ORAL AT BEDTIME
Qty: 600 ML | Refills: 11 | Status: SHIPPED | OUTPATIENT
Start: 2024-12-18

## 2024-12-18 RX ORDER — QUETIAPINE FUMARATE 50 MG/1
TABLET, FILM COATED ORAL
COMMUNITY
Start: 2024-12-09

## 2024-12-18 ASSESSMENT — ASTHMA QUESTIONNAIRES
QUESTION_1 LAST FOUR WEEKS HOW MUCH OF THE TIME DID YOUR ASTHMA KEEP YOU FROM GETTING AS MUCH DONE AT WORK, SCHOOL OR AT HOME: NONE OF THE TIME
QUESTION_5 LAST FOUR WEEKS HOW WOULD YOU RATE YOUR ASTHMA CONTROL: SOMEWHAT CONTROLLED
QUESTION_2 LAST FOUR WEEKS HOW OFTEN HAVE YOU HAD SHORTNESS OF BREATH: ONCE A DAY
ACT_TOTALSCORE: 18
ACT_TOTALSCORE: 18
QUESTION_4 LAST FOUR WEEKS HOW OFTEN HAVE YOU USED YOUR RESCUE INHALER OR NEBULIZER MEDICATION (SUCH AS ALBUTEROL): ONCE A WEEK OR LESS
QUESTION_3 LAST FOUR WEEKS HOW OFTEN DID YOUR ASTHMA SYMPTOMS (WHEEZING, COUGHING, SHORTNESS OF BREATH, CHEST TIGHTNESS OR PAIN) WAKE YOU UP AT NIGHT OR EARLIER THAN USUAL IN THE MORNING: ONCE OR TWICE

## 2024-12-18 ASSESSMENT — PAIN SCALES - GENERAL: PAINLEVEL_OUTOF10: NO PAIN (0)

## 2024-12-18 NOTE — PROGRESS NOTES
Preventive Care Visit  [employer] [employer] JEAN Leyva MD, Internal Medicine - Pediatrics  Dec 18, 2024  {Provider  Link to [facility] :252256}    {PROVIDER CHARTING PREFERENCE:205959}    Roro De La Rosa is a 29 year old, presenting for the following:  Physical        12/18/2024     2:28 PM   Additional Questions   Roomed by Teri Sosa CMA   Accompanied by mom         12/18/2024   Declines Weight   Did patient decline having their weight taken? Yes             HPI  Discuss omeprazole - insurance issues  Quetiapine - taking differently  Discuss asthma - Prednisone about 3 weeks ago  Trazodone and prednisone at same time caused anger issues?      here for follow-up, transitiont to trazodone    had to be on 5 days of prednisone, was very agitated with that and th etrazodone    trazodone is helping him sleep     also as needed jorge luis, still gets 600 at night and this has been helping too      {SUPERLIST (Optional):328396}  {additonal problems for provider to add (Optional):982769}  Health Care Directive  Patient has a Health Care Directive on file        12/13/2024   General Health   How would you rate your overall physical health? (!) FAIR   Feel stress (tense, anxious, or unable to sleep) Patient declined            12/13/2024   Nutrition   Three or more servings of calcium each day? (!) NO   Diet: Other   If other, please elaborate: All liquid diet   How many servings of fruit and vegetables per day? (!) 0-1   How many sweetened beverages each day? 0-1            12/13/2024   Exercise   Days per week of moderate/strenous exercise 0 days   Average minutes spent exercising at this level 0 min      (!) EXERCISE CONCERN      12/13/2024   Social Factors   Frequency of gathering with friends or relatives Patient declined   Worry food won't last until get money to buy more No   Food not last or not have enough money for food? No   Do you have housing? (Housing is defined as stable permanent  housing and does not include staying ouside in a car, in a tent, in an abandoned building, in an overnight shelter, or couch-surfing.) Yes   Are you worried about losing your housing? No   Lack of transportation? No   Unable to get utilities (heat,electricity)? No            12/13/2024   Dental   Dentist two times every year? Yes            12/13/2024   TB Screening   Were you born outside of the [place]? No          { Rooming Staff Patient needs a PHQ as part of the AWV.  Use this link to complete and then refresh the note to pull results Link to PHQ9 Assessment :807811}  {USE TO PULL IN PHQ RESULTS FOR TODAY:361875}        12/13/2024   Substance Use   Alcohol more than 3/day or more than 7/wk No   Do you use any other substances recreationally? No        Social History     Tobacco Use    Smoking status: Never     Passive exposure: Never    Smokeless tobacco: Never   Vaping Use    Vaping status: Never Used   Substance Use Topics    Alcohol use: No    Drug use: No     {Provider  If there are gaps in the social history shown above, please follow the link to update and then refresh the note Link to Social and Substance History :326228}      12/13/2024   STI Screening   New sexual partner(s) since last STI/HIV test? (!) DECLINE            12/13/2024   Contraception/Family Planning   Questions about contraception or family planning (!) DECLINE        {Provider  REQUIRED FOR AWV Use the storyboard to review patient history, after sections have been marked as reviewed, refresh note to capture documentation:254369}   Reviewed and updated as needed this visit by Provider                    {HISTORY OPTIONS (Optional):715025}    {ROS Picklists (Optional):176913}     Objective    Exam  /70 (BP Location: Right arm, Cuff Size: Adult Regular)   Pulse (!) 128   Temp 98.2  F (36.8  C) (Tympanic)   Resp 18   SpO2 92%    Estimated body mass index is 19.89 kg/m  as calculated from the following:    Height as of 9/18/24: 1.562  "m (5' 1.5\").    Weight as of 9/18/24: 48.5 kg (107 lb).    Physical Exam  {Exam Choices (Optional):370948}        Signed Electronically by: Micky Leyva MD  {Email feedback regarding this note to [email or web address]   :172945}  " "elaborate: All liquid diet   How many servings of fruit and vegetables per day? (!) 0-1   How many sweetened beverages each day? 0-1            12/13/2024   Exercise   Days per week of moderate/strenous exercise 0 days   Average minutes spent exercising at this level 0 min      (!) EXERCISE CONCERN      12/13/2024   Social Factors   Frequency of gathering with friends or relatives Patient declined   Worry food won't last until get money to buy more No   Food not last or not have enough money for food? No   Do you have housing? (Housing is defined as stable permanent housing and does not include staying ouside in a car, in a tent, in an abandoned building, in an overnight shelter, or couch-surfing.) Yes   Are you worried about losing your housing? No   Lack of transportation? No   Unable to get utilities (heat,electricity)? No            12/13/2024   Dental   Dentist two times every year? Yes            12/13/2024   TB Screening   Were you born outside of the [place]? No                    12/13/2024   Substance Use   Alcohol more than 3/day or more than 7/wk No   Do you use any other substances recreationally? No        Social History     Tobacco Use    Smoking status: Never     Passive exposure: Never    Smokeless tobacco: Never   Vaping Use    Vaping status: Never Used   Substance Use Topics    Alcohol use: No    Drug use: No           12/13/2024   STI Screening   New sexual partner(s) since last STI/HIV test? (!) DECLINE            12/13/2024   Contraception/Family Planning   Questions about contraception or family planning (!) DECLINE           Reviewed and updated as needed this visit by Provider                             Objective    Exam  /70 (BP Location: Right arm, Cuff Size: Adult Regular)   Pulse (!) 128   Temp 98.2  F (36.8  C) (Tympanic)   Resp 18   SpO2 92%    Estimated body mass index is 19.89 kg/m  as calculated from the following:    Height as of 9/18/24: 1.562 m (5' 1.5\").    Weight as of " 9/18/24: 48.5 kg (107 lb).    Physical Exam  GENERAL: awake alert and no distress; sitting up in chair and listneing to his CD player.   HENT: normocephalic and atrumatic, mucous membranes moist   RESP: lungs clear to auscultation - no rales, rhonchi or wheezes  CV: regular rate and rhythm, normal S1 S2, no S3 or S4, no murmur, click or rub, no peripheral edema   ABDOMEN: soft, nontender bowel sounds normal; GT in place  MS: no gross musculoskeletal defects noted, no edema; + global atrophy  SKIN: no suspicious lesions or rashes  NEURO: baseline strength and tone, mentation intact/baseline and speech normal  PSYCH: mentation appears normal, affect normal         Signed Electronically by: Micky Leyva MD    (*documentation in this chart was partially completed using Dragon PowerMic which could potentially lead to some misspellings or grammar/autocorrect issues in the narrative.)

## 2024-12-19 ENCOUNTER — PATIENT OUTREACH (OUTPATIENT)
Dept: CARE COORDINATION | Facility: CLINIC | Age: 29
End: 2024-12-19
Payer: MEDICAID

## 2024-12-20 ENCOUNTER — TELEPHONE (OUTPATIENT)
Dept: PEDIATRICS | Facility: CLINIC | Age: 29
End: 2024-12-20
Payer: MEDICAID

## 2024-12-20 NOTE — TELEPHONE ENCOUNTER
Prior Authorization Medication Request    Medication/Dose:   omeprazole-sodium bicarbonate (KONVOMEP) 2-84 MG/ML SUSR suspension 600 mL 11 12/18/2024 -- No   Sig - Route: Place 20 mLs (40 mg) into G tube at bedtime. - Per G Tube     Diagnosis and ICD code (if different than what is on RX):   Gastroesophageal reflux disease without esophagitis [K21.9]        Insurance   Primary: [place] MEDICAID  Insurance ID:  [id number]    Pharmacy Information (if different than what is on RX)  Name:  [facility]  Phone:  [phone]  Fax: [phone]    Clinic Information  Preferred routing pool for dept communication: [facility]

## 2024-12-21 LAB
VALPROATE FREE MFR SERPL: 14 %
VALPROATE FREE SERPL-MCNC: 12 UG/ML
VALPROATE SERPL-MCNC: 82 UG/ML

## 2024-12-23 NOTE — TELEPHONE ENCOUNTER
Central Prior Authorization Team   Phone: [phone]    PA Initiation    Medication: omeprazole-sodium bicarbonate (KONVOMEP) 2-84 MG/ML SUSR suspension  Insurance Company: [place] Medicaid ([facility]) - Phone [phone] Fax [phone]  Pharmacy Filling the Rx: [place], [place] - [address]  Filling Pharmacy Phone: [phone]  Filling Pharmacy Fax:    Start Date: 12/23/2024

## 2024-12-30 NOTE — TELEPHONE ENCOUNTER
please let patient's mom know this was denied, will need to still get from [facility] pharmacy

## 2024-12-30 NOTE — PATIENT INSTRUCTIONS
Patient Education   Preventive Care Advice   This is general advice given by our system to help you stay healthy. However, your care team may have specific advice just for you. Please talk to your care team about your preventive care needs.  Nutrition  Eat 5 or more servings of fruits and vegetables each day.  Try wheat bread, brown rice and whole grain pasta (instead of white bread, rice, and pasta).  Get enough calcium and vitamin D. Check the label on foods and aim for 100% of the RDA (recommended daily allowance).  Lifestyle  Exercise at least 150 minutes each week  (30 minutes a day, 5 days a week).  Do muscle strengthening activities 2 days a week. These help control your weight and prevent disease.  No smoking.  Wear sunscreen to prevent skin cancer.  Have a dental exam and cleaning every 6 months.  Yearly exams  See your health care team every year to talk about:  Any changes in your health.  Any medicines your care team has prescribed.  Preventive care, family planning, and ways to prevent chronic diseases.  Shots (vaccines)   HPV shots (up to age 26), if you've never had them before.  Hepatitis B shots (up to age 59), if you've never had them before.  COVID-19 shot: Get this shot when it's due.  Flu shot: Get a flu shot every year.  Tetanus shot: Get a tetanus shot every 10 years.  Pneumococcal, hepatitis A, and RSV shots: Ask your care team if you need these based on your risk.  Shingles shot (for age 50 and up)  General health tests  Diabetes screening:  Starting at age 35, Get screened for diabetes at least every 3 years.  If you are younger than age 35, ask your care team if you should be screened for diabetes.  Cholesterol test: At age 39, start having a cholesterol test every 5 years, or more often if advised.  Bone density scan (DEXA): At age 50, ask your care team if you should have this scan for osteoporosis (brittle bones).  Hepatitis C: Get tested at least once in your life.  STIs (sexually  transmitted infections)  Before age 24: Ask your care team if you should be screened for STIs.  After age 24: Get screened for STIs if you're at risk. You are at risk for STIs (including HIV) if:  You are sexually active with more than one person.  You don't use condoms every time.  You or a partner was diagnosed with a sexually transmitted infection.  If you are at risk for HIV, ask about PrEP medicine to prevent HIV.  Get tested for HIV at least once in your life, whether you are at risk for HIV or not.  Cancer screening tests  Cervical cancer screening: If you have a cervix, begin getting regular cervical cancer screening tests starting at age 21.  Breast cancer scan (mammogram): If you've ever had breasts, begin having regular mammograms starting at age 40. This is a scan to check for breast cancer.  Colon cancer screening: It is important to start screening for colon cancer at age 45.  Have a colonoscopy test every 10 years (or more often if you're at risk) Or, ask your provider about stool tests like a FIT test every year or Cologuard test every 3 years.  To learn more about your testing options, visit:   .  For help making a decision, visit:   https://bit.ly/ny41581.  Prostate cancer screening test: If you have a prostate, ask your care team if a prostate cancer screening test (PSA) at age 55 is right for you.  Lung cancer screening: If you are a current or former smoker ages 50 to 80, ask your care team if ongoing lung cancer screenings are right for you.  For informational purposes only. Not to replace the advice of your health care provider. Copyright   2023 [employer]. All rights reserved. Clinically reviewed by the [facility] Transitions Program. Sigmascreening [id number] - REV 01/24.

## 2024-12-30 NOTE — TELEPHONE ENCOUNTER
PRIOR AUTHORIZATION DENIED    Medication: omeprazole-sodium bicarbonate (KONVOMEP) 2-84 MG/ML SUSR suspension    Denial Date: 12/28/2024    Denial Rational:                      Appeal Information:    If you would like to appeal, please supply P/A team with a letter of medical necessity with clinical reason.

## 2024-12-30 NOTE — TELEPHONE ENCOUNTER
Jamie Earl  [facility] Pool46 minutes ago (8:29 AM)       Hi,  This medication was denied. If the provider would like to appeal please provide a letter of medical necessity and route back to the team. Otherwise please notify the patient and close the encounter.  Thank you,  Central PA Team

## 2025-01-02 ENCOUNTER — PATIENT OUTREACH (OUTPATIENT)
Dept: CARE COORDINATION | Facility: CLINIC | Age: 30
End: 2025-01-02
Payer: MEDICAID

## 2025-01-07 ENCOUNTER — TRANSFERRED RECORDS (OUTPATIENT)
Dept: HEALTH INFORMATION MANAGEMENT | Facility: CLINIC | Age: 30
End: 2025-01-07
Payer: MEDICAID

## 2025-02-04 ENCOUNTER — MYC MEDICAL ADVICE (OUTPATIENT)
Dept: PEDIATRICS | Facility: CLINIC | Age: 30
End: 2025-02-04
Payer: MEDICAID

## 2025-02-04 NOTE — TELEPHONE ENCOUNTER
Dr. Leyva    See [facility]  -Patients mom requests recommendation on new psychiatrist. Referral pended.     Per visit note 9/18/24:  Assessment & Plan      ICD-10-CM     1. Weakness  R53.1         2. Acute and chronic respiratory failure with hypercapnia (H)  J96.22         3. Chronic lung disease of prematurity (H)  P27.9       P07.30         4. Seizure disorder (H)  G40.909         discussed with patient (or patient's parents/caregiver) pathophysiology of condition and treatment options.  Had long discussion with mom and reviewed laboratory evaluation as well as the CT scan.  I do think it is reassuring CT scan does not show any specific issue including no progression or worsening of his lung disease.  Did review the lab labs and I do think he likely has chronic hypercapnia related to his respiratory issues including lung disease of prematurity and likely difficulty with ventilation especially at night.  Unfortunately Rj is unable to tolerate nasal cannula oxygen much less CPAP or BiPAP.  Encouraged his mother to reach out Dr. Soto to get her thoughts.  Also reviewed that his mood medications etc. could also be contributing reach out to his other specialist as well.  No signs of any seizures at this time reviewed that could be a possibility as well.  Patient's mother is aware that this could just be a progression of Rj's overall physical state and this could be a new normal.  She has good support at home and let her know that I am happy to reach out to her care coordinators or anybody anytime to get her additional resources if needed.  She is very thankful and again does feel well supported.  Will follow-up with again in a scheduled visit sooner if any new symptoms develop patient's mom is aware I am happy to add given on my schedule at any time if needed.Patient verbalized understanding and is agreeable to this plan.

## 2025-02-24 ENCOUNTER — E-CONSULT (OUTPATIENT)
Dept: PSYCHIATRY | Facility: CLINIC | Age: 30
End: 2025-02-24
Payer: MEDICAID

## 2025-02-24 PROCEDURE — 99451 NTRPROF PH1/NTRNET/EHR 5/>: CPT | Performed by: PSYCHIATRY & NEUROLOGY

## 2025-02-24 NOTE — PROGRESS NOTES
2/24/2025     E-Consult has been accepted.    Interprofessional consultation requested by:  Micky Leyva MD      Clinical Question/Purpose: MY CLINICAL QUESTION IS: patient has been with psych for many year (Dr Srinivasan at  is peds and patient needs new adult psych) and current regimen ha been stable and mom isn't thinking we would need any adjustments at this time, biggest issues are sleep and behavior.  Appreciate consult for current regimen and any concerns continuing this and referring back to psych only as issues arise, etc. I am comfortable taking over all medications if appropriate.     Patient assessment and information reviewed: chart review    Recommendations:  I think this is a reasonable regime especially if everything is stable at this point.  There is even room to increase doses if needing changes due to a change in sleep or behaviors.  Typically behaviors that worsen often occur due to a change or a medical reason that has worsened, so those should be checked on prior to making changes to medications.  Sometimes it is a behavioral change needed and not medications.     With that being said, buspar can go up and often does better tid as the half life is only around 6 hours or so.  This can help if behaviors seem to be worsened due to increased anxiety.      Trazodone can also be increased if needing more help with sleep.  Max would be around 200 mg.  As you get higher, it does have more antidepressant effects but starts to get a higher chance of side effects.    Gabapentin is on the medication list.  This is a wonderful medication for anxiety and behaviors.  This looks like it is only at bedtime, so this could go to bid or tid.  We often get our best results with TID.  Max would be 3600 mg total in a day.      Depakote is most likely being used as an anti-seizure medication but it also can help with behaviors.  That is an option to increase depending on pts Depakote level and other lab  results.      The hydroxyzine dose can also increase to help with sleep if needed.  It can also be used prn for behaviors/anxiety during the day.  The 1/2 life is around 6 hours, so it can be qid if needed.    Thank you for the consult.        The recommendations provided in this E-Consult are based on a review of clinical data pertinent to the clinical question presented, without a review of the patient's complete medical record or, the benefit of a comprehensive in-person or virtual patient evaluation. This consultation should not replace the clinical judgement and evaluation of the provider ordering this E-Consult. Any new clinical issues, or changes in patient status since the filing of this E-Consult will need to be taken into account when assessing these recommendations. Please contact me if you have further questions.    My total time spent reviewing clinical information and formulating assessment was 15 minutes.        Chris Jones MD

## 2025-02-25 ASSESSMENT — ASTHMA QUESTIONNAIRES
QUESTION_2 LAST FOUR WEEKS HOW OFTEN HAVE YOU HAD SHORTNESS OF BREATH: ONCE A DAY
QUESTION_3 LAST FOUR WEEKS HOW OFTEN DID YOUR ASTHMA SYMPTOMS (WHEEZING, COUGHING, SHORTNESS OF BREATH, CHEST TIGHTNESS OR PAIN) WAKE YOU UP AT NIGHT OR EARLIER THAN USUAL IN THE MORNING: TWO OR THREE NIGHTS A WEEK
QUESTION_1 LAST FOUR WEEKS HOW MUCH OF THE TIME DID YOUR ASTHMA KEEP YOU FROM GETTING AS MUCH DONE AT WORK, SCHOOL OR AT HOME: MOST OF THE TIME
ACT_TOTALSCORE: 12
QUESTION_4 LAST FOUR WEEKS HOW OFTEN HAVE YOU USED YOUR RESCUE INHALER OR NEBULIZER MEDICATION (SUCH AS ALBUTEROL): TWO OR THREE TIMES PER WEEK
ACT_TOTALSCORE: 12
QUESTION_5 LAST FOUR WEEKS HOW WOULD YOU RATE YOUR ASTHMA CONTROL: SOMEWHAT CONTROLLED

## 2025-02-26 ENCOUNTER — VIRTUAL VISIT (OUTPATIENT)
Dept: PEDIATRICS | Facility: CLINIC | Age: 30
End: 2025-02-26
Payer: MEDICAID

## 2025-02-26 DIAGNOSIS — G80.1 CP (CEREBRAL PALSY), SPASTIC, DIPLEGIC (H): ICD-10-CM

## 2025-02-26 DIAGNOSIS — F41.9 ANXIETY: ICD-10-CM

## 2025-02-26 DIAGNOSIS — F33.1 MODERATE EPISODE OF RECURRENT MAJOR DEPRESSIVE DISORDER (H): Primary | ICD-10-CM

## 2025-02-26 DIAGNOSIS — G47.00 INSOMNIA, UNSPECIFIED TYPE: ICD-10-CM

## 2025-02-26 DIAGNOSIS — F33.9 EPISODE OF RECURRENT MAJOR DEPRESSIVE DISORDER, UNSPECIFIED DEPRESSION EPISODE SEVERITY: ICD-10-CM

## 2025-02-26 PROCEDURE — 1126F AMNT PAIN NOTED NONE PRSNT: CPT | Mod: 95 | Performed by: INTERNAL MEDICINE

## 2025-02-26 PROCEDURE — 98006 SYNCH AUDIO-VIDEO EST MOD 30: CPT | Performed by: INTERNAL MEDICINE

## 2025-02-26 RX ORDER — QUETIAPINE FUMARATE 50 MG/1
TABLET, FILM COATED ORAL
Qty: 30 TABLET | Refills: 3 | Status: SHIPPED | OUTPATIENT
Start: 2025-02-26

## 2025-02-26 RX ORDER — BUSPIRONE HYDROCHLORIDE 15 MG/1
TABLET ORAL
COMMUNITY
Start: 2025-02-06 | End: 2025-02-26

## 2025-02-26 RX ORDER — GABAPENTIN 250 MG/5ML
750 SOLUTION ORAL AT BEDTIME
Qty: 510 ML | Refills: 11 | Status: SHIPPED | OUTPATIENT
Start: 2025-02-26

## 2025-02-26 RX ORDER — BUSPIRONE HYDROCHLORIDE 15 MG/1
15 TABLET ORAL 3 TIMES DAILY
Qty: 270 TABLET | Refills: 3 | Status: SHIPPED | OUTPATIENT
Start: 2025-02-26

## 2025-02-26 RX ORDER — TRAZODONE HYDROCHLORIDE 100 MG/1
TABLET ORAL
COMMUNITY
Start: 2025-01-28 | End: 2025-02-26

## 2025-02-26 RX ORDER — QUETIAPINE FUMARATE 300 MG/1
600 TABLET, FILM COATED ORAL AT BEDTIME
Qty: 180 TABLET | Refills: 3 | Status: SHIPPED | OUTPATIENT
Start: 2025-02-26

## 2025-02-26 RX ORDER — TRAZODONE HYDROCHLORIDE 100 MG/1
100 TABLET ORAL AT BEDTIME
Qty: 90 TABLET | Refills: 3 | Status: SHIPPED | OUTPATIENT
Start: 2025-02-26

## 2025-02-26 RX ORDER — HYDROXYZINE HCL 10 MG/5 ML
50 SOLUTION, ORAL ORAL AT BEDTIME
Qty: 750 ML | Refills: 11 | Status: SHIPPED | OUTPATIENT
Start: 2025-02-26

## 2025-02-26 NOTE — PROGRESS NOTES
Rj is a 29 year old who is being evaluated via a billable video visit.    How would you like to obtain your AVS? MyChart  If the video visit is dropped, the invitation should be resent by: Text to cell phone: [phone]  Will anyone else be joining your video visit? No  {If patient encounters technical issues they should call [phone] :003345}    {PROVIDER CHARTING PREFERENCE:984882}    Subjective   Rj is a 29 year old, presenting for the following health issues:  Recheck Medication        2/26/2025     2:16 PM   Additional Questions   Roomed by bernadine HURLEY   Accompanied by Gerardo-Darcy         2/26/2025     2:16 PM   Patient Reported Additional Medications   Patient reports taking the following new medications NA     Video Start Time: 2:30 PM    History of Present Illness       Reason for visit:  Follow up  Symptom onset:  Today  Symptoms include:  NA  : NA.  : NA.  : NA.  Prior treatment description:  NA  What makes it worse:  NA  What makes it better:  NA    He eats 0-1 servings of fruits and vegetables daily.He consumes 0 sweetened beverage(s) daily.He exercises with enough effort to increase his heart rate 9 or less minutes per day.  He exercises with enough effort to increase his heart rate 3 or less days per week.   He is taking medications regularly.     patient on video visit with mom to review eConsult frpm psych and for me to take over medications. doing well otherwise. mom has all medications our and ready for review.     {SUPERLIST (Optional):882079}  {additonal problems for provider to add (Optional):430085}    {ROS Picklists (Optional):205432}      Objective    Vitals - Patient Reported  Weight (Patient Reported): 47.6 kg (105 lb)  Pain Score: No Pain (0)        Physical Exam   GENERAL: alert and no distress  RESP: No audible wheeze, cough, or visible cyanosis.    PSYCH: Appropriate affect, tone, and pace of words    {Diagnostic Test Results (Optional):881985}      Video-Visit Details    Type  of service:  Video Visit   Video End Time:2:49 PM  Originating Location (pt. Location): Home  {PROVIDER LOCATION On-site should be selected for visits conducted from your clinic location or adjoining [facility] hospital, academic office, or other nearby [facility] building. Off-site should be selected for all other provider locations, including home:317044}  Distant Location (provider location):  On-site  Platform used for Video Visit: Gregory  Signed Electronically by: Micky Leyva MD  {Email feedback regarding this note to [email or web address]   :[id number]}

## 2025-03-05 DIAGNOSIS — K59.09 CHRONIC CONSTIPATION: ICD-10-CM

## 2025-03-05 RX ORDER — POLYETHYLENE GLYCOL 3350 17 G/17G
POWDER, FOR SOLUTION ORAL
Qty: 510 G | Refills: 2 | Status: SHIPPED | OUTPATIENT
Start: 2025-03-05

## 2025-03-21 ASSESSMENT — ASTHMA QUESTIONNAIRES
QUESTION_3 LAST FOUR WEEKS HOW OFTEN DID YOUR ASTHMA SYMPTOMS (WHEEZING, COUGHING, SHORTNESS OF BREATH, CHEST TIGHTNESS OR PAIN) WAKE YOU UP AT NIGHT OR EARLIER THAN USUAL IN THE MORNING: FOUR OR MORE NIGHTS A WEEK
QUESTION_1 LAST FOUR WEEKS HOW MUCH OF THE TIME DID YOUR ASTHMA KEEP YOU FROM GETTING AS MUCH DONE AT WORK, SCHOOL OR AT HOME: SOME OF THE TIME
QUESTION_4 LAST FOUR WEEKS HOW OFTEN HAVE YOU USED YOUR RESCUE INHALER OR NEBULIZER MEDICATION (SUCH AS ALBUTEROL): TWO OR THREE TIMES PER WEEK
ACT_TOTALSCORE: 13
QUESTION_2 LAST FOUR WEEKS HOW OFTEN HAVE YOU HAD SHORTNESS OF BREATH: THREE TO SIX TIMES A WEEK
QUESTION_5 LAST FOUR WEEKS HOW WOULD YOU RATE YOUR ASTHMA CONTROL: SOMEWHAT CONTROLLED

## 2025-03-26 ENCOUNTER — OFFICE VISIT (OUTPATIENT)
Dept: PEDIATRICS | Facility: CLINIC | Age: 30
End: 2025-03-26
Payer: MEDICAID

## 2025-03-26 VITALS
HEIGHT: 62 IN | DIASTOLIC BLOOD PRESSURE: 76 MMHG | HEART RATE: 80 BPM | BODY MASS INDEX: 18.95 KG/M2 | TEMPERATURE: 97.9 F | RESPIRATION RATE: 18 BRPM | SYSTOLIC BLOOD PRESSURE: 122 MMHG | OXYGEN SATURATION: 93 % | WEIGHT: 103 LBS

## 2025-03-26 DIAGNOSIS — J96.22 ACUTE AND CHRONIC RESPIRATORY FAILURE WITH HYPERCAPNIA (H): ICD-10-CM

## 2025-03-26 DIAGNOSIS — G40.909 SEIZURE DISORDER (H): ICD-10-CM

## 2025-03-26 DIAGNOSIS — J45.50 SEVERE PERSISTENT ASTHMA, UNSPECIFIED WHETHER COMPLICATED (H): Primary | ICD-10-CM

## 2025-03-26 DIAGNOSIS — Z93.1 FEEDING BY G-TUBE (H): ICD-10-CM

## 2025-03-26 PROCEDURE — G2211 COMPLEX E/M VISIT ADD ON: HCPCS | Performed by: INTERNAL MEDICINE

## 2025-03-26 PROCEDURE — 99214 OFFICE O/P EST MOD 30 MIN: CPT | Performed by: INTERNAL MEDICINE

## 2025-03-26 PROCEDURE — 3074F SYST BP LT 130 MM HG: CPT | Performed by: INTERNAL MEDICINE

## 2025-03-26 PROCEDURE — 3078F DIAST BP <80 MM HG: CPT | Performed by: INTERNAL MEDICINE

## 2025-03-26 PROCEDURE — 1126F AMNT PAIN NOTED NONE PRSNT: CPT | Performed by: INTERNAL MEDICINE

## 2025-03-26 ASSESSMENT — PATIENT HEALTH QUESTIONNAIRE - PHQ9
SUM OF ALL RESPONSES TO PHQ QUESTIONS 1-9: 3
10. IF YOU CHECKED OFF ANY PROBLEMS, HOW DIFFICULT HAVE THESE PROBLEMS MADE IT FOR YOU TO DO YOUR WORK, TAKE CARE OF THINGS AT HOME, OR GET ALONG WITH OTHER PEOPLE: NOT DIFFICULT AT ALL
SUM OF ALL RESPONSES TO PHQ QUESTIONS 1-9: 3

## 2025-03-26 ASSESSMENT — PAIN SCALES - GENERAL: PAINLEVEL_OUTOF10: NO PAIN (0)

## 2025-03-26 NOTE — PROGRESS NOTES
Assessment & Plan     Severe persistent asthma, unspecified whether complicated (H)  discussed with patient (or patient's parents/caregiver) pathophysiology of condition and treatment options.  continue follow-up with Dr Soto as scheduled. reviwed action plan, etc.     Feeding by G-tube (H)  discussed with patient (or patient's parents/caregiver) pathophysiology of condition and treatment options.  tolerarating feeds well and weight is stable. continue cares and plan.     Acute and chronic respiratory failure with hypercapnia (H)  discussed with patient (or patient's parents/caregiver) pathophysiology of condition and treatment options.  as above, off O2 at night. continue as needed to keep sats > 88%.     Seizure disorder (H)  discussed with patient (or patient's parents/caregiver) pathophysiology of condition and treatment options.  Well controlled on current medication(s). continue cares and plan and follow-up with neuro as scheduled.     The longitudinal plan of care for the diagnosis(es)/condition(s) as documented were addressed during this visit. Due to the added complexity in care, I will continue to support Rj in the subsequent management and with ongoing continuity of care.    Return in about 3 months (around 6/26/2025) for Next scheduled follow-up visit, or sooner if symptoms persist or worsen.      Roro De La Rosa is a 29 year old, presenting for the following health issues:  Follow Up        3/26/2025    11:15 AM   Additional Questions   Roomed by Lakshmi HURLEY   Accompanied by Jin Taveras         3/26/2025    11:15 AM   Patient Reported Additional Medications   Patient reports taking the following new medications NA     History of Present Illness       Reason for visit:  Follow Up  Symptom onset:  Today  Symptoms include:  NA  : NA.  Symptom progression:  Staying the same  : NA.  Prior treatment description:  NA  What makes it worse:  NA  What makes it better:  NA    He eats 0-1 servings of  fruits and vegetables daily.He consumes 0 sweetened beverage(s) daily.He exercises with enough effort to increase his heart rate 9 or less minutes per day.  He exercises with enough effort to increase his heart rate 3 or less days per week.   He is taking medications regularly.            3/21/2025   Asthma   1.  In the past 4 weeks, how much of the time did your asthma keep you from getting as much done at work, school or at home? 3    2.  During the past 4 weeks, how often have you had shortness of breath? 3    3.  During the past 4 weeks, how often did your asthma symptoms (wheezing, coughing, shortness of breath, chest tightness or pain) wake you up at night or earlier than usual in the morning? 1    4.  During the past 4 weeks, how often have you used your rescue inhaler or nebulizer medication (such as albuterol)? 3    5.  How would you rate your asthma control during the past 4 weeks? 3    ACT TOTAL SCORE (Goal Greater than or Equal to 20) 13    In the past 12 months, how many times did you visit the emergency room for your asthma without being admitted to the hospital? 0    In the past 12 months, how many times were you hospitalized overnight because of your asthma? 0    Do you have a cough, wheezing or shortness of breath? (!) COUGH    What makes your asthma/breathing worse? None of these (Smoke, URI, Dust mites, Pollen, Animal dander, Insects/rodents, Mold, Humidity, Aspirin,Strong odors&fumes,Occupational exposure,Exercise,Emotions,Cold air,Gastric reflux,Unknown,Other)    Do you want more information about how to use your inhaler? No        Proxy-reported     Rj presents today with his mother for scheduled follow-up.  Over the last month or so have been steadily improved for him.  Earlier in the year he was having some issues with low oxygenation and we did have to play around with his antibiotics and he is following closely with Dr. Soto in the pulmonary clinic.  Mom notes he has not needed any  "oxygen including at night for last 3 to 4 weeks and she is very thankful about this.  He is taking all medications and has no side effects.  We are able to transfer over his medications from his neurologist to myself as well as psychiatric medications and patient's mom was very thankful for the E consult we will to do.  She is aware we can do another E consult the future if we need it.  His weight is stable and is tolerating his feeds well.  They deny other concerns or complaints today.        Objective    /76 (BP Location: Right arm, Patient Position: Sitting, Cuff Size: Adult Small)   Pulse 80   Temp 97.9  F (36.6  C) (Temporal)   Resp 18   Ht 1.562 m (5' 1.5\")   Wt 46.7 kg (103 lb)   SpO2 93%   BMI 19.15 kg/m    Body mass index is 19.15 kg/m .  Wt Readings from Last 5 Encounters:   03/26/25 46.7 kg (103 lb)   09/18/24 48.5 kg (107 lb)   09/11/24 48.1 kg (106 lb)   06/19/24 48.7 kg (107 lb 4.8 oz)   03/27/24 50 kg (110 lb 4.8 oz)       Physical Exam   GENERAL: awake alert and no distress; sitting up in chair and listneing to his CD player.   HENT: normocephalic and atrumatic, mucous membranes moist   RESP: lungs clear to auscultation - no rales, rhonchi or wheezes  CV: regular rate and rhythm, normal S1 S2, no S3 or S4, no murmur, click or rub, no peripheral edema   ABDOMEN: soft, nontender bowel sounds normal; GT in place  MS: no gross musculoskeletal defects noted, no edema; + global atrophy  SKIN: no suspicious lesions or rashes  NEURO: baseline strength and tone, mentation intact/baseline and speech normal  PSYCH: mentation appears normal, affect normal             Signed Electronically by: Micky Leyva MD    "

## 2025-03-31 ENCOUNTER — TELEPHONE (OUTPATIENT)
Dept: PEDIATRICS | Facility: CLINIC | Age: 30
End: 2025-03-31
Payer: MEDICAID

## 2025-03-31 NOTE — TELEPHONE ENCOUNTER
Forms/Letter Request    Type of form/letter: OTHER: Statement of Medical Necessity    Do we have the form/letter: Yes: Dr. Leyva's inbox    Who is the form from? [facility] (if other please explain)    Where did/will the form come from? form was faxed in    When is form/letter needed by: next available    How would you like the form/letter returned: Fax : [phone]    Patient Notified form requests are processed in 5-7 business days:N/A    Could we send this information to you in [place] or would you prefer to receive a phone call?:   NA

## 2025-04-02 NOTE — TELEPHONE ENCOUNTER
Micky Leyva MD  [facility] Pool20 minutes ago (9:55 AM)       Form(s) completed and in station out basket or given to TC

## 2025-04-07 ENCOUNTER — APPOINTMENT (OUTPATIENT)
Dept: GENERAL RADIOLOGY | Facility: CLINIC | Age: 30
End: 2025-04-07
Attending: EMERGENCY MEDICINE
Payer: MEDICAID

## 2025-04-07 ENCOUNTER — HOSPITAL ENCOUNTER (INPATIENT)
Facility: CLINIC | Age: 30
End: 2025-04-07
Attending: EMERGENCY MEDICINE | Admitting: INTERNAL MEDICINE
Payer: MEDICAID

## 2025-04-07 ENCOUNTER — APPOINTMENT (OUTPATIENT)
Dept: CT IMAGING | Facility: CLINIC | Age: 30
End: 2025-04-07
Attending: EMERGENCY MEDICINE
Payer: MEDICAID

## 2025-04-07 DIAGNOSIS — G89.18 POSTOPERATIVE PAIN: Primary | ICD-10-CM

## 2025-04-07 DIAGNOSIS — J96.11 CHRONIC RESPIRATORY FAILURE WITH HYPOXIA (H): ICD-10-CM

## 2025-04-07 DIAGNOSIS — I47.19 NARROW COMPLEX TACHYCARDIA: ICD-10-CM

## 2025-04-07 DIAGNOSIS — K81.9 CHOLECYSTITIS: ICD-10-CM

## 2025-04-07 DIAGNOSIS — K81.0 GANGRENOUS CHOLECYSTITIS: ICD-10-CM

## 2025-04-07 DIAGNOSIS — R50.9 FEVER, UNSPECIFIED FEVER CAUSE: ICD-10-CM

## 2025-04-07 LAB
ALBUMIN SERPL BCG-MCNC: 4 G/DL (ref 3.5–5.2)
ALBUMIN UR-MCNC: 20 MG/DL
ALP SERPL-CCNC: 101 U/L (ref 40–150)
ALT SERPL W P-5'-P-CCNC: 12 U/L (ref 0–70)
ANION GAP SERPL CALCULATED.3IONS-SCNC: 10 MMOL/L (ref 7–15)
APPEARANCE UR: CLEAR
AST SERPL W P-5'-P-CCNC: 22 U/L (ref 0–45)
BASE EXCESS BLDV CALC-SCNC: 4 MMOL/L (ref -3–3)
BILIRUB SERPL-MCNC: 0.8 MG/DL
BILIRUB UR QL STRIP: NEGATIVE
BUN SERPL-MCNC: 11 MG/DL (ref 6–20)
CALCIUM SERPL-MCNC: 9.3 MG/DL (ref 8.8–10.4)
CHLORIDE SERPL-SCNC: 94 MMOL/L (ref 98–107)
COLOR UR AUTO: YELLOW
CREAT SERPL-MCNC: 0.49 MG/DL (ref 0.67–1.17)
EGFRCR SERPLBLD CKD-EPI 2021: >90 ML/MIN/1.73M2
FLUAV RNA SPEC QL NAA+PROBE: NEGATIVE
FLUBV RNA RESP QL NAA+PROBE: NEGATIVE
GLUCOSE SERPL-MCNC: 294 MG/DL (ref 70–99)
GLUCOSE UR STRIP-MCNC: >=1000 MG/DL
HCO3 BLDV-SCNC: 31 MMOL/L (ref 21–28)
HCO3 SERPL-SCNC: 27 MMOL/L (ref 22–29)
HGB UR QL STRIP: NEGATIVE
HOLD SPECIMEN: NORMAL
KETONES UR STRIP-MCNC: NEGATIVE MG/DL
LACTATE BLD-SCNC: 1.4 MMOL/L (ref 0.7–2)
LEUKOCYTE ESTERASE UR QL STRIP: NEGATIVE
MAGNESIUM SERPL-MCNC: 2 MG/DL (ref 1.7–2.3)
MUCOUS THREADS #/AREA URNS LPF: PRESENT /LPF
NITRATE UR QL: NEGATIVE
PCO2 BLDV: 55 MM HG (ref 40–50)
PH BLDV: 7.36 [PH] (ref 7.32–7.43)
PH UR STRIP: 7 [PH] (ref 5–7)
PLAT MORPH BLD: ABNORMAL
PO2 BLDV: 96 MM HG (ref 25–47)
POTASSIUM SERPL-SCNC: 4.5 MMOL/L (ref 3.4–5.3)
PROCALCITONIN SERPL IA-MCNC: 0.25 NG/ML
PROT SERPL-MCNC: 7 G/DL (ref 6.4–8.3)
RBC MORPH BLD: ABNORMAL
RBC URINE: <1 /HPF
RSV RNA SPEC NAA+PROBE: NEGATIVE
SAO2 % BLDV: 97 % (ref 70–75)
SARS-COV-2 RNA RESP QL NAA+PROBE: NEGATIVE
SMUDGE CELLS BLD QL SMEAR: PRESENT
SODIUM SERPL-SCNC: 131 MMOL/L (ref 135–145)
SP GR UR STRIP: 1.02 (ref 1–1.03)
TROPONIN T SERPL HS-MCNC: 14 NG/L
UROBILINOGEN UR STRIP-MCNC: 4 MG/DL
WBC URINE: 1 /HPF

## 2025-04-07 PROCEDURE — 36415 COLL VENOUS BLD VENIPUNCTURE: CPT | Performed by: EMERGENCY MEDICINE

## 2025-04-07 PROCEDURE — 87040 BLOOD CULTURE FOR BACTERIA: CPT | Performed by: EMERGENCY MEDICINE

## 2025-04-07 PROCEDURE — 85004 AUTOMATED DIFF WBC COUNT: CPT | Performed by: EMERGENCY MEDICINE

## 2025-04-07 PROCEDURE — 85007 BL SMEAR W/DIFF WBC COUNT: CPT | Performed by: EMERGENCY MEDICINE

## 2025-04-07 PROCEDURE — 258N000003 HC RX IP 258 OP 636: Performed by: EMERGENCY MEDICINE

## 2025-04-07 PROCEDURE — 99285 EMERGENCY DEPT VISIT HI MDM: CPT | Mod: 25

## 2025-04-07 PROCEDURE — 84145 PROCALCITONIN (PCT): CPT | Performed by: EMERGENCY MEDICINE

## 2025-04-07 PROCEDURE — 71045 X-RAY EXAM CHEST 1 VIEW: CPT

## 2025-04-07 PROCEDURE — 87637 SARSCOV2&INF A&B&RSV AMP PRB: CPT | Performed by: EMERGENCY MEDICINE

## 2025-04-07 PROCEDURE — 74177 CT ABD & PELVIS W/CONTRAST: CPT

## 2025-04-07 PROCEDURE — 93005 ELECTROCARDIOGRAM TRACING: CPT

## 2025-04-07 PROCEDURE — 96361 HYDRATE IV INFUSION ADD-ON: CPT

## 2025-04-07 PROCEDURE — 250N000009 HC RX 250: Performed by: EMERGENCY MEDICINE

## 2025-04-07 PROCEDURE — 83036 HEMOGLOBIN GLYCOSYLATED A1C: CPT | Performed by: INTERNAL MEDICINE

## 2025-04-07 PROCEDURE — 250N000011 HC RX IP 250 OP 636: Performed by: EMERGENCY MEDICINE

## 2025-04-07 PROCEDURE — 96375 TX/PRO/DX INJ NEW DRUG ADDON: CPT

## 2025-04-07 PROCEDURE — 81003 URINALYSIS AUTO W/O SCOPE: CPT | Performed by: EMERGENCY MEDICINE

## 2025-04-07 PROCEDURE — 84155 ASSAY OF PROTEIN SERUM: CPT | Performed by: EMERGENCY MEDICINE

## 2025-04-07 PROCEDURE — 83735 ASSAY OF MAGNESIUM: CPT | Performed by: EMERGENCY MEDICINE

## 2025-04-07 PROCEDURE — 96374 THER/PROPH/DIAG INJ IV PUSH: CPT | Mod: 59

## 2025-04-07 PROCEDURE — 84484 ASSAY OF TROPONIN QUANT: CPT | Performed by: EMERGENCY MEDICINE

## 2025-04-07 PROCEDURE — 82803 BLOOD GASES ANY COMBINATION: CPT

## 2025-04-07 PROCEDURE — 250N000013 HC RX MED GY IP 250 OP 250 PS 637: Performed by: EMERGENCY MEDICINE

## 2025-04-07 RX ORDER — IOPAMIDOL 755 MG/ML
67 INJECTION, SOLUTION INTRAVASCULAR ONCE
Status: COMPLETED | OUTPATIENT
Start: 2025-04-07 | End: 2025-04-07

## 2025-04-07 RX ORDER — PIPERACILLIN SODIUM, TAZOBACTAM SODIUM 4; .5 G/20ML; G/20ML
4.5 INJECTION, POWDER, LYOPHILIZED, FOR SOLUTION INTRAVENOUS ONCE
Status: COMPLETED | OUTPATIENT
Start: 2025-04-07 | End: 2025-04-07

## 2025-04-07 RX ORDER — ONDANSETRON 2 MG/ML
4 INJECTION INTRAMUSCULAR; INTRAVENOUS ONCE
Status: DISCONTINUED | OUTPATIENT
Start: 2025-04-07 | End: 2025-04-07

## 2025-04-07 RX ORDER — IPRATROPIUM BROMIDE AND ALBUTEROL SULFATE 2.5; .5 MG/3ML; MG/3ML
3 SOLUTION RESPIRATORY (INHALATION) ONCE
Status: COMPLETED | OUTPATIENT
Start: 2025-04-07 | End: 2025-04-07

## 2025-04-07 RX ORDER — METOPROLOL TARTRATE 1 MG/ML
5 INJECTION, SOLUTION INTRAVENOUS ONCE
Status: COMPLETED | OUTPATIENT
Start: 2025-04-07 | End: 2025-04-07

## 2025-04-07 RX ORDER — IBUPROFEN 100 MG/5ML
10 SUSPENSION ORAL ONCE
Status: COMPLETED | OUTPATIENT
Start: 2025-04-07 | End: 2025-04-07

## 2025-04-07 RX ADMIN — IPRATROPIUM BROMIDE AND ALBUTEROL SULFATE 3 ML: .5; 3 SOLUTION RESPIRATORY (INHALATION) at 22:34

## 2025-04-07 RX ADMIN — SODIUM CHLORIDE 90 ML: 9 INJECTION, SOLUTION INTRAVENOUS at 23:34

## 2025-04-07 RX ADMIN — PIPERACILLIN AND TAZOBACTAM 4.5 G: 4; .5 INJECTION, POWDER, FOR SOLUTION INTRAVENOUS at 22:37

## 2025-04-07 RX ADMIN — METOPROLOL TARTRATE 5 MG: 5 INJECTION INTRAVENOUS at 23:50

## 2025-04-07 RX ADMIN — PROCHLORPERAZINE EDISYLATE 5 MG: 5 INJECTION INTRAMUSCULAR; INTRAVENOUS at 22:25

## 2025-04-07 RX ADMIN — IOPAMIDOL 67 ML: 755 INJECTION, SOLUTION INTRAVENOUS at 23:34

## 2025-04-07 RX ADMIN — IBUPROFEN 450 MG: 200 SUSPENSION ORAL at 22:25

## 2025-04-07 RX ADMIN — SODIUM CHLORIDE 1000 ML: 9 INJECTION, SOLUTION INTRAVENOUS at 22:02

## 2025-04-07 ASSESSMENT — ACTIVITIES OF DAILY LIVING (ADL)
ADLS_ACUITY_SCORE: 57
ADLS_ACUITY_SCORE: 57

## 2025-04-08 ENCOUNTER — ANESTHESIA (OUTPATIENT)
Dept: SURGERY | Facility: CLINIC | Age: 30
End: 2025-04-08
Payer: MEDICAID

## 2025-04-08 ENCOUNTER — ANESTHESIA EVENT (OUTPATIENT)
Dept: SURGERY | Facility: CLINIC | Age: 30
End: 2025-04-08
Payer: MEDICAID

## 2025-04-08 PROBLEM — I47.19 NARROW COMPLEX TACHYCARDIA: Status: ACTIVE | Noted: 2025-04-08

## 2025-04-08 PROBLEM — R50.9 FEVER, UNSPECIFIED FEVER CAUSE: Status: ACTIVE | Noted: 2025-04-08

## 2025-04-08 PROBLEM — K81.9 CHOLECYSTITIS: Status: ACTIVE | Noted: 2025-04-08

## 2025-04-08 PROBLEM — J96.11 CHRONIC RESPIRATORY FAILURE WITH HYPOXIA (H): Status: ACTIVE | Noted: 2025-04-08

## 2025-04-08 LAB
ALBUMIN SERPL BCG-MCNC: 3.1 G/DL (ref 3.5–5.2)
ALP SERPL-CCNC: 84 U/L (ref 40–150)
ALT SERPL W P-5'-P-CCNC: 10 U/L (ref 0–70)
ANION GAP SERPL CALCULATED.3IONS-SCNC: 12 MMOL/L (ref 7–15)
AST SERPL W P-5'-P-CCNC: 21 U/L (ref 0–45)
ATRIAL RATE - MUSE: 116 BPM
ATRIAL RATE - MUSE: 139 BPM
ATRIAL RATE - MUSE: 292 BPM
BASE EXCESS BLDV CALC-SCNC: 1.8 MMOL/L (ref -3–3)
BASE EXCESS BLDV CALC-SCNC: 3.5 MMOL/L (ref -3–3)
BASOPHILS # BLD AUTO: 0 10E3/UL (ref 0–0.2)
BASOPHILS # BLD MANUAL: 0 10E3/UL (ref 0–0.2)
BASOPHILS NFR BLD AUTO: 0 %
BASOPHILS NFR BLD MANUAL: 0 %
BILIRUB SERPL-MCNC: 0.7 MG/DL
BUN SERPL-MCNC: 8.9 MG/DL (ref 6–20)
CALCIUM SERPL-MCNC: 9.1 MG/DL (ref 8.8–10.4)
CHLORIDE SERPL-SCNC: 100 MMOL/L (ref 98–107)
CREAT SERPL-MCNC: 0.52 MG/DL (ref 0.67–1.17)
DIASTOLIC BLOOD PRESSURE - MUSE: NORMAL MMHG
EGFRCR SERPLBLD CKD-EPI 2021: >90 ML/MIN/1.73M2
EOSINOPHIL # BLD AUTO: 0 10E3/UL (ref 0–0.7)
EOSINOPHIL # BLD MANUAL: 0 10E3/UL (ref 0–0.7)
EOSINOPHIL NFR BLD AUTO: 0 %
EOSINOPHIL NFR BLD MANUAL: 0 %
ERYTHROCYTE [DISTWIDTH] IN BLOOD BY AUTOMATED COUNT: 12.4 % (ref 10–15)
ERYTHROCYTE [DISTWIDTH] IN BLOOD BY AUTOMATED COUNT: 13.1 % (ref 10–15)
EST. AVERAGE GLUCOSE BLD GHB EST-MCNC: 123 MG/DL
GLUCOSE BLDC GLUCOMTR-MCNC: 154 MG/DL (ref 70–99)
GLUCOSE BLDC GLUCOMTR-MCNC: 162 MG/DL (ref 70–99)
GLUCOSE BLDC GLUCOMTR-MCNC: 199 MG/DL (ref 70–99)
GLUCOSE SERPL-MCNC: 159 MG/DL (ref 70–99)
HBA1C MFR BLD: 5.9 %
HCO3 BLDV-SCNC: 32 MMOL/L (ref 21–28)
HCO3 BLDV-SCNC: 32 MMOL/L (ref 21–28)
HCO3 SERPL-SCNC: 24 MMOL/L (ref 22–29)
HCT VFR BLD AUTO: 49 % (ref 40–53)
HCT VFR BLD AUTO: 49.9 % (ref 40–53)
HGB BLD-MCNC: 17.1 G/DL (ref 13.3–17.7)
HGB BLD-MCNC: 17.1 G/DL (ref 13.3–17.7)
IMM GRANULOCYTES # BLD: 0.1 10E3/UL
IMM GRANULOCYTES NFR BLD: 0 %
INTERPRETATION ECG - MUSE: NORMAL
LACTATE SERPL-SCNC: 1.5 MMOL/L (ref 0.7–2)
LACTATE SERPL-SCNC: 2.2 MMOL/L (ref 0.7–2)
LACTATE SERPL-SCNC: 3.4 MMOL/L (ref 0.7–2)
LACTATE SERPL-SCNC: 3.4 MMOL/L (ref 0.7–2)
LYMPHOCYTES # BLD AUTO: 0.6 10E3/UL (ref 0.8–5.3)
LYMPHOCYTES # BLD MANUAL: 0.8 10E3/UL (ref 0.8–5.3)
LYMPHOCYTES NFR BLD AUTO: 4 %
LYMPHOCYTES NFR BLD MANUAL: 5 %
MCH RBC QN AUTO: 33 PG (ref 26.5–33)
MCH RBC QN AUTO: 34.1 PG (ref 26.5–33)
MCHC RBC AUTO-ENTMCNC: 34.3 G/DL (ref 31.5–36.5)
MCHC RBC AUTO-ENTMCNC: 34.9 G/DL (ref 31.5–36.5)
MCV RBC AUTO: 96 FL (ref 78–100)
MCV RBC AUTO: 98 FL (ref 78–100)
MONOCYTES # BLD AUTO: 4.5 10E3/UL (ref 0–1.3)
MONOCYTES # BLD MANUAL: 3.6 10E3/UL (ref 0–1.3)
MONOCYTES NFR BLD AUTO: 29 %
MONOCYTES NFR BLD MANUAL: 23 %
NEUTROPHILS # BLD AUTO: 10.4 10E3/UL (ref 1.6–8.3)
NEUTROPHILS # BLD MANUAL: 11.2 10E3/UL (ref 1.6–8.3)
NEUTROPHILS NFR BLD AUTO: 67 %
NEUTROPHILS NFR BLD MANUAL: 72 %
NRBC # BLD AUTO: 0 10E3/UL
NRBC BLD AUTO-RTO: 0 /100
O2/TOTAL GAS SETTING VFR VENT: 3 %
O2/TOTAL GAS SETTING VFR VENT: 5 %
OXYHGB MFR BLDV: 25 % (ref 70–75)
OXYHGB MFR BLDV: 76 % (ref 70–75)
P AXIS - MUSE: 25 DEGREES
P AXIS - MUSE: 39 DEGREES
P AXIS - MUSE: 47 DEGREES
PATH REV: ABNORMAL
PCO2 BLDV: 63 MM HG (ref 40–50)
PCO2 BLDV: 76 MM HG (ref 40–50)
PH BLDV: 7.24 [PH] (ref 7.32–7.43)
PH BLDV: 7.31 [PH] (ref 7.32–7.43)
PLATELET # BLD AUTO: 119 10E3/UL (ref 150–450)
PLATELET # BLD AUTO: 120 10E3/UL (ref 150–450)
PO2 BLDV: 20 MM HG (ref 25–47)
PO2 BLDV: 43 MM HG (ref 25–47)
POTASSIUM SERPL-SCNC: 4.5 MMOL/L (ref 3.4–5.3)
PR INTERVAL - MUSE: 124 MS
PR INTERVAL - MUSE: 144 MS
PR INTERVAL - MUSE: NORMAL MS
PROT SERPL-MCNC: 6 G/DL (ref 6.4–8.3)
QRS DURATION - MUSE: 68 MS
QRS DURATION - MUSE: 72 MS
QRS DURATION - MUSE: 72 MS
QT - MUSE: 270 MS
QT - MUSE: 324 MS
QT - MUSE: 334 MS
QTC - MUSE: 410 MS
QTC - MUSE: 450 MS
QTC - MUSE: 520 MS
R AXIS - MUSE: -8 DEGREES
R AXIS - MUSE: 10 DEGREES
R AXIS - MUSE: 2 DEGREES
RBC # BLD AUTO: 5.01 10E6/UL (ref 4.4–5.9)
RBC # BLD AUTO: 5.18 10E6/UL (ref 4.4–5.9)
SAO2 % BLDV: 25.1 % (ref 70–75)
SAO2 % BLDV: 77.7 % (ref 70–75)
SODIUM SERPL-SCNC: 136 MMOL/L (ref 135–145)
SYSTOLIC BLOOD PRESSURE - MUSE: NORMAL MMHG
T AXIS - MUSE: 25 DEGREES
T AXIS - MUSE: 31 DEGREES
T AXIS - MUSE: 37 DEGREES
VENTRICULAR RATE- MUSE: 116 BPM
VENTRICULAR RATE- MUSE: 139 BPM
VENTRICULAR RATE- MUSE: 146 BPM
WBC # BLD AUTO: 15.6 10E3/UL (ref 4–11)
WBC # BLD AUTO: 23.8 10E3/UL (ref 4–11)

## 2025-04-08 PROCEDURE — 99207 PR APP CREDIT; MD BILLING SHARED VISIT: CPT | Performed by: NURSE PRACTITIONER

## 2025-04-08 PROCEDURE — 36415 COLL VENOUS BLD VENIPUNCTURE: CPT | Performed by: STUDENT IN AN ORGANIZED HEALTH CARE EDUCATION/TRAINING PROGRAM

## 2025-04-08 PROCEDURE — 250N000025 HC SEVOFLURANE, PER MIN: Performed by: SURGERY

## 2025-04-08 PROCEDURE — 250N000009 HC RX 250: Performed by: NURSE ANESTHETIST, CERTIFIED REGISTERED

## 2025-04-08 PROCEDURE — 250N000009 HC RX 250: Performed by: ANESTHESIOLOGY

## 2025-04-08 PROCEDURE — 258N000003 HC RX IP 258 OP 636: Performed by: NURSE ANESTHETIST, CERTIFIED REGISTERED

## 2025-04-08 PROCEDURE — 83605 ASSAY OF LACTIC ACID: CPT | Performed by: SURGERY

## 2025-04-08 PROCEDURE — 99418 PROLNG IP/OBS E/M EA 15 MIN: CPT | Performed by: INTERNAL MEDICINE

## 2025-04-08 PROCEDURE — 999N000141 HC STATISTIC PRE-PROCEDURE NURSING ASSESSMENT: Performed by: SURGERY

## 2025-04-08 PROCEDURE — 250N000011 HC RX IP 250 OP 636: Mod: JZ | Performed by: ANESTHESIOLOGY

## 2025-04-08 PROCEDURE — 99255 IP/OBS CONSLTJ NEW/EST HI 80: CPT | Mod: 57 | Performed by: PHYSICIAN ASSISTANT

## 2025-04-08 PROCEDURE — 80053 COMPREHEN METABOLIC PANEL: CPT | Performed by: INTERNAL MEDICINE

## 2025-04-08 PROCEDURE — P9045 ALBUMIN (HUMAN), 5%, 250 ML: HCPCS | Performed by: NURSE ANESTHETIST, CERTIFIED REGISTERED

## 2025-04-08 PROCEDURE — 250N000013 HC RX MED GY IP 250 OP 250 PS 637: Performed by: PHYSICIAN ASSISTANT

## 2025-04-08 PROCEDURE — 120N000001 HC R&B MED SURG/OB

## 2025-04-08 PROCEDURE — 83605 ASSAY OF LACTIC ACID: CPT | Performed by: STUDENT IN AN ORGANIZED HEALTH CARE EDUCATION/TRAINING PROGRAM

## 2025-04-08 PROCEDURE — 250N000011 HC RX IP 250 OP 636: Performed by: NURSE ANESTHETIST, CERTIFIED REGISTERED

## 2025-04-08 PROCEDURE — 360N000077 HC SURGERY LEVEL 4, PER MIN: Performed by: SURGERY

## 2025-04-08 PROCEDURE — 258N000003 HC RX IP 258 OP 636: Performed by: INTERNAL MEDICINE

## 2025-04-08 PROCEDURE — 36415 COLL VENOUS BLD VENIPUNCTURE: CPT | Performed by: NURSE PRACTITIONER

## 2025-04-08 PROCEDURE — 250N000009 HC RX 250: Performed by: PHYSICIAN ASSISTANT

## 2025-04-08 PROCEDURE — 250N000011 HC RX IP 250 OP 636: Mod: JZ | Performed by: PHYSICIAN ASSISTANT

## 2025-04-08 PROCEDURE — 0FJ44ZZ INSPECTION OF GALLBLADDER, PERCUTANEOUS ENDOSCOPIC APPROACH: ICD-10-PCS | Performed by: SURGERY

## 2025-04-08 PROCEDURE — 99223 1ST HOSP IP/OBS HIGH 75: CPT | Performed by: INTERNAL MEDICINE

## 2025-04-08 PROCEDURE — 0FT40ZZ RESECTION OF GALLBLADDER, OPEN APPROACH: ICD-10-PCS | Performed by: SURGERY

## 2025-04-08 PROCEDURE — 250N000011 HC RX IP 250 OP 636: Mod: JZ | Performed by: INTERNAL MEDICINE

## 2025-04-08 PROCEDURE — 272N000001 HC OR GENERAL SUPPLY STERILE: Performed by: SURGERY

## 2025-04-08 PROCEDURE — 370N000017 HC ANESTHESIA TECHNICAL FEE, PER MIN: Performed by: SURGERY

## 2025-04-08 PROCEDURE — 250N000009 HC RX 250: Performed by: SURGERY

## 2025-04-08 PROCEDURE — 250N000013 HC RX MED GY IP 250 OP 250 PS 637: Performed by: STUDENT IN AN ORGANIZED HEALTH CARE EDUCATION/TRAINING PROGRAM

## 2025-04-08 PROCEDURE — 93005 ELECTROCARDIOGRAM TRACING: CPT

## 2025-04-08 PROCEDURE — 93010 ELECTROCARDIOGRAM REPORT: CPT | Mod: XU | Performed by: INTERNAL MEDICINE

## 2025-04-08 PROCEDURE — 258N000003 HC RX IP 258 OP 636: Performed by: STUDENT IN AN ORGANIZED HEALTH CARE EDUCATION/TRAINING PROGRAM

## 2025-04-08 PROCEDURE — 36415 COLL VENOUS BLD VENIPUNCTURE: CPT | Performed by: HOSPITALIST

## 2025-04-08 PROCEDURE — 250N000013 HC RX MED GY IP 250 OP 250 PS 637: Performed by: INTERNAL MEDICINE

## 2025-04-08 PROCEDURE — 250N000013 HC RX MED GY IP 250 OP 250 PS 637: Performed by: ANESTHESIOLOGY

## 2025-04-08 PROCEDURE — 82805 BLOOD GASES W/O2 SATURATION: CPT | Performed by: NURSE PRACTITIONER

## 2025-04-08 PROCEDURE — 88304 TISSUE EXAM BY PATHOLOGIST: CPT | Mod: TC | Performed by: SURGERY

## 2025-04-08 PROCEDURE — 710N000009 HC RECOVERY PHASE 1, LEVEL 1, PER MIN: Performed by: SURGERY

## 2025-04-08 PROCEDURE — 36415 COLL VENOUS BLD VENIPUNCTURE: CPT | Performed by: EMERGENCY MEDICINE

## 2025-04-08 PROCEDURE — 258N000003 HC RX IP 258 OP 636: Performed by: HOSPITALIST

## 2025-04-08 PROCEDURE — 258N000003 HC RX IP 258 OP 636: Performed by: PHYSICIAN ASSISTANT

## 2025-04-08 PROCEDURE — 85027 COMPLETE CBC AUTOMATED: CPT | Performed by: INTERNAL MEDICINE

## 2025-04-08 PROCEDURE — 36415 COLL VENOUS BLD VENIPUNCTURE: CPT | Performed by: INTERNAL MEDICINE

## 2025-04-08 PROCEDURE — 258N000001 HC RX 258: Performed by: SURGERY

## 2025-04-08 PROCEDURE — 82805 BLOOD GASES W/O2 SATURATION: CPT | Performed by: HOSPITALIST

## 2025-04-08 RX ORDER — ONDANSETRON 2 MG/ML
INJECTION INTRAMUSCULAR; INTRAVENOUS PRN
Status: DISCONTINUED | OUTPATIENT
Start: 2025-04-08 | End: 2025-04-08

## 2025-04-08 RX ORDER — ACETAMINOPHEN 650 MG/1
650 SUPPOSITORY RECTAL EVERY 4 HOURS PRN
Status: DISCONTINUED | OUTPATIENT
Start: 2025-04-08 | End: 2025-04-08

## 2025-04-08 RX ORDER — ONDANSETRON 4 MG/1
4 TABLET, ORALLY DISINTEGRATING ORAL EVERY 6 HOURS PRN
Status: DISCONTINUED | OUTPATIENT
Start: 2025-04-08 | End: 2025-04-14 | Stop reason: HOSPADM

## 2025-04-08 RX ORDER — DEXMEDETOMIDINE HYDROCHLORIDE 4 UG/ML
INJECTION, SOLUTION INTRAVENOUS PRN
Status: DISCONTINUED | OUTPATIENT
Start: 2025-04-08 | End: 2025-04-08

## 2025-04-08 RX ORDER — OXYCODONE HYDROCHLORIDE 5 MG/1
2.5-5 TABLET ORAL EVERY 4 HOURS PRN
Qty: 8 TABLET | Refills: 0 | Status: SHIPPED | OUTPATIENT
Start: 2025-04-08 | End: 2025-04-10

## 2025-04-08 RX ORDER — NALOXONE HYDROCHLORIDE 0.4 MG/ML
0.4 INJECTION, SOLUTION INTRAMUSCULAR; INTRAVENOUS; SUBCUTANEOUS
Status: DISCONTINUED | OUTPATIENT
Start: 2025-04-08 | End: 2025-04-14 | Stop reason: HOSPADM

## 2025-04-08 RX ORDER — CEFAZOLIN SODIUM 2 G/50ML
2 SOLUTION INTRAVENOUS
Status: CANCELLED | OUTPATIENT
Start: 2025-04-08

## 2025-04-08 RX ORDER — PROPOFOL 10 MG/ML
INJECTION, EMULSION INTRAVENOUS PRN
Status: DISCONTINUED | OUTPATIENT
Start: 2025-04-08 | End: 2025-04-08

## 2025-04-08 RX ORDER — TRAZODONE HYDROCHLORIDE 100 MG/1
100 TABLET ORAL AT BEDTIME
Status: DISCONTINUED | OUTPATIENT
Start: 2025-04-08 | End: 2025-04-09

## 2025-04-08 RX ORDER — AZITHROMYCIN 200 MG/5ML
440 POWDER, FOR SUSPENSION ORAL
Status: DISCONTINUED | OUTPATIENT
Start: 2025-04-09 | End: 2025-04-14 | Stop reason: HOSPADM

## 2025-04-08 RX ORDER — IPRATROPIUM BROMIDE AND ALBUTEROL SULFATE 2.5; .5 MG/3ML; MG/3ML
3 SOLUTION RESPIRATORY (INHALATION) ONCE
Status: COMPLETED | OUTPATIENT
Start: 2025-04-08 | End: 2025-04-08

## 2025-04-08 RX ORDER — ERYTHROMYCIN 5 MG/G
OINTMENT OPHTHALMIC EVERY EVENING
Status: DISCONTINUED | OUTPATIENT
Start: 2025-04-08 | End: 2025-04-14 | Stop reason: HOSPADM

## 2025-04-08 RX ORDER — CYCLOBENZAPRINE HCL 10 MG
10 TABLET ORAL 3 TIMES DAILY
Qty: 21 TABLET | Refills: 0 | Status: SHIPPED | OUTPATIENT
Start: 2025-04-08 | End: 2025-04-10

## 2025-04-08 RX ORDER — HYDRALAZINE HYDROCHLORIDE 20 MG/ML
10 INJECTION INTRAMUSCULAR; INTRAVENOUS EVERY 4 HOURS PRN
Status: DISCONTINUED | OUTPATIENT
Start: 2025-04-08 | End: 2025-04-14 | Stop reason: HOSPADM

## 2025-04-08 RX ORDER — NALOXONE HYDROCHLORIDE 0.4 MG/ML
0.2 INJECTION, SOLUTION INTRAMUSCULAR; INTRAVENOUS; SUBCUTANEOUS
Status: DISCONTINUED | OUTPATIENT
Start: 2025-04-08 | End: 2025-04-14 | Stop reason: HOSPADM

## 2025-04-08 RX ORDER — ACETAMINOPHEN 325 MG/1
650 TABLET ORAL EVERY 4 HOURS PRN
Status: DISCONTINUED | OUTPATIENT
Start: 2025-04-08 | End: 2025-04-08

## 2025-04-08 RX ORDER — CEFAZOLIN SODIUM 2 G/50ML
2 SOLUTION INTRAVENOUS SEE ADMIN INSTRUCTIONS
Status: CANCELLED | OUTPATIENT
Start: 2025-04-08

## 2025-04-08 RX ORDER — VALPROIC ACID 250 MG/5ML
300 SOLUTION ORAL DAILY
Status: DISCONTINUED | OUTPATIENT
Start: 2025-04-08 | End: 2025-04-14 | Stop reason: HOSPADM

## 2025-04-08 RX ORDER — BISACODYL 10 MG
10 SUPPOSITORY, RECTAL RECTAL DAILY PRN
Status: DISCONTINUED | OUTPATIENT
Start: 2025-04-08 | End: 2025-04-14 | Stop reason: HOSPADM

## 2025-04-08 RX ORDER — ONDANSETRON 2 MG/ML
4 INJECTION INTRAMUSCULAR; INTRAVENOUS EVERY 6 HOURS PRN
Status: DISCONTINUED | OUTPATIENT
Start: 2025-04-08 | End: 2025-04-14 | Stop reason: HOSPADM

## 2025-04-08 RX ORDER — SODIUM CHLORIDE, SODIUM LACTATE, POTASSIUM CHLORIDE, CALCIUM CHLORIDE 600; 310; 30; 20 MG/100ML; MG/100ML; MG/100ML; MG/100ML
INJECTION, SOLUTION INTRAVENOUS CONTINUOUS
Status: DISCONTINUED | OUTPATIENT
Start: 2025-04-08 | End: 2025-04-14 | Stop reason: HOSPADM

## 2025-04-08 RX ORDER — BUPIVACAINE HYDROCHLORIDE AND EPINEPHRINE 5; 5 MG/ML; UG/ML
INJECTION, SOLUTION PERINEURAL PRN
Status: DISCONTINUED | OUTPATIENT
Start: 2025-04-08 | End: 2025-04-08 | Stop reason: HOSPADM

## 2025-04-08 RX ORDER — LORAZEPAM 2 MG/ML
1 INJECTION INTRAMUSCULAR ONCE
Status: DISCONTINUED | OUTPATIENT
Start: 2025-04-08 | End: 2025-04-08

## 2025-04-08 RX ORDER — CEFTRIAXONE 1 G/1
1 INJECTION, POWDER, FOR SOLUTION INTRAMUSCULAR; INTRAVENOUS EVERY 24 HOURS
Status: DISCONTINUED | OUTPATIENT
Start: 2025-04-08 | End: 2025-04-08

## 2025-04-08 RX ORDER — HYDROMORPHONE HCL IN WATER/PF 6 MG/30 ML
0.4 PATIENT CONTROLLED ANALGESIA SYRINGE INTRAVENOUS
Status: DISCONTINUED | OUTPATIENT
Start: 2025-04-08 | End: 2025-04-08

## 2025-04-08 RX ORDER — HYDROXYZINE HCL 10 MG/5 ML
50 SOLUTION, ORAL ORAL AT BEDTIME
Status: DISCONTINUED | OUTPATIENT
Start: 2025-04-08 | End: 2025-04-14 | Stop reason: HOSPADM

## 2025-04-08 RX ORDER — BUSPIRONE HYDROCHLORIDE 15 MG/1
15 TABLET ORAL 3 TIMES DAILY
Status: DISCONTINUED | OUTPATIENT
Start: 2025-04-08 | End: 2025-04-09

## 2025-04-08 RX ORDER — CLOBAZAM 2.5 MG/ML
10 SUSPENSION ORAL 2 TIMES DAILY
Status: DISCONTINUED | OUTPATIENT
Start: 2025-04-08 | End: 2025-04-14 | Stop reason: HOSPADM

## 2025-04-08 RX ORDER — POLYETHYLENE GLYCOL 3350 17 G/17G
17 POWDER, FOR SOLUTION ORAL 2 TIMES DAILY PRN
Status: DISCONTINUED | OUTPATIENT
Start: 2025-04-08 | End: 2025-04-11

## 2025-04-08 RX ORDER — FLUTICASONE FUROATE AND VILANTEROL 200; 25 UG/1; UG/1
1 POWDER RESPIRATORY (INHALATION) DAILY
Status: DISCONTINUED | OUTPATIENT
Start: 2025-04-08 | End: 2025-04-09

## 2025-04-08 RX ORDER — HYDROMORPHONE HCL IN WATER/PF 6 MG/30 ML
0.3 PATIENT CONTROLLED ANALGESIA SYRINGE INTRAVENOUS
Status: DISCONTINUED | OUTPATIENT
Start: 2025-04-08 | End: 2025-04-14 | Stop reason: HOSPADM

## 2025-04-08 RX ORDER — METHOCARBAMOL 500 MG/1
500 TABLET, FILM COATED ORAL 3 TIMES DAILY
Status: DISCONTINUED | OUTPATIENT
Start: 2025-04-08 | End: 2025-04-09

## 2025-04-08 RX ORDER — LIDOCAINE HYDROCHLORIDE 20 MG/ML
INJECTION, SOLUTION INFILTRATION; PERINEURAL PRN
Status: DISCONTINUED | OUTPATIENT
Start: 2025-04-08 | End: 2025-04-08

## 2025-04-08 RX ORDER — METRONIDAZOLE 500 MG/100ML
500 INJECTION, SOLUTION INTRAVENOUS EVERY 8 HOURS
Status: DISCONTINUED | OUTPATIENT
Start: 2025-04-08 | End: 2025-04-12

## 2025-04-08 RX ORDER — ALBUTEROL SULFATE 90 UG/1
2 INHALANT RESPIRATORY (INHALATION) EVERY 6 HOURS PRN
Status: DISCONTINUED | OUTPATIENT
Start: 2025-04-08 | End: 2025-04-14 | Stop reason: HOSPADM

## 2025-04-08 RX ORDER — HYDROMORPHONE HCL IN WATER/PF 6 MG/30 ML
0.2 PATIENT CONTROLLED ANALGESIA SYRINGE INTRAVENOUS
Status: DISCONTINUED | OUTPATIENT
Start: 2025-04-08 | End: 2025-04-08

## 2025-04-08 RX ORDER — POLYETHYLENE GLYCOL 3350 17 G/17G
17 POWDER, FOR SOLUTION ORAL DAILY
Status: DISCONTINUED | OUTPATIENT
Start: 2025-04-09 | End: 2025-04-14 | Stop reason: HOSPADM

## 2025-04-08 RX ORDER — LEVOTHYROXINE SODIUM 25 UG/1
25 TABLET ORAL DAILY
Status: DISCONTINUED | OUTPATIENT
Start: 2025-04-08 | End: 2025-04-09

## 2025-04-08 RX ORDER — LIDOCAINE 40 MG/G
CREAM TOPICAL
Status: DISCONTINUED | OUTPATIENT
Start: 2025-04-08 | End: 2025-04-14 | Stop reason: HOSPADM

## 2025-04-08 RX ORDER — QUETIAPINE FUMARATE 25 MG/1
50 TABLET, FILM COATED ORAL 4 TIMES DAILY PRN
Status: DISCONTINUED | OUTPATIENT
Start: 2025-04-08 | End: 2025-04-14 | Stop reason: HOSPADM

## 2025-04-08 RX ORDER — HYDRALAZINE HYDROCHLORIDE 10 MG/1
10 TABLET, FILM COATED ORAL EVERY 4 HOURS PRN
Status: DISCONTINUED | OUTPATIENT
Start: 2025-04-08 | End: 2025-04-14 | Stop reason: HOSPADM

## 2025-04-08 RX ORDER — CEFTRIAXONE 2 G/1
2 INJECTION, POWDER, FOR SOLUTION INTRAMUSCULAR; INTRAVENOUS EVERY 24 HOURS
Status: DISCONTINUED | OUTPATIENT
Start: 2025-04-09 | End: 2025-04-14 | Stop reason: HOSPADM

## 2025-04-08 RX ORDER — QUETIAPINE FUMARATE 300 MG/1
600 TABLET, FILM COATED ORAL AT BEDTIME
Status: DISCONTINUED | OUTPATIENT
Start: 2025-04-08 | End: 2025-04-14 | Stop reason: HOSPADM

## 2025-04-08 RX ORDER — ACETAMINOPHEN 325 MG/10.15ML
650 LIQUID ORAL ONCE
Status: COMPLETED | OUTPATIENT
Start: 2025-04-08 | End: 2025-04-08

## 2025-04-08 RX ORDER — KETAMINE HYDROCHLORIDE 10 MG/ML
INJECTION INTRAMUSCULAR; INTRAVENOUS PRN
Status: DISCONTINUED | OUTPATIENT
Start: 2025-04-08 | End: 2025-04-08

## 2025-04-08 RX ORDER — METHOCARBAMOL 100 MG/ML
1000 INJECTION, SOLUTION INTRAMUSCULAR; INTRAVENOUS ONCE
Status: COMPLETED | OUTPATIENT
Start: 2025-04-08 | End: 2025-04-08

## 2025-04-08 RX ORDER — GABAPENTIN 250 MG/5ML
750 SOLUTION ORAL AT BEDTIME
Status: DISCONTINUED | OUTPATIENT
Start: 2025-04-08 | End: 2025-04-14 | Stop reason: HOSPADM

## 2025-04-08 RX ORDER — ACETAMINOPHEN 325 MG/1
975 TABLET ORAL 4 TIMES DAILY
Status: DISCONTINUED | OUTPATIENT
Start: 2025-04-08 | End: 2025-04-09

## 2025-04-08 RX ORDER — PROCHLORPERAZINE MALEATE 10 MG
10 TABLET ORAL EVERY 6 HOURS PRN
Status: DISCONTINUED | OUTPATIENT
Start: 2025-04-08 | End: 2025-04-14 | Stop reason: HOSPADM

## 2025-04-08 RX ORDER — SENNOSIDES A AND B 8.6 MG/1
1 TABLET, FILM COATED ORAL 2 TIMES DAILY PRN
Qty: 20 TABLET | Refills: 0 | Status: SHIPPED | OUTPATIENT
Start: 2025-04-08 | End: 2025-04-10

## 2025-04-08 RX ORDER — BUPIVACAINE HYDROCHLORIDE AND EPINEPHRINE 2.5; 5 MG/ML; UG/ML
INJECTION, SOLUTION INFILTRATION; PERINEURAL
Status: DISCONTINUED | OUTPATIENT
Start: 2025-04-08 | End: 2025-04-08

## 2025-04-08 RX ORDER — MAGNESIUM HYDROXIDE 1200 MG/15ML
LIQUID ORAL PRN
Status: DISCONTINUED | OUTPATIENT
Start: 2025-04-08 | End: 2025-04-08 | Stop reason: HOSPADM

## 2025-04-08 RX ORDER — CEFAZOLIN SODIUM/WATER 2 G/20 ML
SYRINGE (ML) INTRAVENOUS PRN
Status: DISCONTINUED | OUTPATIENT
Start: 2025-04-08 | End: 2025-04-08

## 2025-04-08 RX ORDER — HYDROMORPHONE HCL IN WATER/PF 6 MG/30 ML
0.5 PATIENT CONTROLLED ANALGESIA SYRINGE INTRAVENOUS
Status: DISCONTINUED | OUTPATIENT
Start: 2025-04-08 | End: 2025-04-10

## 2025-04-08 RX ADMIN — IPRATROPIUM BROMIDE AND ALBUTEROL SULFATE 3 ML: .5; 3 SOLUTION RESPIRATORY (INHALATION) at 10:58

## 2025-04-08 RX ADMIN — METHOCARBAMOL 500 MG: 500 TABLET ORAL at 20:35

## 2025-04-08 RX ADMIN — VALPROIC ACID 300 MG: 250 SOLUTION ORAL at 09:18

## 2025-04-08 RX ADMIN — ROCURONIUM BROMIDE 25 MG: 50 INJECTION, SOLUTION INTRAVENOUS at 11:35

## 2025-04-08 RX ADMIN — ACETAMINOPHEN 975 MG: 325 TABLET ORAL at 20:33

## 2025-04-08 RX ADMIN — PROPOFOL 110 MG: 10 INJECTION, EMULSION INTRAVENOUS at 11:35

## 2025-04-08 RX ADMIN — BUSPIRONE HYDROCHLORIDE 15 MG: 15 TABLET ORAL at 23:05

## 2025-04-08 RX ADMIN — ROCURONIUM BROMIDE 10 MG: 50 INJECTION, SOLUTION INTRAVENOUS at 12:24

## 2025-04-08 RX ADMIN — CLOBAZAM 10 MG: 2.5 SUSPENSION ORAL at 20:35

## 2025-04-08 RX ADMIN — Medication 40 MG: at 23:05

## 2025-04-08 RX ADMIN — PHENYLEPHRINE HYDROCHLORIDE 0.3 MCG/KG/MIN: 10 INJECTION INTRAVENOUS at 12:42

## 2025-04-08 RX ADMIN — LEVOTHYROXINE SODIUM 25 MCG: 25 TABLET ORAL at 09:11

## 2025-04-08 RX ADMIN — ALBUMIN (HUMAN): 12.5 SOLUTION INTRAVENOUS at 13:08

## 2025-04-08 RX ADMIN — ROCURONIUM BROMIDE 10 MG: 50 INJECTION, SOLUTION INTRAVENOUS at 12:04

## 2025-04-08 RX ADMIN — DEXMEDETOMIDINE HYDROCHLORIDE 8 MCG: 200 INJECTION INTRAVENOUS at 11:32

## 2025-04-08 RX ADMIN — ROCURONIUM BROMIDE 15 MG: 50 INJECTION, SOLUTION INTRAVENOUS at 12:10

## 2025-04-08 RX ADMIN — VALPROIC ACID 350 MG: 250 SOLUTION ORAL at 20:31

## 2025-04-08 RX ADMIN — SODIUM CHLORIDE 1416 ML: 0.9 INJECTION, SOLUTION INTRAVENOUS at 21:53

## 2025-04-08 RX ADMIN — LIDOCAINE HYDROCHLORIDE 60 MG: 20 INJECTION, SOLUTION INFILTRATION; PERINEURAL at 11:34

## 2025-04-08 RX ADMIN — ONDANSETRON 4 MG: 2 INJECTION INTRAMUSCULAR; INTRAVENOUS at 14:27

## 2025-04-08 RX ADMIN — METRONIDAZOLE 500 MG: 500 INJECTION, SOLUTION INTRAVENOUS at 13:39

## 2025-04-08 RX ADMIN — ERYTHROMYCIN: 5 OINTMENT OPHTHALMIC at 20:33

## 2025-04-08 RX ADMIN — BUPIVACAINE HYDROCHLORIDE AND EPINEPHRINE BITARTRATE 15 ML: 2.5; .005 INJECTION, SOLUTION INFILTRATION; PERINEURAL at 15:00

## 2025-04-08 RX ADMIN — SODIUM CHLORIDE, POTASSIUM CHLORIDE, SODIUM LACTATE AND CALCIUM CHLORIDE: 600; 310; 30; 20 INJECTION, SOLUTION INTRAVENOUS at 03:32

## 2025-04-08 RX ADMIN — DEXMEDETOMIDINE HYDROCHLORIDE 4 MCG: 200 INJECTION INTRAVENOUS at 12:27

## 2025-04-08 RX ADMIN — METHOCARBAMOL 500 MG: 100 INJECTION, SOLUTION INTRAMUSCULAR; INTRAVENOUS at 11:57

## 2025-04-08 RX ADMIN — BUSPIRONE HYDROCHLORIDE 15 MG: 15 TABLET ORAL at 09:11

## 2025-04-08 RX ADMIN — PHENYLEPHRINE HYDROCHLORIDE 100 MCG: 10 INJECTION INTRAVENOUS at 13:30

## 2025-04-08 RX ADMIN — Medication 10 MG: at 11:35

## 2025-04-08 RX ADMIN — SODIUM CHLORIDE, POTASSIUM CHLORIDE, SODIUM LACTATE AND CALCIUM CHLORIDE 1000 ML: 600; 310; 30; 20 INJECTION, SOLUTION INTRAVENOUS at 09:09

## 2025-04-08 RX ADMIN — Medication 2 G: at 11:31

## 2025-04-08 RX ADMIN — ACETAMINOPHEN 650 MG: 325 SUSPENSION ORAL at 11:09

## 2025-04-08 RX ADMIN — METRONIDAZOLE 500 MG: 500 INJECTION, SOLUTION INTRAVENOUS at 20:31

## 2025-04-08 RX ADMIN — CEFTRIAXONE SODIUM 1 G: 1 INJECTION, POWDER, FOR SOLUTION INTRAMUSCULAR; INTRAVENOUS at 03:32

## 2025-04-08 RX ADMIN — Medication 200 MG: at 14:50

## 2025-04-08 RX ADMIN — DEXMEDETOMIDINE HYDROCHLORIDE 8 MCG: 200 INJECTION INTRAVENOUS at 14:41

## 2025-04-08 RX ADMIN — METRONIDAZOLE 500 MG: 500 INJECTION, SOLUTION INTRAVENOUS at 04:59

## 2025-04-08 RX ADMIN — Medication 10 MG: at 14:50

## 2025-04-08 RX ADMIN — CLOBAZAM 10 MG: 2.5 SUSPENSION ORAL at 09:20

## 2025-04-08 RX ADMIN — SODIUM CHLORIDE, SODIUM LACTATE, POTASSIUM CHLORIDE, AND CALCIUM CHLORIDE: .6; .31; .03; .02 INJECTION, SOLUTION INTRAVENOUS at 13:47

## 2025-04-08 ASSESSMENT — ACTIVITIES OF DAILY LIVING (ADL)
ADLS_ACUITY_SCORE: 63
ADLS_ACUITY_SCORE: 57
ADLS_ACUITY_SCORE: 63
ADLS_ACUITY_SCORE: 57
ADLS_ACUITY_SCORE: 63

## 2025-04-08 NOTE — PLAN OF CARE
Goal Outcome Evaluation:    Pt. Arouses to pain, unresponsive, slept most of shift. On 4L facemask overnight. G-tube in place. NPO. Tele sinus tachy. K+ replacements; am check. NS infusing a 100ml/hr. General surgery, WOC, and nutrition consulted. Mom at bedside.

## 2025-04-08 NOTE — ANESTHESIA PREPROCEDURE EVALUATION
Anesthesia Pre-Procedure Evaluation    Patient: Rj Licea   MRN: [id number] : 1995        Procedure : Procedure(s):  CHOLECYSTECTOMY, LAPAROSCOPIC          Past Medical History:   Diagnosis Date    Anxiety     Autism     Chronic bronchitis (H)     Depression     fluoxetine, risperidone    Developmental delay     Epilepsy 2011    Feeding by G-tube     GERD (gastroesophageal reflux disease)     Ineffective airway clearance     Insomnia     melatonin, clonazepam    Kidney stone     Moderate persistent asthma     Premature infant - 24 weeks     Restrictive lung disease     Retinopathy of prematurity     led to blindness    SBO (small bowel obstruction)       Past Surgical History:   Procedure Laterality Date    APPENDECTOMY OPEN N/A 2021    Procedure: Appendectomy open;  Surgeon: Fabiola Pop MD;  Location: [facility] OR    GASTROSTOMY TUBE      LAPAROTOMY EXPLORATORY N/A 2021    Procedure: LAPAROTOMY, enterolysis;  Surgeon: Fabiola Ppo MD;  Location: [facility] OR      No Known Allergies   Social History     Tobacco Use    Smoking status: Never     Passive exposure: Never    Smokeless tobacco: Never   Substance Use Topics    Alcohol use: No      Wt Readings from Last 1 Encounters:   25 47.2 kg (104 lb)        Anesthesia Evaluation   Pt has had prior anesthetic.         ROS/MED HX  ENT/Pulmonary: Comment: Chronic hypercapnec respiratory failure    Currently breathing is shallow, tachypneic    SaO2 93% on O2. Baseline SaO2 89 - 92%    (+)                     Severe Persistent, asthma  Treatment: Inhaler daily,                 Neurologic: Comment: Cerebral palsy, spastic diplegia    DEVELOPMENTAL DELAY, AUTISM      Cardiovascular: Comment: Sinus tachycardia with R upto 160/min at admission      METS/Exercise Tolerance:     Hematologic:       Musculoskeletal:       GI/Hepatic: Comment: G TUBE    (+) GERD,         cholecystitis/cholelithiasis,          Renal/Genitourinary:     (+)        Nephrolithiasis ,       Endo:     (+)          thyroid problem, hypothyroidism,           Psychiatric/Substance Use:     (+) psychiatric history anxiety, depression and other (comment) (autism)       Infectious Disease:       Malignancy:       Other:            Physical Exam    Airway   unable to assess          Respiratory Devices and Support         Dental           Cardiovascular          Rhythm and rate: tachycardia     Pulmonary           (+) decreased breath sounds           OUTSIDE LABS:  CBC:   Lab Results   Component Value Date    WBC 15.6 (H) 04/07/2025    WBC 5.5 12/18/2024    HGB 17.1 04/07/2025    HGB 16.6 12/18/2024    HCT 49.9 04/07/2025    HCT 50.1 12/18/2024     (L) 04/07/2025     (L) 12/18/2024     BMP:   Lab Results   Component Value Date     (L) 04/07/2025     12/18/2024    POTASSIUM 4.5 04/07/2025    POTASSIUM 4.6 12/18/2024    CHLORIDE 94 (L) 04/07/2025    CHLORIDE 99 12/18/2024    CO2 27 04/07/2025    CO2 30 (H) 12/18/2024    BUN 11.0 04/07/2025    BUN 15.5 12/18/2024    CR 0.49 (L) 04/07/2025    CR 0.76 12/18/2024     (H) 04/07/2025    GLC 90 12/18/2024     COAGS:   Lab Results   Component Value Date    INR 1.02 06/28/2023     POC:   Lab Results   Component Value Date     (H) 04/27/2021     HEPATIC:   Lab Results   Component Value Date    ALBUMIN 4.0 04/07/2025    PROTTOTAL 7.0 04/07/2025    ALT 12 04/07/2025    AST 22 04/07/2025    ALKPHOS 101 04/07/2025    BILITOTAL 0.8 04/07/2025    MELISSA 30 04/04/2023     OTHER:   Lab Results   Component Value Date    LACT 1.4 04/07/2025    A1C 5.9 (H) 04/07/2025    ALICE 9.3 04/07/2025    PHOS 3.8 04/04/2023    MAG 2.0 04/07/2025    LIPASE 49 (L) 09/24/2021    AMYLASE 63 04/23/2019    TSH 2.65 09/10/2024    T4 0.84 (L) 04/04/2023    CRP 13.0 (H) 04/05/2019    SED 3 09/10/2024       Anesthesia Plan    ASA Status:  4    NPO Status:  NPO Appropriate    Anesthesia Type: General.     - Airway: ETT   Induction:  Intravenous, Propofol.   Maintenance: Balanced.   Techniques and Equipment:     - Airway: Video-Laryngoscope       Consents    Anesthesia Plan(s) and associated risks, benefits, and realistic alternatives discussed. Questions answered and patient/representative(s) expressed understanding.     - Discussed:     - Discussed with:  Parent (Mother and/or Father)            Postoperative Care    Pain management: IV analgesics.   PONV prophylaxis: Ondansetron (or other 5HT-3)     Comments:    Other Comments: Possibility of post op ventilation discussed    Minimize narcotics           Angel Davidson MD    Clinically Significant Risk Factors Present on Admission         # Hyponatremia: Lowest Na = 131 mmol/L in last 2 days, will monitor as appropriate  # Hypochloremia: Lowest Cl = 94 mmol/L in last 2 days, will monitor as appropriate        # Thrombocytopenia: Lowest platelets = 120 in last 2 days, will monitor for bleeding                 # Asthma: noted on problem list

## 2025-04-08 NOTE — H&P
[facility]    History and Physical - Hospitalist Service       Date of Admission:  4/7/2025    Assessment & Plan   Rj Licea is a 29 year old male with past medical history significant for developmental delay, autism, asthma, chronic hypercapnic and hypoxic respiratory failure who presents with lethargy, f/c. Admitted on 4/7/2025.     Acute cholecystitis  *Presents with lethargy, fever, chills. RUQ tenderness on exam.    *LFTs normal, WBC 15.6  *CT abd/pelvis with distended gallbladder with mild pericholecystic stranding, findings suggest acute cholecystitis   - NPO   - General surgery consulted   - IVF with LR  - Ceftriaxone IV + metronidazole IV   - Acetaminophen PO, hydromorphone IV PRN  - Ondansetron IV/PO, prochlorperazine IV/PO PRN   - Trend WBC  - Monitor fever curve     Moderate persistent asthma   Chronic hypercapnic and hypoxic respiratory failure   Sleep disordered breathing  Ineffective airway clearance  Chronic bronchitis   Atelectasis   *No sign of acute exacerbation, suspect tachypnea secondary to cholecystitis (mother reports at baseline RR is increased as well), improved with sleeping  *CT chest with moderate volume loss right lower lobe and middle lobe, mild to moderate atelectasis and/or scarring left lower lobe  *Reviewed most recent outpatient pulmonology note, patient likely needs BiPAP long-term, but has severe facial aversion and behavioral uses and trial not recommended   *PTA uses 10L oxygen when O2 <88%, has had difficulty tolerating oxygen when not fatigued/ill  - Continue prior to admission inhaler/nebulizer regimen (or formulary equivalent), azithromycin   - Oxygen as needed  - RCAT for pulmonary hygiene, lung expansion     Mild hyponatremia   Sodium 131.   - IVF with LR  - BMP in AM     Chronic thrombocytopenia   Platelet count 120k. At baseline.   - Monitor CBC    Hyperglycemia  Glucose 294 on BMP. No prior hx of DM. No recent steroid use.  - HgbA1c      Seizure disorder  - Continue prior to admission clobazam, valproic acid     Depression  Anxiety  Behavioral disturbance  Developmental delay  Autism  - Continue prior to admission gabapentin, quetiapine, trazodone, hydroxyzine     Hypothyroidism  - Continue prior to admission levothyroxine    GERD  - Continue prior to admission PPI    G-tube dependence  All medications and nutrition given per G tube  - Nutrition consulted for TF orders when diet advanced    Pressure injury, bilateral elbows, POA  - Wound RN consulted        Diet: NPO for Procedure/Surgery per Anesthesia Guidelines Except for: Meds; Clear liquids before procedure/surgery: ADULT (Age GREATER than or Equal to 18 years) - Clear liquids 2 hours before procedure/surgery   DVT Prophylaxis: Pneumatic Compression Devices  [place] Catheter: Not present  Code Status: Full Code     Disposition Plan   Admit to inpatient.     Medically Ready for Discharge: Anticipated in 2-4 Days       Entered: Ephraim Chang MD 04/08/2025, 12:32 AM     The patient's care was discussed with the ED provider, patient and patient's mother    Medical Decision Making       86 MINUTES SPENT BY ME on the date of service doing chart review, history, exam, documentation & further activities per the note.      Clinically Significant Risk Factors Present on Admission         # Hyponatremia: Lowest Na = 131 mmol/L in last 2 days, will monitor as appropriate  # Hypochloremia: Lowest Cl = 94 mmol/L in last 2 days, will monitor as appropriate        # Thrombocytopenia: Lowest platelets = 120 in last 2 days, will monitor for bleeding                 # Asthma: noted on problem list             Ephraim Chang MD  [facility]    ______________________________________________________________________    Chief Complaint   Lethargy, fever, chills    History of Present Illness   Rj Licea is a 29 year old male who presents with the above chief complaint.    History  is obtained from the patient's mother, discussion with ED provider and review of medical record. History from patient limited due to autism, developmental delay. Beginning 4/6 his mother noted he was more lethargic than usual and not participating in his usual activities such as listening to his music. His only complaint at that time was feeling cold.  He continued to be lethargic on 4/7 and felt warm to touch, his mother measured a fever to 101 Fahrenheit prompting his presentation.  He has not had any nausea or vomiting.  He receives all nutrition and medications through his G-tube.  His mother places him on 10 L of oxygen as needed for oxygen saturations less than 88%, he has had difficulty consistently tolerating oxygen in the past due to facial aversions. His respiratory status has recently been stable on his baseline inhalers, his mother notes he often will have an increased respiratory rate.     In the Emergency Department, the patient was seen by Dr. Wiggins, with whom I discussed the patient's presenting symptoms and emergency department course.  Initial vital signs were a temperature of 98F, , /93, RR 42, SpO2 95% on 5L Oxymask. Pertinent work-up as noted in A&P. The patient received 1L normal saline, IV prochlorperazine, piperacillin-tazobactam IV, IV metoprolol, Duo-Neb, ibuprofen and Hospitalists were contacted for admission to the hospital.     Past Medical History    I have reviewed this patient's medical history and updated it with pertinent information if needed.   Past Medical History:   Diagnosis Date    Anxiety     Autism     Chronic bronchitis (H)     Depression     fluoxetine, risperidone    Developmental delay     Epilepsy 05/01/2011    Feeding by G-tube     GERD (gastroesophageal reflux disease)     Ineffective airway clearance     Insomnia     melatonin, clonazepam    Kidney stone     Moderate persistent asthma     Premature infant - 24 weeks     Restrictive lung disease      Retinopathy of prematurity     led to blindness    SBO (small bowel obstruction)        Past Surgical History   I have reviewed this patient's surgical history and updated it with pertinent information if needed.  Past Surgical History:   Procedure Laterality Date    APPENDECTOMY OPEN N/A 2021    Procedure: Appendectomy open;  Surgeon: Fabiola Pop MD;  Location: [facility] OR    GASTROSTOMY TUBE      LAPAROTOMY EXPLORATORY N/A 2021    Procedure: LAPAROTOMY, enterolysis;  Surgeon: Fabiola Pop MD;  Location: [facility] OR         Social History   I have reviewed this patient's social history and updated it with pertinent information if needed.  Social History     Tobacco Use    Smoking status: Never     Passive exposure: Never    Smokeless tobacco: Never   Vaping Use    Vaping status: Never Used   Substance Use Topics    Alcohol use: No    Drug use: No          Family History   I have reviewed this patient's family history and updated it with pertinent information if needed.   Family History   Problem Relation Age of Onset    Diabetes Father         Prior to Admission Medications    Prior to Admission Medications   Prescriptions Last Dose Informant Patient Reported? Taking?   QUEtiapine (SEROQUEL) 300 MG tablet 2025 Bedtime  No Yes   Sig: Place 2 tablets (600 mg) into G tube at bedtime. Taking 2 - 300 mg at night   QUEtiapine (SEROQUEL) 50 MG tablet   No Yes   Si mg taking during day for anxiety up to 4 times per day (do not take within 4 hours of the evening dose)   SPIRIVA RESPIMAT 1.25 MCG/ACT inhaler 2025 Morning  Yes Yes   Sig: Inhale 2 puffs into the lungs daily.   Valproate Sodium (VALPROIC ACID) 250 MG/5ML 2025 Bedtime  No Yes   Sig: TAKE 6ML BY MOUTH EVERY MORNING AND 7ML IN THE AFTERNOON AND AT NIGHT   albuterol (PROAIR HFA/PROVENTIL HFA/VENTOLIN HFA) 108 (90 Base) MCG/ACT inhaler   No Yes   Sig: Inhale 2 puffs into the lungs every 6 hours as needed for shortness of breath / dyspnea  or wheezing   azithromycin (ZITHROMAX) 200 MG/5ML suspension Past Week  Yes Yes   Sig: Place 11 mLs into G tube three times a week. Monday, Wednesday and Friday   busPIRone (BUSPAR) 15 MG tablet 2025 Bedtime  No Yes   Sig: Take 1 tablet (15 mg) by mouth 3 times daily.   clobazam (,ONFI,) 2.5 MG/ML suspension 2025 Bedtime  Yes Yes   Sig: Place 4 mLs into G tube 2 times daily.   erythromycin (ROMYCIN) 5 MG/GM ophthalmic ointment 2025 Bedtime  Yes Yes   Sig: Place into both eyes every evening.   fluticasone-salmeterol (ADVAIR HFA) 230-21 MCG/ACT inhaler 2025 Bedtime  Yes Yes   Sig: Inhale 2 puffs into the lungs 2 times daily    gabapentin (NEURONTIN) 250 MG/5ML solution 2025 Bedtime  No Yes   Sig: Place 15 mLs (750 mg) into G tube at bedtime.   hydrOXYzine (ATARAX) 10 MG/5ML syrup 2025 Bedtime  No Yes   Sig: Place 25 mLs (50 mg) into G tube at bedtime. (25 mL)   levothyroxine (SYNTHROID/LEVOTHROID) 25 MCG tablet 2025 Morning  No Yes   Sig: Take 1 tablet (25 mcg) by mouth daily   melatonin 1 MG TABS tablet 2025 Evening  Yes Yes   Sig: Place 6 mg into G tube every evening.   omeprazole (PRILOSEC) 2 mg/mL suspension 2025 Bedtime  No Yes   Si mLs (80 mg) by Per G Tube route at bedtime   Patient taking differently: Place 40 mg into G tube at bedtime.   polyethylene glycol (MIRALAX) 17 g packet 2025 Morning  Yes Yes   Si g by Per G Tube route daily   traZODone (DESYREL) 100 MG tablet 2025 Bedtime  No Yes   Sig: Take 1 tablet (100 mg) by mouth at bedtime.      Facility-Administered Medications: None     Allergies   No Known Allergies    Physical Exam   Vital Signs: Temp: 97.9  F (36.6  C) Temp src: Axillary BP: (!) 140/102 Pulse: (!) 127   Resp: (!) 36 SpO2: 97 % O2 Device: Simple face mask Oxygen Delivery: 4 LPM  Weight: 104 lbs 0 oz    Constitutional:  NAD  Respiratory: Shallow inspiration, no wheezes or crackles, normal respiratory effort on face mask   Cardiovascular:  Regular rhythm with tachycardic rate, no m/r/g. No peripheral edema.    GI: Soft, mild grimacing with RUQ palpation. No rebound tenderness or guarding.    Skin: Warm, dry, mild pressure injury bilateral elbows   Neurologic: Sleeping through exam     Data   Data reviewed today: I reviewed all medications, new labs and imaging results over the last 24 hours. I personally reviewed the EKG tracing showing sinus tachycardia .    Recent Labs   Lab 04/07/25  2211   WBC 15.6*   HGB 17.1   MCV 96   *   *   POTASSIUM 4.5   CHLORIDE 94*   CO2 27   BUN 11.0   CR 0.49*   ANIONGAP 10   ALICE 9.3   *   ALBUMIN 4.0   PROTTOTAL 7.0   BILITOTAL 0.8   ALKPHOS 101   ALT 12   AST 22       Recent Results (from the past 24 hours)   XR Chest Port 1 View    Narrative    EXAM: XR CHEST PORT 1 VIEW  LOCATION: [facility]  DATE: 4/7/2025    INDICATION: fever, ?SOB  COMPARISON: Chest CT from 09/16/2024.      Impression    IMPRESSION: Bibasilar atelectasis. No evidence of pneumonia by x-ray. No pleural effusion or pneumothorax. Low lung volumes. Normal heart size.   CT Chest PE Abdomen Pelvis w Contrast    Narrative    EXAM: CT CHEST PE ABDOMEN PELVIS W CONTRAST  LOCATION: [facility]  DATE: 4/7/2025    INDICATION: fever unspecified source, cognitive impairment, chronic hypoxia tachycardia, poor mobility, high risk occult PE  COMPARISON: None.  TECHNIQUE: CT chest pulmonary angiogram and routine CT abdomen pelvis with IV contrast. Arterial phase through the chest and venous phase through the abdomen and pelvis. Multiplanar reformats and MIP reconstructions were performed. Dose reduction   techniques were used.   CONTRAST: 67 mL Isovue 370    FINDINGS:  ANGIOGRAM CHEST: Pulmonary arteries are normal caliber and negative for pulmonary emboli. Thoracic aorta is negative for dissection. No CT evidence of right heart strain.     LUNGS AND PLEURA: Moderate volume loss right lower lobe  and middle lobe. Atelectasis and/or scarring left lower lobe. No pleural effusions seen. No actionable pulmonary nodule.    MEDIASTINUM/AXILLAE: No lymphadenopathy. No thoracic aortic aneurysms. No pericardial effusion. Heart size within normal limits. Esophagus unremarkable. Visualized thyroid gland unremarkable    CORONARY ARTERY CALCIFICATION: None.    HEPATOBILIARY: Colonic interposition. The gallbladder is distended. Mild pericholecystic stranding. Findings suggest acute cholecystitis. Liver and biliary tree otherwise unremarkable.    PANCREAS: No significant mass, duct dilatation, or inflammatory change.    SPLEEN: Normal size.    ADRENAL GLANDS: Normal.    KIDNEYS/BLADDER: The bilateral kidneys enhance symmetrically without evidence for hydronephrosis or pyelonephritis. No renal masses. No renal calculi. The bilateral ureters and urinary bladder are unremarkable.    BOWEL: Nonspecific nonobstructive bowel gas pattern. Percutaneous gastrostomy tube tip in the stomach. Appendix not seen.    LYMPH NODES: No lymphadenopathy.    VASCULATURE: No abdominal aortic aneurysm.    PELVIC ORGANS: No pelvic masses or free fluid.    MUSCULOSKELETAL: Thoracolumbar curvature convex to the right. Mild spondylosis. No inguinal hernia. No ventral hernia.      Impression    IMPRESSION:  1.  No evidence for pulmonary embolism.  2.  Moderate volume loss right lower lobe and middle lobe. Mild to moderate atelectasis and/or scarring left lower lobe.  3.  Distended gallbladder with mild pericholecystic stranding. Findings suggest acute cholecystitis. Clinical correlation recommended.  4.  Percutaneous gastrostomy tube tip in the stomach.

## 2025-04-08 NOTE — DISCHARGE INSTRUCTIONS
[facility] - [facility]  Discharge Instructions: Post-Operative Cholecystectomy    ACTIVITY  Expect to feel tired after your surgery.  This will gradually resolve.    Take frequent, short walks and increase your activity gradually.    Avoid strenuous physical activity or heavy lifting greater than 15-20 lbs. for 2-3 weeks.  You may climb stairs.  You may drive without restrictions when you are not using any prescription pain medication and feel comfortable in a car.  You may return to work/school when you are comfortable without any prescription pain medication.    WOUND CARE  You may remove your outer dressing or Band-Aids and shower 48 hours after the surgery.  Pat your incisions dry and leave them open to air.  Re-apply dressing (Band-Aids or gauze/tape) as needed for comfort or drainage.  You may have steri-strips (looks like white tape) on your incision.  You may peel off the steri-strips 2 weeks after your surgery if they have not peeled off on their own.  If you have skin glue, it will peel up and fall off on its own.  Do not soak your incisions in a tub or pool for 2 weeks.   Do not apply any lotions, creams, or ointments to your incisions.  A ridge under your incisions is normal and will gradually resolve.    DIET  Start with liquids, then gradually resume your regular diet as tolerated.  Avoid heavy, spicy, and greasy meals for 2-3 days.  Drink plenty of fluids to stay hydrated.  It is not uncommon to experience some loose stools or diarrhea after surgery.  This is your body's way of adapting to the bile which will slowly drain into your intestine.  A low fat diet may help with this.  This should improve over 1-2 months.    PAIN  Expect some tenderness and discomfort at the incision sites.  You should take your muscle relaxant (flexeril) three times a day for the first week.  You may use the prescribed narcotic pain medication at your discretion.  Expect gradual resolution of your pain  over several days.  You may take ibuprofen with food (unless you have been told not to) or acetaminophen/Tylenol instead of or in addition to your prescribed pain medication.    You may take Tylenol 500 - 1000 mg every 6 hours as needed for pain - do not exceed 4,000 mg of tylenol (acetaminophen) from all sources in 24 hours.  You may take Ibuprofen 400 - 600 mg every 6 hours as needed for pain - do not exceed 2,400 mg of ibuprofen from all sources in 24 hours. Do not use Ibuprofen for any longer than necessary or take if you have been told not to by another medical provider.  You may alternate Ibuprofen and Tylenol every 3 hours as needed for pain. Reserve the narcotic prescription for refractory pain.  Do not drink alcohol or drive while you are taking pain medications.  You may apply ice to your incisions in 20 minute intervals as needed for the next 48 hours.  After that time, consider switching to heat if you prefer.    EXPECTATIONS  Pain medications can cause constipation.  Limit use when possible.  Take an over the counter or prescribed stool softener/stimulant, such as Colace or Senna, 1-2 times a day with plenty of water.  You may take a mild over the counter laxative, such as Miralax or a suppository, as needed.  You may discontinue these medications once you are having regular bowel movements and/or are no longer taking your narcotic pain medication.    You may have shoulder or upper back discomfort due to the gas used in surgery.  This is temporary and should resolve in 48-72 hours.  Short, frequent walks may help with this.  If you are unable to urinate for 8 hours or feel as though you are not emptying your bladder adequately, we recommend you seek care at an ER or Urgent Care facility for possible catheter placement.     FOLLOW UP  Our office will contact you in approximately 2-3 weeks to check on your progress and answer any questions you may have.  If you are doing well, you will not need to return  for a follow up appointment.  If any concerns are identified over the phone, we will help you make an appointment to see a provider.   If you have not received a phone call, have any questions or concerns, or would like to be seen, please call us at [phone] and ask to speak with our nurse.  We are located at [address] [place] [address].    CALL OUR OFFICE -[phone] IF YOU HAVE:   Chills or fever above 101 F.  Increased redness, warmth, or drainage at your incisions.  Significant bleeding.  Pain not relieved by your pain medication or rest.  Increasing pain after the first 48 hours.  Any other concerns or questions.

## 2025-04-08 NOTE — CONSULTS
"CLINICAL NUTRITION SERVICES - ASSESSMENT NOTE    Registered Dietitian Recommendations:  - Post op and once approved by surgery, restart TF as follows:     Bolus per home regimen:   - 1 carton Osmolite 1.5 bolus via syringe 5x daily.   - Provides 1185 mL, 1778 kcal, 75 g protein, 242 g CHO, 0 g fiber, 903 mL free water.   - Flush 60 mL before and after each bolus.   - Recommended schedule: 6 AM, 8 AM (at least 1 hr prior to synthroid), 11 AM, 2 PM, 5 PM (or per pt/family preference).     If Surgery prefers to start TF continuous as opposed to bolus, recommend:   - Osmolite 1.5 with goal of 50 mL/hr. Begin TF @ 10 mL/hr, advance by 10 ml q 8 hours until goal rate reached.   - Transition back to bolus as able.   - Flush 60 mL q 4 hours.        REASON FOR ASSESSMENT  Provider order - Registered Dietitian to order TF per Medical Nutrition Therapy Guidelines    INFORMATION OBTAINED  Assessed patient in room. Mother Darcy at bedside.     NUTRITION HISTORY  - Obtained information from patient's mother Darcy, and chart review.   - GT in place for tube feeds, which are patient's sole source of nutrition.   - Rj gets 4-5 cartons of Osmolite 1.5 daily. Darcy mentions that 5 cartons is ideal, but a lot of days only 4 are feasible.   - She starts the feeds around 6 AM, with the last feed at 5:30 PM at the latest. She notes that later feeds have caused reflux in the past.   - She give about 8 oz of water in the morning, 5-6 oz with each bolus.   - Weight has been trending around 104# / low 100s.     5 Cartons Osmolite 1.5/day provides 1185 mL, 1778 kcal, 75 g protein, 242 g CHO, 0 g fiber, 903 mL free water.     CURRENT NUTRITION ORDERS  Diet: NPO    CURRENT INTAKE/TOLERANCE  - TF held since admission     LABS  Reviewed  Recent Labs   Lab 04/08/25  1350 04/08/25  0957 04/08/25  0924 04/07/25  2211   * 159* 162* 294*     MEDICATIONS  Synthroid    ANTHROPOMETRICS  Height: 5' 1.5\"  Admission Weight: 47.2 kg (104 lb) " (04/07/25 2210)   Most Recent Weight: 47.2 kg (104 lb) (04/07/25 2210)  IBW: 52.3 kg  BMI: Body mass index is 19.33 kg/m .   Weight History: Pt's mother reports relatively stable weight recently.   Wt Readings from Last 10 Encounters:   04/07/25 47.2 kg (104 lb)   03/26/25 46.7 kg (103 lb)   09/18/24 48.5 kg (107 lb)   09/11/24 48.1 kg (106 lb)   06/19/24 48.7 kg (107 lb 4.8 oz)   03/27/24 50 kg (110 lb 4.8 oz)   12/27/23 47.4 kg (104 lb 6.4 oz)   09/27/23 44.5 kg (98 lb 1.6 oz)   08/14/23 43.5 kg (96 lb)   06/28/23 44.6 kg (98 lb 6.4 oz)     ASSESSED NUTRITION NEEDS  Dosing Weight: 47.2 kg, based on actual wt  Estimated Energy Needs: 9935-4800 kcals/day (35 - 40 kcals/kg)  Justification: Post-op, match home regimen  Estimated Protein Needs: 70+ grams protein/day (1.5+ grams of pro/kg)  Justification: Post-op  Estimated Fluid Needs: 1 mL/kcal/day   Justification: Maintenance    SYSTEM AND PHYSICAL FINDINGS    4/8 - laparoscopic cholecystectomy   GI symptoms: Reviewed  Skin/wounds:  H&P indicates pressure injuries to elbows.     MALNUTRITION  % Intake: No decreased intake noted  % Weight Loss: None noted  Subcutaneous Fat Loss: None observed  Muscle Loss: None observed  Fluid Accumulation/Edema: None noted  Malnutrition Diagnosis: Patient does not meet two of the established criteria necessary for diagnosing malnutrition  Malnutrition Present on Admission: No    NUTRITION DIAGNOSIS  Inadequate protein energy intake related to TF on hold due to altered GI function as evidenced by enteral nutrition held x1 day.     INTERVENTIONS  Enteral nutrition management  Management of flushing of feeding tube    GOALS  TF to restart in the next 48 hours.      MONITORING/EVALUATION  Progress toward goals will be monitored and evaluated per policy.    Ina Mata RD, LD  Pager: [phone]  Available on [email or web address]

## 2025-04-08 NOTE — PROGRESS NOTES
RECEIVING UNIT ED HANDOFF REVIEW    ED Nurse Handoff Report was reviewed by: Ember Prieto RN on April 8, 2025 at 1:45 AM

## 2025-04-08 NOTE — PROGRESS NOTES
"MD Notification    Notified Person: MD    Notified Person Name: Ephraim Chang    Notification Date/Time: 4/8/25. 4:28 am.     Notification Interaction: vocera web    Purpose of Notification: \"FYI pt.s did reach 131, back to 128 right now.\"  \"Heart rate\"  \"Yes\"    Orders Received:  \"Sinus?\"  \"Continue to monitor\"    "

## 2025-04-08 NOTE — PLAN OF CARE
"Goal Outcome Evaluation:    Abd binder on. All pt needs met at this time. Mother at bedside. Pt did have a possible seizure this evening as witnessed per mother she called and RN came in room. She reported that this appeared to be the same as his seizures from home (RRT called). Tele ST. RN paged Dr Reece about pt lactic acid, HR, RR etc and he put in orders. Pt mother said that his lethargy was similar to \"how tired he was in pre op\".   "

## 2025-04-08 NOTE — ANESTHESIA PROCEDURE NOTES
"TAP Procedure Note    Pre-Procedure   Staff -        Anesthesiologist:  Angel Davidson MD       Performed By: anesthesiologist       Location: pre-op       Pre-Anesthestic Checklist: patient identified, IV checked, site marked, risks and benefits discussed, informed consent, monitors and equipment checked, pre-op evaluation, at physician/surgeon's request and post-op pain management  Timeout:       Correct Patient: Yes        Correct Procedure: Yes        Correct Site: Yes        Correct Position: Yes        Correct Laterality: Yes        Site Marked: Yes  Procedure Documentation  Procedure: TAP         Laterality: right       Patient Position: supine       Patient Prep/Sterile Barriers: sterile gloves, mask, \"no-touch\" technique        Skin prep: Chloraprep       Local skin infiltrated with 3 mL of 1% lidocaine.        Needle Type: insulated and short bevel (Pajunk)       Needle Gauge: 21.        Needle Length (millimeters): 100        Ultrasound guided       1. Ultrasound was used to identify targeted nerve, plexus, vascular marker, or fascial plane and place a needle adjacent to it in real-time.       2. Ultrasound was used to visualize the spread of anesthetic in close proximity to the above referenced structure.       3. A permanent image is entered into the patient's record.       4. The visualized anatomic structures appeared normal.       5. There were no apparent abnormal pathologic findings.    Assessment/Narrative         The placement was negative for: blood aspirated, painful injection and site bleeding       Paresthesias: No.       Test dose of mL at.         Test dose negative, 3 minutes after injection, for signs of intravascular, subdural, or intrathecal injection.       Bolus given via needle..        Secured via.        Insertion/Infusion Method: Single Shot       Complications: none       Injection made incrementally with aspirations every 5 mL.    Medication(s) Administered   Bupivacaine " "0.25% w/ 1:200K Epi (Injection) - Injection   15 mL - 4/8/2025 3:00:00 PM   Comments:  0.5% Bupivacaine with 1:400,000 epinephrine injected.  Patient tolerated the procedure well.    The surgeon has given a verbal order transferring care of this patient to me for the performance of a regional analgesia block for post-op pain control. It is requested of me because I am uniquely trained and qualified to perform this block and the surgeon is neither trained nor qualified to perform this procedure.            FOR [facility] ([facility]) ONLY:   Pain Team Contact information: please page the Pain Team Via [employer]. Search \"Pain\". During daytime hours, please page the attending first. At night please page the resident first.      "

## 2025-04-08 NOTE — ANESTHESIA PROCEDURE NOTES
Airway       Patient location during procedure: OR       Procedure Start/Stop Times: 4/8/2025 11:38 AM  Staff -        Anesthesiologist:  Angel Davidson MD       CRNA: Radha Xiao APRN CRNA       Performed By: CRNA  Consent for Airway        Urgency: elective  Indications and Patient Condition       Indications for airway management: sam-procedural       Induction type:intravenous       Mask difficulty assessment: 1 - vent by mask    Final Airway Details       Final airway type: endotracheal airway       Successful airway: ETT - single  Endotracheal Airway Details        ETT size (mm): 7.0       Cuffed: yes       Successful intubation technique: video laryngoscopy       VL Blade Size: Glidescope 3       Grade View of Cords: 1       Adjucts: stylet       Position: Right       Secured at (cm): 23       Bite block used: None    Post intubation assessment        Placement verified by: capnometry, equal breath sounds and chest rise        Number of attempts at approach: 1       Secured with: commercial tube baptiste and tape       Ease of procedure: easy       Dentition: Intact and Unchanged    Medication(s) Administered   Medication Administration Time: 4/8/2025 11:38 AM

## 2025-04-08 NOTE — ED PROVIDER NOTES
Emergency Department Note      History of Present Illness     Chief Complaint   Shortness of Breath      HPI   Rj Licea is a 29 year old male with history of cognitive impairment and blindness and lung disease of prematurity and G-tube dependence and chronic lung disease who presents via EMS from home for evaluation of fever and fast heart rate and possible shortness of breath.  Further history limited due to baseline cognitive impairment.  Paramedics did provide the patient with 2 doses of IV verapamil with concern for underlying atrial flutter.    Independent Historian   Mother reports that patient started having malaise since yesterday.  Fever today.  Also having diarrhea.  No vomiting.  No confusion.    Review of External Notes       Past Medical History     Medical History and Problem List   Past Medical History:   Diagnosis Date    Chronic lung disease     Depression     Epilepsy 05/01/2011    Feeding by G-tube     GERD (gastroesophageal reflux disease)     Insomnia     Kidney stone     Premature infant - 24 weeks     Retinopathy of prematurity     SBO (small bowel obstruction)        Medications   albuterol (PROAIR HFA/PROVENTIL HFA/VENTOLIN HFA) 108 (90 Base) MCG/ACT inhaler  azithromycin (ZITHROMAX) 200 MG/5ML suspension  busPIRone (BUSPAR) 15 MG tablet  clobazam (,ONFI,) 2.5 MG/ML suspension  erythromycin (ROMYCIN) 5 MG/GM ophthalmic ointment  fluticasone-salmeterol (ADVAIR HFA) 230-21 MCG/ACT inhaler  gabapentin (NEURONTIN) 250 MG/5ML solution  hydrOXYzine (ATARAX) 10 MG/5ML syrup  levothyroxine (SYNTHROID/LEVOTHROID) 25 MCG tablet  melatonin 1 MG TABS tablet  omeprazole (PRILOSEC) 2 mg/mL suspension  polyethylene glycol (MIRALAX) 17 g packet  QUEtiapine (SEROQUEL) 300 MG tablet  QUEtiapine (SEROQUEL) 50 MG tablet  SPIRIVA RESPIMAT 1.25 MCG/ACT inhaler  traZODone (DESYREL) 100 MG tablet  Valproate Sodium (VALPROIC ACID) 250 MG/5ML        Surgical History   Past Surgical History:   Procedure  Laterality Date    APPENDECTOMY OPEN N/A 4/19/2021    Procedure: Appendectomy open;  Surgeon: Fabiola Pop MD;  Location: [facility] OR    GASTROSTOMY TUBE      LAPAROTOMY EXPLORATORY N/A 4/19/2021    Procedure: LAPAROTOMY, enterolysis;  Surgeon: Fabiola Pop MD;  Location: [facility] OR       Physical Exam     Patient Vitals for the past 24 hrs:   BP Temp Temp src Pulse Resp SpO2 Weight   04/07/25 2256 (!) 148/91 -- -- (!) 141 (!) 33 94 % --   04/07/25 2248 (!) 155/92 -- -- (!) 141 (!) 35 93 % --   04/07/25 2246 (!) 155/92 -- -- (!) 141 26 93 % --   04/07/25 2233 (!) 159/95 -- -- (!) 138 (!) 35 94 % --   04/07/25 2218 (!) 156/95 -- -- (!) 140 (!) 37 96 % --   04/07/25 2211 -- -- -- (!) 144 (!) 39 94 % --   04/07/25 2210 -- 98  F (36.7  C) Temporal -- -- -- 47.2 kg (104 lb)   04/07/25 2206 -- -- -- (!) 146 (!) 33 97 % --   04/07/25 2202 (!) 136/93 -- -- (!) 147 (!) 42 95 % --     Physical Exam  VS: Reviewed per above  HENT: Mucous membranes moist  EYES: sclera anicteric  CV: Rate as noted,  regular rhythm.   RESP: mild abdominal retractions (slightly worse than baseline per mother) Breath sounds are normal bilaterally.  GI: no discernable tenderness/rebound/guarding, not distended. G tube site without surrounding redness or warmth  NEURO: Alert, few word responses (baseline per mother), squeezing hands to command  MSK: BL knee flexion/leg stiffness- ?contractures  SKIN: Warm and dry    Diagnostics     Lab Results   Labs Ordered and Resulted from Time of ED Arrival to Time of ED Departure   ISTAT GASES LACTATE VENOUS POCT - Abnormal       Result Value    Lactic Acid POCT 1.4      Bicarbonate Venous POCT 31 (*)     O2 Sat, Venous POCT 97 (*)     pCO2 Venous POCT 55 (*)     pH Venous POCT 7.36      pO2 Venous POCT 96 (*)     Base Excess/Deficit (+/-) POCT 4.0 (*)    COMPREHENSIVE METABOLIC PANEL - Abnormal    Sodium 131 (*)     Potassium 4.5      Carbon Dioxide (CO2) 27      Anion Gap 10      Urea Nitrogen 11.0       Creatinine 0.49 (*)     GFR Estimate >90      Calcium 9.3      Chloride 94 (*)     Glucose 294 (*)     Alkaline Phosphatase 101      AST 22      ALT 12      Protein Total 7.0      Albumin 4.0      Bilirubin Total 0.8     ROUTINE UA WITH MICROSCOPIC REFLEX TO CULTURE - Abnormal    Color Urine Yellow      Appearance Urine Clear      Glucose Urine >=1000 (*)     Bilirubin Urine Negative      Ketones Urine Negative      Specific Gravity Urine 1.022      Blood Urine Negative      pH Urine 7.0      Protein Albumin Urine 20 (*)     Urobilinogen Urine 4.0 (*)     Nitrite Urine Negative      Leukocyte Esterase Urine Negative      Mucus Urine Present (*)     RBC Urine <1      WBC Urine 1     CBC WITH PLATELETS AND DIFFERENTIAL - Abnormal    WBC Count 15.6 (*)     RBC Count 5.18      Hemoglobin 17.1      Hematocrit 49.9      MCV 96      MCH 33.0      MCHC 34.3      RDW 12.4      Platelet Count 120 (*)    MANUAL DIFFERENTIAL - Abnormal    % Neutrophils 72      % Lymphocytes 5      % Monocytes 23      % Eosinophils 0      % Basophils 0      Absolute Neutrophils 11.2 (*)     Absolute Lymphocytes 0.8      Absolute Monocytes 3.6 (*)     Absolute Eosinophils 0.0      Absolute Basophils 0.0     RBC AND PLATELET MORPHOLOGY - Abnormal    RBC Morphology Confirmed RBC Indices      Platelet Assessment        Value: Automated Count Confirmed. Platelet morphology is normal.    Smudge Cells Present (*)    PROCALCITONIN - Normal    Procalcitonin 0.25     INFLUENZA A/B, RSV AND SARS-COV2 PCR - Normal    Influenza A PCR Negative      Influenza B PCR Negative      RSV PCR Negative      SARS CoV2 PCR Negative     MAGNESIUM - Normal    Magnesium 2.0     TROPONIN T, HIGH SENSITIVITY - Normal    Troponin T, High Sensitivity 14     TROPONIN T, HIGH SENSITIVITY   BLOOD CULTURE   BLOOD CULTURE       Imaging   CT Chest PE Abdomen Pelvis w Contrast   Final Result   IMPRESSION:   1.  No evidence for pulmonary embolism.   2.  Moderate volume loss right  lower lobe and middle lobe. Mild to moderate atelectasis and/or scarring left lower lobe.   3.  Distended gallbladder with mild pericholecystic stranding. Findings suggest acute cholecystitis. Clinical correlation recommended.   4.  Percutaneous gastrostomy tube tip in the stomach.         XR Chest Port 1 View   Final Result   IMPRESSION: Bibasilar atelectasis. No evidence of pneumonia by x-ray. No pleural effusion or pneumothorax. Low lung volumes. Normal heart size.          EKG   ECG results from 04/07/25   EKG 12-lead, tracing only     Value    Systolic Blood Pressure     Diastolic Blood Pressure     Ventricular Rate 146    Atrial Rate 292    KS Interval     QRS Duration 72        QTc 520    P Axis 25    R AXIS 10    T Axis 37    Interpretation ECG      ** Critical Test Result: High HR  Atrial flutter with 2:1 A-V conduction vs other narrow complex tachycardia  Nonspecific ST and T wave abnormality  Abnormal ECG  When compared with ECG of 22-Jan-2022 07:11,  Still with narrow complex tachycardia           Independent Interpretation   CXR: No pneumothorax.    ED Course      Medications Administered   Medications   sodium chloride 0.9% BOLUS 1,000 mL (0 mLs Intravenous Stopped 4/7/25 2248)   piperacillin-tazobactam (ZOSYN) 4.5 g vial to attach to  mL bag (0 g Intravenous Stopped 4/7/25 2252)   ibuprofen (ADVIL/MOTRIN) suspension 450 mg (450 mg Per G Tube $Given 4/7/25 2225)   ipratropium - albuterol 0.5 mg/2.5 mg/3 mL (DUONEB) neb solution 3 mL (3 mLs Nebulization $Given 4/7/25 2234)   prochlorperazine (COMPAZINE) injection 5 mg (5 mg Intravenous $Given 4/7/25 2225)   metoprolol (LOPRESSOR) injection 5 mg (5 mg Intravenous $Given 4/7/25 2350)   iopamidol (ISOVUE-370) solution 67 mL (67 mLs Intravenous $Given 4/7/25 2334)   sodium chloride 0.9 % bag for CT scan flush (90 mLs Intravenous $Given 4/7/25 2334)       Procedures   Procedures     Discussion of Management   Admitting Hospitalist,   Charlene    ED Course        Additional Documentation  None    Medical Decision Making / Diagnosis       MIPS     CT for PE was ordered because the patient is high risk for pulmonary embolism.    SAMUEL Licea is a 29 year old male who presents to the ER for evaluation of fever and fast heart rate and possible increased work of breathing from baseline.  On arrival patient has heart rate in the 140s.  EKG reveals narrow complex tachycardia.  He has mild tachypnea and abdominal retractions which is slightly worse than reported baseline per mother.  He seems to be breathing comfortably on supplemental O2 per facemask.  Initial chest x-ray is clear.  No lactic acidosis.  Patient has evidence of chronic CO2 retention on VBG.  UA negative for signs of infection.  CT chest abdomen shows question cholecystitis.  Patient was given broad-spectrum antibiotics on arrival.  Despite IV fluid administration, no significant change in heart rate.  He was given a dose of IV metoprolol and heart rates improved to the low 100s.  Unclear if presenting EKG represented SVT/flutter.  Patient was admitted for further treatment and evaluation of possible cholecystitis.    Disposition   The patient was admitted to the hospital.     Diagnosis     ICD-10-CM    1. Fever, unspecified fever cause  R50.9       2. Cholecystitis  K81.9       3. Chronic respiratory failure with hypoxia (H)  J96.11       4. Narrow complex tachycardia  I47.19            Discharge Medications   New Prescriptions    No medications on file          Wagner Wiggins MD  04/08/25 0044

## 2025-04-08 NOTE — ED NOTES
[facility]    ED Nurse Handoff Report    ED Chief complaint: Shortness of Breath      ED Diagnosis:   Final diagnoses:   Fever, unspecified fever cause   Cholecystitis   Chronic respiratory failure with hypoxia (H)   Narrow complex tachycardia       Code Status: Full Code    Allergies: No Known Allergies    Patient Story:    Arrive via ems from home for c/o sob. Patient came from home and usually on 10 L nc when sick. Patient is blind and intellectual disability per ems. HR per ems is on 150-170. PIV was placed and ivf bolus of 500 ml was started by ems. , Temp of 101 wan also endorsed by ems. Mother at bedside.      Focused Assessment:        Labs Ordered and Resulted from Time of ED Arrival to Time of ED Departure   ISTAT GASES LACTATE VENOUS POCT - Abnormal       Result Value    Lactic Acid POCT 1.4      Bicarbonate Venous POCT 31 (*)     O2 Sat, Venous POCT 97 (*)     pCO2 Venous POCT 55 (*)     pH Venous POCT 7.36      pO2 Venous POCT 96 (*)     Base Excess/Deficit (+/-) POCT 4.0 (*)    COMPREHENSIVE METABOLIC PANEL - Abnormal    Sodium 131 (*)     Potassium 4.5      Carbon Dioxide (CO2) 27      Anion Gap 10      Urea Nitrogen 11.0      Creatinine 0.49 (*)     GFR Estimate >90      Calcium 9.3      Chloride 94 (*)     Glucose 294 (*)     Alkaline Phosphatase 101      AST 22      ALT 12      Protein Total 7.0      Albumin 4.0      Bilirubin Total 0.8     ROUTINE UA WITH MICROSCOPIC REFLEX TO CULTURE - Abnormal    Color Urine Yellow      Appearance Urine Clear      Glucose Urine >=1000 (*)     Bilirubin Urine Negative      Ketones Urine Negative      Specific Gravity Urine 1.022      Blood Urine Negative      pH Urine 7.0      Protein Albumin Urine 20 (*)     Urobilinogen Urine 4.0 (*)     Nitrite Urine Negative      Leukocyte Esterase Urine Negative      Mucus Urine Present (*)     RBC Urine <1      WBC Urine 1     CBC WITH PLATELETS AND DIFFERENTIAL - Abnormal    WBC Count 15.6 (*)      RBC Count 5.18      Hemoglobin 17.1      Hematocrit 49.9      MCV 96      MCH 33.0      MCHC 34.3      RDW 12.4      Platelet Count 120 (*)    MANUAL DIFFERENTIAL - Abnormal    % Neutrophils 72      % Lymphocytes 5      % Monocytes 23      % Eosinophils 0      % Basophils 0      Absolute Neutrophils 11.2 (*)     Absolute Lymphocytes 0.8      Absolute Monocytes 3.6 (*)     Absolute Eosinophils 0.0      Absolute Basophils 0.0     RBC AND PLATELET MORPHOLOGY - Abnormal    RBC Morphology Confirmed RBC Indices      Platelet Assessment        Value: Automated Count Confirmed. Platelet morphology is normal.    Smudge Cells Present (*)    PROCALCITONIN - Normal    Procalcitonin 0.25     INFLUENZA A/B, RSV AND SARS-COV2 PCR - Normal    Influenza A PCR Negative      Influenza B PCR Negative      RSV PCR Negative      SARS CoV2 PCR Negative     MAGNESIUM - Normal    Magnesium 2.0     TROPONIN T, HIGH SENSITIVITY - Normal    Troponin T, High Sensitivity 14     TROPONIN T, HIGH SENSITIVITY   BLOOD CULTURE   BLOOD CULTURE       CT Chest PE Abdomen Pelvis w Contrast   Final Result   IMPRESSION:   1.  No evidence for pulmonary embolism.   2.  Moderate volume loss right lower lobe and middle lobe. Mild to moderate atelectasis and/or scarring left lower lobe.   3.  Distended gallbladder with mild pericholecystic stranding. Findings suggest acute cholecystitis. Clinical correlation recommended.   4.  Percutaneous gastrostomy tube tip in the stomach.         XR Chest Port 1 View   Final Result   IMPRESSION: Bibasilar atelectasis. No evidence of pneumonia by x-ray. No pleural effusion or pneumothorax. Low lung volumes. Normal heart size.            Treatments and/or interventions provided:      Medications   sodium chloride 0.9% BOLUS 1,000 mL (0 mLs Intravenous Stopped 4/7/25 2248)   piperacillin-tazobactam (ZOSYN) 4.5 g vial to attach to  mL bag (0 g Intravenous Stopped 4/7/25 2252)   ibuprofen (ADVIL/MOTRIN) suspension 450 mg  (450 mg Per G Tube $Given 4/7/25 2225)   ipratropium - albuterol 0.5 mg/2.5 mg/3 mL (DUONEB) neb solution 3 mL (3 mLs Nebulization $Given 4/7/25 2234)   prochlorperazine (COMPAZINE) injection 5 mg (5 mg Intravenous $Given 4/7/25 2225)   metoprolol (LOPRESSOR) injection 5 mg (5 mg Intravenous $Given 4/7/25 2350)   iopamidol (ISOVUE-370) solution 67 mL (67 mLs Intravenous $Given 4/7/25 2334)   sodium chloride 0.9 % bag for CT scan flush (90 mLs Intravenous $Given 4/7/25 2334)       Patient's response to treatments and/or interventions:  Reduced O2 need, Reduction in HR    To be done/followed up on inpatient unit:   See any in-patient orders    Does this patient have any cognitive concerns?:  Developmentally delayed at baseline    Activity level - Baseline/Home:    Total Care    Activity Level - Current:    Total Care    Patient's Preferred language: English     Needed?: No    Isolation: None  Infection: Not Applicable  Patient tested for COVID 19 prior to admission: YES    Bariatric?: No    Vital Signs:   Vitals:    04/08/25 0017 04/08/25 0022 04/08/25 0027 04/08/25 0032   BP: (!) 123/91 (!) 127/91 (!) 128/92 (!) 129/95   Pulse: 109 109 111 111   Resp: (!) 31 (!) 34 (!) 34 12   Temp:       TempSrc:       SpO2: 96% 97% 97% 96%   Weight:           Cardiac Rhythm:     Was the PSS-3 completed:   No -  What interventions are required if any?               Family Comments:   OBS brochure/video discussed/provided to patient/family: N/A              Name of person given brochure if not patient:               Relationship to patient:     For the majority of the shift this patient's behavior was Green.  Behavioral interventions performed were .    ED NURSE PHONE NUMBER: [id number]

## 2025-04-08 NOTE — CONSULTS
General Surgery Consultation  We are asked by Ephraim Chang MD  to see Rj Licea in consultation regarding cholecystitis.    Rj Licea  YOB: 1995    Age: 29 year old  MRN#: [id number]    Date of Admission: 4/7/2025  Surgeon:   Robert Lopez MD                  Chief Complaint:   Fever         History of Present Illness:   This patient is a 29 year old male who presented to the [facility] ED last night for evaluation of fevers. His mother noticed 2 days ago that Rj seemed more lethargic and not participating in normal daily activities, he complained of feeling cold and has had some loose stools. Mother noted a fever of 101 and heart rate 162 at home and called EMS. No nausea or vomiting noted. He is fully G-tube dependent, uses O2 (10LPM) as needed when O2 is <88% at home due to chronic respiratory issues.   In the ED Rj was found to be hypertensive and tachycardic with increased respiratory rate. CT showed findings consistent with acute cholecystitis including a distended gallbladder with pericholecystic stranding. No other significant intra-abdominal findings. Blood gases abnormal with elevated bicarb, elevated pCO2, normal pH. WBC 15.6, platelets 120s (at his baseline), Na 131, creatinine 0.49, procal normal at 0.25, troponin negative 14, respiratory panel negative for RSV, covid, influenza.     Rj was admitted to the hospital and we have been consulted for surgical evaluation. His surgical history includes laparotomy and open appendectomy in April 2021. He has not had any issues with general anesthesia in the past, though his mother notes that his respiratory status does seem slightly worse now than the last time he had surgery.          Past Medical History:    has a past medical history of Anxiety, Autism, Chronic bronchitis (H), Depression, Developmental delay, Epilepsy (05/01/2011), Feeding by G-tube, GERD (gastroesophageal reflux  disease), Ineffective airway clearance, Insomnia, Kidney stone, Moderate persistent asthma, Premature infant - 24 weeks, Restrictive lung disease, Retinopathy of prematurity, and SBO (small bowel obstruction).          Past Surgical History:     Past Surgical History:   Procedure Laterality Date    APPENDECTOMY OPEN N/A 4/19/2021    Procedure: Appendectomy open;  Surgeon: Fabiola Pop MD;  Location: [facility] OR    GASTROSTOMY TUBE      LAPAROTOMY EXPLORATORY N/A 4/19/2021    Procedure: LAPAROTOMY, enterolysis;  Surgeon: Fabiola Pop MD;  Location: [facility] OR            Medications:     Prior to Admission medications    Medication Sig Start Date End Date Taking? Authorizing Provider   albuterol (PROAIR HFA/PROVENTIL HFA/VENTOLIN HFA) 108 (90 Base) MCG/ACT inhaler Inhale 2 puffs into the lungs every 6 hours as needed for shortness of breath / dyspnea or wheezing 12/31/19  Yes Micky Leyva MD   azithromycin (ZITHROMAX) 200 MG/5ML suspension Place 11 mLs into G tube three times a week. Monday, Wednesday and Friday   Yes Reported, Patient   busPIRone (BUSPAR) 15 MG tablet Take 1 tablet (15 mg) by mouth 3 times daily. 2/26/25  Yes Micky Leyva MD   clobazam (,ONFI,) 2.5 MG/ML suspension Place 4 mLs into G tube 2 times daily.   Yes Unknown, Entered By History   erythromycin (ROMYCIN) 5 MG/GM ophthalmic ointment Place into both eyes every evening. 3/10/24  Yes Reported, Patient   fluticasone-salmeterol (ADVAIR HFA) 230-21 MCG/ACT inhaler Inhale 2 puffs into the lungs 2 times daily  7/20/17  Yes Micky Leyva MD   gabapentin (NEURONTIN) 250 MG/5ML solution Place 15 mLs (750 mg) into G tube at bedtime. 2/26/25  Yes Micky Leyva MD   hydrOXYzine (ATARAX) 10 MG/5ML syrup Place 25 mLs (50 mg) into G tube at bedtime. (25 mL) 2/26/25  Yes Micky Leyva MD   levothyroxine (SYNTHROID/LEVOTHROID) 25 MCG tablet Take 1 tablet (25 mcg) by mouth daily 7/12/24  Yes Micky Leyva MD   melatonin 1 MG TABS  tablet Place 6 mg into G tube every evening.   Yes Reported, Patient   omeprazole (PRILOSEC) 2 mg/mL suspension 40 mLs (80 mg) by Per G Tube route at bedtime  Patient taking differently: Place 40 mg into G tube at bedtime. 6/26/24  Yes Mikcy Leyva MD   polyethylene glycol (MIRALAX) 17 g packet 17 g by Per G Tube route daily   Yes Unknown, Entered By History   QUEtiapine (SEROQUEL) 300 MG tablet Place 2 tablets (600 mg) into G tube at bedtime. Taking 2 - 300 mg at night 2/26/25  Yes Micky Leyva MD   QUEtiapine (SEROQUEL) 50 MG tablet 50 mg taking during day for anxiety up to 4 times per day (do not take within 4 hours of the evening dose) 2/26/25  Yes Micky Leyva MD   SPIRIVA RESPIMAT 1.25 MCG/ACT inhaler Inhale 2 puffs into the lungs daily. 7/14/23  Yes Reported, Patient   traZODone (DESYREL) 100 MG tablet Take 1 tablet (100 mg) by mouth at bedtime. 2/26/25  Yes Micky Leyva MD   Valproate Sodium (VALPROIC ACID) 250 MG/5ML TAKE 6ML BY MOUTH EVERY MORNING AND 7ML IN THE AFTERNOON AND AT NIGHT 11/29/22  Yes Micky Leyva MD            Allergies:   No Known Allergies         Social History:     Social History     Tobacco Use    Smoking status: Never     Passive exposure: Never    Smokeless tobacco: Never   Substance Use Topics    Alcohol use: No               Physical Exam:   Blood pressure (!) 143/101, pulse (!) 128, temperature 97.9  F (36.6  C), temperature source Axillary, resp. rate 22, weight 47.2 kg (104 lb), SpO2 97%.  I/O last 3 completed shifts:  In: 1100 [IV Piggyback:1100]  Out: 450 [Urine:450]    General - male sleeping in bed, awakens to touch, mostly nonverbal   Eyes - no scleral icterus, extraocular movements intact  Respiratory: non-labored breathing   Abdomen - soft, nondistended. Tender to palpation RUQ, remainder of abdomen nontender. G tube intact, site clean and dry. Well-healed midline laparotomy incision          Data:   Labs:  Recent Labs   Lab Test  04/07/25 2211 12/18/24  1509 09/10/24  1034   WBC 15.6* 5.5 4.9   HGB 17.1 16.6 16.2   HCT 49.9 50.1 49.2   * 128* 118*     Recent Labs   Lab Test 04/07/25 2211 12/18/24  1509 09/10/24  1034   POTASSIUM 4.5 4.6 4.5   CHLORIDE 94* 99 100   CO2 27 30* 31*   BUN 11.0 15.5 20.4*   CR 0.49* 0.76 0.84     Recent Labs   Lab Test 04/07/25  2211 09/10/24  1034 06/18/24  0955 11/17/21  1228 09/24/21  0531 06/07/19  1315 04/23/19  1314 04/05/19  1314 01/15/19  1233   BILITOTAL 0.8 0.5 0.8   < > 1.2   < > 0.4   < > 0.4   ALT 12 12 13   < > 20   < > 40   < > 34   AST 22 30 33   < > 15   < > 37   < > 21   ALKPHOS 101 103 97   < > 77   < > 82   < > 68   AMYLASE  --   --   --   --   --   --  63  --   --    LIPASE  --   --   --   --  49*  --  192  --  170    < > = values in this interval not displayed.     Recent Labs   Lab Test 04/07/25 2211 12/18/24  1509 09/10/24  1034 06/28/23  1353 04/04/23  0921 05/11/22  1409 02/09/22  1222   ALICE 9.3 10.0 9.4   < > 10.8* 9.0 9.3   PHOS  --   --   --   --  3.8 4.0 4.1   MAG 2.0  --   --   --  2.3 2.2 2.0    < > = values in this interval not displayed.     Recent Labs   Lab Test 04/07/25 2238 04/07/25 2211 12/18/24  1509 09/10/24  1034 09/09/24  1026 06/18/24  0955 01/23/22  0734 01/22/22  0754   ANIONGAP  --  10 11 9  --  9   < >  --    PROTEIN 20*  --   --   --  Trace*  --   --  20*   ALBUMIN  --  4.0  --  4.2  --  4.4   < >  --     < > = values in this interval not displayed.       CT scan of the abdomen:   IMPRESSION:  1.  No evidence for pulmonary embolism.  2.  Moderate volume loss right lower lobe and middle lobe. Mild to moderate atelectasis and/or scarring left lower lobe.  3.  Distended gallbladder with mild pericholecystic stranding. Findings suggest acute cholecystitis. Clinical correlation recommended.  4.  Percutaneous gastrostomy tube tip in the stomach.            Assessment:   Rj Licea is a 29 year old male with acute cholecystitis with sepsis. Currently  requiring O2 6LPM.          Plan:   - Discussed diagnosis and recommendations with patient's mother. Rj is higher risk for general anesthesia with his respiratory issues, and we discussed this including potential worsening of respiratory status and/or need for prolonged mechanical ventilation postoperatively which is low likelihood but certainly possible.   - To OR for laparoscopic cholecystectomy today. Briefly discussed surgical details, risks, benefits, alternatives, potential complications, and expected recovery with Rj's mother. Her questions were answered and she would like to proceed with surgery today. Dr. Lopez to discuss further.  - Continue IV fluids, pain meds and anti-emetics as needed. PCDs while resting, activity as tolerated  - Rocephin + Flagyl for coverage of cholecystitis  - Medical management per hospitalist.  - Surgical comorbidities include developmental delay, acute on chronic respiratory failure, sepsis     I have discussed the history, physical, and plan with Dr. Lopez who will independently interview and examine the patient and update the stated plan as indicated.       Hazel Molina PA-C  [facility]  [phone]

## 2025-04-08 NOTE — ED NOTES
Bed: ST  Expected date: 4/7/25  Expected time: 9:50 PM  Means of arrival: Ambulance  Comments:  [facility] 1842 29M rapid HR (150) poss Aflutter

## 2025-04-08 NOTE — ANESTHESIA POSTPROCEDURE EVALUATION
Patient: Rj Licea    Procedure: Procedure(s):  CHOLECYSTECTOMY, LAPAROSCOPIC CONVERTED TO OPEN       Anesthesia Type:  General    Note:     Postop Pain Control: Uneventful            Sign Out: Well controlled pain   PONV: No   Neuro/Psych: Uneventful            Sign Out: Acceptable/Baseline neuro status   Airway/Respiratory: Uneventful            Sign Out: Acceptable/Baseline resp. status   CV/Hemodynamics: Uneventful            Sign Out: Acceptable CV status; No obvious hypovolemia; No obvious fluid overload   Other NRE: NONE   DID A NON-ROUTINE EVENT OCCUR? No           Last vitals:  Vitals Value Taken Time   /83 04/08/25 1545   Temp 36.1  C (96.98  F) 04/08/25 1548   Pulse 130 04/08/25 1550   Resp 31 04/08/25 1550   SpO2 98 % 04/08/25 1550   Vitals shown include unfiled device data.    Electronically Signed By: Angel Davidson MD  April 8, 2025  3:51 PM

## 2025-04-08 NOTE — OR NURSING
ELIZ Davidson at  bedside. Pt lethargic , unable to complete an assessment. ELIZ Davidson stated patient is back to his baseline before procedure. Pt ok to be transfer to his hospital room per ELIZ Davidson

## 2025-04-08 NOTE — CONSULTS
[facility]  WO Nurse Inpatient Assessment     Consulted for: bilateral elbows    Summary: Patient in OR 4/8 for most of the day, will attempt to eval 4/9  Right elbow:  skin intact blanchable redness related to friction with attempts to reposition in bed  Left elbow: skin intact blanchable redness related to friction with attempts to reposition in bed    Mother requested WOC RN to assess G tube insertion site. DTPI related to medical device pressing downward on skin at insertion site. Per Mother, this comes and goes as patient gains and loses weight, POA    WO nurse follow-up plan: weekly for G tube insertion site only    Patient History (according to provider note(s):      29 year old male with past medical history significant for developmental delay, autism, asthma, chronic hypercapnic and hypoxic respiratory failure who presents with lethargy, f/c. Admitted on 4/7/2025.   Pressure injury, bilateral elbows, POA  - Wound RN consulted     Assessment:      Areas visualized during today's visit: Focused: and bilateral elbow     Bilateral elbows with intact skin, blanchable redness at time of assessment, Mother reports previously more superficial skin loss. Related to friction form attempts to push himself upwards in the bed. Discussed prevention measures with Mother of patient.    Pressure Injury Location: G tube        Last photo: 4/9/25  Wound type: Pressure Injury     Pressure Injury Stage: Deep Tissue Pressure Injury (DTPI), present on admission      This is a Medical Device Related Pressure Injury (MDRPI) due to  G tube access port  Wound history/plan of care:   G tube access port pressing downward onto skin, purple discoloration that is non blanchable, skin has a crepe like texture similar to blister formation otherwise skin is intact. Distinct shape of the G tube port on skin Patients Mother endorses weight gain and loss and this area comes and goes with weight fluctuations. Medical  Device related pressure injury, POA    Wound base: 40 % Purple and Blister, 60 % Non-blanchable and Erythema     Palpation of the wound bed:  crepe like       Drainage: none     Description of drainage: none     Measurements (length x width x depth, in cm) 1.5  x 2.3  x  0 cm      Tunneling N/A     Undermining N/A  Periwound skin: Intact      Color: normal and consistent with surrounding tissue      Temperature: warm  Odor: none  Pain: unable to assess due to  not able to describe pain , none  Pain intervention prior to dressing change: patient tolerated well and slow and gentle cares   Treatment goal: Heal , Infection control/prevention, Maintain (prevention of deterioration), and Protection  STATUS: initial assessment  Supplies ordered: gathered, supplies stored on unit, and discussed with patient  and family    My PI Risk Assessment     Sensory Perception: 2 - Very Limited     Moisture: 3 - Occasionally moist      Activity: 1 - Bedfast      Mobility: 1 - Completely immobile      Nutrition: 3 - Adequate     Friction/Shear: 3 - No apparent problem      TOTAL: 13     Treatment Plan:     Bilateral elbows, sacrum and additional bony prominences: Every 5 days and as needed  Cleanse the area with NS and pat dry.  Apply No sting film barrier to periwound skin.  Cover wound with Mepilex 4x4 foam dressing  Turn and reposition Q 2hrs side to side only.  FYI- If pt has constant incontinent loose stools needing dressing changes Q shift please discontinue the Mepilex dressing and apply criticaid barrier paste BID and PRN.      G tube insertion site skin: Every three to five days if no drainage present  Cleanse with normal saline, pat dry  Apply no sting barrier wipe  Cut 3x3 inch square of Mepilex lite (# [id number])  and fenestrate like a drain sponge  Slide under G tube and on top of skin. May double layer if needed    Pressure Injury Prevention (PIP) Plan:  If patient is declining pressure injury prevention interventions:  "Explore reason why and address patient's concerns, Educate on pressure injury risk and prevention intervention(s), If patient is still declining, document \"informed refusal\" , and Ensure Care team is aware ( provider, charge nurse, etc)  Mattress: Follow bed algorithm, add Low Air Loss (Air+) mattress pump if skin is very moist or constantly moist.   HOB: Maintain at or below 30 degrees, unless contraindicated  Repositioning in bed: Every 1-2 hours , Left/right positioning; avoid supine, and Raise foot of bed prior to raising head of bed, to reduce patient sliding down (shear)  Heels: Keep elevated off mattress and Pillows under calves  Protective Dressing: Sacral Mepilex for prevention (#[id number]),  especially for the agitated patient   Positioning Equipment:None  Chair positioning: Assist patient to reposition hourly and Do NOT use a donut for sitting (this increases pressure to smaller area and creates a higher potential for injury)    If patient has a buttock pressure injury, or high risk for PI use chair cushion or SPS.  Moisture Management: Perineal cleansing /protection: Follow Incontinence Protocol, Avoid brief in bed, Clean and dry skin folds with bathing , Consider InterDry (#839608) between folds, and Moisturize dry skin  Under Devices: Inspect skin under all medical devices during skin inspection , Ensure tubes are stabilized without tension, and Ensure patient is not lying on medical devices or equipment when repositioned  Ask provider to discontinue device when no longer needed.        Orders: Written    RECOMMEND PRIMARY TEAM ORDER: None, at this time  Education provided: importance of repositioning, plan of care, wound progress, Infection prevention , Moisture management, Hygiene, and Off-loading pressure  Discussed plan of care with: Patient and Family  Notify WO if wound(s) deteriorate.  Nursing to notify the Provider(s) and re-consult the WOC Nurse if new skin concern.    DATA:     Current support " surface: Standard  Standard gel mattress (Isoflex)  Containment of urine/stool: Incontinence Protocol, Incontinent pad in bed, and Suction based external urinary catheter   BMI: Body mass index is 19.33 kg/m .   Active diet order: Orders Placed This Encounter      NPO for Procedure/Surgery per Anesthesia Guidelines Except for: Meds; Clear liquids before procedure/surgery: ADULT (Age GREATER than or Equal to 18 years) - Clear liquids 2 hours before procedure/surgery     Output: I/O last 3 completed shifts:  In: 1100 [IV Piggyback:1100]  Out: 450 [Urine:450]     Labs:   Recent Labs   Lab 04/07/25  2211   ALBUMIN 4.0   HGB 17.1   WBC 15.6*   A1C 5.9*     Pressure injury risk assessment:   Sensory Perception: 2-->very limited  Moisture: 3-->occasionally moist  Activity: 2-->chairfast  Mobility: 2-->very limited  Nutrition: 1-->very poor  Friction and Shear: 2-->potential problem  Ziggy Score: 12    Jemima Rush RN, BSN, CWOCN   Pager no longer is use, please contact through [employer] group: [facility] Nurse Yanci  Dept. Office Number: [phone]

## 2025-04-08 NOTE — ANESTHESIA CARE TRANSFER NOTE
Patient: Rj Licea    Procedure: Procedure(s):  CHOLECYSTECTOMY, LAPAROSCOPIC CONVERTED TO OPEN       Diagnosis: Cholecystitis [K81.9]  Diagnosis Additional Information: No value filed.    Anesthesia Type:   General     Note:    Oropharynx: oropharynx clear of all foreign objects  Level of Consciousness: awake  Oxygen Supplementation: face mask  Level of Supplemental Oxygen (L/min / FiO2): 10  Independent Airway: airway patency satisfactory and stable  Dentition: dentition unchanged  Vital Signs Stable: post-procedure vital signs reviewed and stable  Report to RN Given: handoff report given  Patient transferred to: PACU    Handoff Report: Identifed the Patient, Identified the Reponsible Provider, Reviewed the pertinent medical history, Discussed the surgical course, Reviewed Intra-OP anesthesia mangement and issues during anesthesia, Set expectations for post-procedure period and Allowed opportunity for questions and acknowledgement of understanding  Vitals:  Vitals Value Taken Time   /78 04/08/25 1530   Temp 36.1  C (96.98  F) 04/08/25 1534   Pulse 129 04/08/25 1535   Resp 31 04/08/25 1535   SpO2 98 % 04/08/25 1535   Vitals shown include unfiled device data.    Electronically Signed By: ADRIAN Cr CRNA  April 8, 2025  3:36 PM

## 2025-04-08 NOTE — BRIEF OP NOTE
[facility]    Brief Operative Note    Pre-operative diagnosis: Cholecystitis [K81.9]  Post-operative diagnosis Same    Procedure: Attempted laparoscopic converted to open cholecystectomy    Surgeon:       * Robert Lopez MD - Primary     * Hazel Molina PA-C - Assisting    Anesthesia: General     Estimated Blood Loss: 350cc, see op report for estimation    Specimens:   ID Type Source Tests Collected by Time Destination   1 : Gallbladder Tissue Gallbladder SURGICAL PATHOLOGY EXAM Robert Lopez MD 4/8/2025  1:57 PM      Findings:    Unable to visualize gallbladder laparoscopically due to dilated colon high in right upper quadrant, adhesions to liver, minimal space within abdomen with insufflation. Converted to open, gallbladder gangrenous and necrotic. Majority of gallbladder removed, suture ligated at infundibulum. 10french flat drain left near gallbladder closure. No immediate complications. See operative report for full details.        Hazel Molina PA-C  [facility]  [phone]

## 2025-04-08 NOTE — PHARMACY-ADMISSION MEDICATION HISTORY
Pharmacist Admission Medication History    Admission medication history is complete. The information provided in this note is only as accurate as the sources available at the time of the update.    Information Source(s): Family member and CareEverywhere/SureScripts via N/A    Pertinent Information: None    Changes made to PTA medication list:  Added: None  Deleted: mucomyst, albuterol neb, atropine, benzoyl peroxide, cefdinir, clindamycin gel, clobazam duplicate, clonazepam, famotidine, lorazepam, melatonin duplicate, omeprazole duplicates, , miralax duplicates, prednisolone, psyllium, 3% nebs, triamcinolone topical  Changed:   Clobazam; 7.5 mg BID to 10 mg BID  Melatonin; 1 mg to 6 mg  Spiriva; added directions  Omeprazole; 80 mg to 40 mg    Allergies reviewed with patient and updates made in EHR: no    Medication History Completed By: Anastacio Ann RPH 4/7/2025 10:36 PM    PTA Med List   Medication Sig Last Dose/Taking    albuterol (PROAIR HFA/PROVENTIL HFA/VENTOLIN HFA) 108 (90 Base) MCG/ACT inhaler Inhale 2 puffs into the lungs every 6 hours as needed for shortness of breath / dyspnea or wheezing Taking As Needed    azithromycin (ZITHROMAX) 200 MG/5ML suspension Place 11 mLs into G tube three times a week. Monday, Wednesday and Friday Past Week    busPIRone (BUSPAR) 15 MG tablet Take 1 tablet (15 mg) by mouth 3 times daily. 4/7/2025 Bedtime    clobazam (,ONFI,) 2.5 MG/ML suspension Place 4 mLs into G tube 2 times daily. 4/7/2025 Bedtime    erythromycin (ROMYCIN) 5 MG/GM ophthalmic ointment Place into both eyes every evening. 4/7/2025 Bedtime    fluticasone-salmeterol (ADVAIR HFA) 230-21 MCG/ACT inhaler Inhale 2 puffs into the lungs 2 times daily  4/7/2025 Bedtime    gabapentin (NEURONTIN) 250 MG/5ML solution Place 15 mLs (750 mg) into G tube at bedtime. 4/7/2025 Bedtime    hydrOXYzine (ATARAX) 10 MG/5ML syrup Place 25 mLs (50 mg) into G tube at bedtime. (25 mL) 4/7/2025 Bedtime    levothyroxine  (SYNTHROID/LEVOTHROID) 25 MCG tablet Take 1 tablet (25 mcg) by mouth daily 4/7/2025 Morning    melatonin 1 MG TABS tablet Place 6 mg into G tube every evening. 4/7/2025 Evening    omeprazole (PRILOSEC) 2 mg/mL suspension 40 mLs (80 mg) by Per G Tube route at bedtime (Patient taking differently: Place 40 mg into G tube at bedtime.) 4/7/2025 Bedtime    polyethylene glycol (MIRALAX) 17 g packet 17 g by Per G Tube route daily 4/7/2025 Morning    QUEtiapine (SEROQUEL) 300 MG tablet Place 2 tablets (600 mg) into G tube at bedtime. Taking 2 - 300 mg at night 4/7/2025 Bedtime    QUEtiapine (SEROQUEL) 50 MG tablet 50 mg taking during day for anxiety up to 4 times per day (do not take within 4 hours of the evening dose) Taking    SPIRIVA RESPIMAT 1.25 MCG/ACT inhaler Inhale 2 puffs into the lungs daily. 4/7/2025 Morning    traZODone (DESYREL) 100 MG tablet Take 1 tablet (100 mg) by mouth at bedtime. 4/7/2025 Bedtime    Valproate Sodium (VALPROIC ACID) 250 MG/5ML TAKE 6ML BY MOUTH EVERY MORNING AND 7ML IN THE AFTERNOON AND AT NIGHT 4/7/2025 Bedtime

## 2025-04-08 NOTE — ED NOTES
Arrive via ems from home for c/o sob. Patient came from home and usually on 10 L nc when sick. Patient is blind and intellectual disability per ems. HR per ems is on 150-170. PIV was placed and ivf bolus of 500 ml was started by ems. , Temp of 101 wan also endorsed by ems. Mother at bedside.

## 2025-04-09 ENCOUNTER — APPOINTMENT (OUTPATIENT)
Dept: ULTRASOUND IMAGING | Facility: CLINIC | Age: 30
End: 2025-04-09
Attending: STUDENT IN AN ORGANIZED HEALTH CARE EDUCATION/TRAINING PROGRAM
Payer: MEDICAID

## 2025-04-09 LAB
ALBUMIN SERPL BCG-MCNC: 3.1 G/DL (ref 3.5–5.2)
ALP SERPL-CCNC: 79 U/L (ref 40–150)
ALT SERPL W P-5'-P-CCNC: 45 U/L (ref 0–70)
ANION GAP SERPL CALCULATED.3IONS-SCNC: 7 MMOL/L (ref 7–15)
AST SERPL W P-5'-P-CCNC: 80 U/L (ref 0–45)
ATRIAL RATE - MUSE: 142 BPM
BILIRUB SERPL-MCNC: 0.4 MG/DL
BUN SERPL-MCNC: 6.4 MG/DL (ref 6–20)
CALCIUM SERPL-MCNC: 9 MG/DL (ref 8.8–10.4)
CHLORIDE SERPL-SCNC: 105 MMOL/L (ref 98–107)
CREAT SERPL-MCNC: 0.6 MG/DL (ref 0.67–1.17)
DIASTOLIC BLOOD PRESSURE - MUSE: NORMAL MMHG
EGFRCR SERPLBLD CKD-EPI 2021: >90 ML/MIN/1.73M2
ERYTHROCYTE [DISTWIDTH] IN BLOOD BY AUTOMATED COUNT: 13 % (ref 10–15)
GLUCOSE BLDC GLUCOMTR-MCNC: 125 MG/DL (ref 70–99)
GLUCOSE BLDC GLUCOMTR-MCNC: 133 MG/DL (ref 70–99)
GLUCOSE BLDC GLUCOMTR-MCNC: 139 MG/DL (ref 70–99)
GLUCOSE SERPL-MCNC: 122 MG/DL (ref 70–99)
HCO3 SERPL-SCNC: 31 MMOL/L (ref 22–29)
HCT VFR BLD AUTO: 41.9 % (ref 40–53)
HGB BLD-MCNC: 14 G/DL (ref 13.3–17.7)
INTERPRETATION ECG - MUSE: NORMAL
MAGNESIUM SERPL-MCNC: 1.8 MG/DL (ref 1.7–2.3)
MCH RBC QN AUTO: 33.6 PG (ref 26.5–33)
MCHC RBC AUTO-ENTMCNC: 33.4 G/DL (ref 31.5–36.5)
MCV RBC AUTO: 101 FL (ref 78–100)
P AXIS - MUSE: NORMAL DEGREES
PHOSPHATE SERPL-MCNC: 3.1 MG/DL (ref 2.5–4.5)
PLATELET # BLD AUTO: 124 10E3/UL (ref 150–450)
POTASSIUM SERPL-SCNC: 4.2 MMOL/L (ref 3.4–5.3)
PR INTERVAL - MUSE: 88 MS
PROT SERPL-MCNC: 5.7 G/DL (ref 6.4–8.3)
QRS DURATION - MUSE: 106 MS
QT - MUSE: 300 MS
QTC - MUSE: 461 MS
R AXIS - MUSE: 45 DEGREES
RBC # BLD AUTO: 4.17 10E6/UL (ref 4.4–5.9)
SODIUM SERPL-SCNC: 143 MMOL/L (ref 135–145)
SYSTOLIC BLOOD PRESSURE - MUSE: NORMAL MMHG
T AXIS - MUSE: 241 DEGREES
VENTRICULAR RATE- MUSE: 142 BPM
WBC # BLD AUTO: 13.2 10E3/UL (ref 4–11)

## 2025-04-09 PROCEDURE — 120N000001 HC R&B MED SURG/OB

## 2025-04-09 PROCEDURE — 258N000003 HC RX IP 258 OP 636: Performed by: PHYSICIAN ASSISTANT

## 2025-04-09 PROCEDURE — 99233 SBSQ HOSP IP/OBS HIGH 50: CPT | Performed by: STUDENT IN AN ORGANIZED HEALTH CARE EDUCATION/TRAINING PROGRAM

## 2025-04-09 PROCEDURE — 93005 ELECTROCARDIOGRAM TRACING: CPT

## 2025-04-09 PROCEDURE — 93970 EXTREMITY STUDY: CPT

## 2025-04-09 PROCEDURE — 93010 ELECTROCARDIOGRAM REPORT: CPT | Performed by: INTERNAL MEDICINE

## 2025-04-09 PROCEDURE — 83735 ASSAY OF MAGNESIUM: CPT | Performed by: PHYSICIAN ASSISTANT

## 2025-04-09 PROCEDURE — 250N000013 HC RX MED GY IP 250 OP 250 PS 637: Performed by: INTERNAL MEDICINE

## 2025-04-09 PROCEDURE — 84100 ASSAY OF PHOSPHORUS: CPT | Performed by: PHYSICIAN ASSISTANT

## 2025-04-09 PROCEDURE — 250N000013 HC RX MED GY IP 250 OP 250 PS 637: Performed by: PHYSICIAN ASSISTANT

## 2025-04-09 PROCEDURE — 85014 HEMATOCRIT: CPT | Performed by: PHYSICIAN ASSISTANT

## 2025-04-09 PROCEDURE — 250N000011 HC RX IP 250 OP 636: Mod: JZ | Performed by: PHYSICIAN ASSISTANT

## 2025-04-09 PROCEDURE — 80053 COMPREHEN METABOLIC PANEL: CPT | Performed by: PHYSICIAN ASSISTANT

## 2025-04-09 PROCEDURE — 36415 COLL VENOUS BLD VENIPUNCTURE: CPT | Performed by: PHYSICIAN ASSISTANT

## 2025-04-09 PROCEDURE — G0463 HOSPITAL OUTPT CLINIC VISIT: HCPCS

## 2025-04-09 PROCEDURE — 250N000011 HC RX IP 250 OP 636: Performed by: PHYSICIAN ASSISTANT

## 2025-04-09 PROCEDURE — 258N000003 HC RX IP 258 OP 636: Performed by: STUDENT IN AN ORGANIZED HEALTH CARE EDUCATION/TRAINING PROGRAM

## 2025-04-09 RX ORDER — BUSPIRONE HYDROCHLORIDE 15 MG/1
15 TABLET ORAL 3 TIMES DAILY
Status: DISCONTINUED | OUTPATIENT
Start: 2025-04-09 | End: 2025-04-14 | Stop reason: HOSPADM

## 2025-04-09 RX ORDER — LEVOTHYROXINE SODIUM 25 UG/1
25 TABLET ORAL DAILY
Status: DISCONTINUED | OUTPATIENT
Start: 2025-04-09 | End: 2025-04-11

## 2025-04-09 RX ORDER — AMOXICILLIN 250 MG
1-2 CAPSULE ORAL 2 TIMES DAILY
Status: DISCONTINUED | OUTPATIENT
Start: 2025-04-09 | End: 2025-04-09

## 2025-04-09 RX ORDER — ENOXAPARIN SODIUM 100 MG/ML
40 INJECTION SUBCUTANEOUS EVERY 24 HOURS
Status: DISCONTINUED | OUTPATIENT
Start: 2025-04-09 | End: 2025-04-14 | Stop reason: HOSPADM

## 2025-04-09 RX ORDER — FLUTICASONE PROPIONATE AND SALMETEROL XINAFOATE 230; 21 UG/1; UG/1
2 AEROSOL, METERED RESPIRATORY (INHALATION) 2 TIMES DAILY
Status: DISCONTINUED | OUTPATIENT
Start: 2025-04-09 | End: 2025-04-11

## 2025-04-09 RX ORDER — GUAIFENESIN 600 MG/1
15 TABLET, EXTENDED RELEASE ORAL DAILY
Status: DISCONTINUED | OUTPATIENT
Start: 2025-04-09 | End: 2025-04-14 | Stop reason: HOSPADM

## 2025-04-09 RX ORDER — METHOCARBAMOL 500 MG/1
500 TABLET, FILM COATED ORAL 3 TIMES DAILY
Status: DISCONTINUED | OUTPATIENT
Start: 2025-04-09 | End: 2025-04-09

## 2025-04-09 RX ORDER — ACETAMINOPHEN 325 MG/1
975 TABLET ORAL 4 TIMES DAILY
Status: DISCONTINUED | OUTPATIENT
Start: 2025-04-09 | End: 2025-04-09

## 2025-04-09 RX ORDER — METHOCARBAMOL 750 MG/1
750 TABLET, FILM COATED ORAL 4 TIMES DAILY
Status: DISCONTINUED | OUTPATIENT
Start: 2025-04-09 | End: 2025-04-14 | Stop reason: HOSPADM

## 2025-04-09 RX ORDER — DEXTROSE MONOHYDRATE 100 MG/ML
INJECTION, SOLUTION INTRAVENOUS CONTINUOUS PRN
Status: DISCONTINUED | OUTPATIENT
Start: 2025-04-09 | End: 2025-04-14 | Stop reason: HOSPADM

## 2025-04-09 RX ORDER — ACETAMINOPHEN 325 MG/10.15ML
975 LIQUID ORAL 4 TIMES DAILY
Status: DISCONTINUED | OUTPATIENT
Start: 2025-04-09 | End: 2025-04-10

## 2025-04-09 RX ORDER — TRAZODONE HYDROCHLORIDE 50 MG/1
100 TABLET ORAL AT BEDTIME
Status: DISCONTINUED | OUTPATIENT
Start: 2025-04-09 | End: 2025-04-14 | Stop reason: HOSPADM

## 2025-04-09 RX ADMIN — BUSPIRONE HYDROCHLORIDE 15 MG: 10 TABLET ORAL at 21:46

## 2025-04-09 RX ADMIN — VALPROIC ACID 350 MG: 250 SOLUTION ORAL at 21:15

## 2025-04-09 RX ADMIN — SODIUM CHLORIDE, POTASSIUM CHLORIDE, SODIUM LACTATE AND CALCIUM CHLORIDE: 600; 310; 30; 20 INJECTION, SOLUTION INTRAVENOUS at 16:19

## 2025-04-09 RX ADMIN — SENNOSIDES 5 ML: 8.8 LIQUID ORAL at 21:35

## 2025-04-09 RX ADMIN — GABAPENTIN 750 MG: 250 SOLUTION ORAL at 21:18

## 2025-04-09 RX ADMIN — ACETAMINOPHEN 975 MG: 325 SUSPENSION ORAL at 09:28

## 2025-04-09 RX ADMIN — FLUTICASONE PROPIONATE AND SALMETEROL XINAFOATE 2 PUFF: 230; 21 AEROSOL, METERED RESPIRATORY (INHALATION) at 22:31

## 2025-04-09 RX ADMIN — LEVOTHYROXINE SODIUM 25 MCG: 25 TABLET ORAL at 09:29

## 2025-04-09 RX ADMIN — ACETAMINOPHEN 975 MG: 325 SUSPENSION ORAL at 21:23

## 2025-04-09 RX ADMIN — ERYTHROMYCIN: 5 OINTMENT OPHTHALMIC at 21:35

## 2025-04-09 RX ADMIN — VALPROIC ACID 350 MG: 250 SOLUTION ORAL at 14:49

## 2025-04-09 RX ADMIN — HYDROMORPHONE HYDROCHLORIDE 0.3 MG: 0.2 INJECTION, SOLUTION INTRAMUSCULAR; INTRAVENOUS; SUBCUTANEOUS at 09:49

## 2025-04-09 RX ADMIN — Medication 15 ML: at 11:54

## 2025-04-09 RX ADMIN — QUETIAPINE FUMARATE 600 MG: 300 TABLET ORAL at 21:23

## 2025-04-09 RX ADMIN — Medication 40 MG: at 21:21

## 2025-04-09 RX ADMIN — AZITHROMYCIN 440 MG: 200 POWDER, FOR SUSPENSION ORAL at 09:30

## 2025-04-09 RX ADMIN — ACETAMINOPHEN 975 MG: 325 SUSPENSION ORAL at 17:36

## 2025-04-09 RX ADMIN — METRONIDAZOLE 500 MG: 500 INJECTION, SOLUTION INTRAVENOUS at 11:55

## 2025-04-09 RX ADMIN — METRONIDAZOLE 500 MG: 500 INJECTION, SOLUTION INTRAVENOUS at 21:44

## 2025-04-09 RX ADMIN — CLOBAZAM 10 MG: 2.5 SUSPENSION ORAL at 22:18

## 2025-04-09 RX ADMIN — SODIUM CHLORIDE, SODIUM LACTATE, POTASSIUM CHLORIDE, AND CALCIUM CHLORIDE 1000 ML: .6; .31; .03; .02 INJECTION, SOLUTION INTRAVENOUS at 14:46

## 2025-04-09 RX ADMIN — CEFTRIAXONE SODIUM 2 G: 2 INJECTION, POWDER, FOR SOLUTION INTRAMUSCULAR; INTRAVENOUS at 03:28

## 2025-04-09 RX ADMIN — SODIUM CHLORIDE, POTASSIUM CHLORIDE, SODIUM LACTATE AND CALCIUM CHLORIDE: 600; 310; 30; 20 INJECTION, SOLUTION INTRAVENOUS at 10:03

## 2025-04-09 RX ADMIN — CLOBAZAM 10 MG: 2.5 SUSPENSION ORAL at 09:50

## 2025-04-09 RX ADMIN — BUSPIRONE HYDROCHLORIDE 15 MG: 10 TABLET ORAL at 09:29

## 2025-04-09 RX ADMIN — VALPROIC ACID 300 MG: 250 SOLUTION ORAL at 09:29

## 2025-04-09 RX ADMIN — METHOCARBAMOL 500 MG: 500 TABLET ORAL at 14:48

## 2025-04-09 RX ADMIN — ACETAMINOPHEN 975 MG: 325 SUSPENSION ORAL at 14:47

## 2025-04-09 RX ADMIN — METRONIDAZOLE 500 MG: 500 INJECTION, SOLUTION INTRAVENOUS at 04:29

## 2025-04-09 RX ADMIN — POLYETHYLENE GLYCOL 3350 17 G: 17 POWDER, FOR SOLUTION ORAL at 09:29

## 2025-04-09 RX ADMIN — METHOCARBAMOL 750 MG: 750 TABLET ORAL at 17:36

## 2025-04-09 RX ADMIN — GABAPENTIN 750 MG: 250 SOLUTION ORAL at 03:00

## 2025-04-09 RX ADMIN — DOCUSATE SODIUM LIQUID 50 MG: 100 LIQUID ORAL at 21:20

## 2025-04-09 RX ADMIN — ENOXAPARIN SODIUM 40 MG: 40 INJECTION SUBCUTANEOUS at 11:55

## 2025-04-09 RX ADMIN — BUSPIRONE HYDROCHLORIDE 15 MG: 10 TABLET ORAL at 16:31

## 2025-04-09 RX ADMIN — QUETIAPINE FUMARATE 50 MG: 25 TABLET ORAL at 03:16

## 2025-04-09 RX ADMIN — TRAZODONE HYDROCHLORIDE 100 MG: 50 TABLET ORAL at 21:23

## 2025-04-09 RX ADMIN — METHOCARBAMOL 750 MG: 750 TABLET ORAL at 21:23

## 2025-04-09 RX ADMIN — HYDROXYZINE HYDROCHLORIDE 50 MG: 10 SYRUP ORAL at 21:18

## 2025-04-09 ASSESSMENT — ACTIVITIES OF DAILY LIVING (ADL)
ADLS_ACUITY_SCORE: 73
ADLS_ACUITY_SCORE: 73
ADLS_ACUITY_SCORE: 63
ADLS_ACUITY_SCORE: 73
ADLS_ACUITY_SCORE: 63
ADLS_ACUITY_SCORE: 73
ADLS_ACUITY_SCORE: 63
ADLS_ACUITY_SCORE: 73
ADLS_ACUITY_SCORE: 63
ADLS_ACUITY_SCORE: 73

## 2025-04-09 NOTE — SIGNIFICANT EVENT
Significant Event Note    Time of event: 7:03 PM April 8, 2025    Description of event:  I was notified about a lactic acid which came back elevated at 2.2.  Of note the patient was admitted to the hospital for acute cholecystitis.  He is currently on ceftriaxone and Flagyl.  They should provide appropriate coverage.  He is currently on less as well.  Will continue with current treatment and recheck lactic acid in 2 hours.  Additionally the patient has a history of hypoxic respiratory failure and is on oxygen which we will continue and check a vbg.  He has been persistently tachycardic since admission with heart rate mildly improved, will continue to monitor.    Addendum: Rj had a seizure in the evening for which a rapid response was called. I was not notified about this and when his lactic acid was rechecked and elevated I started him on a fluid bolus for suspected sepsis. Given his recent seizure I have asked his bedside nurse to stop his fluid bolus and recheck his lactic acid as it could be elevated due to seizure activity.     Jenaro Reece MD

## 2025-04-09 NOTE — PROGRESS NOTES
General Surgery Progress Note    Admission Date: 4/7/2025  Today's Date: 4/9/2025         Assessment:      Rj Licea is a 29 year old male with gangrenous and necrotic cholecystitis s/p open cholecystectomy with drain placement POD 1.          Plan:   - RRT overnight for seizurelike activity, elevated lactate. Greatly appreciate medical teams cares, defer ongoing medical management to primary service  - Start trickle tube feeds now, if tolerating well for a few hours can slowly advance to goal rate tonight or tomorrow. At home Rj receives all feeds during the day, none at night due to reflux  - Tylenol and robaxin scheduled for pain. Oxy and IV dilaudid available for breakthrough  - Rocephin + Flagyl, continue for gangrenous cholecystitis  - Protonix daily. Bowel regimen with daily miralax and BID senna-docusate  - Activity as tolerated, consider PT/OT consult tomorrow as long as he remains stable throughout today. PCDs for DVT ppx, encourage pulmonary toilet  - AM labs pending. If hemoglobin stable, start DVT ppx with Lovenox  - Surgical drain to remain in place, strip every shift and monitor output. Change dressing daily  - Continue to monitor closely        Interval History:   Temp 100.1 this morning, continues with tachycardia, hypertensive at times. Overnight events noted. Rj's mother is at bedside and states that Rj overall looks pretty good to her. He doesn't appear uncomfortable, has rested well. Abdomen looks distended but he doesn't seem bothered by it. Patient unable to provide history due to developmental delay.   Good UOP.           Physical Exam:   BP (!) 143/85 (BP Location: Right arm)   Pulse 100   Temp 97.3  F (36.3  C) (Temporal)   Resp 23   Wt 47.2 kg (104 lb)   SpO2 94%   BMI 19.33 kg/m    I/O last 3 completed shifts:  In: 2550 [I.V.:2300]  Out: 1910 [Urine:1500; Drains:110; Blood:300]  General: NAD, resting comfortably in bed  Respiratory: non-labored breathing. O2 cannula  in place  Abdomen: distended but soft, dry dressings in place over surgical incisions. No erythema or drainage. CARLITO drain with serosanguinous fluid       LABS:  Recent Labs   Lab Test 04/08/25 0957 04/07/25 2211 12/18/24  1509   WBC 23.8* 15.6* 5.5   HGB 17.1 17.1 16.6   MCV 98 96 99   * 120* 128*      Recent Labs   Lab Test 04/08/25 0957 04/07/25 2211 12/18/24  1509   POTASSIUM 4.5 4.5 4.6   CHLORIDE 100 94* 99   CO2 24 27 30*   BUN 8.9 11.0 15.5   CR 0.52* 0.49* 0.76   ANIONGAP 12 10 11      Recent Labs   Lab Test 04/08/25  0957 04/07/25  2211 09/10/24  1034 06/07/19  1315 04/23/19  1314   ALBUMIN 3.1* 4.0 4.2   < > 3.7   BILITOTAL 0.7 0.8 0.5   < > 0.4   ALT 10 12 12   < > 40   AST 21 22 30   < > 37   ALKPHOS 84 101 103   < > 82   AMYLASE  --   --   --   --  63    < > = values in this interval not displayed.        -------------------------------    Hazel Molina PA-C  [facility]  [phone]

## 2025-04-09 NOTE — CODE/RAPID RESPONSE
"[facility]    House LYNDSAY RRT Note  4/8/2025   Time Called: 1922    RRT called for: Seizure like activity    Assessment & Plan     Seizure like activity possibly 2/2 critical illness, recent anesthesia in setting of PMH seizure disorder.  Acute on chronic hypoxic respiratory failure 2/2 above, recent anesthesia in setting of PMH moderate persistent asthma, chronic bronchitis.  Sinus tachycardia suspect multifactorial 2/2 sepsis, ?pain.  - Upon arrival, pt lying in bed, no obvious seizure activity noted, in no overt distress with VS noting O2 sats > 92% on 8L O2 via oxymask, SBP 140s, HR 130s, RR 20s, afebrile.  Nursing notes pt's mother, Darcy, present at pt's bedside, noted pt having seizure (similar to seizures occurring PTA) and ran into the hallway calling for help.  Pt's mother notes pt's BUE flexed upward with clenching of fists and was incontinent of urine that lasted for < 1 min.  Pt's mother notes pt generally postictal.  Pt's mother notes at baseline, pt interactive, able to follow simple commands, is blind bilaterally.  Pt's mother notes no obvious concern for aspiration with above event.  Of note, pt's mother states she has been instructed to contact 911 if pt has a seizure lasting > 1 min; notes has been seizure free for \"quite some time\" with current medication regimen.        INTERVENTIONS:  - Will formally order seizure precautions  - Continues on PTA clobazam and valproic acid.  - Continue on capnography overnight, including continuous pulse oximetry    At the end of the RRT pt's O2 sats > 88% (pt's PTA goal) on 5L O2     Addendum: 2115: Cross covering hospitalist ordered VBG in setting of baseline respiratory failure; noted pH 7.24, CO2 76.  Flying squad to reassess pt.  Updated by flying squad that pt's capnography with CO2 in lower 40s, states pt's mother notes pt recently aroused for a little bit.  Not on CPAP or Bipap PTA.  States pt's mother notes recently had an " outpatient pulmonology appointment noted pt with worsening restrictive lung disease.  Noted has repeat lactic acid.  Will repeat VBG in 1 hour as capnography remains WNL, hold HS sleeping medications.      Addendum: 2330: Noted repeat VBG with pH 7.31, corrects to 7.36 arterially with CO2 63.  At this time, will defer Bipap therapy.  Continue capnography throughout the night and update writer if continues to alarm low CO2 indicative of pt retaining CO2.    Discussed with and defer further cares to nursing and hospitalist    Interval History     Rj Licea is a 29 year old male who was admitted on 4/7/2025 for lethargy, fever, chills.    Medical history significant for:   Past Medical History:   Diagnosis Date    Anxiety     Autism     Chronic bronchitis (H)     Depression     fluoxetine, risperidone    Developmental delay     Epilepsy 05/01/2011    Feeding by G-tube     GERD (gastroesophageal reflux disease)     Ineffective airway clearance     Insomnia     melatonin, clonazepam    Kidney stone     Moderate persistent asthma     Premature infant - 24 weeks     Restrictive lung disease     Retinopathy of prematurity     led to blindness    SBO (small bowel obstruction)      Code Status: Full Code    Allergies   No Known Allergies    Physical Exam   Vital Signs with Ranges:  Temp:  [97  F (36.1  C)-98  F (36.7  C)] 97  F (36.1  C)  Pulse:  [109-147] 134  Resp:  [12-42] 23  BP: (117-159)/() 141/86  SpO2:  [91 %-99 %] 99 %  I/O last 3 completed shifts:  In: 3650 [I.V.:2300; IV Piggyback:1100]  Out: 750 [Urine:450; Blood:300]    Constitutional: Pt lying in bed, no obvious seizure activity noted, in no overt distress  Neck: No upper airway stridor or wheezes, no obvious secretions  Pulmonary: In no overt respiratory distress, clear to auscultation bilaterally, no crackles or wheezes noted  Cardiovascular: Tachycardic, regular rate and rhythm, normal S1S2, no murmur, rub or gallop noted  GI: Soft, nondistended,  no obvious tenderness with palpation, noted CARLITO with sanguinous drainage, noted PEG tube, has abdominal binder in place  Skin/Integumen: Warm, dry  Neuro: Nonverbal, blind, does not follow commands at this time  Psych:  Calm  Extremities: No peripheral edema    Data     VBG:  Recent Labs   Lab 04/08/25  2318 04/08/25 2059 04/07/25  2201   PHV 7.31* 7.24* 7.36   PO2V 43 20* 96*   PCO2V 63* 76* 55*   HCO3V 32* 32* 31*     Medical Decision Making       25 MINUTES SPENT BY ME on the date of service doing chart review, history, exam, documentation & further activities per the note.       ADRIAN Kelley CNP  Hospitalist-House LYNDSAY  Hospitalist Service  Securely message with [employer] (more info)  Text page via [facility] Paging/Directory

## 2025-04-09 NOTE — PLAN OF CARE
4/8/25  [id number]    Orientation: A/Ox2-3    Vitals/Tele:VSS, except tachy HR in the 130s most of shift. 2 L via capno    IV Access/drains: L CARLITO drain, G tube, external cath    Diet: NPO    Mobility: Ax2 w lift    GI/: Incontinent of B&B     Wound/Skin: surgical sites x2 CDI    Discharge Plan: TBD    Other: Pt on seizure precautions, all medication given through G-tube. Bilaterally blind. Refuses T&R at times. Lactic acid and CO2 was elevated, lactic now within normal range. CO2 still elevated at 63.      See Flow sheets for assessment

## 2025-04-09 NOTE — PROGRESS NOTES
[facility]    Medicine Progress Note - Hospitalist Service    Date of Admission:  4/7/2025    Assessment & Plan   Rj Licea is a 29 year old male with past medical history significant for developmental delay, autism, asthma, chronic hypercapnic and hypoxic respiratory failure who presents with lethargy, f/c. Admitted on 4/7/2025.     Acute cholecystitis s/p cholecystectomy  Leukocytosis  *Presents with lethargy, fever, chills. RUQ tenderness on exam.   *CT abd/pelvis with distended gallbladder with mild pericholecystic stranding, findings suggest acute cholecystitis   *S/p cholecystectomy on 4/8/2025. Initially laparoscopic approach but had to convert to open due to dilated colon in RUQ, adhesions to liver, and minimal space in abdomen with insufflation. Gallbladder was noted to be gangrenous and necrotic.  - General surgery consulted   - Drain management per general surgery  - Ceftriaxone IV + metronidazole IV  - Acetaminophen PO, hydromorphone IV PRN  - Ondansetron IV/PO, prochlorperazine IV/PO PRN   - Trend WBC  - Monitor fever curve     Sinus tachycardia  Patient has been in sinus tachycardia for much of his admission. He was initially in atrial flutter when he was seen in the ED and was given metoprolol for that. His tachycardia is most likely a response to his acute illness and post-op setting. Etiology is multifactorial including infection that is still being treated, recent removal of gangrenous/necrotic gallbladder, and pain associated with his surgery.   - Labs are stable with improving WBC, stable hemoglobin, and no other clear etiology to explain his persistent sinus tachycardia.  - Pain control with Dilaudid. Note that patient is nonverbal and does not ask for pain medication. He has very mild grimacing of his face with palpation of his abdomen and is more talkative when he is in pain but otherwise can be difficult to tell. Monitor vitals closely and dose pain meds when  he is showing nonverbal signs of pain.  - Currently on maintenance IV fluids while awaiting restart of tube feeds via G tube. Hold these temporarily to bolus 1 L of LR over 2 hours, possible he has not been adequately volume resuscitated and that is why he is tachycardic. Page provider after bolus completed with updated set of vitals.    Moderate persistent asthma   Chronic hypercapnic and hypoxic respiratory failure   Sleep disordered breathing  Ineffective airway clearance  Chronic bronchitis   Atelectasis   *No sign of acute exacerbation, suspect tachypnea secondary to cholecystitis (mother reports at baseline RR is increased as well), improved with sleeping  *CT chest with moderate volume loss right lower lobe and middle lobe, mild to moderate atelectasis and/or scarring left lower lobe  *Reviewed most recent outpatient pulmonology note, patient likely needs BiPAP long-term, but has severe facial aversion and behavioral uses and trial not recommended   *PTA uses 10L oxygen when O2 <88%, has had difficulty tolerating oxygen when not fatigued/ill  *RRT called after patient returned to the floor from PACU on 4/9.  There was a suspected seizure and then concern that he was having more trouble breathing with an elevated respiratory rate. VBG showed PCO2 76 which improved later in the evening to 63 with a pH of 7.31. Based on prior VBGs, patient likely is a chronic retainer and baseline pCO2 is ~60 so he is near his baseline currently.  - Continue prior to admission Spiriva and Advair. Cannot use formulary substitutions as patient has a special spacer that does not work with our formulary substitutions.  Bedside RN spoke with RT who said they do not have a spacer available for our formulary substitutions either.  - PTA Azithromycin   - Oxygen as needed  - RCAT for pulmonary hygiene, lung expansion     Mild hyponatremia, resolved  Sodium 131 on admission. Now resolved.    Chronic thrombocytopenia   Platelet count 120k.  At baseline.   - Monitor CBC    Hyperglycemia  Glucose 294 on BMP. No prior hx of DM. No recent steroid use. A1C 5.9%. Likely reactive due to infection, stress, pain. FSBS now stable <180 without insulin.    Seizure disorder  Noted that patient had a short seizure episode on 4/8/25 reported by mother who was at bedside when it happened. Not witnessed by staff and was fortunately short-lived not requiring medications.  - Continue prior to admission clobazam, valproic acid   - Seizure precautions    Depression  Anxiety  Behavioral disturbance  Developmental delay  Autism  - Continue prior to admission gabapentin, quetiapine, trazodone, hydroxyzine     Hypothyroidism  - Continue prior to admission levothyroxine    GERD  - Continue prior to admission PPI    G-tube dependence  All medications and nutrition given per G tube  - Nutrition assisting with advancing tube feeds    Pressure injury, bilateral elbows, POA  - Wound RN consulted           Diet: Diet  NPO for Medical/Clinical Reasons Except for: Meds  Adult Formula Drip Feeding: Continuous Osmolite 1.5; Gastrostomy; Goal Rate: 10; mL/hr; OK to initiate @ 10 ml/hr. RD to re-check and re-assess advancing this afternoon    DVT Prophylaxis: Enoxaparin (Lovenox) SQ  [place] Catheter: Not present  Lines: None     Cardiac Monitoring: ACTIVE order. Indication: Tachyarrhythmias, acute (48 hours)  Code Status: Full Code      Clinically Significant Risk Factors         # Hyponatremia: Lowest Na = 131 mmol/L in last 2 days, will monitor as appropriate  # Hypochloremia: Lowest Cl = 94 mmol/L in last 2 days, will monitor as appropriate      # Hypoalbuminemia: Lowest albumin = 3.1 g/dL at 4/9/2025  9:31 AM, will monitor as appropriate   # Thrombocytopenia: Lowest platelets = 119 in last 2 days, will monitor for bleeding       # Acute Hypercapnic Respiratory Failure: based on venous blood gas results.  Continue supplemental oxygen and ventilatory support as indicated.             #  Asthma: noted on problem list        Social Drivers of Health    Physical Activity: Inactive (12/13/2024)    Exercise Vital Sign     Days of Exercise per Week: 0 days     Minutes of Exercise per Session: 0 min   Stress: Patient Declined (12/13/2024)    [employer] [place] [employer] - Occupational Stress Questionnaire     Feeling of Stress : Patient declined   Social Connections: Unknown (12/13/2024)    Social Connection and Isolation Panel [NHANES]     Frequency of Social Gatherings with Friends and Family: Patient declined          Disposition Plan     Medically Ready for Discharge: Anticipated in 2-4 Days pending pain control, improvement of HR and recovery from surgery             Tru Jensen MD  Hospitalist Service  [facility]  Securely message with [employer] (more info)  Text page via [employer] Paging/Directory   ______________________________________________________________________    Interval History   RRT called yesterday. Mother was at bedside and witnessed a suspected seizure event which was short-lived and over by the time staff came to the room. There was also concern about his respiratory status and VBG was obtained which showed elevated PCO2, he also had elevated lactic acid. Patient is followed by pulmonology and has known worsening restrictive lung disease per patient's mother. BiPAP was not initiated and pCO2 improved on its own during the night back to near his baseline.    Today, patient is very talkative and seems to be more agitated than usual per his mother.  She states that it is always but difficult to tell when he is in pain and she still has difficulty now being able to tell.  He does not respond saying he has pain and typically the most she can see is some grimacing.  She also states that sometimes he gets more agitated and that might be the only sign that he is in pain.  She is also concerned about his tachycardia which I discussed with her likely has  "multiple etiologies including pain, infection that is being treated still, and being in the postop setting.      Physical Exam   Vital Signs: Temp: 98.8  F (37.1  C) Temp src: Axillary BP: 130/67 Pulse: (!) 132   Resp: 16 SpO2: 92 % O2 Device: Nasal cannula Oxygen Delivery: 1 LPM  Weight: 104 lbs 0 oz  Constitutional: Awake, alert, cooperative, no apparent distress.   Pulmonary: No increased work of breathing, good air exchange, clear to auscultation bilaterally, no crackles or wheezing.  Cardiovascular: Tachycardic. Regular rhythm, normal S1 and S2, no S3 or S4. No murmurs, rubs, or gallops noted.  GI: Normal bowel sounds, soft, non-distended, non-tender.  No guarding or rebound.  Skin/Integumen: No cyanosis or jaundice. No rashes noted.  Extremities: No lower extremity edema.  Neuro: Cannot assess orientation due to mostly nonverbal status. Patient repeatedly yelling \"mom\" and \"I'm ready to go\" but is not able to respond to questions which is baseline per his mother. 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 Moves all extremities with no focal deficit identified.        Medical Decision Making       54 MINUTES SPENT BY ME on the date of service doing chart review, history, exam, documentation & further activities per the note.  Significant amount of time trying to setup use of patient's home inhalers and re-evaluation of sinus tachycardia    Data   ------------------------- PAST 24 HR DATA REVIEWED -----------------------------------------------    I have personally reviewed the following data over the past 24 hrs:    13.2 (H)  \   14.0   / 124 (L)     143 105 6.4 /  122 (H)   4.2 31 (H) 0.60 (L) \     ALT: 45 AST: 80 (H) AP: 79 TBILI: 0.4   ALB: 3.1 (L) TOT PROTEIN: 5.7 (L) LIPASE: N/A     Procal: N/A CRP: N/A Lactic Acid: 1.5         Imaging results reviewed over the past 24 hrs:   No results found for this or any previous visit (from the past 24 hours).  "

## 2025-04-09 NOTE — PROGRESS NOTES
Patient HR is in 130-140's. MD notified via [email or web address] and verbal- (face to face) while in the patient room. MD. Said he is aware and asked me to administer IV Dilaudid hoping HR will go down.      MD notified again via [email or web address] that family still concern because heart rate did not really go down after Dilaudid. Patient slept most of the day after IV Dilaudid and no facial expression of pain.   Instead MD ordered bolus because he believe high HR is related to pain since patient is unable to tell wether he is in pain or not. Bolus provided.

## 2025-04-09 NOTE — PROGRESS NOTES
Tube feeding started at 10ml goal rate per hr. With flush 60 mL q 4 hours. Will wait for nutrition to advance goal rate

## 2025-04-09 NOTE — PROGRESS NOTES
Patient non verbal, araseli. 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 Tele, tachy-NSR.on 1liter of o2.   Tube feeding is running, mother is at bedside at all times. Patient calm and comfortable,No facial pain expression- slept most of my shift.

## 2025-04-09 NOTE — CONSULTS
CLINICAL NUTRITION SERVICES - BRIEF NOTE     Nutrition Prescription    Recommendations already ordered by Registered Dietitian (RD):  PM: Update Addendum --> Pt mom reported he tolerated the tropic feeds and is OK with advancing to 20 ml/hr for tonight and re-assess plan tomorrow, pending tolerance.   - Re-order head of bed >30 degrees with gastric feeds  - 4/9 PM: OK to increase to 20 ml/hr now (1600) and do NOT advance, RD to re-assess plan tomorrow (4/10)    AM: OK to start Osmolite 1.5 @ 10 ml/hr via PEG tube (CT on 4/7). RD to re-check and re-assess advancing, plan this afternoon   - Flush 60 mL q 4 hours.     Future/Additional Recommendations:  Monitor tolerance and ability to switch back to home regimen      REASON FOR ASSESSMENT  Rj Licea is a 29 year old male seen by Registered Dietitian for Provider Order - Registered Dietitian to Assess and Order TF per Medical Nutrition Therapy Protocol   - Comments: Start trickle tube feeds. If tolerating well for a few hours, please slowly advance to goal rate. Of note, patient only receives tube feeds during the day at home due to reflux with feeds at night. If possible, run tube feeds during the day and off at night.     NEW FINDINGS   4/8 - laparoscopic cholecystectomy. WOCN following but was unable to visit pt yesterday.  4/9 - Received consult to start trickle feeds as above. Will order now, then re-check and re-assess in the afternoon     - Meds: Synthroid, LR @ 100 ml/hr, Miralax (Held), Senna due  - Labs: -199 (Yesterday: K+ NL, Cr 0.52) ---> PM update = Baseline labs: Lytes NL, , Cr 0.6 (L)    CURRENT NUTRITION ORDERS  Diet: NPO     CURRENT INTAKE/TOLERANCE  - TF held since admission     ASSESSED NUTRITION NEEDS  Dosing Weight: 47.2 kg, based on actual wt  Estimated Energy Needs: 1274-0035 kcals/day (35 - 40 kcals/kg)  Justification: Post-op, match home regimen  Estimated Protein Needs: 70+ grams protein/day (1.5+ grams of  pro/kg)  Justification: Post-op  Estimated Fluid Needs: 1 mL/kcal/day   Justification: Maintenance    INTERVENTIONS  Implementation  Enteral Nutrition - Initiate    Monitoring/Evaluation  Will continue to monitor and evaluate per protocol.

## 2025-04-10 LAB
ALBUMIN SERPL BCG-MCNC: 2.4 G/DL (ref 3.5–5.2)
ALBUMIN UR-MCNC: NEGATIVE MG/DL
ALP SERPL-CCNC: 81 U/L (ref 40–150)
ALT SERPL W P-5'-P-CCNC: 28 U/L (ref 0–70)
ANION GAP SERPL CALCULATED.3IONS-SCNC: 6 MMOL/L (ref 7–15)
APPEARANCE UR: CLEAR
AST SERPL W P-5'-P-CCNC: 36 U/L (ref 0–45)
BILIRUB DIRECT SERPL-MCNC: 0.12 MG/DL (ref 0–0.3)
BILIRUB SERPL-MCNC: 0.2 MG/DL
BILIRUB UR QL STRIP: NEGATIVE
BUN SERPL-MCNC: 6.5 MG/DL (ref 6–20)
CALCIUM SERPL-MCNC: 8.4 MG/DL (ref 8.8–10.4)
CHLORIDE SERPL-SCNC: 103 MMOL/L (ref 98–107)
COLOR UR AUTO: YELLOW
CREAT SERPL-MCNC: 0.54 MG/DL (ref 0.67–1.17)
CREAT SERPL-MCNC: 0.55 MG/DL (ref 0.67–1.17)
CREAT UR-MCNC: 69.3 MG/DL
EGFRCR SERPLBLD CKD-EPI 2021: >90 ML/MIN/1.73M2
EGFRCR SERPLBLD CKD-EPI 2021: >90 ML/MIN/1.73M2
ERYTHROCYTE [DISTWIDTH] IN BLOOD BY AUTOMATED COUNT: 13 % (ref 10–15)
GLUCOSE BLDC GLUCOMTR-MCNC: 172 MG/DL (ref 70–99)
GLUCOSE SERPL-MCNC: 189 MG/DL (ref 70–99)
GLUCOSE UR STRIP-MCNC: 500 MG/DL
HCO3 SERPL-SCNC: 31 MMOL/L (ref 22–29)
HCT VFR BLD AUTO: 30 % (ref 40–53)
HGB BLD-MCNC: 10.9 G/DL (ref 13.3–17.7)
HGB BLD-MCNC: 9.9 G/DL (ref 13.3–17.7)
HGB UR QL STRIP: NEGATIVE
KETONES UR STRIP-MCNC: NEGATIVE MG/DL
LACTATE SERPL-SCNC: 1.6 MMOL/L (ref 0.7–2)
LEUKOCYTE ESTERASE UR QL STRIP: NEGATIVE
MAGNESIUM SERPL-MCNC: 1.8 MG/DL (ref 1.7–2.3)
MCH RBC QN AUTO: 33.2 PG (ref 26.5–33)
MCHC RBC AUTO-ENTMCNC: 33 G/DL (ref 31.5–36.5)
MCV RBC AUTO: 101 FL (ref 78–100)
NITRATE UR QL: NEGATIVE
PATH REPORT.COMMENTS IMP SPEC: NORMAL
PATH REPORT.COMMENTS IMP SPEC: NORMAL
PATH REPORT.FINAL DX SPEC: NORMAL
PATH REPORT.GROSS SPEC: NORMAL
PATH REPORT.MICROSCOPIC SPEC OTHER STN: NORMAL
PATH REPORT.RELEVANT HX SPEC: NORMAL
PH UR STRIP: 6.5 [PH] (ref 5–7)
PHOSPHATE SERPL-MCNC: 2.4 MG/DL (ref 2.5–4.5)
PHOTO IMAGE: NORMAL
PLATELET # BLD AUTO: 122 10E3/UL (ref 150–450)
POTASSIUM SERPL-SCNC: 3.5 MMOL/L (ref 3.4–5.3)
PROT SERPL-MCNC: 4.5 G/DL (ref 6.4–8.3)
RBC # BLD AUTO: 2.98 10E6/UL (ref 4.4–5.9)
SODIUM SERPL-SCNC: 140 MMOL/L (ref 135–145)
SODIUM UR-SCNC: 52 MMOL/L
SP GR UR STRIP: 1.02 (ref 1–1.03)
UROBILINOGEN UR STRIP-MCNC: NORMAL MG/DL
WBC # BLD AUTO: 8.9 10E3/UL (ref 4–11)

## 2025-04-10 PROCEDURE — 99233 SBSQ HOSP IP/OBS HIGH 50: CPT | Performed by: STUDENT IN AN ORGANIZED HEALTH CARE EDUCATION/TRAINING PROGRAM

## 2025-04-10 PROCEDURE — 82248 BILIRUBIN DIRECT: CPT | Performed by: STUDENT IN AN ORGANIZED HEALTH CARE EDUCATION/TRAINING PROGRAM

## 2025-04-10 PROCEDURE — 250N000013 HC RX MED GY IP 250 OP 250 PS 637: Performed by: PHYSICIAN ASSISTANT

## 2025-04-10 PROCEDURE — 88304 TISSUE EXAM BY PATHOLOGIST: CPT | Mod: 26 | Performed by: PATHOLOGY

## 2025-04-10 PROCEDURE — 85014 HEMATOCRIT: CPT | Performed by: STUDENT IN AN ORGANIZED HEALTH CARE EDUCATION/TRAINING PROGRAM

## 2025-04-10 PROCEDURE — 36415 COLL VENOUS BLD VENIPUNCTURE: CPT | Performed by: STUDENT IN AN ORGANIZED HEALTH CARE EDUCATION/TRAINING PROGRAM

## 2025-04-10 PROCEDURE — 83605 ASSAY OF LACTIC ACID: CPT | Performed by: STUDENT IN AN ORGANIZED HEALTH CARE EDUCATION/TRAINING PROGRAM

## 2025-04-10 PROCEDURE — 250N000013 HC RX MED GY IP 250 OP 250 PS 637: Performed by: INTERNAL MEDICINE

## 2025-04-10 PROCEDURE — 258N000003 HC RX IP 258 OP 636: Performed by: PHYSICIAN ASSISTANT

## 2025-04-10 PROCEDURE — 250N000011 HC RX IP 250 OP 636: Mod: JZ | Performed by: PHYSICIAN ASSISTANT

## 2025-04-10 PROCEDURE — 83735 ASSAY OF MAGNESIUM: CPT | Performed by: STUDENT IN AN ORGANIZED HEALTH CARE EDUCATION/TRAINING PROGRAM

## 2025-04-10 PROCEDURE — 258N000003 HC RX IP 258 OP 636: Performed by: STUDENT IN AN ORGANIZED HEALTH CARE EDUCATION/TRAINING PROGRAM

## 2025-04-10 PROCEDURE — 81003 URINALYSIS AUTO W/O SCOPE: CPT | Performed by: STUDENT IN AN ORGANIZED HEALTH CARE EDUCATION/TRAINING PROGRAM

## 2025-04-10 PROCEDURE — 80048 BASIC METABOLIC PNL TOTAL CA: CPT | Performed by: STUDENT IN AN ORGANIZED HEALTH CARE EDUCATION/TRAINING PROGRAM

## 2025-04-10 PROCEDURE — 250N000013 HC RX MED GY IP 250 OP 250 PS 637: Performed by: STUDENT IN AN ORGANIZED HEALTH CARE EDUCATION/TRAINING PROGRAM

## 2025-04-10 PROCEDURE — 120N000001 HC R&B MED SURG/OB

## 2025-04-10 PROCEDURE — 250N000011 HC RX IP 250 OP 636: Performed by: PHYSICIAN ASSISTANT

## 2025-04-10 PROCEDURE — 250N000009 HC RX 250: Performed by: STUDENT IN AN ORGANIZED HEALTH CARE EDUCATION/TRAINING PROGRAM

## 2025-04-10 PROCEDURE — 85018 HEMOGLOBIN: CPT | Performed by: STUDENT IN AN ORGANIZED HEALTH CARE EDUCATION/TRAINING PROGRAM

## 2025-04-10 PROCEDURE — 82565 ASSAY OF CREATININE: CPT | Performed by: STUDENT IN AN ORGANIZED HEALTH CARE EDUCATION/TRAINING PROGRAM

## 2025-04-10 PROCEDURE — 84100 ASSAY OF PHOSPHORUS: CPT | Performed by: STUDENT IN AN ORGANIZED HEALTH CARE EDUCATION/TRAINING PROGRAM

## 2025-04-10 PROCEDURE — 84300 ASSAY OF URINE SODIUM: CPT | Performed by: STUDENT IN AN ORGANIZED HEALTH CARE EDUCATION/TRAINING PROGRAM

## 2025-04-10 PROCEDURE — 82570 ASSAY OF URINE CREATININE: CPT | Performed by: STUDENT IN AN ORGANIZED HEALTH CARE EDUCATION/TRAINING PROGRAM

## 2025-04-10 RX ORDER — OXYCODONE HCL 20 MG/ML
2.5-5 CONCENTRATE, ORAL ORAL EVERY 6 HOURS PRN
Status: DISCONTINUED | OUTPATIENT
Start: 2025-04-10 | End: 2025-04-14 | Stop reason: HOSPADM

## 2025-04-10 RX ORDER — ACETAMINOPHEN 325 MG/10.15ML
500 LIQUID ORAL 4 TIMES DAILY
Status: COMPLETED | OUTPATIENT
Start: 2025-04-10 | End: 2025-04-11

## 2025-04-10 RX ORDER — HYDROMORPHONE HYDROCHLORIDE 1 MG/ML
0.5 INJECTION, SOLUTION INTRAMUSCULAR; INTRAVENOUS; SUBCUTANEOUS
Status: DISCONTINUED | OUTPATIENT
Start: 2025-04-10 | End: 2025-04-14 | Stop reason: HOSPADM

## 2025-04-10 RX ADMIN — VALPROIC ACID 350 MG: 250 SOLUTION ORAL at 20:20

## 2025-04-10 RX ADMIN — METHOCARBAMOL 750 MG: 750 TABLET ORAL at 17:44

## 2025-04-10 RX ADMIN — METHOCARBAMOL 750 MG: 750 TABLET ORAL at 09:27

## 2025-04-10 RX ADMIN — CLOBAZAM 10 MG: 2.5 SUSPENSION ORAL at 21:08

## 2025-04-10 RX ADMIN — ERYTHROMYCIN: 5 OINTMENT OPHTHALMIC at 20:30

## 2025-04-10 RX ADMIN — QUETIAPINE FUMARATE 600 MG: 300 TABLET ORAL at 22:15

## 2025-04-10 RX ADMIN — ALBUTEROL SULFATE 2 PUFF: 90 INHALANT RESPIRATORY (INHALATION) at 09:29

## 2025-04-10 RX ADMIN — FLUTICASONE PROPIONATE AND SALMETEROL XINAFOATE 2 PUFF: 230; 21 AEROSOL, METERED RESPIRATORY (INHALATION) at 20:31

## 2025-04-10 RX ADMIN — OXYCODONE HYDROCHLORIDE 2.5 MG: 100 SOLUTION ORAL at 16:45

## 2025-04-10 RX ADMIN — VALPROIC ACID 350 MG: 250 SOLUTION ORAL at 13:01

## 2025-04-10 RX ADMIN — METHOCARBAMOL 750 MG: 750 TABLET ORAL at 12:57

## 2025-04-10 RX ADMIN — Medication 15 ML: at 09:28

## 2025-04-10 RX ADMIN — METRONIDAZOLE 500 MG: 500 INJECTION, SOLUTION INTRAVENOUS at 12:58

## 2025-04-10 RX ADMIN — METHOCARBAMOL 750 MG: 750 TABLET ORAL at 22:15

## 2025-04-10 RX ADMIN — SODIUM PHOSPHATE, MONOBASIC, MONOHYDRATE AND SODIUM PHOSPHATE, DIBASIC, ANHYDROUS 9 MMOL: 142; 276 INJECTION, SOLUTION INTRAVENOUS at 17:45

## 2025-04-10 RX ADMIN — BUSPIRONE HYDROCHLORIDE 15 MG: 10 TABLET ORAL at 17:44

## 2025-04-10 RX ADMIN — ACETAMINOPHEN 500 MG: 325 SUSPENSION ORAL at 22:14

## 2025-04-10 RX ADMIN — Medication 40 MG: at 22:14

## 2025-04-10 RX ADMIN — SENNOSIDES 10 ML: 8.8 LIQUID ORAL at 20:20

## 2025-04-10 RX ADMIN — FLUTICASONE PROPIONATE AND SALMETEROL XINAFOATE 2 PUFF: 230; 21 AEROSOL, METERED RESPIRATORY (INHALATION) at 09:30

## 2025-04-10 RX ADMIN — CEFTRIAXONE SODIUM 2 G: 2 INJECTION, POWDER, FOR SOLUTION INTRAMUSCULAR; INTRAVENOUS at 02:02

## 2025-04-10 RX ADMIN — SENNOSIDES 10 ML: 8.8 LIQUID ORAL at 13:00

## 2025-04-10 RX ADMIN — METRONIDAZOLE 500 MG: 500 INJECTION, SOLUTION INTRAVENOUS at 20:14

## 2025-04-10 RX ADMIN — TRAZODONE HYDROCHLORIDE 100 MG: 50 TABLET ORAL at 22:15

## 2025-04-10 RX ADMIN — ACETAMINOPHEN 500 MG: 325 SUSPENSION ORAL at 17:44

## 2025-04-10 RX ADMIN — DOCUSATE SODIUM LIQUID 100 MG: 100 LIQUID ORAL at 20:19

## 2025-04-10 RX ADMIN — HYDROXYZINE HYDROCHLORIDE 50 MG: 10 SYRUP ORAL at 22:14

## 2025-04-10 RX ADMIN — GABAPENTIN 750 MG: 250 SOLUTION ORAL at 22:14

## 2025-04-10 RX ADMIN — CLOBAZAM 10 MG: 2.5 SUSPENSION ORAL at 09:27

## 2025-04-10 RX ADMIN — DOCUSATE SODIUM LIQUID 100 MG: 100 LIQUID ORAL at 12:59

## 2025-04-10 RX ADMIN — POLYETHYLENE GLYCOL 3350 17 G: 17 POWDER, FOR SOLUTION ORAL at 09:27

## 2025-04-10 RX ADMIN — BISACODYL 10 MG: 10 SUPPOSITORY RECTAL at 14:18

## 2025-04-10 RX ADMIN — BUSPIRONE HYDROCHLORIDE 15 MG: 10 TABLET ORAL at 09:27

## 2025-04-10 RX ADMIN — ACETAMINOPHEN 975 MG: 325 SUSPENSION ORAL at 09:26

## 2025-04-10 RX ADMIN — BUSPIRONE HYDROCHLORIDE 15 MG: 10 TABLET ORAL at 22:15

## 2025-04-10 RX ADMIN — METRONIDAZOLE 500 MG: 500 INJECTION, SOLUTION INTRAVENOUS at 03:32

## 2025-04-10 RX ADMIN — SODIUM CHLORIDE, POTASSIUM CHLORIDE, SODIUM LACTATE AND CALCIUM CHLORIDE: 600; 310; 30; 20 INJECTION, SOLUTION INTRAVENOUS at 13:22

## 2025-04-10 RX ADMIN — VALPROIC ACID 300 MG: 250 SOLUTION ORAL at 09:28

## 2025-04-10 RX ADMIN — LEVOTHYROXINE SODIUM 25 MCG: 25 TABLET ORAL at 09:26

## 2025-04-10 ASSESSMENT — ACTIVITIES OF DAILY LIVING (ADL)
ADLS_ACUITY_SCORE: 73
ADLS_ACUITY_SCORE: 73
ADLS_ACUITY_SCORE: 69
ADLS_ACUITY_SCORE: 73
ADLS_ACUITY_SCORE: 69
ADLS_ACUITY_SCORE: 73
ADLS_ACUITY_SCORE: 69
ADLS_ACUITY_SCORE: 69
ADLS_ACUITY_SCORE: 73
ADLS_ACUITY_SCORE: 69
ADLS_ACUITY_SCORE: 73
ADLS_ACUITY_SCORE: 69
ADLS_ACUITY_SCORE: 69
ADLS_ACUITY_SCORE: 73

## 2025-04-10 NOTE — PROGRESS NOTES
Pt in non-verbal, SAHIL to assess orientation. On TELE, VSS except Tachy HR. Has CARLITO drain. On Gtube @ 20ml/hr, with free H2O flushes. On NPO. Noted surgical incision x2. CDI. Pt is on seizure precaution . Mother is at bedside. T&R. On intermittent IV abx.

## 2025-04-10 NOTE — PROGRESS NOTES
General Surgery Progress Note    Admission Date: 4/7/2025  Today's Date: 4/10/2025         Assessment:      Rj Licea is a 29 year old male with gangrenous and necrotic cholecystitis s/p open cholecystectomy with drain placement POD 2.          Plan:   - Tube feeds per dietitian. Of note, Rj receives all feeds during the day, none at night due to reflux at home.  - Tylenol and robaxin scheduled for pain. Oxy and IV dilaudid available for breakthrough  - White count wnl. Rocephin + Flagyl, okay to transition to Augmentin tomorrow from our standpoint (recommend completing 10 days total of antibiotics).  - Protonix daily. Bowel regimen with daily miralax and BID senna-docusate  - Activity as tolerated. Primary could consider PT/OT consult. PCDs for DVT ppx, encourage pulmonary toilet  - Given one dose of lovenox yesterday for DVT ppx. Hgb low at 9.9 with likely dilutional component, will recheck tomorrow before resuming.  **ADDENDUM**  Discussed with Dr. Jensen about possible imaging given Hgb drop. Recheck at 10.9 which seems more accurate. CARLITO drain more serous than sanguinous and patient with 300 ml intra-op blood loss. Discussed with Dr. Lopez who feels hold off imaging unless significant drop tomorrow.  - Surgical drain to remain in place, strip every shift and monitor output. Change dressing daily  - RRT 4/8 for seizure-like activity, on precautions. Greatly appreciate medical teams cares, defer ongoing medical management to primary service    DISPOSITION:  - Per primary team. From surgical standpoint, okay with discharge home tomorrow if Hgb stable, off supplemental oxygen, and having bowel function.  - Anticipate CARLITO drain removal prior to discharge and clinic follow up in 2 weeks for staple removal.        Interval History:   Rj Licea is seen with his mother at bedside. She feels like he is more himself today but states he is most comfortable at home. He has not appeared in pain and requests  to go home whenever he wakes up. Tube feeds advancing as tolerated to goal of 50 ml/hr with plans for bolus schedule tomorrow vs when home. Appears to be tolerating well. Plan for suppository later today if no BM. Remains tachycardic, afebrile, on 1 LPM of supplemental oxygen.          Physical Exam:   /75 (BP Location: Right arm)   Pulse (!) 127   Temp 97.7  F (36.5  C) (Axillary)   Resp 22   Wt 47.2 kg (104 lb)   SpO2 93%   BMI 19.33 kg/m    I/O last 3 completed shifts:  In: 465 [NG/GT:465]  Out: 1540 [Urine:1400; Drains:140]  General: NAD, resting comfortably in bed  Respiratory: non-labored breathing. O2 cannula in place  Abdomen: distended but soft, scant drainage on surgical incisions, peak below without active drainage. CARLITO drain with serosanguinous fluid- 140 ml/24 hours       LABS:  Recent Labs   Lab Test 04/10/25  1054 04/09/25  0931 04/08/25  0957   WBC 8.9 13.2* 23.8*   HGB 9.9* 14.0 17.1   * 101* 98   * 124* 119*      Recent Labs   Lab Test 04/10/25  1054 04/09/25  0931 04/08/25  0957   POTASSIUM 3.5 4.2 4.5   CHLORIDE 103 105 100   CO2 31* 31* 24   BUN 6.5 6.4 8.9   CR 0.55* 0.60* 0.52*   ANIONGAP 6* 7 12      Recent Labs   Lab Test 04/10/25  1054 04/09/25  0931 04/08/25  0957 06/07/19  1315 04/23/19  1314   ALBUMIN 2.4* 3.1* 3.1*   < > 3.7   BILITOTAL 0.2 0.4 0.7   < > 0.4   ALT 28 45 10   < > 40   AST 36 80* 21   < > 37   ALKPHOS 81 79 84   < > 82   AMYLASE  --   --   --   --  63    < > = values in this interval not displayed.        -------------------------------    Lo Dia PA-C  [facility]  [phone]

## 2025-04-10 NOTE — PLAN OF CARE
Goal Outcome Evaluation: progressing    4/7/25 admit, Lethargy, POD 2 Open Cholecystectomy  4/10/25, 3 p to 7 p    Orientation: Lethargic    Vitals/Tele: Tachycardic, on 1L O2, uses oxygen at home as needed, Patient was tachycardic the whole day, Hgb steady, HR went down to 122 after receiving 2.5 mg Oxy    IV Access/drains: PIV x 2 infusing    Diet: NPO, TF run rate increased at 40 ml from 30 ml. Can increase to goal rate at MN to 50 ml. 60 ml Flushes every 4hrs,     Mobility: A1 with mobilty aid at home, T and R here as needed    GI/: Retaining, no BM for 4 days, continue bowel regimen    Wound/Skin: Bilateral elbow PI, MDRPI a Enteral site, WOC following    Consults: Gen Surg, Nutrition following    Discharge Plan: Possibly tomorrow, mom is requesting if MD can order a commode fr home use    Other:    Suppository given from AM shift, NO BM fpr 4 days  Phos replacement on going        See Flow sheets for assessment

## 2025-04-10 NOTE — PROGRESS NOTES
[id number]  Surgery/POD:  open cholecystectomy with drain placement POD 2.     Patient non verbal, araseli. 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 Tele, tachy-NSR.on 1liter of o2.   Tube feeding is running 30ml/hr, mother is at bedside at all times. Patient calm and comfortable,No facial pain expression- slept most of my shift.       Patient is retaining, straight cath once

## 2025-04-10 NOTE — PROGRESS NOTES
[facility]    Medicine Progress Note - Hospitalist Service    Date of Admission:  4/7/2025    Assessment & Plan   Rj Licea is a 29 year old male with past medical history significant for developmental delay, autism, asthma, chronic hypercapnic and hypoxic respiratory failure who presents with lethargy, f/c. Admitted on 4/7/2025.     Acute cholecystitis s/p cholecystectomy  Leukocytosis  *Presents with lethargy, fever, chills. RUQ tenderness on exam.   *CT abd/pelvis with distended gallbladder with mild pericholecystic stranding, findings suggest acute cholecystitis   *S/p cholecystectomy on 4/8/2025. Initially laparoscopic approach but had to convert to open due to dilated colon in RUQ, adhesions to liver, and minimal space in abdomen with insufflation. Gallbladder was noted to be gangrenous and necrotic.  - General surgery consulted   - Drain management per general surgery  - Ceftriaxone IV + metronidazole IV  - Acetaminophen PO, hydromorphone IV PRN  - Ondansetron IV/PO, prochlorperazine IV/PO PRN   - Trend WBC  - Monitor fever curve     Sinus tachycardia  Patient has been in sinus tachycardia for much of his admission. He was initially in atrial flutter when he was seen in the ED and was given metoprolol for that. His tachycardia is most likely a response to his acute illness and post-op setting. Etiology is multifactorial including infection that is still being treated, recent removal of gangrenous/necrotic gallbladder, and pain associated with his surgery.   - With improving WBC to indicate resolving infeciton. Given a liter of LR on 4/9 to see if it is volume responsive which would indicate inadequate volume resuscitation but this did not make a difference in his heart rate. Most likely differential includes blood loss (noted hemoglobin drop since surgery which does not appear completely dilutional as platelets have not trended down as well) and uncontrolled pain.  - Pain  control with oxycodone and Dilaudid. Note that patient is mostly nonverbal and does not ask for pain medication. He has very mild grimacing of his face with palpation of his abdomen and is sometimes more talkative/agitated when he is in pain but otherwise can be difficult to tell. Monitor vitals closely and dose pain meds when he is showing nonverbal signs of pain.     Acute anemia  On 4/7 and 4/8, hemoglobin 17.1. Trended down to 14 on 4/9 and down to 9.9 on 4/10. Patient has only received ~3 L of bolused fluid apart from his maintenance fluid rate. WBC also trending down but this is more likely due to removal of his gallbladder and IV antibiotics treating his infection. His platelets have not trended down at all which would suggest this is not hemodilutional in etiology.  - DVT prophylaxis with enoxaparin held  - I have reached out to surgery to discuss possible imaging to investigate the trend down in hemoglobin. Lovenox held     Moderate persistent asthma   Chronic hypercapnic and hypoxic respiratory failure   Sleep disordered breathing  Ineffective airway clearance  Chronic bronchitis   Atelectasis   *No sign of acute exacerbation, suspect tachypnea secondary to cholecystitis (mother reports at baseline RR is increased as well), improved with sleeping  *CT chest with moderate volume loss right lower lobe and middle lobe, mild to moderate atelectasis and/or scarring left lower lobe  *Reviewed most recent outpatient pulmonology note, patient likely needs BiPAP long-term, but has severe facial aversion and behavioral uses and trial not recommended   *PTA uses 10L oxygen when O2 <88%, has had difficulty tolerating oxygen when not fatigued/ill  *RRT called after patient returned to the floor from PACU on 4/9.  There was a suspected seizure and then concern that he was having more trouble breathing with an elevated respiratory rate. VBG showed PCO2 76 which improved later in the evening to 63 with a pH of 7.31. Based  on prior VBGs, patient likely is a chronic retainer and baseline pCO2 is ~60 so he is near his baseline currently.  - Continue prior to admission Spiriva and Advair. Cannot use formulary substitutions as patient has a special spacer that does not work with our formulary substitutions.  Bedside RN spoke with RT who said they do not have a spacer available for our formulary substitutions either.  - PTA Azithromycin   - Oxygen as needed, wean as able  - RCAT for pulmonary hygiene, lung expansion     Mild hyponatremia, resolved  Sodium 131 on admission. Now resolved.    Chronic thrombocytopenia   Platelet count 120k. At baseline.   - Monitor CBC    Hyperglycemia  Glucose 294 on BMP. No prior hx of DM. No recent steroid use. A1C 5.9%. Likely reactive due to infection, stress, pain. FSBS now stable <180 without insulin.    Seizure disorder  Noted that patient had a short seizure episode on 4/8/25 reported by mother who was at bedside when it happened. Not witnessed by staff and was fortunately short-lived not requiring medications.  - Continue prior to admission clobazam, valproic acid   - Seizure precautions    Depression  Anxiety  Behavioral disturbance  Developmental delay  Autism  - Continue prior to admission gabapentin, quetiapine, trazodone, hydroxyzine     Hypothyroidism  - Continue prior to admission levothyroxine    GERD  - Continue prior to admission PPI    G-tube dependence  All medications and nutrition given per G tube  - Nutrition assisting with advancing tube feeds  - Continue maintenance IVF while tube feeds are being advanced    Pressure injury, bilateral elbows, POA  - Wound RN consulted           Diet: Diet  NPO for Medical/Clinical Reasons Except for: Meds  Adult Formula Drip Feeding: Continuous Osmolite 1.5; Gastrostomy; Goal Rate: 50; mL/hr; Continue advancing by 10 ml q 6 hours until goal rate reached. Pt's mother OK with running overnight, also OK to hold if reflux issues arise. Ensure HOB is 30  ...    DVT Prophylaxis: Enoxaparin (Lovenox) SQ  [place] Catheter: Not present  Lines: None     Cardiac Monitoring: ACTIVE order. Indication: Tachyarrhythmias, acute (48 hours)  Code Status: Full Code      Clinically Significant Risk Factors               # Hypoalbuminemia: Lowest albumin = 2.4 g/dL at 4/10/2025 10:54 AM, will monitor as appropriate   # Thrombocytopenia: Lowest platelets = 122 in last 2 days, will monitor for bleeding       # Acute Hypercapnic Respiratory Failure: based on venous blood gas results.  Continue supplemental oxygen and ventilatory support as indicated.             # Asthma: noted on problem list        Social Drivers of Health    Physical Activity: Inactive (12/13/2024)    Exercise Vital Sign     Days of Exercise per Week: 0 days     Minutes of Exercise per Session: 0 min   Stress: Patient Declined (12/13/2024)    [employer] [place] [employer] - Occupational Stress Questionnaire     Feeling of Stress : Patient declined   Social Connections: Unknown (12/13/2024)    Social Connection and Isolation Panel [NHANES]     Frequency of Social Gatherings with Friends and Family: Patient declined          Disposition Plan     Medically Ready for Discharge: Anticipated in 2-4 Days pending pain control, improvement of HR and recovery from surgery. Also hemoglobin stabilization             Tru Jensen MD  Hospitalist Service  [facility]  Securely message with [employer] (more info)  Text page via [employer] Paging/Directory   ______________________________________________________________________    Interval History   NAEO. NNR. Today, patient is less agitated. He continues to be tachycardic but slightly better compared to yesterday. Discussed pain control with nursing staff and mother. Also noted to have hemoglobin trend down, so have reached out to surgery team to discuss if we need imaging to investigate this further. Op note reviewed which says  ml.    RRT  called yesterday. Mother was at bedside and witnessed a suspected seizure event which was short-lived and over by the time staff came to the room. There was also concern about his respiratory status and VBG was obtained which showed elevated PCO2, he also had elevated lactic acid. Patient is followed by pulmonology and has known worsening restrictive lung disease per patient's mother. BiPAP was not initiated and pCO2 improved on its own during the night back to near his baseline.    Today, patient is very talkative and seems to be more agitated than usual per his mother.  She states that it is always but difficult to tell when he is in pain and she still has difficulty now being able to tell.  He does not respond saying he has pain and typically the most she can see is some grimacing.  She also states that sometimes he gets more agitated and that might be the only sign that he is in pain.  She is also concerned about his tachycardia which I discussed with her likely has multiple etiologies including pain, infection that is being treated still, and being in the postop setting.      Physical Exam   Vital Signs: Temp: 97.7  F (36.5  C) Temp src: Axillary BP: 126/75 Pulse: (!) 127   Resp: 22 SpO2: 93 % O2 Device: None (Room air) Oxygen Delivery: 1 LPM  Weight: 104 lbs 0 oz  Constitutional: Awake, alert, cooperative, no apparent distress.   Pulmonary: No increased work of breathing, good air exchange, clear to auscultation bilaterally, no crackles or wheezing.  Cardiovascular: Tachycardic, slightly improved from yesterday. Regular rhythm, normal S1 and S2, no S3 or S4. No murmurs, rubs, or gallops noted.  GI: Normal bowel sounds, soft, non-distended. Noted to have grimacing with palpation of his abdomen, worse grimacing in RUQ but some still noted in the rest of the abdomen.  No guarding or rebound.  Skin/Integumen: No cyanosis or jaundice. No rashes noted.  Extremities: No lower extremity edema.  Neuro: Cannot assess  orientation due to mostly nonverbal status. Patient asleep for most of my exam, but did wake up and calling to his mother at the bedside when lab was in his room drawing bloodwork.  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 Moves all extremities with no focal deficit identified.        Medical Decision Making       51 MINUTES SPENT BY ME on the date of service doing chart review, history, exam, documentation & further activities per the note.      Data   ------------------------- PAST 24 HR DATA REVIEWED -----------------------------------------------    I have personally reviewed the following data over the past 24 hrs:    8.9  \   9.9 (L)   / 122 (L)     140 103 6.5 /  189 (H)   3.5 31 (H) 0.55 (L) \     ALT: 28 AST: 36 AP: 81 TBILI: 0.2   ALB: 2.4 (L) TOT PROTEIN: 4.5 (L) LIPASE: N/A       Imaging results reviewed over the past 24 hrs:   Recent Results (from the past 24 hours)   US Lower Extremity Venous Duplex Bilateral    Narrative    EXAM: US LOWER EXTREMITY VENOUS DUPLEX BILATERAL  LOCATION: [facility]  DATE: 4/9/2025    INDICATION: Hypoxic respiratory failure  COMPARISON: None available.  TECHNIQUE: Venous Duplex ultrasound of bilateral lower extremities with and without compression, augmentation and duplex. Color flow and spectral Doppler with waveform analysis performed.    FINDINGS: Exam includes the common femoral, femoral, popliteal veins as well as segmentally visualized deep calf veins and greater saphenous vein.     RIGHT: No deep vein thrombosis. No superficial thrombophlebitis.     LEFT: No deep vein thrombosis. No superficial thrombophlebitis.       Impression    IMPRESSION:    No deep venous thrombosis in the visualized bilateral lower extremities.

## 2025-04-10 NOTE — PROGRESS NOTES
CLINICAL NUTRITION SERVICES BRIEF NOTE    - Patient and mother seen in room. Tolerating TF @ 20 mL/hr w/o issue.   - Entered orders to continue advancing to goal of 50 mL/hr, advance by 10 ml q 6 hours.   - OK to hold overnight if reflux is an issue. --> Discussed goal rate with patient's mother Darcy, she is OK with running the TF overnight as long as HOB remains >30 degrees.     Recommendations:   - Consider transition back to bolus schedule tomorrow, would recommend half-carton boluses 10x daily every 1-1.5 hours.   - Mother is comfortable transitioning back to bolus after discharge if patient is ready to go home sooner than later.     RD available by LINAGORA for questions.     Ina Mata RD, LD  Pager: [phone]  Available on LINAG[employer]

## 2025-04-10 NOTE — PLAN OF CARE
Goal Outcome Evaluation:      Plan of Care Reviewed With: patient    Overall Patient Progress: improvingOverall Patient Progress: improving    Outcome Evaluation: Pain control . HR slightly improved    Date & Time: 04/09/25. [id number]  Surgery/POD#: 1  Behavior & Aggression:  Green   Fall Risk: Yes   Orientation:Patient alert and orientedX2. Sleepy and arouses to touch. Patient make needs at times.    ABNL VS/O2:VSS exp HR slightly  elevated, MD aware and new order obtained. On 1Litres oxygen CAPNO in place per MD.  TELE: NSR with tachy    ABNL Labs: CR 0.60  Pain Management: PAINAD utilized for pain assessment with score of 6,  schedule acetaminophen and robaxin was given with effective results . Heat utilized for pain.   Bowel/Bladder: Incontinnet , PVR check with 300ml and 346ml,male extremal catheter in place with moderate output of 500ml this shift . No BM this shift .   Drains: IV LR infusing 100ml/hr   Wounds/incisions : Dry and intact . Abdominal binder in place   Diet:NPO . On  Continuous feeding 10ml/hr advance @1700 to 20ml/hr. Patient tolerated it well . Total given feeding given  80ml. Total free water  before and after meds given this shift 210 ml   Number of times: Not out of bed this shift   Tests/Procedures: US on lower  Extremity  to  rule out DVT   Anticipated  DC Date: TBD   Significant Information: CARLITO drain in place with bloody/light red with 50ml out put. Turn and repo.  On potassium protocol, no replacement needed this shift. Next recheck in the morning . Oral cares done with swabbed antiseptic solution with water X2 . No seizure episode noted or reported this shift. seizure precaution in place with rail side padded. Oncoming RN updated to follow up with pharmacy regarding label for fluticasone inhaler pharmacy called [facility]  for the label .Patents Mum at bedside .

## 2025-04-11 ENCOUNTER — APPOINTMENT (OUTPATIENT)
Dept: CT IMAGING | Facility: CLINIC | Age: 30
End: 2025-04-11
Attending: PHYSICIAN ASSISTANT
Payer: MEDICAID

## 2025-04-11 ENCOUNTER — APPOINTMENT (OUTPATIENT)
Dept: GENERAL RADIOLOGY | Facility: CLINIC | Age: 30
End: 2025-04-11
Payer: MEDICAID

## 2025-04-11 ENCOUNTER — APPOINTMENT (OUTPATIENT)
Dept: GENERAL RADIOLOGY | Facility: CLINIC | Age: 30
End: 2025-04-11
Attending: STUDENT IN AN ORGANIZED HEALTH CARE EDUCATION/TRAINING PROGRAM
Payer: MEDICAID

## 2025-04-11 VITALS
HEART RATE: 136 BPM | OXYGEN SATURATION: 92 % | DIASTOLIC BLOOD PRESSURE: 75 MMHG | SYSTOLIC BLOOD PRESSURE: 124 MMHG | RESPIRATION RATE: 20 BRPM | BODY MASS INDEX: 19.33 KG/M2 | TEMPERATURE: 97.6 F | WEIGHT: 104 LBS

## 2025-04-11 LAB
ALBUMIN SERPL BCG-MCNC: 2.6 G/DL (ref 3.5–5.2)
ALLEN'S TEST: YES
ALP SERPL-CCNC: 103 U/L (ref 40–150)
ALT SERPL W P-5'-P-CCNC: 26 U/L (ref 0–70)
ANION GAP SERPL CALCULATED.3IONS-SCNC: 8 MMOL/L (ref 7–15)
AST SERPL W P-5'-P-CCNC: 34 U/L (ref 0–45)
ATRIAL RATE - MUSE: 131 BPM
BASE EXCESS BLDA CALC-SCNC: 7.2 MMOL/L (ref -3–3)
BASE EXCESS BLDV CALC-SCNC: 11 MMOL/L (ref -3–3)
BASE EXCESS BLDV CALC-SCNC: 11.1 MMOL/L (ref -3–3)
BASE EXCESS BLDV CALC-SCNC: 5.7 MMOL/L (ref -3–3)
BASE EXCESS BLDV CALC-SCNC: 9 MMOL/L (ref -3–3)
BASE EXCESS BLDV CALC-SCNC: 9.9 MMOL/L (ref -3–3)
BILIRUB DIRECT SERPL-MCNC: 0.25 MG/DL (ref 0–0.3)
BILIRUB SERPL-MCNC: 0.3 MG/DL
BUN SERPL-MCNC: 5.6 MG/DL (ref 6–20)
CALCIUM SERPL-MCNC: 8.5 MG/DL (ref 8.8–10.4)
CHLORIDE SERPL-SCNC: 102 MMOL/L (ref 98–107)
CREAT SERPL-MCNC: 0.52 MG/DL (ref 0.67–1.17)
DIASTOLIC BLOOD PRESSURE - MUSE: NORMAL MMHG
EGFRCR SERPLBLD CKD-EPI 2021: >90 ML/MIN/1.73M2
ERYTHROCYTE [DISTWIDTH] IN BLOOD BY AUTOMATED COUNT: 12.9 % (ref 10–15)
GLUCOSE SERPL-MCNC: 301 MG/DL (ref 70–99)
HCO3 BLD-SCNC: 35 MMOL/L (ref 21–28)
HCO3 BLDV-SCNC: 37 MMOL/L (ref 21–28)
HCO3 BLDV-SCNC: 38 MMOL/L (ref 21–28)
HCO3 BLDV-SCNC: 38 MMOL/L (ref 21–28)
HCO3 BLDV-SCNC: 39 MMOL/L (ref 21–28)
HCO3 BLDV-SCNC: 40 MMOL/L (ref 21–28)
HCO3 SERPL-SCNC: 31 MMOL/L (ref 22–29)
HCT VFR BLD AUTO: 32.7 % (ref 40–53)
HGB BLD-MCNC: 10.6 G/DL (ref 13.3–17.7)
INTERPRETATION ECG - MUSE: NORMAL
LACTATE SERPL-SCNC: 1.2 MMOL/L (ref 0.7–2)
MAGNESIUM SERPL-MCNC: 1.8 MG/DL (ref 1.7–2.3)
MCH RBC QN AUTO: 32.6 PG (ref 26.5–33)
MCHC RBC AUTO-ENTMCNC: 32.4 G/DL (ref 31.5–36.5)
MCV RBC AUTO: 101 FL (ref 78–100)
O2/TOTAL GAS SETTING VFR VENT: 2 %
O2/TOTAL GAS SETTING VFR VENT: 30 %
O2/TOTAL GAS SETTING VFR VENT: 50 %
O2/TOTAL GAS SETTING VFR VENT: 60 %
OXYHGB MFR BLDA: 90 % (ref 92–100)
OXYHGB MFR BLDV: 28 % (ref 70–75)
OXYHGB MFR BLDV: 35 % (ref 70–75)
OXYHGB MFR BLDV: 42 % (ref 70–75)
OXYHGB MFR BLDV: 42 % (ref 70–75)
OXYHGB MFR BLDV: 46 % (ref 70–75)
P AXIS - MUSE: 45 DEGREES
PCO2 BLD: 71 MM HG (ref 35–45)
PCO2 BLDV: 62 MM HG (ref 40–50)
PCO2 BLDV: 72 MM HG (ref 40–50)
PCO2 BLDV: 78 MM HG (ref 40–50)
PCO2 BLDV: 86 MM HG (ref 40–50)
PCO2 BLDV: 90 MM HG (ref 40–50)
PH BLD: 7.3 [PH] (ref 7.35–7.45)
PH BLDV: 7.22 [PH] (ref 7.32–7.43)
PH BLDV: 7.26 [PH] (ref 7.32–7.43)
PH BLDV: 7.3 [PH] (ref 7.32–7.43)
PH BLDV: 7.35 [PH] (ref 7.32–7.43)
PH BLDV: 7.4 [PH] (ref 7.32–7.43)
PHOSPHATE SERPL-MCNC: 3.5 MG/DL (ref 2.5–4.5)
PLATELET # BLD AUTO: 140 10E3/UL (ref 150–450)
PO2 BLD: 60 MM HG (ref 80–105)
PO2 BLDV: 21 MM HG (ref 25–47)
PO2 BLDV: 21 MM HG (ref 25–47)
PO2 BLDV: 26 MM HG (ref 25–47)
PO2 BLDV: 26 MM HG (ref 25–47)
PO2 BLDV: 27 MM HG (ref 25–47)
POTASSIUM SERPL-SCNC: 3.8 MMOL/L (ref 3.4–5.3)
PR INTERVAL - MUSE: 148 MS
PROCALCITONIN SERPL IA-MCNC: 5.07 NG/ML
PROT SERPL-MCNC: 5 G/DL (ref 6.4–8.3)
QRS DURATION - MUSE: 80 MS
QT - MUSE: 318 MS
QTC - MUSE: 469 MS
R AXIS - MUSE: 4 DEGREES
RBC # BLD AUTO: 3.25 10E6/UL (ref 4.4–5.9)
SAO2 % BLDA: 91.5 % (ref 96–97)
SAO2 % BLDV: 28.5 % (ref 70–75)
SAO2 % BLDV: 35.8 % (ref 70–75)
SAO2 % BLDV: 42.4 % (ref 70–75)
SAO2 % BLDV: 43.2 % (ref 70–75)
SAO2 % BLDV: 47 % (ref 70–75)
SODIUM SERPL-SCNC: 141 MMOL/L (ref 135–145)
SYSTOLIC BLOOD PRESSURE - MUSE: NORMAL MMHG
T AXIS - MUSE: 26 DEGREES
VENTRICULAR RATE- MUSE: 131 BPM
WBC # BLD AUTO: 8 10E3/UL (ref 4–11)

## 2025-04-11 PROCEDURE — 250N000013 HC RX MED GY IP 250 OP 250 PS 637: Performed by: STUDENT IN AN ORGANIZED HEALTH CARE EDUCATION/TRAINING PROGRAM

## 2025-04-11 PROCEDURE — 36415 COLL VENOUS BLD VENIPUNCTURE: CPT | Performed by: STUDENT IN AN ORGANIZED HEALTH CARE EDUCATION/TRAINING PROGRAM

## 2025-04-11 PROCEDURE — 250N000013 HC RX MED GY IP 250 OP 250 PS 637: Performed by: INTERNAL MEDICINE

## 2025-04-11 PROCEDURE — 250N000011 HC RX IP 250 OP 636: Performed by: PHYSICIAN ASSISTANT

## 2025-04-11 PROCEDURE — 94640 AIRWAY INHALATION TREATMENT: CPT | Mod: 76

## 2025-04-11 PROCEDURE — 36415 COLL VENOUS BLD VENIPUNCTURE: CPT

## 2025-04-11 PROCEDURE — 87040 BLOOD CULTURE FOR BACTERIA: CPT

## 2025-04-11 PROCEDURE — 71260 CT THORAX DX C+: CPT

## 2025-04-11 PROCEDURE — 999N000157 HC STATISTIC RCP TIME EA 10 MIN

## 2025-04-11 PROCEDURE — 93005 ELECTROCARDIOGRAM TRACING: CPT

## 2025-04-11 PROCEDURE — 82805 BLOOD GASES W/O2 SATURATION: CPT | Performed by: STUDENT IN AN ORGANIZED HEALTH CARE EDUCATION/TRAINING PROGRAM

## 2025-04-11 PROCEDURE — 83605 ASSAY OF LACTIC ACID: CPT

## 2025-04-11 PROCEDURE — 74018 RADEX ABDOMEN 1 VIEW: CPT

## 2025-04-11 PROCEDURE — 250N000009 HC RX 250: Performed by: INTERNAL MEDICINE

## 2025-04-11 PROCEDURE — 71045 X-RAY EXAM CHEST 1 VIEW: CPT

## 2025-04-11 PROCEDURE — 83735 ASSAY OF MAGNESIUM: CPT

## 2025-04-11 PROCEDURE — 94660 CPAP INITIATION&MGMT: CPT

## 2025-04-11 PROCEDURE — 250N000013 HC RX MED GY IP 250 OP 250 PS 637: Performed by: PHYSICIAN ASSISTANT

## 2025-04-11 PROCEDURE — 82805 BLOOD GASES W/O2 SATURATION: CPT

## 2025-04-11 PROCEDURE — 250N000011 HC RX IP 250 OP 636: Mod: JZ | Performed by: INTERNAL MEDICINE

## 2025-04-11 PROCEDURE — 93010 ELECTROCARDIOGRAM REPORT: CPT | Performed by: INTERNAL MEDICINE

## 2025-04-11 PROCEDURE — 82248 BILIRUBIN DIRECT: CPT | Performed by: STUDENT IN AN ORGANIZED HEALTH CARE EDUCATION/TRAINING PROGRAM

## 2025-04-11 PROCEDURE — 99255 IP/OBS CONSLTJ NEW/EST HI 80: CPT | Performed by: INTERNAL MEDICINE

## 2025-04-11 PROCEDURE — 84100 ASSAY OF PHOSPHORUS: CPT

## 2025-04-11 PROCEDURE — 120N000013 HC R&B IMCU

## 2025-04-11 PROCEDURE — 250N000011 HC RX IP 250 OP 636: Mod: JZ | Performed by: PHYSICIAN ASSISTANT

## 2025-04-11 PROCEDURE — 84145 PROCALCITONIN (PCT): CPT

## 2025-04-11 PROCEDURE — 85018 HEMOGLOBIN: CPT

## 2025-04-11 PROCEDURE — 80053 COMPREHEN METABOLIC PANEL: CPT

## 2025-04-11 PROCEDURE — 99291 CRITICAL CARE FIRST HOUR: CPT

## 2025-04-11 PROCEDURE — 99233 SBSQ HOSP IP/OBS HIGH 50: CPT | Performed by: STUDENT IN AN ORGANIZED HEALTH CARE EDUCATION/TRAINING PROGRAM

## 2025-04-11 PROCEDURE — 250N000009 HC RX 250: Performed by: PHYSICIAN ASSISTANT

## 2025-04-11 PROCEDURE — 258N000003 HC RX IP 258 OP 636: Performed by: PHYSICIAN ASSISTANT

## 2025-04-11 PROCEDURE — 250N000009 HC RX 250

## 2025-04-11 RX ORDER — LEVOTHYROXINE SODIUM ANHYDROUS 100 UG/5ML
18.75 INJECTION, POWDER, LYOPHILIZED, FOR SOLUTION INTRAVENOUS DAILY
Status: DISCONTINUED | OUTPATIENT
Start: 2025-04-12 | End: 2025-04-14 | Stop reason: HOSPADM

## 2025-04-11 RX ORDER — ALBUTEROL SULFATE 0.83 MG/ML
2.5 SOLUTION RESPIRATORY (INHALATION)
Status: DISCONTINUED | OUTPATIENT
Start: 2025-04-11 | End: 2025-04-14 | Stop reason: HOSPADM

## 2025-04-11 RX ORDER — ACETYLCYSTEINE 200 MG/ML
2 SOLUTION ORAL; RESPIRATORY (INHALATION)
Status: DISCONTINUED | OUTPATIENT
Start: 2025-04-11 | End: 2025-04-14 | Stop reason: HOSPADM

## 2025-04-11 RX ORDER — CARBOXYMETHYLCELLULOSE SODIUM 5 MG/ML
1 SOLUTION/ DROPS OPHTHALMIC
Status: DISCONTINUED | OUTPATIENT
Start: 2025-04-11 | End: 2025-04-14 | Stop reason: HOSPADM

## 2025-04-11 RX ORDER — FORMOTEROL FUMARATE 20 UG/2ML
20 SOLUTION RESPIRATORY (INHALATION) EVERY 12 HOURS
Status: DISCONTINUED | OUTPATIENT
Start: 2025-04-11 | End: 2025-04-14 | Stop reason: HOSPADM

## 2025-04-11 RX ORDER — IOPAMIDOL 755 MG/ML
52 INJECTION, SOLUTION INTRAVASCULAR ONCE
Status: COMPLETED | OUTPATIENT
Start: 2025-04-11 | End: 2025-04-11

## 2025-04-11 RX ORDER — BUDESONIDE 0.5 MG/2ML
0.5 INHALANT ORAL 2 TIMES DAILY
Status: DISCONTINUED | OUTPATIENT
Start: 2025-04-11 | End: 2025-04-14 | Stop reason: HOSPADM

## 2025-04-11 RX ADMIN — CLOBAZAM 10 MG: 2.5 SUSPENSION ORAL at 22:56

## 2025-04-11 RX ADMIN — BUSPIRONE HYDROCHLORIDE 15 MG: 10 TABLET ORAL at 08:27

## 2025-04-11 RX ADMIN — TRAZODONE HYDROCHLORIDE 100 MG: 50 TABLET ORAL at 22:05

## 2025-04-11 RX ADMIN — FORMOTEROL FUMARATE 20 MCG: 20 SOLUTION RESPIRATORY (INHALATION) at 19:49

## 2025-04-11 RX ADMIN — SENNOSIDES 5 ML: 8.8 LIQUID ORAL at 08:27

## 2025-04-11 RX ADMIN — GABAPENTIN 750 MG: 250 SOLUTION ORAL at 22:06

## 2025-04-11 RX ADMIN — ACETAMINOPHEN 500 MG: 325 SUSPENSION ORAL at 08:27

## 2025-04-11 RX ADMIN — PANTOPRAZOLE SODIUM 40 MG: 40 INJECTION, POWDER, FOR SOLUTION INTRAVENOUS at 12:16

## 2025-04-11 RX ADMIN — PANTOPRAZOLE SODIUM 40 MG: 40 INJECTION, POWDER, FOR SOLUTION INTRAVENOUS at 20:22

## 2025-04-11 RX ADMIN — VALPROIC ACID 300 MG: 250 SOLUTION ORAL at 09:51

## 2025-04-11 RX ADMIN — CLOBAZAM 10 MG: 2.5 SUSPENSION ORAL at 09:51

## 2025-04-11 RX ADMIN — LEVOTHYROXINE SODIUM 25 MCG: 25 TABLET ORAL at 08:27

## 2025-04-11 RX ADMIN — BUDESONIDE 0.5 MG: 0.5 INHALANT RESPIRATORY (INHALATION) at 12:21

## 2025-04-11 RX ADMIN — BUDESONIDE 0.5 MG: 0.5 INHALANT RESPIRATORY (INHALATION) at 19:49

## 2025-04-11 RX ADMIN — VALPROIC ACID 350 MG: 250 SOLUTION ORAL at 14:11

## 2025-04-11 RX ADMIN — METRONIDAZOLE 500 MG: 500 INJECTION, SOLUTION INTRAVENOUS at 12:17

## 2025-04-11 RX ADMIN — AZITHROMYCIN 440 MG: 200 POWDER, FOR SUSPENSION ORAL at 09:51

## 2025-04-11 RX ADMIN — Medication 15 ML: at 08:27

## 2025-04-11 RX ADMIN — QUETIAPINE FUMARATE 600 MG: 300 TABLET ORAL at 22:05

## 2025-04-11 RX ADMIN — IOPAMIDOL 52 ML: 755 INJECTION, SOLUTION INTRAVENOUS at 13:44

## 2025-04-11 RX ADMIN — HYDROMORPHONE HYDROCHLORIDE 0.3 MG: 0.2 INJECTION, SOLUTION INTRAMUSCULAR; INTRAVENOUS; SUBCUTANEOUS at 20:30

## 2025-04-11 RX ADMIN — DOCUSATE SODIUM LIQUID 100 MG: 100 LIQUID ORAL at 22:07

## 2025-04-11 RX ADMIN — CEFTRIAXONE SODIUM 2 G: 2 INJECTION, POWDER, FOR SOLUTION INTRAMUSCULAR; INTRAVENOUS at 02:55

## 2025-04-11 RX ADMIN — VALPROIC ACID 350 MG: 250 SOLUTION ORAL at 22:06

## 2025-04-11 RX ADMIN — ACETYLCYSTEINE 2 ML: 200 SOLUTION ORAL; RESPIRATORY (INHALATION) at 03:53

## 2025-04-11 RX ADMIN — BUSPIRONE HYDROCHLORIDE 15 MG: 10 TABLET ORAL at 22:05

## 2025-04-11 RX ADMIN — SODIUM CHLORIDE 60 ML: 9 INJECTION, SOLUTION INTRAVENOUS at 13:44

## 2025-04-11 RX ADMIN — ACETAMINOPHEN 500 MG: 325 SUSPENSION ORAL at 22:04

## 2025-04-11 RX ADMIN — METHOCARBAMOL 750 MG: 750 TABLET ORAL at 08:27

## 2025-04-11 RX ADMIN — POLYETHYLENE GLYCOL 3350 17 G: 17 POWDER, FOR SOLUTION ORAL at 08:26

## 2025-04-11 RX ADMIN — ERYTHROMYCIN: 5 OINTMENT OPHTHALMIC at 20:24

## 2025-04-11 RX ADMIN — DOCUSATE SODIUM LIQUID 100 MG: 100 LIQUID ORAL at 12:10

## 2025-04-11 RX ADMIN — HYDROMORPHONE HYDROCHLORIDE 0.5 MG: 1 INJECTION, SOLUTION INTRAMUSCULAR; INTRAVENOUS; SUBCUTANEOUS at 02:50

## 2025-04-11 RX ADMIN — HYDROXYZINE HYDROCHLORIDE 50 MG: 10 SYRUP ORAL at 22:09

## 2025-04-11 RX ADMIN — ACETAMINOPHEN 500 MG: 325 SUSPENSION ORAL at 12:16

## 2025-04-11 RX ADMIN — METRONIDAZOLE 500 MG: 500 INJECTION, SOLUTION INTRAVENOUS at 05:35

## 2025-04-11 RX ADMIN — ALBUTEROL SULFATE 2.5 MG: 2.5 SOLUTION RESPIRATORY (INHALATION) at 03:53

## 2025-04-11 RX ADMIN — SODIUM CHLORIDE, POTASSIUM CHLORIDE, SODIUM LACTATE AND CALCIUM CHLORIDE: 600; 310; 30; 20 INJECTION, SOLUTION INTRAVENOUS at 00:29

## 2025-04-11 RX ADMIN — SODIUM CHLORIDE, POTASSIUM CHLORIDE, SODIUM LACTATE AND CALCIUM CHLORIDE: 600; 310; 30; 20 INJECTION, SOLUTION INTRAVENOUS at 12:24

## 2025-04-11 RX ADMIN — BUSPIRONE HYDROCHLORIDE 15 MG: 10 TABLET ORAL at 16:15

## 2025-04-11 RX ADMIN — METRONIDAZOLE 500 MG: 500 INJECTION, SOLUTION INTRAVENOUS at 20:23

## 2025-04-11 ASSESSMENT — ACTIVITIES OF DAILY LIVING (ADL)
ADLS_ACUITY_SCORE: 79
ADLS_ACUITY_SCORE: 83
ADLS_ACUITY_SCORE: 83
ADLS_ACUITY_SCORE: 84
ADLS_ACUITY_SCORE: 83
ADLS_ACUITY_SCORE: 79
ADLS_ACUITY_SCORE: 84
ADLS_ACUITY_SCORE: 80
ADLS_ACUITY_SCORE: 84
ADLS_ACUITY_SCORE: 84
ADLS_ACUITY_SCORE: 80
ADLS_ACUITY_SCORE: 76
ADLS_ACUITY_SCORE: 83
ADLS_ACUITY_SCORE: 83
DEPENDENT_IADLS:: CLEANING;COOKING;LAUNDRY;SHOPPING;MEAL PREPARATION;MEDICATION MANAGEMENT;MONEY MANAGEMENT;TRANSPORTATION;INCONTINENCE
ADLS_ACUITY_SCORE: 83
ADLS_ACUITY_SCORE: 84
ADLS_ACUITY_SCORE: 83
ADLS_ACUITY_SCORE: 77
ADLS_ACUITY_SCORE: 76
ADLS_ACUITY_SCORE: 76

## 2025-04-11 NOTE — CONSULTS
[facility]    Pulmonology Consultation     Date of Admission:  4/7/2025  Date of Consult (When I saw the patient): 04/11/25    Assessment & Plan   Rj Licea is a 29 year old male who was admitted on 4/7/2025. I was asked to see the patient for hypercapnic respiratory failure postsurgery.    #Acute on chronic hypoxic and hypercapnic respiratory failure  #Moderate persistent asthma  #Acute cholecystitis s/p cholecystectomy  #Sinus tachycardia  #Right middle and lower lobe atelectasis with mucous plugging    - Patient currently on BiPAP and tolerating very well.  Has a minute ventilation close to 10 L/min.  Will increase respiratory rate to 26.  - Check VBG to document improvement in the hypercapnia and to see if any other adjustment in the settings needed within an hour.  - His baseline supplemental oxygen is up to 10 LPM.  His current acute worsening likely secondary to postoperative state with hypoventilation/atelectasis with mucous plugging and increased abdominal tension reducing lung compliance.  - Would change his inhalers to nebulizers while in the hospital.  Initiate ICS and LABA nebulizers.  - For his pulmonary hygiene regimen, he is not an ideal candidate to use incentive spirometer or flutter valve.  Consider Mucomyst nebulizers.  Will defer the use of vest to primary surgical team because of the recent surgery.  - Defer the treatment of the ileus/abdominal distention to surgery.  Will help with respiratory compliance if that is improved.  - Currently, he is not a candidate for bronchoscopy due to tenuous respiratory status.  If he has further hypoxia or gets intubated, bronchoscopy can be considered for therapeutic aspiration of secretions.  - Agree with current antibiotics with Flagyl, azithromycin and ceftriaxone.  - Rest of the plan as per the primary team.    Pulmonary will sign off.  Please feel free to reconsult if his clinical condition changes.    Selvin Luna MD,  MD    Text page  Securely message with the Vocera Web Console (learn more here)    Code Status    Full Code    Reason for Consult   Reason for consult: Hypercapnic respiratory failure postsurgery.    Primary Care Physician   Micky Leyva    Chief Complaint   Shortness of breath    History is obtained from the patient    History of Present Illness   Patient is a 29-year-old male with previous history of developmental delay, seizure disorder, frequent respiratory illnesses, asthma and chronic hypoxic and hypercapnic respiratory failure with restrictive lung disease.  He initially presented to the ED on 4/7 with increased tachycardia.    Hospital course has been significant for undergoing cholecystectomy open open cholecystectomy after unable to visualize gallbladder laparoscopically.  Gallbladder was found to be gangrenous and necrotic.  Postoperatively, earlier today RRT was called due to significant hypoxia and was found to be hypercapnic.  Placed on BiPAP.    He has significant pulmonary history in the past and follows Dr. Soto for asthma.  He has had recurrent hospitalizations for asthma exacerbations and is currently on azithromycin once daily MWF, triple inhaler therapy along with DuoNebs.  He has required antibiotics for exacerbations in the past.  He uses supplemental oxygen up to 10 LPM at baseline.  He has had severe sensitivities to nasal cannula oxygen as well as BiPAP, this has been considered in the past and was decided not to use BiPAP due to the significant sensitivity.    Past Medical History   I have reviewed this patient's medical history and updated it with pertinent information if needed.   Past Medical History:   Diagnosis Date    Anxiety     Autism     Chronic bronchitis (H)     Depression     fluoxetine, risperidone    Developmental delay     Epilepsy 05/01/2011    Feeding by G-tube     GERD (gastroesophageal reflux disease)     Ineffective airway clearance     Insomnia     melatonin,  clonazepam    Kidney stone     Moderate persistent asthma     Premature infant - 24 weeks     Restrictive lung disease     Retinopathy of prematurity     led to blindness    SBO (small bowel obstruction)        Past Surgical History   I have reviewed this patient's surgical history and updated it with pertinent information if needed.  Past Surgical History:   Procedure Laterality Date    APPENDECTOMY OPEN N/A 2021    Procedure: Appendectomy open;  Surgeon: Fabiola Pop MD;  Location: [facility] OR    CHOLECYSTECTOMY N/A 2025    Procedure: CONVERTED TO OPEN;  Surgeon: Robert Lopez MD;  Location: [facility] OR    GASTROSTOMY TUBE      LAPAROTOMY EXPLORATORY N/A 2021    Procedure: LAPAROTOMY, enterolysis;  Surgeon: Fabiola Pop MD;  Location: [facility] OR       Prior to Admission Medications   Prior to Admission Medications   Prescriptions Last Dose Informant Patient Reported? Taking?   QUEtiapine (SEROQUEL) 300 MG tablet 2025 Bedtime  No Yes   Sig: Place 2 tablets (600 mg) into G tube at bedtime. Taking 2 - 300 mg at night   QUEtiapine (SEROQUEL) 50 MG tablet   No Yes   Si mg taking during day for anxiety up to 4 times per day (do not take within 4 hours of the evening dose)   SPIRIVA RESPIMAT 1.25 MCG/ACT inhaler 2025 Morning  Yes Yes   Sig: Inhale 2 puffs into the lungs daily.   Valproate Sodium (VALPROIC ACID) 250 MG/5ML 2025 Bedtime  No Yes   Sig: TAKE 6ML BY MOUTH EVERY MORNING AND 7ML IN THE AFTERNOON AND AT NIGHT   albuterol (PROAIR HFA/PROVENTIL HFA/VENTOLIN HFA) 108 (90 Base) MCG/ACT inhaler   No Yes   Sig: Inhale 2 puffs into the lungs every 6 hours as needed for shortness of breath / dyspnea or wheezing   azithromycin (ZITHROMAX) 200 MG/5ML suspension Past Week  Yes Yes   Sig: Place 11 mLs into G tube three times a week. Monday, Wednesday and Friday   busPIRone (BUSPAR) 15 MG tablet 2025 Bedtime  No Yes   Sig: Take 1 tablet (15 mg) by mouth 3 times daily.   clobazam (,ONFI,)  2.5 MG/ML suspension 2025 Bedtime  Yes Yes   Sig: Place 4 mLs into G tube 2 times daily.   erythromycin (ROMYCIN) 5 MG/GM ophthalmic ointment 2025 Bedtime  Yes Yes   Sig: Place into both eyes every evening.   fluticasone-salmeterol (ADVAIR HFA) 230-21 MCG/ACT inhaler 2025 Bedtime  Yes Yes   Sig: Inhale 2 puffs into the lungs 2 times daily    gabapentin (NEURONTIN) 250 MG/5ML solution 2025 Bedtime  No Yes   Sig: Place 15 mLs (750 mg) into G tube at bedtime.   hydrOXYzine (ATARAX) 10 MG/5ML syrup 2025 Bedtime  No Yes   Sig: Place 25 mLs (50 mg) into G tube at bedtime. (25 mL)   levothyroxine (SYNTHROID/LEVOTHROID) 25 MCG tablet 2025 Morning  No Yes   Sig: Take 1 tablet (25 mcg) by mouth daily   melatonin 1 MG TABS tablet 2025 Evening  Yes Yes   Sig: Place 6 mg into G tube every evening.   omeprazole (PRILOSEC) 2 mg/mL suspension 2025 Bedtime  No Yes   Si mLs (80 mg) by Per G Tube route at bedtime   Patient taking differently: Place 40 mg into G tube at bedtime.   polyethylene glycol (MIRALAX) 17 g packet 2025 Morning  Yes Yes   Si g by Per G Tube route daily   traZODone (DESYREL) 100 MG tablet 2025 Bedtime  No Yes   Sig: Take 1 tablet (100 mg) by mouth at bedtime.      Facility-Administered Medications: None     Allergies   No Known Allergies    Social History   I have reviewed this patient's social history and updated it with pertinent information if needed. Rj ODONNELL Anuja  reports that he has never smoked. He has never been exposed to tobacco smoke. He has never used smokeless tobacco. He reports that he does not drink alcohol and does not use drugs.    Family History   I have reviewed this patient's family history and updated it with pertinent information if needed.   Family History   Problem Relation Age of Onset    Diabetes Father        Review of Systems   The 10 point Review of Systems is negative other than noted in the HPI or here.     Physical Exam   Temp:   "[97.6  F (36.4  C)-98.5  F (36.9  C)] 97.8  F (36.6  C)  Pulse:  [122-138] 138  Resp:  [16-24] 16  BP: (119-141)/(72-82) 140/81  FiO2 (%):  [30 %-40 %] 30 %  SpO2:  [92 %-96 %] 96 %    Intake/Output Summary (Last 24 hours) at 4/11/2025 0920  Last data filed at 4/11/2025 0600  Gross per 24 hour   Intake 1020 ml   Output 1065 ml   Net -45 ml     Constitutional:   Awake, alert and in no apparent distress     Eyes:   nonicteric     ENT:    oral mucosa moist without lesions     Neck:   Supple without supraclavicular or cervical lymphadenopathy     Lungs:   Good air flow.  No crackles. No rhonchi.  No wheezes.     Cardiovascular:   Normal S1 and S2.  RRR.  No murmur, gallop or rub.     Abdomen:   NABS, soft, nontender, nondistended.  No HSM.     Musculoskeletal:   No edema     Neurologic:   Alert and conversant.     Skin:   Warm, dry.  No rash on limited exam.             No data to display                All cultures:  No results for input(s): \"CULT\" in the last 168 hours.    Imaging:            Data   Results for orders placed or performed during the hospital encounter of 04/07/25 (from the past 24 hours)   CBC with platelets   Result Value Ref Range    WBC Count 8.9 4.0 - 11.0 10e3/uL    RBC Count 2.98 (L) 4.40 - 5.90 10e6/uL    Hemoglobin 9.9 (L) 13.3 - 17.7 g/dL    Hematocrit 30.0 (L) 40.0 - 53.0 %     (H) 78 - 100 fL    MCH 33.2 (H) 26.5 - 33.0 pg    MCHC 33.0 31.5 - 36.5 g/dL    RDW 13.0 10.0 - 15.0 %    Platelet Count 122 (L) 150 - 450 10e3/uL   Basic metabolic panel   Result Value Ref Range    Sodium 140 135 - 145 mmol/L    Potassium 3.5 3.4 - 5.3 mmol/L    Chloride 103 98 - 107 mmol/L    Carbon Dioxide (CO2) 31 (H) 22 - 29 mmol/L    Anion Gap 6 (L) 7 - 15 mmol/L    Urea Nitrogen 6.5 6.0 - 20.0 mg/dL    Creatinine 0.55 (L) 0.67 - 1.17 mg/dL    GFR Estimate >90 >60 mL/min/1.73m2    Calcium 8.4 (L) 8.8 - 10.4 mg/dL    Glucose 189 (H) 70 - 99 mg/dL   Hepatic panel   Result Value Ref Range    Protein Total 4.5 " (L) 6.4 - 8.3 g/dL    Albumin 2.4 (L) 3.5 - 5.2 g/dL    Bilirubin Total 0.2 <=1.2 mg/dL    Alkaline Phosphatase 81 40 - 150 U/L    AST 36 0 - 45 U/L    ALT 28 0 - 70 U/L    Bilirubin Direct 0.12 0.00 - 0.30 mg/dL   Magnesium   Result Value Ref Range    Magnesium 1.8 1.7 - 2.3 mg/dL   Phosphorus   Result Value Ref Range    Phosphorus 2.4 (L) 2.5 - 4.5 mg/dL   Urinalysis Macroscopic   Result Value Ref Range    Color Urine Yellow Colorless, Straw, Light Yellow, Yellow    Appearance Urine Clear Clear    Glucose Urine 500 (A) Negative mg/dL    Bilirubin Urine Negative Negative    Ketones Urine Negative Negative mg/dL    Specific Gravity Urine 1.020 1.003 - 1.035    Blood Urine Negative Negative    pH Urine 6.5 5.0 - 7.0    Protein Albumin Urine Negative Negative mg/dL    Urobilinogen Urine Normal Normal mg/dL    Nitrite Urine Negative Negative    Leukocyte Esterase Urine Negative Negative   Sodium random urine   Result Value Ref Range    Sodium Urine mmol/L 52 mmol/L   Creatinine random urine   Result Value Ref Range    Creatinine Urine mg/dL 69.3 mg/dL   Hemoglobin   Result Value Ref Range    Hemoglobin 10.9 (L) 13.3 - 17.7 g/dL   Lactic acid whole blood   Result Value Ref Range    Lactic Acid 1.6 0.7 - 2.0 mmol/L   Creatinine   Result Value Ref Range    Creatinine 0.54 (L) 0.67 - 1.17 mg/dL    GFR Estimate >90 >60 mL/min/1.73m2   EKG 12-lead, tracing only   Result Value Ref Range    Systolic Blood Pressure  mmHg    Diastolic Blood Pressure  mmHg    Ventricular Rate 131 BPM    Atrial Rate 131 BPM    OK Interval 148 ms    QRS Duration 80 ms     ms    QTc 469 ms    P Axis 45 degrees    R AXIS 4 degrees    T Axis 26 degrees    Interpretation ECG       Sinus tachycardia  Nonspecific T wave abnormality  Abnormal ECG  When compared with ECG of 09-Apr-2025 10:02,  There have been no significant changes  Confirmed by MD RICHIE, ANTOINE ([id number]) on 4/11/2025 7:09:48 AM     Blood gas arterial   Result Value Ref Range    pH  Arterial 7.30 (L) 7.35 - 7.45    pCO2 Arterial 71 (H) 35 - 45 mm Hg    pO2 Arterial 60 (L) 80 - 105 mm Hg    FIO2 60     Bicarbonate Arterial 35 (H) 21 - 28 mmol/L    Base Excess/Deficit Arterial 7.2 (H) -3.0 - 3.0 mmol/L    Scott's Test Yes     Oxyhemoglobin Arterial 90 (L) 92 - 100 %    O2 Sat, Arterial 91.5 (L) 96.0 - 97.0 %    Narrative    In healthy individuals, oxyhemoglobin (O2Hb) and oxygen saturation (SO2) are approximately equal. In the presence of dyshemoglobins, oxyhemoglobin can be considerably lower than oxygen saturation.   XR Chest Port 1 View    Narrative    EXAM: XR CHEST PORT 1 VIEW  LOCATION: [facility]  DATE: 4/11/2025    INDICATION: RRT: acute hypoxic respiratory failure  COMPARISON: 04/07/2025      Impression    IMPRESSION: Shallow inspiration. Bibasilar atelectasis or infiltrate and small pleural effusions. Presumed surgical drain right upper abdomen. Prominent air-filled bowel.   CBC with platelets   Result Value Ref Range    WBC Count 8.0 4.0 - 11.0 10e3/uL    RBC Count 3.25 (L) 4.40 - 5.90 10e6/uL    Hemoglobin 10.6 (L) 13.3 - 17.7 g/dL    Hematocrit 32.7 (L) 40.0 - 53.0 %     (H) 78 - 100 fL    MCH 32.6 26.5 - 33.0 pg    MCHC 32.4 31.5 - 36.5 g/dL    RDW 12.9 10.0 - 15.0 %    Platelet Count 140 (L) 150 - 450 10e3/uL   Lactic acid whole blood   Result Value Ref Range    Lactic Acid 1.2 0.7 - 2.0 mmol/L   Magnesium   Result Value Ref Range    Magnesium 1.8 1.7 - 2.3 mg/dL   Phosphorus   Result Value Ref Range    Phosphorus 3.5 2.5 - 4.5 mg/dL   Comprehensive metabolic panel   Result Value Ref Range    Sodium 141 135 - 145 mmol/L    Potassium 3.8 3.4 - 5.3 mmol/L    Carbon Dioxide (CO2) 31 (H) 22 - 29 mmol/L    Anion Gap 8 7 - 15 mmol/L    Urea Nitrogen 5.6 (L) 6.0 - 20.0 mg/dL    Creatinine 0.52 (L) 0.67 - 1.17 mg/dL    GFR Estimate >90 >60 mL/min/1.73m2    Calcium 8.5 (L) 8.8 - 10.4 mg/dL    Chloride 102 98 - 107 mmol/L    Glucose 301 (H) 70 - 99 mg/dL     Alkaline Phosphatase 103 40 - 150 U/L    AST 34 0 - 45 U/L    ALT 26 0 - 70 U/L    Protein Total 5.0 (L) 6.4 - 8.3 g/dL    Albumin 2.6 (L) 3.5 - 5.2 g/dL    Bilirubin Total 0.3 <=1.2 mg/dL   Procalcitonin   Result Value Ref Range    Procalcitonin 5.07 (HH) <0.50 ng/mL   Bilirubin direct   Result Value Ref Range    Bilirubin Direct 0.25 0.00 - 0.30 mg/dL   Blood gas venous   Result Value Ref Range    pH Venous 7.22 (L) 7.32 - 7.43    pCO2 Venous 90 (HH) 40 - 50 mm Hg    pO2 Venous 27 25 - 47 mm Hg    Bicarbonate Venous 37 (H) 21 - 28 mmol/L    Base Excess/Deficit Venous 5.7 (H) -3.0 - 3.0 mmol/L    FIO2 50     Oxyhemoglobin Venous 42 (L) 70 - 75 %    O2 Sat, Venous 43.2 (L) 70.0 - 75.0 %    Narrative    In healthy individuals, oxyhemoglobin (O2Hb) and oxygen saturation (SO2) are approximately equal. In the presence of dyshemoglobins, oxyhemoglobin can be considerably lower than oxygen saturation.       I personally spent 81 minutes on the date of the encounter doing chart review, history and exam, documentation and further activities per the note.       Pulmonary will continue to follow.       ANDERS Wells   of Medicine  [facility]  Pulmonary, Allergy, Critical Care and Sleep Medicine  Pager - [phone]

## 2025-04-11 NOTE — CONSULTS
Care Management Initial Consult    General Information  Assessment completed with: Parents, Family,    Type of CM/SW Visit: Initial Assessment    Primary Care Provider verified and updated as needed: Yes   Readmission within the last 30 days: no previous admission in last 30 days      Reason for Consult: discharge planning  Advance Care Planning: Advance Care Planning Reviewed: verified with patient          Communication Assessment  Patient's communication style: spoken language (English or Bilingual)             Cognitive  Cognitive/Neuro/Behavioral: .WDL except  Level of Consciousness: obtunded  Arousal Level: arouses to pain  Orientation: other (see comments) (SAHIL- pt not responding to questions)  Mood/Behavior: hypoactive (quiet, withdrawn)  Best Language: 0 - No aphasia  Speech: other (see comments) (SAHIL- pt not responding to questions)    Living Environment:   People in home: sibling(s), parent(s)     Current living Arrangements: house      Able to return to prior arrangements: yes       Family/Social Support:  Care provided by: parent(s), other (see comments)  Provides care for: no one, unable/limited ability to care for self  Marital Status: Single  Support system: Sibling(s), Parent(s)          Description of Support System: Involved, Supportive    Support Assessment: Adequate family and caregiver support    Current Resources:   Patient receiving home care services: No        Community Resources: Home Infusion, PCA  Equipment currently used at home: wheelchair, manual  Supplies currently used at home: Oxygen Tubing/Supplies    Employment/Financial:  Employment Status: disabled        Financial Concerns: none   Referral to Financial Worker: No       Does the patient's insurance plan have a 3 day qualifying hospital stay waiver?  No    Lifestyle & Psychosocial Needs:  Social Drivers of Health     Food Insecurity: Low Risk  (12/13/2024)    Food Insecurity     Within the past 12 months, did you worry that your  food would run out before you got money to buy more?: No     Within the past 12 months, did the food you bought just not last and you didn t have money to get more?: No   Depression: Not at risk (3/26/2025)    PHQ-2     PHQ-2 Score: 0   Housing Stability: Low Risk  (12/13/2024)    Housing Stability     Do you have housing? : Yes     Are you worried about losing your housing?: No   Tobacco Use: Low Risk  (3/26/2025)    Patient History     Smoking Tobacco Use: Never     Smokeless Tobacco Use: Never     Passive Exposure: Never   Financial Resource Strain: Low Risk  (12/13/2024)    Financial Resource Strain     Within the past 12 months, have you or your family members you live with been unable to get utilities (heat, electricity) when it was really needed?: No   Alcohol Use: Not on file   Transportation Needs: Low Risk  (12/13/2024)    Transportation Needs     Within the past 12 months, has lack of transportation kept you from medical appointments, getting your medicines, non-medical meetings or appointments, work, or from getting things that you need?: No   Physical Activity: Inactive (12/13/2024)    Exercise Vital Sign     Days of Exercise per Week: 0 days     Minutes of Exercise per Session: 0 min   Interpersonal Safety: Not on file   Stress: Patient Declined (12/13/2024)    [employer] - Occupational Stress Questionnaire     Feeling of Stress : Patient declined   Social Connections: Unknown (12/13/2024)    Social Connection and Isolation Panel [NHANES]     Frequency of Communication with Friends and Family: Not on file     Frequency of Social Gatherings with Friends and Family: Patient declined     Attends [employer] Services: Not on file     Active Member of Clubs or Organizations: Not on file     Attends Club or Organization Meetings: Not on file     Marital Status: Not on file   Health Literacy: Not on file       Functional Status:  Prior to admission patient needed assistance:    Dependent ADLs:: Wheelchair-with assist, Bathing, Dressing, Eating, Grooming, Transfers  Dependent IADLs:: Cleaning, Cooking, Laundry, Shopping, Meal Preparation, Medication Management, Money Management, Transportation, Incontinence       Mental Health Status:  Mental Health Status: No Current Concerns       Chemical Dependency Status:  Chemical Dependency Status: No Current Concerns             Values/Beliefs:  Spiritual, Cultural Beliefs, Yarsani Practices, Values that affect care: no               Discussed  Partnership in Safe Discharge Planning  document with patient/family: No    Additional Information:  SW/CM consult for elevated risk score. SW reviewed chart and met with pt's mother and sister who are also his legal guardians. Paperwork is on file. Pt lives at home with his mother. She assists him with transfers, ambulating, dressing, bathing and feeding. Mother and sister are both PCA's. He has a combined 85 hours per week and is on the [facility] waiver through [facility]. Per Mother, pt is typically verbal and recognizes family. Mother states he knows he is in the hospital. He has a transfer wheelchair at home. He uses home O2 and has a concentrator and tanks. Both O2 and tube feeding equipment/supplies are provided by [facility]. Family goal is discharge home. Mother does request a commode upon discharge. SW explained role in hospital in regards to discharge planning. Mother and sister had no questions or concerns at this time.    Next Steps: [facility]/RN CC will follow for discharge planning. Watch for PT/OT recommendations.    MAYA Lambert, LICSW  [phone] Desk phone  [phone] Cell/text (Preferred)  [facility]

## 2025-04-11 NOTE — CODE/RAPID RESPONSE
[facility]  House LYNDSAY RRT Note  4/11/2025   Time called: 0301  RRT called for: Hypoxia  Code status: Full Code    Assessment & Plan   Rj Licea is a 29 year old male with past medical history significant for developmental delay, autism, asthma, chronic hypercapnic and hypoxic respiratory failure who presents with lethargy, f/c. Admitted on 4/7/2025.     I was paged to the bedside to evaluate . Rj Licea for an acute episode of hypoxia which occurred suddenly while patient was sleeping. Patient's mother stated that patient had been attempting to cough just prior to his becoming profoundly hypoxic.    Diagnosis:  Acute cholecystitis s/p cholecystectomy  Sinus tachycardia  Acute on chronic hypoxic and hypercapnic respiratory failure  Respiratory acidosis  Upon my arrival the patient was supine in bed and sleeping. Patient was hypoxic at 87% on 6L oxymask (had been on nasal cannula 2L previously). Tachycardic which has been his baseline this hospitalization, suspect multifactorial with recent surgery and infection but could be compensatory to maintain BP in the setting of sepsis so will complete further infectious workup. Patient was warm, 2+ pulses in all extremities. Neurological exam unchanged from prior, maybe more lethargic than previously. Right lobe lung sounds very diminished/absent, left lung clear. Patient was rotated to his left side and SpO2 improved from 87% to 92% on 10L oxymask. Heart tones with normal S1, S2, no rub, gallop, or murmur. Abdomen was soft, no grimace or guarding with palpation. Tube feeding residual of 8ml, lowering the likelihood that this was due to aspiration of tube feeding. CXR showed very low lung volumes with R lower compared to L as well as atelectasis versus infiltrates. ABG showed respiratory acidosis with a pCO2 of 71 and pH of 7.30 so patient will trial BiPAP and [place] [facility] [place].    Albuterol and mucomyst nebs  administered and patient's SpO2 improved to 98% on 10L oxymask prior to starting BiPAP. Patient is currently on rocephin, azithromycin, and flagyl.    Findings discussed with patient's mother at the bedside. Questions answered and overnight plan discussed.    /75 (BP Location: Right arm, Patient Position: Semi-Drew's, Cuff Size: Adult Regular)   Pulse (!) 136   Temp 97.6  F (36.4  C) (Axillary)   Resp 20   Wt 47.2 kg (104 lb)   SpO2 92%   BMI 19.33 kg/m        Plan:  -- CXR: low lung volumes; bibasilar atelectasis versus infiltrate   -- NT suction: low yield  -- EKG: Sinus tachycardia, unchanged  -- Labs   *CBC   *CMP   *ABG   *Blood culture   *Procalcitonin   *Phosphorous   *Magnesium  -- Albuterol and mucomyst nebs PRN    At the conclusion of this RRT patient was hemodynamically stable and will remain on current unit.    His history is significant for:  Past Medical History:   Diagnosis Date    Anxiety     Autism     Chronic bronchitis (H)     Depression     fluoxetine, risperidone    Developmental delay     Epilepsy 05/01/2011    Feeding by G-tube     GERD (gastroesophageal reflux disease)     Ineffective airway clearance     Insomnia     melatonin, clonazepam    Kidney stone     Moderate persistent asthma     Premature infant - 24 weeks     Restrictive lung disease     Retinopathy of prematurity     led to blindness    SBO (small bowel obstruction)      Past Surgical History:   Procedure Laterality Date    APPENDECTOMY OPEN N/A 4/19/2021    Procedure: Appendectomy open;  Surgeon: Fabiola Pop MD;  Location: [facility] OR    CHOLECYSTECTOMY N/A 4/8/2025    Procedure: CONVERTED TO OPEN;  Surgeon: Robert Lopez MD;  Location:  OR    GASTROSTOMY TUBE      LAPAROTOMY EXPLORATORY N/A 4/19/2021    Procedure: LAPAROTOMY, enterolysis;  Surgeon: Fabiola Pop MD;  Location: [facility] OR       Review of Systems   The 10 point Review of Systems is negative other than noted in the HPI or here.     Allergies    No Known Allergies    Physical Exam   Physical Exam  Constitutional:       General: He is sleeping.      Appearance: He is ill-appearing. He is not diaphoretic.   HENT:      Head: Normocephalic.      Mouth/Throat:      Mouth: Mucous membranes are moist.   Eyes:      Pupils: Pupils are equal, round, and reactive to light.   Cardiovascular:      Rate and Rhythm: Regular rhythm. Tachycardia present.      Pulses: Normal pulses.      Heart sounds: Normal heart sounds.   Pulmonary:      Effort: Pulmonary effort is normal.      Breath sounds: Normal breath sounds.   Abdominal:      General: Abdomen is flat.      Palpations: Abdomen is soft.   Musculoskeletal:      Right lower leg: No edema.      Left lower leg: No edema.   Skin:     General: Skin is warm and dry.      Capillary Refill: Capillary refill takes less than 2 seconds.   Neurological:      General: No focal deficit present.      Mental Status: He is oriented to person, place, and time.   Psychiatric:         Mood and Affect: Mood normal.         Vital Signs with Ranges:  Temp:  [97.5  F (36.4  C)-98.1  F (36.7  C)] 97.6  F (36.4  C)  Pulse:  [122-136] 136  Resp:  [18-22] 20  BP: (105-133)/(73-82) 124/75  SpO2:  [92 %-93 %] 92 %  I/O last 3 completed shifts:  In: 1250 [I.V.:400; NG/GT:850]  Out: 2395 [Urine:2175; Emesis/NG output:10; Drains:210]    Data   Results for orders placed or performed during the hospital encounter of 04/07/25 (from the past 24 hours)   Glucose by meter   Result Value Ref Range    GLUCOSE BY METER POCT 172 (H) 70 - 99 mg/dL   CBC with platelets   Result Value Ref Range    WBC Count 8.9 4.0 - 11.0 10e3/uL    RBC Count 2.98 (L) 4.40 - 5.90 10e6/uL    Hemoglobin 9.9 (L) 13.3 - 17.7 g/dL    Hematocrit 30.0 (L) 40.0 - 53.0 %     (H) 78 - 100 fL    MCH 33.2 (H) 26.5 - 33.0 pg    MCHC 33.0 31.5 - 36.5 g/dL    RDW 13.0 10.0 - 15.0 %    Platelet Count 122 (L) 150 - 450 10e3/uL   Basic metabolic panel   Result Value Ref Range    Sodium 140  135 - 145 mmol/L    Potassium 3.5 3.4 - 5.3 mmol/L    Chloride 103 98 - 107 mmol/L    Carbon Dioxide (CO2) 31 (H) 22 - 29 mmol/L    Anion Gap 6 (L) 7 - 15 mmol/L    Urea Nitrogen 6.5 6.0 - 20.0 mg/dL    Creatinine 0.55 (L) 0.67 - 1.17 mg/dL    GFR Estimate >90 >60 mL/min/1.73m2    Calcium 8.4 (L) 8.8 - 10.4 mg/dL    Glucose 189 (H) 70 - 99 mg/dL   Hepatic panel   Result Value Ref Range    Protein Total 4.5 (L) 6.4 - 8.3 g/dL    Albumin 2.4 (L) 3.5 - 5.2 g/dL    Bilirubin Total 0.2 <=1.2 mg/dL    Alkaline Phosphatase 81 40 - 150 U/L    AST 36 0 - 45 U/L    ALT 28 0 - 70 U/L    Bilirubin Direct 0.12 0.00 - 0.30 mg/dL   Magnesium   Result Value Ref Range    Magnesium 1.8 1.7 - 2.3 mg/dL   Phosphorus   Result Value Ref Range    Phosphorus 2.4 (L) 2.5 - 4.5 mg/dL   Urinalysis Macroscopic   Result Value Ref Range    Color Urine Yellow Colorless, Straw, Light Yellow, Yellow    Appearance Urine Clear Clear    Glucose Urine 500 (A) Negative mg/dL    Bilirubin Urine Negative Negative    Ketones Urine Negative Negative mg/dL    Specific Gravity Urine 1.020 1.003 - 1.035    Blood Urine Negative Negative    pH Urine 6.5 5.0 - 7.0    Protein Albumin Urine Negative Negative mg/dL    Urobilinogen Urine Normal Normal mg/dL    Nitrite Urine Negative Negative    Leukocyte Esterase Urine Negative Negative   Sodium random urine   Result Value Ref Range    Sodium Urine mmol/L 52 mmol/L   Creatinine random urine   Result Value Ref Range    Creatinine Urine mg/dL 69.3 mg/dL   Hemoglobin   Result Value Ref Range    Hemoglobin 10.9 (L) 13.3 - 17.7 g/dL   Lactic acid whole blood   Result Value Ref Range    Lactic Acid 1.6 0.7 - 2.0 mmol/L   Creatinine   Result Value Ref Range    Creatinine 0.54 (L) 0.67 - 1.17 mg/dL    GFR Estimate >90 >60 mL/min/1.73m2   EKG 12-lead, tracing only   Result Value Ref Range    Systolic Blood Pressure  mmHg    Diastolic Blood Pressure  mmHg    Ventricular Rate 131 BPM    Atrial Rate 131 BPM    NY Interval 148 ms     QRS Duration 80 ms     ms    QTc 469 ms    P Axis 45 degrees    R AXIS 4 degrees    T Axis 26 degrees    Interpretation ECG       Sinus tachycardia  Nonspecific T wave abnormality  Abnormal ECG  When compared with ECG of 09-Apr-2025 10:02,  Significant changes have occurred     Blood gas arterial   Result Value Ref Range    pH Arterial 7.30 (L) 7.35 - 7.45    pCO2 Arterial 71 (H) 35 - 45 mm Hg    pO2 Arterial 60 (L) 80 - 105 mm Hg    FIO2 60     Bicarbonate Arterial 35 (H) 21 - 28 mmol/L    Base Excess/Deficit Arterial 7.2 (H) -3.0 - 3.0 mmol/L    Scott's Test Yes     Oxyhemoglobin Arterial 90 (L) 92 - 100 %    O2 Sat, Arterial 91.5 (L) 96.0 - 97.0 %    Narrative    In healthy individuals, oxyhemoglobin (O2Hb) and oxygen saturation (SO2) are approximately equal. In the presence of dyshemoglobins, oxyhemoglobin can be considerably lower than oxygen saturation.   XR Chest Port 1 View    Narrative    EXAM: XR CHEST PORT 1 VIEW  LOCATION: [facility]  DATE: 4/11/2025    INDICATION: RRT: acute hypoxic respiratory failure  COMPARISON: 04/07/2025      Impression    IMPRESSION: Shallow inspiration. Bibasilar atelectasis or infiltrate and small pleural effusions. Presumed surgical drain right upper abdomen. Prominent air-filled bowel.     COVID-19 PCR Results          4/7/2025    22:12   COVID-19 PCR Results   SARS CoV2 PCR Negative      COVID-19 Antibody Results, Testing for Immunity           No data to display              EKG:  Interpreted by ADRIAN Abdullahi CNP  Time reviewed: 0316  Symptoms at time of EKG: None   Rhythm: sinus tachycardia  Rate: Tachycardia  Axis: Normal  Ectopy: none  Conduction: normal  ST Segments/ T Waves: No acute ischemic changes  Q Waves: none  Comparison to prior: Unchanged  Clinical Impression: no acute changes    Time Spent on this Encounter   I spent 60 minutes of critical care time on the unit/floor managing the care of Rj Licea. Upon  evaluation, this patient had a high probability of imminent or life-threatening deterioration due to acute on chronic hypoxic and hypercapnic respiratory failuer, which required my direct attention, intervention, and personal management. 100% of my time was spent at the bedside counseling the patient and/or coordinating care regarding services listed in this note.      ADRIAN Abdullahi, CNP  Hospitalist - House [facility]  Text me on the [email or web address] for a textback

## 2025-04-11 NOTE — PROGRESS NOTES
[facility]    Medicine Progress Note - Hospitalist Service    Date of Admission:  4/7/2025    Assessment & Plan   Rj Licea is a 29 year old male with past medical history significant for developmental delay, autism, asthma, chronic hypercapnic and hypoxic respiratory failure who presents with lethargy, f/c. Admitted on 4/7/2025.    Acute cholecystitis s/p cholecystectomy  Leukocytosis  *Presents with lethargy, fever, chills. RUQ tenderness on exam.   *CT abd/pelvis with distended gallbladder with mild pericholecystic stranding, findings suggest acute cholecystitis   *S/p cholecystectomy on 4/8/2025. Initially laparoscopic approach but had to convert to open due to dilated colon in RUQ, adhesions to liver, and minimal space in abdomen with insufflation. Gallbladder was noted to be gangrenous and necrotic.  *Overnight from 4/10-4/11, patient had a rapid response called for respiratory distress ultimately requiring BiPAP placement and transfer to [facility]. Abdomen more distended and firm than prior. Abd XR findings concerning for diffuse ileus. CT C/A/P ordered by general surgery shows mild diffuse colonic distention with liquid fecal matter and air likely due to colonic ileus.   - General surgery consulted   - Drain management per general surgery  - Hold tube feeds and G tube to LIS per surgery while we wait for ileus to resolve. Depending on how long tube feeds are held, may need to discuss TPN.  - Maintenance IV fluids while NPO  - Ceftriaxone IV + metronidazole IV  - Acetaminophen PO, oxycodone prn, hydromorphone IV PRN  - Ondansetron IV/PO, prochlorperazine IV/PO PRN   - Trend WBC  - Monitor fever curve     Acute hypoxic and hypercapnic respiratory failure  Bilateral pleural effusion, R>L  Moderate persistent asthma   Chronic hypercapnic and hypoxic respiratory failure   Sleep disordered breathing  Ineffective airway clearance  Chronic bronchitis   Atelectasis   *PTA uses 10L oxygen  when O2 <88%, has had difficulty tolerating oxygen when not fatigued/ill  *RRT called after patient returned to the floor from PACU on 4/9.  There was a suspected seizure and then concern that he was having more trouble breathing with an elevated respiratory rate. VBG showed PCO2 76 which improved later in the evening to 63 with a pH of 7.31. Based on prior VBGs, patient likely is a chronic retainer and baseline pCO2 is ~60 so he is near his baseline currently.  *Overnight 4/10-4/11, patient had a rapid response called for respiratory distress ultimately requiring BiPAP placement and [place] [facility] VBG with respiratory acidosis, hypercapnia, and hypoxia. Patient initially   *CT chest on 4/11 shows new moderate right and small left pleural effusions. Discussed with general surgery. Feel this is most likely due to the severity of his gallbladder infection prior to its removal and the complicated nature of the surgery that was done to remove it.  - Pulmonology consulted  - Hold off on thoracentesis for now as patient is improving from respiratory status  - BiPAP for much of the day today. If VBG this afternoon is looking better and pH normalized, can remove BiPAP and monitor on supplemental oxygen. Noted from recent outpatient pulmonology note that patient likely needs BiPAP long-term but has severe facial aversion and behavioral issues that make this difficult.  - PTA on Spiriva and Advair. Held for now and substituted with nebulizers per pulmonary recommendations  - Not a good candidate for pulmonary hygiene as patient is not able to participate due to baseline mental status  - Mucomyst nebulizers  - PTA Azithromycin   - Wean supplemental oxygen as able    Sinus tachycardia   Patient has been in sinus tachycardia for much of his admission. He was initially in atrial flutter when he was seen in the ED and was given metoprolol for that. His tachycardia is most likely a response to his acute illness and post-op  setting. Etiology is multifactorial including infection that is still being treated, recent removal of gangrenous/necrotic gallbladder, pain associated with his surgery, post-op ileus, and respiratory distress.  - With improving WBC to indicate resolving infeciton. Given a liter of LR on 4/9 to see if it is volume responsive which would indicate inadequate volume resuscitation but this did not make a difference in his heart rate. Most likely differential includes blood loss (noted hemoglobin drop since surgery which does not appear completely dilutional as platelets have not trended down as well) and uncontrolled pain.  - Pain control with oxycodone and Dilaudid. Note that patient is mostly nonverbal and does not ask for pain medication. He has very mild grimacing of his face with palpation of his abdomen and is sometimes more talkative/agitated when he is in pain but otherwise can be difficult to tell. Monitor vitals closely and dose pain meds when he is showing nonverbal signs of pain.     Acute anemia, improving  On 4/7 and 4/8, hemoglobin 17.1. Trended down to 14 on 4/9 and down to 9.9 on 4/10. Patient has only received ~3 L of bolused fluid apart from his maintenance fluid rate. WBC also trending down but this is more likely due to removal of his gallbladder and IV antibiotics treating his infection. His platelets have not trended down at all which would suggest this is not hemodilutional in etiology.  - DVT prophylaxis with enoxaparin held. Possible restart tomorrow if hemoglobin stable.  - Hemoglobin slowly improving. Monitor for signs of bleeding. Had a BM in last 24 hours which had no melena or hematochezia. There is a small amount of serosanguinous fluid and ?clot in his drain.    Mild hyponatremia, resolved  Sodium 131 on admission. Now resolved.    Chronic thrombocytopenia   Platelet count 120k. At baseline.   - Monitor CBC    Hyperglycemia  Glucose 294 on BMP. No prior hx of DM. No recent steroid use.  A1C 5.9%. Likely reactive due to infection, stress, pain. FSBS now stable <180 without insulin.    Seizure disorder  Noted that patient had a short seizure episode on 4/8/25 reported by mother who was at bedside when it happened. Not witnessed by staff and was fortunately short-lived not requiring medications.  - Continue prior to admission clobazam, valproic acid   - Seizure precautions    Depression  Anxiety  Behavioral disturbance  Developmental delay  Autism  - Continue prior to admission gabapentin, quetiapine, trazodone, hydroxyzine     Hypothyroidism  - Continue prior to admission levothyroxine    GERD  - Continue prior to admission PPI    G-tube dependence  All medications and nutrition given per G tube  - Nutrition assisting with advancing tube feeds  - Continue maintenance IVF while tube feeds are being advanced    Pressure injury, bilateral elbows, POA  - Wound RN consulted           Diet: Diet  NPO for Medical/Clinical Reasons Except for: Meds    DVT Prophylaxis: Enoxaparin (Lovenox) SQ  [place] Catheter: Not present  Lines: None     Cardiac Monitoring: ACTIVE order. Indication: QTc prolonging medication (48 hours)  Code Status: Full Code      Clinically Significant Risk Factors               # Hypoalbuminemia: Lowest albumin = 2.4 g/dL at 4/10/2025 10:54 AM, will monitor as appropriate   # Thrombocytopenia: Lowest platelets = 122 in last 2 days, will monitor for bleeding       # Acute Hypercapnic Respiratory Failure: based on venous blood gas results.  Continue supplemental oxygen and ventilatory support as indicated.             # Financial/Environmental Concerns: none  # Asthma: noted on problem list        Social Drivers of Health    Physical Activity: Inactive (12/13/2024)    Exercise Vital Sign     Days of Exercise per Week: 0 days     Minutes of Exercise per Session: 0 min   Stress: Patient Declined (12/13/2024)    [employer] [place] [employer] - Occupational Stress Questionnaire      Feeling of Stress : Patient declined   Social Connections: Unknown (12/13/2024)    Social Connection and Isolation Panel [NHANES]     Frequency of Social Gatherings with Friends and Family: Patient declined          Disposition Plan     Medically Ready for Discharge: Anticipated in 2-4 Days pending treatment of multiple conditions as discussed above            Tru Jensen MD  Hospitalist Service  [facility]  Securely message with [employer] (more info)  Text page via [employer] Paging/Directory   ______________________________________________________________________    Interval History   Overnight, RRT called for respiratory distress. Patient required escalating amounts of supplemental oxygen and ultimately had to be transferred to [facility] with BiPAP. Patient's mother stated that he started getting very sleepy and that was most concerning to her in addition to his trouble breathing. After being on BiPAP for a few hours this morning, she said his mental status was starting to improve. Rj was able to respond to me when I spoke to him; he said that he was having abdominal pain. During my visit, his mother said his mental status is the best it has been since yesterday before he decompensated.    Discussed case with surgery team who evaluated patient this morning. See their note for recommendations. At this time, tube feeds are being held with G tube set to LIS. Will plan on removing BiPAP later today and monitor.      Physical Exam   Vital Signs: Temp: 97.8  F (36.6  C) Temp src: Axillary BP: 119/76 Pulse: (!) 133   Resp: 24 SpO2: 95 % O2 Device: Nasal cannula Oxygen Delivery: 4 LPM  Weight: 104 lbs 0 oz  Constitutional: Awake, alert, cooperative, no apparent distress.   Pulmonary: No increased work of breathing, good air exchange, clear to auscultation bilaterally, no crackles or wheezing.  Cardiovascular: Tachycardic, slightly improved from yesterday. Regular rhythm, normal S1 and S2, no S3 or S4.  No murmurs, rubs, or gallops noted.  GI: Normal bowel sounds, soft, non-distended. Noted to have grimacing with palpation of his abdomen, worse grimacing in RUQ but some still noted in the rest of the abdomen.  No guarding or rebound.  Skin/Integumen: No cyanosis or jaundice. No rashes noted.  Extremities: No lower extremity edema.  Neuro: Cannot assess orientation due to mostly nonverbal status. Patient asleep for most of my exam, but did wake up and calling to his mother at the bedside when lab was in his room drawing bloodwork.  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 Moves all extremities with no focal deficit identified.        Medical Decision Making       58 MINUTES SPENT BY ME on the date of service doing chart review, history, exam, documentation & further activities per the note.      Data   ------------------------- PAST 24 HR DATA REVIEWED -----------------------------------------------    I have personally reviewed the following data over the past 24 hrs:    8.0  \   10.6 (L)   / 140 (L)     141 102 5.6 (L) /  301 (H)   3.8 31 (H) 0.52 (L) \     ALT: 26 AST: 34 AP: 103 TBILI: 0.3   ALB: 2.6 (L) TOT PROTEIN: 5.0 (L) LIPASE: N/A     Procal: 5.07 (HH) CRP: N/A Lactic Acid: 1.2         Imaging results reviewed over the past 24 hrs:   Recent Results (from the past 24 hours)   XR Chest Port 1 View    Narrative    EXAM: XR CHEST PORT 1 VIEW  LOCATION: [facility]  DATE: 4/11/2025    INDICATION: RRT: acute hypoxic respiratory failure  COMPARISON: 04/07/2025      Impression    IMPRESSION: Shallow inspiration. Bibasilar atelectasis or infiltrate and small pleural effusions. Presumed surgical drain right upper abdomen. Prominent air-filled bowel.   XR Abdomen Port 1 View    Narrative    EXAM: XR ABDOMEN PORT 1 VIEW  LOCATION: [facility]  DATE: 4/11/2025    INDICATION: Constipation after recent open cholecystectomy; abdomen more distended and firm  today.  COMPARISON: CT 4/7/2025.      Impression    IMPRESSION: Gaseous distention of large and small bowel loops consistent with a diffuse ileus. Patchy bibasilar pulmonary opacities may just be atelectasis. A drain is seen at the right abdomen along with multiple surgical staples.   CT Chest/Abdomen/Pelvis w Contrast    Narrative    EXAM: CT CHEST/ABDOMEN/PELVIS W CONTRAST  LOCATION: [facility]  DATE: 4/11/2025    INDICATION: Worsening abdominal distention and respiratory failure after abdominal surgery  COMPARISON: 4/7/2025  TECHNIQUE: CT scan of the chest, abdomen, and pelvis was performed following injection of IV contrast. Multiplanar reformats were obtained. Dose reduction techniques were used.   CONTRAST: 52 mL Isovue 370    FINDINGS:   LUNGS AND PLEURA: New moderate right and small left pleural effusions. Bibasilar atelectasis. No pulmonary nodules or masses.    MEDIASTINUM/AXILLAE: No lymphadenopathy. No thoracic aortic aneurysm.    CORONARY ARTERY CALCIFICATION: None.    HEPATOBILIARY: Normal liver. Interval cholecystectomy. Surgical drain in the cholecystectomy bed. Small collection of fluid with foci of gas along the anterior right hepatic lobe measuring 3.7 x 1.0 cm (series 3, image 104), likely postoperative changes.   No fluid collection in the cholecystectomy bed.    PANCREAS: Normal.    SPLEEN: Normal.    ADRENAL GLANDS: Normal.    KIDNEYS/BLADDER: Normal.    BOWEL: Percutaneous gastrostomy tube in place. No dilated loops of small bowel. Mild diffuse colonic distention with liquid fecal material and air. The cecum measures 7.9 cm, transverse colon measures 5.8 cm, splenic flexure measures 4.2 cm, and sigmoid   colon measures 6.5 cm.     There is a short segment of decompressed lower descending colon (series 3, image 203). There is mild colonic distention downstream to this segment in the sigmoid colon and rectum. No colonic volvulus. Redundant sigmoid colon with mild  nonspecific   inflammatory changes in a loop of sigmoid colon in the right abdomen (series 3, image 161 and coronal series 8, image 22-39).     LYMPH NODES: No lymphadenopathy.    VASCULATURE: Normal variant left sided IVC.    PELVIC ORGANS: No pelvic masses. Trace free fluid in the pelvis.    MUSCULOSKELETAL: Mild thoracolumbar scoliosis. No suspicious lesions in the bones..      Impression    IMPRESSION:  1.  Mild diffuse colonic distention with liquid fecal material and air, likely due to colonic ileus. There is a short segment of decompressed lower descending colon, with mild colonic distention above and below to this segment. No small bowel dilatation.  2.  Interval cholecystectomy. Small collection of fluid and gas along the anterior right hepatic lobe is likely postoperative. This may be due to small for drainage given size and location.  3.  New moderate right and small left pleural effusions with bibasilar atelectasis.  4.  Mild nonspecific inflammatory changes in a loop of sigmoid colon in the right abdomen.

## 2025-04-11 NOTE — PROCEDURES
Operative Note:     PREOPERATIVE DIAGNOSIS:  Cholecystitis     POSTOPERATIVE DIAGNOSIS:  Necrotic gallbladder     PROCEDURE PERFORMED: Laparoscopic cholecystectomy converted to open procedure.     SURGEON: Robert Lopez MD    ASSISTANT:   Hazel Molina PA-C was asked to assist for camera operation, open exposure, hemostasis, counter-traction, closure.       ANESTHESIA: General endotracheal.     ESTIMATED BLOOD LOSS: 300 mL     FINDINGS:  The gallbladder was taut with areas of hemorrhagic change, and the majority of the gallbladder was approaching necrosis.     COMPLICATIONS: None.     SPECIMENS: Gallbladder.     OPERATIVE INDICATIONS: Rj Licea is a 29 year old male with a history of abdominal pain suggestive of biliary colic.     After understanding the risks and benefits of proceeding with surgery, the patient has an  indication for cholecystectomy and consented to undergo surgery.     I reviewed the risks of surgery with the patient's guardians (mother and daughter).     More specific risks related to laparoscopic cholecystectomy include bile duct injury (3/1000), bile leak (10/1000), retained common bile duct stone (10/1000), postcholecystectomy diarrhea (1-2%) and these complications may require additional treatment.     OPERATIVE DETAILS: The patient was brought to the operating room and prepared in a  routine fashion. A timeout was performed prior to surgery and documented by the nursing  team.  Under the benefits of general anesthesia, a cut down was performed above the umbilicus and  pneumoperitoneum was established using carbon dioxide gas to a maximum pressure of 15  mmHg. A total of 4 ports were placed and the laparoscope was utilized from the periumbilical  port site.    There was significant bowel dilation and this limited the available space for insufflation/operating space.    Additionally, Mr. Licea's legs and feet could not be appropriately positioned on the foot board.  So, he could  not be positioned in any degree of reverse Trendelenburg.      He was positioned into a left lateral decubitus tilt.  However, the gallbladder resided well above the lowest rib with colon resting over the liver.  Positioning could not overcome this problem and the gallbladder could barely be seen laparoscopically.  The decision was made to convert to open.     Incision was made just inferior to the right subcostal location.  The subcutaneous tissues, muscles and fascia were cauterized to expose the liver.      The large intestine was swept down from above the liver.      The gallbladder was fractured away from surrounding colon and omentum digitally.    The exposure was significantly affected by the rib cage.  Due to this constraint, the decision was made to cone down to the infundibulum, Ligasure across the infundibulum leaving a small cuff to suture with 2-0 silk, in running fashion and two layers.  Care was taken to stay well away from the common bile duct.      The cystic artery was taken with ligasure and the gallbladder was excised and removed from the body.       Next, the gallbladder fossa was irrigated with saline and the aspirate was clear.      Complete hemostasis was achieved.  A drain was placed in the gallbladder fossa.     The fascia was closed with 0-looped PDS. The subcutaneous tissues were closed with 3-0 vicryl.     The skin was closed using staples.     I was present for all central components of the operation and all needle and sponge counts were correct x2 at the end of the procedure.

## 2025-04-11 NOTE — PLAN OF CARE
"Patient Name: Sol  MRN: [id number]  Date of Admission: 4/7/2025  Reason for Admission: Cholecystitis   Level of Care: [facility]    Vitals:   BP Readings from Last 1 Encounters:   04/11/25 (!) 140/81     Pulse Readings from Last 1 Encounters:   04/11/25 (!) 138     Wt Readings from Last 1 Encounters:   04/07/25 47.2 kg (104 lb)     Ht Readings from Last 1 Encounters:   03/26/25 1.562 m (5' 1.5\")     Estimated body mass index is 19.33 kg/m  as calculated from the following:    Height as of 3/26/25: 1.562 m (5' 1.5\").    Weight as of this encounter: 47.2 kg (104 lb).  Temp Readings from Last 1 Encounters:   04/11/25 98.5  F (36.9  C) (Axillary)       Pain: SAHIL pain level, pt grimaces with abdominal palpation  CV Surgery Patient: No    Assessment    Resp: Lung sounds diminished. On Bipap 50% FiO2  Telemetry: Sinus tachycardia 130s  Neuro: SAHIL orientation. Does not follow commands or answer questions. Lethargic. Arouses to pain  GI/: Abdomen distended, firm, and tender to touch. incontinent of bowel and bladder. Bladder scanned for 384 ml  Skin/Wounds: Redness/bruising to bilateral elbows, scabs in groin and on toes. Abdominal incisions  Lines/Drains: CARLITO to bulb suction. 2 R PIV. G-tube running TF at 40 ml/hr  Activity: T/R lift  Sleep: good  Abnormal Labs: CO2 90 hgb 10.6    Aggression Stop Light: Green          Patient Care Plan: Monitor VBG, pain management, T/R.   "

## 2025-04-11 NOTE — PROGRESS NOTES
General Surgery Progress Note    Admission Date: 4/7/2025  Today's Date: 4/11/2025         Assessment:      Rj Licea is a 29 year old male with gangrenous and necrotic cholecystitis s/p open cholecystectomy with drain placement POD 3.    Worsening abdominal bloating last night, then developed hypercarbic respiratory failure. Postoperative ileus.          Plan:   - Hold tube feeds. Continue maintenance IV fluids  - G tube to gravity for venting. Clamp for 1-2 hours for necessary PO meds. Holding PO meds that can be held, changing protonix and synthroid to IV for now  - Abdominal XR this morning with distended small bowel and colon consistent with diffuse ileus. Abdomen is much more distended and tender to today  - Rj had 1 moderate sized loose BM yesterday but previously had not had a BM x 4 days  - Daily suppositories, hold bowel regimen for now  - Continue surgical drain, strip every shift  - LFTs and white count normal, hemoglobin stable. Lactate normal, procalcitonin elevated at 5.07 (was previously normal)  - Pulmonology consulted today, patient remains on bipap, adding nebs. Please hold off on percussive vest at this time due to abdominal pain and distention, can consider using once abdominal exam improves  - Continue on Rocephin + Flagyl for treatment of gangrenous cholecystitis  - Etiology of ongoing tachycardia unclear, heart rate was in 160s when EMS brought Rj to hospital. Hospitalist considering cardiology consult? Ddx includes cardiac etiology vs ongoing intra-abdominal infection vs pain    - Discussed with pulmonology, hospitalist, and bedside RN. Will discuss with acute care surgeon, further recommendations to follow.    **ADDENDUM**  Discussed with Dr. Lopez, will proceed with CT chest/abdomen/pelvis to evaluate. Spoke with hospitalist and pulmonologist, want to get VBG prior to taking off bipap for transport to ensure that respiratory status is improving. Dr. Lopez will follow-up on  CT result, ordered STAT. Patient's mother updated at bedside.         Interval History:   Afebrile, pulse remains in 130s. Rj had significant worsening in abdominal bloating last night. Then later in the night he developed hypercarbic respiratory failure and RRT was called. Notes and labs reviewed. Today Rj remains on bipap, is obtunded likely secondary to hypercarbia. Had one moderate sized soft/loose BM yesterday. Was tolerating tube feeds until last night when abdominal distention worsened. No vomiting.           Physical Exam:   /77   Pulse (!) 131   Temp 97.8  F (36.6  C) (Axillary)   Resp 21   Wt 47.2 kg (104 lb)   SpO2 97%   BMI 19.33 kg/m    I/O last 3 completed shifts:  In: 1020 [I.V.:400; NG/GT:620]  Out: 1065 [Urine:875; Emesis/NG output:10; Drains:180]  General: on bipap, resting in bed, winces with pain otherwise nonverbal and obtunded  Abdomen: distended and tympanic, overall fairly firm and diffusely tender to palpation. Dressings removed from incisions. Drain site and supraumbilical incision clean and intact. RUQ incision with bilious staining on dressing, surrounding skin intact without active drainage. Staples in place at incisions. Surgical drain with serosanguinous fluid.     LABS:  Recent Labs   Lab Test 04/11/25  0433 04/10/25  1535 04/10/25  1054 04/09/25  0931   WBC 8.0  --  8.9 13.2*   HGB 10.6* 10.9* 9.9* 14.0   *  --  101* 101*   *  --  122* 124*      Recent Labs   Lab Test 04/11/25  0433 04/10/25  1535 04/10/25  1054 04/09/25  0931   POTASSIUM 3.8  --  3.5 4.2   CHLORIDE 102  --  103 105   CO2 31*  --  31* 31*   BUN 5.6*  --  6.5 6.4   CR 0.52* 0.54* 0.55* 0.60*   ANIONGAP 8  --  6* 7      Recent Labs   Lab Test 04/11/25  0433 04/10/25  1054 04/09/25  0931 06/07/19  1315 04/23/19  1314   ALBUMIN 2.6* 2.4* 3.1*   < > 3.7   BILITOTAL 0.3 0.2 0.4   < > 0.4   ALT 26 28 45   < > 40   AST 34 36 80*   < > 37   ALKPHOS 103 81 79   < > 82   AMYLASE  --   --   --    --  63    < > = values in this interval not displayed.      Recent Labs   Lab Test 04/10/25  1425 04/07/25  2238 09/09/24  1026   PROTEIN Negative 20* Trace*       -------------------------------    Hazel Molina PA-C  [facility]  [phone]

## 2025-04-11 NOTE — PROGRESS NOTES
Lethargic, SAHIL orientation as pt did not respond to my questions. Tachycardic, otherwise VSS on 2LNC. Tele: ST. Assistance with turns and repos. NPO; G tube TF running at 40mL/hr, tolerating well. 2xPIV in RUE, infusing LR at 100mL/hr. Abd incsions, dressing WDL.  Abdomen distended and firm, pt grimaces with palpitation. Pain managed with scheduled medication. Lungs clear/dim. Incont of bladder, -BM, bowel meds given. Continue plan of care.    Provider notified of persistent tachycardia and lethargy despite interventions; no new orders.

## 2025-04-11 NOTE — PROGRESS NOTES
RRT activated due to patient experiencing 02 desaturation in the 70's, Pt subsequently transferred to [facility] and report given to Jc MOULTON

## 2025-04-11 NOTE — PLAN OF CARE
Goal Outcome Evaluation:      Plan of Care Reviewed With: parent, family          Outcome Evaluation: Family goal is d/c home with continued services

## 2025-04-11 NOTE — PLAN OF CARE
[id number]    Orientations: SAHIL orientation as pt does not follow commands, but awake & alert.   Vitals: VSS on 2L NC except persistent tachycardia. LS coarse/dim.   Pain: No pain per RFLACC scale. Receiving scheduled Tylenol.   Tele: ST  GI/: Adequate uop via purewick. +BS, +flatus, large watery BM X1.   Diet: NPO. TF paused this morning.   Ambulation/Assist: Not oob. T/R & weight shifting in bed.   Skin/Wounds: BL elbows blanchable redness. Abd incisions closed with staples & covered with gauze, cdi.   LDA: PEG to L/I suction. CARLITO drain to bulb suction. PIV X2, LR infusing at 100 ml/hr.   Labs: CO2 stable at 62  Plan: Continue to monitor& follow POC.

## 2025-04-12 LAB
ANION GAP SERPL CALCULATED.3IONS-SCNC: 6 MMOL/L (ref 7–15)
BASE EXCESS BLDV CALC-SCNC: 11.3 MMOL/L (ref -3–3)
BUN SERPL-MCNC: 5.1 MG/DL (ref 6–20)
CALCIUM SERPL-MCNC: 8.6 MG/DL (ref 8.8–10.4)
CHLORIDE SERPL-SCNC: 98 MMOL/L (ref 98–107)
CREAT SERPL-MCNC: 0.53 MG/DL (ref 0.67–1.17)
EGFRCR SERPLBLD CKD-EPI 2021: >90 ML/MIN/1.73M2
ERYTHROCYTE [DISTWIDTH] IN BLOOD BY AUTOMATED COUNT: 12.9 % (ref 10–15)
GLUCOSE SERPL-MCNC: 111 MG/DL (ref 70–99)
HCO3 BLDV-SCNC: 40 MMOL/L (ref 21–28)
HCO3 SERPL-SCNC: 34 MMOL/L (ref 22–29)
HCT VFR BLD AUTO: 31.1 % (ref 40–53)
HGB BLD-MCNC: 10.6 G/DL (ref 13.3–17.7)
MCH RBC QN AUTO: 33.2 PG (ref 26.5–33)
MCHC RBC AUTO-ENTMCNC: 34.1 G/DL (ref 31.5–36.5)
MCV RBC AUTO: 98 FL (ref 78–100)
O2/TOTAL GAS SETTING VFR VENT: 2 %
OXYHGB MFR BLDV: 31 % (ref 70–75)
PCO2 BLDV: 67 MM HG (ref 40–50)
PH BLDV: 7.38 [PH] (ref 7.32–7.43)
PHOSPHATE SERPL-MCNC: 4 MG/DL (ref 2.5–4.5)
PLATELET # BLD AUTO: 169 10E3/UL (ref 150–450)
PO2 BLDV: 21 MM HG (ref 25–47)
POTASSIUM SERPL-SCNC: 4.3 MMOL/L (ref 3.4–5.3)
RBC # BLD AUTO: 3.19 10E6/UL (ref 4.4–5.9)
SAO2 % BLDV: 31.4 % (ref 70–75)
SODIUM SERPL-SCNC: 138 MMOL/L (ref 135–145)
WBC # BLD AUTO: 6.9 10E3/UL (ref 4–11)

## 2025-04-12 PROCEDURE — 250N000009 HC RX 250: Performed by: PHYSICIAN ASSISTANT

## 2025-04-12 PROCEDURE — 82805 BLOOD GASES W/O2 SATURATION: CPT | Performed by: STUDENT IN AN ORGANIZED HEALTH CARE EDUCATION/TRAINING PROGRAM

## 2025-04-12 PROCEDURE — 120N000013 HC R&B IMCU

## 2025-04-12 PROCEDURE — 250N000011 HC RX IP 250 OP 636: Mod: JZ | Performed by: PHYSICIAN ASSISTANT

## 2025-04-12 PROCEDURE — 258N000003 HC RX IP 258 OP 636: Performed by: PHYSICIAN ASSISTANT

## 2025-04-12 PROCEDURE — 80048 BASIC METABOLIC PNL TOTAL CA: CPT | Performed by: STUDENT IN AN ORGANIZED HEALTH CARE EDUCATION/TRAINING PROGRAM

## 2025-04-12 PROCEDURE — 94640 AIRWAY INHALATION TREATMENT: CPT

## 2025-04-12 PROCEDURE — 250N000013 HC RX MED GY IP 250 OP 250 PS 637: Performed by: INTERNAL MEDICINE

## 2025-04-12 PROCEDURE — 99232 SBSQ HOSP IP/OBS MODERATE 35: CPT | Performed by: STUDENT IN AN ORGANIZED HEALTH CARE EDUCATION/TRAINING PROGRAM

## 2025-04-12 PROCEDURE — 250N000013 HC RX MED GY IP 250 OP 250 PS 637: Performed by: PHYSICIAN ASSISTANT

## 2025-04-12 PROCEDURE — 36415 COLL VENOUS BLD VENIPUNCTURE: CPT | Performed by: STUDENT IN AN ORGANIZED HEALTH CARE EDUCATION/TRAINING PROGRAM

## 2025-04-12 PROCEDURE — 84100 ASSAY OF PHOSPHORUS: CPT | Performed by: STUDENT IN AN ORGANIZED HEALTH CARE EDUCATION/TRAINING PROGRAM

## 2025-04-12 PROCEDURE — 250N000009 HC RX 250: Performed by: INTERNAL MEDICINE

## 2025-04-12 PROCEDURE — 250N000011 HC RX IP 250 OP 636: Mod: JZ | Performed by: INTERNAL MEDICINE

## 2025-04-12 PROCEDURE — 250N000011 HC RX IP 250 OP 636: Performed by: PHYSICIAN ASSISTANT

## 2025-04-12 PROCEDURE — 85027 COMPLETE CBC AUTOMATED: CPT | Performed by: STUDENT IN AN ORGANIZED HEALTH CARE EDUCATION/TRAINING PROGRAM

## 2025-04-12 RX ORDER — METRONIDAZOLE 500 MG/100ML
500 INJECTION, SOLUTION INTRAVENOUS EVERY 12 HOURS
Status: DISCONTINUED | OUTPATIENT
Start: 2025-04-12 | End: 2025-04-14 | Stop reason: HOSPADM

## 2025-04-12 RX ADMIN — VALPROIC ACID 350 MG: 250 SOLUTION ORAL at 22:19

## 2025-04-12 RX ADMIN — SODIUM CHLORIDE, POTASSIUM CHLORIDE, SODIUM LACTATE AND CALCIUM CHLORIDE: 600; 310; 30; 20 INJECTION, SOLUTION INTRAVENOUS at 13:59

## 2025-04-12 RX ADMIN — DOCUSATE SODIUM LIQUID 100 MG: 100 LIQUID ORAL at 08:29

## 2025-04-12 RX ADMIN — QUETIAPINE FUMARATE 50 MG: 25 TABLET ORAL at 08:31

## 2025-04-12 RX ADMIN — TRAZODONE HYDROCHLORIDE 100 MG: 50 TABLET ORAL at 22:18

## 2025-04-12 RX ADMIN — BUSPIRONE HYDROCHLORIDE 15 MG: 10 TABLET ORAL at 08:29

## 2025-04-12 RX ADMIN — PANTOPRAZOLE SODIUM 40 MG: 40 INJECTION, POWDER, FOR SOLUTION INTRAVENOUS at 08:29

## 2025-04-12 RX ADMIN — BUSPIRONE HYDROCHLORIDE 15 MG: 10 TABLET ORAL at 15:39

## 2025-04-12 RX ADMIN — ERYTHROMYCIN: 5 OINTMENT OPHTHALMIC at 20:30

## 2025-04-12 RX ADMIN — LEVOTHYROXINE SODIUM 18.75 MCG: 20 INJECTION, SOLUTION INTRAVENOUS at 15:39

## 2025-04-12 RX ADMIN — VALPROIC ACID 300 MG: 250 SOLUTION ORAL at 08:29

## 2025-04-12 RX ADMIN — BUSPIRONE HYDROCHLORIDE 15 MG: 10 TABLET ORAL at 22:19

## 2025-04-12 RX ADMIN — CEFTRIAXONE SODIUM 2 G: 2 INJECTION, POWDER, FOR SOLUTION INTRAMUSCULAR; INTRAVENOUS at 02:12

## 2025-04-12 RX ADMIN — SODIUM CHLORIDE, POTASSIUM CHLORIDE, SODIUM LACTATE AND CALCIUM CHLORIDE: 600; 310; 30; 20 INJECTION, SOLUTION INTRAVENOUS at 01:41

## 2025-04-12 RX ADMIN — CLOBAZAM 10 MG: 2.5 SUSPENSION ORAL at 22:19

## 2025-04-12 RX ADMIN — FORMOTEROL FUMARATE 20 MCG: 20 SOLUTION RESPIRATORY (INHALATION) at 09:31

## 2025-04-12 RX ADMIN — VALPROIC ACID 350 MG: 250 SOLUTION ORAL at 13:47

## 2025-04-12 RX ADMIN — HYDROXYZINE HYDROCHLORIDE 50 MG: 10 SYRUP ORAL at 22:20

## 2025-04-12 RX ADMIN — QUETIAPINE FUMARATE 600 MG: 300 TABLET ORAL at 22:18

## 2025-04-12 RX ADMIN — OXYCODONE HYDROCHLORIDE 2.5 MG: 100 SOLUTION ORAL at 14:07

## 2025-04-12 RX ADMIN — CLOBAZAM 10 MG: 2.5 SUSPENSION ORAL at 08:29

## 2025-04-12 RX ADMIN — METRONIDAZOLE 500 MG: 500 INJECTION, SOLUTION INTRAVENOUS at 03:00

## 2025-04-12 RX ADMIN — BUDESONIDE 0.5 MG: 0.5 INHALANT RESPIRATORY (INHALATION) at 09:31

## 2025-04-12 RX ADMIN — CEFTRIAXONE SODIUM 2 G: 2 INJECTION, POWDER, FOR SOLUTION INTRAMUSCULAR; INTRAVENOUS at 22:31

## 2025-04-12 RX ADMIN — GABAPENTIN 750 MG: 250 SOLUTION ORAL at 22:19

## 2025-04-12 RX ADMIN — DOCUSATE SODIUM LIQUID 100 MG: 100 LIQUID ORAL at 22:18

## 2025-04-12 RX ADMIN — PANTOPRAZOLE SODIUM 40 MG: 40 INJECTION, POWDER, FOR SOLUTION INTRAVENOUS at 20:31

## 2025-04-12 RX ADMIN — METRONIDAZOLE 500 MG: 500 INJECTION, SOLUTION INTRAVENOUS at 20:30

## 2025-04-12 ASSESSMENT — ACTIVITIES OF DAILY LIVING (ADL)
WALKING_OR_CLIMBING_STAIRS_DIFFICULTY: NO
ADLS_ACUITY_SCORE: 78
DRESSING/BATHING_DIFFICULTY: YES
ADLS_ACUITY_SCORE: 79
ADLS_ACUITY_SCORE: 81
ADLS_ACUITY_SCORE: 79
ADLS_ACUITY_SCORE: 81
ADLS_ACUITY_SCORE: 81
DIFFICULTY_EATING/SWALLOWING: YES
FALL_HISTORY_WITHIN_LAST_SIX_MONTHS: NO
ADLS_ACUITY_SCORE: 85
ADLS_ACUITY_SCORE: 79
ADLS_ACUITY_SCORE: 79
ADLS_ACUITY_SCORE: 81
TOILETING_ISSUES: NO
ADLS_ACUITY_SCORE: 85
ADLS_ACUITY_SCORE: 79
CHANGE_IN_FUNCTIONAL_STATUS_SINCE_ONSET_OF_CURRENT_ILLNESS/INJURY: NO
EATING/SWALLOWING: OTHER (SEE COMMENTS)
ADLS_ACUITY_SCORE: 79
ADLS_ACUITY_SCORE: 85
ADLS_ACUITY_SCORE: 81
DRESSING/BATHING_MANAGEMENT: CAREGIVER
ADLS_ACUITY_SCORE: 79
ADLS_ACUITY_SCORE: 81
ADLS_ACUITY_SCORE: 81
ADLS_ACUITY_SCORE: 85
ADLS_ACUITY_SCORE: 79
ADLS_ACUITY_SCORE: 81
ADLS_ACUITY_SCORE: 81
DRESSING/BATHING: DRESSING DIFFICULTY, DEPENDENT
ADLS_ACUITY_SCORE: 79
DOING_ERRANDS_INDEPENDENTLY_DIFFICULTY: YES

## 2025-04-12 NOTE — PLAN OF CARE
[id number]    Orientations: SAHIL orientation as pt does not follow commands, but awake & alert.   Vitals: VSS on 1L NC except persistent tachycardia. LS dim.   Pain: Pain controlled with PRN Oxycodone. Restlessness controlled with PRN Seroquel.    Tele: ST  GI/: Adequate uop via purewick. +BS, +flatus, -BM.  Diet: NPO.   Ambulation/Assist: Not oob. T/R & weight shifting in bed.   Skin/Wounds: Abd incisions closed with staples & covered with gauze, cdi.   LDA:   GT clamped at 1345 (Clamp for 7 hrs).   CARLITO drain to bulb suction.   PIV X2, LR infusing at 100 ml/hr.   Labs: CO2 stable at 67  Plan: Place GT back to low intermittent suction at 20:45 then clamp again for 7 hrs. Continue to monitor & follow POC.

## 2025-04-12 NOTE — PLAN OF CARE
Goal Outcome Evaluation:       Orientations: A and confused  Vitals/Pain: Vitals stable during the night  Tele: ST  Lines/Drains: PIV's flushed/infusing  Skin/Wounds: Surgical incision in the abd is ok  GI/: Incontinence care done  Labs: Abnormal/Trends, Electrolyte Replacement- Electrolytes check with am labs  Ambulation/Assist: Repositioning q2hrs  Sleep Quality: Slept through the night post night meds  Plan: Monitor fever curve, and wean off oxygen as able per MD notes.

## 2025-04-12 NOTE — PROGRESS NOTES
General Surgery Progress Note - PA Student     Admission Date: 4/7/2025  Today's Date: 4/12/2025          Assessment:      Rj Licea is a 29 year old male with gangrenous and necrotic cholecystitis s/p open cholecystectomy with drain placement. Significant improvement in overall appearance, responsiveness, abdominal distension and tenderness. He is interacting and giving short responses to questions.           Plan:   - Hold tube feeds. Continue maintenance IV fluids  - Attempt to Clamp G tube for 7 hours and leave open for one, repeat to assess readiness for reinitiating feeds. Monitor for worsening abdominal distension and Bowel output.  - BM regimen: Daily suppositories, hold bowel regimen for now. Rj has no had BM since yesterday (mom notes Rj has an irregular BM schedule at baseline)  - Continue surgical drain, strip every shift.  - LFTs and white count normal, hemoglobin stable. Lactate normal. Glucose improved from 301 (4/11/25 04:33)  to 111(4/12/25 5:45)  -- Continue on Rocephin + Flagyl for treatment of gangrenous cholecystitis          Interval History:   Afebrile, his HR remains slightly elevated at 117 bpm. Rj had an worsening abdominal bloating on POD#2, later that day he developed hypercarbic respiratory failure and RRT was called Post OP ileus was suspected and confirmed on CT, feedings were held and BiPAP was started.  POD#3, Rj had a large watery BM and had notable improvement in general appearance on BiPAP per mom.    Today Rj is off BiPAP. General appearance, distention of abdomen, and tenderness are significantly better today (POD#4). Mom wonders when feeds can restart as she sees restarting feeding as a step closer to going home.        Physical Exam:   Temp: 98.6  F (37  C) Temp src: Axillary BP: 117/86 Pulse: 117   Resp: 26 SpO2: 98 % O2 Device: Nasal cannula Oxygen Delivery: 1 LPM   From 7:00 AM 4/11 to 7:00 AM 4/12  Ins: 48.35 mL/kg  Outs: 43.03 mL/kg   Total I/O:  +251  Drains: 30 mL    General: laying down, interacting with mom, and responding to questions (saying yes, good, no)   Heart: regular rhythm, slightly tachycardiac, no murmurs rubs or gallops  Lungs: clear to auscultation bilaterally, no wheezing, crackles   Extremities: no lower leg edema noted or skin color changes  Abdomen: slightly tender over incision, non-distended, clean dressings noted, no bilious staining or dried blood.  Staples in place at incisions. Surgical drain with serosanguinous fluid, no bilious film noted.      Labs:   Results for orders placed or performed during the hospital encounter of 04/07/25 (from the past 24 hours)   CT Chest/Abdomen/Pelvis w Contrast    Narrative    EXAM: CT CHEST/ABDOMEN/PELVIS W CONTRAST  LOCATION: [facility]  DATE: 4/11/2025    INDICATION: Worsening abdominal distention and respiratory failure after abdominal surgery  COMPARISON: 4/7/2025  TECHNIQUE: CT scan of the chest, abdomen, and pelvis was performed following injection of IV contrast. Multiplanar reformats were obtained. Dose reduction techniques were used.   CONTRAST: 52 mL Isovue 370    FINDINGS:   LUNGS AND PLEURA: New moderate right and small left pleural effusions. Bibasilar atelectasis. No pulmonary nodules or masses.    MEDIASTINUM/AXILLAE: No lymphadenopathy. No thoracic aortic aneurysm.    CORONARY ARTERY CALCIFICATION: None.    HEPATOBILIARY: Normal liver. Interval cholecystectomy. Surgical drain in the cholecystectomy bed. Small collection of fluid with foci of gas along the anterior right hepatic lobe measuring 3.7 x 1.0 cm (series 3, image 104), likely postoperative changes.   No fluid collection in the cholecystectomy bed.    PANCREAS: Normal.    SPLEEN: Normal.    ADRENAL GLANDS: Normal.    KIDNEYS/BLADDER: Normal.    BOWEL: Percutaneous gastrostomy tube in place. No dilated loops of small bowel. Mild diffuse colonic distention with liquid fecal material and air. The  cecum measures 7.9 cm, transverse colon measures 5.8 cm, splenic flexure measures 4.2 cm, and sigmoid   colon measures 6.5 cm.     There is a short segment of decompressed lower descending colon (series 3, image 203). There is mild colonic distention downstream to this segment in the sigmoid colon and rectum. No colonic volvulus. Redundant sigmoid colon with mild nonspecific   inflammatory changes in a loop of sigmoid colon in the right abdomen (series 3, image 161 and coronal series 8, image 22-39).     LYMPH NODES: No lymphadenopathy.    VASCULATURE: Normal variant left sided IVC.    PELVIC ORGANS: No pelvic masses. Trace free fluid in the pelvis.    MUSCULOSKELETAL: Mild thoracolumbar scoliosis. No suspicious lesions in the bones..      Impression    IMPRESSION:  1.  Mild diffuse colonic distention with liquid fecal material and air, likely due to colonic ileus. There is a short segment of decompressed lower descending colon, with mild colonic distention above and below to this segment. No small bowel dilatation.  2.  Interval cholecystectomy. Small collection of fluid and gas along the anterior right hepatic lobe is likely postoperative. This may be due to small for drainage given size and location.  3.  New moderate right and small left pleural effusions with bibasilar atelectasis.  4.  Mild nonspecific inflammatory changes in a loop of sigmoid colon in the right abdomen.       Blood gas venous   Result Value Ref Range    pH Venous 7.40 7.32 - 7.43    pCO2 Venous 62 (H) 40 - 50 mm Hg    pO2 Venous 21 (L) 25 - 47 mm Hg    Bicarbonate Venous 38 (H) 21 - 28 mmol/L    Base Excess/Deficit Venous 11.1 (H) -3.0 - 3.0 mmol/L    FIO2 30     Oxyhemoglobin Venous 35 (L) 70 - 75 %    O2 Sat, Venous 35.8 (L) 70.0 - 75.0 %    Narrative    In healthy individuals, oxyhemoglobin (O2Hb) and oxygen saturation (SO2) are approximately equal. In the presence of dyshemoglobins, oxyhemoglobin can be considerably lower than oxygen  saturation.   Blood gas venous   Result Value Ref Range    pH Venous 7.35 7.32 - 7.43    pCO2 Venous 72 (H) 40 - 50 mm Hg    pO2 Venous 26 25 - 47 mm Hg    Bicarbonate Venous 40 (H) 21 - 28 mmol/L    Base Excess/Deficit Venous 11.0 (H) -3.0 - 3.0 mmol/L    FIO2 2     Oxyhemoglobin Venous 46 (L) 70 - 75 %    O2 Sat, Venous 47.0 (L) 70.0 - 75.0 %    Narrative    In healthy individuals, oxyhemoglobin (O2Hb) and oxygen saturation (SO2) are approximately equal. In the presence of dyshemoglobins, oxyhemoglobin can be considerably lower than oxygen saturation.   Phosphorus   Result Value Ref Range    Phosphorus 4.0 2.5 - 4.5 mg/dL   CBC with platelets   Result Value Ref Range    WBC Count 6.9 4.0 - 11.0 10e3/uL    RBC Count 3.19 (L) 4.40 - 5.90 10e6/uL    Hemoglobin 10.6 (L) 13.3 - 17.7 g/dL    Hematocrit 31.1 (L) 40.0 - 53.0 %    MCV 98 78 - 100 fL    MCH 33.2 (H) 26.5 - 33.0 pg    MCHC 34.1 31.5 - 36.5 g/dL    RDW 12.9 10.0 - 15.0 %    Platelet Count 169 150 - 450 10e3/uL   Basic metabolic panel   Result Value Ref Range    Sodium 138 135 - 145 mmol/L    Potassium 4.3 3.4 - 5.3 mmol/L    Chloride 98 98 - 107 mmol/L    Carbon Dioxide (CO2) 34 (H) 22 - 29 mmol/L    Anion Gap 6 (L) 7 - 15 mmol/L    Urea Nitrogen 5.1 (L) 6.0 - 20.0 mg/dL    Creatinine 0.53 (L) 0.67 - 1.17 mg/dL    GFR Estimate >90 >60 mL/min/1.73m2    Calcium 8.6 (L) 8.8 - 10.4 mg/dL    Glucose 111 (H) 70 - 99 mg/dL         -------------------------------   AJY Latham under supervision of Francois Wood PA-C          Patient seen concurrently with student.   Information in note reviewed in detail.  Agree with the note.  Clamp G-Tube 7 of 8 hours.   Connect to LIS if he doesn't tolerate clamping.  Continue current cares.    Francois Wood PA-C  Office: [phone]  Pager: [phone]

## 2025-04-12 NOTE — PROGRESS NOTES
[facility]    Medicine Progress Note - Hospitalist Service    Date of Admission:  4/7/2025    Assessment & Plan   Rj Licea is a 29 year old male with past medical history significant for developmental delay, autism, asthma, chronic hypercapnic and hypoxic respiratory failure who presents with lethargy, f/c. Admitted on 4/7/2025.    Acute cholecystitis s/p cholecystectomy  Leukocytosis  *Presents with lethargy, fever, chills. RUQ tenderness on exam.   *CT abd/pelvis with distended gallbladder with mild pericholecystic stranding, findings suggest acute cholecystitis   *S/p cholecystectomy on 4/8/2025. Initially laparoscopic approach but had to convert to open due to dilated colon in RUQ, adhesions to liver, and minimal space in abdomen with insufflation. Gallbladder was noted to be gangrenous and necrotic.  *Overnight from 4/10-4/11, patient had a rapid response called for respiratory distress ultimately requiring BiPAP placement and transfer to [facility]. Abdomen more distended and firm than prior. Abd XR findings concerning for diffuse ileus. CT C/A/P ordered by general surgery shows mild diffuse colonic distention with liquid fecal matter and air likely due to colonic ileus.   - General surgery consulted   - Drain management per general surgery  - Hold tube feeds and G tube to LIS per surgery while we wait for ileus to resolve. Depending on how long tube feeds are held, may need to discuss TPN. Pending re-evaluation by general surgery today.  - Maintenance IV fluids while NPO  - Ceftriaxone IV + metronidazole IV  - Acetaminophen PO, oxycodone prn, hydromorphone IV PRN  - Ondansetron IV/PO, prochlorperazine IV/PO PRN   - Trend WBC  - Monitor fever curve     Acute hypoxic and hypercapnic respiratory failure  Bilateral pleural effusion, R>L  Moderate persistent asthma   Chronic hypercapnic and hypoxic respiratory failure   Sleep disordered breathing  Ineffective airway clearance  Chronic  bronchitis   Atelectasis   *PTA uses 10L oxygen when O2 <88%, has had difficulty tolerating oxygen when not fatigued/ill  *RRT called after patient returned to the floor from PACU on 4/9.  There was a suspected seizure and then concern that he was having more trouble breathing with an elevated respiratory rate. VBG showed PCO2 76 which improved later in the evening to 63 with a pH of 7.31. Based on prior VBGs, patient likely is a chronic retainer and baseline pCO2 is ~60 so he is near his baseline currently.  *Overnight 4/10-4/11, patient had a rapid response called for respiratory distress ultimately requiring BiPAP placement and transfer to [facility]. VBG with respiratory acidosis, hypercapnia, and hypoxia. Patient initially   *CT chest on 4/11 shows new moderate right and small left pleural effusions. Discussed with general surgery. Feel this is most likely due to the severity of his gallbladder infection prior to its removal and the complicated nature of the surgery that was done to remove it.  - Pulmonology consulted and signed off.  - Hold off on thoracentesis for now as patient is improving from respiratory status and would be very difficult to do as patient is not able to receive instruction given his baseline mental status  - Off BiPAP since 4/11/2025 in the afternoon. Mental status much better today. Recheck VBG this afternoon. Noted from recent outpatient pulmonology note that patient likely needs BiPAP long-term but has severe facial aversion and behavioral issues that make this difficult; patient's mother plans on talking with outpatient pulmonologist about this again.  - Wean supplemental oxygen as able  - PTA on Spiriva and Advair. Held for now and substituted with nebulizers per pulmonary recommendations  - Not a good candidate for pulmonary hygiene as patient is not able to participate due to baseline mental status  - Mucomyst nebulizers  - PTA Azithromycin   - Wean supplemental oxygen as able    Sinus  tachycardia, improving  Patient has been in sinus tachycardia for much of his admission. He was initially in atrial flutter when he was seen in the ED and was given metoprolol for that. His tachycardia is most likely a response to his acute illness and post-op setting. Etiology is multifactorial including infection that is still being treated, recent removal of gangrenous/necrotic gallbladder, pain associated with his surgery, post-op ileus, and respiratory distress.  - With improving WBC to indicate resolving infeciton. Given a liter of LR on 4/9 to see if it is volume responsive which would indicate inadequate volume resuscitation but this did not make a difference in his heart rate. Most likely differential includes uncontrolled pain, ileus which is slowly resolving, and respiratory failure which is also resolving.  - Pain control with oxycodone and Dilaudid. Note that patient is mostly nonverbal and does not ask for pain medication. He has very mild grimacing of his face with palpation of his abdomen and is sometimes more talkative/agitated when he is in pain but otherwise can be difficult to tell. Monitor vitals closely and dose pain meds when he is showing nonverbal signs of pain.     Acute anemia, improving  On 4/7 and 4/8, hemoglobin 17.1. Trended down to 14 on 4/9 and down to 9.9 on 4/10. Patient has only received ~3 L of bolused fluid apart from his maintenance fluid rate. WBC also trending down but this is more likely due to removal of his gallbladder and IV antibiotics treating his infection. His platelets have not trended down at all which would suggest this is not hemodilutional in etiology but improving on its own and no signs of blood accumulation on CT C/A/P.  - DVT prophylaxis with enoxaparin held. Possible restart tomorrow if hemoglobin stable.  - Hemoglobin slowly improving. Monitor for signs of bleeding. Had a BM in last 24 hours which had no melena or hematochezia. There is a small amount of  serosanguinous fluid and ?clot in his drain.    Mild hyponatremia, resolved  Sodium 131 on admission. Now resolved.    Chronic thrombocytopenia   Platelet count 120k. At baseline.   - Monitor CBC    Hyperglycemia  Glucose 294 on BMP. No prior hx of DM. No recent steroid use. A1C 5.9%. Likely reactive due to infection, stress, pain. FSBS now stable <180 without insulin.    Seizure disorder  Noted that patient had a short seizure episode on 4/8/25 reported by mother who was at bedside when it happened. Not witnessed by staff and was fortunately short-lived not requiring medications.  - Continue prior to admission clobazam, valproic acid   - Seizure precautions    Depression  Anxiety  Behavioral disturbance  Developmental delay  Autism  - Continue prior to admission gabapentin, quetiapine, trazodone, hydroxyzine     Hypothyroidism  - Continue prior to admission levothyroxine    GERD  - Continue prior to admission PPI    G-tube dependence  All medications and nutrition given per G tube  - Nutrition assisting with advancing tube feeds  - Continue maintenance IVF while tube feeds are being advanced    Pressure injury, bilateral elbows, POA  - Wound RN consulted           Diet: Diet  NPO for Medical/Clinical Reasons Except for: Meds    DVT Prophylaxis: Enoxaparin (Lovenox) SQ  [place] Catheter: Not present  Lines: None     Cardiac Monitoring: ACTIVE order. Indication: QTc prolonging medication (48 hours)  Code Status: Full Code      Clinically Significant Risk Factors               # Hypoalbuminemia: Lowest albumin = 2.4 g/dL at 4/10/2025 10:54 AM, will monitor as appropriate         # Acute Hypercapnic Respiratory Failure: based on venous blood gas results.  Continue supplemental oxygen and ventilatory support as indicated.             # Financial/Environmental Concerns: none  # Asthma: noted on problem list        Social Drivers of Health    Physical Activity: Inactive (12/13/2024)    Exercise Vital Sign     Days of  Exercise per Week: 0 days     Minutes of Exercise per Session: 0 min   Stress: Patient Declined (12/13/2024)    Citizen of [place] of Occupational Health - Occupational Stress Questionnaire     Feeling of Stress : Patient declined   Social Connections: Unknown (12/13/2024)    Social Connection and Isolation Panel [NHANES]     Frequency of Social Gatherings with Friends and Family: Patient declined          Disposition Plan     Medically Ready for Discharge: Anticipated Tomorrow or Monday pending ramp up tube feeds once cleared by surgery, improvement of tachycardia, resolution of ileus, and good pain control           Tru Jensen MD  Hospitalist Service  [facility]  Securely message with [email or web address] (more info)  Text page via [facility] Paging/Directory   ______________________________________________________________________    Interval History   NAEO. NNR. Patient's mental status continues to be great this morning and mother is very pleased with his progress. He is still requiring supplemental oxygen but much more interactive. There are still some signs of pain where he is grimacing with turns and repositioning. Pending general surgery re-evaluation today. G-tube set to LIS at this time until general surgery re-evaluates.      Physical Exam   Vital Signs: Temp: 98.6  F (37  C) Temp src: Axillary BP: 117/86 Pulse: 117   Resp: 26 SpO2: 98 % O2 Device: Nasal cannula Oxygen Delivery: 1 LPM  Weight: 104 lbs 0 oz  Constitutional: Awake, alert, cooperative, mild distress during blood draw for labs  Pulmonary: No increased work of breathing, good air exchange, clear to auscultation bilaterally, no crackles or wheezing.  Cardiovascular: Tachycardic, improved from yesterday. Regular rhythm, normal S1 and S2, no S3 or S4. No murmurs, rubs, or gallops noted.  GI: Normal bowel sounds, soft, non-distended. Noted to have grimacing with palpation of his abdomen, worse grimacing in RUQ but some still noted in the  rest of the abdomen.  No guarding or rebound.  Skin/Integumen: No cyanosis or jaundice. No rashes noted.  Extremities: No lower extremity edema.  Neuro: Cannot assess orientation due to mostly nonverbal status. Awake, alert, and interactive with mother at bedside. 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 Moves all extremities with no focal deficit identified.        Medical Decision Making       42 MINUTES SPENT BY ME on the date of service doing chart review, history, exam, documentation & further activities per the note.      Data   ------------------------- PAST 24 HR DATA REVIEWED -----------------------------------------------    I have personally reviewed the following data over the past 24 hrs:    6.9  \   10.6 (L)   / 169     138 98 5.1 (L) /  111 (H)   4.3 34 (H) 0.53 (L) \       Imaging results reviewed over the past 24 hrs:   Recent Results (from the past 24 hours)   CT Chest/Abdomen/Pelvis w Contrast    Narrative    EXAM: CT CHEST/ABDOMEN/PELVIS W CONTRAST  LOCATION: [facility]  DATE: 4/11/2025    INDICATION: Worsening abdominal distention and respiratory failure after abdominal surgery  COMPARISON: 4/7/2025  TECHNIQUE: CT scan of the chest, abdomen, and pelvis was performed following injection of IV contrast. Multiplanar reformats were obtained. Dose reduction techniques were used.   CONTRAST: 52 mL Isovue 370    FINDINGS:   LUNGS AND PLEURA: New moderate right and small left pleural effusions. Bibasilar atelectasis. No pulmonary nodules or masses.    MEDIASTINUM/AXILLAE: No lymphadenopathy. No thoracic aortic aneurysm.    CORONARY ARTERY CALCIFICATION: None.    HEPATOBILIARY: Normal liver. Interval cholecystectomy. Surgical drain in the cholecystectomy bed. Small collection of fluid with foci of gas along the anterior right hepatic lobe measuring 3.7 x 1.0 cm (series 3, image 104), likely postoperative changes.   No fluid collection in the cholecystectomy bed.    PANCREAS:  Normal.    SPLEEN: Normal.    ADRENAL GLANDS: Normal.    KIDNEYS/BLADDER: Normal.    BOWEL: Percutaneous gastrostomy tube in place. No dilated loops of small bowel. Mild diffuse colonic distention with liquid fecal material and air. The cecum measures 7.9 cm, transverse colon measures 5.8 cm, splenic flexure measures 4.2 cm, and sigmoid   colon measures 6.5 cm.     There is a short segment of decompressed lower descending colon (series 3, image 203). There is mild colonic distention downstream to this segment in the sigmoid colon and rectum. No colonic volvulus. Redundant sigmoid colon with mild nonspecific   inflammatory changes in a loop of sigmoid colon in the right abdomen (series 3, image 161 and coronal series 8, image 22-39).     LYMPH NODES: No lymphadenopathy.    VASCULATURE: Normal variant left sided IVC.    PELVIC ORGANS: No pelvic masses. Trace free fluid in the pelvis.    MUSCULOSKELETAL: Mild thoracolumbar scoliosis. No suspicious lesions in the bones..      Impression    IMPRESSION:  1.  Mild diffuse colonic distention with liquid fecal material and air, likely due to colonic ileus. There is a short segment of decompressed lower descending colon, with mild colonic distention above and below to this segment. No small bowel dilatation.  2.  Interval cholecystectomy. Small collection of fluid and gas along the anterior right hepatic lobe is likely postoperative. This may be due to small for drainage given size and location.  3.  New moderate right and small left pleural effusions with bibasilar atelectasis.  4.  Mild nonspecific inflammatory changes in a loop of sigmoid colon in the right abdomen.

## 2025-04-13 ENCOUNTER — APPOINTMENT (OUTPATIENT)
Dept: PHYSICAL THERAPY | Facility: CLINIC | Age: 30
End: 2025-04-13
Attending: STUDENT IN AN ORGANIZED HEALTH CARE EDUCATION/TRAINING PROGRAM
Payer: MEDICAID

## 2025-04-13 LAB
ANION GAP SERPL CALCULATED.3IONS-SCNC: 9 MMOL/L (ref 7–15)
BACTERIA BLD CULT: NO GROWTH
BACTERIA BLD CULT: NO GROWTH
BUN SERPL-MCNC: 8.9 MG/DL (ref 6–20)
CALCIUM SERPL-MCNC: 8.6 MG/DL (ref 8.8–10.4)
CHLORIDE SERPL-SCNC: 99 MMOL/L (ref 98–107)
CREAT SERPL-MCNC: 0.59 MG/DL (ref 0.67–1.17)
EGFRCR SERPLBLD CKD-EPI 2021: >90 ML/MIN/1.73M2
GLUCOSE SERPL-MCNC: 105 MG/DL (ref 70–99)
HCO3 SERPL-SCNC: 31 MMOL/L (ref 22–29)
PHOSPHATE SERPL-MCNC: 4 MG/DL (ref 2.5–4.5)
POTASSIUM SERPL-SCNC: 3.9 MMOL/L (ref 3.4–5.3)
POTASSIUM SERPL-SCNC: 4 MMOL/L (ref 3.4–5.3)
SODIUM SERPL-SCNC: 139 MMOL/L (ref 135–145)

## 2025-04-13 PROCEDURE — 250N000009 HC RX 250: Performed by: PHYSICIAN ASSISTANT

## 2025-04-13 PROCEDURE — 80048 BASIC METABOLIC PNL TOTAL CA: CPT | Performed by: STUDENT IN AN ORGANIZED HEALTH CARE EDUCATION/TRAINING PROGRAM

## 2025-04-13 PROCEDURE — 250N000013 HC RX MED GY IP 250 OP 250 PS 637: Performed by: PHYSICIAN ASSISTANT

## 2025-04-13 PROCEDURE — 97530 THERAPEUTIC ACTIVITIES: CPT | Mod: GP

## 2025-04-13 PROCEDURE — 999N000157 HC STATISTIC RCP TIME EA 10 MIN

## 2025-04-13 PROCEDURE — 999N000111 HC STATISTIC OT IP EVAL DEFER: Performed by: OCCUPATIONAL THERAPIST

## 2025-04-13 PROCEDURE — 99232 SBSQ HOSP IP/OBS MODERATE 35: CPT | Performed by: STUDENT IN AN ORGANIZED HEALTH CARE EDUCATION/TRAINING PROGRAM

## 2025-04-13 PROCEDURE — 250N000011 HC RX IP 250 OP 636: Performed by: PHYSICIAN ASSISTANT

## 2025-04-13 PROCEDURE — 94640 AIRWAY INHALATION TREATMENT: CPT

## 2025-04-13 PROCEDURE — 250N000013 HC RX MED GY IP 250 OP 250 PS 637: Performed by: INTERNAL MEDICINE

## 2025-04-13 PROCEDURE — 36415 COLL VENOUS BLD VENIPUNCTURE: CPT | Performed by: STUDENT IN AN ORGANIZED HEALTH CARE EDUCATION/TRAINING PROGRAM

## 2025-04-13 PROCEDURE — 120N000013 HC R&B IMCU

## 2025-04-13 PROCEDURE — 84132 ASSAY OF SERUM POTASSIUM: CPT | Performed by: STUDENT IN AN ORGANIZED HEALTH CARE EDUCATION/TRAINING PROGRAM

## 2025-04-13 PROCEDURE — 250N000011 HC RX IP 250 OP 636: Performed by: STUDENT IN AN ORGANIZED HEALTH CARE EDUCATION/TRAINING PROGRAM

## 2025-04-13 PROCEDURE — 250N000011 HC RX IP 250 OP 636: Mod: JZ | Performed by: INTERNAL MEDICINE

## 2025-04-13 PROCEDURE — 250N000009 HC RX 250: Performed by: INTERNAL MEDICINE

## 2025-04-13 PROCEDURE — 258N000003 HC RX IP 258 OP 636: Performed by: PHYSICIAN ASSISTANT

## 2025-04-13 PROCEDURE — 97162 PT EVAL MOD COMPLEX 30 MIN: CPT | Mod: GP

## 2025-04-13 PROCEDURE — 84100 ASSAY OF PHOSPHORUS: CPT | Performed by: STUDENT IN AN ORGANIZED HEALTH CARE EDUCATION/TRAINING PROGRAM

## 2025-04-13 RX ORDER — ACETAMINOPHEN 325 MG/10.15ML
650 LIQUID ORAL EVERY 4 HOURS PRN
Status: DISCONTINUED | OUTPATIENT
Start: 2025-04-13 | End: 2025-04-14 | Stop reason: HOSPADM

## 2025-04-13 RX ADMIN — ACETAMINOPHEN 650 MG: 325 SUSPENSION ORAL at 02:26

## 2025-04-13 RX ADMIN — SODIUM CHLORIDE, POTASSIUM CHLORIDE, SODIUM LACTATE AND CALCIUM CHLORIDE: 600; 310; 30; 20 INJECTION, SOLUTION INTRAVENOUS at 01:49

## 2025-04-13 RX ADMIN — BUSPIRONE HYDROCHLORIDE 15 MG: 10 TABLET ORAL at 21:24

## 2025-04-13 RX ADMIN — LEVOTHYROXINE SODIUM 18.75 MCG: 20 INJECTION, SOLUTION INTRAVENOUS at 18:19

## 2025-04-13 RX ADMIN — FORMOTEROL FUMARATE 20 MCG: 20 SOLUTION RESPIRATORY (INHALATION) at 07:44

## 2025-04-13 RX ADMIN — BUSPIRONE HYDROCHLORIDE 15 MG: 10 TABLET ORAL at 15:24

## 2025-04-13 RX ADMIN — OXYCODONE HYDROCHLORIDE 2.5 MG: 100 SOLUTION ORAL at 08:34

## 2025-04-13 RX ADMIN — CEFTRIAXONE SODIUM 2 G: 2 INJECTION, POWDER, FOR SOLUTION INTRAMUSCULAR; INTRAVENOUS at 21:24

## 2025-04-13 RX ADMIN — PANTOPRAZOLE SODIUM 40 MG: 40 INJECTION, POWDER, FOR SOLUTION INTRAVENOUS at 08:15

## 2025-04-13 RX ADMIN — CLOBAZAM 10 MG: 2.5 SUSPENSION ORAL at 21:25

## 2025-04-13 RX ADMIN — VALPROIC ACID 350 MG: 250 SOLUTION ORAL at 15:23

## 2025-04-13 RX ADMIN — METRONIDAZOLE 500 MG: 500 INJECTION, SOLUTION INTRAVENOUS at 08:23

## 2025-04-13 RX ADMIN — BUSPIRONE HYDROCHLORIDE 15 MG: 10 TABLET ORAL at 08:15

## 2025-04-13 RX ADMIN — ENOXAPARIN SODIUM 40 MG: 40 INJECTION SUBCUTANEOUS at 15:24

## 2025-04-13 RX ADMIN — QUETIAPINE FUMARATE 50 MG: 25 TABLET ORAL at 06:45

## 2025-04-13 RX ADMIN — VALPROIC ACID 300 MG: 250 SOLUTION ORAL at 08:16

## 2025-04-13 RX ADMIN — DOCUSATE SODIUM LIQUID 100 MG: 100 LIQUID ORAL at 08:15

## 2025-04-13 RX ADMIN — SODIUM CHLORIDE, POTASSIUM CHLORIDE, SODIUM LACTATE AND CALCIUM CHLORIDE: 600; 310; 30; 20 INJECTION, SOLUTION INTRAVENOUS at 12:54

## 2025-04-13 RX ADMIN — BUDESONIDE 0.5 MG: 0.5 INHALANT RESPIRATORY (INHALATION) at 20:39

## 2025-04-13 RX ADMIN — CLOBAZAM 10 MG: 2.5 SUSPENSION ORAL at 08:15

## 2025-04-13 RX ADMIN — METRONIDAZOLE 500 MG: 500 INJECTION, SOLUTION INTRAVENOUS at 20:07

## 2025-04-13 RX ADMIN — TRAZODONE HYDROCHLORIDE 100 MG: 50 TABLET ORAL at 21:24

## 2025-04-13 RX ADMIN — BUDESONIDE 0.5 MG: 0.5 INHALANT RESPIRATORY (INHALATION) at 07:44

## 2025-04-13 RX ADMIN — QUETIAPINE FUMARATE 600 MG: 300 TABLET ORAL at 21:24

## 2025-04-13 RX ADMIN — HYDROXYZINE HYDROCHLORIDE 50 MG: 10 SYRUP ORAL at 21:25

## 2025-04-13 RX ADMIN — GABAPENTIN 750 MG: 250 SOLUTION ORAL at 21:25

## 2025-04-13 RX ADMIN — OXYCODONE HYDROCHLORIDE 2.5 MG: 100 SOLUTION ORAL at 18:57

## 2025-04-13 RX ADMIN — VALPROIC ACID 350 MG: 250 SOLUTION ORAL at 21:24

## 2025-04-13 ASSESSMENT — ACTIVITIES OF DAILY LIVING (ADL)
ADLS_ACUITY_SCORE: 85
ADLS_ACUITY_SCORE: 83
ADLS_ACUITY_SCORE: 85
ADLS_ACUITY_SCORE: 83
ADLS_ACUITY_SCORE: 83
ADLS_ACUITY_SCORE: 82
ADLS_ACUITY_SCORE: 83
ADLS_ACUITY_SCORE: 85
ADLS_ACUITY_SCORE: 73
ADLS_ACUITY_SCORE: 82
ADLS_ACUITY_SCORE: 83
ADLS_ACUITY_SCORE: 82
ADLS_ACUITY_SCORE: 79
ADLS_ACUITY_SCORE: 83
ADLS_ACUITY_SCORE: 75
ADLS_ACUITY_SCORE: 76
ADLS_ACUITY_SCORE: 76
ADLS_ACUITY_SCORE: 83
ADLS_ACUITY_SCORE: 82
ADLS_ACUITY_SCORE: 76
ADLS_ACUITY_SCORE: 83

## 2025-04-13 NOTE — PLAN OF CARE
[id number] TRANSFER to GEN SURG    Orientations: SAHIL orientation as pt does not follow commands, but awake & alert.   Vitals: VSS on 1L NC except persistent tachycardia. LS dim.   Pain: Pain controlled with PRN Oxycodone.   Tele: ST  GI/: Adequate uop via purewick. +BS, +flatus, -BM.  Diet: NPO.   Ambulation/Assist: Not oob. T/R & weight shifting in bed.   Skin/Wounds: Abd incisions closed with staples & covered with gauze, cdi.   LDA:   GT clamped, waiting for TF to be sent up to restart feeds.   CARLITO drain to bulb suction.   PIV X1 infusing LR at 100 ml/hr.   Labs: K & Phos WDL  Plan: Resume TF today. IMC discontinued. Transfer to Gen Surg. Continue to monitor & follow POC.

## 2025-04-13 NOTE — PLAN OF CARE
Goal Outcome Evaluation:             Date/Time: 25 ([id number])    Mental Status: Alert, not able to assess orientation, able to respond patient name and   Activity/dangle: Has not been OOB yet  Diet: NPO, Tube Feeding  Pain: Oxycodone x1  C[place]ona/Voiding: Urinal, incontinent @ times  Tele/Restraints/Iso: NA  /LDA: LR infusing @ 100 mL/hr, Tube Feeding  D/C Date: TBD  Other Info: Patient transferred from [facility] @ 1215. Tube Feeding started @ 10 ml/hr @ 1300, advance by 10 mL every 8 hour if tolerated to reach goal rate of 50 mL/hr per order. Meds via gastrostomy tube. Had large BM x1 this shift. Patient mom @ bedside, provides  assistance with cares .

## 2025-04-13 NOTE — PROGRESS NOTES
"Care Management Follow Up    Length of Stay (days): 5    Expected Discharge Date: 04/16/2025     Concerns to be Addressed: discharge planning     Patient plan of care discussed at interdisciplinary rounds: Yes    Anticipated Discharge Disposition: Home Infusion, Home             Patient/family educated on Medicare website which has current facility and service quality ratings:    Education Provided on the Discharge Plan: Yes  Patient/Family in Agreement with the Plan: yes    Referrals Placed by CM/SW:    Private pay costs discussed: Not applicable    Discussed  Partnership in Safe Discharge Planning  document with patient/family: No     Additional Information:  Writer was asked by OT today to see if patient can have a \"stretcher ride\" home.  Writer approached patient's mom Darcy to discuss this option.    Darcy is not interested in a ride from hospital to home, she is interested in getting her son on a stretcher once home to get up the stairs.    Informed Darcy that writer can not arrange that option yet could provide transport via [employer] [facility].  At this time she is not interested in that for transport.  She stated she was going to reach out to her EMS near her home to see if they can help once patient arrives home.  Writer discussed again that [employer] [facility] via Stretcher might be the best way for him to transport.  At time of meeting she was not interested.  Next Steps: Care Management will continue to follow for discharge planning.         Shruti Garduno, Care Management RN, AIDAN  [facility] - FLOAT   Contact: via Bert   "

## 2025-04-13 NOTE — PROGRESS NOTES
CLINICAL NUTRITION SERVICES -     TF held past two days due to ileus. Pt had BM over the past 24 hours. Received consult for TF resumption.     Recommendations  - Resume Osmolite 1.5 @ 10 ml/hr via G tube.   - Pending tolerance, adv by 10 ml q8hr to eventual goal rate of 50 ml/hr.   - FWF 30 ml q4hr       Madelyn Mcgowan, RD, LD, CNSC   Available on Simplify

## 2025-04-13 NOTE — CARE PLAN
OT: Order rec'd. Confirmed today that patient's mother and sister are his PCAs and they assist with all ADLs at baseline. OT eval not indicated. PT will address transfers here. OT order complete.

## 2025-04-13 NOTE — PROGRESS NOTES
"   04/13/25 1500   Appointment Info   Signing Clinician's Name / Credentials (PT) Eloisa Allen, PT, DPT   Living Environment   People in Home parent(s)   Current Living Arrangements house   Home Accessibility stairs to enter home   Number of Stairs, Main Entrance 7   Stair Railings, Main Entrance railings safe and in good condition   Transportation Anticipated family or friend will provide   Living Environment Comments Pt lives with mom, sister can help as needed as well. Between the two of him he has 80+ PCA hours. They are planning to take him home with 24/7 support and take it easy for a while besides going out for appointments   Self-Care   Usual Activity Tolerance fair   Current Activity Tolerance poor   Equipment Currently Used at Home wheelchair, manual   Fall history within last six months no   Activity/Exercise/Self-Care Comment Pt has assist for all ADLs. He typically walks short distances with anterior hand hold assist, to the bathroom and back. Transport chair for community mobility. Split entry home with 7 steps up where he typically scoots with some help. He has a hospital bed at home.   General Information   Onset of Illness/Injury or Date of Surgery 04/07/25   Referring Physician Tru Jensen MD   Patient/Family Therapy Goals Statement (PT) to go home   Pertinent History of Current Problem (include personal factors and/or comorbidities that impact the POC) \"Rj Licea is a 29 year old male with past medical history significant for developmental delay, autism, asthma, chronic hypercapnic and hypoxic respiratory failure who presents with lethargy, f/c. Admitted on 4/7/2025.     Acute cholecystitis s/p cholecystectomy  Leukocytosis  \"   Existing Precautions/Restrictions abdominal   Weight-Bearing Status - LLE full weight-bearing   Weight-Bearing Status - RLE full weight-bearing   Cognition   Follows Commands (Cognition) physical/tactile prompts required;repetition of directions required "   Cognitive Status Comments appears to be at baseline per mom. pt can state phrases 'i want to go home' 'we're done' and 'yes/no's   Pain Assessment   Patient Currently in Pain   (no overt indication of pain)   Integumentary/Edema   Integumentary/Edema Comments incision not assessed. appears thin, fragile   Posture    Posture Kyphosis   Range of Motion (ROM)   ROM Comment limited at baseline w/ tone   Strength (Manual Muscle Testing)   Strength Comments limited against gravity strength at this time, limited by pt self limitation as well   Bed Mobility   Comment, (Bed Mobility) mod assist of 2   Transfers   Comment, (Transfers) did not attempt, pt not agreeable to try   Gait/Stairs (Locomotion)   Comment, (Gait/Stairs) NT   Balance   Balance Comments fair seated balance   Sensory Examination   Sensory Perception Comments pt not able to state   Clinical Impression   Criteria for Skilled Therapeutic Intervention Yes, treatment indicated   PT Diagnosis (PT) impaired functional mobility   Influenced by the following impairments decreased strength, activity tolerance, balance; abdominal incision   Functional limitations due to impairments bed mobility, transfers, ambulation, stairs   Clinical Presentation (PT Evaluation Complexity) evolving   Clinical Presentation Rationale clinical judgement   Clinical Decision Making (Complexity) moderate complexity   Planned Therapy Interventions (PT) bed mobility training;gait training;patient/family education;stair training;transfer training   Risk & Benefits of therapy have been explained evaluation/treatment results reviewed;care plan/treatment goals reviewed;risks/benefits reviewed;current/potential barriers reviewed;participants voiced agreement with care plan;participants included;patient;mother   PT Total Evaluation Time   PT Eval, Moderate Complexity Minutes (70075) 11   Physical Therapy Goals   PT Frequency Daily   PT Predicted Duration/Target Date for Goal Attainment  04/20/25   PT Goals Bed Mobility;Transfers;Gait;Stairs   PT: Bed Mobility Minimal assist   PT: Transfers Moderate assist;Bed to/from chair;Sit to/from stand;Assistive device   PT: Gait Moderate assist;10 feet   PT: Stairs   (not anticipated to reasonably practice in hospital environment)   Interventions   Interventions Quick Adds Therapeutic Activity   Therapeutic Activity   Therapeutic Activities: dynamic activities to improve functional performance Minutes (30839) 30   Treatment Detail/Skilled Intervention Pt in supine, a little worked up from transfering rooms. Mom present and involved throughout the session, protective of him but with conversation agreeable to try mobility. Pt agreed to try sitting up with a 'yes', otherwise states 'i want to go home' fairly frequently. From supine, HOB raised with bed and sat up to long sitting with mod assist of 2 x 3 reps with cuing. Pt able to sit but gets tired and needs to lay back down. Attempted rolling to sit up fully on EOB but heart rate increased to upper 120s and patient stating verbally and with body language he was done. Long discussion with mom about his mobility ability here and what discharging home might look like. Discussed and reviewed strategies for max-dependent pivot tranfsers, bumping up stairs with two assist, and bumping up to step stool, shorter chair, then wheelchair from top of step. Mom and family insistent on patient returning home as this would be best for him in his own environment, but she is worried about the physical aspect of getting him home as he is today. Discussed possibility of medical transport to get him home and up the stairs. Mom seems relieved this could be an option and would like to look into it. Discussed with social work. Also discussed home PT with mom, that it would be highly recommended for him to get stronger and mobilize better as he recovers. Mom wants to think about it.   PT Discharge Planning   PT Plan continue trialing  sitting up, EOB sitting; standing with HHA if able   PT Discharge Recommendation (DC Rec) home with assist;home with home care physical therapy   PT Rationale for DC Rec Patient's mom and sister are PCAs for him, able to provide 24/7 while he's recovering and are confident about taking him home, though haven't quite finalized the plan physically for getting him into a car, getting him up the stairs, and transfers. Today at time of eval pt able to sit up in bed, but unable to tolerate mobility more than this. Transfers will be challenging, let alone stairs. Recommend medical transport into his home and receive home care PT from home in his own environment where he will be best set up for success to strengthen and mobilize.   PT Brief overview of current status mod assist to sit up in bed   PT Total Distance Amb During Session (feet) 0   PT Equipment Needed at Discharge commode chair   Physical Therapy Time and Intention   Timed Code Treatment Minutes 30   Total Session Time (sum of timed and untimed services) 41

## 2025-04-13 NOTE — PROGRESS NOTES
Surgery  Patient looks to be doing okay.  Had a sizable bowel movement over the last 24 hours.  Abdomen is soft, drain output is decreasing.  I think tube feeds could be initiated at a low rate with a plan to slowly ramp it up to goal.  No other recommendations.  Drain will likely be removed prior to discharge.  Kyler Dominguez MD  General Surgery, Office [phone]

## 2025-04-13 NOTE — PROGRESS NOTES
[facility]    Medicine Progress Note - Hospitalist Service    Date of Admission:  4/7/2025    Assessment & Plan   Rj Licea is a 29 year old male with past medical history significant for developmental delay, autism, asthma, chronic hypercapnic and hypoxic respiratory failure who presents with lethargy, f/c. Admitted on 4/7/2025.    Acute cholecystitis s/p cholecystectomy  Leukocytosis  *Presents with lethargy, fever, chills. RUQ tenderness on exam.   *CT abd/pelvis with distended gallbladder with mild pericholecystic stranding, findings suggest acute cholecystitis   *S/p cholecystectomy on 4/8/2025. Initially laparoscopic approach but had to convert to open due to dilated colon in RUQ, adhesions to liver, and minimal space in abdomen with insufflation. Gallbladder was noted to be gangrenous and necrotic.  *Overnight from 4/10-4/11, patient had a rapid response called for respiratory distress ultimately requiring BiPAP placement and transfer to [facility]. Abdomen more distended and firm than prior. Abd XR findings concerning for diffuse ileus. CT C/A/P ordered by general surgery shows mild diffuse colonic distention with liquid fecal matter and air likely due to colonic ileus.   - General surgery following   - Drain management per general surgery  - Ramp up tube feeds slowly today. Monitor closely for worsening abdominal distension or pain  - Maintenance IV fluids today. Plan to discontinue or reduce tomorrow if patient tolerating tube feeds (they won't be at goal yet tomorrow due to slow ramp up).  - Ceftriaxone IV + metronidazole IV  - Acetaminophen PO, oxycodone prn, hydromorphone IV PRN  - Ondansetron IV/PO, prochlorperazine IV/PO PRN   - Trend WBC  - Monitor fever curve     Acute hypoxic and hypercapnic respiratory failure  Bilateral pleural effusion, R>L  Moderate persistent asthma   Chronic hypercapnic and hypoxic respiratory failure   Sleep disordered breathing  Ineffective airway  clearance  Chronic bronchitis   Atelectasis   *PTA uses 10L oxygen when O2 <88%, has had difficulty tolerating oxygen when not fatigued/ill  *RRT called after patient returned to the floor from PACU on 4/9.  There was a suspected seizure and then concern that he was having more trouble breathing with an elevated respiratory rate. VBG showed PCO2 76 which improved later in the evening to 63 with a pH of 7.31. Based on prior VBGs, patient likely is a chronic retainer and baseline pCO2 is ~60 so he is near his baseline currently.  *Overnight 4/10-4/11, patient had a rapid response called for respiratory distress ultimately requiring BiPAP placement and [place] [facility] VBG with respiratory acidosis, hypercapnia, and hypoxia. Patient initially   *CT chest on 4/11 shows new moderate right and small left pleural effusions. Discussed with general surgery. Feel this is most likely due to the severity of his gallbladder infection prior to its removal and the complicated nature of the surgery that was done to remove it.  - Pulmonology consulted and signed off.  - Hold off on thoracentesis for now as patient is improving from respiratory status and would be very difficult to do as patient is not able to receive instruction given his baseline mental status  - Off BiPAP since 4/11/2025 in the afternoon. Mental status much better and satting well on 1 LPM close to going down to room air. Recheck VBG in the morning with morning labs.  - Wean supplemental oxygen as able  - PTA on Spiriva and Advair. Held for now and substituted with nebulizers per pulmonary recommendations  - Not a good candidate for pulmonary hygiene as patient is not able to participate due to baseline mental status  - Mucomyst nebulizers  - PTA Azithromycin   - Wean supplemental oxygen as able    Sinus tachycardia, improving  Patient has been in sinus tachycardia for much of his admission. He was initially in atrial flutter when he was seen in the ED and was  given metoprolol for that. His tachycardia is most likely a response to his acute illness and post-op setting. Etiology is multifactorial including infection that is still being treated, recent removal of gangrenous/necrotic gallbladder, pain associated with his surgery, post-op ileus, and respiratory distress.  - With improving WBC to indicate resolving infeciton. Given a liter of LR on 4/9 to see if it is volume responsive which would indicate inadequate volume resuscitation but this did not make a difference in his heart rate. Most likely differential includes uncontrolled pain, ileus which is slowly resolving, and respiratory failure which is also resolving.  - Pain control with oxycodone and Dilaudid. Note that patient is mostly nonverbal and does not ask for pain medication. He has very mild grimacing of his face with palpation of his abdomen and is sometimes more talkative/agitated when he is in pain but otherwise can be difficult to tell. Monitor vitals closely and dose pain meds when he is showing nonverbal signs of pain.     Acute anemia, improving  On 4/7 and 4/8, hemoglobin 17.1. Trended down to 14 on 4/9 and down to 9.9 on 4/10. Patient has only received ~3 L of bolused fluid apart from his maintenance fluid rate. WBC also trending down but this is more likely due to removal of his gallbladder and IV antibiotics treating his infection. His platelets have not trended down at all which would suggest this is not hemodilutional in etiology but improving on its own and no signs of blood accumulation on CT C/A/P.  - Have been holding DVT prophylaxis. Restart today and recheck CBC tomorrow.    Mild hyponatremia, resolved  Sodium 131 on admission. Now resolved.    Chronic thrombocytopenia   Platelet count 120k. At baseline.   - Monitor CBC    Hyperglycemia  Glucose 294 on BMP on admission. No prior hx of DM. No recent steroid use. A1C 5.9%. Likely reactive due to infection, stress, pain. FSBS now stable <180  without insulin.    Seizure disorder  Noted that patient had a short seizure episode on 4/8/25 reported by mother who was at bedside when it happened. Not witnessed by staff and was fortunately short-lived not requiring medications.  - Continue prior to admission clobazam, valproic acid   - Seizure precautions    Depression  Anxiety  Behavioral disturbance  Developmental delay  Autism  - Continue prior to admission gabapentin, quetiapine, trazodone, hydroxyzine     Hypothyroidism  - Continue prior to admission levothyroxine    GERD  - Continue prior to admission PPI    G-tube dependence  All medications and nutrition given per G tube  - Nutrition assisting with advancing tube feeds  - Continue maintenance IVF while tube feeds are being advanced    Pressure injury, bilateral elbows, POA  - Wound RN consulted           Diet: Diet  NPO for Medical/Clinical Reasons Except for: Meds  Adult Formula Drip Feeding: Continuous Osmolite 1.5; Gastrostomy; Goal Rate: 50; mL/hr; 4/13: Initiate @ 10 ml/hr. Pending tolerance, adv by 10 ml q8h to eventual goal rate of 50 ml/hr.    DVT Prophylaxis: Enoxaparin (Lovenox) SQ  Carmona Catheter: Not present  Lines: None     Cardiac Monitoring: None  Code Status: Full Code      Clinically Significant Risk Factors               # Hypoalbuminemia: Lowest albumin = 2.4 g/dL at 4/10/2025 10:54 AM, will monitor as appropriate         # Acute Hypercapnic Respiratory Failure: based on venous blood gas results.  Continue supplemental oxygen and ventilatory support as indicated.             # Financial/Environmental Concerns: none  # Asthma: noted on problem list        Social Drivers of Health    Physical Activity: Inactive (12/13/2024)    Exercise Vital Sign     Days of Exercise per Week: 0 days     Minutes of Exercise per Session: 0 min   Stress: Patient Declined (12/13/2024)    Citizen of [place] [employer] - Occupational Stress Questionnaire     Feeling of Stress : Patient declined    Social Connections: Unknown (12/13/2024)    Social Connection and Isolation Panel [NHANES]     Frequency of Social Gatherings with Friends and Family: Patient declined          Disposition Plan     Medically Ready for Discharge: Anticipated Tomorrow or Monday pending ramp up tube feeds once cleared by surgery, improvement of tachycardia, resolution of ileus, and good pain control           Tru Jensen MD  Hospitalist Service  [facility]  Securely message with [employer] (more info)  Text page via [employer] Paging/Directory   _____________________________________________________________________    Interval History   NAEO. NNR. Patient's mental status continues to be great this morning and mother is very pleased with his progress. Only requiring 1 LPM via nasal cannula. Heart rate still slightly elevated but improved from before. There are still some signs of pain where he is grimacing with turns and repositioning.     Tube feeds via G tube to restart with slow ramp up per surgery.      Physical Exam   Vital Signs: Temp: 98  F (36.7  C) Temp src: Axillary BP: 125/84 Pulse: 116   Resp: 18 SpO2: 95 % O2 Device: Nasal cannula Oxygen Delivery: 1/2 LPM  Weight: 104 lbs 0 oz  Constitutional: Awake, alert, cooperative, no acute distress  Pulmonary: No increased work of breathing, good air exchange, clear to auscultation bilaterally, no crackles or wheezing.  Cardiovascular: Tachycardic, improved from yesterday. Regular rhythm, normal S1 and S2, no S3 or S4. No murmurs, rubs, or gallops noted.  GI: Normal bowel sounds, soft, non-distended. Some wincing/grimacing with palpation of RUQ.  No guarding.  Skin/Integumen: No cyanosis or jaundice. No rashes noted.  Extremities: No lower extremity edema.  Neuro: Cannot assess orientation due to mostly nonverbal status. Awake, alert, and interactive with mother at bedside. 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 Moves all extremities with no focal deficit  identified.        Medical Decision Making       43 MINUTES SPENT BY ME on the date of service doing chart review, history, exam, documentation & further activities per the note.      Data   ------------------------- PAST 24 HR DATA REVIEWED -----------------------------------------------    I have personally reviewed the following data over the past 24 hrs:    N/A  \   N/A   / N/A     139 99 8.9 /  105 (H)   3.9; 4.0 31 (H) 0.59 (L) \       Imaging results reviewed over the past 24 hrs:   No results found for this or any previous visit (from the past 24 hours).

## 2025-04-13 NOTE — PLAN OF CARE
Goal Outcome Evaluation:       Orientations: A/confused  Vitals/Pain: HR was high, temp 100.1 MD was paged to have prn tylenol which was given, temp will be re-checked before end of the shift  Tele: SR  Lines/Drains: PIV's : flushed and saline locked  Skin/Wounds: Redness in both elbows, open to air  GI/: Incontinence care done  Labs: Abnormal/Trends, Electrolyte Replacement- Electrolytes with am labs  Ambulation/Assist: Reposition q 2hrs.  Sleep Quality: Slept through the night  Plan: Per surgery team order: clamp G-tube for 7 hrs/ unclamp for 1 hrs.

## 2025-04-14 ENCOUNTER — APPOINTMENT (OUTPATIENT)
Dept: PHYSICAL THERAPY | Facility: CLINIC | Age: 30
End: 2025-04-14
Payer: MEDICAID

## 2025-04-14 VITALS
RESPIRATION RATE: 18 BRPM | BODY MASS INDEX: 22.62 KG/M2 | OXYGEN SATURATION: 92 % | WEIGHT: 121.69 LBS | SYSTOLIC BLOOD PRESSURE: 121 MMHG | DIASTOLIC BLOOD PRESSURE: 78 MMHG | TEMPERATURE: 97.5 F | HEART RATE: 109 BPM

## 2025-04-14 LAB
ANION GAP SERPL CALCULATED.3IONS-SCNC: 7 MMOL/L (ref 7–15)
BASE EXCESS BLDV CALC-SCNC: 8.5 MMOL/L (ref -3–3)
BUN SERPL-MCNC: 8.5 MG/DL (ref 6–20)
CALCIUM SERPL-MCNC: 8.9 MG/DL (ref 8.8–10.4)
CHLORIDE SERPL-SCNC: 98 MMOL/L (ref 98–107)
CREAT SERPL-MCNC: 0.62 MG/DL (ref 0.67–1.17)
EGFRCR SERPLBLD CKD-EPI 2021: >90 ML/MIN/1.73M2
ERYTHROCYTE [DISTWIDTH] IN BLOOD BY AUTOMATED COUNT: 12.7 % (ref 10–15)
GLUCOSE BLDC GLUCOMTR-MCNC: 209 MG/DL (ref 70–99)
GLUCOSE SERPL-MCNC: 169 MG/DL (ref 70–99)
HCO3 BLDV-SCNC: 35 MMOL/L (ref 21–28)
HCO3 SERPL-SCNC: 31 MMOL/L (ref 22–29)
HCT VFR BLD AUTO: 34.5 % (ref 40–53)
HGB BLD-MCNC: 11.4 G/DL (ref 13.3–17.7)
MAGNESIUM SERPL-MCNC: 2.1 MG/DL (ref 1.7–2.3)
MCH RBC QN AUTO: 32.3 PG (ref 26.5–33)
MCHC RBC AUTO-ENTMCNC: 33 G/DL (ref 31.5–36.5)
MCV RBC AUTO: 98 FL (ref 78–100)
O2/TOTAL GAS SETTING VFR VENT: 1 %
OXYHGB MFR BLDV: 54 % (ref 70–75)
PCO2 BLDV: 56 MM HG (ref 40–50)
PH BLDV: 7.4 [PH] (ref 7.32–7.43)
PHOSPHATE SERPL-MCNC: 3.8 MG/DL (ref 2.5–4.5)
PLATELET # BLD AUTO: 288 10E3/UL (ref 150–450)
PO2 BLDV: 31 MM HG (ref 25–47)
POTASSIUM SERPL-SCNC: 3.8 MMOL/L (ref 3.4–5.3)
RBC # BLD AUTO: 3.53 10E6/UL (ref 4.4–5.9)
SAO2 % BLDV: 55.5 % (ref 70–75)
SODIUM SERPL-SCNC: 136 MMOL/L (ref 135–145)
WBC # BLD AUTO: 6.8 10E3/UL (ref 4–11)

## 2025-04-14 PROCEDURE — 82805 BLOOD GASES W/O2 SATURATION: CPT | Performed by: STUDENT IN AN ORGANIZED HEALTH CARE EDUCATION/TRAINING PROGRAM

## 2025-04-14 PROCEDURE — 250N000013 HC RX MED GY IP 250 OP 250 PS 637: Performed by: PHYSICIAN ASSISTANT

## 2025-04-14 PROCEDURE — 85018 HEMOGLOBIN: CPT | Performed by: STUDENT IN AN ORGANIZED HEALTH CARE EDUCATION/TRAINING PROGRAM

## 2025-04-14 PROCEDURE — 250N000011 HC RX IP 250 OP 636: Performed by: PHYSICIAN ASSISTANT

## 2025-04-14 PROCEDURE — 250N000013 HC RX MED GY IP 250 OP 250 PS 637: Performed by: INTERNAL MEDICINE

## 2025-04-14 PROCEDURE — 99239 HOSP IP/OBS DSCHRG MGMT >30: CPT | Performed by: INTERNAL MEDICINE

## 2025-04-14 PROCEDURE — 999N000157 HC STATISTIC RCP TIME EA 10 MIN

## 2025-04-14 PROCEDURE — 250N000011 HC RX IP 250 OP 636: Mod: JZ | Performed by: INTERNAL MEDICINE

## 2025-04-14 PROCEDURE — 250N000009 HC RX 250: Performed by: INTERNAL MEDICINE

## 2025-04-14 PROCEDURE — 94640 AIRWAY INHALATION TREATMENT: CPT

## 2025-04-14 PROCEDURE — 250N000011 HC RX IP 250 OP 636: Performed by: STUDENT IN AN ORGANIZED HEALTH CARE EDUCATION/TRAINING PROGRAM

## 2025-04-14 PROCEDURE — 84100 ASSAY OF PHOSPHORUS: CPT | Performed by: PHYSICIAN ASSISTANT

## 2025-04-14 PROCEDURE — 258N000003 HC RX IP 258 OP 636: Performed by: PHYSICIAN ASSISTANT

## 2025-04-14 PROCEDURE — 97530 THERAPEUTIC ACTIVITIES: CPT | Mod: GP | Performed by: PHYSICAL THERAPIST

## 2025-04-14 PROCEDURE — 36415 COLL VENOUS BLD VENIPUNCTURE: CPT | Performed by: STUDENT IN AN ORGANIZED HEALTH CARE EDUCATION/TRAINING PROGRAM

## 2025-04-14 PROCEDURE — 83735 ASSAY OF MAGNESIUM: CPT | Performed by: PHYSICIAN ASSISTANT

## 2025-04-14 PROCEDURE — 250N000009 HC RX 250: Performed by: PHYSICIAN ASSISTANT

## 2025-04-14 PROCEDURE — 80048 BASIC METABOLIC PNL TOTAL CA: CPT | Performed by: PHYSICIAN ASSISTANT

## 2025-04-14 RX ORDER — AMOXICILLIN AND CLAVULANATE POTASSIUM 400; 57 MG/5ML; MG/5ML
875 POWDER, FOR SUSPENSION ORAL 2 TIMES DAILY
Qty: 175.04 ML | Refills: 0 | Status: SHIPPED | OUTPATIENT
Start: 2025-04-14 | End: 2025-04-22

## 2025-04-14 RX ADMIN — METRONIDAZOLE 500 MG: 500 INJECTION, SOLUTION INTRAVENOUS at 08:40

## 2025-04-14 RX ADMIN — SODIUM CHLORIDE, POTASSIUM CHLORIDE, SODIUM LACTATE AND CALCIUM CHLORIDE: 600; 310; 30; 20 INJECTION, SOLUTION INTRAVENOUS at 04:19

## 2025-04-14 RX ADMIN — CLOBAZAM 10 MG: 2.5 SUSPENSION ORAL at 08:46

## 2025-04-14 RX ADMIN — QUETIAPINE FUMARATE 50 MG: 25 TABLET ORAL at 04:31

## 2025-04-14 RX ADMIN — VALPROIC ACID 300 MG: 250 SOLUTION ORAL at 09:38

## 2025-04-14 RX ADMIN — BUSPIRONE HYDROCHLORIDE 15 MG: 10 TABLET ORAL at 15:05

## 2025-04-14 RX ADMIN — ENOXAPARIN SODIUM 40 MG: 40 INJECTION SUBCUTANEOUS at 13:16

## 2025-04-14 RX ADMIN — LEVOTHYROXINE SODIUM 18.75 MCG: 20 INJECTION, SOLUTION INTRAVENOUS at 13:16

## 2025-04-14 RX ADMIN — PANTOPRAZOLE SODIUM 40 MG: 40 INJECTION, POWDER, FOR SOLUTION INTRAVENOUS at 08:42

## 2025-04-14 RX ADMIN — FORMOTEROL FUMARATE 20 MCG: 20 SOLUTION RESPIRATORY (INHALATION) at 07:20

## 2025-04-14 RX ADMIN — VALPROIC ACID 350 MG: 250 SOLUTION ORAL at 13:16

## 2025-04-14 RX ADMIN — BUSPIRONE HYDROCHLORIDE 15 MG: 10 TABLET ORAL at 08:47

## 2025-04-14 RX ADMIN — AZITHROMYCIN 440 MG: 200 POWDER, FOR SUSPENSION ORAL at 08:45

## 2025-04-14 RX ADMIN — BUDESONIDE 0.5 MG: 0.5 INHALANT RESPIRATORY (INHALATION) at 07:20

## 2025-04-14 ASSESSMENT — ACTIVITIES OF DAILY LIVING (ADL)
ADLS_ACUITY_SCORE: 85
ADLS_ACUITY_SCORE: 76
ADLS_ACUITY_SCORE: 85
ADLS_ACUITY_SCORE: 77
ADLS_ACUITY_SCORE: 76
ADLS_ACUITY_SCORE: 84
ADLS_ACUITY_SCORE: 76
ADLS_ACUITY_SCORE: 84
ADLS_ACUITY_SCORE: 85
ADLS_ACUITY_SCORE: 84
ADLS_ACUITY_SCORE: 77
ADLS_ACUITY_SCORE: 76
ADLS_ACUITY_SCORE: 85

## 2025-04-14 NOTE — PROGRESS NOTES
General Surgery Progress Note - PA Student     Admission Date: 4/7/2025  Today's Date: 4/14/2025          Assessment:      Richard Licea is a 29 year old male with gangrenous and necrotic cholecystitis s/p open cholecystectomy with drain placement. Richard is still interacting and giving short responses to questions. His abdomen is slightly distended but non tender. Feedings are going well. Overall, slowly improving.           Plan:   -  Continue maintenance IV fluids  - Continue to progress feeds as tolerated, Currently at 30 mL/hr. Goal 50 mL/hr . Monitor for worsening abdominal distension and Bowel output.  - BM regimen: Daily suppositories (mom notes Richard has an irregular BM schedule at baseline)  - Continue surgical drain, strip every shift.  - LFTs and white count normal, hemoglobin stable. Lactate normal. Glucose elevated at 169 (4/14 7:53) previously 105 (4/13 6:12). Otherwise, rest of labs are improving.   -- Continue on Rocephin + Flagyl for treatment of gangrenous cholecystitis            Interval History:   Richard Licea is a 29 year old male with gangrenous and necrotic cholecystitis s/p open cholecystectomy with drain placement. Consistently mildly tachycardic. Richard had an worsening abdominal bloating on POD#2, later that day he developed hypercarbic respiratory failure and RRT was called Post OP ileus was suspected and confirmed on CT, feedings were held and BiPAP was started.  POD#3, Richard had a large watery BM and had notable improvement in general appearance on BiPAP per mom. POD#4 had significant improvement in abdominal distension, tenderness, and overall appearance. Feedings were restarted on POD#5 due to toleration of G tube clamping and have been able to advance up to 30 ml/hr. PT and OT providers were consulted and assessed richard POD#5. See formal PT and OT note for more information.      Today POD#6,  Richard is still slightly tachycardic. Mom notes he is complaining less of pain, although  it is difficult to assess pain due to his developmental delays. Rj is interacting with mom and responding with short responses to my questions. Abdomen is slightly distended but non-tender. Mom reports feedings are going well. He is urinating with no issues. Mom reports no shortness of breath that she can tell. Had a BM in the last 24 hrs.          Physical Exam:   Temp: 97.5  F (36.4  C) Temp src: Axillary BP: 121/78 Pulse: 114   Resp: 18 SpO2: 93 % O2 Device: None (Room air) Oxygen Delivery: 1/2 LPM   Intake/Output Summary (Last 24 hours) at 4/14/2025 1044  Last data filed at 4/14/2025 0451  Gross per 24 hour   Intake 1839 ml   Output 748 ml   Net 1091 ml        General: laying down, interacting with mom, and responding to questions (saying yes, good, no)   Heart: regular rhythm, slightly tachycardiac, no murmurs rubs or gallops  Lungs: clear to auscultation bilaterally, no wheezing, crackles   Extremities: no lower leg edema noted or skin color changes  Abdomen: normal bowel sounds. slightly distended, non tender. dried blood noted on dressings as well as dark green dried substance (possibly bile).  Staples in place at incisions. Surgical drain with serosanguinous fluid, no bilious film noted.      Labs:   Results for orders placed or performed during the hospital encounter of 04/07/25 (from the past 24 hours)   Basic metabolic panel   Result Value Ref Range    Sodium 136 135 - 145 mmol/L    Potassium 3.8 3.4 - 5.3 mmol/L    Chloride 98 98 - 107 mmol/L    Carbon Dioxide (CO2) 31 (H) 22 - 29 mmol/L    Anion Gap 7 7 - 15 mmol/L    Urea Nitrogen 8.5 6.0 - 20.0 mg/dL    Creatinine 0.62 (L) 0.67 - 1.17 mg/dL    GFR Estimate >90 >60 mL/min/1.73m2    Calcium 8.9 8.8 - 10.4 mg/dL    Glucose 169 (H) 70 - 99 mg/dL   Magnesium   Result Value Ref Range    Magnesium 2.1 1.7 - 2.3 mg/dL   Phosphorus   Result Value Ref Range    Phosphorus 3.8 2.5 - 4.5 mg/dL   CBC with platelets   Result Value Ref Range    WBC Count 6.8 4.0  - 11.0 10e3/uL    RBC Count 3.53 (L) 4.40 - 5.90 10e6/uL    Hemoglobin 11.4 (L) 13.3 - 17.7 g/dL    Hematocrit 34.5 (L) 40.0 - 53.0 %    MCV 98 78 - 100 fL    MCH 32.3 26.5 - 33.0 pg    MCHC 33.0 31.5 - 36.5 g/dL    RDW 12.7 10.0 - 15.0 %    Platelet Count 288 150 - 450 10e3/uL   Blood gas venous   Result Value Ref Range    pH Venous 7.40 7.32 - 7.43    pCO2 Venous 56 (H) 40 - 50 mm Hg    pO2 Venous 31 25 - 47 mm Hg    Bicarbonate Venous 35 (H) 21 - 28 mmol/L    Base Excess/Deficit Venous 8.5 (H) -3.0 - 3.0 mmol/L    FIO2 1     Oxyhemoglobin Venous 54 (L) 70 - 75 %    O2 Sat, Venous 55.5 (L) 70.0 - 75.0 %    Narrative    In healthy individuals, oxyhemoglobin (O2Hb) and oxygen saturation (SO2) are approximately equal. In the presence of dyshemoglobins, oxyhemoglobin can be considerably lower than oxygen saturation.       JAY Latham under supervision of Francois Wood PA-C

## 2025-04-14 NOTE — PLAN OF CARE
Goal Outcome Evaluation:      Plan of Care Reviewed With: patient    Overall Patient Progress: no changeOverall Patient Progress: no change    Date & Time: 4/13/25 2666-5888  Surgery/POD#: POD5 for open cholecystectomy  Behavior & Aggression: yellow- restless at times  Fall Risk: yes  Orientation:SAHIL- pt unable to answer, autism and cognitive delay dx, somewhat nonverbal  ABNL VS/O2:VSS on 1L, pt often removes O2 NC  ABNL Labs: N/A  Pain Management:Pain managed with oxy x1, gabapentin  Bowel/Bladder: Inc of B/B, last BM 4/13, can use urinal at times  IV/Drains: R PIV inf LR@ 100mL/hr, gtube with TF running at 20mL/hr, free water flush q4hr, CARLITO with small serosanguinous drainage, stripped x1  Wounds/incisions:Abd inc x2, small drainage to island dressing, CARLITO dressing CDI, Gtube CDI  Diet:NPO  Number of times OUT OF BED this shift: 0  Tests/Procedures: N/A  Anticipated  DC Date: discharge to home pending improvement  Significant Information: family at bedside and helpful. All meds given through G tube. TF to be increased by 10mL every 8 hours, next rate change due at 4am. Int IV abx. K+ and Phos recheck due tomorrow AM. Seizure precautions. Pt is blind.

## 2025-04-14 NOTE — PROGRESS NOTES
Physical Therapy Discharge Summary    Reason for therapy discharge:    Discharged to home with home therapy.    Progress towards therapy goal(s). See goals on Care Plan in [facility] electronic health record for goal details.  Goals not met.  Barriers to achieving goals:   discharge from facility.    Therapy recommendation(s):    Continued therapy is recommended.  Rationale/Recommendations:  Patient is below baseline and has not completed out of bed mobility since surgery. Would benefit from home care physical therapy to improve strength, activity tolerance, and functional mobility.

## 2025-04-14 NOTE — DISCHARGE SUMMARY
[facility]  Hospitalist Discharge Summary      Date of Admission:  4/7/2025  Date of Discharge:  4/14/2025  Discharging Provider: Myranda Marsh MD  Discharge Service: Hospitalist Service    Discharge Diagnoses   Acute gangrenous cholecystitis  Leukocytosis related to above  Acute hypoxic and hypercapnic respiratory failure  Bilateral pleural effusion R>L  Moderate persistent asthma  Chronic hypercapnic and hypoxic respiratory failure  Sleep disordered breathing  Ineffective airway clearance  Chronic bronchitis  Atelectasis  Sinus tachycardia  Acute anemia, improving  Mild hyponatremia, resolved  Chronic thrombocytopenia  Hyperglycemia  Seizure disorder  Depression  Anxiety  Developmental delay  Autism  G-tube dependence  GERD  Hypothyroidism  Pressure injury G-tube site    Clinically Significant Risk Factors          Follow-ups Needed After Discharge   Follow-up Appointments       Follow Up      Dr. Lopez's surgical office will contact you in approximately 2-3 weeks to check on your progress and answer any questions you may have. If you are doing well, you will not need to return for a follow up appointment. If any concerns are identified over the phone, we will help you make an appointment to see a provider.  If you have not received a phone call, have any questions or concerns, or would like to be seen, please call us at [phone] and ask to speak with our nurse. We are located at [address] [place] [address].        Hospital Follow-up with Existing Primary Care Provider (PCP)          Schedule Primary Care visit within: 30 Days             Unresulted Labs Ordered in the Past 30 Days of this Admission       Date and Time Order Name Status Description    4/11/2025  3:12 AM Blood Culture Hand, Left Preliminary     4/11/2025  3:12 AM Blood Culture Arm, Left Preliminary             Discharge Disposition   Discharged to home  Condition at discharge:  Stable    Hospital Course   Rj Licea is a 29 year old male with past medical history significant for developmental delay, autism, asthma, chronic hypercapnic and hypoxic respiratory failure who presents with lethargy, f/c. Admitted on 4/7/2025.     Acute gangrenous cholecystitis s/p cholecystectomy  Leukocytosis  Ileus, resolved  *Presented with lethargy, fever, chills. RUQ tenderness on exam.   *CT abd/pelvis with distended gallbladder with mild pericholecystic stranding, findings suggest acute cholecystitis   *S/p cholecystectomy on 4/8/2025. Initially laparoscopic approach but had to convert to open due to dilated colon in RUQ, adhesions to liver, and minimal space in abdomen with insufflation. Gallbladder was noted to be gangrenous and necrotic.  *Overnight from 4/10-4/11, patient had a rapid response called for respiratory distress ultimately requiring BiPAP placement and [facility]. Abdomen more distended and firm than prior. Abd XR findings concerning for diffuse ileus. CT C/A/P ordered by general surgery shows mild diffuse colonic distention with liquid fecal matter and air likely due to colonic ileus.   - General surgery followed while in-house  -Feeding was gradually increased as patient tolerated up to 40 cc prior to discharge.  Goal is 50 cc/h.  Mother was comfortable adjusting it at home up to the goal of 50 cc/h.  - Ceftriaxone IV + metronidazole IV while in-house, discharged on Augmentin to complete the course.  - Patient to follow-up with surgery outpatient and staples to be removed on outpatient visit     Acute hypoxic and hypercapnic respiratory failure  Bilateral pleural effusion, R>L  Moderate persistent asthma   Chronic hypercapnic and hypoxic respiratory failure   Sleep disordered breathing  Ineffective airway clearance  Chronic bronchitis   Atelectasis   *PTA uses 10L oxygen when O2 <88%, has had difficulty tolerating oxygen when not fatigued/ill  *RRT called after patient returned  to the floor from PACU on 4/9.  There was a suspected seizure and then concern that he was having more trouble breathing with an elevated respiratory rate. VBG showed PCO2 76 which improved later in the evening to 63 with a pH of 7.31. Based on prior VBGs, patient likely is a chronic retainer and baseline pCO2 is ~60  *Overnight 4/10-4/11, patient had another rapid response called for respiratory distress ultimately requiring BiPAP placement and transfer to [facility]. VBG with respiratory acidosis, hypercapnia, and hypoxia.  *CT chest on 4/11 shows new moderate right and small left pleural effusions.   - Pulmonology consulted and signed off.  - Hold off on thoracentesis for now as patient is improving from respiratory status and would be very difficult to do as patient is not able to receive instruction given his baseline mental status  - Off BiPAP since 4/11/2025 in the afternoon. Mental status much better and almost back to baseline as per mom.  Patient on room air prior to discharge   - PTA on Spiriva and Advair continued at discharge  - Not a good candidate for pulmonary hygiene as patient is not able to participate due to baseline mental status  - PTA Azithromycin continued at discharge     Sinus tachycardia, improving  Patient has been in sinus tachycardia for much of his admission. He was initially in atrial flutter when he was seen in the ED and was given metoprolol for that. His tachycardia is most likely a response to his acute illness and post-op setting. Etiology is multifactorial including infection that is still being treated, recent removal of gangrenous/necrotic gallbladder, pain associated with his surgery, post-op ileus, and respiratory distress.  - With improving WBC to indicate resolving infeciton. Given a liter of LR on 4/9 to see if it is volume responsive which would indicate inadequate volume resuscitation but this did not make a difference in his heart rate. Most likely differential includes  uncontrolled pain, ileus which is slowly resolving, and respiratory failure which is also resolving.  - Pain control with oxycodone and Dilaudid. Note that patient is mostly nonverbal and does not ask for pain medication. He has very mild grimacing of his face with palpation of his abdomen and is sometimes more talkative/agitated when he is in pain but otherwise can be difficult to tell. Monitor vitals closely and dose pain meds when he is showing nonverbal signs of pain.      Acute anemia, stable  On 4/7 and 4/8, hemoglobin 17.1. Trended down to 14 on 4/9 and down to 9.9 on 4/10. Patient has only received ~3 L of bolused fluid apart from his maintenance fluid rate. WBC also trending down but this is more likely due to removal of his gallbladder and IV antibiotics treating his infection. His platelets have not trended down at all which would suggest this is not hemodilutional in etiology but improving on its own and no signs of blood accumulation on CT C/A/P.  - Hemoglobin remained stable around 10-11 prior to discharge     Mild hyponatremia, resolved  Sodium 131 on admission. Now resolved.     Thrombocytopenia  Platelet count 120k on previous labs.  -Improved prior to discharge  - Monitor outpatient     Hyperglycemia  Glucose 294 on BMP on admission. No prior hx of DM. No recent steroid use. A1C 5.9%. Likely reactive due to infection, stress, pain. FSBS now stable <180 without insulin.     Seizure disorder  Noted that patient had a short seizure episode on 4/8/25 reported by mother who was at bedside when it happened. Not witnessed by staff and was fortunately short-lived not requiring medications.  - Continue prior to admission clobazam, valproic acid   - Seizure precautions     Depression  Anxiety  Behavioral disturbance  Developmental delay  Autism  - Continue prior to admission gabapentin, quetiapine, trazodone, hydroxyzine      Hypothyroidism  - Continue prior to admission levothyroxine     GERD  - Continue  prior to admission PPI     G-tube dependence  All medications and nutrition given per G tube  - Nutrition assisting with advancing tube feeds     Pressure injury, G-tube site, POA  - Wound RN followed while in-house.  - Bilateral elbow pressure injury ruled out as it has healed      Consultations This Hospital Stay   SURGERY GENERAL IP CONSULT  WOUND OSTOMY CONTINENCE NURSE  IP CONSULT  RESPIRATORY CARE IP CONSULT  NUTRITION SERVICES ADULT IP CONSULT  NUTRITION SERVICES ADULT IP CONSULT  PHARMACY IP CONSULT  PHARMACY IP CONSULT  PHARMACY IP CONSULT  PHYSICAL THERAPY ADULT IP CONSULT  OCCUPATIONAL THERAPY ADULT IP CONSULT  CARE MANAGEMENT / SOCIAL WORK IP CONSULT  PULMONARY IP CONSULT  NUTRITION SERVICES ADULT IP CONSULT    Code Status   Full Code    Time Spent on this Encounter   I, Myranda Marsh MD, personally saw the patient today and spent greater than 30 minutes discharging this patient.       Myranda Marsh MD  [address] [address]  Phone: [phone]  Fax: [phone]  ______________________________________________________________________    Physical Exam   Vital Signs: Temp: 97.5  F (36.4  C) Temp src: Axillary BP: 121/78 Pulse: 114   Resp: 18 SpO2: 93 % O2 Device: None (Room air)    Weight: 121 lbs 11.1 oz  Exam:  Constitutional: Awake, alert, nonverbal. Appears comfortable  Head: Normocephalic. No masses, lesions, tenderness or abnormalities  ENMT: ENT exam normal, no neck nodes or sinus tenderness  Cardiovascular: RRR.  No murmurs, no rubs or JVD  Respiratory: Normal WOB,b/l equal air entry, no wheezes or crackles   Gastrointestinal: Abdomen soft, dressing CDI intact, G-tube in place, BS normal. No masses, organomegaly  : Deferred  Extremities : No edema , no clubbing or cyanosis         Primary Care Physician   Micky Leyva    Discharge Orders      Med Therapy Management Referral      Home Care Referral      Activity    Please see  attached discharge instructions.     Follow Up    Dr. Lopez's surgical office will contact you in approximately 2-3 weeks to check on your progress and answer any questions you may have. If you are doing well, you will not need to return for a follow up appointment. If any concerns are identified over the phone, we will help you make an appointment to see a provider.  If you have not received a phone call, have any questions or concerns, or would like to be seen, please call us at [phone] and ask to speak with our nurse. We are located at [address] [place] [address].     Reason for your hospital stay    Necrotic and gangrenous cholecystitis     Diet    Please see attached discharge instructions.     Hospital Follow-up with Existing Primary Care Provider (PCP)            Significant Results and Procedures   Results for orders placed or performed during the hospital encounter of 04/07/25   XR Chest Port 1 View    Narrative    EXAM: XR CHEST PORT 1 VIEW  LOCATION: [facility]  DATE: 4/7/2025    INDICATION: fever, ?SOB  COMPARISON: Chest CT from 09/16/2024.      Impression    IMPRESSION: Bibasilar atelectasis. No evidence of pneumonia by x-ray. No pleural effusion or pneumothorax. Low lung volumes. Normal heart size.   CT Chest PE Abdomen Pelvis w Contrast    Narrative    EXAM: CT CHEST PE ABDOMEN PELVIS W CONTRAST  LOCATION: [facility]  DATE: 4/7/2025    INDICATION: fever unspecified source, cognitive impairment, chronic hypoxia tachycardia, poor mobility, high risk occult PE  COMPARISON: None.  TECHNIQUE: CT chest pulmonary angiogram and routine CT abdomen pelvis with IV contrast. Arterial phase through the chest and venous phase through the abdomen and pelvis. Multiplanar reformats and MIP reconstructions were performed. Dose reduction   techniques were used.   CONTRAST: 67 mL Isovue 370    FINDINGS:  ANGIOGRAM CHEST: Pulmonary arteries  are normal caliber and negative for pulmonary emboli. Thoracic aorta is negative for dissection. No CT evidence of right heart strain.     LUNGS AND PLEURA: Moderate volume loss right lower lobe and middle lobe. Atelectasis and/or scarring left lower lobe. No pleural effusions seen. No actionable pulmonary nodule.    MEDIASTINUM/AXILLAE: No lymphadenopathy. No thoracic aortic aneurysms. No pericardial effusion. Heart size within normal limits. Esophagus unremarkable. Visualized thyroid gland unremarkable    CORONARY ARTERY CALCIFICATION: None.    HEPATOBILIARY: Colonic interposition. The gallbladder is distended. Mild pericholecystic stranding. Findings suggest acute cholecystitis. Liver and biliary tree otherwise unremarkable.    PANCREAS: No significant mass, duct dilatation, or inflammatory change.    SPLEEN: Normal size.    ADRENAL GLANDS: Normal.    KIDNEYS/BLADDER: The bilateral kidneys enhance symmetrically without evidence for hydronephrosis or pyelonephritis. No renal masses. No renal calculi. The bilateral ureters and urinary bladder are unremarkable.    BOWEL: Nonspecific nonobstructive bowel gas pattern. Percutaneous gastrostomy tube tip in the stomach. Appendix not seen.    LYMPH NODES: No lymphadenopathy.    VASCULATURE: No abdominal aortic aneurysm.    PELVIC ORGANS: No pelvic masses or free fluid.    MUSCULOSKELETAL: Thoracolumbar curvature convex to the right. Mild spondylosis. No inguinal hernia. No ventral hernia.      Impression    IMPRESSION:  1.  No evidence for pulmonary embolism.  2.  Moderate volume loss right lower lobe and middle lobe. Mild to moderate atelectasis and/or scarring left lower lobe.  3.  Distended gallbladder with mild pericholecystic stranding. Findings suggest acute cholecystitis. Clinical correlation recommended.  4.  Percutaneous gastrostomy tube tip in the stomach.     US Lower Extremity Venous Duplex Bilateral    Narrative    EXAM: US LOWER EXTREMITY VENOUS DUPLEX  BILATERAL  LOCATION: [facility]  DATE: 4/9/2025    INDICATION: Hypoxic respiratory failure  COMPARISON: None available.  TECHNIQUE: Venous Duplex ultrasound of bilateral lower extremities with and without compression, augmentation and duplex. Color flow and spectral Doppler with waveform analysis performed.    FINDINGS: Exam includes the common femoral, femoral, popliteal veins as well as segmentally visualized deep calf veins and greater saphenous vein.     RIGHT: No deep vein thrombosis. No superficial thrombophlebitis.     LEFT: No deep vein thrombosis. No superficial thrombophlebitis.       Impression    IMPRESSION:    No deep venous thrombosis in the visualized bilateral lower extremities.     XR Chest Port 1 View    Narrative    EXAM: XR CHEST PORT 1 VIEW  LOCATION: [facility]  DATE: 4/11/2025    INDICATION: RRT: acute hypoxic respiratory failure  COMPARISON: 04/07/2025      Impression    IMPRESSION: Shallow inspiration. Bibasilar atelectasis or infiltrate and small pleural effusions. Presumed surgical drain right upper abdomen. Prominent air-filled bowel.   XR Abdomen Port 1 View    Narrative    EXAM: XR ABDOMEN PORT 1 VIEW  LOCATION: [facility]  DATE: 4/11/2025    INDICATION: Constipation after recent open cholecystectomy; abdomen more distended and firm today.  COMPARISON: CT 4/7/2025.      Impression    IMPRESSION: Gaseous distention of large and small bowel loops consistent with a diffuse ileus. Patchy bibasilar pulmonary opacities may just be atelectasis. A drain is seen at the right abdomen along with multiple surgical staples.   CT Chest/Abdomen/Pelvis w Contrast    Narrative    EXAM: CT CHEST/ABDOMEN/PELVIS W CONTRAST  LOCATION: [facility]  DATE: 4/11/2025    INDICATION: Worsening abdominal distention and respiratory failure after abdominal surgery  COMPARISON: 4/7/2025  TECHNIQUE: CT scan of the  chest, abdomen, and pelvis was performed following injection of IV contrast. Multiplanar reformats were obtained. Dose reduction techniques were used.   CONTRAST: 52 mL Isovue 370    FINDINGS:   LUNGS AND PLEURA: New moderate right and small left pleural effusions. Bibasilar atelectasis. No pulmonary nodules or masses.    MEDIASTINUM/AXILLAE: No lymphadenopathy. No thoracic aortic aneurysm.    CORONARY ARTERY CALCIFICATION: None.    HEPATOBILIARY: Normal liver. Interval cholecystectomy. Surgical drain in the cholecystectomy bed. Small collection of fluid with foci of gas along the anterior right hepatic lobe measuring 3.7 x 1.0 cm (series 3, image 104), likely postoperative changes.   No fluid collection in the cholecystectomy bed.    PANCREAS: Normal.    SPLEEN: Normal.    ADRENAL GLANDS: Normal.    KIDNEYS/BLADDER: Normal.    BOWEL: Percutaneous gastrostomy tube in place. No dilated loops of small bowel. Mild diffuse colonic distention with liquid fecal material and air. The cecum measures 7.9 cm, transverse colon measures 5.8 cm, splenic flexure measures 4.2 cm, and sigmoid   colon measures 6.5 cm.     There is a short segment of decompressed lower descending colon (series 3, image 203). There is mild colonic distention downstream to this segment in the sigmoid colon and rectum. No colonic volvulus. Redundant sigmoid colon with mild nonspecific   inflammatory changes in a loop of sigmoid colon in the right abdomen (series 3, image 161 and coronal series 8, image [id number]).     LYMPH NODES: No lymphadenopathy.    VASCULATURE: Normal variant left sided IVC.    PELVIC ORGANS: No pelvic masses. Trace free fluid in the pelvis.    MUSCULOSKELETAL: Mild thoracolumbar scoliosis. No suspicious lesions in the bones..      Impression    IMPRESSION:  1.  Mild diffuse colonic distention with liquid fecal material and air, likely due to colonic ileus. There is a short segment of decompressed lower descending colon, with mild  colonic distention above and below to this segment. No small bowel dilatation.  2.  Interval cholecystectomy. Small collection of fluid and gas along the anterior right hepatic lobe is likely postoperative. This may be due to small for drainage given size and location.  3.  New moderate right and small left pleural effusions with bibasilar atelectasis.  4.  Mild nonspecific inflammatory changes in a loop of sigmoid colon in the right abdomen.           Discharge Medications   Current Discharge Medication List        START taking these medications    Details   amoxicillin-clavulanate (AUGMENTIN) 400-57 MG/5ML suspension Take 10.94 mLs (875 mg) by mouth 2 times daily for 8 days.  Qty: 175.04 mL, Refills: 0    Associated Diagnoses: Gangrenous cholecystitis           CONTINUE these medications which have NOT CHANGED    Details   albuterol (PROAIR HFA/PROVENTIL HFA/VENTOLIN HFA) 108 (90 Base) MCG/ACT inhaler Inhale 2 puffs into the lungs every 6 hours as needed for shortness of breath / dyspnea or wheezing  Qty: 2 Inhaler, Refills: 3    Comments: Pharmacy may dispense brand covered by insurance (Proair, or proventil or ventolin or generic albuterol inhaler)  Associated Diagnoses: Chronic lung disease of prematurity (H)      azithromycin (ZITHROMAX) 200 MG/5ML suspension Place 11 mLs into G tube three times a week. Monday, Wednesday and Friday      busPIRone (BUSPAR) 15 MG tablet Take 1 tablet (15 mg) by mouth 3 times daily.  Qty: 270 tablet, Refills: 3    Associated Diagnoses: CP (cerebral palsy), spastic, diplegic (H); Anxiety      clobazam (,ONFI,) 2.5 MG/ML suspension Place 4 mLs into G tube 2 times daily.      erythromycin (ROMYCIN) 5 MG/GM ophthalmic ointment Place into both eyes every evening.      fluticasone-salmeterol (ADVAIR HFA) 230-21 MCG/ACT inhaler Inhale 2 puffs into the lungs 2 times daily   Qty: 12 g, Refills: 3      gabapentin (NEURONTIN) 250 MG/5ML solution Place 15 mLs (750 mg) into G tube at  bedtime.  Qty: 510 mL, Refills: 11    Associated Diagnoses: CP (cerebral palsy), spastic, diplegic (H); Anxiety; Insomnia, unspecified type      hydrOXYzine (ATARAX) 10 MG/5ML syrup Place 25 mLs (50 mg) into G tube at bedtime. (25 mL)  Qty: 750 mL, Refills: 11    Associated Diagnoses: CP (cerebral palsy), spastic, diplegic (H); Anxiety; Insomnia, unspecified type      levothyroxine (SYNTHROID/LEVOTHROID) 25 MCG tablet Take 1 tablet (25 mcg) by mouth daily  Qty: 90 tablet, Refills: 2    Associated Diagnoses: Hypothyroidism, unspecified type      melatonin 1 MG TABS tablet Place 6 mg into G tube every evening.    Associated Diagnoses: Short stature      omeprazole (PRILOSEC) 2 mg/mL suspension 40 mLs (80 mg) by Per G Tube route at bedtime  Qty: 1200 mL, Refills: 11    Associated Diagnoses: Feeding by G-tube (H); Gastroesophageal reflux disease without esophagitis; Gastrostomy tube dependent (H); Chronic gastroesophageal reflux disease      polyethylene glycol (MIRALAX) 17 g packet 17 g by Per G Tube route daily      !! QUEtiapine (SEROQUEL) 300 MG tablet Place 2 tablets (600 mg) into G tube at bedtime. Taking 2 - 300 mg at night  Qty: 180 tablet, Refills: 3    Associated Diagnoses: Moderate episode of recurrent major depressive disorder (H); CP (cerebral palsy), spastic, diplegic (H); Episode of recurrent major depressive disorder, unspecified depression episode severity; Anxiety; Insomnia, unspecified type      !! QUEtiapine (SEROQUEL) 50 MG tablet 50 mg taking during day for anxiety up to 4 times per day (do not take within 4 hours of the evening dose)  Qty: 30 tablet, Refills: 3    Associated Diagnoses: Moderate episode of recurrent major depressive disorder (H); CP (cerebral palsy), spastic, diplegic (H); Episode of recurrent major depressive disorder, unspecified depression episode severity; Anxiety; Insomnia, unspecified type      SPIRIVA RESPIMAT 1.25 MCG/ACT inhaler Inhale 2 puffs into the lungs daily.       traZODone (DESYREL) 100 MG tablet Take 1 tablet (100 mg) by mouth at bedtime.  Qty: 90 tablet, Refills: 3    Associated Diagnoses: CP (cerebral palsy), spastic, diplegic (H); Insomnia, unspecified type      Valproate Sodium (VALPROIC ACID) 250 MG/5ML TAKE 6ML BY MOUTH EVERY MORNING AND 7ML IN THE AFTERNOON AND AT NIGHT  Qty: 600 mL, Refills: 3    Associated Diagnoses: Seizure disorder (H)       !! - Potential duplicate medications found. Please discuss with provider.        Allergies   No Known Allergies

## 2025-04-14 NOTE — PROGRESS NOTES
Care Management Discharge Note    Discharge Date: 04/14/2025       Discharge Disposition: Home Infusion, Home    Discharge Services:      Discharge DME:      Discharge Transportation: family or friend will provide    Private pay costs discussed: Not applicable    Does the patient's insurance plan have a 3 day qualifying hospital stay waiver?  Yes     Which insurance plan 3 day waiver is available? Alternative insurance waiver    Will the waiver be used for post-acute placement? No    PAS Confirmation Code:    Patient/family educated on Medicare website which has current facility and service quality ratings:      Education Provided on the Discharge Plan: Yes  Persons Notified of Discharge Plans: RN, HHC  Patient/Family in Agreement with the Plan: yes    Handoff Referral Completed: No, handoff not indicated or clinically appropriate    Additional Information:  Mother, Darcy, garrison [facility] ok with [facility]. Updated home care HUB regarding choice. Mother aware of delayed SOC and is agreeable to that plan.   Home care secured through [facility].     Preeti Davis RN   [facility]   Phone [phone], SingleFeed or [phone]

## 2025-04-14 NOTE — PROGRESS NOTES
Care Management Follow Up    Length of Stay (days): 6    Expected Discharge Date: 04/16/2025     Concerns to be Addressed: discharge planning     Patient plan of care discussed at interdisciplinary rounds: Yes    Anticipated Discharge Disposition: Home Infusion, Home, Home PT       Anticipated Discharge Services:  Home PT  Anticipated Discharge DME:  none    Patient/family educated on Medicare website which has current facility and service quality ratings:    Education Provided on the Discharge Plan: Yes  Patient/Family in Agreement with the Plan: yes    Referrals Placed by CM/SW:  Home Care  Private pay costs discussed: Not applicable    Discussed  Partnership in Safe Discharge Planning  document with patient/family: No     Handoff Completed: No, handoff not indicated or clinically appropriate    Additional Information:  Follow up with patient and mother, Darcy, in the room. Discussed recommendation for home care PT. Darcy in agreement with getting Home PT, she does wonder if patient needs to heal more before starting-message out to surgery to clarify for mother. Darcy does not have an agency preference and is ok with starting with [facility].     Discussed transportation home again. Darcy feels confident that she and Shani will be able to get Rj in the house at discharge. Darcy will drive him home.     1111 writer discussed with surgical team, no restriction to start home PT when available. Mother updated and will work with accepting agency regarding SOC.     Next Steps: secure home care PT.    Preeti Davis RN   [facility]   Phone [phone], [employer] or [phone]

## 2025-04-14 NOTE — PROGRESS NOTES
Pt discharged home with family. All discharge instructions and medications reviewed with pt's mother. All questions answered. All pt's belonging returned to pt and family.

## 2025-04-14 NOTE — PLAN OF CARE
Goal Outcome Evaluation:      Plan of Care Reviewed With: patient    Overall Patient Progress: improvingOverall Patient Progress: improving    POD 6 open cholecystectomy. Orentatioont- unable to answer, mother at bedside and helps. Restless at times,PRN seroquel given. VSS on 1L NC. NPO. Abd incision x2, small drainage to island dressing intact. CARLITO dressing CDI, stripped q8. Gtube-running at 30ml/hr, free water 30ml/q4hrs. Meds given through Gtube. PIV infusing LR @100ml/hr. Abd soft, hypo BS. T/R, inc B/B. Up with lift. Plan to be discharge home today, see SW notes.

## 2025-04-14 NOTE — PROGRESS NOTES
Has been improving gradually.  Has had return of bowel function.  Tube feeds started and at 30 mL/hr to goal of 50 mL/hr. Osmolite.    Dressings intact  Drain serosanguinous    Labs reviewed and wnl.    Discontinue drain.  Staples will be removed in clinic first available.  Doing well overall.

## 2025-04-15 ENCOUNTER — TELEPHONE (OUTPATIENT)
Dept: PEDIATRICS | Facility: CLINIC | Age: 30
End: 2025-04-15
Payer: MEDICAID

## 2025-04-15 ENCOUNTER — PATIENT OUTREACH (OUTPATIENT)
Dept: PEDIATRICS | Facility: CLINIC | Age: 30
End: 2025-04-15
Payer: MEDICAID

## 2025-04-15 DIAGNOSIS — K21.9 GASTROESOPHAGEAL REFLUX DISEASE WITHOUT ESOPHAGITIS: ICD-10-CM

## 2025-04-15 DIAGNOSIS — Z93.1 FEEDING BY G-TUBE (H): ICD-10-CM

## 2025-04-15 DIAGNOSIS — G89.18 POSTOPERATIVE PAIN: ICD-10-CM

## 2025-04-15 DIAGNOSIS — K21.9 CHRONIC GASTROESOPHAGEAL REFLUX DISEASE: ICD-10-CM

## 2025-04-15 DIAGNOSIS — Z93.1 GASTROSTOMY TUBE DEPENDENT (H): ICD-10-CM

## 2025-04-15 RX ORDER — OXYCODONE HYDROCHLORIDE 5 MG/1
2.5-5 TABLET ORAL EVERY 4 HOURS PRN
Qty: 8 TABLET | Refills: 0 | Status: SHIPPED | OUTPATIENT
Start: 2025-04-15

## 2025-04-15 NOTE — TELEPHONE ENCOUNTER
Called mom to get clarification. Mom says that he is currently taking 20 mls(40 mg) at this time.     Also, mom is requesting a small supply of oxycodone to use as prn for the increased pain. Pended med & pharmacy. Please review & sign, if appropriate. Thanks.    DARREN - Pt has hospital f/up with Dr. Leyva on 4/23 and post-op stable removal at surgeon's office on that same day.    Katie Monk RN  [facility]

## 2025-04-15 NOTE — TELEPHONE ENCOUNTER
Call patient for hospital follow up.  Acute gangrenous cholecystitis/ Cholecystectomy     Med changes:        Appointment needed within 30 days.     Hospital follow up appointment has not been made    Anjelica BONNER RN, BSN  Clinic RN  [facility]

## 2025-04-15 NOTE — TELEPHONE ENCOUNTER
Transitions of Care Outreach  Chief Complaint   Patient presents with    Hospital F/U       Most Recent Admission Date: 4/7/2025   Most Recent Admission Diagnosis: Cholecystitis - K81.9  Chronic respiratory failure with hypoxia (H) - J96.11  Narrow complex tachycardia - I47.19  Fever, unspecified fever cause - R50.9     Most Recent Discharge Date: 4/14/2025   Most Recent Discharge Diagnosis: Fever, unspecified fever cause - R50.9  Cholecystitis - K81.9  Chronic respiratory failure with hypoxia (H) - J96.11  Narrow complex tachycardia - I47.19  Postoperative pain - G89.18  Gangrenous cholecystitis - K81.0     Transitions of Care Assessment    Discharge Assessment  How are you doing now that you are home?: Talked to mom - he is doing better, not his usual 100% yet. Tolerating abx well  How are your symptoms? (Red Flag symptoms escalate to triage hotline per guidelines): Unchanged  Do you know how to contact your clinic care team if you have future questions or changes to your health status? : Yes  Does the patient have their discharge instructions? : Yes  Does the patient have questions regarding their discharge instructions? : No  Were you started on any new medications or were there changes to any of your previous medications? : Yes  Does the patient have all of their medications?: Yes (Requesting a short supply of pain med(Oxy) to use prn - sent an encounter seperately to pcp to address)  Do you have questions regarding any of your medications? : No  Do you have all of your needed medical supplies or equipment (DME)?  (i.e. oxygen tank, CPAP, cane, etc.): No - What equipment or supplies are needed?    Follow up Plan     Discharge Follow-Up  Discharge follow up appointment scheduled in alignment with recommended follow up timeframe or Transitions of Risk Category? (Low = within 30 days; Moderate= within 14 days; High= within 7 days): Yes  Discharge Follow Up Appointment Date: 04/23/25  Discharge Follow Up  Appointment Scheduled with?: Primary Care Provider (Also scheduled with surgeon's office on 4/23 for staple removal)    Future Appointments   Date Time Provider Department Center   4/23/2025 11:40 AM Micky Leyva MD [facility]   4/23/2025  1:15 PM Tejinder Dale MD [facility]   6/25/2025  2:00 PM Micky Leyva MD [facility]   9/24/2025  1:00 PM Micky Leyva MD [facility]   12/31/2025 11:00 AM Micky Leyva MD [facility]   3/25/2026 11:30 AM Micky Leyva MD [facility]       Outpatient Plan as outlined on AVS reviewed with patient.    For any urgent concerns, please contact our 24 hour nurse triage line: [phone] ([phone])       Kirsten Flores RN

## 2025-04-15 NOTE — TELEPHONE ENCOUNTER
MTM referral from: Transitions of Care (recent hospital discharge, TCU discharge, or ED visit)    MTM referral outreach attempt #1 on April 15, 2025 at 11:57 AM      Outcome: Patient is not interested at this time because    Patients Mother has no questions or concers regarding medications at this time.    Use MA for the carrier/Plan on the flowsheet      Tess Clemens CMA  MTM

## 2025-04-15 NOTE — TELEPHONE ENCOUNTER
Called and spoke with patient's mother Darcy to relay provider message below.     Instructed patient's mother to call [phone] for new or worsening symptoms or further questions. Patient's mother verbalized understanding and agrees with the plan.     Per chart review  omeprazole (PRILOSEC) 2 mg/mL suspension 1800 mL 3 4/15/2025 -- No   Sig - Route: Place 20 mLs (40 mg) into G tube at bedtime. - Per G Tube   Sent to pharmacy as: omeprazole 2 mg/mL PO suspension     oxyCODONE (ROXICODONE) 5 MG tablet 8 tablet 0 4/15/2025 -- No   Sig - Route: Take 0.5-1 tablets (2.5-5 mg) by mouth every 4 hours as needed for moderate to severe pain (2.5 mg for moderate pain, 5 mg for severe pain). - Oral   [facility] - [place], [place] - [address] MEGAN Mcgraw RN

## 2025-04-16 ENCOUNTER — TELEPHONE (OUTPATIENT)
Dept: SURGERY | Facility: CLINIC | Age: 30
End: 2025-04-16
Payer: MEDICAID

## 2025-04-16 LAB
BACTERIA BLD CULT: NO GROWTH
BACTERIA BLD CULT: NO GROWTH

## 2025-04-16 NOTE — TELEPHONE ENCOUNTER
Patient had lap helena 4/8/25 AAA and mom is calling today to ask if she can take off the big covering that is going aroubnd his abdomen, it is bothering him a lot    Please call    Phone: [phone]   Message ok

## 2025-04-16 NOTE — TELEPHONE ENCOUNTER
Procedure: attempted laparoscopic converted to open cholecystectomy    Date: 4/8/25    Surgeon: John    Patient's mom calling, wondering if it is okay to remove the abdominal binder? She reports the binder is really bothering patient. Informed her that yes, it is okay to remove unless he starts pulling at staples. If he starts pulling at staples, should put abdominal binder back on    She agreed. Will follow up on the 23rd as planned for staple removal    Lisa Milton, RN-BSN

## 2025-04-17 DIAGNOSIS — E03.9 HYPOTHYROIDISM, UNSPECIFIED TYPE: ICD-10-CM

## 2025-04-17 RX ORDER — LEVOTHYROXINE SODIUM 25 UG/1
25 TABLET ORAL DAILY
Qty: 90 TABLET | Refills: 0 | Status: SHIPPED | OUTPATIENT
Start: 2025-04-17

## 2025-04-23 ENCOUNTER — OFFICE VISIT (OUTPATIENT)
Dept: SURGERY | Facility: CLINIC | Age: 30
End: 2025-04-23
Payer: MEDICAID

## 2025-04-23 DIAGNOSIS — K81.0 ACUTE CHOLECYSTITIS: Primary | ICD-10-CM

## 2025-04-23 DIAGNOSIS — Z90.49 S/P CHOLECYSTECTOMY: ICD-10-CM

## 2025-04-23 PROCEDURE — 99024 POSTOP FOLLOW-UP VISIT: CPT | Performed by: STUDENT IN AN ORGANIZED HEALTH CARE EDUCATION/TRAINING PROGRAM

## 2025-04-23 NOTE — PROGRESS NOTES
General Surgery Post-op Follow up Clinic Note:      Rj Licea is a 29 year old who is following up in clinic with his mother 15 days s/p lap converted to open cholecystectomy on 4/8/2025.    S: He has been doing ok. Lots of bandages needed to prevent patient from picking at his staples.     Abdomen: incisions - umbilical port incision is well healed and intact with staples. Subxiphoid incision is well healed with a scab. RUQ open incision is well healed and intact with staples.     Pathology:   Final Diagnosis   Gallbladder, cholecystectomy:  -- Acute cholecystitis and cholelithiasis.       A/P:  Rj Licea is recovering from a Laparoscopic converted to Open Cholecystectomy. While removing the staples from his large RUQ incision, the lateral portion had some slight splitting of the epidermis due to tension. Steristrips were placed all along the incision for reinforcement to allow for more healing. Mother was instructed that steristrips can be removed in 2 weeks. If they fall off sooner, that's ok. Ok to shower today. No baths until incision completely healed. I expect he will make a complete recovery without issues.     Thank you for the opportunity to help in their care.    Tejinder Dale MD   [facility]  [phone]    Please route or send letter to:  Primary Care Provider (PCP) and Referring Provider

## 2025-04-29 ASSESSMENT — ASTHMA QUESTIONNAIRES
ACT_TOTALSCORE: 18
QUESTION_2 LAST FOUR WEEKS HOW OFTEN HAVE YOU HAD SHORTNESS OF BREATH: ONCE OR TWICE A WEEK
QUESTION_5 LAST FOUR WEEKS HOW WOULD YOU RATE YOUR ASTHMA CONTROL: SOMEWHAT CONTROLLED
QUESTION_3 LAST FOUR WEEKS HOW OFTEN DID YOUR ASTHMA SYMPTOMS (WHEEZING, COUGHING, SHORTNESS OF BREATH, CHEST TIGHTNESS OR PAIN) WAKE YOU UP AT NIGHT OR EARLIER THAN USUAL IN THE MORNING: ONCE OR TWICE
QUESTION_4 LAST FOUR WEEKS HOW OFTEN HAVE YOU USED YOUR RESCUE INHALER OR NEBULIZER MEDICATION (SUCH AS ALBUTEROL): NOT AT ALL
QUESTION_1 LAST FOUR WEEKS HOW MUCH OF THE TIME DID YOUR ASTHMA KEEP YOU FROM GETTING AS MUCH DONE AT WORK, SCHOOL OR AT HOME: MOST OF THE TIME

## 2025-04-30 ENCOUNTER — TELEPHONE (OUTPATIENT)
Dept: PEDIATRICS | Facility: CLINIC | Age: 30
End: 2025-04-30

## 2025-04-30 ENCOUNTER — OFFICE VISIT (OUTPATIENT)
Dept: PEDIATRICS | Facility: CLINIC | Age: 30
End: 2025-04-30
Payer: MEDICAID

## 2025-04-30 ENCOUNTER — MEDICAL CORRESPONDENCE (OUTPATIENT)
Dept: HEALTH INFORMATION MANAGEMENT | Facility: CLINIC | Age: 30
End: 2025-04-30

## 2025-04-30 VITALS
WEIGHT: 104 LBS | SYSTOLIC BLOOD PRESSURE: 97 MMHG | RESPIRATION RATE: 20 BRPM | TEMPERATURE: 96.8 F | HEART RATE: 111 BPM | BODY MASS INDEX: 19.33 KG/M2 | OXYGEN SATURATION: 94 % | DIASTOLIC BLOOD PRESSURE: 62 MMHG

## 2025-04-30 DIAGNOSIS — J96.22 ACUTE AND CHRONIC RESPIRATORY FAILURE WITH HYPERCAPNIA (H): ICD-10-CM

## 2025-04-30 DIAGNOSIS — G89.18 POSTOPERATIVE PAIN: ICD-10-CM

## 2025-04-30 DIAGNOSIS — Z90.49 S/P CHOLECYSTECTOMY: Primary | ICD-10-CM

## 2025-04-30 DIAGNOSIS — G80.1 CP (CEREBRAL PALSY), SPASTIC, DIPLEGIC (H): ICD-10-CM

## 2025-04-30 DIAGNOSIS — Z93.1 FEEDING BY G-TUBE (H): ICD-10-CM

## 2025-04-30 DIAGNOSIS — K81.0 GANGRENOUS CHOLECYSTITIS: ICD-10-CM

## 2025-04-30 DIAGNOSIS — Z93.1 GASTROSTOMY TUBE DEPENDENT (H): ICD-10-CM

## 2025-04-30 PROCEDURE — 99495 TRANSJ CARE MGMT MOD F2F 14D: CPT | Performed by: INTERNAL MEDICINE

## 2025-04-30 PROCEDURE — 1111F DSCHRG MED/CURRENT MED MERGE: CPT | Performed by: INTERNAL MEDICINE

## 2025-04-30 PROCEDURE — 3074F SYST BP LT 130 MM HG: CPT | Performed by: INTERNAL MEDICINE

## 2025-04-30 PROCEDURE — 3078F DIAST BP <80 MM HG: CPT | Performed by: INTERNAL MEDICINE

## 2025-04-30 NOTE — TELEPHONE ENCOUNTER
Standard Written Order for Rental Bed Rails.  [facility].  Provider signed.  Faxed.    Thank you kindly,  David MADISON

## 2025-04-30 NOTE — PROGRESS NOTES
Assessment & Plan     ICD-10-CM    1. S/P cholecystectomy  Z90.49       2. Gangrenous cholecystitis  K81.0       3. Acute and chronic respiratory failure with hypercapnia (H)  J96.22       4. CP (cerebral palsy), spastic, diplegic (H)  G80.1       5. Postoperative pain  G89.18       6. Gastrostomy tube dependent (H)  Z93.1       7. Feeding by G-tube (H)  Z93.1       discussed with patient (or patient's parents/caregiver) pathophysiology of condition and treatment options.   overall doing very well. continue cares and plan, okay to leaver last two steri strips on until they fall off. Continue shower chair for one more week then okay to go back to baths. they will follow-up with Dr Soto about BiPAP option. follow-up next month with me as scheduled, sooner as needed.     The longitudinal plan of care for the diagnosis(es)/condition(s) as documented were addressed during this visit. Due to the added complexity in care, I will continue to support Rj in the subsequent management and with ongoing continuity of care.    MED REC REQUIRED  Post Medication Reconciliation Status:       Return in about 4 weeks (around 5/28/2025) for Next scheduled follow-up visit, or sooner if symptoms persist or worsen.      Roro De La Rosa is a 29 year old, presenting for the following health issues:  Hospital F/U        4/30/2025    11:07 AM   Additional Questions   Roomed by    Accompanied by mom         4/30/2025    11:07 AM   Patient Reported Additional Medications   Patient reports taking the following new medications no     HPI          Hospital Follow-up Visit:    Hospital/Nursing Home/IP Rehab Facility: [facility]  Most Recent Admission Date: 4/7/2025   Most Recent Admission Diagnosis: Cholecystitis - K81.9  Chronic respiratory failure with hypoxia (H) - J96.11  Narrow complex tachycardia - I47.19  Fever, unspecified fever cause - R50.9     Most Recent Discharge Date: 4/14/2025   Most Recent  Discharge Diagnosis: Fever, unspecified fever cause - R50.9  Cholecystitis - K81.9  Chronic respiratory failure with hypoxia (H) - J96.11  Narrow complex tachycardia - I47.19  Postoperative pain - G89.18  Gangrenous cholecystitis - K81.0   Was the patient in the ICU or did the patient experience delirium during hospitalization?  No  Do you have any other stressors you would like to discuss with your provider? No    Problems taking medications regularly:  None  Medication changes since discharge: None  Problems adhering to non-medication therapy:  None    Summary of hospitalization:  [facility] discharge summary reviewed  Diagnostic Tests/Treatments reviewed.  Follow up needed: none  Other Healthcare Providers Involved in Patient s Care:         None  Update since discharge: improved.     here for hospital follow-up, overall doing well. he is back to normal feels, wound healing well, still 2 steri strips on it. using shower chair. [pain is well controlled and not needing any pain medications. not sleepin well, somewhat agitated at times and hoping that as things are settling in he may sleep better. plans to discuss BiPAP with dr redd. no abdominal pain. No other concerns or complaints today.     Plan of care communicated with patient                 Objective    BP 97/62 (BP Location: Right arm, Patient Position: Sitting, Cuff Size: Adult Small)   Pulse 111   Temp 96.8  F (36  C) (Temporal)   Resp 20   Wt 47.2 kg (104 lb)   SpO2 94%   BMI 19.33 kg/m    Body mass index is 19.33 kg/m .  Physical Exam   GENERAL: awake alert and no distress; sitting up in chair and listneing to his CD player.   HENT: normocephalic and atrumatic, mucous membranes moist   RESP: lungs clear to auscultation - no rales, rhonchi or wheezes  CV: regular rate and rhythm, normal S1 S2, no S3 or S4, no murmur, click or rub, no peripheral edema   ABDOMEN: soft, nontender bowel sounds normal; GT in place; large abd incision  healing well with no dehiscence and no drainage or discharge; 2 steri strips still in place and no erythema or skin breakdown. Other sites healing well as well. abdominal soft and no tenderness to palaption.   MS: no gross musculoskeletal defects noted, no edema; + global atrophy  SKIN: no suspicious lesions or rashes  NEURO: baseline strength and tone, mentation intact/baseline and speech normal  PSYCH: mentation appears normal, affect normal             Signed Electronically by: Micky Leyva MD

## 2025-06-05 ENCOUNTER — TRANSFERRED RECORDS (OUTPATIENT)
Dept: HEALTH INFORMATION MANAGEMENT | Facility: CLINIC | Age: 30
End: 2025-06-05
Payer: MEDICAID

## 2025-06-17 ENCOUNTER — DOCUMENTATION ONLY (OUTPATIENT)
Dept: OTHER | Facility: CLINIC | Age: 30
End: 2025-06-17
Payer: MEDICAID

## 2025-06-20 ASSESSMENT — ASTHMA QUESTIONNAIRES
ACT_TOTALSCORE: 16
QUESTION_4 LAST FOUR WEEKS HOW OFTEN HAVE YOU USED YOUR RESCUE INHALER OR NEBULIZER MEDICATION (SUCH AS ALBUTEROL): ONCE A WEEK OR LESS
QUESTION_5 LAST FOUR WEEKS HOW WOULD YOU RATE YOUR ASTHMA CONTROL: WELL CONTROLLED
QUESTION_1 LAST FOUR WEEKS HOW MUCH OF THE TIME DID YOUR ASTHMA KEEP YOU FROM GETTING AS MUCH DONE AT WORK, SCHOOL OR AT HOME: SOME OF THE TIME
QUESTION_3 LAST FOUR WEEKS HOW OFTEN DID YOUR ASTHMA SYMPTOMS (WHEEZING, COUGHING, SHORTNESS OF BREATH, CHEST TIGHTNESS OR PAIN) WAKE YOU UP AT NIGHT OR EARLIER THAN USUAL IN THE MORNING: TWO OR THREE NIGHTS A WEEK
QUESTION_2 LAST FOUR WEEKS HOW OFTEN HAVE YOU HAD SHORTNESS OF BREATH: THREE TO SIX TIMES A WEEK

## 2025-06-24 ASSESSMENT — PATIENT HEALTH QUESTIONNAIRE - PHQ9
10. IF YOU CHECKED OFF ANY PROBLEMS, HOW DIFFICULT HAVE THESE PROBLEMS MADE IT FOR YOU TO DO YOUR WORK, TAKE CARE OF THINGS AT HOME, OR GET ALONG WITH OTHER PEOPLE: NOT DIFFICULT AT ALL
SUM OF ALL RESPONSES TO PHQ QUESTIONS 1-9: 0
SUM OF ALL RESPONSES TO PHQ QUESTIONS 1-9: 0

## 2025-06-25 ENCOUNTER — OFFICE VISIT (OUTPATIENT)
Dept: PEDIATRICS | Facility: CLINIC | Age: 30
End: 2025-06-25
Payer: MEDICAID

## 2025-06-25 VITALS
TEMPERATURE: 97.7 F | DIASTOLIC BLOOD PRESSURE: 76 MMHG | RESPIRATION RATE: 20 BRPM | SYSTOLIC BLOOD PRESSURE: 109 MMHG | WEIGHT: 105 LBS | HEART RATE: 101 BPM | HEIGHT: 62 IN | OXYGEN SATURATION: 94 % | BODY MASS INDEX: 19.32 KG/M2

## 2025-06-25 DIAGNOSIS — Z93.1 FEEDING BY G-TUBE (H): ICD-10-CM

## 2025-06-25 DIAGNOSIS — G80.1 CP (CEREBRAL PALSY), SPASTIC, DIPLEGIC (H): ICD-10-CM

## 2025-06-25 DIAGNOSIS — J96.22 ACUTE AND CHRONIC RESPIRATORY FAILURE WITH HYPERCAPNIA (H): Primary | ICD-10-CM

## 2025-06-25 PROCEDURE — 3074F SYST BP LT 130 MM HG: CPT | Performed by: INTERNAL MEDICINE

## 2025-06-25 PROCEDURE — 99213 OFFICE O/P EST LOW 20 MIN: CPT | Mod: 24 | Performed by: INTERNAL MEDICINE

## 2025-06-25 PROCEDURE — 1126F AMNT PAIN NOTED NONE PRSNT: CPT | Performed by: INTERNAL MEDICINE

## 2025-06-25 PROCEDURE — 3078F DIAST BP <80 MM HG: CPT | Performed by: INTERNAL MEDICINE

## 2025-06-25 PROCEDURE — G2211 COMPLEX E/M VISIT ADD ON: HCPCS | Performed by: INTERNAL MEDICINE

## 2025-06-25 ASSESSMENT — PAIN SCALES - GENERAL: PAINLEVEL_OUTOF10: NO PAIN (0)

## 2025-06-25 NOTE — PROGRESS NOTES
Assessment & Plan       ICD-10-CM    1. Acute and chronic respiratory failure with hypercapnia (H)  J96.22       2. CP (cerebral palsy), spastic, diplegic (H)  G80.1       3. Feeding by G-tube (H)  Z93.1       4. Chronic lung disease of prematurity (H)  P27.9     P07.30       discussed with patient (or patient's parents/caregiver) pathophysiology of condition and treatment options.  doing well on current regimen and plan, keep up the great work!!!. Continue cares and follow-up with Dr Soto as scheduled. Patient's parent(s) verbalized understanding and are agreeable to this plan.      The longitudinal plan of care for the diagnosis(es)/condition(s) as documented were addressed during this visit. Due to the added complexity in care, I will continue to support Rj in the subsequent management and with ongoing continuity of care.       Return in about 3 months (around 9/25/2025) for Next scheduled follow-up visit, or sooner if symptoms persist or worsen.;        Subjective   Rj is a 29 year old, presenting for the following health issues:  Surgical Followup        6/25/2025     1:19 PM   Additional Questions   Roomed by Blanca MIDDLETON   Accompanied by Mom         6/25/2025     1:19 PM   Patient Reported Additional Medications   Patient reports taking the following new medications No     History of Present Illness       Reason for visit:  Follow up    He eats 0-1 servings of fruits and vegetables daily.He consumes 0 sweetened beverage(s) daily.He exercises with enough effort to increase his heart rate 9 or less minutes per day.  He exercises with enough effort to increase his heart rate 3 or less days per week.   He is taking medications regularly.      patient here with mom for follow-up visit, doing well overall. weight is stable and all home cares are at baseline and manageable. O2 sats have been within acceptable limits at night. tolerating all medications without side effects. No other concerns or complaints  "today.                     Objective    /76 (BP Location: Right arm, Patient Position: Sitting, Cuff Size: Adult Regular)   Pulse 101   Temp 97.7  F (36.5  C) (Temporal)   Resp 20   Ht 1.562 m (5' 1.5\")   Wt 47.6 kg (105 lb)   SpO2 94%   BMI 19.52 kg/m    Body mass index is 19.52 kg/m .  Physical Exam   GENERAL: awake alert and no distress; sitting up in chair and listneing to his CD player.   HENT: normocephalic and atrumatic, mucous membranes moist   RESP: lungs clear to auscultation - no rales, rhonchi or wheezes  CV: regular rate and rhythm, normal S1 S2, no S3 or S4, no murmur, click or rub, no peripheral edema   ABDOMEN: soft, nontender bowel sounds normal; GT in place  MS: no gross musculoskeletal defects noted, no edema; + global atrophy  SKIN: no suspicious lesions or rashes  NEURO: baseline strength and tone, mentation intact/baseline and speech normal  PSYCH: mentation appears normal, affect normal             Signed Electronically by: Micky Leyva MD    "

## 2025-07-17 DIAGNOSIS — E03.9 HYPOTHYROIDISM, UNSPECIFIED TYPE: ICD-10-CM

## 2025-07-17 RX ORDER — LEVOTHYROXINE SODIUM 25 UG/1
25 TABLET ORAL DAILY
Qty: 90 TABLET | Refills: 0 | Status: SHIPPED | OUTPATIENT
Start: 2025-07-17

## 2025-08-10 DIAGNOSIS — K59.09 CHRONIC CONSTIPATION: Primary | ICD-10-CM

## 2025-08-12 RX ORDER — POLYETHYLENE GLYCOL 3350 17 G/17G
POWDER, FOR SOLUTION ORAL
Qty: 510 G | Refills: 2 | Status: SHIPPED | OUTPATIENT
Start: 2025-08-12

## (undated) DEVICE — SYR 50ML LL W/O NDL 309653

## (undated) DEVICE — ESU ELEC BLADE 6" COATED E1450-6

## (undated) DEVICE — PACK LAP CHOLE SLC15LCFSD

## (undated) DEVICE — SU TIE VICRYL+ 3-0 12X18IN VIO VCP104G

## (undated) DEVICE — DRAIN JACKSON PRATT RESERVOIR 100ML SU130-1305

## (undated) DEVICE — DEVICE SUTURE PASSER 14GA WECK EFX EFXSP2

## (undated) DEVICE — BLADE CLIPPER 3M 9670

## (undated) DEVICE — BLADE KNIFE SURG 11 371111

## (undated) DEVICE — ESU PENCIL W/HOLSTER E2350H

## (undated) DEVICE — BARRIER SEPRAFILM 5X6" SINGLE SHEET 4301-02

## (undated) DEVICE — SU SILK 2-0 FS-1 18" 685G

## (undated) DEVICE — NDL BLUNT 19GA 1.5"

## (undated) DEVICE — GLOVE PROTEXIS BLUE W/NEU-THERA 6.5  2D73EB65

## (undated) DEVICE — ESU GROUND PAD UNIVERSAL W/O CORD

## (undated) DEVICE — DRAIN JACKSON PRATT 10MM FLAT 3/4 PERF

## (undated) DEVICE — STPL SKIN 35W 6.9MM  PXW35

## (undated) DEVICE — TUBING SMOKE EVAC ATTACHMENT E3590

## (undated) DEVICE — LINEN TOWEL PACK X10 5473

## (undated) DEVICE — SUCTION IRR STRYKERFLOW II W/TIP 250-070-520

## (undated) DEVICE — SU VICRYL 3-0 SH 27" J316H

## (undated) DEVICE — SU VICRYL+ 0 27 UR6 VLT VCP603H

## (undated) DEVICE — SU VICRYL 4-0 PS-2 18" UND J496H

## (undated) DEVICE — SUCTION TIP YANKAUER W/O VENT K86

## (undated) DEVICE — GLOVE BIOGEL PI MICRO INDICATOR UNDERGLOVE SZ 6.5 48965

## (undated) DEVICE — SU PDS II 0 CTX 60" Z990G

## (undated) DEVICE — SYR 10ML SLIP TIP W/O NDL 303134

## (undated) DEVICE — ESU ELEC BLADE 2.75" COATED/INSULATED E1455

## (undated) DEVICE — SYR BULB IRRIG DOVER 60 ML LATEX FREE 67000

## (undated) DEVICE — SU VICRYL 0 TIE 12X18" J906G

## (undated) DEVICE — STPL LINEAR CUT 55MM TLC55

## (undated) DEVICE — SOL WATER IRRIG 1000ML BOTTLE 2F7114

## (undated) DEVICE — ENDO TROCAR SLEEVE KII Z-THREADED 05X100MM CTS02

## (undated) DEVICE — SUCTION MANIFOLD NEPTUNE 2 SYS 4 PORT 0702-020-000

## (undated) DEVICE — PREP CHLORAPREP 26ML TINTED HI-LITE ORANGE 930815

## (undated) DEVICE — SU VICRYL 3-0 SH-1 CR 8X18" J772D

## (undated) DEVICE — SYR 20ML LL W/O NDL 302830

## (undated) DEVICE — SYR 10ML LL W/O NDL 302995

## (undated) DEVICE — PREP SKIN SCRUB TRAY 4461A

## (undated) DEVICE — SPONGE LAP 18X18" X8435

## (undated) DEVICE — LINEN TOWEL PACK X5 5464

## (undated) DEVICE — EVAC SYSTEM CLEAR FLOW SC082500

## (undated) DEVICE — NDL INSUFFLATION 13GA 120MM C2201

## (undated) DEVICE — POUCH TISSUE RETRIEVAL LAP 10MM 2.21" INTRO TRS100SB2

## (undated) DEVICE — ENDO TROCAR FIRST ENTRY KII FIOS Z-THRD 05X100MM CTF03

## (undated) DEVICE — ESU ENDO SCISSORS 5MM CVD 5DCS

## (undated) DEVICE — SU PDS II 1 TP-1 48" Z880G

## (undated) DEVICE — ENDO SCOPE WARMER LF TM500

## (undated) DEVICE — DRSG DRAIN 4X4" 7086

## (undated) DEVICE — BNDG ABDOMINAL BINDER 9X30-45" 79-89070

## (undated) DEVICE — ESU LIGASURE MARYLAND LAPAROSCOPIC SLR/DVDR 5MMX37CM LF1937

## (undated) DEVICE — CATH TRAY FOLEY COUDE SURESTEP 16FR W/DRN BAG LATEX A304416A

## (undated) DEVICE — ANTIFOG SOLUTION W/FOAM PAD CF-1002

## (undated) DEVICE — GLOVE PROTEXIS POWDER FREE 6.5 ORTHOPEDIC 2D73ET65

## (undated) DEVICE — DRSG GAUZE 4X4" TRAY

## (undated) DEVICE — ENDO DISSECTOR BLUNT 05MM  BTD05

## (undated) DEVICE — TIP CAUTERY L HOOK 36CM E377336C

## (undated) DEVICE — SOL NACL 0.9% IRRIG 1000ML BOTTLE 2F7124

## (undated) DEVICE — SOL NACL 0.9% INJ 1000ML BAG 2B1324X

## (undated) DEVICE — ESU HOLDER LAP INST DISP PURPLE LONG 330MM H-PRO-330

## (undated) DEVICE — DRSG GAUZE 4X4" 3033

## (undated) DEVICE — ENDO TROCAR FIRST ENTRY KII FIOS Z-THRD 12X100MM CTF73

## (undated) DEVICE — ANTIFOG SOLUTION SEE SHARP 150M TROCAR SWABS 30978

## (undated) DEVICE — PREP POVIDONE IODINE SOLUTION 10% 4OZ BOTTLE 29906-004

## (undated) DEVICE — ESU GROUND PAD ADULT W/CORD E7507

## (undated) DEVICE — CATH TRAY FOLEY SURESTEP 16FR DRAIN BAG STATOCK A899916

## (undated) DEVICE — LINEN HALF SHEET 5512

## (undated) DEVICE — ESU ELEC BLADE 6" COATED/INSULATED E1455-6

## (undated) DEVICE — PREP POVIDONE-IODINE 7.5% SCRUB 4OZ BOTTLE MDS093945

## (undated) DEVICE — CLIP APPLIER ENDO 5MM M/L LIGAMAX EL5ML

## (undated) DEVICE — LINEN FULL SHEET 5511

## (undated) DEVICE — SU SILK 2-0 SH 30" K833H

## (undated) DEVICE — DRAPE IOBAN C-SECTION 2057

## (undated) DEVICE — PACK MAJOR SBA15MAFSI

## (undated) DEVICE — SUCTION TIP POOLE K770

## (undated) DEVICE — ESU PENCIL W/SMOKE EVAC NEPTUNE STRYKER 0703-046-000

## (undated) DEVICE — SU VICRYL 2-0 TIE 12X18" J905T

## (undated) DEVICE — BAG CLEAR TRASH 1.3M 39X33" P4040C

## (undated) RX ORDER — BUPIVACAINE HYDROCHLORIDE 5 MG/ML
INJECTION, SOLUTION EPIDURAL; INTRACAUDAL; PERINEURAL
Status: DISPENSED
Start: 2025-04-08

## (undated) RX ORDER — CEFAZOLIN SODIUM/WATER 2 G/20 ML
SYRINGE (ML) INTRAVENOUS
Status: DISPENSED
Start: 2025-04-08

## (undated) RX ORDER — ALBUTEROL SULFATE 0.83 MG/ML
SOLUTION RESPIRATORY (INHALATION)
Status: DISPENSED
Start: 2025-04-08

## (undated) RX ORDER — METHOCARBAMOL 100 MG/ML
INJECTION, SOLUTION INTRAMUSCULAR; INTRAVENOUS
Status: DISPENSED
Start: 2025-04-08

## (undated) RX ORDER — ONDANSETRON 2 MG/ML
INJECTION INTRAMUSCULAR; INTRAVENOUS
Status: DISPENSED
Start: 2025-04-08

## (undated) RX ORDER — IPRATROPIUM BROMIDE AND ALBUTEROL SULFATE 2.5; .5 MG/3ML; MG/3ML
SOLUTION RESPIRATORY (INHALATION)
Status: DISPENSED
Start: 2025-04-08

## (undated) RX ORDER — HYDROMORPHONE HYDROCHLORIDE 1 MG/ML
INJECTION, SOLUTION INTRAMUSCULAR; INTRAVENOUS; SUBCUTANEOUS
Status: DISPENSED
Start: 2021-04-20

## (undated) RX ORDER — FENTANYL CITRATE 50 UG/ML
INJECTION, SOLUTION INTRAMUSCULAR; INTRAVENOUS
Status: DISPENSED
Start: 2021-04-19

## (undated) RX ORDER — DEXAMETHASONE SODIUM PHOSPHATE 4 MG/ML
INJECTION, SOLUTION INTRA-ARTICULAR; INTRALESIONAL; INTRAMUSCULAR; INTRAVENOUS; SOFT TISSUE
Status: DISPENSED
Start: 2025-04-08

## (undated) RX ORDER — PROPOFOL 10 MG/ML
INJECTION, EMULSION INTRAVENOUS
Status: DISPENSED
Start: 2025-04-08

## (undated) RX ORDER — BUPIVACAINE HYDROCHLORIDE AND EPINEPHRINE 5; 5 MG/ML; UG/ML
INJECTION, SOLUTION EPIDURAL; INTRACAUDAL; PERINEURAL
Status: DISPENSED
Start: 2025-04-08